# Patient Record
Sex: FEMALE | Race: WHITE | NOT HISPANIC OR LATINO | Employment: UNEMPLOYED | URBAN - METROPOLITAN AREA
[De-identification: names, ages, dates, MRNs, and addresses within clinical notes are randomized per-mention and may not be internally consistent; named-entity substitution may affect disease eponyms.]

---

## 2019-04-18 ENCOUNTER — HOSPITAL ENCOUNTER (OUTPATIENT)
Dept: OTHER | Facility: HOSPITAL | Age: 39
Setting detail: SPECIMEN
Discharge: HOME | End: 2019-04-18
Attending: ORTHOPAEDIC SURGERY | Admitting: ORTHOPAEDIC SURGERY

## 2019-04-19 ENCOUNTER — HOSPITAL ENCOUNTER (OUTPATIENT)
Dept: OTHER | Facility: HOSPITAL | Age: 39
Setting detail: SPECIMEN
Discharge: HOME | End: 2019-04-19
Attending: ORTHOPAEDIC SURGERY | Admitting: ORTHOPAEDIC SURGERY

## 2019-04-19 LAB
ANION GAP SERPL CALC-SCNC: 13.5 MMOL/L (ref 10–20)
BASOPHILS # BLD AUTO: 0 10*3/UL (ref 0–0.2)
BASOPHILS NFR BLD AUTO: 0 % (ref 0–2)
BUN SERPL-MCNC: 6 MG/DL (ref 8–20)
BUN/CREAT SERPL: 10 (ref 5.4–26.2)
CALCIUM SERPL-MCNC: 8.7 MG/DL (ref 8.9–10.3)
CHLORIDE SERPL-SCNC: 105 MMOL/L (ref 101–111)
CONV CO2: 21 MMOL/L (ref 22–32)
CREAT UR-MCNC: 0.6 MG/DL (ref 0.4–1)
DIFFERENTIAL METHOD BLD: (no result)
EOSINOPHIL # BLD AUTO: 0 % (ref 0–3)
EOSINOPHIL # BLD AUTO: 0 10*3/UL (ref 0–0.3)
ERYTHROCYTE [DISTWIDTH] IN BLOOD BY AUTOMATED COUNT: 13.9 % (ref 11.5–14.5)
GLUCOSE SERPL-MCNC: 156 MG/DL (ref 65–99)
HCT VFR BLD AUTO: 35 % (ref 35–49)
HGB BLD-MCNC: 11.3 G/DL (ref 12–15)
LYMPHOCYTES # BLD AUTO: 0.5 10*3/UL (ref 0.8–4.8)
LYMPHOCYTES NFR BLD AUTO: 4 % (ref 18–42)
MCH RBC QN AUTO: 28.9 PG (ref 26–32)
MCHC RBC AUTO-ENTMCNC: 32.2 G/DL (ref 32–36)
MCV RBC AUTO: 89.9 FL (ref 80–94)
MONOCYTES # BLD AUTO: 0.4 10*3/UL (ref 0.1–1.3)
MONOCYTES NFR BLD AUTO: 3 % (ref 2–11)
NEUTROPHILS # BLD AUTO: 12 10*3/UL (ref 2.3–8.6)
NEUTROPHILS NFR BLD AUTO: 93 % (ref 50–75)
NRBC BLD AUTO-RTO: 0 /100{WBCS}
NRBC/RBC NFR BLD MANUAL: 0 10*3/UL
PLATELET # BLD AUTO: 426 10*3/UL (ref 150–450)
PMV BLD AUTO: 9.2 FL (ref 7.4–10.4)
POTASSIUM SERPL-SCNC: 3.5 MMOL/L (ref 3.6–5.1)
RBC # BLD AUTO: 3.9 10*6/UL (ref 4–5.4)
SODIUM SERPL-SCNC: 136 MMOL/L (ref 136–144)
WBC # BLD AUTO: 12.9 10*3/UL (ref 4.5–11.5)

## 2022-04-07 ENCOUNTER — OFFICE VISIT (OUTPATIENT)
Dept: FAMILY MEDICINE CLINIC | Facility: CLINIC | Age: 42
End: 2022-04-07

## 2022-04-07 VITALS
SYSTOLIC BLOOD PRESSURE: 126 MMHG | WEIGHT: 193.8 LBS | DIASTOLIC BLOOD PRESSURE: 72 MMHG | HEART RATE: 85 BPM | TEMPERATURE: 97.7 F | BODY MASS INDEX: 34.34 KG/M2 | OXYGEN SATURATION: 99 % | HEIGHT: 63 IN

## 2022-04-07 DIAGNOSIS — R53.83 FATIGUE, UNSPECIFIED TYPE: ICD-10-CM

## 2022-04-07 DIAGNOSIS — J30.2 SEASONAL ALLERGIC RHINITIS, UNSPECIFIED TRIGGER: ICD-10-CM

## 2022-04-07 DIAGNOSIS — K92.1 BLOOD IN STOOL: ICD-10-CM

## 2022-04-07 DIAGNOSIS — L30.9 DERMATITIS: ICD-10-CM

## 2022-04-07 DIAGNOSIS — Z13.220 SCREENING FOR LIPID DISORDERS: ICD-10-CM

## 2022-04-07 DIAGNOSIS — Z76.89 ENCOUNTER TO ESTABLISH CARE: Primary | ICD-10-CM

## 2022-04-07 DIAGNOSIS — F41.1 GAD (GENERALIZED ANXIETY DISORDER): ICD-10-CM

## 2022-04-07 PROBLEM — Q76.1 CERVICAL FUSION SYNDROME: Status: ACTIVE | Noted: 2017-01-26

## 2022-04-07 PROBLEM — M48.02 SPINAL STENOSIS IN CERVICAL REGION: Status: ACTIVE | Noted: 2017-10-17

## 2022-04-07 PROBLEM — M50.30 BULGE OF CERVICAL DISC WITHOUT MYELOPATHY: Status: ACTIVE | Noted: 2022-04-07

## 2022-04-07 PROBLEM — J30.9 ALLERGIC RHINITIS: Status: ACTIVE | Noted: 2020-10-12

## 2022-04-07 LAB
25(OH)D3 SERPL-MCNC: 28.2 NG/ML (ref 30–100)
ALBUMIN SERPL-MCNC: 4.6 G/DL (ref 3.5–5.2)
ALBUMIN/GLOB SERPL: 1.6 G/DL
ALP SERPL-CCNC: 50 U/L (ref 39–117)
ALT SERPL W P-5'-P-CCNC: 14 U/L (ref 1–33)
ANION GAP SERPL CALCULATED.3IONS-SCNC: 11.6 MMOL/L (ref 5–15)
AST SERPL-CCNC: 12 U/L (ref 1–32)
BASOPHILS # BLD AUTO: 0.04 10*3/MM3 (ref 0–0.2)
BASOPHILS NFR BLD AUTO: 0.8 % (ref 0–1.5)
BILIRUB SERPL-MCNC: 0.2 MG/DL (ref 0–1.2)
BUN SERPL-MCNC: 16 MG/DL (ref 6–20)
BUN/CREAT SERPL: 20.8 (ref 7–25)
CALCIUM SPEC-SCNC: 9.5 MG/DL (ref 8.6–10.5)
CHLORIDE SERPL-SCNC: 103 MMOL/L (ref 98–107)
CHOLEST SERPL-MCNC: 189 MG/DL (ref 0–200)
CO2 SERPL-SCNC: 23.4 MMOL/L (ref 22–29)
CREAT SERPL-MCNC: 0.77 MG/DL (ref 0.57–1)
DEPRECATED RDW RBC AUTO: 40 FL (ref 37–54)
EGFRCR SERPLBLD CKD-EPI 2021: 100.8 ML/MIN/1.73
EOSINOPHIL # BLD AUTO: 0.1 10*3/MM3 (ref 0–0.4)
EOSINOPHIL NFR BLD AUTO: 1.9 % (ref 0.3–6.2)
ERYTHROCYTE [DISTWIDTH] IN BLOOD BY AUTOMATED COUNT: 12.3 % (ref 12.3–15.4)
FERRITIN SERPL-MCNC: 22.2 NG/ML (ref 13–150)
FOLATE SERPL-MCNC: 7.68 NG/ML (ref 4.78–24.2)
GLOBULIN UR ELPH-MCNC: 2.8 GM/DL
GLUCOSE SERPL-MCNC: 78 MG/DL (ref 65–99)
HCT VFR BLD AUTO: 39 % (ref 34–46.6)
HDLC SERPL-MCNC: 44 MG/DL (ref 40–60)
HGB BLD-MCNC: 12.7 G/DL (ref 12–15.9)
IMM GRANULOCYTES # BLD AUTO: 0.02 10*3/MM3 (ref 0–0.05)
IMM GRANULOCYTES NFR BLD AUTO: 0.4 % (ref 0–0.5)
IRON 24H UR-MRATE: 91 MCG/DL (ref 37–145)
IRON SATN MFR SERPL: 23 % (ref 20–50)
LDLC SERPL CALC-MCNC: 122 MG/DL (ref 0–100)
LDLC/HDLC SERPL: 2.72 {RATIO}
LYMPHOCYTES # BLD AUTO: 1.4 10*3/MM3 (ref 0.7–3.1)
LYMPHOCYTES NFR BLD AUTO: 26.4 % (ref 19.6–45.3)
MCH RBC QN AUTO: 28.7 PG (ref 26.6–33)
MCHC RBC AUTO-ENTMCNC: 32.6 G/DL (ref 31.5–35.7)
MCV RBC AUTO: 88.2 FL (ref 79–97)
MONOCYTES # BLD AUTO: 0.34 10*3/MM3 (ref 0.1–0.9)
MONOCYTES NFR BLD AUTO: 6.4 % (ref 5–12)
NEUTROPHILS NFR BLD AUTO: 3.4 10*3/MM3 (ref 1.7–7)
NEUTROPHILS NFR BLD AUTO: 64.1 % (ref 42.7–76)
NRBC BLD AUTO-RTO: 0 /100 WBC (ref 0–0.2)
PLATELET # BLD AUTO: 466 10*3/MM3 (ref 140–450)
PMV BLD AUTO: 10.1 FL (ref 6–12)
POTASSIUM SERPL-SCNC: 4.1 MMOL/L (ref 3.5–5.2)
PROT SERPL-MCNC: 7.4 G/DL (ref 6–8.5)
RBC # BLD AUTO: 4.42 10*6/MM3 (ref 3.77–5.28)
SODIUM SERPL-SCNC: 138 MMOL/L (ref 136–145)
TIBC SERPL-MCNC: 404 MCG/DL (ref 298–536)
TRANSFERRIN SERPL-MCNC: 271 MG/DL (ref 200–360)
TRIGL SERPL-MCNC: 126 MG/DL (ref 0–150)
TSH SERPL DL<=0.05 MIU/L-ACNC: 1.63 UIU/ML (ref 0.27–4.2)
VIT B12 BLD-MCNC: 459 PG/ML (ref 211–946)
VLDLC SERPL-MCNC: 23 MG/DL (ref 5–40)
WBC NRBC COR # BLD: 5.3 10*3/MM3 (ref 3.4–10.8)

## 2022-04-07 PROCEDURE — 99214 OFFICE O/P EST MOD 30 MIN: CPT | Performed by: NURSE PRACTITIONER

## 2022-04-07 PROCEDURE — 82746 ASSAY OF FOLIC ACID SERUM: CPT | Performed by: NURSE PRACTITIONER

## 2022-04-07 PROCEDURE — 85025 COMPLETE CBC W/AUTO DIFF WBC: CPT | Performed by: NURSE PRACTITIONER

## 2022-04-07 PROCEDURE — 82728 ASSAY OF FERRITIN: CPT | Performed by: NURSE PRACTITIONER

## 2022-04-07 PROCEDURE — 80061 LIPID PANEL: CPT | Performed by: NURSE PRACTITIONER

## 2022-04-07 PROCEDURE — 82306 VITAMIN D 25 HYDROXY: CPT | Performed by: NURSE PRACTITIONER

## 2022-04-07 PROCEDURE — 84443 ASSAY THYROID STIM HORMONE: CPT | Performed by: NURSE PRACTITIONER

## 2022-04-07 PROCEDURE — 80053 COMPREHEN METABOLIC PANEL: CPT | Performed by: NURSE PRACTITIONER

## 2022-04-07 PROCEDURE — 84466 ASSAY OF TRANSFERRIN: CPT | Performed by: NURSE PRACTITIONER

## 2022-04-07 PROCEDURE — 82607 VITAMIN B-12: CPT | Performed by: NURSE PRACTITIONER

## 2022-04-07 PROCEDURE — 96372 THER/PROPH/DIAG INJ SC/IM: CPT | Performed by: NURSE PRACTITIONER

## 2022-04-07 PROCEDURE — 83540 ASSAY OF IRON: CPT | Performed by: NURSE PRACTITIONER

## 2022-04-07 RX ORDER — DULOXETIN HYDROCHLORIDE 30 MG/1
30 CAPSULE, DELAYED RELEASE ORAL DAILY
Qty: 90 CAPSULE | Refills: 1 | Status: SHIPPED | OUTPATIENT
Start: 2022-04-07 | End: 2022-04-28

## 2022-04-07 RX ORDER — TRIAMCINOLONE ACETONIDE 1 MG/G
1 CREAM TOPICAL 2 TIMES DAILY
Qty: 80 G | Refills: 1 | Status: SHIPPED | OUTPATIENT
Start: 2022-04-07

## 2022-04-07 RX ORDER — TRIAMCINOLONE ACETONIDE 40 MG/ML
40 INJECTION, SUSPENSION INTRA-ARTICULAR; INTRAMUSCULAR ONCE
Status: COMPLETED | OUTPATIENT
Start: 2022-04-07 | End: 2022-04-07

## 2022-04-07 RX ORDER — TIZANIDINE 4 MG/1
8 TABLET ORAL 3 TIMES DAILY
COMMUNITY
End: 2022-05-19

## 2022-04-07 RX ADMIN — TRIAMCINOLONE ACETONIDE 40 MG: 40 INJECTION, SUSPENSION INTRA-ARTICULAR; INTRAMUSCULAR at 12:21

## 2022-04-07 NOTE — PROGRESS NOTES
Chief Complaint  Establish Care, anxiety depression    Subjective          Ebony Hamilton presents to [facility]  History of Present Illness  Presents today to establish care.  Previous PCP is Reina Kaiser at [facility] primary care in [facility].  She has anxiety.  She was previously treated with hydroxyzine and Lexapro.  She states the Lexapro caused her to sweat profusely daily.  She was also given hydroxyzine to help out with her anxiety and her insomnia.  She states the medication did not help much.    She was seen pain management specialist pain care in [place].  She has chronic neck pain.  Spinal stenosis in the cervical region, degenerative disc disease with a couple surgeries.  She is trying to switch to [facility].  She states when she spoke with the pain clinic in [place] that they did not refill her last medication.  She is in the process of setting up an appointment with [facility] pain and spine.  Previously taking Percocet 7.5-325 4 times daily.    She has endometriosis.  She has an appointment scheduled with a gynecologist on the 19th.    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  When she had her surgery and had effusion on her neck that the implants contained nickel which caused her to have a rash.  She is seeing an allergist prior.  Reports that she has several allergies to some foods and pollen.  In the last week she developed a rash on her hands that is started spread up her arms and feet.    She also reports having intermittent blood in her stool.  Last week she had blood clots.  History of hemorrhoids.    PHQ-9 Depression Screening  Little interest or pleasure in doing things? 2-->more than half the days   Feeling down, depressed, or hopeless? 3-->nearly every day   Trouble falling or staying asleep, or sleeping too much? 3-->nearly every  day   Feeling tired or having little energy? 3-->nearly every day   Poor appetite or overeating? 0-->not at all   Feeling bad about yourself - or that you are a failure or have let yourself or your family down? 0-->not at all   Trouble concentrating on things, such as reading the newspaper or watching television? 2-->more than half the days   Moving or speaking so slowly that other people could have noticed? Or the opposite - being so fidgety or restless that you have been moving around a lot more than usual? 1-->several days   Thoughts that you would be better off dead, or of hurting yourself in some way? 0-->not at all   PHQ-9 Total Score 14   If you checked off any problems, how difficult have these problems made it for you to do your work, take care of things at home, or get along with other people? very difficult           NICHOLE - 7 Anxiety    Date data was collected: 4/7/2022    Over the last 2 weeks, how often have you been bothered by any of the following problems?    1. Feeling nervous, anxious or on edge: Nearly every day = 3   2. Not being able to stop or control worrying Nearly every day = 3   3. Worrying too much about different things: Nearly every day = 3   4. Trouble relaxing: Nearly every day = 3   5. Being so restless that it is hard to sit still: Nearly every day = 3   6. Becoming easily annoyed or irritable: Nearly every day = 3   7. Feeling afraid as if something awful might happen: Nearly every day = 3    Total Score 21     If you checked off any problems, how difficult have these problems made it for you to do your work, take care of things at home or get along with other people? Somewhat difficult    ______________________________________________________________________  NICHOLE-7 SCORING CARD FOR SEVERITY DETERMINATION    Scoring -- add up all answers  Not at all =0; Serveral Days = 1; More than half the days - 2; Nearly every day =3      Interpretation of Total Score  Total Score Anxiety Severity  "  0-5 Mild   6-10 Moderate   11-15 Moderately Severe   15-21 Severe       Objective   Vital Signs:   /72   Pulse 85   Temp 97.7 °F (36.5 °C)   Ht 158.8 cm (62.5\")   Wt 87.9 kg (193 lb 12.8 oz)   SpO2 99%   BMI 34.88 kg/m²            Physical Exam  Vitals reviewed.   Constitutional:       Appearance: Normal appearance. She is well-developed.   HENT:      Head: Normocephalic and atraumatic.      Right Ear: External ear normal.      Left Ear: External ear normal.      Mouth/Throat:      Pharynx: No oropharyngeal exudate.   Eyes:      Conjunctiva/sclera: Conjunctivae normal.      Pupils: Pupils are equal, round, and reactive to light.   Cardiovascular:      Rate and Rhythm: Normal rate and regular rhythm.      Heart sounds: No murmur heard.    No friction rub. No gallop.   Pulmonary:      Effort: Pulmonary effort is normal.      Breath sounds: Normal breath sounds. No wheezing or rhonchi.   Abdominal:      General: Bowel sounds are normal. There is no distension.      Palpations: Abdomen is soft.      Tenderness: There is no abdominal tenderness.   Skin:     General: Skin is warm and dry.      Findings: Rash (Erythema rash on bilateral hand scattered, slightly going up arm, on feet as well.) present.   Neurological:      Mental Status: She is alert and oriented to person, place, and time.   Psychiatric:         Mood and Affect: Affect normal. Mood is anxious.         Behavior: Behavior normal.         Thought Content: Thought content normal.         Judgment: Judgment normal.        Result Review :                 Assessment and Plan    Diagnoses and all orders for this visit:    1. Encounter to establish care (Primary)    2. Fatigue, unspecified type  -     Ferritin  -     Iron Profile  -     Vitamin D 25 Hydroxy  -     Vitamin B12 & Folate    3. NICHOLE (generalized anxiety disorder)  -     CBC & Differential  -     Comprehensive Metabolic Panel  -     TSH Rfx On Abnormal To Free T4  -     Ambulatory Referral " to Psychiatry  -     DULoxetine (CYMBALTA) 30 MG capsule; Take 1 capsule by mouth Daily.  Dispense: 90 capsule; Refill: 1    4. Screening for lipid disorders  -     Lipid Panel    5. Seasonal allergic rhinitis, unspecified trigger    6. Dermatitis  -     triamcinolone acetonide (KENALOG-40) injection 40 mg  -     triamcinolone (KENALOG) 0.1 % cream; Apply 1 application topically to the appropriate area as directed 2 (Two) Times a Day.  Dispense: 80 g; Refill: 1    7. Blood in stool    Check a CBC ferritin iron for fatigue and blood in stool.  Check a vitamin D B12 folate for fatigue.  Start duloxetine 30 mg daily for anxiety and for chronic cervical neck pain.  Consult psychiatry for further evaluation and treatment.  For the dermatitis give Kenalog 40 IM injection today.  Apply triamcinolone 0.1% cream twice daily to the rash.      Follow Up   Return in about 1 week (around 4/14/2022), or if symptoms worsen or fail to improve, for Next scheduled follow up.  Patient was given instructions and counseling regarding her condition or for health maintenance advice. Please see specific information pulled into the AVS if appropriate.

## 2022-04-14 ENCOUNTER — OFFICE VISIT (OUTPATIENT)
Dept: FAMILY MEDICINE CLINIC | Facility: CLINIC | Age: 42
End: 2022-04-14

## 2022-04-14 VITALS
SYSTOLIC BLOOD PRESSURE: 124 MMHG | WEIGHT: 198.8 LBS | DIASTOLIC BLOOD PRESSURE: 88 MMHG | OXYGEN SATURATION: 98 % | BODY MASS INDEX: 35.22 KG/M2 | HEART RATE: 101 BPM | TEMPERATURE: 97.8 F | HEIGHT: 63 IN

## 2022-04-14 DIAGNOSIS — L30.9 DERMATITIS: ICD-10-CM

## 2022-04-14 DIAGNOSIS — K92.1 BLOOD IN STOOL: ICD-10-CM

## 2022-04-14 DIAGNOSIS — F41.1 GAD (GENERALIZED ANXIETY DISORDER): Primary | ICD-10-CM

## 2022-04-14 PROCEDURE — 99214 OFFICE O/P EST MOD 30 MIN: CPT | Performed by: NURSE PRACTITIONER

## 2022-04-14 RX ORDER — BUSPIRONE HYDROCHLORIDE 5 MG/1
5 TABLET ORAL 3 TIMES DAILY PRN
Qty: 90 TABLET | Refills: 2 | Status: SHIPPED | OUTPATIENT
Start: 2022-04-14 | End: 2022-04-28

## 2022-04-14 RX ORDER — HYDROCODONE BITARTRATE AND ACETAMINOPHEN 10; 325 MG/1; MG/1
1 TABLET ORAL EVERY 6 HOURS PRN
COMMUNITY
End: 2023-02-03

## 2022-04-14 NOTE — ASSESSMENT & PLAN NOTE
Anxiety insomnia are improving with Cymbalta 30 mg daily.  To help cover the afternoon evening hours of her anxiety we will start BuSpar 5 mg 3 times daily as needed.  She has a consultation with psychiatry in 2 weeks.

## 2022-04-14 NOTE — ASSESSMENT & PLAN NOTE
She has endometriosis.  She is going to see her OB/GYN next week.  The blood in stool only is occurring with prior her menstrual cycle.  Will consult general surgery for colonoscopy.

## 2022-04-14 NOTE — PROGRESS NOTES
"Chief Complaint  Anxiety, Depression, and Labs Only (Lab results )    Subjective          Ebony Hamilton presents to [facility]  History of Present Illness  Presents today for 1 week follow-up on depression anxiety, rash, and blood in stool.  Last week she was started on Cymbalta 30 mg daily.  She states the medication is helping a lot with her depression anxiety.  She feels more calm during the day.  She states she feels the medication wearing off in the afternoon and feels more anxious.  She is sleeping better at night.    She states over the past year 3 days prior to her menstrual cycle she will have blood and blood clots in her stool.  He states it is different than when she is on her menstrual cycle.  She has hemorrhoids.  She takes over-the-counter stool softener and fiber supplements.  She has a history of endometriosis.  She is seeing her OB/GYN next week.    Started seeing her new pain clinic doctor.  He resumed her Norco for her chronic neck pain.    The dermatitis rash that was on her hands and feet have resolved after receiving a Kenalog injection and applying Kenalog cream.    Reviewed labs from last week.  She has a mild elevation of her LDL.  Also low vitamin D.  She is taking over-the-counter vitamin D supplements.  Slightly elevated platelets.    Objective   Vital Signs:   /88   Pulse 101   Temp 97.8 °F (36.6 °C)   Ht 160 cm (63\")   Wt 90.2 kg (198 lb 12.8 oz)   SpO2 98%   BMI 35.22 kg/m²            Physical Exam  Vitals reviewed.   Constitutional:       Appearance: Normal appearance. She is well-developed.   HENT:      Head: Normocephalic and atraumatic.      Right Ear: External ear normal.      Left Ear: External ear normal.      Mouth/Throat:      Pharynx: No oropharyngeal exudate.   Eyes:      Conjunctiva/sclera: Conjunctivae normal.      Pupils: Pupils are equal, round, and reactive to light.   Cardiovascular:      Rate and Rhythm: Normal rate and " regular rhythm.      Heart sounds: No murmur heard.    No friction rub. No gallop.   Pulmonary:      Effort: Pulmonary effort is normal.      Breath sounds: Normal breath sounds. No wheezing or rhonchi.   Abdominal:      General: Bowel sounds are normal. There is no distension.      Palpations: Abdomen is soft.      Tenderness: There is no abdominal tenderness.   Skin:     General: Skin is warm and dry.   Neurological:      Mental Status: She is alert and oriented to person, place, and time.   Psychiatric:         Mood and Affect: Mood and affect normal.         Behavior: Behavior normal.         Thought Content: Thought content normal.         Judgment: Judgment normal.        Result Review :     Common labs    Common Labsle 4/7/22 4/7/22 4/7/22    1233 1233 1233   Glucose  78    BUN  16    Creatinine  0.77    Sodium  138    Potassium  4.1    Chloride  103    Calcium  9.5    Albumin  4.60    Total Bilirubin  0.2    Alkaline Phosphatase  50    AST (SGOT)  12    ALT (SGPT)  14    WBC 5.30     Hemoglobin 12.7     Hematocrit 39.0     Platelets 466 (A)     Total Cholesterol   189   Triglycerides   126   HDL Cholesterol   44   LDL Cholesterol    122 (A)   (A) Abnormal value                      Assessment and Plan {CC Problem List  Visit Diagnosis   ROS  Review (Popup)  [facility] Maintenance  Quality  BestPractice  Medications  SmartSets  SnapShot Encounters  Media :23}   Diagnoses and all orders for this visit:    1. NICHOLE (generalized anxiety disorder) (Primary)  Assessment & Plan:  Anxiety insomnia are improving with Cymbalta 30 mg daily.  To help cover the afternoon evening hours of her anxiety we will start BuSpar 5 mg 3 times daily as needed.  She has a consultation with psychiatry in 2 weeks.    Orders:  -     busPIRone (BUSPAR) 5 MG tablet; Take 1 tablet by mouth 3 (Three) Times a Day As Needed (anxiety).  Dispense: 90 tablet; Refill: 2    2. Blood in stool  Assessment & Plan:  She has endometriosis.  She  is going to see her OB/GYN next week.  The blood in stool only is occurring with prior her menstrual cycle.  Will consult general surgery for colonoscopy.     Orders:  -     Ambulatory Referral to General Surgery    3. Dermatitis  Comments:  Dermatitis has resolved      Follow Up   Return in about 4 weeks (around 5/12/2022), or if symptoms worsen or fail to improve, for Next scheduled follow up.  Patient was given instructions and counseling regarding her condition or for health maintenance advice. Please see specific information pulled into the AVS if appropriate.

## 2022-04-21 ENCOUNTER — OFFICE VISIT (OUTPATIENT)
Dept: SURGERY | Facility: CLINIC | Age: 42
End: 2022-04-21

## 2022-04-21 ENCOUNTER — PREP FOR SURGERY (OUTPATIENT)
Dept: OTHER | Facility: HOSPITAL | Age: 42
End: 2022-04-21

## 2022-04-21 VITALS — HEART RATE: 87 BPM | BODY MASS INDEX: 34.2 KG/M2 | RESPIRATION RATE: 18 BRPM | WEIGHT: 193 LBS | HEIGHT: 63 IN

## 2022-04-21 DIAGNOSIS — K59.03 DRUG-INDUCED CONSTIPATION: ICD-10-CM

## 2022-04-21 DIAGNOSIS — K62.5 BRBPR (BRIGHT RED BLOOD PER RECTUM): Primary | ICD-10-CM

## 2022-04-21 DIAGNOSIS — R19.7 DIARRHEA: ICD-10-CM

## 2022-04-21 DIAGNOSIS — K59.00 CONSTIPATION: ICD-10-CM

## 2022-04-21 DIAGNOSIS — R10.30 LOWER ABDOMINAL PAIN: ICD-10-CM

## 2022-04-21 DIAGNOSIS — R19.7 DIARRHEA, UNSPECIFIED TYPE: ICD-10-CM

## 2022-04-21 PROCEDURE — 99213 OFFICE O/P EST LOW 20 MIN: CPT | Performed by: NURSE PRACTITIONER

## 2022-04-21 RX ORDER — SODIUM CHLORIDE 0.9 % (FLUSH) 0.9 %
10 SYRINGE (ML) INJECTION AS NEEDED
Status: CANCELLED | OUTPATIENT
Start: 2022-04-21

## 2022-04-21 RX ORDER — SODIUM CHLORIDE 0.9 % (FLUSH) 0.9 %
3 SYRINGE (ML) INJECTION EVERY 12 HOURS SCHEDULED
Status: CANCELLED | OUTPATIENT
Start: 2022-04-21

## 2022-04-21 NOTE — PROGRESS NOTES
Chief Complaint: Colonoscopy (consult)    Subjective      Colonoscopy consultation       History of Present Illness  Ebony Hamilton is a 41 y.o. female presents to [facility] for a colonoscopy consultation.    Patient presents today on a referral from Son LEVY.    Patient reports with complaints of BRBPR, she feels like it hemorrhoid related. Admits that bleeding is significant enough that it fills the toilet. She reports that bleeding has been  On-going x  1 year.     Admits to lower abdominal pain. Reports that she is with diarrhea alternating constipation. She reports that she is taking pain medication daily for 2 previous neck surgery she has had.    Denies any family history of colorectal cancer.    No previous colonoscopy.    Objective     Past Medical History:   Diagnosis Date   • Anxiety    • Atrial fibrillation (HCC)    • Depression    • Endometriosis    • Injury of shoulder, right 2009       Past Surgical History:   Procedure Laterality Date   • CERVICAL ARTHRODESIS  01/14/2015    Donaldo Albarran   • EXPLORATORY LAPAROTOMY     • TUBAL ABDOMINAL LIGATION         Outpatient Medications Marked as Taking for the 4/21/22 encounter (Office Visit) with Madina Hassan APRN   Medication Sig Dispense Refill   • busPIRone (BUSPAR) 5 MG tablet Take 1 tablet by mouth 3 (Three) Times a Day As Needed (anxiety). 90 tablet 2   • cetirizine (ZyrTEC) 10 MG tablet Take 10 mg by mouth daily.     • DULoxetine (CYMBALTA) 30 MG capsule Take 1 capsule by mouth Daily. 90 capsule 1   • famotidine (PEPCID) 10 MG tablet Take 10 mg by mouth 2 (two) times a day.     • fexofenadine (ALLEGRA) 30 MG tablet Take 30 mg by mouth 2 (two) times a day.     • HYDROcodone-acetaminophen (NORCO)  MG per tablet Take 1 tablet by mouth Every 6 (Six) Hours As Needed for Moderate Pain .     • tiZANidine (ZANAFLEX) 4 MG tablet Take 8 mg by mouth 3 (Three) Times a Day.     • vitamin C (ASCORBIC ACID) 500 MG  "tablet Take 500 mg by mouth daily.         Allergies   Allergen Reactions   • Sulfa Antibiotics Anaphylaxis   • Ciprofloxacin Hallucinations   • Codeine Rash   • Gold-Containing Drug Products Rash   • Nickel Rash        Family History   Problem Relation Age of Onset   • Cancer Mother    • Heart disease Mother    • Hypertension Mother    • Hypertension Father    • Heart disease Other        Social History     Socioeconomic History   • Marital status:    Tobacco Use   • Smoking status: Former Smoker     Packs/day: 1.00     Years: 20.00     Pack years: 20.00     Quit date: 2019     Years since quitting: 3.3   • Smokeless tobacco: Never Used   Vaping Use   • Vaping Use: Never used   Substance and Sexual Activity   • Alcohol use: No   • Drug use: No   • Sexual activity: Defer       Review of Systems   Constitutional: Negative for chills and fever.   HENT: Negative for trouble swallowing.    Gastrointestinal: Positive for abdominal pain, anal bleeding, constipation and diarrhea. Negative for abdominal distention, blood in stool, nausea, rectal pain, vomiting, GERD and indigestion.        Vital Signs:   Pulse 87   Resp 18   Ht 160 cm (63\")   Wt 87.5 kg (193 lb)   BMI 34.19 kg/m²      Physical Exam  Constitutional:       Appearance: She is obese.   HENT:      Head: Normocephalic.   Cardiovascular:      Rate and Rhythm: Normal rate.   Pulmonary:      Effort: Pulmonary effort is normal.   Abdominal:      Palpations: Abdomen is soft.   Skin:     General: Skin is warm and dry.   Neurological:      General: No focal deficit present.      Mental Status: She is alert and oriented to person, place, and time.   Psychiatric:         Mood and Affect: Mood normal.         Thought Content: Thought content normal.          Result Review :          []  Laboratory  []  Radiology  []  Pathology  []  Microbiology  []  EKG/Telemetry   []  Cardiology/Vascular   [x]  Old records  Today I have reviewed Son DHALIWAL previous " office note.      Assessment and Plan    Diagnoses and all orders for this visit:    1. BRBPR (bright red blood per rectum) (Primary)    2. Diarrhea, unspecified type    3. Lower abdominal pain    4. Drug-induced constipation    white prep    Follow Up   Return for Schedule colonoscopy with Dr. Baird on 5/31/22@ [facility], [place].     Hospital arrival time: 10am    Possible risks/complications, benefits, and alternatives to surgical or invasive procedure have been explained to patient and/or legal guardian.     Patient has been evaluated and can tolerate anesthesia and/or sedation. Risks, benefits, and alternatives to anesthesia and sedation have been explained to patient and/or legal guardian.  Patient verbalizes understanding is will proceed with above plan.    Patient was given instructions and counseling regarding her condition or for health maintenance advice. Please see specific information pulled into the AVS if appropriate.

## 2022-04-26 NOTE — PROGRESS NOTES
"    Chief Complaint:  Anxiety    History of Present Illness: Ebony Hamilton is a 41 y.o. female who presents to the office today referred by PCP Son LEVY.  Patient denies feeling depressed since starting Cymbalta.  Patient states \"I feel better.\"  She denies having any difficulty with sleep.  Patient does note having nightmares that occur nightly.  Patient denies having any SI or HI.  She does have access to firearms.  Patient denies history of suicide attempt or self-harm.  Patient had BuSpar added to med regimen as she felt like Cymbalta was wearing off in the evening.  Patient does take Cymbalta every morning.  Patient notes feeling anxious and having to take BuSpar 3 times a day although this does improve her symptoms.  Patient continues to have anxiety with worrying about everything although notes improvement in anxiety since starting Cymbalta.  No panic attacks since starting Cymbalta.  Patient denies feeling restless, on edge or easily irritable since starting Cymbalta.  Patient denies PTSD symptoms.  No AVH.  Patient does note having life stressors over the past few years, including her  cheating on her nearly 3 years ago.  Patient states this still affects her and has difficulty with trust as it is.      Medical Record Review: Reviewed office visit note from 4/7/22, She has anxiety.  She was previously treated with hydroxyzine and Lexapro.  She states the Lexapro caused her to sweat profusely daily.  She was also given hydroxyzine to help out with her anxiety and her insomnia.  She states the medication did not help much.      PHQ-9 Depression Screening  Little interest or pleasure in doing things? 1-->several days   Feeling down, depressed, or hopeless? 0-->not at all   Trouble falling or staying asleep, or sleeping too much?     Feeling tired or having little energy?     Poor appetite or overeating?     Feeling bad about yourself - or that you are a failure or have let yourself or your " family down?     Trouble concentrating on things, such as reading the newspaper or watching television?     Moving or speaking so slowly that other people could have noticed? Or the opposite - being so fidgety or restless that you have been moving around a lot more than usual?     Thoughts that you would be better off dead, or of hurting yourself in some way?     PHQ-9 Total Score 1   If you checked off any problems, how difficult have these problems made it for you to do your work, take care of things at home, or get along with other people?           NICHOLE-7  Feeling nervous, anxious or on edge: Several days  Not being able to stop or control worrying: More than half the days  Worrying too much about different things: More than half the days  Trouble Relaxing: Several days  Being so restless that it is hard to sit still: Several days  Feeling afraid as if something awful might happen: Several days  Becoming easily annoyed or irritable: Several days  NICHOLE 7 Total Score: 9  If you checked any problems, how difficult have these problems made it for you to do your work, take care of things at home, or get along with other people: Somewhat difficult      ROS:  Review of Systems   Constitutional: Positive for diaphoresis and fatigue. Negative for appetite change and unexpected weight change.   HENT: Negative for drooling, tinnitus and trouble swallowing.    Eyes: Negative for visual disturbance.   Respiratory: Negative for cough, chest tightness and shortness of breath.    Cardiovascular: Negative for chest pain and palpitations.   Gastrointestinal: Positive for abdominal pain, constipation, diarrhea and nausea. Negative for vomiting.   Endocrine: Negative for cold intolerance and heat intolerance.   Genitourinary: Negative for difficulty urinating.   Musculoskeletal: Positive for myalgias. Negative for arthralgias.   Skin: Negative for rash.   Allergic/Immunologic: Negative for immunocompromised state.   Neurological:  Positive for headaches. Negative for dizziness, tremors and seizures.   Psychiatric/Behavioral: Positive for agitation and sleep disturbance. Negative for dysphoric mood, hallucinations, self-injury and suicidal ideas. The patient is nervous/anxious.        Problem List:  Patient Active Problem List   Diagnosis   • Acute postoperative pain   • Allergic rhinitis   • Bulge of cervical disc without myelopathy   • Cervical fusion syndrome   • Degeneration of intervertebral disc of cervical region   • Spinal stenosis in cervical region   • Impingement syndrome of shoulder region   • Intractable chronic migraine without aura   • Mitral valve disorder   • Tricuspid valve disorder   • Endometriosis   • Obesity   • SLAP lesion of left shoulder   • NICHOLE (generalized anxiety disorder)   • Blood in stool   • BRBPR (bright red blood per rectum)   • Constipation   • Diarrhea   • Lower abdominal pain       Current Medications:   Current Outpatient Medications   Medication Sig Dispense Refill   • cetirizine (ZyrTEC) 10 MG tablet Take 10 mg by mouth daily.     • famotidine (PEPCID) 10 MG tablet Take 10 mg by mouth 2 (two) times a day.     • fexofenadine (ALLEGRA) 30 MG tablet Take 30 mg by mouth 2 (two) times a day.     • HYDROcodone-acetaminophen (NORCO)  MG per tablet Take 1 tablet by mouth Every 6 (Six) Hours As Needed for Moderate Pain .     • tiZANidine (ZANAFLEX) 4 MG tablet Take 8 mg by mouth 3 (Three) Times a Day.     • triamcinolone (KENALOG) 0.1 % cream Apply 1 application topically to the appropriate area as directed 2 (Two) Times a Day. 80 g 1   • vitamin C (ASCORBIC ACID) 500 MG tablet Take 500 mg by mouth daily.     • busPIRone (BUSPAR) 7.5 MG tablet Take 1 tablet by mouth 3 (Three) Times a Day for 30 days. 90 tablet 2   • DULoxetine (Cymbalta) 60 MG capsule Take 1 capsule by mouth Daily. 30 capsule 2   • prazosin (MINIPRESS) 1 MG capsule Take 1 capsule by mouth Every Night for 30 days. 30 capsule 2     No current  facility-administered medications for this visit.       Discontinued Medications:  Medications Discontinued During This Encounter   Medication Reason   • busPIRone (BUSPAR) 5 MG tablet    • DULoxetine (CYMBALTA) 30 MG capsule        Allergy:   Allergies   Allergen Reactions   • Sulfa Antibiotics Anaphylaxis   • Ciprofloxacin Hallucinations   • Codeine Rash   • Gold-Containing Drug Products Rash   • Nickel Rash        Past Medical History:  Past Medical History:   Diagnosis Date   • Anxiety    • Atrial fibrillation (HCC)    • Chronic pain disorder    • Depression    • Endometriosis    • Injury of shoulder, right 2009   • Panic disorder        Past Surgical History:  Past Surgical History:   Procedure Laterality Date   • CERVICAL ARTHRODESIS  01/14/2015    Donaldo Albarran   • CERVICAL FUSION     • EXPLORATORY LAPAROTOMY     • TUBAL ABDOMINAL LIGATION         Past Psychiatric History:  Began Treatment: Initially 20 years ago and then 1 to 2 years ago.  Diagnoses: Anxiety  Psychiatrist: Denies  Therapist: Denies  Admission History: Denies  Medications/Treatment: Patient was initially put on Xanax (irritable the following day), Lexapro (sweating, GI symptoms, headache)  Self Harm: Denies  Suicide Attempts: Denies  Postpartum depression: Denies    Family Psychiatric History:   Diagnoses: Her mother has a history of depression, anxiety, and bipolar disorder (patient is not completely sure about bipolar diagnosis)  Substance use: Denies  Suicide Attempts/Completions: Denies    Family History   Problem Relation Age of Onset   • Depression Mother    • Bipolar disorder Mother    • Anxiety disorder Mother    • Cancer Mother    • Heart disease Mother    • Hypertension Mother    • Anxiety disorder Father    • Hypertension Father    • Dementia Paternal Uncle    • Dementia Paternal Grandmother    • Heart disease Other        Substance Abuse History:   Alcohol use: Denies  Nicotine: Denies  Illicit Drug Use: Denies  Longest Period  "Sober: Denies  Rehab/AA/NA: Denies    Social History:  Living Situation: Patient lives with  and their 17-year-old daughter.  Marital/Relationship History: Patient has been  to  for 15 years.  No abuse.  Children: She has a 17-year-old daughter and a 21-year-old daughter.  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  Education: Patient received her GED and attended school for EMS.   History: Denies  Legal: Denies    Social History     Socioeconomic History   • Marital status:    Tobacco Use   • Smoking status: Former Smoker     Packs/day: 1.00     Years: 20.00     Pack years: 20.00     Quit date: 2019     Years since quitting: 3.3   • Smokeless tobacco: Never Used   Vaping Use   • Vaping Use: Never used   Substance and Sexual Activity   • Alcohol use: No   • Drug use: Never   • Sexual activity: Yes       Developmental History:   Place of birth: Patient was born in [place].  Siblings: Denies  Childhood: Patient notes having trust issues since childhood.      Physical Exam:  Physical Exam    Appearance: Well-groomed with adequate hygiene, appears to be of stated age. Casually and neatly dressed, maintains good eye contact.   Behavior: Appropriate, cooperative. No acute distress.  Motor: No abnormal movements, tics or tremors are noted. No psychomotor agitation or retardation.  Speech: Coherent, spontaneous, appropriate with normal rate, volume, rhythm, and tone. Normal reaction time to questions. No hyperverbal or pressured speech.   Mood: \"I'm feeling great\"  Affect: Full range, appropriate, congruent with spontaneous emotional reactivity. Normal intensity. No emotional blunting. Pt is pleasant. Pt does not appear depressed or anxious.  Thought content: Negative suicidal ideations, negative homicidal ideations. Patient denies any obsession, compulsion, or phobia. No evidence of delusions.  Perceptions: Negative auditory hallucinations, negative visual " "hallucinations. Pt does not appear to be actively responding to internal stimuli.   Thought process: Logical, goal-directed, coherent, and linear with no evidence of flight of ideas, looseness of associations, thought blocking, circumstantiality, or tangentiality.   Insight/Judgement: Fair/fair  Cognition: Alert and oriented to person, place, and date. Memory intact for recent and remote events. Attention and concentration intact.     Vital Signs:   /70   Ht 160 cm (63\")   Wt 86.3 kg (190 lb 4.8 oz)   BMI 33.71 kg/m²      Lab Results:   Office Visit on 04/07/2022   Component Date Value Ref Range Status   • Glucose 04/07/2022 78  65 - 99 mg/dL Final   • BUN 04/07/2022 16  6 - 20 mg/dL Final   • Creatinine 04/07/2022 0.77  0.57 - 1.00 mg/dL Final   • Sodium 04/07/2022 138  136 - 145 mmol/L Final   • Potassium 04/07/2022 4.1  3.5 - 5.2 mmol/L Final   • Chloride 04/07/2022 103  98 - 107 mmol/L Final   • CO2 04/07/2022 23.4  22.0 - 29.0 mmol/L Final   • Calcium 04/07/2022 9.5  8.6 - 10.5 mg/dL Final   • Total Protein 04/07/2022 7.4  6.0 - 8.5 g/dL Final   • Albumin 04/07/2022 4.60  3.50 - 5.20 g/dL Final   • ALT (SGPT) 04/07/2022 14  1 - 33 U/L Final   • AST (SGOT) 04/07/2022 12  1 - 32 U/L Final   • Alkaline Phosphatase 04/07/2022 50  39 - 117 U/L Final   • Total Bilirubin 04/07/2022 0.2  0.0 - 1.2 mg/dL Final   • Globulin 04/07/2022 2.8  gm/dL Final   • A/G Ratio 04/07/2022 1.6  g/dL Final   • BUN/Creatinine Ratio 04/07/2022 20.8  7.0 - 25.0 Final   • Anion Gap 04/07/2022 11.6  5.0 - 15.0 mmol/L Final   • eGFR 04/07/2022 100.8  >60.0 mL/min/1.73 Final    National Kidney Foundation and American Society of Nephrology (ASN) Task Force recommended calculation based on the Chronic Kidney Disease Epidemiology Collaboration (CKD-EPI) equation refit without adjustment for race.   • Total Cholesterol 04/07/2022 189  0 - 200 mg/dL Final   • Triglycerides 04/07/2022 126  0 - 150 mg/dL Final   • HDL Cholesterol 04/07/2022 " 44  40 - 60 mg/dL Final   • LDL Cholesterol  04/07/2022 122 (A) 0 - 100 mg/dL Final   • VLDL Cholesterol 04/07/2022 23  5 - 40 mg/dL Final   • LDL/HDL Ratio 04/07/2022 2.72   Final   • TSH 04/07/2022 1.630  0.270 - 4.200 uIU/mL Final   • Ferritin 04/07/2022 22.20  13.00 - 150.00 ng/mL Final   • Iron 04/07/2022 91  37 - 145 mcg/dL Final   • Iron Saturation 04/07/2022 23  20 - 50 % Final   • Transferrin 04/07/2022 271  200 - 360 mg/dL Final   • TIBC 04/07/2022 404  298 - 536 mcg/dL Final   • 25 Hydroxy, Vitamin D 04/07/2022 28.2 (A) 30.0 - 100.0 ng/ml Final   • Folate 04/07/2022 7.68  4.78 - 24.20 ng/mL Final   • Vitamin B-12 04/07/2022 459  211 - 946 pg/mL Final   • WBC 04/07/2022 5.30  3.40 - 10.80 10*3/mm3 Final   • RBC 04/07/2022 4.42  3.77 - 5.28 10*6/mm3 Final   • Hemoglobin 04/07/2022 12.7  12.0 - 15.9 g/dL Final   • Hematocrit 04/07/2022 39.0  34.0 - 46.6 % Final   • MCV 04/07/2022 88.2  79.0 - 97.0 fL Final   • MCH 04/07/2022 28.7  26.6 - 33.0 pg Final   • MCHC 04/07/2022 32.6  31.5 - 35.7 g/dL Final   • RDW 04/07/2022 12.3  12.3 - 15.4 % Final   • RDW-SD 04/07/2022 40.0  37.0 - 54.0 fl Final   • MPV 04/07/2022 10.1  6.0 - 12.0 fL Final   • Platelets 04/07/2022 466 (A) 140 - 450 10*3/mm3 Final   • Neutrophil % 04/07/2022 64.1  42.7 - 76.0 % Final   • Lymphocyte % 04/07/2022 26.4  19.6 - 45.3 % Final   • Monocyte % 04/07/2022 6.4  5.0 - 12.0 % Final   • Eosinophil % 04/07/2022 1.9  0.3 - 6.2 % Final   • Basophil % 04/07/2022 0.8  0.0 - 1.5 % Final   • Immature Grans % 04/07/2022 0.4  0.0 - 0.5 % Final   • Neutrophils, Absolute 04/07/2022 3.40  1.70 - 7.00 10*3/mm3 Final   • Lymphocytes, Absolute 04/07/2022 1.40  0.70 - 3.10 10*3/mm3 Final   • Monocytes, Absolute 04/07/2022 0.34  0.10 - 0.90 10*3/mm3 Final   • Eosinophils, Absolute 04/07/2022 0.10  0.00 - 0.40 10*3/mm3 Final   • Basophils, Absolute 04/07/2022 0.04  0.00 - 0.20 10*3/mm3 Final   • Immature Grans, Absolute 04/07/2022 0.02  0.00 - 0.05 10*3/mm3  Final   • [facility] 04/07/2022 0.0  0.0 - 0.2 /100 WBC Final       EKG Results:  No orders to display       Imaging Results:  No Images in the past 120 days found..      Assessment/Plan   Diagnoses and all orders for this visit:    1. Generalized anxiety disorder (Primary)  -     busPIRone (BUSPAR) 7.5 MG tablet; Take 1 tablet by mouth 3 (Three) Times a Day for 30 days.  Dispense: 90 tablet; Refill: 2  -     DULoxetine (Cymbalta) 60 MG capsule; Take 1 capsule by mouth Daily.  Dispense: 30 capsule; Refill: 2    2. Episode of recurrent major depressive disorder, unspecified depression episode severity (HCC)  -     busPIRone (BUSPAR) 7.5 MG tablet; Take 1 tablet by mouth 3 (Three) Times a Day for 30 days.  Dispense: 90 tablet; Refill: 2  -     DULoxetine (Cymbalta) 60 MG capsule; Take 1 capsule by mouth Daily.  Dispense: 30 capsule; Refill: 2    3. Nightmares  -     prazosin (MINIPRESS) 1 MG capsule; Take 1 capsule by mouth Every Night for 30 days.  Dispense: 30 capsule; Refill: 2    Presentation seems most consistent with NICHOLE, MDD, and nightmares.  Patient appears to be doing very well with current med regimen.  Will increase Cymbalta to 60 mg to target depression, anxiety, and overall mood.  Patient would like to have BuSpar slightly increased as it is helpful when she takes it.  We will increase BuSpar to 7.5 mg to target depression, anxiety.  Discussed that patient may not need BuSpar once increasing Cymbalta to 60 mg and to continue monitoring for this.  We will start the patient on prazosin for management of nightmares.  Patient is interested in therapy.  Will refer to Faiza.  Follow-up in 8 weeks.  Addressed all questions and concerns.    Visit Diagnoses:    ICD-10-CM ICD-9-CM   1. Generalized anxiety disorder  F41.1 300.02   2. Episode of recurrent major depressive disorder, unspecified depression episode severity (HCC)  F33.9 296.30   3. Nightmares  F51.5 307.47       PLAN:  1. Safety: No acute safety concerns at  this time.   2. Therapy: Will refer to Faiza Small LCSW.   3. Risk Assessment: Risk of self-harm acutely is low to moderate.  Risk factors include anxiety disorder, mood disorder, access to firearms, and recent psychosocial stressors (pandemic). Protective factors include no family history, no present SI, no history of suicide attempts or self-harm in the past, minimal AODA, healthcare seeking, future orientation, willingness to engage in care.  Risk of self-harm chronically is also low to moderate, but could be further elevated in the event of treatment noncompliance and/or AODA.  4. Labs/Diagnostics Ordered:   No orders of the defined types were placed in this encounter.    5. Medications:   New Medications Ordered This Visit   Medications   • busPIRone (BUSPAR) 7.5 MG tablet     Sig: Take 1 tablet by mouth 3 (Three) Times a Day for 30 days.     Dispense:  90 tablet     Refill:  2   • DULoxetine (Cymbalta) 60 MG capsule     Sig: Take 1 capsule by mouth Daily.     Dispense:  30 capsule     Refill:  2   • prazosin (MINIPRESS) 1 MG capsule     Sig: Take 1 capsule by mouth Every Night for 30 days.     Dispense:  30 capsule     Refill:  2       Discussed all risks, benefits, alternatives, and side effects of Buspirone including but not limited to GI upset, dizziness, sedation, HA, nervousness, restlessness, and serotonin syndrome.  Pt educated on the need to practice safe sex while taking this med. Discussed the need for pt to immediately call the office for any new or worsening symptoms, and all other concerns. Pt educated on med compliance. Pt verbalized understanding and is agreeable to taking Buspirone. Addressed all questions and concerns.     Discussed all risks, benefits, alternatives, and side effects of Duloxetine including but not limited to GI upset, sexual dysfunction, bleeding risk, seizure risk, weight loss, insomnia, diaphoresis, drowsiness, headache, dizziness, fatigue, activation of vernon or  hypomania, increased fragility fracture risk, hyponatremia, increased BP, hepatotoxicity, ocular effects, withdrawal syndrome following abrupt discontinuation, serotonin syndrome, and activation of suicidal ideation and behavior.  Pt educated on the need to practice safe sex while taking this med. Discussed the need for pt to immediately call the office for any new or worsening symptoms, such as worsening depression; feeling nervous or restless; suicidal thoughts or actions; or other changes changes in mood or behavior, and all other concerns. Pt educated on med compliance and the risks of suddenly stopping this medication or missing doses. Pt verbalized understanding and is agreeable to taking Duloxetine. Addressed all questions and concerns.     Prazosin, Risks, benefits, alternatives, and side effects discussed with patient including sedation, dizziness/falls risk, hypotension, GI upset. After discussion of these risks and benefits, the patient voiced understanding and agreed to proceed.      6. Follow up:   F/u in 8 weeks.     TREATMENT PLAN/GOALS: Continue supportive psychotherapy efforts and medications as indicated. Treatment and medication options discussed during today's visit. Patient ackowledged and verbally consented to continue with current treatment plan and was educated on the importance of compliance with treatment and follow-up appointments.    MEDICATION ISSUES:  JESÚS reviewed as expected.  Discussed medication options and treatment plan of prescribed medication as well as the risks, benefits, and side effects including potential falls, possible impaired driving and metabolic adversities among others. Patient is agreeable to call the office with any worsening of symptoms or onset of side effects. Patient is agreeable to call 911 or go to the nearest ER should he/she begin having SI/HI. No medication side effects or related complaints today.            This document has been electronically signed  by Kelly Lindsay PA-C  April 28, 2022 12:04 EDT      Part of this note may be an electronic transcription/translation of spoken language to printed text using the Dragon Dictation System.

## 2022-04-28 ENCOUNTER — OFFICE VISIT (OUTPATIENT)
Dept: BEHAVIORAL HEALTH | Facility: CLINIC | Age: 42
End: 2022-04-28

## 2022-04-28 VITALS
WEIGHT: 190.3 LBS | HEIGHT: 63 IN | DIASTOLIC BLOOD PRESSURE: 70 MMHG | SYSTOLIC BLOOD PRESSURE: 120 MMHG | BODY MASS INDEX: 33.72 KG/M2

## 2022-04-28 DIAGNOSIS — F41.1 GENERALIZED ANXIETY DISORDER: Primary | ICD-10-CM

## 2022-04-28 DIAGNOSIS — F33.9 EPISODE OF RECURRENT MAJOR DEPRESSIVE DISORDER, UNSPECIFIED DEPRESSION EPISODE SEVERITY: ICD-10-CM

## 2022-04-28 DIAGNOSIS — F51.5 NIGHTMARES: ICD-10-CM

## 2022-04-28 PROCEDURE — 90792 PSYCH DIAG EVAL W/MED SRVCS: CPT | Performed by: PHYSICIAN ASSISTANT

## 2022-04-28 RX ORDER — DULOXETIN HYDROCHLORIDE 60 MG/1
60 CAPSULE, DELAYED RELEASE ORAL DAILY
Qty: 30 CAPSULE | Refills: 2 | Status: SHIPPED | OUTPATIENT
Start: 2022-04-28 | End: 2022-06-23 | Stop reason: SDUPTHER

## 2022-04-28 RX ORDER — PRAZOSIN HYDROCHLORIDE 1 MG/1
1 CAPSULE ORAL NIGHTLY
Qty: 30 CAPSULE | Refills: 2 | Status: SHIPPED | OUTPATIENT
Start: 2022-04-28 | End: 2022-05-19 | Stop reason: SDUPTHER

## 2022-04-28 RX ORDER — BUSPIRONE HYDROCHLORIDE 7.5 MG/1
7.5 TABLET ORAL 3 TIMES DAILY
Qty: 90 TABLET | Refills: 2 | Status: SHIPPED | OUTPATIENT
Start: 2022-04-28 | End: 2022-05-19 | Stop reason: SDUPTHER

## 2022-05-02 ENCOUNTER — OFFICE VISIT (OUTPATIENT)
Dept: PSYCHIATRY | Facility: CLINIC | Age: 42
End: 2022-05-02

## 2022-05-02 DIAGNOSIS — F41.1 GAD (GENERALIZED ANXIETY DISORDER): Primary | ICD-10-CM

## 2022-05-02 PROCEDURE — 90837 PSYTX W PT 60 MINUTES: CPT | Performed by: SOCIAL WORKER
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
## 2022-05-19 ENCOUNTER — OFFICE VISIT (OUTPATIENT)
Dept: FAMILY MEDICINE CLINIC | Facility: CLINIC | Age: 42
End: 2022-05-19

## 2022-05-19 VITALS
SYSTOLIC BLOOD PRESSURE: 116 MMHG | WEIGHT: 192 LBS | TEMPERATURE: 97.3 F | HEIGHT: 63 IN | DIASTOLIC BLOOD PRESSURE: 82 MMHG | BODY MASS INDEX: 34.02 KG/M2 | HEART RATE: 82 BPM | OXYGEN SATURATION: 96 %

## 2022-05-19 DIAGNOSIS — L30.9 DERMATITIS: Primary | ICD-10-CM

## 2022-05-19 DIAGNOSIS — F41.1 GENERALIZED ANXIETY DISORDER: ICD-10-CM

## 2022-05-19 DIAGNOSIS — F51.5 NIGHTMARES: ICD-10-CM

## 2022-05-19 DIAGNOSIS — F33.9 EPISODE OF RECURRENT MAJOR DEPRESSIVE DISORDER, UNSPECIFIED DEPRESSION EPISODE SEVERITY: ICD-10-CM

## 2022-05-19 PROCEDURE — 99214 OFFICE O/P EST MOD 30 MIN: CPT | Performed by: NURSE PRACTITIONER

## 2022-05-19 RX ORDER — METHOCARBAMOL 750 MG/1
TABLET, FILM COATED ORAL
COMMUNITY
Start: 2022-05-16 | End: 2022-05-31 | Stop reason: HOSPADM

## 2022-05-19 RX ORDER — METHOCARBAMOL 750 MG/1
TABLET, FILM COATED ORAL EVERY 8 HOURS SCHEDULED
COMMUNITY
End: 2022-05-19 | Stop reason: SDUPTHER

## 2022-05-19 RX ORDER — PRAZOSIN HYDROCHLORIDE 1 MG/1
2 CAPSULE ORAL NIGHTLY
Qty: 90 CAPSULE | Refills: 2 | Status: SHIPPED | OUTPATIENT
Start: 2022-05-19 | End: 2022-06-23 | Stop reason: SDUPTHER

## 2022-05-19 RX ORDER — DULOXETIN HYDROCHLORIDE 30 MG/1
CAPSULE, DELAYED RELEASE ORAL
COMMUNITY
End: 2022-05-19

## 2022-05-19 RX ORDER — BUSPIRONE HYDROCHLORIDE 5 MG/1
TABLET ORAL
COMMUNITY
End: 2022-05-19

## 2022-05-19 RX ORDER — BUSPIRONE HYDROCHLORIDE 7.5 MG/1
15 TABLET ORAL 3 TIMES DAILY
Qty: 90 TABLET | Refills: 2 | Status: SHIPPED | OUTPATIENT
Start: 2022-05-19 | End: 2022-06-23 | Stop reason: SDUPTHER
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
## 2022-05-31 ENCOUNTER — ANESTHESIA EVENT (OUTPATIENT)
Dept: GASTROENTEROLOGY | Facility: HOSPITAL | Age: 42
End: 2022-05-31

## 2022-05-31 ENCOUNTER — ANESTHESIA (OUTPATIENT)
Dept: GASTROENTEROLOGY | Facility: HOSPITAL | Age: 42
End: 2022-05-31

## 2022-05-31 ENCOUNTER — HOSPITAL ENCOUNTER (OUTPATIENT)
Facility: HOSPITAL | Age: 42
Setting detail: HOSPITAL OUTPATIENT SURGERY
Discharge: HOME | End: 2022-05-31
Attending: SURGERY | Admitting: SURGERY

## 2022-05-31 VITALS
BODY MASS INDEX: 33.74 KG/M2 | WEIGHT: 190.48 LBS | DIASTOLIC BLOOD PRESSURE: 74 MMHG | HEART RATE: 59 BPM | RESPIRATION RATE: 18 BRPM | SYSTOLIC BLOOD PRESSURE: 110 MMHG | TEMPERATURE: 97.4 F | OXYGEN SATURATION: 98 %

## 2022-05-31 DIAGNOSIS — K62.5 BRBPR (BRIGHT RED BLOOD PER RECTUM): ICD-10-CM

## 2022-05-31 DIAGNOSIS — R19.7 DIARRHEA: ICD-10-CM

## 2022-05-31 DIAGNOSIS — R10.30 LOWER ABDOMINAL PAIN: ICD-10-CM

## 2022-05-31 DIAGNOSIS — K59.00 CONSTIPATION: ICD-10-CM

## 2022-05-31 PROCEDURE — 25010000002 PROPOFOL 10 MG/ML EMULSION: Performed by: NURSE ANESTHETIST, CERTIFIED REGISTERED

## 2022-05-31 RX ORDER — SODIUM CHLORIDE, SODIUM LACTATE, POTASSIUM CHLORIDE, CALCIUM CHLORIDE 600; 310; 30; 20 MG/100ML; MG/100ML; MG/100ML; MG/100ML
30 INJECTION, SOLUTION INTRAVENOUS CONTINUOUS
Status: DISCONTINUED | OUTPATIENT
Start: 2022-05-31 | End: 2022-05-31 | Stop reason: HOSPADM

## 2022-05-31 RX ORDER — TIZANIDINE HYDROCHLORIDE 4 MG/1
4 CAPSULE, GELATIN COATED ORAL 3 TIMES DAILY
COMMUNITY
End: 2022-08-04

## 2022-05-31 RX ORDER — SODIUM CHLORIDE 0.9 % (FLUSH) 0.9 %
10 SYRINGE (ML) INJECTION AS NEEDED
Status: DISCONTINUED | OUTPATIENT
Start: 2022-05-31 | End: 2022-05-31 | Stop reason: HOSPADM

## 2022-05-31 RX ORDER — PROPOFOL 10 MG/ML
VIAL (ML) INTRAVENOUS AS NEEDED
Status: DISCONTINUED | OUTPATIENT
Start: 2022-05-31 | End: 2022-05-31 | Stop reason: SURG

## 2022-05-31 RX ORDER — SODIUM CHLORIDE 0.9 % (FLUSH) 0.9 %
3 SYRINGE (ML) INJECTION EVERY 12 HOURS SCHEDULED
Status: DISCONTINUED | OUTPATIENT
Start: 2022-05-31 | End: 2022-05-31 | Stop reason: HOSPADM

## 2022-05-31 RX ORDER — LIDOCAINE HYDROCHLORIDE 20 MG/ML
INJECTION, SOLUTION EPIDURAL; INFILTRATION; INTRACAUDAL; PERINEURAL AS NEEDED
Status: DISCONTINUED | OUTPATIENT
Start: 2022-05-31 | End: 2022-05-31 | Stop reason: SURG

## 2022-05-31 RX ADMIN — SODIUM CHLORIDE, POTASSIUM CHLORIDE, SODIUM LACTATE AND CALCIUM CHLORIDE 30 ML/HR: 600; 310; 30; 20 INJECTION, SOLUTION INTRAVENOUS at 12:03

## 2022-05-31 RX ADMIN — PROPOFOL 50 MG: 10 INJECTION, EMULSION INTRAVENOUS at 12:21

## 2022-05-31 RX ADMIN — LIDOCAINE HYDROCHLORIDE 60 MG: 20 INJECTION, SOLUTION EPIDURAL; INFILTRATION; INTRACAUDAL; PERINEURAL at 12:21

## 2022-05-31 RX ADMIN — PROPOFOL 250 MCG/KG/MIN: 10 INJECTION, EMULSION INTRAVENOUS at 12:21

## 2022-05-31 NOTE — ANESTHESIA POSTPROCEDURE EVALUATION
Patient: Ebony Hamilton    Procedure Summary     Date: 05/31/22 Room / Location: [facility] ENDOSCOPY 3 / [facility] ENDOSCOPY    Anesthesia Start: 1220 Anesthesia Stop: 1242    Procedures:       COLONOSCOPY (N/A )      HEMORRHOID BANDING (N/A Rectum) Diagnosis:       BRBPR (bright red blood per rectum)      Constipation      Diarrhea      Lower abdominal pain      (BRBPR (bright red blood per rectum) [K62.5])      (Constipation [K59.00])      (Diarrhea [R19.7])      (Lower abdominal pain [R10.30])    Surgeons: Shabbir Baird MD Provider: Bryce Nettles MD    Anesthesia Type: general ASA Status: 2          Anesthesia Type: general    Vitals  Vitals Value Taken Time   /74 05/31/22 1301   Temp 36.3 °C (97.4 °F) 05/31/22 1240   Pulse 67 05/31/22 1303   Resp 18 05/31/22 1300   SpO2 98 % 05/31/22 1303   Vitals shown include unvalidated device data.        Post Anesthesia Care and Evaluation    Patient location during evaluation: bedside  Patient participation: complete - patient participated  Level of consciousness: awake  Pain management: adequate  Airway patency: patent  Anesthetic complications: No anesthetic complications  PONV Status: none  Cardiovascular status: acceptable  Respiratory status: acceptable  Hydration status: acceptable    Comments: An Anesthesiologist personally participated in the most demanding procedures (including induction and emergence if applicable) in the anesthesia plan, monitored the course of anesthesia administration at frequent intervals and remained physically present and available for immediate diagnosis and treatment of emergencies.

## 2022-05-31 NOTE — ANESTHESIA PREPROCEDURE EVALUATION
Anesthesia Evaluation     Patient summary reviewed and Nursing notes reviewed   no history of anesthetic complications:  NPO Solid Status: > 8 hours  NPO Liquid Status: > 2 hours           Airway   Mallampati: II  TM distance: >3 FB  Neck ROM: limited  No difficulty expected  Dental      Pulmonary - negative pulmonary ROS and normal exam    breath sounds clear to auscultation  Cardiovascular - normal exam  Exercise tolerance: good (4-7 METS)    Rhythm: regular  Rate: normal    (+) dysrhythmias Atrial Fib,       Neuro/Psych  (+) psychiatric history Anxiety and Depression,    GI/Hepatic/Renal/Endo    (+)  GI bleeding ,     Musculoskeletal     (+) neck stiffness,   Abdominal    Substance History - negative use     OB/GYN negative ob/gyn ROS         Other - negative ROS                       Anesthesia Plan    ASA 2     general       Anesthetic plan, all risks, benefits, and alternatives have been provided, discussed and informed consent has been obtained with: patient and spouse/significant other.        CODE STATUS:

## 2022-06-13 PROBLEM — M96.1 CERVICAL POST-LAMINECTOMY SYNDROME: Status: ACTIVE | Noted: 2022-06-13

## 2022-06-13 PROBLEM — M79.10 MYALGIA: Status: ACTIVE | Noted: 2022-05-17

## 2022-06-13 PROBLEM — M54.12 CERVICAL RADICULOPATHY: Status: ACTIVE | Noted: 2022-06-13

## 2022-06-22 ENCOUNTER — TELEPHONE (OUTPATIENT)
Dept: SURGERY | Facility: CLINIC | Age: 42
End: 2022-06-22

## 2022-06-22 NOTE — TELEPHONE ENCOUNTER
Caller: LINDSAY TINSLEY    Relationship: SELF    Best call back number: [phone]    What form or medical record are you requesting: COLONOSCOPY OP NOTE    Who is requesting this form or medical record from you: TEENA FARRAR (OBGYSHAWNA @ [facility])    How would you like to receive the form or medical records (pick-up, mail, fax): FAX  If fax, what is the fax number: [phone]        Timeframe paperwork needed: ASAP    Additional notes: NONE

## 2022-06-23 ENCOUNTER — OFFICE VISIT (OUTPATIENT)
Dept: BEHAVIORAL HEALTH | Facility: CLINIC | Age: 42
End: 2022-06-23

## 2022-06-23 DIAGNOSIS — F51.5 NIGHTMARES: ICD-10-CM

## 2022-06-23 DIAGNOSIS — F33.1 MODERATE EPISODE OF RECURRENT MAJOR DEPRESSIVE DISORDER: ICD-10-CM

## 2022-06-23 DIAGNOSIS — F41.1 GENERALIZED ANXIETY DISORDER: ICD-10-CM

## 2022-06-23 PROCEDURE — 99213 OFFICE O/P EST LOW 20 MIN: CPT | Performed by: PHYSICIAN ASSISTANT

## 2022-06-23 RX ORDER — BUSPIRONE HYDROCHLORIDE 7.5 MG/1
15 TABLET ORAL 3 TIMES DAILY
Qty: 90 TABLET | Refills: 2 | Status: SHIPPED | OUTPATIENT
Start: 2022-06-23 | End: 2022-08-04

## 2022-06-23 RX ORDER — DULOXETIN HYDROCHLORIDE 30 MG/1
CAPSULE, DELAYED RELEASE ORAL
Qty: 30 CAPSULE | Refills: 2 | Status: SHIPPED | OUTPATIENT
Start: 2022-06-23 | End: 2022-08-04 | Stop reason: SDUPTHER

## 2022-06-23 RX ORDER — PRAZOSIN HYDROCHLORIDE 1 MG/1
2 CAPSULE ORAL NIGHTLY
Qty: 90 CAPSULE | Refills: 2 | Status: SHIPPED | OUTPATIENT
Start: 2022-06-23 | End: 2022-08-04

## 2022-06-23 RX ORDER — DULOXETIN HYDROCHLORIDE 60 MG/1
CAPSULE, DELAYED RELEASE ORAL
Qty: 30 CAPSULE | Refills: 2 | Status: SHIPPED | OUTPATIENT
Start: 2022-06-23 | End: 2022-08-04 | Stop reason: SDUPTHER

## 2022-06-24 ENCOUNTER — TELEPHONE (OUTPATIENT)
Dept: PSYCHIATRY | Facility: CLINIC | Age: 42
End: 2022-06-24

## 2022-08-04 ENCOUNTER — OFFICE VISIT (OUTPATIENT)
Dept: BEHAVIORAL HEALTH | Facility: CLINIC | Age: 42
End: 2022-08-04

## 2022-08-04 VITALS
WEIGHT: 190.8 LBS | BODY MASS INDEX: 33.81 KG/M2 | DIASTOLIC BLOOD PRESSURE: 70 MMHG | SYSTOLIC BLOOD PRESSURE: 120 MMHG | HEIGHT: 63 IN

## 2022-08-04 DIAGNOSIS — G47.00 INSOMNIA, UNSPECIFIED TYPE: ICD-10-CM

## 2022-08-04 DIAGNOSIS — F41.1 GENERALIZED ANXIETY DISORDER: Primary | ICD-10-CM

## 2022-08-04 DIAGNOSIS — F33.1 MODERATE EPISODE OF RECURRENT MAJOR DEPRESSIVE DISORDER: ICD-10-CM

## 2022-08-04 DIAGNOSIS — F51.5 NIGHTMARES: ICD-10-CM

## 2022-08-04 PROBLEM — N80.03 ADENOMYOSIS OF UTERUS: Status: ACTIVE | Noted: 2022-07-06

## 2022-08-04 PROCEDURE — 99213 OFFICE O/P EST LOW 20 MIN: CPT | Performed by: PHYSICIAN ASSISTANT

## 2022-08-04 RX ORDER — HYDROCODONE BITARTRATE AND ACETAMINOPHEN 7.5; 325 MG/1; MG/1
TABLET ORAL
COMMUNITY
End: 2022-08-19

## 2022-08-04 RX ORDER — TRAZODONE HYDROCHLORIDE 50 MG/1
TABLET ORAL
Qty: 60 TABLET | Refills: 2 | Status: SHIPPED | OUTPATIENT
Start: 2022-08-04

## 2022-08-04 RX ORDER — ORPHENADRINE CITRATE 100 MG/1
TABLET, EXTENDED RELEASE ORAL
COMMUNITY
End: 2022-08-04

## 2022-08-04 RX ORDER — IBUPROFEN 800 MG/1
TABLET ORAL
COMMUNITY

## 2022-08-04 RX ORDER — PRAZOSIN HYDROCHLORIDE 1 MG/1
CAPSULE ORAL
COMMUNITY
End: 2022-08-04

## 2022-08-04 RX ORDER — PRAZOSIN HYDROCHLORIDE 2 MG/1
2 CAPSULE ORAL NIGHTLY
Qty: 30 CAPSULE | Refills: 2 | Status: SHIPPED | OUTPATIENT
Start: 2022-08-04 | End: 2023-02-03

## 2022-08-04 RX ORDER — DULOXETIN HYDROCHLORIDE 60 MG/1
CAPSULE, DELAYED RELEASE ORAL
Qty: 30 CAPSULE | Refills: 2 | Status: SHIPPED | OUTPATIENT
Start: 2022-08-04 | End: 2023-02-03 | Stop reason: SDUPTHER

## 2022-08-04 RX ORDER — BACLOFEN 20 MG/1
TABLET ORAL EVERY 6 HOURS SCHEDULED
COMMUNITY
End: 2022-08-04

## 2022-08-04 RX ORDER — PREGABALIN 50 MG/1
CAPSULE ORAL
COMMUNITY
Start: 2022-08-02 | End: 2023-02-03

## 2022-08-04 RX ORDER — DULOXETIN HYDROCHLORIDE 30 MG/1
CAPSULE, DELAYED RELEASE ORAL
Qty: 30 CAPSULE | Refills: 2 | Status: SHIPPED | OUTPATIENT
Start: 2022-08-04

## 2022-08-04 RX ORDER — PROMETHAZINE HYDROCHLORIDE 25 MG/1
TABLET ORAL
COMMUNITY

## 2022-08-04 RX ORDER — BUSPIRONE HYDROCHLORIDE 15 MG/1
15 TABLET ORAL 3 TIMES DAILY
Qty: 90 TABLET | Refills: 2 | Status: SHIPPED | OUTPATIENT
Start: 2022-08-04 | End: 2023-02-28 | Stop reason: SDUPTHER

## 2022-08-04 NOTE — PROGRESS NOTES
Chief Complaint:  Anxiety    History of Present Illness: Ebony Hamilton is a 41 y.o. female who presents to the office today for follow-up of depression and anxiety.  Patient reports taking meds as prescribed and tolerating them well without any complications.  Patient denies feeling depressed.  No SI or HI.  She has been taking BuSpar 15 mg 3 times daily and is requesting refill.  Patient continues to have anxiety although has been more manageable.  Patient was started on Lyrica 2 days ago by her pain management.  Patient reports nightmares are well controlled with prazosin 2 mg.  Patient reports constipation resolved.  She feels like she is doing well on that regimen.      Medical Record Review: Reviewed office visit note from 4/7/22, She has anxiety.  She was previously treated with hydroxyzine and Lexapro.  She states the Lexapro caused her to sweat profusely daily.  She was also given hydroxyzine to help out with her anxiety and her insomnia.  She states the medication did not help much.      ROS:  Review of Systems   Constitutional: Positive for diaphoresis and fatigue. Negative for appetite change and unexpected weight change.   HENT: Negative for drooling, tinnitus and trouble swallowing.    Eyes: Negative for visual disturbance.   Respiratory: Negative for cough, chest tightness and shortness of breath.    Cardiovascular: Negative for chest pain and palpitations.   Gastrointestinal: Positive for abdominal pain, constipation, diarrhea and nausea. Negative for vomiting.   Endocrine: Negative for cold intolerance and heat intolerance.   Genitourinary: Negative for difficulty urinating.   Musculoskeletal: Positive for myalgias. Negative for arthralgias.   Skin: Negative for rash.   Allergic/Immunologic: Negative for immunocompromised state.   Neurological: Positive for headaches. Negative for dizziness, tremors and seizures.   Psychiatric/Behavioral: Positive for agitation and sleep disturbance.  Negative for dysphoric mood, hallucinations, self-injury and suicidal ideas. The patient is nervous/anxious.        Problem List:  Patient Active Problem List   Diagnosis   • Acute postoperative pain   • Allergic rhinitis   • Bulge of cervical disc without myelopathy   • Cervical fusion syndrome   • Degeneration of intervertebral disc of cervical region   • Spinal stenosis in cervical region   • Impingement syndrome of shoulder region   • Intractable chronic migraine without aura   • Mitral valve disorder   • Tricuspid valve disorder   • Endometriosis   • Obesity   • SLAP lesion of left shoulder   • NICHOLE (generalized anxiety disorder)   • Blood in stool   • BRBPR (bright red blood per rectum)   • Constipation   • Diarrhea   • Lower abdominal pain   • Myalgia   • Cervical post-laminectomy syndrome   • Cervical radiculopathy   • Adenomyosis of uterus       Current Medications:   Current Outpatient Medications   Medication Sig Dispense Refill   • cetirizine (ZyrTEC) 10 MG tablet Take 10 mg by mouth daily.     • DULoxetine (Cymbalta) 30 MG capsule Take 1 cap PO QD. Take with 60mg for total dose of 90mg 30 capsule 2   • DULoxetine (Cymbalta) 60 MG capsule Take 1 cap PO QD. Take with 30mg cap for total dose of 90mg. 30 capsule 2   • fexofenadine (ALLEGRA) 30 MG tablet Take 30 mg by mouth 2 (two) times a day.     • HYDROcodone-acetaminophen (NORCO)  MG per tablet Take 1 tablet by mouth Every 6 (Six) Hours As Needed for Moderate Pain .     • HYDROcodone-acetaminophen (NORCO) 7.5-325 MG per tablet hydrocodone 7.5 mg-acetaminophen 325 mg tablet   TAKE (1) TABLET EVERY 4-6 HOURS AS NEEDED FOR PAIN     • ibuprofen (ADVIL,MOTRIN) 800 MG tablet ibuprofen 800 mg tablet   take 1 tablet by mouth 3 times daily as needed     • pregabalin (LYRICA) 50 MG capsule TAKE ONE CAPSULE BY MOUTH AT BEDTIME FOR 7 DAYS THEN TAKE ONE CAPSULE BY MOUTH EVERY MORNING AND ONE AT bedtime FOR 7 DAYS THEN TAKE ONE CAPSULE BY MOUTH THREE TIMES DAILY      • promethazine (PHENERGAN) 25 MG tablet promethazine 25 mg tablet   TAKE (1) TABLET EVERY 6 HOURS AS NEEDED FOR NAUSEA     • tiZANidine (ZANAFLEX) 4 MG tablet TAKE 2 TABLETS BY MOUTH 3 TIMES DAILY AS NEEDED DISCONTINUE CYCLOBENZAPRINE     • triamcinolone (KENALOG) 0.1 % cream Apply 1 application topically to the appropriate area as directed 2 (Two) Times a Day. 80 g 1   • vitamin C (ASCORBIC ACID) 500 MG tablet Take 500 mg by mouth daily.     • busPIRone (BUSPAR) 15 MG tablet Take 1 tablet by mouth 3 (Three) Times a Day for 30 days. 90 tablet 2   • prazosin (MINIPRESS) 2 MG capsule Take 1 capsule by mouth Every Night for 30 days. 30 capsule 2   • traZODone (DESYREL) 50 MG tablet Take 0.5 to 2 tab PO QHS PRN sleep 60 tablet 2     No current facility-administered medications for this visit.       Discontinued Medications:  Medications Discontinued During This Encounter   Medication Reason   • baclofen (LIORESAL) 20 MG tablet *Therapy completed   • orphenadrine (NORFLEX) 100 MG 12 hr tablet *Therapy completed   • prazosin (MINIPRESS) 1 MG capsule *Re-Entry   • TiZANidine (ZANAFLEX) 4 MG capsule *Re-Entry   • busPIRone (BUSPAR) 7.5 MG tablet    • prazosin (MINIPRESS) 1 MG capsule    • DULoxetine (Cymbalta) 60 MG capsule Reorder   • DULoxetine (Cymbalta) 30 MG capsule Reorder       Allergy:   Allergies   Allergen Reactions   • Sulfa Antibiotics Anaphylaxis   • Ciprofloxacin Hallucinations   • Baclofen Itching   • Tegaderm Ag Mesh [Silver] Hives   • Codeine Rash   • Gold-Containing Drug Products Rash   • Latex Rash   • Nickel Rash        Past Medical History:  Past Medical History:   Diagnosis Date   • Anxiety    • Atrial fibrillation (HCC)    • Chronic pain disorder    • Depression    • Endometriosis    • Injury of shoulder, right 2009   • Panic disorder        Past Surgical History:  Past Surgical History:   Procedure Laterality Date   • CERVICAL ARTHRODESIS  01/14/2015    Donaldo Albarran   • CERVICAL FUSION     •  COLONOSCOPY N/A 05/31/2022    Procedure: COLONOSCOPY;  Surgeon: Shabbir Baird MD;  Location: [facility] ENDOSCOPY;  Service: General;  Laterality: N/A;  HEMORRHOIDS   • EXPLORATORY LAPAROTOMY     • HEMORRHOIDECTOMY N/A 05/31/2022    Procedure: HEMORRHOID BANDING;  Surgeon: Shabbir Baird MD;  Location: [facility] ENDOSCOPY;  Service: General;  Laterality: N/A;  BANDS X 2   • SHOULDER SURGERY Left    • TUBAL ABDOMINAL LIGATION         Past Psychiatric History:  Began Treatment: Initially 20 years ago and then 1 to 2 years ago.  Diagnoses: Anxiety  Psychiatrist: Denies  Therapist: Denies  Admission History: Denies  Medications/Treatment: Patient was initially put on Xanax (irritable the following day), Lexapro (sweating, GI symptoms, headache), gabapentin (dystonia)  Self Harm: Denies  Suicide Attempts: Denies  Postpartum depression: Denies    Family Psychiatric History:   Diagnoses: Her mother has a history of depression, anxiety, and bipolar disorder (patient is not completely sure about bipolar diagnosis)  Substance use: Denies  Suicide Attempts/Completions: Denies    Family History   Problem Relation Age of Onset   • Depression Mother    • Bipolar disorder Mother    • Anxiety disorder Mother    • Cancer Mother    • Heart disease Mother    • Hypertension Mother    • Anxiety disorder Father    • Hypertension Father    • Dementia Paternal Uncle    • Dementia Paternal Grandmother    • Heart disease Other    • Colon cancer Neg Hx        Substance Abuse History:   Alcohol use: Denies  Nicotine: Denies  Illicit Drug Use: Denies  Longest Period Sober: Denies  Rehab/AA/NA: Denies    Social History:  Living Situation: Patient lives with  and their 17-year-old daughter.  Marital/Relationship History: Patient has been  to  for 15 years.  No abuse.  Children: She has a 17-year-old daughter and a 21-year-old daughter.  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  Education:  "Patient received her GED and attended school for [employer].   History: Denies  Legal: Denies    Social History     Socioeconomic History   • Marital status:    Tobacco Use   • Smoking status: Former Smoker     Packs/day: 1.00     Years: 20.00     Pack years: 20.00     Quit date: 2019     Years since quitting: 3.5   • Smokeless tobacco: Never Used   Vaping Use   • Vaping Use: Never used   Substance and Sexual Activity   • Alcohol use: No   • Drug use: Never   • Sexual activity: Yes       Developmental History:   Place of birth: Patient was born in [place].  Siblings: Denies  Childhood: Patient notes having trust issues since childhood.      Physical Exam:  Physical Exam    Appearance: Well-groomed with adequate hygiene, appears to be of stated age. Casually and neatly dressed, maintains good eye contact.   Behavior: Appropriate, cooperative. No acute distress.  Motor: No abnormal movements, tics or tremors are noted. No psychomotor agitation or retardation.  Speech: Coherent, spontaneous, appropriate with normal rate, volume, rhythm, and tone. Normal reaction time to questions. No hyperverbal or pressured speech.   Mood: \"I'm good\"  Affect: Full range, appropriate, congruent with spontaneous emotional reactivity. Normal intensity. No emotional blunting. Pt is pleasant. Pt does not appear depressed or anxious.  Thought content: Negative suicidal ideations, negative homicidal ideations. Patient denies any obsession, compulsion, or phobia. No evidence of delusions.  Perceptions: Negative auditory hallucinations, negative visual hallucinations. Pt does not appear to be actively responding to internal stimuli.   Thought process: Logical, goal-directed, coherent, and linear with no evidence of flight of ideas, looseness of associations, thought blocking, circumstantiality, or tangentiality.   Insight/Judgement: Fair/fair  Cognition: Alert and oriented to person, place, and date. Memory intact for recent and remote " "events. Attention and concentration intact.     Vital Signs:   /70   Ht 158.8 cm (62.5\")   Wt 86.5 kg (190 lb 12.8 oz)   BMI 34.34 kg/m²      Lab Results:   Office Visit on 04/07/2022   Component Date Value Ref Range Status   • Glucose 04/07/2022 78  65 - 99 mg/dL Final   • BUN 04/07/2022 16  6 - 20 mg/dL Final   • Creatinine 04/07/2022 0.77  0.57 - 1.00 mg/dL Final   • Sodium 04/07/2022 138  136 - 145 mmol/L Final   • Potassium 04/07/2022 4.1  3.5 - 5.2 mmol/L Final   • Chloride 04/07/2022 103  98 - 107 mmol/L Final   • CO2 04/07/2022 23.4  22.0 - 29.0 mmol/L Final   • Calcium 04/07/2022 9.5  8.6 - 10.5 mg/dL Final   • Total Protein 04/07/2022 7.4  6.0 - 8.5 g/dL Final   • Albumin 04/07/2022 4.60  3.50 - 5.20 g/dL Final   • ALT (SGPT) 04/07/2022 14  1 - 33 U/L Final   • AST (SGOT) 04/07/2022 12  1 - 32 U/L Final   • Alkaline Phosphatase 04/07/2022 50  39 - 117 U/L Final   • Total Bilirubin 04/07/2022 0.2  0.0 - 1.2 mg/dL Final   • Globulin 04/07/2022 2.8  gm/dL Final   • A/G Ratio 04/07/2022 1.6  g/dL Final   • BUN/Creatinine Ratio 04/07/2022 20.8  7.0 - 25.0 Final   • Anion Gap 04/07/2022 11.6  5.0 - 15.0 mmol/L Final   • eGFR 04/07/2022 100.8  >60.0 mL/min/1.73 Final    National Kidney Foundation and American Society of Nephrology (ASN) Task Force recommended calculation based on the Chronic Kidney Disease Epidemiology Collaboration (CKD-EPI) equation refit without adjustment for race.   • Total Cholesterol 04/07/2022 189  0 - 200 mg/dL Final   • Triglycerides 04/07/2022 126  0 - 150 mg/dL Final   • HDL Cholesterol 04/07/2022 44  40 - 60 mg/dL Final   • LDL Cholesterol  04/07/2022 122 (A) 0 - 100 mg/dL Final   • VLDL Cholesterol 04/07/2022 23  5 - 40 mg/dL Final   • LDL/HDL Ratio 04/07/2022 2.72   Final   • TSH 04/07/2022 1.630  0.270 - 4.200 uIU/mL Final   • Ferritin 04/07/2022 22.20  13.00 - 150.00 ng/mL Final   • Iron 04/07/2022 91  37 - 145 mcg/dL Final   • Iron Saturation 04/07/2022 23  20 - 50 % " Final   • Transferrin 04/07/2022 271  200 - 360 mg/dL Final   • TIBC 04/07/2022 404  298 - 536 mcg/dL Final   • 25 Hydroxy, Vitamin D 04/07/2022 28.2 (A) 30.0 - 100.0 ng/ml Final   • Folate 04/07/2022 7.68  4.78 - 24.20 ng/mL Final   • Vitamin B-12 04/07/2022 459  211 - 946 pg/mL Final   • WBC 04/07/2022 5.30  3.40 - 10.80 10*3/mm3 Final   • RBC 04/07/2022 4.42  3.77 - 5.28 10*6/mm3 Final   • Hemoglobin 04/07/2022 12.7  12.0 - 15.9 g/dL Final   • Hematocrit 04/07/2022 39.0  34.0 - 46.6 % Final   • MCV 04/07/2022 88.2  79.0 - 97.0 fL Final   • MCH 04/07/2022 28.7  26.6 - 33.0 pg Final   • MCHC 04/07/2022 32.6  31.5 - 35.7 g/dL Final   • RDW 04/07/2022 12.3  12.3 - 15.4 % Final   • RDW-SD 04/07/2022 40.0  37.0 - 54.0 fl Final   • MPV 04/07/2022 10.1  6.0 - 12.0 fL Final   • Platelets 04/07/2022 466 (A) 140 - 450 10*3/mm3 Final   • Neutrophil % 04/07/2022 64.1  42.7 - 76.0 % Final   • Lymphocyte % 04/07/2022 26.4  19.6 - 45.3 % Final   • Monocyte % 04/07/2022 6.4  5.0 - 12.0 % Final   • Eosinophil % 04/07/2022 1.9  0.3 - 6.2 % Final   • Basophil % 04/07/2022 0.8  0.0 - 1.5 % Final   • Immature Grans % 04/07/2022 0.4  0.0 - 0.5 % Final   • Neutrophils, Absolute 04/07/2022 3.40  1.70 - 7.00 10*3/mm3 Final   • Lymphocytes, Absolute 04/07/2022 1.40  0.70 - 3.10 10*3/mm3 Final   • Monocytes, Absolute 04/07/2022 0.34  0.10 - 0.90 10*3/mm3 Final   • Eosinophils, Absolute 04/07/2022 0.10  0.00 - 0.40 10*3/mm3 Final   • Basophils, Absolute 04/07/2022 0.04  0.00 - 0.20 10*3/mm3 Final   • Immature Grans, Absolute 04/07/2022 0.02  0.00 - 0.05 10*3/mm3 Final   • nRBC 04/07/2022 0.0  0.0 - 0.2 /100 WBC Final       EKG Results:  No orders to display       Imaging Results:  No Images in the past 120 days found..      Assessment & Plan   Diagnoses and all orders for this visit:    1. Generalized anxiety disorder (Primary)  -     busPIRone (BUSPAR) 15 MG tablet; Take 1 tablet by mouth 3 (Three) Times a Day for 30 days.  Dispense: 90  tablet; Refill: 2  -     DULoxetine (Cymbalta) 30 MG capsule; Take 1 cap PO QD. Take with 60mg for total dose of 90mg  Dispense: 30 capsule; Refill: 2  -     DULoxetine (Cymbalta) 60 MG capsule; Take 1 cap PO QD. Take with 30mg cap for total dose of 90mg.  Dispense: 30 capsule; Refill: 2    2. Moderate episode of recurrent major depressive disorder (HCC)  -     busPIRone (BUSPAR) 15 MG tablet; Take 1 tablet by mouth 3 (Three) Times a Day for 30 days.  Dispense: 90 tablet; Refill: 2  -     DULoxetine (Cymbalta) 30 MG capsule; Take 1 cap PO QD. Take with 60mg for total dose of 90mg  Dispense: 30 capsule; Refill: 2  -     DULoxetine (Cymbalta) 60 MG capsule; Take 1 cap PO QD. Take with 30mg cap for total dose of 90mg.  Dispense: 30 capsule; Refill: 2    3. Insomnia, unspecified type  -     prazosin (MINIPRESS) 2 MG capsule; Take 1 capsule by mouth Every Night for 30 days.  Dispense: 30 capsule; Refill: 2  -     traZODone (DESYREL) 50 MG tablet; Take 0.5 to 2 tab PO QHS PRN sleep  Dispense: 60 tablet; Refill: 2    4. Nightmares  -     prazosin (MINIPRESS) 2 MG capsule; Take 1 capsule by mouth Every Night for 30 days.  Dispense: 30 capsule; Refill: 2    Presentation seems most consistent with NICHOLE, MDD, and nightmares.  We will continue cymbalta at 90 mg to target depression, anxiety, and overall mood.  We will continue BuSpar at current dose to target depression, anxiety.  We will continue prazosin at current dose for management of nightmares.  Discussed that patient will likely have improvement of anxiety with new prescription of Lyrica.  Pt feels well controlled at this time. F/u in 2-3 months.  Addressed all questions and concerns.    Visit Diagnoses:    ICD-10-CM ICD-9-CM   1. Generalized anxiety disorder  F41.1 300.02   2. Moderate episode of recurrent major depressive disorder (HCC)  F33.1 296.32   3. Insomnia, unspecified type  G47.00 780.52   4. Nightmares  F51.5 307.47       PLAN:  1. Safety: No acute safety  concerns at this time.   2. Therapy: Will refer to Faiza Small LCSW.   3. Risk Assessment: Risk of self-harm acutely is low to moderate.  Risk factors include anxiety disorder, mood disorder, access to firearms, and recent psychosocial stressors (pandemic). Protective factors include no family history, no present SI, no history of suicide attempts or self-harm in the past, minimal AODA, healthcare seeking, future orientation, willingness to engage in care.  Risk of self-harm chronically is also low to moderate, but could be further elevated in the event of treatment noncompliance and/or AODA.  4. Labs/Diagnostics Ordered:   No orders of the defined types were placed in this encounter.    5. Medications:   New Medications Ordered This Visit   Medications   • busPIRone (BUSPAR) 15 MG tablet     Sig: Take 1 tablet by mouth 3 (Three) Times a Day for 30 days.     Dispense:  90 tablet     Refill:  2   • prazosin (MINIPRESS) 2 MG capsule     Sig: Take 1 capsule by mouth Every Night for 30 days.     Dispense:  30 capsule     Refill:  2   • DULoxetine (Cymbalta) 30 MG capsule     Sig: Take 1 cap PO QD. Take with 60mg for total dose of 90mg     Dispense:  30 capsule     Refill:  2   • DULoxetine (Cymbalta) 60 MG capsule     Sig: Take 1 cap PO QD. Take with 30mg cap for total dose of 90mg.     Dispense:  30 capsule     Refill:  2   • traZODone (DESYREL) 50 MG tablet     Sig: Take 0.5 to 2 tab PO QHS PRN sleep     Dispense:  60 tablet     Refill:  2       Discussed all risks, benefits, alternatives, and side effects of Buspirone including but not limited to GI upset, dizziness, sedation, HA, nervousness, restlessness, and serotonin syndrome.  Pt educated on the need to practice safe sex while taking this med. Discussed the need for pt to immediately call the office for any new or worsening symptoms, and all other concerns. Pt educated on med compliance. Pt verbalized understanding and is agreeable to taking Buspirone.  Addressed all questions and concerns.     Discussed all risks, benefits, alternatives, and side effects of Duloxetine including but not limited to GI upset, sexual dysfunction, bleeding risk, seizure risk, weight loss, insomnia, diaphoresis, drowsiness, headache, dizziness, fatigue, activation of vernon or hypomania, increased fragility fracture risk, hyponatremia, increased BP, hepatotoxicity, ocular effects, withdrawal syndrome following abrupt discontinuation, serotonin syndrome, and activation of suicidal ideation and behavior.  Pt educated on the need to practice safe sex while taking this med. Discussed the need for pt to immediately call the office for any new or worsening symptoms, such as worsening depression; feeling nervous or restless; suicidal thoughts or actions; or other changes changes in mood or behavior, and all other concerns. Pt educated on med compliance and the risks of suddenly stopping this medication or missing doses. Pt verbalized understanding and is agreeable to taking Duloxetine. Addressed all questions and concerns.     Prazosin, Risks, benefits, alternatives, and side effects discussed with patient including sedation, dizziness/falls risk, hypotension, GI upset. After discussion of these risks and benefits, the patient voiced understanding and agreed to proceed.      6. Follow up:   F/u in 2-3 months.    TREATMENT PLAN/GOALS: Continue supportive psychotherapy efforts and medications as indicated. Treatment and medication options discussed during today's visit. Patient ackowledged and verbally consented to continue with current treatment plan and was educated on the importance of compliance with treatment and follow-up appointments.    MEDICATION ISSUES:  JESÚS reviewed as expected.  Discussed medication options and treatment plan of prescribed medication as well as the risks, benefits, and side effects including potential falls, possible impaired driving and metabolic adversities among  others. Patient is agreeable to call the office with any worsening of symptoms or onset of side effects. Patient is agreeable to call 911 or go to the nearest ER should he/she begin having SI/HI. No medication side effects or related complaints today.            This document has been electronically signed by Kelly Lindsay PA-C  August 5, 2022 16:49 EDT      Part of this note may be an electronic transcription/translation of spoken language to printed text using the Dragon Dictation System.

## 2022-08-19 ENCOUNTER — OFFICE VISIT (OUTPATIENT)
Dept: FAMILY MEDICINE CLINIC | Facility: CLINIC | Age: 42
End: 2022-08-19

## 2022-08-19 VITALS
HEIGHT: 63 IN | TEMPERATURE: 98.6 F | OXYGEN SATURATION: 99 % | DIASTOLIC BLOOD PRESSURE: 82 MMHG | HEART RATE: 98 BPM | WEIGHT: 195.4 LBS | BODY MASS INDEX: 34.62 KG/M2 | SYSTOLIC BLOOD PRESSURE: 124 MMHG

## 2022-08-19 DIAGNOSIS — M25.512 ACUTE PAIN OF LEFT SHOULDER: Primary | ICD-10-CM

## 2022-08-19 PROCEDURE — 99213 OFFICE O/P EST LOW 20 MIN: CPT | Performed by: NURSE PRACTITIONER

## 2022-08-19 PROCEDURE — 96372 THER/PROPH/DIAG INJ SC/IM: CPT | Performed by: NURSE PRACTITIONER

## 2022-08-19 RX ORDER — KETOROLAC TROMETHAMINE 30 MG/ML
60 INJECTION, SOLUTION INTRAMUSCULAR; INTRAVENOUS ONCE
Status: COMPLETED | OUTPATIENT
Start: 2022-08-19 | End: 2022-08-19

## 2022-08-19 RX ADMIN — KETOROLAC TROMETHAMINE 60 MG: 30 INJECTION, SOLUTION INTRAMUSCULAR; INTRAVENOUS at 14:05

## 2022-08-19 NOTE — PROGRESS NOTES
"Chief Complaint  Left shoulder pain    Subjective        Ebony Hamilton presents to [facility]  History of Present Illness  Presents today for an acute visit for left shoulder pain.  Patient had surgery in July at [facility].  Patient states she has been having significant amount of pain over the past month.  Difficulty getting pain under control.  She sees pain management has been taking Norco 10/3/2025 4 times a day.  Surgeon only written for 7.5 unless for amount.  Today she woke up with significant amount of pain and felt as her bicep was and I did not her fall.  Her  has stretched out her arm.  She is going to see PT later today.  Patient reports pain 10 out of 10.    Objective   Vital Signs:  /82 (BP Location: Right arm, Patient Position: Sitting)   Pulse 98   Temp 98.6 °F (37 °C)   Ht 158.8 cm (62.5\")   Wt 88.6 kg (195 lb 6.4 oz)   SpO2 99%   BMI 35.17 kg/m²   Estimated body mass index is 35.17 kg/m² as calculated from the following:    Height as of this encounter: 158.8 cm (62.5\").    Weight as of this encounter: 88.6 kg (195 lb 6.4 oz).    \plain      Physical Exam  Vitals reviewed.   Constitutional:       Appearance: Normal appearance. She is well-developed.   HENT:      Head: Normocephalic and atraumatic.      Right Ear: External ear normal.      Left Ear: External ear normal.      Mouth/Throat:      Pharynx: No oropharyngeal exudate.   Eyes:      Conjunctiva/sclera: Conjunctivae normal.      Pupils: Pupils are equal, round, and reactive to light.   Cardiovascular:      Rate and Rhythm: Normal rate and regular rhythm.      Heart sounds: No murmur heard.    No friction rub. No gallop.   Pulmonary:      Effort: Pulmonary effort is normal.      Breath sounds: Normal breath sounds. No wheezing or rhonchi.   Musculoskeletal:      Left shoulder: Tenderness present. Decreased range of motion. Decreased strength.   Skin:     " General: Skin is warm and dry.   Neurological:      Mental Status: She is alert and oriented to person, place, and time.   Psychiatric:         Mood and Affect: Affect normal.        Result Review :                Assessment and Plan {CC Problem List  Visit Diagnosis   ROS  Review (Popup)  Health Maintenance  Quality  BestPractice  Medications  SmartSets  SnapShot Encounters  Media :23}  Diagnoses and all orders for this visit:    1. Acute pain of left shoulder (Primary)  -     ketorolac (TORADOL) injection 60 mg    Will give Toradol 60 mg IM today in office.  We will request medical records from [facility].  Instructed patient to follow-up with orthopedist.  If pain becomes unbearable to go to the emergency room         Follow Up   Return in about 4 weeks (around 9/16/2022), or if symptoms worsen or fail to improve, for Next scheduled follow up.  Patient was given instructions and counseling regarding her condition or for health maintenance advice. Please see specific information pulled into the AVS if appropriate.

## 2022-09-20 ENCOUNTER — OFFICE VISIT (OUTPATIENT)
Dept: FAMILY MEDICINE CLINIC | Facility: CLINIC | Age: 42
End: 2022-09-20

## 2022-09-20 VITALS
DIASTOLIC BLOOD PRESSURE: 72 MMHG | BODY MASS INDEX: 35.08 KG/M2 | TEMPERATURE: 98.1 F | HEART RATE: 98 BPM | HEIGHT: 63 IN | OXYGEN SATURATION: 97 % | SYSTOLIC BLOOD PRESSURE: 128 MMHG | WEIGHT: 198 LBS

## 2022-09-20 DIAGNOSIS — M25.512 ACUTE PAIN OF LEFT SHOULDER: Primary | ICD-10-CM

## 2022-09-20 DIAGNOSIS — Z11.59 ENCOUNTER FOR HEPATITIS C SCREENING TEST FOR LOW RISK PATIENT: ICD-10-CM

## 2022-09-20 DIAGNOSIS — E55.9 VITAMIN D DEFICIENCY: ICD-10-CM

## 2022-09-20 DIAGNOSIS — F41.1 GAD (GENERALIZED ANXIETY DISORDER): ICD-10-CM

## 2022-09-20 PROCEDURE — 99214 OFFICE O/P EST MOD 30 MIN: CPT | Performed by: NURSE PRACTITIONER

## 2022-09-20 PROCEDURE — 96372 THER/PROPH/DIAG INJ SC/IM: CPT | Performed by: NURSE PRACTITIONER

## 2022-09-20 RX ORDER — KETOROLAC TROMETHAMINE 30 MG/ML
60 INJECTION, SOLUTION INTRAMUSCULAR; INTRAVENOUS ONCE
Status: COMPLETED | OUTPATIENT
Start: 2022-09-20 | End: 2022-09-20

## 2022-09-20 RX ADMIN — KETOROLAC TROMETHAMINE 60 MG: 30 INJECTION, SOLUTION INTRAMUSCULAR; INTRAVENOUS at 14:42

## 2022-09-20 NOTE — PROGRESS NOTES
"Answers for HPI/ROS submitted by the patient on 9/20/2022  Please describe your symptoms.: N/A  Have you had these symptoms before?: No  How long have you been having these symptoms?: 1-4 days  What is the primary reason for your visit?: Other    Chief Complaint  Anxiety and Shoulder Pain (Left side)    Subjective        Ebony Hamilton presents to [facility]  History of Present Illness  Presents today for 1 month follow-up on left shoulder pain.  She is still seeing pain management for her chronic back pain.  Sees orthopedist for her left shoulder.  Currently on hydrocodone  mg every 6 hours as needed for chronic back pain.  She reports she has had good and bad days with her left shoulder pain.  She saw physical therapy today.    Objective   Vital Signs:  /72 (BP Location: Right arm, Patient Position: Sitting)   Pulse 98   Temp 98.1 °F (36.7 °C)   Ht 158.8 cm (62.5\")   Wt 89.8 kg (198 lb)   SpO2 97%   BMI 35.64 kg/m²   Estimated body mass index is 35.64 kg/m² as calculated from the following:    Height as of this encounter: 158.8 cm (62.5\").    Weight as of this encounter: 89.8 kg (198 lb).          Physical Exam  Vitals reviewed.   Constitutional:       Appearance: Normal appearance. She is well-developed.   HENT:      Head: Normocephalic and atraumatic.      Right Ear: External ear normal.      Left Ear: External ear normal.      Mouth/Throat:      Pharynx: No oropharyngeal exudate.   Eyes:      Conjunctiva/sclera: Conjunctivae normal.      Pupils: Pupils are equal, round, and reactive to light.   Cardiovascular:      Rate and Rhythm: Normal rate and regular rhythm.      Heart sounds: No murmur heard.    No friction rub. No gallop.   Pulmonary:      Effort: Pulmonary effort is normal.      Breath sounds: Normal breath sounds. No wheezing or rhonchi.   Abdominal:      General: Bowel sounds are normal. There is no distension.      Palpations: Abdomen is soft. "      Tenderness: There is no abdominal tenderness.   Musculoskeletal:      Left shoulder: Tenderness present. Decreased range of motion.   Skin:     General: Skin is warm and dry.   Neurological:      Mental Status: She is alert and oriented to person, place, and time.   Psychiatric:         Mood and Affect: Mood and affect normal.         Behavior: Behavior normal.         Thought Content: Thought content normal.         Judgment: Judgment normal.        Result Review :                Assessment and Plan   Diagnoses and all orders for this visit:    1. Acute pain of left shoulder (Primary)  -     ketorolac (TORADOL) injection 60 mg    2. NICHOLE (generalized anxiety disorder)  -     CBC & Differential  -     Comprehensive Metabolic Panel  -     TSH Rfx On Abnormal To Free T4    3. Encounter for hepatitis C screening test for low risk patient  -     Hepatitis C antibody; Future  -     Hepatitis C antibody    4. Vitamin D deficiency  -     Vitamin D 25 Hydroxy    Give Toradol 60 mg IM injection today.  Continue other regimen.  Check a CBC CMP and TSH.  Also check hep C antibody.         Follow Up   Return if symptoms worsen or fail to improve.  Patient was given instructions and counseling regarding her condition or for health maintenance advice. Please see specific information pulled into the AVS if appropriate.

## 2022-12-07 ENCOUNTER — TELEPHONE (OUTPATIENT)
Dept: FAMILY MEDICINE CLINIC | Facility: CLINIC | Age: 42
End: 2022-12-07

## 2022-12-07 NOTE — TELEPHONE ENCOUNTER
Caller: Ebony Hamilton    Relationship: Self    Best call back number: [phone]    What medication are you requesting: SOMETHING FOR SYMPTOMS    What are your current symptoms: PATIENT STATES SHE HAS NAUSEA, FEVER, CHILLS AND VOMITING FOR 2 WEEKS. SHE IS REQUESTING A MEDICATION FOR THIS.     How long have you been experiencing symptoms: 2 WEEKS    If a prescription is needed, what is your preferred pharmacy and phone number: [facility] [place], [place] - [address] [place] [address] [phone] [facility] [phone] FX

## 2022-12-13 NOTE — TELEPHONE ENCOUNTER
Left voicemail for patient stating she can make an appt or be seen at urgent care. I also advised that she can do a virtual urgent care visit with the [email or web address] [facility].

## 2023-01-30 ENCOUNTER — TELEPHONE (OUTPATIENT)
Dept: PSYCHIATRY | Facility: CLINIC | Age: 43
End: 2023-01-30
Payer: COMMERCIAL

## 2023-01-30 ENCOUNTER — TELEPHONE (OUTPATIENT)
Dept: FAMILY MEDICINE CLINIC | Facility: CLINIC | Age: 43
End: 2023-01-30
Payer: COMMERCIAL

## 2023-02-01 NOTE — TELEPHONE ENCOUNTER
Spoke to patient and she stated that she was having SOB and coughing up brown stuff. Told patient that I would recommend her to go to ER or Urgent care to be seen since she was having these symptoms and patient stated that she understood and was going.

## 2023-02-02 RX ORDER — BENZONATATE 200 MG/1
200 CAPSULE ORAL 3 TIMES DAILY PRN
Qty: 30 CAPSULE | Refills: 1 | Status: SHIPPED | OUTPATIENT
Start: 2023-02-02 | End: 2023-02-03 | Stop reason: SDUPTHER

## 2023-02-02 NOTE — TELEPHONE ENCOUNTER
Called [facility] and they stated that I would need to send a fax over. Faxing as requested to [phone].

## 2023-02-02 NOTE — TELEPHONE ENCOUNTER
I spoke with patient and she stated that she went to Urgent Care in [place]. I see an [facility] and will contact them at [phone] when they open to obtain records. Number is [phone]. Patient stated that she was giving albuterol inhaler and steroids.

## 2023-02-03 ENCOUNTER — OFFICE VISIT (OUTPATIENT)
Dept: FAMILY MEDICINE CLINIC | Facility: CLINIC | Age: 43
End: 2023-02-03
Payer: COMMERCIAL

## 2023-02-03 VITALS
BODY MASS INDEX: 35.61 KG/M2 | DIASTOLIC BLOOD PRESSURE: 74 MMHG | HEART RATE: 104 BPM | OXYGEN SATURATION: 99 % | TEMPERATURE: 99.2 F | HEIGHT: 63 IN | WEIGHT: 201 LBS | SYSTOLIC BLOOD PRESSURE: 132 MMHG

## 2023-02-03 DIAGNOSIS — F41.1 GENERALIZED ANXIETY DISORDER: ICD-10-CM

## 2023-02-03 DIAGNOSIS — J20.9 ACUTE BRONCHITIS, UNSPECIFIED ORGANISM: Primary | ICD-10-CM

## 2023-02-03 DIAGNOSIS — F33.1 MODERATE EPISODE OF RECURRENT MAJOR DEPRESSIVE DISORDER: ICD-10-CM

## 2023-02-03 PROCEDURE — 99213 OFFICE O/P EST LOW 20 MIN: CPT | Performed by: NURSE PRACTITIONER

## 2023-02-03 RX ORDER — DULOXETIN HYDROCHLORIDE 60 MG/1
CAPSULE, DELAYED RELEASE ORAL
Qty: 90 CAPSULE | Refills: 1 | Status: SHIPPED | OUTPATIENT
Start: 2023-02-03

## 2023-02-03 RX ORDER — AZITHROMYCIN 250 MG/1
TABLET, FILM COATED ORAL
Qty: 6 TABLET | Refills: 0 | Status: SHIPPED | OUTPATIENT
Start: 2023-02-03 | End: 2023-02-28

## 2023-02-03 RX ORDER — BROMPHENIRAMINE MALEATE, PSEUDOEPHEDRINE HYDROCHLORIDE, AND DEXTROMETHORPHAN HYDROBROMIDE 2; 30; 10 MG/5ML; MG/5ML; MG/5ML
SYRUP ORAL
COMMUNITY
Start: 2023-02-01 | End: 2023-02-03 | Stop reason: SDUPTHER

## 2023-02-03 RX ORDER — BROMPHENIRAMINE MALEATE, PSEUDOEPHEDRINE HYDROCHLORIDE, AND DEXTROMETHORPHAN HYDROBROMIDE 2; 30; 10 MG/5ML; MG/5ML; MG/5ML
10 SYRUP ORAL 4 TIMES DAILY PRN
Qty: 473 ML | Refills: 0 | Status: SHIPPED | OUTPATIENT
Start: 2023-02-03 | End: 2023-02-28 | Stop reason: SDUPTHER

## 2023-02-03 RX ORDER — METHYLPREDNISOLONE 4 MG/1
TABLET ORAL
COMMUNITY
Start: 2023-02-01 | End: 2023-02-28

## 2023-02-03 RX ORDER — ALBUTEROL SULFATE 90 UG/1
2 AEROSOL, METERED RESPIRATORY (INHALATION) EVERY 4 HOURS PRN
COMMUNITY
Start: 2023-02-01

## 2023-02-03 RX ORDER — BENZONATATE 200 MG/1
200 CAPSULE ORAL 3 TIMES DAILY PRN
Qty: 30 CAPSULE | Refills: 1 | Status: SHIPPED | OUTPATIENT
Start: 2023-02-03 | End: 2023-02-28

## 2023-02-03 RX ORDER — OXYCODONE AND ACETAMINOPHEN 7.5; 325 MG/1; MG/1
1 TABLET ORAL 4 TIMES DAILY
COMMUNITY
Start: 2023-01-06

## 2023-02-03 NOTE — TELEPHONE ENCOUNTER
Staff have attempted to contact patient at least three times to schedule an appointment.  The patient has not returned any of these calls. If the patient returns our calls we will schedule a follow up appointment with them

## 2023-02-03 NOTE — TELEPHONE ENCOUNTER
PHARMACY SENT A FAX FOR  A REFILL FOR THE PATIENT'S BUSPAR.  PATIENT CANCELLED LAST APPT AND NEVER RESCHEDULED.  CALLED PT TO SCHEDULE A FOLLOW UP.  NO ANSWER. LEFT A MESSAGE TO CALL BACK AND SCHEDULE A FOLLOW UP SO WE CAN PROCESS THE REFILL

## 2023-02-03 NOTE — PROGRESS NOTES
"Chief Complaint  URI (Seen at an urgent care on 2023)    Subjective         Ebony Hamilton presents to [facility]  HPI   Presents today for an acute visit for upper respiratory.  And  on Friday she went to urgent care in [place].  She was prescribed doxycycline and Bromfed for upper respiratory infection.  Symptoms had worsened which shortness of breath she return back to urgent care and was prescribed a Medrol Dosepak and albuterol.  She is on day 2 of her Medrol Dosepak.  She still having shortness of breath and persistent cough.  Urgent care flu and strep were negative.    Social History     Socioeconomic History   • Marital status:    Tobacco Use   • Smoking status: Former     Packs/day: 1.00     Years: 20.00     Pack years: 20.00     Types: Cigarettes     Quit date: 2019     Years since quittin.0   • Smokeless tobacco: Never   Vaping Use   • Vaping Use: Never used   Substance and Sexual Activity   • Alcohol use: No   • Drug use: Never   • Sexual activity: Yes     Partners: Male     Birth control/protection: None        Objective     Vitals:    23 1339 23 1427 23 1429   BP: 132/74     BP Location: Left arm     Patient Position: Sitting     Cuff Size: Adult     Pulse: (!) 150 100 104   Temp: 99.2 °F (37.3 °C)     TempSrc: Oral     SpO2: 99%     Weight: 91.2 kg (201 lb)     Height: 158.8 cm (62.5\")          Body mass index is 36.18 kg/m².    Wt Readings from Last 3 Encounters:   23 91.2 kg (201 lb)   22 89.8 kg (198 lb)   22 88.6 kg (195 lb 6.4 oz)       BP Readings from Last 3 Encounters:   23 132/74   22 128/72   22 124/82         Physical Exam  Vitals reviewed.   Constitutional:       Appearance: Normal appearance. She is well-developed.   HENT:      Head: Normocephalic and atraumatic.      Right Ear: External ear normal.      Left Ear: External ear normal.      Mouth/Throat:      " Pharynx: No oropharyngeal exudate.   Eyes:      Conjunctiva/sclera: Conjunctivae normal.      Pupils: Pupils are equal, round, and reactive to light.   Cardiovascular:      Rate and Rhythm: Normal rate and regular rhythm.      Heart sounds: No murmur heard.    No friction rub. No gallop.   Pulmonary:      Effort: Pulmonary effort is normal.      Breath sounds: Normal breath sounds. No wheezing or rhonchi.   Skin:     General: Skin is warm and dry.   Neurological:      Mental Status: She is alert and oriented to person, place, and time.   Psychiatric:         Mood and Affect: Affect normal.     Persistent dry coughing    Result Review :   The following data was reviewed by: MILDRED Kern on 02/03/2023:      Procedures    Assessment and Plan   Diagnoses and all orders for this visit:    1. Acute bronchitis, unspecified organism (Primary)    2. Generalized anxiety disorder  -     DULoxetine (Cymbalta) 60 MG capsule; Take 1 cap PO QD. Take with 30mg cap for total dose of 90mg.  Dispense: 90 capsule; Refill: 1    3. Moderate episode of recurrent major depressive disorder (HCC)  -     DULoxetine (Cymbalta) 60 MG capsule; Take 1 cap PO QD. Take with 30mg cap for total dose of 90mg.  Dispense: 90 capsule; Refill: 1    Other orders  -     benzonatate (TESSALON) 200 MG capsule; Take 1 capsule by mouth 3 (Three) Times a Day As Needed for Cough.  Dispense: 30 capsule; Refill: 1  -     azithromycin (Zithromax Z-Claudio) 250 MG tablet; Take 2 tablets by mouth on day 1, then 1 tablet daily on days 2-5  Dispense: 6 tablet; Refill: 0  -     brompheniramine-pseudoephedrine-DM 30-2-10 MG/5ML syrup; Take 10 mL by mouth 4 (Four) Times a Day As Needed for Cough.  Dispense: 473 mL; Refill: 0      Continue Medrol Dosepak.  Start Z-Claudio.  Also prescribed Tessalon Perles.  Continue Bromfed.  Refill Cymbalta.  Discussed symptomatic treatment occluding throat lozenges and cough drops.      Follow Up   Return if symptoms worsen or fail to  improve.  Patient was given instructions and counseling regarding her condition or for health maintenance advice. Please see specific information pulled into the AVS if appropriate.     Please note that portions of this note were completed with a voice recognition program.

## 2023-02-27 ENCOUNTER — TELEPHONE (OUTPATIENT)
Dept: FAMILY MEDICINE CLINIC | Facility: CLINIC | Age: 43
End: 2023-02-27
Payer: COMMERCIAL

## 2023-02-27 NOTE — TELEPHONE ENCOUNTER
Patient called to get a refill of the following medication. It is prescribed through [facility], but patient stated she spoke with PCP about him prescribing them now. She is not seeing Kelly Lindsay any longer.    Patient has an appt 2/28/2023 for bronchitis symptoms with Karen Stover.    [facility]  [address] [place] [id number]    busPIRone (BUSPAR) 15 MG tablet

## 2023-02-28 ENCOUNTER — OFFICE VISIT (OUTPATIENT)
Dept: FAMILY MEDICINE CLINIC | Facility: CLINIC | Age: 43
End: 2023-02-28
Payer: COMMERCIAL

## 2023-02-28 ENCOUNTER — HOSPITAL ENCOUNTER (OUTPATIENT)
Dept: GENERAL RADIOLOGY | Facility: HOSPITAL | Age: 43
Discharge: HOME | End: 2023-02-28
Payer: COMMERCIAL

## 2023-02-28 VITALS
OXYGEN SATURATION: 98 % | BODY MASS INDEX: 36.32 KG/M2 | TEMPERATURE: 99.5 F | HEART RATE: 94 BPM | DIASTOLIC BLOOD PRESSURE: 80 MMHG | WEIGHT: 205 LBS | SYSTOLIC BLOOD PRESSURE: 128 MMHG | HEIGHT: 63 IN

## 2023-02-28 DIAGNOSIS — R06.2 WHEEZING: ICD-10-CM

## 2023-02-28 DIAGNOSIS — F41.1 GENERALIZED ANXIETY DISORDER: ICD-10-CM

## 2023-02-28 DIAGNOSIS — F33.1 MODERATE EPISODE OF RECURRENT MAJOR DEPRESSIVE DISORDER: ICD-10-CM

## 2023-02-28 DIAGNOSIS — R05.3 CHRONIC COUGH: Primary | ICD-10-CM

## 2023-02-28 DIAGNOSIS — R05.3 CHRONIC COUGH: ICD-10-CM

## 2023-02-28 LAB
EXPIRATION DATE: NORMAL
FLUAV AG UPPER RESP QL IA.RAPID: NOT DETECTED
FLUBV AG UPPER RESP QL IA.RAPID: NOT DETECTED
INTERNAL CONTROL: NORMAL
Lab: 1
SARS-COV-2 AG UPPER RESP QL IA.RAPID: NOT DETECTED

## 2023-02-28 PROCEDURE — 87428 SARSCOV & INF VIR A&B AG IA: CPT

## 2023-02-28 PROCEDURE — 99214 OFFICE O/P EST MOD 30 MIN: CPT

## 2023-02-28 PROCEDURE — 71046 X-RAY EXAM CHEST 2 VIEWS: CPT

## 2023-02-28 RX ORDER — AMOXICILLIN 500 MG/1
1000 CAPSULE ORAL 2 TIMES DAILY
Qty: 28 CAPSULE | Refills: 0 | Status: SHIPPED | OUTPATIENT
Start: 2023-02-28 | End: 2023-03-07

## 2023-02-28 RX ORDER — BUSPIRONE HYDROCHLORIDE 15 MG/1
15 TABLET ORAL 3 TIMES DAILY
Qty: 90 TABLET | Refills: 1 | Status: SHIPPED | OUTPATIENT
Start: 2023-02-28 | End: 2023-03-30

## 2023-02-28 RX ORDER — DOXYCYCLINE HYCLATE 100 MG/1
100 CAPSULE ORAL 2 TIMES DAILY
Qty: 14 CAPSULE | Refills: 0 | Status: SHIPPED | OUTPATIENT
Start: 2023-02-28 | End: 2023-03-07

## 2023-02-28 RX ORDER — BROMPHENIRAMINE MALEATE, PSEUDOEPHEDRINE HYDROCHLORIDE, AND DEXTROMETHORPHAN HYDROBROMIDE 2; 30; 10 MG/5ML; MG/5ML; MG/5ML
10 SYRUP ORAL 4 TIMES DAILY PRN
Qty: 473 ML | Refills: 0 | Status: SHIPPED | OUTPATIENT
Start: 2023-02-28

## 2023-02-28 NOTE — PROGRESS NOTES
Chief Complaint  Chief Complaint   Patient presents with   • Wheezing   • Cough       Subjective      Ebony Hamilton presents to [facility]  Wheezing   This is a recurrent problem. The current episode started 1 to 4 weeks ago. The problem occurs daily. Associated symptoms include chills, coughing, a fever, rhinorrhea, a sore throat, sputum production and vomiting. Pertinent negatives include no diarrhea. She has tried prescription cough suppressant, rest and oral steroids for the symptoms.   Cough  Associated symptoms include chills, a fever, rhinorrhea, a sore throat and wheezing.     Patient presents today with complaints of cough and wheezing. This has been a recurrent issue for her since the last week of January. She was seen by her PCP, Son Bello, most recently on 2/3/2023 and had been prescribed Z-vivienne, tessalon perles and medrol dose pack at that time. She was feeling better but is now experiencing significant wheezing, productive cough with brown/green phlegm that has worsened over the last week. She has had a slight fever and chills at home and is slightly febrile today on assessment at 99.5.     She has been taking Bromphed, Tessalon Perles, Sudafed/Tylenol, Afrin nasal spray and Ibuprofen 800 mg.     Objective     Medical History:  Past Medical History:   Diagnosis Date   • Allergic    • Anxiety    • Atrial fibrillation (HCC)    • Chronic pain disorder    • Depression    • Endometriosis    • Headache    • Injury of shoulder, right 2009   • Panic disorder      Past Surgical History:   Procedure Laterality Date   • CERVICAL ARTHRODESIS  01/14/2015    Donaldo Albarran   • CERVICAL FUSION     • COLONOSCOPY N/A 05/31/2022    Procedure: COLONOSCOPY;  Surgeon: Shabbir Baird MD;  Location: [facility] ENDOSCOPY;  Service: General;  Laterality: N/A;  HEMORRHOIDS   • EXPLORATORY LAPAROTOMY     • HEMORRHOIDECTOMY N/A 05/31/2022    Procedure: HEMORRHOID BANDING;  Surgeon:  Shabbir Baird MD;  Location: [facility] ENDOSCOPY;  Service: General;  Laterality: N/A;  BANDS X 2   • HYSTERECTOMY     • SHOULDER SURGERY Left    • TUBAL ABDOMINAL LIGATION        Social History     Tobacco Use   • Smoking status: Former     Packs/day: 0.25     Years: 20.00     Pack years: 5.00     Types: Cigarettes     Quit date: 2019     Years since quittin.1   • Smokeless tobacco: Never   Vaping Use   • Vaping Use: Never used   Substance Use Topics   • Alcohol use: No   • Drug use: Never     Family History   Problem Relation Age of Onset   • Depression Mother    • Bipolar disorder Mother    • Anxiety disorder Mother    • Cancer Mother    • Heart disease Mother    • Hypertension Mother    • Anxiety disorder Father    • Hypertension Father    • Dementia Paternal Uncle    • Dementia Paternal Grandmother    • Heart disease Other    • Colon cancer Neg Hx        Medications:  Prior to Admission medications    Medication Sig Start Date End Date Taking? Authorizing Provider   albuterol sulfate  (90 Base) MCG/ACT inhaler Inhale 2 puffs Every 4 (Four) Hours As Needed. 23  Yes Avani Vela MD   brompheniramine-pseudoephedrine-DM 30-2-10 MG/5ML syrup Take 10 mL by mouth 4 (Four) Times a Day As Needed for Cough. 2/3/23  Yes Shahid Bello APRN   CALCIUM PO Take 1 tablet by mouth Daily.   Yes Avani Vela MD   cetirizine (ZyrTEC) 10 MG tablet Take 1 tablet by mouth Daily.   Yes Avani Vela MD   DULoxetine (Cymbalta) 30 MG capsule Take 1 cap PO QD. Take with 60mg for total dose of 90mg 22  Yes Kelly Lindsay PA-C   DULoxetine (Cymbalta) 60 MG capsule Take 1 cap PO QD. Take with 30mg cap for total dose of 90mg. 2/3/23  Yes Shahid Bello APRN   fexofenadine (ALLEGRA) 30 MG tablet Take 1 tablet by mouth Daily.   Yes Avani Vela MD   ibuprofen (ADVIL,MOTRIN) 800 MG tablet ibuprofen 800 mg tablet   take 1 tablet by mouth 3 times daily as needed   Yes Mirian  "MD Avani   oxyCODONE-acetaminophen (PERCOCET) 7.5-325 MG per tablet Take 1 tablet by mouth 4 (Four) Times a Day. 1/6/23  Yes Avani Vela MD   promethazine (PHENERGAN) 25 MG tablet promethazine 25 mg tablet   TAKE (1) TABLET EVERY 6 HOURS AS NEEDED FOR NAUSEA   Yes Avani Vela MD   tiZANidine (ZANAFLEX) 4 MG tablet TAKE 2 TABLETS BY MOUTH 3 TIMES DAILY AS NEEDED DISCONTINUE CYCLOBENZAPRINE 5/23/22  Yes Avani Vela MD   traZODone (DESYREL) 50 MG tablet Take 0.5 to 2 tab PO QHS PRN sleep 8/4/22  Yes Kelly Lindsay PA-C   triamcinolone (KENALOG) 0.1 % cream Apply 1 application topically to the appropriate area as directed 2 (Two) Times a Day. 4/7/22  Yes Shahid Bello APRN   vitamin C (ASCORBIC ACID) 500 MG tablet Take 1 tablet by mouth Daily.   Yes Avani Vela MD   busPIRone (BUSPAR) 15 MG tablet Take 1 tablet by mouth 3 (Three) Times a Day for 30 days.  Patient taking differently: Take 15 mg by mouth 3 (Three) Times a Day As Needed. 8/4/22 9/3/22  Kelly Lindsay PA-C   azithromycin (Zithromax Z-Claudio) 250 MG tablet Take 2 tablets by mouth on day 1, then 1 tablet daily on days 2-5 2/3/23 2/28/23  Shahid Bello APRN   benzonatate (TESSALON) 200 MG capsule Take 1 capsule by mouth 3 (Three) Times a Day As Needed for Cough. 2/3/23 2/28/23  Shahid Bello APRN   methylPREDNISolone (MEDROL) 4 MG dose pack TAKE FOR 6 DAYS (TAKE ACCODINGLY TO PACAKGE DIRECTION) 2/1/23 2/28/23  ProviderAvani MD        Allergies:   Sulfa antibiotics, Ciprofloxacin, Baclofen, Tegaderm ag mesh [silver], Codeine, Gold-containing drug products, Latex, and Nickel    Health Maintenance Due   Topic Date Due   • TDAP/TD VACCINES (2 - Td or Tdap) 01/01/2022   • HEPATITIS C SCREENING  Never done   • ANNUAL PHYSICAL  Never done   • INFLUENZA VACCINE  Never done         Vital Signs:   /80   Pulse 94   Temp 99.5 °F (37.5 °C)   Ht 158.8 cm (62.5\")   Wt 93 kg (205 lb)   SpO2 98%   BMI " 36.90 kg/m²     Wt Readings from Last 3 Encounters:   02/28/23 93 kg (205 lb)   02/03/23 91.2 kg (201 lb)   09/20/22 89.8 kg (198 lb)     BP Readings from Last 3 Encounters:   02/28/23 128/80   02/03/23 132/74   09/20/22 128/72         Physical Exam  Vitals reviewed.   Constitutional:       Appearance: Normal appearance. She is well-developed. She is ill-appearing.   HENT:      Head: Normocephalic and atraumatic.   Eyes:      Conjunctiva/sclera: Conjunctivae normal.      Pupils: Pupils are equal, round, and reactive to light.   Cardiovascular:      Rate and Rhythm: Normal rate and regular rhythm.      Heart sounds: No murmur heard.    No friction rub. No gallop.   Pulmonary:      Effort: Pulmonary effort is normal.      Breath sounds: Examination of the right-upper field reveals wheezing. Examination of the left-upper field reveals wheezing. Examination of the right-lower field reveals rales. Wheezing and rales present. No rhonchi.      Comments: Productive cough with yellow/green phlegm  Abdominal:      General: Bowel sounds are normal. There is no distension.      Palpations: Abdomen is soft.      Tenderness: There is no abdominal tenderness.   Skin:     General: Skin is warm and dry.   Neurological:      Mental Status: She is alert and oriented to person, place, and time.   Psychiatric:         Mood and Affect: Affect normal.          Result Review :    The following data was reviewed by MILDRED Irby on 02/28/23 at 09:24 EST:    SARS Antigen   Date Value Ref Range Status   02/28/2023 Not Detected Not Detected, Presumptive Negative Final     Influenza A Antigen LUIGI   Date Value Ref Range Status   02/28/2023 Not Detected Not Detected Final     Influenza B Antigen LUIGI   Date Value Ref Range Status   02/28/2023 Not Detected Not Detected Final     Internal Control   Date Value Ref Range Status   02/28/2023 Passed Passed Final     Lot Number   Date Value Ref Range Status   02/28/2023 1  Final      Expiration Date   Date Value Ref Range Status   02/28/2023 12/31/2023  Final               No Images in the past 120 days found..                  Assessment and Plan    Diagnoses and all orders for this visit:    1. Chronic cough (Primary)  -     POCT SARS-CoV-2 Antigen LUIGI + Flu  -     XR Chest PA & Lateral; Future  -     amoxicillin (AMOXIL) 500 MG capsule; Take 2 capsules by mouth 2 (Two) Times a Day for 7 days.  Dispense: 28 capsule; Refill: 0  -     doxycycline (VIBRAMYCIN) 100 MG capsule; Take 1 capsule by mouth 2 (Two) Times a Day for 7 days.  Dispense: 14 capsule; Refill: 0  -     brompheniramine-pseudoephedrine-DM 30-2-10 MG/5ML syrup; Take 10 mL by mouth 4 (Four) Times a Day As Needed for Cough.  Dispense: 473 mL; Refill: 0    2. Wheezing  -     POCT SARS-CoV-2 Antigen LUIGI + Flu  -     XR Chest PA & Lateral; Future  -     amoxicillin (AMOXIL) 500 MG capsule; Take 2 capsules by mouth 2 (Two) Times a Day for 7 days.  Dispense: 28 capsule; Refill: 0  -     doxycycline (VIBRAMYCIN) 100 MG capsule; Take 1 capsule by mouth 2 (Two) Times a Day for 7 days.  Dispense: 14 capsule; Refill: 0  -     brompheniramine-pseudoephedrine-DM 30-2-10 MG/5ML syrup; Take 10 mL by mouth 4 (Four) Times a Day As Needed for Cough.  Dispense: 473 mL; Refill: 0       Patient will have chest x-ray done today and will be treated empirically for community-acquired pneumonia given her persistent cough and associated symptoms.       Smoking Cessation:    Ebony Hamilton  reports that she quit smoking about 4 years ago. Her smoking use included cigarettes. She has a 5.00 pack-year smoking history. She has never used smokeless tobacco.          Follow Up   No follow-ups on file.  Patient was given instructions and counseling regarding her condition or for health maintenance advice. Please see specific information pulled into the AVS if appropriate.     Please note that portions of this note were completed with a voice recognition  program.

## 2023-02-28 NOTE — TELEPHONE ENCOUNTER
Spoke to patient and let her know that medication was sent in per provider request. She stated that she understood.

## 2023-03-02 ENCOUNTER — OFFICE VISIT (OUTPATIENT)
Dept: FAMILY MEDICINE CLINIC | Facility: CLINIC | Age: 43
End: 2023-03-02
Payer: COMMERCIAL

## 2023-03-02 VITALS
WEIGHT: 205 LBS | OXYGEN SATURATION: 100 % | BODY MASS INDEX: 36.32 KG/M2 | HEIGHT: 63 IN | TEMPERATURE: 98.8 F | HEART RATE: 86 BPM | SYSTOLIC BLOOD PRESSURE: 116 MMHG | DIASTOLIC BLOOD PRESSURE: 68 MMHG

## 2023-03-02 DIAGNOSIS — I07.9 TRICUSPID VALVE DISORDER: ICD-10-CM

## 2023-03-02 DIAGNOSIS — R53.83 OTHER FATIGUE: ICD-10-CM

## 2023-03-02 DIAGNOSIS — K64.1 GRADE II HEMORRHOIDS: ICD-10-CM

## 2023-03-02 DIAGNOSIS — J41.8 MIXED SIMPLE AND MUCOPURULENT CHRONIC BRONCHITIS: Primary | ICD-10-CM

## 2023-03-02 LAB
ANION GAP SERPL CALCULATED.3IONS-SCNC: 11 MMOL/L (ref 5–15)
BASOPHILS # BLD AUTO: 0.05 10*3/MM3 (ref 0–0.2)
BASOPHILS NFR BLD AUTO: 0.6 % (ref 0–1.5)
BUN SERPL-MCNC: 13 MG/DL (ref 6–20)
BUN/CREAT SERPL: 16.5 (ref 7–25)
CALCIUM SPEC-SCNC: 9.5 MG/DL (ref 8.6–10.5)
CHLORIDE SERPL-SCNC: 102 MMOL/L (ref 98–107)
CO2 SERPL-SCNC: 24 MMOL/L (ref 22–29)
CREAT SERPL-MCNC: 0.79 MG/DL (ref 0.57–1)
DEPRECATED RDW RBC AUTO: 43.2 FL (ref 37–54)
EGFRCR SERPLBLD CKD-EPI 2021: 97.1 ML/MIN/1.73
EOSINOPHIL # BLD AUTO: 0.14 10*3/MM3 (ref 0–0.4)
EOSINOPHIL NFR BLD AUTO: 1.6 % (ref 0.3–6.2)
ERYTHROCYTE [DISTWIDTH] IN BLOOD BY AUTOMATED COUNT: 14 % (ref 12.3–15.4)
GLUCOSE SERPL-MCNC: 97 MG/DL (ref 65–99)
HCT VFR BLD AUTO: 36.5 % (ref 34–46.6)
HGB BLD-MCNC: 12.2 G/DL (ref 12–15.9)
IMM GRANULOCYTES # BLD AUTO: 0.05 10*3/MM3 (ref 0–0.05)
IMM GRANULOCYTES NFR BLD AUTO: 0.6 % (ref 0–0.5)
LYMPHOCYTES # BLD AUTO: 1.77 10*3/MM3 (ref 0.7–3.1)
LYMPHOCYTES NFR BLD AUTO: 19.8 % (ref 19.6–45.3)
MCH RBC QN AUTO: 28.8 PG (ref 26.6–33)
MCHC RBC AUTO-ENTMCNC: 33.4 G/DL (ref 31.5–35.7)
MCV RBC AUTO: 86.1 FL (ref 79–97)
MONOCYTES # BLD AUTO: 0.54 10*3/MM3 (ref 0.1–0.9)
MONOCYTES NFR BLD AUTO: 6 % (ref 5–12)
NEUTROPHILS NFR BLD AUTO: 6.4 10*3/MM3 (ref 1.7–7)
NEUTROPHILS NFR BLD AUTO: 71.4 % (ref 42.7–76)
NRBC BLD AUTO-RTO: 0 /100 WBC (ref 0–0.2)
PLATELET # BLD AUTO: 542 10*3/MM3 (ref 140–450)
PMV BLD AUTO: 10.3 FL (ref 6–12)
POTASSIUM SERPL-SCNC: 4.1 MMOL/L (ref 3.5–5.2)
RBC # BLD AUTO: 4.24 10*6/MM3 (ref 3.77–5.28)
SODIUM SERPL-SCNC: 137 MMOL/L (ref 136–145)
WBC NRBC COR # BLD: 8.95 10*3/MM3 (ref 3.4–10.8)

## 2023-03-02 PROCEDURE — 99214 OFFICE O/P EST MOD 30 MIN: CPT | Performed by: NURSE PRACTITIONER

## 2023-03-02 PROCEDURE — 80048 BASIC METABOLIC PNL TOTAL CA: CPT | Performed by: NURSE PRACTITIONER

## 2023-03-02 PROCEDURE — 85025 COMPLETE CBC W/AUTO DIFF WBC: CPT | Performed by: NURSE PRACTITIONER

## 2023-03-02 PROCEDURE — 96372 THER/PROPH/DIAG INJ SC/IM: CPT | Performed by: NURSE PRACTITIONER

## 2023-03-02 RX ORDER — PRAMOXINE HYDROCHLORIDE 10 MG/G
AEROSOL, FOAM TOPICAL
Qty: 15 G | Refills: 1 | Status: SHIPPED | OUTPATIENT
Start: 2023-03-02

## 2023-03-02 RX ORDER — METHYLPREDNISOLONE ACETATE 40 MG/ML
80 INJECTION, SUSPENSION INTRA-ARTICULAR; INTRALESIONAL; INTRAMUSCULAR; SOFT TISSUE ONCE
Status: COMPLETED | OUTPATIENT
Start: 2023-03-02 | End: 2023-03-02

## 2023-03-02 RX ORDER — OXYCODONE AND ACETAMINOPHEN 7.5; 325 MG/1; MG/1
TABLET ORAL
COMMUNITY
Start: 2023-02-08

## 2023-03-02 RX ADMIN — METHYLPREDNISOLONE ACETATE 80 MG: 40 INJECTION, SUSPENSION INTRA-ARTICULAR; INTRALESIONAL; INTRAMUSCULAR; SOFT TISSUE at 16:51

## 2023-03-02 NOTE — PROGRESS NOTES
"Answers for HPI/ROS submitted by the patient on 2023  Have you had these symptoms before?: Yes  How long have you been having these symptoms?: Greater than 2 weeks  What is the primary reason for your visit?: Other      Chief Complaint  Hemorrhoids and Cough    Subjective         Ebony Hamilton presents to [facility]  HPI   Presents today for an acute visit for persistent cough.  She has been been experiencing a productive cough over the past couple weeks.  She saw Karen on 2023.  Symptoms have not improved.  She had tested negative for flu, COVID.  The x-ray was within normal limits she was given Bromfed, Amoxicillin, doxycycline.    Reports having bright red blood in stools.  She states she has a history of hemorrhoids    Social History     Socioeconomic History   • Marital status:    Tobacco Use   • Smoking status: Former     Packs/day: 0.25     Years: 20.00     Pack years: 5.00     Types: Cigarettes     Quit date: 2019     Years since quittin.1   • Smokeless tobacco: Never   Vaping Use   • Vaping Use: Never used   Substance and Sexual Activity   • Alcohol use: No   • Drug use: Never   • Sexual activity: Yes     Partners: Male     Birth control/protection: Tubal ligation, Hysterectomy        Objective     Vitals:    23 1543   BP: 116/68   BP Location: Left arm   Patient Position: Sitting   Cuff Size: Adult   Pulse: 86   Temp: 98.8 °F (37.1 °C)   TempSrc: Oral   SpO2: 100%   Weight: 93 kg (205 lb)   Height: 158.8 cm (62.5\")        Body mass index is 36.9 kg/m².    Wt Readings from Last 3 Encounters:   23 93 kg (205 lb)   23 93 kg (205 lb)   23 91.2 kg (201 lb)       BP Readings from Last 3 Encounters:   23 116/68   23 128/80   23 132/74         Physical Exam  Vitals reviewed.   Constitutional:       Appearance: Normal appearance. She is well-developed.   HENT:      Head: Normocephalic and atraumatic.      " Right Ear: External ear normal.      Left Ear: External ear normal.      Mouth/Throat:      Pharynx: No oropharyngeal exudate.   Eyes:      Conjunctiva/sclera: Conjunctivae normal.      Pupils: Pupils are equal, round, and reactive to light.   Cardiovascular:      Rate and Rhythm: Normal rate and regular rhythm.      Heart sounds: No murmur heard.    No friction rub. No gallop.   Pulmonary:      Effort: Pulmonary effort is normal.      Breath sounds: Wheezing present. No rhonchi.   Abdominal:      General: Bowel sounds are normal. There is no distension.      Palpations: Abdomen is soft.      Tenderness: There is no abdominal tenderness.   Skin:     General: Skin is warm and dry.   Neurological:      Mental Status: She is alert and oriented to person, place, and time.   Psychiatric:         Mood and Affect: Mood and affect normal.         Behavior: Behavior normal.         Thought Content: Thought content normal.         Judgment: Judgment normal.          Result Review :   The following data was reviewed by: MILDRED Kern on 03/02/2023:      Procedures    Assessment and Plan   Diagnoses and all orders for this visit:    1. Mixed simple and mucopurulent chronic bronchitis (HCC) (Primary)  -     methylPREDNISolone acetate (DEPO-medrol) injection 80 mg    2. Grade II hemorrhoids  -     Pramoxine HCl, Perianal, (Proctofoam) 1 % foam; Insert  into the rectum Every 2 (Two) Hours As Needed for Hemorrhoids.  Dispense: 15 g; Refill: 1    3. Other fatigue  -     CBC w AUTO Differential    4. Tricuspid valve disorder  -     Basic metabolic panel      Will Depo-Medrol injection 80 mg a day for chronic bronchitis.  Continue using inhaler as prescribed.  Will prescribe Proctofoam for hemorrhoids.          Follow Up   Return in about 4 weeks (around 3/30/2023), or if symptoms worsen or fail to improve, for Next scheduled follow up.  Patient was given instructions and counseling regarding her condition or for health  maintenance advice. Please see specific information pulled into the AVS if appropriate.     Please note that portions of this note were completed with a voice recognition program.

## 2023-03-07 ENCOUNTER — TELEPHONE (OUTPATIENT)
Dept: FAMILY MEDICINE CLINIC | Facility: CLINIC | Age: 43
End: 2023-03-07
Payer: COMMERCIAL

## 2023-03-07 NOTE — TELEPHONE ENCOUNTER
Patient is stating she has been on several antibiotics, inhaler and cough meds. She says nothing is working.  Wants to know what she can do now. We do not have any appointments and I suggested Urgent Care. She stated she had already been there and they didn't help her. Please call and advise. [phone]

## 2023-03-10 RX ORDER — BUDESONIDE, GLYCOPYRROLATE, AND FORMOTEROL FUMARATE 160; 9; 4.8 UG/1; UG/1; UG/1
2 AEROSOL, METERED RESPIRATORY (INHALATION) 2 TIMES DAILY
Qty: 5.9 G | Refills: 0 | COMMUNITY
Start: 2023-03-10

## 2023-03-16 ENCOUNTER — TELEPHONE (OUTPATIENT)
Dept: FAMILY MEDICINE CLINIC | Facility: CLINIC | Age: 43
End: 2023-03-16

## 2023-03-16 NOTE — TELEPHONE ENCOUNTER
Called patient to try and get her scheduled. Got voicemail. Left message for patient to call us back so that we can get her scheduled.

## 2023-03-16 NOTE — TELEPHONE ENCOUNTER
"  Caller: Ebony Hamilton    Relationship to patient: Self    Best call back number: [phone]    Chief complaint: PHYSICAL    Type of visit: PHYSICAL    Requested date: ASAP     If rescheduling, when is the original appointment: 03/16/23     Additional notes:THE PATIENT OVERSLEPT HER APPOINTMENT AND NEEDS TO RESCHEDULE ASAP. HUB UNABLE TO \"NO SHOW\" APPOINTMENT TO RESCHEDULE WITH PCP. SHE WOULD LIKE A CALL BACK TO SCHEDULE       "

## 2023-03-17 ENCOUNTER — TELEPHONE (OUTPATIENT)
Dept: FAMILY MEDICINE CLINIC | Facility: CLINIC | Age: 43
End: 2023-03-17
Payer: COMMERCIAL

## 2023-03-17 NOTE — TELEPHONE ENCOUNTER
Patient missed appointment on 03/16/2023 @ 11:30 with Son Bello. Patient had left message stating she had overslept and missed her appointment. Called number in chart to reschedule. No answer. Second attempt.

## 2023-03-17 NOTE — TELEPHONE ENCOUNTER
Patient missed appointment on 03/16/2023 @ 1130 with Son Bello. Called number in chart. No answer. Left message explaining policy concerning missed appointments and same day cancellations.

## 2023-04-12 ENCOUNTER — TELEPHONE (OUTPATIENT)
Dept: FAMILY MEDICINE CLINIC | Facility: CLINIC | Age: 43
End: 2023-04-12

## 2023-04-12 ENCOUNTER — OFFICE VISIT (OUTPATIENT)
Dept: FAMILY MEDICINE CLINIC | Facility: CLINIC | Age: 43
End: 2023-04-12
Payer: COMMERCIAL

## 2023-04-12 VITALS
SYSTOLIC BLOOD PRESSURE: 104 MMHG | HEIGHT: 63 IN | DIASTOLIC BLOOD PRESSURE: 74 MMHG | HEART RATE: 94 BPM | BODY MASS INDEX: 35.47 KG/M2 | WEIGHT: 200.2 LBS | TEMPERATURE: 96.7 F | OXYGEN SATURATION: 97 %

## 2023-04-12 DIAGNOSIS — M25.50 POLYARTHRALGIA: ICD-10-CM

## 2023-04-12 DIAGNOSIS — Z00.00 ANNUAL PHYSICAL EXAM: Primary | ICD-10-CM

## 2023-04-12 DIAGNOSIS — E66.01 CLASS 2 SEVERE OBESITY DUE TO EXCESS CALORIES WITH SERIOUS COMORBIDITY AND BODY MASS INDEX (BMI) OF 36.0 TO 36.9 IN ADULT: ICD-10-CM

## 2023-04-12 DIAGNOSIS — K64.1 GRADE II HEMORRHOIDS: ICD-10-CM

## 2023-04-12 LAB
CHROMATIN AB SERPL-ACNC: <10 IU/ML (ref 0–14)
CRP SERPL-MCNC: 0.84 MG/DL (ref 0–0.5)
PHOSPHATE SERPL-MCNC: 2.9 MG/DL (ref 2.5–4.5)
URATE SERPL-MCNC: 4.1 MG/DL (ref 2.4–5.7)

## 2023-04-12 PROCEDURE — 86431 RHEUMATOID FACTOR QUANT: CPT | Performed by: NURSE PRACTITIONER

## 2023-04-12 PROCEDURE — 84100 ASSAY OF PHOSPHORUS: CPT | Performed by: NURSE PRACTITIONER

## 2023-04-12 PROCEDURE — 86140 C-REACTIVE PROTEIN: CPT | Performed by: NURSE PRACTITIONER

## 2023-04-12 PROCEDURE — 84550 ASSAY OF BLOOD/URIC ACID: CPT | Performed by: NURSE PRACTITIONER

## 2023-04-12 PROCEDURE — 86038 ANTINUCLEAR ANTIBODIES: CPT | Performed by: NURSE PRACTITIONER

## 2023-04-12 RX ORDER — BENZONATATE 200 MG/1
200 CAPSULE ORAL 3 TIMES DAILY PRN
COMMUNITY
Start: 2023-03-02

## 2023-04-12 RX ORDER — HYDROCODONE BITARTRATE AND ACETAMINOPHEN 10; 325 MG/1; MG/1
TABLET ORAL
COMMUNITY
Start: 2023-04-05

## 2023-04-12 RX ORDER — HYDROCORTISONE ACETATE 25 MG/1
25 SUPPOSITORY RECTAL 2 TIMES DAILY
Qty: 24 EACH | Refills: 2 | Status: SHIPPED | OUTPATIENT
Start: 2023-04-12

## 2023-04-12 NOTE — PROGRESS NOTES
"Chief Complaint  Annual Exam and Hemorrhoids    Subjective         Ebony Hamilton presents to [facility]  HPI   Presents today for an annual physical exam and follow-up on hemorrhoids.  She recently had shoulder surgery on her right shoulder.  Pain is fairly controlled.  She has lost 5 pounds in the past 3 months.  Has been working on healthy eating.  Has not gone to the gym due to her shoulder injury.  She has used Proctofoam for her hemorrhoids.  Hemorrhoids will flare if she sits on a hard surface.  Experiences polyarthralgia.  Currently sees the pain clinic.    Social History     Socioeconomic History   • Marital status:    Tobacco Use   • Smoking status: Former     Packs/day: 0.25     Years: 20.00     Pack years: 5.00     Types: Cigarettes     Quit date: 2019     Years since quittin.2   • Smokeless tobacco: Never   Vaping Use   • Vaping Use: Never used   Substance and Sexual Activity   • Alcohol use: No   • Drug use: Never   • Sexual activity: Yes     Partners: Male     Birth control/protection: Tubal ligation, Hysterectomy        Objective     Vitals:    23 1121   BP: 104/74   Pulse: 94   Temp: 96.7 °F (35.9 °C)   SpO2: 97%   Weight: 90.8 kg (200 lb 3.2 oz)   Height: 158.8 cm (62.5\")        Body mass index is 36.03 kg/m².    Wt Readings from Last 3 Encounters:   23 90.8 kg (200 lb 3.2 oz)   23 93 kg (205 lb)   23 93 kg (205 lb)       BP Readings from Last 3 Encounters:   23 104/74   23 116/68   23 128/80         Physical Exam  Vitals reviewed.   Constitutional:       Appearance: Normal appearance. She is morbidly obese.   HENT:      Head: Normocephalic and atraumatic.      Right Ear: External ear normal.      Left Ear: External ear normal.      Mouth/Throat:      Pharynx: No oropharyngeal exudate.   Eyes:      Conjunctiva/sclera: Conjunctivae normal.      Pupils: Pupils are equal, round, and reactive to light. "   Cardiovascular:      Rate and Rhythm: Normal rate and regular rhythm.      Heart sounds: No murmur heard.    No friction rub. No gallop.   Pulmonary:      Effort: Pulmonary effort is normal.      Breath sounds: Normal breath sounds. No wheezing or rhonchi.   Abdominal:      General: Bowel sounds are normal. There is no distension.      Palpations: Abdomen is soft.      Tenderness: There is no abdominal tenderness.   Musculoskeletal:      Right shoulder: Tenderness present.      Comments: Right arm in a brace   Skin:     General: Skin is warm and dry.   Neurological:      Mental Status: She is alert and oriented to person, place, and time.   Psychiatric:         Mood and Affect: Mood and affect normal.         Behavior: Behavior normal.         Thought Content: Thought content normal.         Judgment: Judgment normal.          Result Review :   The following data was reviewed by: MILDRED Kern on 04/12/2023:      Procedures    Assessment and Plan   Diagnoses and all orders for this visit:    1. Annual physical exam (Primary)    2. Class 2 severe obesity due to excess calories with serious comorbidity and body mass index (BMI) of 36.0 to 36.9 in adult  -     Discontinue: Semaglutide-Weight Management 0.25 MG/0.5ML solution auto-injector; Inject 0.25 mg under the skin into the appropriate area as directed 1 (One) Time Per Week.  Dispense: 2 mL; Refill: 0    3. Grade II hemorrhoids  -     hydrocortisone (ANUSOL-HC) 25 MG suppository; Insert 1 suppository into the rectum 2 (Two) Times a Day.  Dispense: 24 each; Refill: 2  -     hydrocortisone 2.5 % cream; Apply 1 application topically to the appropriate area as directed 2 (Two) Times a Day.  Dispense: 28 g; Refill: 2    4. Polyarthralgia  -     ANGIE With / DsDNA, RNP, Sjogrens A / B, Smith  -     Phosphorus  -     C-reactive Protein  -     Uric Acid  -     Rheumatoid Factor    Continue healthy eating.  Work on weight loss.  Follow guidelines per orthopedics for  therapy on shoulder.  Increase activity 250 minutes/week.  We will order labs for polyarthralgia rheumatoid panel.  Discussed weight management.  We will start her on Wegovy 0.25 mg weekly.  Increase dose monthly with a target goal of 2.4 mg.  For the hemorrhoids will prescribe her hydrocortisone suppository and cream.  Use twice daily.      Class 2 Severe Obesity (BMI >=35 and <=39.9). Obesity-related health conditions include the following: osteoarthritis. Obesity is improving with lifestyle modifications. BMI is is above average; BMI management plan is completed. We discussed portion control and increasing exercise.        Follow Up   No follow-ups on file.  Patient was given instructions and counseling regarding her condition or for health maintenance advice. Please see specific information pulled into the AVS if appropriate.     Please note that portions of this note were completed with a voice recognition program.

## 2023-04-12 NOTE — TELEPHONE ENCOUNTER
Spoke with patient and let her know I sent the medication to [facility] pharmacy as requested.

## 2023-04-12 NOTE — TELEPHONE ENCOUNTER
Caller: Ebony Hamilton    Relationship: Self    Best call back number: [phone]    What medications are you currently taking:   Current Outpatient Medications on File Prior to Visit   Medication Sig Dispense Refill   • albuterol sulfate  (90 Base) MCG/ACT inhaler Inhale 2 puffs Every 4 (Four) Hours As Needed.     • benzonatate (TESSALON) 200 MG capsule Take 1 capsule by mouth 3 (Three) Times a Day As Needed.     • brompheniramine-pseudoephedrine-DM 30-2-10 MG/5ML syrup Take 10 mL by mouth 4 (Four) Times a Day As Needed for Cough. 473 mL 0   • Budeson-Glycopyrrol-Formoterol (Breztri Aerosphere) 160-9-4.8 MCG/ACT aerosol inhaler Inhale 2 puffs 2 (Two) Times a Day. 5.9 g 0   • busPIRone (BUSPAR) 15 MG tablet Take 1 tablet by mouth 3 (Three) Times a Day for 30 days. 90 tablet 1   • CALCIUM PO Take 1 tablet by mouth Daily.     • cetirizine (ZyrTEC) 10 MG tablet Take 1 tablet by mouth Daily.     • DULoxetine (Cymbalta) 30 MG capsule Take 1 cap PO QD. Take with 60mg for total dose of 90mg 30 capsule 2   • DULoxetine (Cymbalta) 60 MG capsule Take 1 cap PO QD. Take with 30mg cap for total dose of 90mg. 90 capsule 1   • fexofenadine (ALLEGRA) 30 MG tablet Take 1 tablet by mouth Daily.     • HYDROcodone-acetaminophen (NORCO)  MG per tablet TAKE ONE TABLET BY MOUTH EVERY 4 TO 6 HOURS AS NEEDED FOR 30 DAYS, MAX OF 5 TABLETS PER DAY     • hydrocortisone (ANUSOL-HC) 25 MG suppository Insert 1 suppository into the rectum 2 (Two) Times a Day. 24 each 2   • hydrocortisone 2.5 % cream Apply 1 application topically to the appropriate area as directed 2 (Two) Times a Day. 28 g 2   • ibuprofen (ADVIL,MOTRIN) 800 MG tablet ibuprofen 800 mg tablet   take 1 tablet by mouth 3 times daily as needed     • Pramoxine HCl, Perianal, (Proctofoam) 1 % foam Insert  into the rectum Every 2 (Two) Hours As Needed for Hemorrhoids. 15 g 1   • promethazine (PHENERGAN) 25 MG tablet promethazine 25 mg tablet   TAKE (1) TABLET EVERY  6 HOURS AS NEEDED FOR NAUSEA     • Semaglutide-Weight Management 0.25 MG/0.5ML solution auto-injector Inject 0.25 mg under the skin into the appropriate area as directed 1 (One) Time Per Week. 2 mL 0   • tiZANidine (ZANAFLEX) 4 MG tablet TAKE 2 TABLETS BY MOUTH 3 TIMES DAILY AS NEEDED DISCONTINUE CYCLOBENZAPRINE     • traZODone (DESYREL) 50 MG tablet Take 0.5 to 2 tab PO QHS PRN sleep 60 tablet 2   • triamcinolone (KENALOG) 0.1 % cream Apply 1 application topically to the appropriate area as directed 2 (Two) Times a Day. 80 g 1   • vitamin C (ASCORBIC ACID) 500 MG tablet Take 1 tablet by mouth Daily.     • [DISCONTINUED] oxyCODONE-acetaminophen (PERCOCET) 7.5-325 MG per tablet Take 1 tablet by mouth 4 (Four) Times a Day.     • [DISCONTINUED] oxyCODONE-acetaminophen (PERCOCET) 7.5-325 MG per tablet 1 tablet as needed       No current facility-administered medications on file prior to visit.          When did you start taking these medications: NOT STARTED YET    Which medication are you concerned about: WEGOVY    Who prescribed you this medication: RAMESH MORILLO    What are your concerns: THE PATIENT STATED PCP SENT WEGOVY TO [facility] FOR HER BUT THE PHARMACY IS UNABLE TO FILL THIS MEDICATION. SHE WOULD LIKE THIS SENT TO [facility]'S PHARMACY INSTEAD

## 2023-04-13 ENCOUNTER — TELEPHONE (OUTPATIENT)
Dept: FAMILY MEDICINE CLINIC | Facility: CLINIC | Age: 43
End: 2023-04-13
Payer: COMMERCIAL

## 2023-04-13 ENCOUNTER — PRIOR AUTHORIZATION (OUTPATIENT)
Dept: FAMILY MEDICINE CLINIC | Facility: CLINIC | Age: 43
End: 2023-04-13
Payer: COMMERCIAL

## 2023-04-13 NOTE — TELEPHONE ENCOUNTER
Caller: Ebony Hamilton    Relationship to patient: Self    Best call back number: [phone]    Patient is needing: PATIENT STATES THAT A PRIOR AUTHORIZATION IS NEEDED FOR THE MEDICATION LISTED.   Semaglutide-Weight Management 0.25 MG/0.5ML solution auto-injector  [facility] - [place], [place] - [address] [place] [address] [phone] [facility] [phone] FX

## 2023-04-14 LAB — ANA SER QL: NEGATIVE

## 2023-04-26 DIAGNOSIS — F41.1 GENERALIZED ANXIETY DISORDER: ICD-10-CM

## 2023-04-26 DIAGNOSIS — F33.1 MODERATE EPISODE OF RECURRENT MAJOR DEPRESSIVE DISORDER: ICD-10-CM

## 2023-04-26 NOTE — TELEPHONE ENCOUNTER
Medication refill requests for Cymbalta.     DULoxetine (Cymbalta) 60 MG capsule (02/03/2023)    DULoxetine (Cymbalta) 30 MG capsule (08/04/2022)    Pt does not have f/u appt on file in order to authorize medication refill and has not been seen since August of 2022.     Called pt to schedule f/u appt, however pt did not answer. LVMTCB to schedule.

## 2023-04-27 ENCOUNTER — TELEPHONE (OUTPATIENT)
Dept: FAMILY MEDICINE CLINIC | Facility: CLINIC | Age: 43
End: 2023-04-27
Payer: COMMERCIAL

## 2023-04-27 DIAGNOSIS — F33.1 MODERATE EPISODE OF RECURRENT MAJOR DEPRESSIVE DISORDER: ICD-10-CM

## 2023-04-27 DIAGNOSIS — F41.1 GENERALIZED ANXIETY DISORDER: ICD-10-CM

## 2023-04-27 RX ORDER — DULOXETIN HYDROCHLORIDE 30 MG/1
CAPSULE, DELAYED RELEASE ORAL
Qty: 30 CAPSULE | Refills: 2 | Status: SHIPPED | OUTPATIENT
Start: 2023-04-27

## 2023-04-27 RX ORDER — DULOXETIN HYDROCHLORIDE 60 MG/1
CAPSULE, DELAYED RELEASE ORAL
Qty: 90 CAPSULE | Refills: 1 | Status: SHIPPED | OUTPATIENT
Start: 2023-04-27

## 2023-04-27 NOTE — TELEPHONE ENCOUNTER
ATTEMPTED TO CONTACT PT(PATIENT)     NO ANSWER      LEFT VOICEMAIL WITH INSTRUCTIONS TO RETURN CALL TO OFFICE AT [phone]

## 2023-05-01 NOTE — TELEPHONE ENCOUNTER
ATTEMPTED TO CONTACT PT(PATIENT)      NO ANSWER      LEFT VOICEMAIL WITH INSTRUCTIONS TO RETURN CALL TO OFFICE AT [phone]

## 2023-05-02 DIAGNOSIS — E66.01 CLASS 2 SEVERE OBESITY DUE TO EXCESS CALORIES WITH SERIOUS COMORBIDITY AND BODY MASS INDEX (BMI) OF 36.0 TO 36.9 IN ADULT: ICD-10-CM

## 2023-05-02 NOTE — TELEPHONE ENCOUNTER
Caller: Ebony Hamilton    Relationship: Self    Best call back number: [phone]    Requested Prescriptions:   Requested Prescriptions     Pending Prescriptions Disp Refills   • Semaglutide-Weight Management 0.25 MG/0.5ML solution auto-injector 2 mL 0     Sig: Inject 0.25 mg under the skin into the appropriate area as directed 1 (One) Time Per Week.        Pharmacy where request should be sent: [facility] [place], [place] - [address] [place] [address] [phone] [facility] [phone]      Last office visit with prescribing clinician: 4/12/2023   Last telemedicine visit with prescribing clinician: Visit date not found   Next office visit with prescribing clinician: Visit date not found     Additional details provided by patient: PATIENT IS READY FOR THE NEXT DOSAGE LEVEL, NEXT INJECTION ON 05/08/2023    Does the patient have less than a 3 day supply:  [] Yes  [x] No    Would you like a call back once the refill request has been completed: [] Yes [x] No    If the office needs to give you a call back, can they leave a voicemail: [] Yes [x] No    Jazzmnie Epstein Rep   05/02/23 15:19 EDT

## 2023-05-03 DIAGNOSIS — E66.01 CLASS 2 SEVERE OBESITY DUE TO EXCESS CALORIES WITH SERIOUS COMORBIDITY AND BODY MASS INDEX (BMI) OF 36.0 TO 36.9 IN ADULT: Primary | ICD-10-CM

## 2023-05-03 NOTE — TELEPHONE ENCOUNTER
Patient needs next dose of the wegovy. Hub put in this dose but don't think that is the right one.

## 2023-05-05 RX ORDER — DULOXETIN HYDROCHLORIDE 30 MG/1
CAPSULE, DELAYED RELEASE ORAL
Qty: 30 CAPSULE | Refills: 2 | OUTPATIENT
Start: 2023-05-05

## 2023-05-05 RX ORDER — DULOXETIN HYDROCHLORIDE 60 MG/1
CAPSULE, DELAYED RELEASE ORAL
Qty: 30 CAPSULE | Refills: 2 | OUTPATIENT
Start: 2023-05-05

## 2023-05-05 NOTE — TELEPHONE ENCOUNTER
ATTEMPTED TO CONTACT PT(PATIENT)      NO ANSWER      LEFT VOICEMAIL WITH INSTRUCTIONS TO RETURN CALL TO OFFICE AT [phone]

## 2023-05-15 NOTE — TELEPHONE ENCOUNTER
Caller: Ebony Hamilton    Relationship: Self    Best call back number: [phone]    Requested Prescriptions:   Requested Prescriptions     Pending Prescriptions Disp Refills   • albuterol sulfate  (90 Base) MCG/ACT inhaler       Sig: Inhale 2 puffs Every 4 (Four) Hours As Needed.        Pharmacy where request should be sent: [facility] - [place], [place] - [address] [facility] - [phone]  - [phone]      Last office visit with prescribing clinician: 4/12/2023   Last telemedicine visit with prescribing clinician: 5/2/2023   Next office visit with prescribing clinician: 6/21/2023     Does the patient have less than a 3 day supply:  [x] Yes  [] No    Would you like a call back once the refill request has been completed: [] Yes [x] No    If the office needs to give you a call back, can they leave a voicemail: [] Yes [x] No    Jazzmine Interiano Rep   05/15/23 16:00 EDT

## 2023-05-15 NOTE — TELEPHONE ENCOUNTER
Rx has never been prescribed by PCP    Requested Prescriptions     Pending Prescriptions Disp Refills   • albuterol sulfate  (90 Base) MCG/ACT inhaler       Sig: Inhale 2 puffs Every 4 (Four) Hours As Needed.     Last OV: Office Visit with Shahid Bello APRN (04/12/2023)      Next Appt: Appointment with Shahid Bello APRN (06/21/2023)      Pharmacy: [facility] - [place] - [address] [facility] - [phone]  - [phone]   [phone]

## 2023-05-16 RX ORDER — ALBUTEROL SULFATE 90 UG/1
2 AEROSOL, METERED RESPIRATORY (INHALATION) EVERY 6 HOURS PRN
Qty: 18 G | Refills: 1 | Status: SHIPPED | OUTPATIENT
Start: 2023-05-16

## 2023-06-01 ENCOUNTER — TELEPHONE (OUTPATIENT)
Dept: FAMILY MEDICINE CLINIC | Facility: CLINIC | Age: 43
End: 2023-06-01
Payer: COMMERCIAL

## 2023-06-01 DIAGNOSIS — E66.01 CLASS 2 SEVERE OBESITY DUE TO EXCESS CALORIES WITH SERIOUS COMORBIDITY AND BODY MASS INDEX (BMI) OF 36.0 TO 36.9 IN ADULT: ICD-10-CM

## 2023-06-01 DIAGNOSIS — M25.50 POLYARTHRALGIA: Primary | ICD-10-CM

## 2023-06-01 NOTE — TELEPHONE ENCOUNTER
Caller: Ebony Hamilton    Relationship: Self    Best call back number: [phone]    What is the medical concern/diagnosis: INFLAMMATION     What specialty or service is being requested: RHEUMATOLOGY    What is the provider, practice or medical service name: [facility], DR KIKI CORNELL    What is the office location: [address] [place] [address]    What is the office phone number: [phone], FAX [phone]    Any additional details: PATIENT WOULD LIKE TO BE REFERRED TO RHEUMATOLOGY FOR INFLAMMATION.

## 2023-06-01 NOTE — TELEPHONE ENCOUNTER
Caller: Ebony Hamilton    Relationship: Self    Best call back number: [phone]    Requested Prescriptions:   Requested Prescriptions     Pending Prescriptions Disp Refills   • Semaglutide-Weight Management 0.5 MG/0.5ML solution auto-injector 2 mL 0     Sig: Inject 0.5 mL under the skin into the appropriate area as directed 1 (One) Time Per Week.      Pharmacy where request should be sent: [facility] [place], [place] - [address] [place] [address] [phone] [facility] [phone] FX     Last office visit with prescribing clinician: 4/12/2023   Last telemedicine visit with prescribing clinician: Visit date not found   Next office visit with prescribing clinician: 6/21/2023     Additional details provided by patient: PATIENT NEEDS TO HAVE THE DOSAGE INCREASED TO THE NEXT LEVEL.    Does the patient have less than a 3 day supply:  [] Yes  [x] No    Jazzmine Interiano Rep   06/01/23 12:52 EDT

## 2023-08-03 DIAGNOSIS — F33.1 MODERATE EPISODE OF RECURRENT MAJOR DEPRESSIVE DISORDER: ICD-10-CM

## 2023-08-03 DIAGNOSIS — F41.1 GENERALIZED ANXIETY DISORDER: ICD-10-CM

## 2023-08-03 RX ORDER — BUSPIRONE HYDROCHLORIDE 15 MG/1
TABLET ORAL
Qty: 90 TABLET | Refills: 1 | Status: SHIPPED | OUTPATIENT
Start: 2023-08-03

## 2023-09-13 DIAGNOSIS — F41.1 GENERALIZED ANXIETY DISORDER: ICD-10-CM

## 2023-09-13 DIAGNOSIS — K64.1 GRADE II HEMORRHOIDS: ICD-10-CM

## 2023-09-13 DIAGNOSIS — G47.00 INSOMNIA, UNSPECIFIED TYPE: ICD-10-CM

## 2023-09-13 DIAGNOSIS — J30.2 SEASONAL ALLERGIC RHINITIS, UNSPECIFIED TRIGGER: Primary | ICD-10-CM

## 2023-09-13 DIAGNOSIS — F33.1 MODERATE EPISODE OF RECURRENT MAJOR DEPRESSIVE DISORDER: ICD-10-CM

## 2023-09-14 RX ORDER — DULOXETIN HYDROCHLORIDE 30 MG/1
CAPSULE, DELAYED RELEASE ORAL
Qty: 30 CAPSULE | Refills: 2 | OUTPATIENT
Start: 2023-09-14

## 2023-09-25 DIAGNOSIS — F33.1 MODERATE EPISODE OF RECURRENT MAJOR DEPRESSIVE DISORDER: ICD-10-CM

## 2023-09-25 DIAGNOSIS — G47.00 INSOMNIA, UNSPECIFIED TYPE: ICD-10-CM

## 2023-09-25 DIAGNOSIS — F41.1 GENERALIZED ANXIETY DISORDER: ICD-10-CM

## 2023-09-25 RX ORDER — BUSPIRONE HYDROCHLORIDE 15 MG/1
15 TABLET ORAL 3 TIMES DAILY
Qty: 90 TABLET | Refills: 1 | Status: SHIPPED | OUTPATIENT
Start: 2023-09-25

## 2023-09-25 RX ORDER — DULOXETIN HYDROCHLORIDE 60 MG/1
CAPSULE, DELAYED RELEASE ORAL
Qty: 90 CAPSULE | Refills: 1 | Status: SHIPPED | OUTPATIENT
Start: 2023-09-25

## 2023-09-25 RX ORDER — DULOXETIN HYDROCHLORIDE 30 MG/1
CAPSULE, DELAYED RELEASE ORAL
Qty: 30 CAPSULE | Refills: 2 | Status: SHIPPED | OUTPATIENT
Start: 2023-09-25

## 2023-09-25 RX ORDER — ALBUTEROL SULFATE 90 UG/1
2 AEROSOL, METERED RESPIRATORY (INHALATION) EVERY 6 HOURS PRN
Qty: 18 G | Refills: 1 | Status: SHIPPED | OUTPATIENT
Start: 2023-09-25

## 2023-09-25 RX ORDER — TRAZODONE HYDROCHLORIDE 50 MG/1
TABLET ORAL
Qty: 60 TABLET | Refills: 2 | Status: SHIPPED | OUTPATIENT
Start: 2023-09-25

## 2023-09-25 NOTE — TELEPHONE ENCOUNTER
Caller: Ebony Hamilton    Relationship: Self    Best call back number: [phone]     Requested Prescriptions:   Requested Prescriptions     Pending Prescriptions Disp Refills    DULoxetine (Cymbalta) 30 MG capsule 30 capsule 2     Sig: Take 1 cap PO QD. Take with 60mg for total dose of 90mg    DULoxetine (Cymbalta) 60 MG capsule 90 capsule 1     Sig: Take 1 cap PO QD. Take with 30mg cap for total dose of 90mg.    traZODone (DESYREL) 50 MG tablet 60 tablet 2     Sig: Take 0.5 to 2 tab PO QHS PRN sleep    albuterol sulfate  (90 Base) MCG/ACT inhaler 18 g 1     Sig: Inhale 2 puffs Every 6 (Six) Hours As Needed for Wheezing.    busPIRone (BUSPAR) 15 MG tablet 90 tablet 1        Pharmacy where request should be sent: [facility] - [place], [place] - [address] [place] [address] [phone] [facility] [phone] FX     Last office visit with prescribing clinician: 7/12/2023   Last telemedicine visit with prescribing clinician: Visit date not found   Next office visit with prescribing clinician: Visit date not found     Additional details provided by patient: PATIENT STATES THAT THESE WERE PREVIOUSLY REQUESTED AND THAT SHE IS OUT OF CYMBALTA AND TRAZODONE, AND HER INHALER IS LOW. IF THE MEDICATIONS ARE NOT GOING TO BE REFILLED, PLEASE CALL PATIENT TO ADVISE. PATIENT IS A FORMER [facility] PATIENT AND IS CURRENTLY SCHEDULED ON THE FIRST AVAILABLE APPOINTMENT WITH DR. MANZANO ON 11/8/23.    Does the patient have less than a 3 day supply:  [x] Yes  [] No    Would you like a call back once the refill request has been completed: [] Yes [] No    If the office needs to give you a call back, can they leave a voicemail: [] Yes [] No    Jazzmine Phelps Rep   09/25/23 09:33 EDT

## 2023-09-25 NOTE — TELEPHONE ENCOUNTER
ATTEMPTED TO CONTACT PT(PATIENT)      NO ANSWER      LEFT VOICEMAIL WITH INSTRUCTIONS TO RETURN CALL TO OFFICE AT [phone]

## 2023-09-25 NOTE — TELEPHONE ENCOUNTER
FAX MED REFILL REQUEST     PT(PATIENT) NOT SEEN SINCE  Office Visit with Kelly Lindsay PA-C (08/04/2022)     LAST MED REFILL  traZODone (DESYREL) 50 MG tablet (08/04/2022)     STAFF ATTEMPTED TO CONTACT PT(PATIENT)   Telephone with Kelly Lindsay PA-C (01/30/2023)

## 2023-09-26 NOTE — TELEPHONE ENCOUNTER
ATTEMPTED TO CONTACT PT(PATIENT)      NO ANSWER      LEFT VOICEMAIL WITH INSTRUCTIONS TO RETURN CALL TO OFFICE AT [phone]

## 2023-09-27 NOTE — TELEPHONE ENCOUNTER
ATTEMPTED TO CONTACT PT(PATIENT)      NO ANSWER      LEFT VOICEMAIL WITH INSTRUCTIONS TO RETURN CALL TO OFFICE AT [phone]

## 2023-09-28 RX ORDER — TRAZODONE HYDROCHLORIDE 50 MG/1
TABLET ORAL
Qty: 60 TABLET | Refills: 2 | OUTPATIENT
Start: 2023-09-28

## 2023-10-03 ENCOUNTER — OFFICE VISIT (OUTPATIENT)
Dept: FAMILY MEDICINE CLINIC | Facility: CLINIC | Age: 43
End: 2023-10-03
Payer: COMMERCIAL

## 2023-10-03 VITALS
WEIGHT: 183 LBS | TEMPERATURE: 97.3 F | HEIGHT: 62 IN | OXYGEN SATURATION: 99 % | DIASTOLIC BLOOD PRESSURE: 70 MMHG | HEART RATE: 106 BPM | SYSTOLIC BLOOD PRESSURE: 102 MMHG | BODY MASS INDEX: 33.68 KG/M2

## 2023-10-03 DIAGNOSIS — R05.9 COUGH, UNSPECIFIED TYPE: Primary | ICD-10-CM

## 2023-10-03 DIAGNOSIS — J34.89 RHINORRHEA: ICD-10-CM

## 2023-10-03 PROCEDURE — 99213 OFFICE O/P EST LOW 20 MIN: CPT | Performed by: NURSE PRACTITIONER

## 2023-10-03 RX ORDER — MONTELUKAST SODIUM 10 MG/1
10 TABLET ORAL NIGHTLY
Qty: 30 TABLET | Refills: 2 | Status: SHIPPED | OUTPATIENT
Start: 2023-10-03

## 2023-10-03 RX ORDER — BROMPHENIRAMINE MALEATE, PSEUDOEPHEDRINE HYDROCHLORIDE, AND DEXTROMETHORPHAN HYDROBROMIDE 2; 30; 10 MG/5ML; MG/5ML; MG/5ML
10 SYRUP ORAL 4 TIMES DAILY PRN
Qty: 240 ML | Refills: 0 | Status: SHIPPED | OUTPATIENT
Start: 2023-10-03

## 2023-10-03 NOTE — PROGRESS NOTES
Chief Complaint  Cough    Subjective      Ebony Hamilton is a 42-year-old female that presents to [facility] for c/o nonproductive cough and runny nose for about 3 weeks. She has been taking allegra, Zyrtec and using an albuterol inhaler.  She states that she has similar symptoms a couple of times a year due to allergies. She denies shortness of breath, wheezing or chest pain. No known sick contacts or exposures.           History of Present Illness    Current Outpatient Medications   Medication Instructions    albuterol sulfate  (90 Base) MCG/ACT inhaler 2 puffs, Inhalation, Every 6 Hours PRN    brompheniramine-pseudoephedrine-DM 30-2-10 MG/5ML syrup 10 mL, Oral, 4 Times Daily PRN    Budeson-Glycopyrrol-Formoterol (Breztri Aerosphere) 160-9-4.8 MCG/ACT aerosol inhaler 2 puffs, Inhalation, 2 Times Daily    busPIRone (BUSPAR) 15 mg, Oral, 3 Times Daily    CALCIUM PO 1 tablet, Oral, Daily    cetirizine (ZYRTEC) 10 mg, Oral, Daily    DULoxetine (Cymbalta) 30 MG capsule Take 1 cap PO QD. Take with 60mg for total dose of 90mg    DULoxetine (Cymbalta) 60 MG capsule Take 1 cap PO QD. Take with 30mg cap for total dose of 90mg.    fexofenadine (ALLEGRA) 30 mg, Oral, Daily    hydrocortisone (ANUSOL-HC) 25 mg, Rectal, 2 Times Daily    hydrocortisone 2.5 % cream 1 application , Topical, 2 Times Daily    ibuprofen (ADVIL,MOTRIN) 800 MG tablet ibuprofen 800 mg tablet   take 1 tablet by mouth 3 times daily as needed    montelukast (SINGULAIR) 10 mg, Oral, Nightly    oxyCODONE-acetaminophen (PERCOCET) 7.5-325 MG per tablet 1 tablet, Oral, Every 4 Hours PRN    promethazine (PHENERGAN) 25 MG tablet promethazine 25 mg tablet   TAKE (1) TABLET EVERY 6 HOURS AS NEEDED FOR NAUSEA    Quviviq 50 mg, Oral, Nightly    Semaglutide-Weight Management 2.4 mg, Subcutaneous, Weekly    tiZANidine (ZANAFLEX) 4 MG tablet TAKE 2 TABLETS BY MOUTH 3 TIMES DAILY AS NEEDED DISCONTINUE CYCLOBENZAPRINE     "traZODone (DESYREL) 50 MG tablet Take 0.5 to 2 tab PO QHS PRN sleep    triamcinolone (KENALOG) 0.1 % cream 1 application , Topical, 2 Times Daily    vitamin C (ASCORBIC ACID) 500 mg, Oral, Daily       The following portions of the patient's history were reviewed and updated as appropriate: allergies, current medications, past family history, past medical history, past social history, past surgical history, and problem list.    Objective   Vital Signs:   /70   Pulse 106   Temp 97.3 °F (36.3 °C)   Ht 157.5 cm (62\")   Wt 83 kg (183 lb)   SpO2 99%   BMI 33.47 kg/m²     Wt Readings from Last 3 Encounters:   10/03/23 83 kg (183 lb)   07/12/23 84.5 kg (186 lb 3.2 oz)   06/21/23 85.4 kg (188 lb 3.2 oz)     BP Readings from Last 3 Encounters:   10/03/23 102/70   07/12/23 102/58   06/21/23 114/80     Physical Exam  Vitals reviewed.   Constitutional:       Appearance: Normal appearance. She is well-developed. She is not ill-appearing.   HENT:      Head: Normocephalic and atraumatic.   Eyes:      Conjunctiva/sclera: Conjunctivae normal.      Pupils: Pupils are equal, round, and reactive to light.   Cardiovascular:      Rate and Rhythm: Normal rate and regular rhythm.      Heart sounds: No murmur heard.  Pulmonary:      Effort: Pulmonary effort is normal.      Breath sounds: Normal breath sounds. No wheezing or rhonchi.      Comments: Dry cough noted on exam  Skin:     General: Skin is warm and dry.   Neurological:      Mental Status: She is alert and oriented to person, place, and time.   Psychiatric:         Mood and Affect: Mood and affect normal.         Behavior: Behavior normal.        Result Review :  The following data was reviewed by: MILDRED Fisher on 10/03/2023:          Lab Results (last 72 hours)       ** No results found for the last 72 hours. **             No Images in the past 120 days found..    Lab Results   Component Value Date    SARSANTIGEN Not Detected 02/28/2023    FLUAAG Not " Detected 02/28/2023    FLUBAG Not Detected 02/28/2023    POCGLU 110 09/01/2022       Procedures        Assessment and Plan   Diagnoses and all orders for this visit:    1. Cough, unspecified type (Primary)  -     montelukast (Singulair) 10 MG tablet; Take 1 tablet by mouth Every Night.  Dispense: 30 tablet; Refill: 2  -     brompheniramine-pseudoephedrine-DM 30-2-10 MG/5ML syrup; Take 10 mL by mouth 4 (Four) Times a Day As Needed for Congestion or Cough.  Dispense: 240 mL; Refill: 0    2. Rhinorrhea        Advised to follow up if symptoms do not improve or new symptoms develop.     There are no discontinued medications.       Follow Up   No follow-ups on file.  Patient was given instructions and counseling regarding her condition or for health maintenance advice. Please see specific information pulled into the AVS if appropriate.       Tracy Segura, MILDRED  10/03/23  14:24 EDT

## 2023-11-08 ENCOUNTER — OFFICE VISIT (OUTPATIENT)
Dept: FAMILY MEDICINE CLINIC | Facility: CLINIC | Age: 43
End: 2023-11-08
Payer: COMMERCIAL

## 2023-11-08 VITALS
BODY MASS INDEX: 34.6 KG/M2 | HEART RATE: 88 BPM | SYSTOLIC BLOOD PRESSURE: 130 MMHG | TEMPERATURE: 97.5 F | OXYGEN SATURATION: 98 % | HEIGHT: 62 IN | DIASTOLIC BLOOD PRESSURE: 80 MMHG | WEIGHT: 188 LBS

## 2023-11-08 DIAGNOSIS — R10.13 EPIGASTRIC PAIN: ICD-10-CM

## 2023-11-08 DIAGNOSIS — Z86.19 HISTORY OF HELICOBACTER PYLORI INFECTION: ICD-10-CM

## 2023-11-08 DIAGNOSIS — K21.9 GASTROESOPHAGEAL REFLUX DISEASE, UNSPECIFIED WHETHER ESOPHAGITIS PRESENT: Primary | ICD-10-CM

## 2023-11-08 PROCEDURE — 83013 H PYLORI (C-13) BREATH: CPT | Performed by: STUDENT IN AN ORGANIZED HEALTH CARE EDUCATION/TRAINING PROGRAM

## 2023-11-08 RX ORDER — PANTOPRAZOLE SODIUM 40 MG/1
40 TABLET, DELAYED RELEASE ORAL DAILY
Qty: 90 TABLET | Refills: 3 | Status: SHIPPED | OUTPATIENT
Start: 2023-11-08

## 2023-11-08 RX ORDER — BISACODYL 10 MG
SUPPOSITORY, RECTAL RECTAL
COMMUNITY
Start: 2023-07-24

## 2023-11-08 RX ORDER — HYDROCODONE BITARTRATE AND ACETAMINOPHEN 7.5; 325 MG/1; MG/1
TABLET ORAL
COMMUNITY
Start: 2023-11-03

## 2023-11-08 RX ORDER — PANTOPRAZOLE SODIUM 40 MG/1
TABLET, DELAYED RELEASE ORAL
COMMUNITY
Start: 2023-07-20 | End: 2023-11-08 | Stop reason: SDUPTHER

## 2023-11-08 RX ORDER — POLYETHYLENE GLYCOL 3350 17 G/17G
POWDER, FOR SOLUTION ORAL
COMMUNITY
Start: 2023-07-24

## 2023-11-08 NOTE — PROGRESS NOTES
"Chief Complaint  Establish Care    Subjective      History of Present Illness  Ebony Hamilton is a 43 y.o. female who presents to [facility] with a past medical history of NICHOLE, Insomia, and chronic pain managed by pain management.  Pt presents today to establish care with me today.  Pt stopped wegovy due to having severe constipation, vomiting for non-stop, so they told her to D/C the medication.  Pt requesting Protonix she states she takes PPI-s for her whole life, patient was previously diagnosed with H. pylori she states she got IV antibiotics in the hospital but was never given a 14-day course outpatient she is unsure of her H. pylori status at this time.  Continues to have symptoms when she eats certain foods she states she also has symptoms that are worse at night and the only medication that seemed to help where Protonix.  She also states she was recently treated for pneumonia and she has had pneumonia multiple times throughout her life she did quit smoking 5 years ago.  Past Medical History:   Diagnosis Date    Allergic     Anxiety     Atrial fibrillation     Chronic pain disorder     Depression     Endometriosis     Headache     Injury of shoulder, right 2009    Panic disorder          Objective   Vital Signs:   Vitals:    11/08/23 1411   BP: 130/80   Pulse: 88   Temp: 97.5 °F (36.4 °C)   SpO2: 98%   Weight: 85.3 kg (188 lb)   Height: 157.5 cm (62\")       Wt Readings from Last 3 Encounters:   11/08/23 85.3 kg (188 lb)   10/03/23 83 kg (183 lb)   07/12/23 84.5 kg (186 lb 3.2 oz)     BP Readings from Last 3 Encounters:   11/08/23 130/80   10/03/23 102/70   07/12/23 102/58       Health Maintenance   Topic Date Due    COVID-19 Vaccine (1) Never done    TDAP/TD VACCINES (2 - Td or Tdap) 01/01/2022    HEPATITIS C SCREENING  Never done    INFLUENZA VACCINE  Never done    ANNUAL PHYSICAL  04/12/2024    BMI FOLLOWUP  07/12/2024    COLORECTAL CANCER SCREENING  05/31/2032    " Pneumococcal Vaccine 0-64  Aged Out       Physical Exam   General:  AAO x3, no acute distress.  Eyes:  EOMI PERRLA  Ears/Nose/Mouth/Throat:  Moist mucous membranes  Respiratory:  CTA bilaterally, no wheezes rales or rhonchi  Cardiovascular:  RRR no murmur rubs or gallops  Gastrointestinal:  Abdomen soft nondistended nontender bowel sounds present x4 quadrants  Musculoskeletal:  Moves all 4 extremities spontaneously, no apparent weakness  Skin:  Warm, dry, no skin rashes or lesions  Neurologic:  AAO x3, cranial nerves 2-12 grossly intact, no focal neuro deficits  Psychiatric:  Normal mood and affect      Result Review :  The following data was reviewed by: Digna Paniagua MD on 11/08/2023:      Procedures        Assessment and Plan   Diagnoses and all orders for this visit:    1. Gastroesophageal reflux disease, unspecified whether esophagitis present (Primary)  -     pantoprazole (PROTONIX) 40 MG EC tablet; Take 1 tablet by mouth Daily.  Dispense: 90 tablet; Refill: 3  -     H. Pylori Breath Test - Breath, Lung; Future  -     H. Pylori Breath Test - Breath, Lung    2. Epigastric pain    3. History of Helicobacter pylori infection        Plan to test for H. pylori, will send in Protonix for patient to use as needed and will call patient with results patient also has appointment with rheumatology I told her to keep that.         FOLLOW UP  Return in about 4 weeks (around 12/6/2023).  Patient was given instructions and counseling regarding her condition or for health maintenance advice. Please see specific information pulled into the AVS if appropriate.       Digna Paniagua MD  11/08/23  16:02 EST    CURRENT & DISCONTINUED MEDICATIONS  Current Outpatient Medications   Medication Instructions    albuterol sulfate  (90 Base) MCG/ACT inhaler 2 puffs, Inhalation, Every 6 Hours PRN    bisacodyl (DULCOLAX) 10 MG suppository Insert 1 suppository every day by rectal route as needed.    brompheniramine-pseudoephedrine-DM  30-2-10 MG/5ML syrup 10 mL, Oral, 4 Times Daily PRN    Budeson-Glycopyrrol-Formoterol (Breztri Aerosphere) 160-9-4.8 MCG/ACT aerosol inhaler 2 puffs, Inhalation, 2 Times Daily    busPIRone (BUSPAR) 15 mg, Oral, 3 Times Daily    CALCIUM PO 1 tablet, Oral, Daily    cetirizine (ZYRTEC) 10 mg, Oral, Daily    DULoxetine (Cymbalta) 30 MG capsule Take 1 cap PO QD. Take with 60mg for total dose of 90mg    DULoxetine (Cymbalta) 60 MG capsule Take 1 cap PO QD. Take with 30mg cap for total dose of 90mg.    fexofenadine (ALLEGRA) 30 mg, Oral, Daily    HYDROcodone-acetaminophen (NORCO) 7.5-325 MG per tablet TAKE 1 TO 2 TABLETS BY MOUTH THREE TIMES DAILY AS NEEDED FOR PAIN    hydrocortisone (ANUSOL-HC) 25 mg, Rectal, 2 Times Daily    hydrocortisone 2.5 % cream 1 application , Topical, 2 Times Daily    ibuprofen (ADVIL,MOTRIN) 800 MG tablet ibuprofen 800 mg tablet   take 1 tablet by mouth 3 times daily as needed    montelukast (SINGULAIR) 10 mg, Oral, Nightly    oxyCODONE-acetaminophen (PERCOCET) 7.5-325 MG per tablet 1 tablet, Oral, Every 4 Hours PRN    pantoprazole (PROTONIX) 40 mg, Oral, Daily    polyethylene glycol (MIRALAX) 17 GM/SCOOP powder mix 17 grams in 4-8oz of liquid and drink once daily    promethazine (PHENERGAN) 25 MG tablet promethazine 25 mg tablet   TAKE (1) TABLET EVERY 6 HOURS AS NEEDED FOR NAUSEA    Quviviq 50 mg, Oral, Nightly    Semaglutide-Weight Management 2.4 mg, Subcutaneous, Weekly    tiZANidine (ZANAFLEX) 4 MG tablet TAKE 2 TABLETS BY MOUTH 3 TIMES DAILY AS NEEDED DISCONTINUE CYCLOBENZAPRINE    traZODone (DESYREL) 50 MG tablet Take 0.5 to 2 tab PO QHS PRN sleep    triamcinolone (KENALOG) 0.1 % cream 1 application , Topical, 2 Times Daily    vitamin C (ASCORBIC ACID) 500 mg, Oral, Daily       Medications Discontinued During This Encounter   Medication Reason    pantoprazole (PROTONIX) 40 MG EC tablet Reorder

## 2023-11-10 LAB — UREA BREATH TEST QL: NEGATIVE

## 2023-12-05 RX ORDER — DARIDOREXANT 50 MG/1
50 TABLET, FILM COATED ORAL NIGHTLY
Qty: 30 TABLET | Refills: 2 | Status: CANCELLED | OUTPATIENT
Start: 2023-12-05

## 2023-12-07 DIAGNOSIS — G47.00 INSOMNIA, UNSPECIFIED TYPE: ICD-10-CM

## 2023-12-07 RX ORDER — DULOXETIN HYDROCHLORIDE 30 MG/1
CAPSULE, DELAYED RELEASE ORAL
Qty: 30 CAPSULE | Refills: 2 | Status: SHIPPED | OUTPATIENT
Start: 2023-12-07

## 2023-12-07 RX ORDER — HYDROCORTISONE ACETATE 25 MG/1
25 SUPPOSITORY RECTAL 2 TIMES DAILY
Qty: 24 EACH | Refills: 2 | Status: SHIPPED | OUTPATIENT
Start: 2023-12-07

## 2023-12-07 RX ORDER — ALBUTEROL SULFATE 90 UG/1
2 AEROSOL, METERED RESPIRATORY (INHALATION) EVERY 6 HOURS PRN
Qty: 18 G | Refills: 1 | Status: SHIPPED | OUTPATIENT
Start: 2023-12-07

## 2023-12-20 RX ORDER — DARIDOREXANT 50 MG/1
50 TABLET, FILM COATED ORAL NIGHTLY
Qty: 30 TABLET | Refills: 2 | OUTPATIENT
Start: 2023-12-20

## 2024-01-05 DIAGNOSIS — G47.00 INSOMNIA, UNSPECIFIED TYPE: ICD-10-CM

## 2024-01-05 RX ORDER — DARIDOREXANT 50 MG/1
50 TABLET, FILM COATED ORAL NIGHTLY
Qty: 30 TABLET | Refills: 2 | Status: CANCELLED | OUTPATIENT
Start: 2024-01-05

## 2024-01-10 ENCOUNTER — OFFICE VISIT (OUTPATIENT)
Dept: FAMILY MEDICINE CLINIC | Facility: CLINIC | Age: 44
End: 2024-01-10
Payer: COMMERCIAL

## 2024-01-10 VITALS
HEIGHT: 62 IN | WEIGHT: 191 LBS | HEART RATE: 70 BPM | SYSTOLIC BLOOD PRESSURE: 120 MMHG | BODY MASS INDEX: 35.15 KG/M2 | DIASTOLIC BLOOD PRESSURE: 72 MMHG | OXYGEN SATURATION: 98 % | TEMPERATURE: 97 F

## 2024-01-10 DIAGNOSIS — F11.90 CHRONIC, CONTINUOUS USE OF OPIOIDS: ICD-10-CM

## 2024-01-10 DIAGNOSIS — K59.03 CONSTIPATION DUE TO OPIOID THERAPY: ICD-10-CM

## 2024-01-10 DIAGNOSIS — T40.2X5A CONSTIPATION DUE TO OPIOID THERAPY: ICD-10-CM

## 2024-01-10 DIAGNOSIS — K64.9 HEMORRHOIDS, UNSPECIFIED HEMORRHOID TYPE: Primary | ICD-10-CM

## 2024-01-10 DIAGNOSIS — G47.00 INSOMNIA, UNSPECIFIED TYPE: ICD-10-CM

## 2024-01-10 RX ORDER — DARIDOREXANT 50 MG/1
50 TABLET, FILM COATED ORAL NIGHTLY
Qty: 90 TABLET | Refills: 0 | Status: SHIPPED | OUTPATIENT
Start: 2024-01-10

## 2024-01-10 NOTE — PROGRESS NOTES
"Chief Complaint  Hemorrhoids    Subjective      History of Present Illness  Ebony Hamilton is a 43 y.o. female who presents to [facility] with a past medical history of insomnia, chronic pain disorder on opioids, chronic constipation, presents today for follow-up and acute complaint of hemorrhoids.  She was seen at urgent care and they gave her hydrocortisone cream she is stating that there is no improvement.  Gerd is improving with protonix.   Past Medical History:   Diagnosis Date    Allergic     Anxiety     Atrial fibrillation     Chronic pain disorder     Depression     Endometriosis     Headache     Injury of shoulder, right 2009    Panic disorder          Objective   Vital Signs:   Vitals:    01/10/24 0938   BP: 120/72   Pulse: 70   Temp: 97 °F (36.1 °C)   SpO2: 98%   Weight: 86.6 kg (191 lb)   Height: 157.5 cm (62\")     Body mass index is 34.93 kg/m².    Wt Readings from Last 3 Encounters:   01/10/24 86.6 kg (191 lb)   11/08/23 85.3 kg (188 lb)   10/03/23 83 kg (183 lb)     BP Readings from Last 3 Encounters:   01/10/24 120/72   11/08/23 130/80   10/03/23 102/70       Health Maintenance   Topic Date Due    COVID-19 Vaccine (1) Never done    TDAP/TD VACCINES (2 - Td or Tdap) 01/01/2022    HEPATITIS C SCREENING  Never done    INFLUENZA VACCINE  Never done    ANNUAL PHYSICAL  04/12/2024    BMI FOLLOWUP  04/12/2024    COLORECTAL CANCER SCREENING  05/31/2032    Pneumococcal Vaccine 0-64  Aged Out       Physical Exam  Constitutional:       Appearance: Normal appearance.   HENT:      Head: Normocephalic.      Nose: Nose normal.      Mouth/Throat:      Mouth: Mucous membranes are moist.   Eyes:      Pupils: Pupils are equal, round, and reactive to light.   Cardiovascular:      Rate and Rhythm: Normal rate and regular rhythm.   Pulmonary:      Effort: Pulmonary effort is normal.   Abdominal:      General: Abdomen is flat.   Musculoskeletal:         General: Normal range of " motion.      Cervical back: Normal range of motion.   Skin:     General: Skin is warm and dry.   Neurological:      General: No focal deficit present.      Mental Status: She is alert.   Psychiatric:         Mood and Affect: Mood normal.         Thought Content: Thought content normal.            Result Review :  The following data was reviewed by: Digna Paniagua MD on 01/10/2024:      Procedures        Assessment and Plan   Diagnoses and all orders for this visit:    1. Hemorrhoids, unspecified hemorrhoid type (Primary)  -     Ambulatory Referral to General Surgery    2. Insomnia, unspecified type  -     Daridorexant HCl (Quviviq) 50 MG tablet; Take 1 tablet by mouth Every Night.  Dispense: 90 tablet; Refill: 0    3. Constipation due to opioid therapy    4. Chronic, continuous use of opioids        Refer to general surgery at patient's request.  Refilled Quviviq today for insomnia.  Patient states she does not want to start any medications for constipation she states she knows how to treat her constipation she just would like referral to general surgery to treat her hemorrhoids.  She also has Anusol suppositories at home and does not require refills today.         FOLLOW UP  Return in about 3 months (around 4/10/2024).  Patient was given instructions and counseling regarding her condition or for health maintenance advice. Please see specific information pulled into the AVS if appropriate.       Digna Paniagua MD  01/10/24  10:56 EST    CURRENT & DISCONTINUED MEDICATIONS  Current Outpatient Medications   Medication Instructions    albuterol sulfate  (90 Base) MCG/ACT inhaler 2 puffs, Inhalation, Every 6 Hours PRN    albuterol sulfate  (90 Base) MCG/ACT inhaler 2 puffs, Inhalation, Every 6 Hours PRN    bisacodyl (DULCOLAX) 10 MG suppository Insert 1 suppository every day by rectal route as needed.    Budeson-Glycopyrrol-Formoterol (Breztri Aerosphere) 160-9-4.8 MCG/ACT aerosol inhaler 2 puffs,  Inhalation, 2 Times Daily    busPIRone (BUSPAR) 15 mg, Oral, 3 Times Daily    CALCIUM PO 1 tablet, Oral, Daily    cetirizine (ZYRTEC) 10 mg, Oral, Daily    DULoxetine (Cymbalta) 30 MG capsule Take 1 cap PO QD. Take with 60mg for total dose of 90mg    DULoxetine (Cymbalta) 30 MG capsule Take 1 cap PO QD. Take with 60mg for total dose of 90mg    DULoxetine (Cymbalta) 60 MG capsule Take 1 cap PO QD. Take with 30mg cap for total dose of 90mg.    fexofenadine (ALLEGRA) 30 mg, Oral, Daily    HYDROcodone-acetaminophen (NORCO) 7.5-325 MG per tablet TAKE 1 TO 2 TABLETS BY MOUTH THREE TIMES DAILY AS NEEDED FOR PAIN    hydrocortisone (ANUSOL-HC) 25 mg, Rectal, 2 Times Daily    hydrocortisone 2.5 % cream 1 application , Topical, 2 Times Daily    ibuprofen (ADVIL,MOTRIN) 800 MG tablet ibuprofen 800 mg tablet   take 1 tablet by mouth 3 times daily as needed    montelukast (SINGULAIR) 10 mg, Oral, Nightly    oxyCODONE-acetaminophen (PERCOCET) 7.5-325 MG per tablet 1 tablet, Oral, Every 4 Hours PRN    pantoprazole (PROTONIX) 40 mg, Oral, Daily    polyethylene glycol (MIRALAX) 17 GM/SCOOP powder mix 17 grams in 4-8oz of liquid and drink once daily    promethazine (PHENERGAN) 25 MG tablet promethazine 25 mg tablet   TAKE (1) TABLET EVERY 6 HOURS AS NEEDED FOR NAUSEA    Quviviq 50 mg, Oral, Nightly    tiZANidine (ZANAFLEX) 4 MG tablet TAKE 2 TABLETS BY MOUTH 3 TIMES DAILY AS NEEDED DISCONTINUE CYCLOBENZAPRINE    traZODone (DESYREL) 50 MG tablet Take 0.5 to 2 tab PO QHS PRN sleep    triamcinolone (KENALOG) 0.1 % cream 1 application , Topical, 2 Times Daily    vitamin C (ASCORBIC ACID) 500 mg, Oral, Daily       Medications Discontinued During This Encounter   Medication Reason    Daridorexant HCl (Quviviq) 50 MG tablet Reorder

## 2024-01-17 ENCOUNTER — PREP FOR SURGERY (OUTPATIENT)
Dept: OTHER | Facility: HOSPITAL | Age: 44
End: 2024-01-17
Payer: COMMERCIAL

## 2024-01-17 ENCOUNTER — OFFICE VISIT (OUTPATIENT)
Dept: SURGERY | Facility: CLINIC | Age: 44
End: 2024-01-17
Payer: COMMERCIAL

## 2024-01-17 VITALS
WEIGHT: 192 LBS | HEIGHT: 62 IN | DIASTOLIC BLOOD PRESSURE: 93 MMHG | BODY MASS INDEX: 35.33 KG/M2 | SYSTOLIC BLOOD PRESSURE: 153 MMHG | RESPIRATION RATE: 16 BRPM

## 2024-01-17 DIAGNOSIS — K64.9 HEMORRHOIDS, UNSPECIFIED HEMORRHOID TYPE: Primary | ICD-10-CM

## 2024-01-17 RX ORDER — SODIUM CHLORIDE, SODIUM LACTATE, POTASSIUM CHLORIDE, CALCIUM CHLORIDE 600; 310; 30; 20 MG/100ML; MG/100ML; MG/100ML; MG/100ML
50 INJECTION, SOLUTION INTRAVENOUS CONTINUOUS
OUTPATIENT
Start: 2024-01-17

## 2024-01-17 RX ORDER — SODIUM CHLORIDE 0.9 % (FLUSH) 0.9 %
10 SYRINGE (ML) INJECTION EVERY 12 HOURS SCHEDULED
OUTPATIENT
Start: 2024-01-17

## 2024-01-17 RX ORDER — SODIUM CHLORIDE 0.9 % (FLUSH) 0.9 %
10 SYRINGE (ML) INJECTION AS NEEDED
OUTPATIENT
Start: 2024-01-17

## 2024-01-17 RX ORDER — SODIUM CHLORIDE 9 MG/ML
40 INJECTION, SOLUTION INTRAVENOUS AS NEEDED
OUTPATIENT
Start: 2024-01-17

## 2024-01-17 RX ORDER — SODIUM PHOSPHATE,MONO-DIBASIC 19G-7G/118
1 ENEMA (ML) RECTAL ONCE
OUTPATIENT
Start: 2024-01-17 | End: 2024-01-17

## 2024-01-17 RX ORDER — CEFAZOLIN SODIUM 2 G/100ML
2000 INJECTION, SOLUTION INTRAVENOUS ONCE
OUTPATIENT
Start: 2024-01-17 | End: 2024-01-17

## 2024-01-18 NOTE — PROGRESS NOTES
General Surgery/Colorectal Surgery Note    Patient Name:  Ebony Hamilton  YOB: 1980  [id number]    Referring Provider: Digna Paniagua MD      Patient Care Team:  Digna Paniagua MD as PCP - General (Internal Medicine)  Deion, Donaldo LAGUNA MD as Surgeon (Neurosurgery)  Mo April, APRN as Nurse Practitioner (Nurse Practitioner)    Chief complaint hemorrhoids    Subjective .     History of present illness:    Previously seen.  Previous colonoscopy 5/31/2022 normal with rubber band ligation.    She comes in for symptomatic hemorrhoids with prolapse, swelling, rectal bleeding.  Similar symptoms for 23 years since pregnancy.  Symptoms have recently worsened.  She is using fiber.  She is using stool softeners.  She is on and off the toilet quickly.  No chest pain.  No blood thinner use.  No tobacco use.  Also some fecal seepage over the past month.  She is currently working with a pain management doctor.      History:  Past Medical History:   Diagnosis Date    Allergic     Anxiety     Atrial fibrillation     Chronic pain disorder     Depression     Endometriosis     Headache     Injury of shoulder, right 2009    Panic disorder        Past Surgical History:   Procedure Laterality Date    CERVICAL ARTHRODESIS  01/14/2015    Donaldo Albarran    CERVICAL FUSION      COLONOSCOPY N/A 05/31/2022    Procedure: COLONOSCOPY;  Surgeon: Shabbir Baird MD;  Location: [facility];  Service: General;  Laterality: N/A;  HEMORRHOIDS    EXPLORATORY LAPAROTOMY      HEMORRHOIDECTOMY N/A 05/31/2022    Procedure: HEMORRHOID BANDING;  Surgeon: Shabbir Baird MD;  Location: [facility] ENDOSCOPY;  Service: General;  Laterality: N/A;  BANDS X 2    HYSTERECTOMY      SHOULDER SURGERY Left     TUBAL ABDOMINAL LIGATION         Family History   Problem Relation Age of Onset    Depression Mother     Bipolar disorder Mother     Anxiety disorder Mother     Cancer Mother     Heart disease Mother     Hypertension Mother      Anxiety disorder Father     Hypertension Father     Dementia Paternal Uncle     Dementia Paternal Grandmother     Heart disease Other     Colon cancer Neg Hx        Social History     Tobacco Use    Smoking status: Former     Packs/day: 0.25     Years: 20.00     Additional pack years: 0.00     Total pack years: 5.00     Types: Cigarettes     Quit date: 2019     Years since quittin.0    Smokeless tobacco: Never   Vaping Use    Vaping Use: Never used   Substance Use Topics    Alcohol use: No    Drug use: Never       Review of Systems  All systems were reviewed and negative except for:   Review of Systems   Constitutional: Negative for chills, fever and unexpected weight loss.   HENT: Negative for congestion, nosebleeds and voice change.    Eyes: Negative for blurred vision, double vision and discharge.   Respiratory: Negative for apnea, chest tightness and shortness of breath.    Cardiovascular: Negative for chest pain and leg swelling.   Gastrointestinal:        See HPI   Endocrine: Negative for cold intolerance and heat intolerance.   Genitourinary: Negative for dysuria, hematuria and urgency.   Musculoskeletal: Negative for back pain, joint swelling and neck pain.   Skin: Negative for color change and dry skin.   Neurological: Negative for dizziness and confusion.   Hematological: Negative for adenopathy.   Psychiatric/Behavioral: Negative for agitation and behavioral problems.     MEDS:  Prior to Admission medications    Medication Sig Start Date End Date Taking? Authorizing Provider   albuterol sulfate  (90 Base) MCG/ACT inhaler Inhale 2 puffs Every 6 (Six) Hours As Needed for Wheezing. 23  Yes Digna Paniagua MD   albuterol sulfate  (90 Base) MCG/ACT inhaler Inhale 2 puffs Every 6 (Six) Hours As Needed for Wheezing. 23  Yes Digna Paniagua MD   bisacodyl (DULCOLAX) 10 MG suppository Insert 1 suppository every day by rectal route as needed. 23  Yes ProviderAvani MD    Budeson-Glycopyrrol-Formoterol (Breztri Aerosphere) 160-9-4.8 MCG/ACT aerosol inhaler Inhale 2 puffs 2 (Two) Times a Day. 3/10/23  Yes Shahid Bello APRN   busPIRone (BUSPAR) 15 MG tablet Take 1 tablet by mouth 3 (Three) Times a Day. 9/25/23  Yes Digan Paniagua MD   CALCIUM PO Take 1 tablet by mouth Daily.   Yes ProviderAvani MD   cetirizine (ZyrTEC) 10 MG tablet Take 1 tablet by mouth Daily.   Yes ProviderAvani MD   Daridorexant HCl (Quviviq) 50 MG tablet Take 1 tablet by mouth Every Night. 1/10/24  Yes Digna Paniagua MD   DULoxetine (Cymbalta) 30 MG capsule Take 1 cap PO QD. Take with 60mg for total dose of 90mg 12/7/23  Yes Digna Paniagua MD   DULoxetine (Cymbalta) 30 MG capsule Take 1 cap PO QD. Take with 60mg for total dose of 90mg 9/25/23  Yes Digna Paniagua MD   DULoxetine (Cymbalta) 60 MG capsule Take 1 cap PO QD. Take with 30mg cap for total dose of 90mg. 9/25/23  Yes Digna Paniagua MD   fexofenadine (ALLEGRA) 30 MG tablet Take 1 tablet by mouth Daily.   Yes Avani Vela MD   HYDROcodone-acetaminophen (NORCO) 7.5-325 MG per tablet TAKE 1 TO 2 TABLETS BY MOUTH THREE TIMES DAILY AS NEEDED FOR PAIN 11/3/23  Yes Avani Vela MD   hydrocortisone (ANUSOL-HC) 25 MG suppository Insert 1 suppository into the rectum 2 (Two) Times a Day. 12/7/23  Yes Digna Paniagua MD   hydrocortisone 2.5 % cream Apply 1 application  topically to the appropriate area as directed 2 (Two) Times a Day. 12/7/23  Yes Digna Paniagua MD   ibuprofen (ADVIL,MOTRIN) 800 MG tablet ibuprofen 800 mg tablet   take 1 tablet by mouth 3 times daily as needed   Yes Avani eVla MD   montelukast (Singulair) 10 MG tablet Take 1 tablet by mouth Every Night. 10/3/23  Yes Tracy Segura APRN   pantoprazole (PROTONIX) 40 MG EC tablet Take 1 tablet by mouth Daily. 11/8/23  Yes Digna Paniagua MD   polyethylene glycol (MIRALAX) 17 GM/SCOOP powder mix 17 grams in 4-8oz of liquid and drink once daily 7/24/23  Yes  Avani Vela MD   promethazine (PHENERGAN) 25 MG tablet promethazine 25 mg tablet   TAKE (1) TABLET EVERY 6 HOURS AS NEEDED FOR NAUSEA   Yes Avani Vela MD   tiZANidine (ZANAFLEX) 4 MG tablet TAKE 2 TABLETS BY MOUTH 3 TIMES DAILY AS NEEDED DISCONTINUE CYCLOBENZAPRINE 5/23/22  Yes Avani Vela MD   traZODone (DESYREL) 50 MG tablet Take 0.5 to 2 tab PO QHS PRN sleep 9/25/23  Yes Digna Paniagua MD   triamcinolone (KENALOG) 0.1 % cream Apply 1 application topically to the appropriate area as directed 2 (Two) Times a Day. 4/7/22  Yes Shahid Bello APRN   vitamin C (ASCORBIC ACID) 500 MG tablet Take 1 tablet by mouth Daily.   Yes Avani Vela MD   oxyCODONE-acetaminophen (PERCOCET) 7.5-325 MG per tablet Take 1 tablet by mouth Every 4 (Four) Hours As Needed.  Patient not taking: Reported on 1/17/2024 6/27/23   Avani Vela MD        Allergies:  Sulfa antibiotics, Ciprofloxacin, Escitalopram, Baclofen, Codeine, Gold-containing drug products, Latex, Nickel, and Tegaderm ag mesh [silver]    Objective     Vital Signs   Resp:  [16] 16  BP: (153)/(93) 153/93    Physical Exam:     General Appearance:    Alert, cooperative, in no acute distress   Head:    Normocephalic, without obvious abnormality, atraumatic   Eyes:          Conjunctivae and sclerae normal, no icterus,     Ears:    Ears appear intact with no abnormalities noted   Throat:   No oral lesions, no thrush, oral mucosa moist   Neck:   No adenopathy, supple, trachea midline, no thyromegaly   Back:     No kyphosis present, no scoliosis present, no skin lesions,      erythema or scars, no tenderness to percussion or                   palpation,   range of motion normal   Lungs:     Clear to auscultation,respirations regular, even and                  unlabored    Heart:    Regular rhythm and normal rate, normal S1 and S2, no            murmur, no gallop, no rub, no click   Chest Wall:    No abnormalities observed   Abdomen:    "  Normal bowel sounds, no masses, no organomegaly, soft        non-tender, non-distended, no guarding, no rebound                tenderness   Rectal:        Extremities:   Moves all extremities well, no edema, no cyanosis, no             redness   Pulses:   Pulses palpable and equal bilaterally   Skin:   No bleeding, bruising or rash   Lymph nodes:   No palpable adenopathy   Neurologic:   A/o x 4 with no deficits       Results Review:   {Results Review:10077::\"I reviewed the patient's new clinical results.\"    LABS/IMAGING:  Results for orders placed or performed in visit on 11/08/23   H. Pylori Breath Test - Breath, Lung    Specimen: Lung; Breath   Result Value Ref Range    H. pylori Breath Test Negative Negative        Result Review :     Assessment & Plan     Symptomatic hemorrhoids    Discussion with patient.  I reviewed images of her hemorrhoids on her phone.  Significant external hemorrhoids.  With her symptoms I recommended excisional hemorrhoidectomy.  I explained the procedure and recovery.  Benefits and alternatives discussed.  Risk procedure including risk of anesthesia, bleeding, infection, recurrence, pain, heart attack, stroke, blood clot, pneumonia discussed.  All questions answered.  She agrees with the plan.  Orders placed.  Enema on the day of the procedure.  I instructed her to discuss pain management after the surgery with her pain physician.  Thank you for the consult.              This document has been electronically signed by Shabbir Baird MD  January 18, 2024 10:08 EST  "

## 2024-01-18 NOTE — H&P (VIEW-ONLY)
General Surgery/Colorectal Surgery Note    Patient Name:  Ebony Hamilton  YOB: 1980  [id number]    Referring Provider: Digna Paniagua MD      Patient Care Team:  Digna Paniagua MD as PCP - General (Internal Medicine)  Deion, Donaldo LAGUNA MD as Surgeon (Neurosurgery)  Mo April, APRN as Nurse Practitioner (Nurse Practitioner)    Chief complaint hemorrhoids    Subjective .     History of present illness:    Previously seen.  Previous colonoscopy 5/31/2022 normal with rubber band ligation.    She comes in for symptomatic hemorrhoids with prolapse, swelling, rectal bleeding.  Similar symptoms for 23 years since pregnancy.  Symptoms have recently worsened.  She is using fiber.  She is using stool softeners.  She is on and off the toilet quickly.  No chest pain.  No blood thinner use.  No tobacco use.  Also some fecal seepage over the past month.  She is currently working with a pain management doctor.      History:  Past Medical History:   Diagnosis Date    Allergic     Anxiety     Atrial fibrillation     Chronic pain disorder     Depression     Endometriosis     Headache     Injury of shoulder, right 2009    Panic disorder        Past Surgical History:   Procedure Laterality Date    CERVICAL ARTHRODESIS  01/14/2015    Donaldo Albarran    CERVICAL FUSION      COLONOSCOPY N/A 05/31/2022    Procedure: COLONOSCOPY;  Surgeon: Shabbir Baird MD;  Location: [facility] ENDOSCOPY;  Service: General;  Laterality: N/A;  HEMORRHOIDS    EXPLORATORY LAPAROTOMY      HEMORRHOIDECTOMY N/A 05/31/2022    Procedure: HEMORRHOID BANDING;  Surgeon: Shabbir Baird MD;  Location: [facility] ENDOSCOPY;  Service: General;  Laterality: N/A;  BANDS X 2    HYSTERECTOMY      SHOULDER SURGERY Left     TUBAL ABDOMINAL LIGATION         Family History   Problem Relation Age of Onset    Depression Mother     Bipolar disorder Mother     Anxiety disorder Mother     Cancer Mother     Heart disease Mother     Hypertension Mother      Anxiety disorder Father     Hypertension Father     Dementia Paternal Uncle     Dementia Paternal Grandmother     Heart disease Other     Colon cancer Neg Hx        Social History     Tobacco Use    Smoking status: Former     Packs/day: 0.25     Years: 20.00     Additional pack years: 0.00     Total pack years: 5.00     Types: Cigarettes     Quit date: 2019     Years since quittin.0    Smokeless tobacco: Never   Vaping Use    Vaping Use: Never used   Substance Use Topics    Alcohol use: No    Drug use: Never       Review of Systems  All systems were reviewed and negative except for:   Review of Systems   Constitutional: Negative for chills, fever and unexpected weight loss.   HENT: Negative for congestion, nosebleeds and voice change.    Eyes: Negative for blurred vision, double vision and discharge.   Respiratory: Negative for apnea, chest tightness and shortness of breath.    Cardiovascular: Negative for chest pain and leg swelling.   Gastrointestinal:        See HPI   Endocrine: Negative for cold intolerance and heat intolerance.   Genitourinary: Negative for dysuria, hematuria and urgency.   Musculoskeletal: Negative for back pain, joint swelling and neck pain.   Skin: Negative for color change and dry skin.   Neurological: Negative for dizziness and confusion.   Hematological: Negative for adenopathy.   Psychiatric/Behavioral: Negative for agitation and behavioral problems.     MEDS:  Prior to Admission medications    Medication Sig Start Date End Date Taking? Authorizing Provider   albuterol sulfate  (90 Base) MCG/ACT inhaler Inhale 2 puffs Every 6 (Six) Hours As Needed for Wheezing. 23  Yes Digna Paniagua MD   albuterol sulfate  (90 Base) MCG/ACT inhaler Inhale 2 puffs Every 6 (Six) Hours As Needed for Wheezing. 23  Yes Digna Paniagua MD   bisacodyl (DULCOLAX) 10 MG suppository Insert 1 suppository every day by rectal route as needed. 23  Yes ProviderAvani MD    Budeson-Glycopyrrol-Formoterol (Breztri Aerosphere) 160-9-4.8 MCG/ACT aerosol inhaler Inhale 2 puffs 2 (Two) Times a Day. 3/10/23  Yes Shahid Bello APRN   busPIRone (BUSPAR) 15 MG tablet Take 1 tablet by mouth 3 (Three) Times a Day. 9/25/23  Yes Digna Paniagua MD   CALCIUM PO Take 1 tablet by mouth Daily.   Yes ProviderAvani MD   cetirizine (ZyrTEC) 10 MG tablet Take 1 tablet by mouth Daily.   Yes ProviderAvani MD   Daridorexant HCl (Quviviq) 50 MG tablet Take 1 tablet by mouth Every Night. 1/10/24  Yes Digna Paniagua MD   DULoxetine (Cymbalta) 30 MG capsule Take 1 cap PO QD. Take with 60mg for total dose of 90mg 12/7/23  Yes Digna Paniagua MD   DULoxetine (Cymbalta) 30 MG capsule Take 1 cap PO QD. Take with 60mg for total dose of 90mg 9/25/23  Yes Digna Paniagua MD   DULoxetine (Cymbalta) 60 MG capsule Take 1 cap PO QD. Take with 30mg cap for total dose of 90mg. 9/25/23  Yes Digna Paniagua MD   fexofenadine (ALLEGRA) 30 MG tablet Take 1 tablet by mouth Daily.   Yes Avani Vela MD   HYDROcodone-acetaminophen (NORCO) 7.5-325 MG per tablet TAKE 1 TO 2 TABLETS BY MOUTH THREE TIMES DAILY AS NEEDED FOR PAIN 11/3/23  Yes Avani Vela MD   hydrocortisone (ANUSOL-HC) 25 MG suppository Insert 1 suppository into the rectum 2 (Two) Times a Day. 12/7/23  Yes Digna Paniagua MD   hydrocortisone 2.5 % cream Apply 1 application  topically to the appropriate area as directed 2 (Two) Times a Day. 12/7/23  Yes Digna Paniagua MD   ibuprofen (ADVIL,MOTRIN) 800 MG tablet ibuprofen 800 mg tablet   take 1 tablet by mouth 3 times daily as needed   Yes Avani Vela MD   montelukast (Singulair) 10 MG tablet Take 1 tablet by mouth Every Night. 10/3/23  Yes Tracy Segura APRN   pantoprazole (PROTONIX) 40 MG EC tablet Take 1 tablet by mouth Daily. 11/8/23  Yes Digna Paniagua MD   polyethylene glycol (MIRALAX) 17 GM/SCOOP powder mix 17 grams in 4-8oz of liquid and drink once daily 7/24/23  Yes  Avani Vela MD   promethazine (PHENERGAN) 25 MG tablet promethazine 25 mg tablet   TAKE (1) TABLET EVERY 6 HOURS AS NEEDED FOR NAUSEA   Yes Avani Vela MD   tiZANidine (ZANAFLEX) 4 MG tablet TAKE 2 TABLETS BY MOUTH 3 TIMES DAILY AS NEEDED DISCONTINUE CYCLOBENZAPRINE 5/23/22  Yes Avani Veal MD   traZODone (DESYREL) 50 MG tablet Take 0.5 to 2 tab PO QHS PRN sleep 9/25/23  Yes Digna Paniagua MD   triamcinolone (KENALOG) 0.1 % cream Apply 1 application topically to the appropriate area as directed 2 (Two) Times a Day. 4/7/22  Yes Shahid Bello APRN   vitamin C (ASCORBIC ACID) 500 MG tablet Take 1 tablet by mouth Daily.   Yes Avani Vela MD   oxyCODONE-acetaminophen (PERCOCET) 7.5-325 MG per tablet Take 1 tablet by mouth Every 4 (Four) Hours As Needed.  Patient not taking: Reported on 1/17/2024 6/27/23   Avani Vela MD        Allergies:  Sulfa antibiotics, Ciprofloxacin, Escitalopram, Baclofen, Codeine, Gold-containing drug products, Latex, Nickel, and Tegaderm ag mesh [silver]    Objective     Vital Signs   Resp:  [16] 16  BP: (153)/(93) 153/93    Physical Exam:     General Appearance:    Alert, cooperative, in no acute distress   Head:    Normocephalic, without obvious abnormality, atraumatic   Eyes:          Conjunctivae and sclerae normal, no icterus,     Ears:    Ears appear intact with no abnormalities noted   Throat:   No oral lesions, no thrush, oral mucosa moist   Neck:   No adenopathy, supple, trachea midline, no thyromegaly   Back:     No kyphosis present, no scoliosis present, no skin lesions,      erythema or scars, no tenderness to percussion or                   palpation,   range of motion normal   Lungs:     Clear to auscultation,respirations regular, even and                  unlabored    Heart:    Regular rhythm and normal rate, normal S1 and S2, no            murmur, no gallop, no rub, no click   Chest Wall:    No abnormalities observed   Abdomen:    "  Normal bowel sounds, no masses, no organomegaly, soft        non-tender, non-distended, no guarding, no rebound                tenderness   Rectal:        Extremities:   Moves all extremities well, no edema, no cyanosis, no             redness   Pulses:   Pulses palpable and equal bilaterally   Skin:   No bleeding, bruising or rash   Lymph nodes:   No palpable adenopathy   Neurologic:   A/o x 4 with no deficits       Results Review:   {Results Review:[id number]::\"I reviewed the patient's new clinical results.\"    LABS/IMAGING:  Results for orders placed or performed in visit on 11/08/23   H. Pylori Breath Test - Breath, Lung    Specimen: Lung; Breath   Result Value Ref Range    H. pylori Breath Test Negative Negative        Result Review :     Assessment & Plan     Symptomatic hemorrhoids    Discussion with patient.  I reviewed images of her hemorrhoids on her phone.  Significant external hemorrhoids.  With her symptoms I recommended excisional hemorrhoidectomy.  I explained the procedure and recovery.  Benefits and alternatives discussed.  Risk procedure including risk of anesthesia, bleeding, infection, recurrence, pain, heart attack, stroke, blood clot, pneumonia discussed.  All questions answered.  She agrees with the plan.  Orders placed.  Enema on the day of the procedure.  I instructed her to discuss pain management after the surgery with her pain physician.  Thank you for the consult.              This document has been electronically signed by Shabbir Baird MD  January 18, 2024 10:08 EST  "

## 2024-01-29 ENCOUNTER — TELEPHONE (OUTPATIENT)
Dept: UROLOGY | Facility: CLINIC | Age: 44
End: 2024-01-29
Payer: COMMERCIAL

## 2024-01-29 NOTE — PRE-PROCEDURE INSTRUCTIONS
PRE-OP INSTRUCTIONS REVIEWED WITH PATIENT: FASTING/BATHING/ARRIVAL PROCEDURES.  INSTRUCTED TO TAKE A.M. DAY OF SURGERY: PRN: NORCO, ALBUTEROL INHALER.  TAKE: BUSPAR, ZYRTEC, PANTOPRAZOLE  UNDERSTANDING VERBALIZED.

## 2024-01-29 NOTE — TELEPHONE ENCOUNTER
Called & spoke with pt. [facility] I told her that dr wetzel can send them to the outpatient pharmacy at the hospital & she can get them before she leaves. She was fine with that.

## 2024-01-29 NOTE — TELEPHONE ENCOUNTER
Patient called, she stated that she has not gotten an arrival time for her surgery on 1/31. I told her that the hospital typically calls the day before and will give her a time. I gave her the phone number to call just in case she does not hear from them. She also needs her medication send to the [facility] in [place] at [place] [facility]. She ask that it would be send in as soon as possible as [facility] has had issues getting the medication

## 2024-01-30 ENCOUNTER — ANESTHESIA EVENT (OUTPATIENT)
Dept: PERIOP | Facility: HOSPITAL | Age: 44
End: 2024-01-30
Payer: COMMERCIAL

## 2024-01-31 ENCOUNTER — HOSPITAL ENCOUNTER (OUTPATIENT)
Facility: HOSPITAL | Age: 44
Setting detail: HOSPITAL OUTPATIENT SURGERY
Discharge: HOME | End: 2024-01-31
Attending: SURGERY | Admitting: SURGERY
Payer: COMMERCIAL

## 2024-01-31 ENCOUNTER — ANESTHESIA (OUTPATIENT)
Dept: PERIOP | Facility: HOSPITAL | Age: 44
End: 2024-01-31
Payer: COMMERCIAL

## 2024-01-31 VITALS
HEART RATE: 115 BPM | DIASTOLIC BLOOD PRESSURE: 96 MMHG | HEIGHT: 62 IN | TEMPERATURE: 97.9 F | OXYGEN SATURATION: 97 % | SYSTOLIC BLOOD PRESSURE: 128 MMHG | BODY MASS INDEX: 34.52 KG/M2 | RESPIRATION RATE: 20 BRPM | WEIGHT: 187.61 LBS

## 2024-01-31 DIAGNOSIS — K64.9 HEMORRHOIDS, UNSPECIFIED HEMORRHOID TYPE: ICD-10-CM

## 2024-01-31 PROCEDURE — 25010000002 FENTANYL CITRATE (PF) 50 MCG/ML SOLUTION: Performed by: NURSE ANESTHETIST, CERTIFIED REGISTERED

## 2024-01-31 PROCEDURE — 25010000002 PROPOFOL 10 MG/ML EMULSION: Performed by: NURSE ANESTHETIST, CERTIFIED REGISTERED

## 2024-01-31 PROCEDURE — 25010000002 DEXAMETHASONE PER 1 MG: Performed by: NURSE ANESTHETIST, CERTIFIED REGISTERED

## 2024-01-31 PROCEDURE — 25010000002 ONDANSETRON PER 1 MG: Performed by: NURSE ANESTHETIST, CERTIFIED REGISTERED

## 2024-01-31 PROCEDURE — 25010000002 SUGAMMADEX 200 MG/2ML SOLUTION: Performed by: NURSE ANESTHETIST, CERTIFIED REGISTERED

## 2024-01-31 PROCEDURE — 46260 REMOVE IN/EX HEM GROUPS 2+: CPT

## 2024-01-31 PROCEDURE — 25010000002 BUPRENORPHINE PER 0.1 MG: Performed by: SURGERY

## 2024-01-31 PROCEDURE — 46260 REMOVE IN/EX HEM GROUPS 2+: CPT | Performed by: SURGERY

## 2024-01-31 PROCEDURE — 88304 TISSUE EXAM BY PATHOLOGIST: CPT | Performed by: SURGERY

## 2024-01-31 PROCEDURE — 25010000002 BUPIVACAINE (PF) 0.25 % SOLUTION: Performed by: SURGERY

## 2024-01-31 PROCEDURE — 25010000002 MIDAZOLAM PER 1MG: Performed by: ANESTHESIOLOGY

## 2024-01-31 PROCEDURE — 25010000002 HYDROMORPHONE 1 MG/ML SOLUTION: Performed by: NURSE ANESTHETIST, CERTIFIED REGISTERED

## 2024-01-31 PROCEDURE — 25010000002 MORPHINE PER 10 MG: Performed by: NURSE ANESTHETIST, CERTIFIED REGISTERED

## 2024-01-31 PROCEDURE — 25010000002 CEFAZOLIN IN DEXTROSE 2000 MG/ 100 ML SOLUTION: Performed by: SURGERY

## 2024-01-31 PROCEDURE — 25810000003 LACTATED RINGERS PER 1000 ML: Performed by: ANESTHESIOLOGY

## 2024-01-31 PROCEDURE — 25010000002 ONDANSETRON PER 1 MG: Performed by: ANESTHESIOLOGY

## 2024-01-31 PROCEDURE — 25010000002 DEXAMETHASONE SODIUM PHOSPHATE 100 MG/10ML SOLUTION: Performed by: SURGERY

## 2024-01-31 PROCEDURE — 0 HYDROMORPHONE 1 MG/ML SOLUTION: Performed by: NURSE ANESTHETIST, CERTIFIED REGISTERED

## 2024-01-31 RX ORDER — SODIUM CHLORIDE 9 MG/ML
40 INJECTION, SOLUTION INTRAVENOUS AS NEEDED
Status: DISCONTINUED | OUTPATIENT
Start: 2024-01-31 | End: 2024-01-31 | Stop reason: HOSPADM

## 2024-01-31 RX ORDER — DEXMEDETOMIDINE HYDROCHLORIDE 100 UG/ML
INJECTION, SOLUTION INTRAVENOUS AS NEEDED
Status: DISCONTINUED | OUTPATIENT
Start: 2024-01-31 | End: 2024-01-31 | Stop reason: SURG

## 2024-01-31 RX ORDER — PROMETHAZINE HYDROCHLORIDE 12.5 MG/1
25 TABLET ORAL ONCE AS NEEDED
Status: DISCONTINUED | OUTPATIENT
Start: 2024-01-31 | End: 2024-01-31 | Stop reason: HOSPADM

## 2024-01-31 RX ORDER — CEFAZOLIN SODIUM 2 G/100ML
2000 INJECTION, SOLUTION INTRAVENOUS ONCE
Status: COMPLETED | OUTPATIENT
Start: 2024-01-31 | End: 2024-01-31

## 2024-01-31 RX ORDER — NALOXONE HYDROCHLORIDE 4 MG/.1ML
SPRAY NASAL
Qty: 2 EACH | Refills: 0 | Status: SHIPPED | OUTPATIENT
Start: 2024-01-31

## 2024-01-31 RX ORDER — OXYCODONE HYDROCHLORIDE 5 MG/1
5 TABLET ORAL
Status: COMPLETED | OUTPATIENT
Start: 2024-01-31 | End: 2024-01-31

## 2024-01-31 RX ORDER — ONDANSETRON 2 MG/ML
4 INJECTION INTRAMUSCULAR; INTRAVENOUS ONCE
Status: COMPLETED | OUTPATIENT
Start: 2024-01-31 | End: 2024-01-31

## 2024-01-31 RX ORDER — ULTRASOUND COUPLING MEDIUM
GEL (GRAM) TOPICAL AS NEEDED
Status: DISCONTINUED | OUTPATIENT
Start: 2024-01-31 | End: 2024-01-31 | Stop reason: HOSPADM

## 2024-01-31 RX ORDER — SODIUM CHLORIDE 0.9 % (FLUSH) 0.9 %
10 SYRINGE (ML) INJECTION AS NEEDED
Status: DISCONTINUED | OUTPATIENT
Start: 2024-01-31 | End: 2024-01-31 | Stop reason: HOSPADM

## 2024-01-31 RX ORDER — FENTANYL CITRATE 50 UG/ML
INJECTION, SOLUTION INTRAMUSCULAR; INTRAVENOUS AS NEEDED
Status: DISCONTINUED | OUTPATIENT
Start: 2024-01-31 | End: 2024-01-31 | Stop reason: SURG

## 2024-01-31 RX ORDER — SODIUM CHLORIDE, SODIUM LACTATE, POTASSIUM CHLORIDE, CALCIUM CHLORIDE 600; 310; 30; 20 MG/100ML; MG/100ML; MG/100ML; MG/100ML
50 INJECTION, SOLUTION INTRAVENOUS CONTINUOUS
Status: DISCONTINUED | OUTPATIENT
Start: 2024-01-31 | End: 2024-01-31 | Stop reason: HOSPADM

## 2024-01-31 RX ORDER — OXYCODONE HYDROCHLORIDE AND ACETAMINOPHEN 5; 325 MG/1; MG/1
1 TABLET ORAL EVERY 6 HOURS PRN
Qty: 18 TABLET | Refills: 0 | Status: SHIPPED | OUTPATIENT
Start: 2024-01-31

## 2024-01-31 RX ORDER — LIDOCAINE 50 MG/G
1 OINTMENT TOPICAL
Qty: 35.44 G | Refills: 0 | Status: SHIPPED | OUTPATIENT
Start: 2024-01-31

## 2024-01-31 RX ORDER — SODIUM PHOSPHATE,MONO-DIBASIC 19G-7G/118
1 ENEMA (ML) RECTAL ONCE
Status: COMPLETED | OUTPATIENT
Start: 2024-01-31 | End: 2024-01-31

## 2024-01-31 RX ORDER — SCOLOPAMINE TRANSDERMAL SYSTEM 1 MG/1
1 PATCH, EXTENDED RELEASE TRANSDERMAL ONCE
Status: DISCONTINUED | OUTPATIENT
Start: 2024-01-31 | End: 2024-01-31 | Stop reason: HOSPADM

## 2024-01-31 RX ORDER — ONDANSETRON 2 MG/ML
4 INJECTION INTRAMUSCULAR; INTRAVENOUS ONCE AS NEEDED
Status: DISCONTINUED | OUTPATIENT
Start: 2024-01-31 | End: 2024-01-31 | Stop reason: HOSPADM

## 2024-01-31 RX ORDER — MEPERIDINE HYDROCHLORIDE 25 MG/ML
12.5 INJECTION INTRAMUSCULAR; INTRAVENOUS; SUBCUTANEOUS
Status: DISCONTINUED | OUTPATIENT
Start: 2024-01-31 | End: 2024-01-31 | Stop reason: HOSPADM

## 2024-01-31 RX ORDER — PROPOFOL 10 MG/ML
VIAL (ML) INTRAVENOUS AS NEEDED
Status: DISCONTINUED | OUTPATIENT
Start: 2024-01-31 | End: 2024-01-31 | Stop reason: SURG

## 2024-01-31 RX ORDER — POLYETHYLENE GLYCOL 3350 17 G/17G
17 POWDER, FOR SOLUTION ORAL DAILY
Qty: 14 PACKET | Refills: 0 | Status: SHIPPED | OUTPATIENT
Start: 2024-01-31

## 2024-01-31 RX ORDER — LABETALOL HYDROCHLORIDE 5 MG/ML
INJECTION, SOLUTION INTRAVENOUS AS NEEDED
Status: DISCONTINUED | OUTPATIENT
Start: 2024-01-31 | End: 2024-01-31 | Stop reason: SURG

## 2024-01-31 RX ORDER — MIDAZOLAM HYDROCHLORIDE 2 MG/2ML
2 INJECTION, SOLUTION INTRAMUSCULAR; INTRAVENOUS ONCE
Status: COMPLETED | OUTPATIENT
Start: 2024-01-31 | End: 2024-01-31

## 2024-01-31 RX ORDER — ONDANSETRON 2 MG/ML
INJECTION INTRAMUSCULAR; INTRAVENOUS AS NEEDED
Status: DISCONTINUED | OUTPATIENT
Start: 2024-01-31 | End: 2024-01-31 | Stop reason: SURG

## 2024-01-31 RX ORDER — SODIUM CHLORIDE, SODIUM LACTATE, POTASSIUM CHLORIDE, CALCIUM CHLORIDE 600; 310; 30; 20 MG/100ML; MG/100ML; MG/100ML; MG/100ML
9 INJECTION, SOLUTION INTRAVENOUS CONTINUOUS PRN
Status: DISCONTINUED | OUTPATIENT
Start: 2024-01-31 | End: 2024-01-31 | Stop reason: HOSPADM

## 2024-01-31 RX ORDER — ROCURONIUM BROMIDE 10 MG/ML
INJECTION, SOLUTION INTRAVENOUS AS NEEDED
Status: DISCONTINUED | OUTPATIENT
Start: 2024-01-31 | End: 2024-01-31 | Stop reason: SURG

## 2024-01-31 RX ORDER — ACETAMINOPHEN 500 MG
1000 TABLET ORAL ONCE
Status: COMPLETED | OUTPATIENT
Start: 2024-01-31 | End: 2024-01-31

## 2024-01-31 RX ORDER — SODIUM CHLORIDE 0.9 % (FLUSH) 0.9 %
10 SYRINGE (ML) INJECTION EVERY 12 HOURS SCHEDULED
Status: DISCONTINUED | OUTPATIENT
Start: 2024-01-31 | End: 2024-01-31 | Stop reason: HOSPADM

## 2024-01-31 RX ORDER — LIDOCAINE HYDROCHLORIDE 20 MG/ML
INJECTION, SOLUTION EPIDURAL; INFILTRATION; INTRACAUDAL; PERINEURAL AS NEEDED
Status: DISCONTINUED | OUTPATIENT
Start: 2024-01-31 | End: 2024-01-31 | Stop reason: SURG

## 2024-01-31 RX ORDER — PROMETHAZINE HYDROCHLORIDE 25 MG/1
25 SUPPOSITORY RECTAL ONCE AS NEEDED
Status: DISCONTINUED | OUTPATIENT
Start: 2024-01-31 | End: 2024-01-31 | Stop reason: HOSPADM

## 2024-01-31 RX ORDER — CYCLOBENZAPRINE HCL 5 MG
5 TABLET ORAL EVERY 8 HOURS
Qty: 15 TABLET | Refills: 0 | Status: SHIPPED | OUTPATIENT
Start: 2024-01-31 | End: 2024-02-05

## 2024-01-31 RX ORDER — DEXAMETHASONE SODIUM PHOSPHATE 4 MG/ML
INJECTION, SOLUTION INTRA-ARTICULAR; INTRALESIONAL; INTRAMUSCULAR; INTRAVENOUS; SOFT TISSUE AS NEEDED
Status: DISCONTINUED | OUTPATIENT
Start: 2024-01-31 | End: 2024-01-31 | Stop reason: SURG

## 2024-01-31 RX ORDER — MORPHINE SULFATE 2 MG/ML
2 INJECTION, SOLUTION INTRAMUSCULAR; INTRAVENOUS ONCE
Status: COMPLETED | OUTPATIENT
Start: 2024-01-31 | End: 2024-01-31

## 2024-01-31 RX ORDER — KETAMINE HCL IN NACL, ISO-OSM 100MG/10ML
SYRINGE (ML) INJECTION AS NEEDED
Status: DISCONTINUED | OUTPATIENT
Start: 2024-01-31 | End: 2024-01-31 | Stop reason: SURG

## 2024-01-31 RX ADMIN — ONDANSETRON 4 MG: 2 INJECTION INTRAMUSCULAR; INTRAVENOUS at 08:50

## 2024-01-31 RX ADMIN — HYDROMORPHONE HYDROCHLORIDE 0.5 MG: 1 INJECTION, SOLUTION INTRAMUSCULAR; INTRAVENOUS; SUBCUTANEOUS at 09:34

## 2024-01-31 RX ADMIN — OXYCODONE 5 MG: 5 TABLET ORAL at 10:52

## 2024-01-31 RX ADMIN — CEFAZOLIN SODIUM 2000 MG: 2 INJECTION, SOLUTION INTRAVENOUS at 09:28

## 2024-01-31 RX ADMIN — SCOPALAMINE 1 PATCH: 1 PATCH, EXTENDED RELEASE TRANSDERMAL at 08:50

## 2024-01-31 RX ADMIN — FENTANYL CITRATE 50 MCG: 50 INJECTION, SOLUTION INTRAMUSCULAR; INTRAVENOUS at 09:38

## 2024-01-31 RX ADMIN — SODIUM CHLORIDE, POTASSIUM CHLORIDE, SODIUM LACTATE AND CALCIUM CHLORIDE 9 ML/HR: 600; 310; 30; 20 INJECTION, SOLUTION INTRAVENOUS at 07:46

## 2024-01-31 RX ADMIN — ROCURONIUM BROMIDE 50 MG: 10 INJECTION, SOLUTION INTRAVENOUS at 09:22

## 2024-01-31 RX ADMIN — LIDOCAINE HYDROCHLORIDE 60 MG: 20 INJECTION, SOLUTION EPIDURAL; INFILTRATION; INTRACAUDAL; PERINEURAL at 09:22

## 2024-01-31 RX ADMIN — DEXMEDETOMIDINE HYDROCHLORIDE 20 MCG: 100 INJECTION, SOLUTION, CONCENTRATE INTRAVENOUS at 09:22

## 2024-01-31 RX ADMIN — LABETALOL HYDROCHLORIDE 5 MG: 5 INJECTION, SOLUTION INTRAVENOUS at 09:42

## 2024-01-31 RX ADMIN — Medication 50 MG: at 09:22

## 2024-01-31 RX ADMIN — FENTANYL CITRATE 100 MCG: 50 INJECTION, SOLUTION INTRAMUSCULAR; INTRAVENOUS at 09:22

## 2024-01-31 RX ADMIN — MORPHINE SULFATE 2 MG: 2 INJECTION, SOLUTION INTRAMUSCULAR; INTRAVENOUS at 10:58

## 2024-01-31 RX ADMIN — ACETAMINOPHEN 1000 MG: 500 TABLET ORAL at 08:12

## 2024-01-31 RX ADMIN — DEXAMETHASONE SODIUM PHOSPHATE 8 MG: 4 INJECTION, SOLUTION INTRAMUSCULAR; INTRAVENOUS at 09:22

## 2024-01-31 RX ADMIN — OXYCODONE 5 MG: 5 TABLET ORAL at 10:35

## 2024-01-31 RX ADMIN — SODIUM PHOSPHATE 1 ENEMA: 7; 19 ENEMA RECTAL at 07:46

## 2024-01-31 RX ADMIN — ONDANSETRON 4 MG: 2 INJECTION INTRAMUSCULAR; INTRAVENOUS at 09:37

## 2024-01-31 RX ADMIN — PROPOFOL 150 MG: 10 INJECTION, EMULSION INTRAVENOUS at 09:22

## 2024-01-31 RX ADMIN — FENTANYL CITRATE 50 MCG: 50 INJECTION, SOLUTION INTRAMUSCULAR; INTRAVENOUS at 09:34

## 2024-01-31 RX ADMIN — HYDROMORPHONE HYDROCHLORIDE 0.5 MG: 1 INJECTION, SOLUTION INTRAMUSCULAR; INTRAVENOUS; SUBCUTANEOUS at 10:35

## 2024-01-31 RX ADMIN — MIDAZOLAM HYDROCHLORIDE 2 MG: 1 INJECTION, SOLUTION INTRAMUSCULAR; INTRAVENOUS at 08:12

## 2024-01-31 RX ADMIN — SUGAMMADEX 200 MG: 100 INJECTION, SOLUTION INTRAVENOUS at 10:02

## 2024-01-31 NOTE — OP NOTE
OP NOTE  HEMORRHOIDECTOMY  Procedure Report    Patient Name:  Ebony Hamilton  YOB: 1980  [id number]    Date of Surgery:  1/31/2024     Indications: See last clinic note for indications, discussion of risk benefits and alternatives    Pre-op Diagnosis:   Hemorrhoids, unspecified hemorrhoid type [K64.9]       Post-Op Diagnosis Codes:     * Hemorrhoids, unspecified hemorrhoid type [K64.9]    Procedure/CPT® Codes:    Procedure(s): Excisional hemorrhoidectomy of left lateral, right anterior, right posterior external/internal hemorrhoids    Staff:  Surgeon(s):  Shabbir Baird MD    Assistant: Grisel Christopher CSA    Anesthesia: General, Local    Estimated Blood Loss: 30 mL    Implants:    Nothing was implanted during the procedure    Specimen:          Specimens       ID Source Type Tests Collected By Collected At Frozen?    A Hemorrhoid(s) Tissue TISSUE PATHOLOGY EXAM   Shabbir Baird MD 1/31/24 0934 No    Description: right anterior internal and external hemorrhoids    This specimen was not marked as sent.    B Hemorrhoid(s) Tissue TISSUE PATHOLOGY EXAM   Shabbir Baird MD 1/31/24 0934 No    Description: right posterior external/internal hemorrhoids    This specimen was not marked as sent.    C Hemorrhoid(s) Tissue TISSUE PATHOLOGY EXAM   Shabbir Baird MD 1/31/24 0926 No    Description: left lateral external/internal hemorrhoids    This specimen was not marked as sent.              Findings: Excisional hemorrhoidectomy of left lateral, right anterior, right posterior external/internal hemorrhoids over Kelsey bivalve retractor with minimal excision of anoderm.  Excised with LigaSure device oversewn with Vicryl suture.    Complications: None    Description of Procedure:   After all questions were answered, consent was verified.  Patient brought to the operating room per stretcher placed in supine position arms out all extremities padded.  Bilateral lower extremity  SCDs placed.  Anesthesia induced.  Preoperative IV antibiotics administered.  Patient placed in candycane lithotomy.  Patient's perianal area prepped with Betadine.  Draped in usual sterile fashion.  Critical timeout taken.  Began the procedure with exam under anesthesia.  Significant external hemorrhoids noted.  Digital rectal exam with no mass no blood.  Circumferential exam with Kelsey bivalve retractor with grade 2-3 internal hemorrhoids.  I then exposed the right anterior external and internal hemorrhoid bundle with a Kelsey bivalve retractor.  I excised this with minimal excision of the anoderm with the LigaSure device.  This was sent to pathology for permanent.  I oversewed this with running Vicryl suture.  This was repeated in a similar fashion to right posterior and left lateral external/internal hemorrhoids with each sent to pathology separately for permanent.  I then placed additional circumferential figure-of-eight Vicryl sutures to obtain hemostasis.  I verified hemostasis.  I infiltrated with local anesthesia.  I placed Xeroform gauze inside the anus.  Dressing applied.  At the end of the procedure all counts were correct.  I was present for the procedure.    Assistant: Grisel Christopher CSA was responsible for performing the following activities: Retraction, Suction, and Placing Dressing and their skilled assistance was necessary for the success of this case.    Shabbir Baird MD     Date: 1/31/2024  Time: 10:07 EST

## 2024-01-31 NOTE — DISCHARGE INSTRUCTIONS
DISCHARGE INSTRUCTIONS  SURGICAL / AMBULATORY  PROCEDURES      For your surgery you had:  General anesthesia (you may have a sore throat for the first 24 hours)  IV sedation.  Local anesthesia  Monitored anesthesia Care  You received a medicated patch for nausea prevention today (behind your ear). It is recommended that you remove it 24-48 hours post-operatively. It must be removed within 72 hours.   You have received an anesthesia medication today that can cause hormonal forms of birth control to be ineffective. You should use a different form of birth control (to prevent pregnancy) for 7 days.   You may experience dizziness, drowsiness, or light-headedness for several hours following surgery/procedure.  Do not stay alone today or tonight.  Limit your activity for 24 hours.  Resume your diet slowly.  Follow whatever special dietary instructions you may have been given by your doctor.  You should not drive or operate machinery, drink alcohol, or sign legally binding documents for 24 hours or while you are taking pain medication.    NOTIFY YOUR DOCTOR IF YOU EXPERIENCE ANY OF THE FOLLOWING:  Temperature greater than 101 degrees Fahrenheit  Shaking Chills  Redness or excessive drainage from incision  Nausea, vomiting and/or pain that is not controlled by prescribed medications  Increase in bleeding or bleeding that is excessive  Unable to urinate in 6 hours after surgery  If unable to reach your doctor, please go to the closest Emergency Room  It is important to avoid constipation at this time so the physician will prescribe stool softeners. Eating a high-fiber diet and drinking plenty of liquids also helps. A small to moderate amount of bleeding, usually when having a bowel movement, may occur for a week or two following the surgery. This is normal and should stop when the anus and rectum heal.  Heavy lifting should be avoided for 2-3 weeks.  An ice pack can help reduce swelling. Soaking in a sitz bat (a shallow  bath of warm water) several times a day helps ease the discomfort. Using a donut ring (cushion with a hole in the middle) can make sitting upright more comfortable.  May shower tomorrow, sitz bath as needed.    SPECIAL INSTRUCTIONS:            Last dose of pain medication was given at:   .

## 2024-01-31 NOTE — ANESTHESIA POSTPROCEDURE EVALUATION
Patient: Ebony Hamilton    Procedure Summary       Date: 01/31/24 Room / Location: [facility] OR  / [facility]    Anesthesia Start: 0918 Anesthesia Stop: 1013    Procedure: HEMORRHOIDECTOMY (Anus) Diagnosis:       Hemorrhoids, unspecified hemorrhoid type      (Hemorrhoids, unspecified hemorrhoid type [K64.9])    Surgeons: Shabbir Baird MD Provider: Javier Eli MD    Anesthesia Type: general ASA Status: 2            Anesthesia Type: general    Vitals  Vitals Value Taken Time   /89 01/31/24 1107   Temp 36.2 °C (97.1 °F) 01/31/24 1010   Pulse 115 01/31/24 1109   Resp 20 01/31/24 1105   SpO2 100 % 01/31/24 1109   Vitals shown include unfiled device data.        Post Anesthesia Care and Evaluation    Patient location during evaluation: bedside  Patient participation: complete - patient participated  Level of consciousness: awake  Pain management: adequate    Airway patency: patent  PONV Status: none  Cardiovascular status: acceptable  Respiratory status: acceptable  Hydration status: acceptable    Comments: An Anesthesiologist personally participated in the most demanding procedures (including induction and emergence if applicable) in the anesthesia plan, monitored the course of anesthesia administration at frequent intervals and remained physically present and available for immediate diagnosis and treatment of emergencies.

## 2024-01-31 NOTE — ANESTHESIA PREPROCEDURE EVALUATION
Anesthesia Evaluation     Patient summary reviewed and Nursing notes reviewed   no history of anesthetic complications:   NPO Solid Status: > 8 hours  NPO Liquid Status: > 2 hours           Airway   Mallampati: II  TM distance: >3 FB  Neck ROM: limited  No difficulty expected  Dental      Pulmonary - negative pulmonary ROS and normal exam    breath sounds clear to auscultation  Cardiovascular - normal exam  Exercise tolerance: good (4-7 METS)    Rhythm: regular  Rate: normal    (+) valvular problems/murmurs MR, dysrhythmias      Neuro/Psych  (+) headaches, numbness, psychiatric history Anxiety and Depression  GI/Hepatic/Renal/Endo    (+) obesity, GI bleeding     Musculoskeletal     (+) neck stiffness  Abdominal    Substance History - negative use     OB/GYN negative ob/gyn ROS         Other   arthritis,     ROS/Med Hx Other: History of intermittent SVT, none recently.                Anesthesia Plan    ASA 2     general       Anesthetic plan, risks, benefits, and alternatives have been provided, discussed and informed consent has been obtained with: patient.  Pre-procedure education provided      CODE STATUS:

## 2024-02-01 ENCOUNTER — TELEPHONE (OUTPATIENT)
Dept: SURGERY | Facility: CLINIC | Age: 44
End: 2024-02-01
Payer: COMMERCIAL

## 2024-02-01 NOTE — TELEPHONE ENCOUNTER
PT REPORTS THAT THE HOSPITAL WAS UNABLE TO FILL THEM. SHE HAS NOT HAD ANY PAIN MEDS SINCE SURGERY. WANTS THEM SENT TO [facility] PLEASE.

## 2024-02-01 NOTE — TELEPHONE ENCOUNTER
Caller: Ebony Hamilton    Relationship: Self    Best call back number: [phone]     Requested Prescriptions:   oxyCODONE-acetaminophen (Percocet) 5-325 MG per tablet    Pharmacy where request should be sent:    [id number] - [place], [place] - [address] AT [facility] [phone] [facility] [phone]   [address] [facility] [address]    Last office visit with prescribing clinician: 1/17/2024   Last telemedicine visit with prescribing clinician: 01/31/24  Next office visit with prescribing clinician: 2/21/2024     Additional details provided by patient: UNABLE TO GET FILLED    Does the patient have less than a 3 day supply:  [x] Yes  [] No    Would you like a call back once the refill request has been completed: [x] Yes [] No    If the office needs to give you a call back, can they leave a voicemail: [x] Yes [] No    Jazzmine Loomis Rep   02/01/24 11:17 EST

## 2024-02-02 ENCOUNTER — APPOINTMENT (OUTPATIENT)
Dept: GENERAL RADIOLOGY | Facility: HOSPITAL | Age: 44
End: 2024-02-02
Payer: COMMERCIAL

## 2024-02-02 ENCOUNTER — APPOINTMENT (OUTPATIENT)
Dept: ULTRASOUND IMAGING | Facility: HOSPITAL | Age: 44
End: 2024-02-02
Payer: COMMERCIAL

## 2024-02-02 ENCOUNTER — HOSPITAL ENCOUNTER (EMERGENCY)
Facility: HOSPITAL | Age: 44
Discharge: HOME | End: 2024-02-02
Attending: EMERGENCY MEDICINE
Payer: COMMERCIAL

## 2024-02-02 VITALS
TEMPERATURE: 98.6 F | RESPIRATION RATE: 20 BRPM | BODY MASS INDEX: 34.97 KG/M2 | HEART RATE: 102 BPM | WEIGHT: 190.04 LBS | OXYGEN SATURATION: 95 % | HEIGHT: 62 IN | SYSTOLIC BLOOD PRESSURE: 147 MMHG | DIASTOLIC BLOOD PRESSURE: 91 MMHG

## 2024-02-02 DIAGNOSIS — R10.10 PAIN OF UPPER ABDOMEN: ICD-10-CM

## 2024-02-02 DIAGNOSIS — K59.00 CONSTIPATION, UNSPECIFIED CONSTIPATION TYPE: Primary | ICD-10-CM

## 2024-02-02 DIAGNOSIS — R11.2 NAUSEA AND VOMITING, UNSPECIFIED VOMITING TYPE: ICD-10-CM

## 2024-02-02 LAB
ALBUMIN SERPL-MCNC: 4.6 G/DL (ref 3.5–5.2)
ALBUMIN/GLOB SERPL: 1.5 G/DL
ALP SERPL-CCNC: 63 U/L (ref 39–117)
ALT SERPL W P-5'-P-CCNC: 11 U/L (ref 1–33)
ANION GAP SERPL CALCULATED.3IONS-SCNC: 15.1 MMOL/L (ref 5–15)
AST SERPL-CCNC: 16 U/L (ref 1–32)
BACTERIA UR QL AUTO: ABNORMAL /HPF
BASOPHILS # BLD AUTO: 0.04 10*3/MM3 (ref 0–0.2)
BASOPHILS NFR BLD AUTO: 0.4 % (ref 0–1.5)
BILIRUB SERPL-MCNC: 0.3 MG/DL (ref 0–1.2)
BILIRUB UR QL STRIP: NEGATIVE
BUN SERPL-MCNC: 14 MG/DL (ref 6–20)
BUN/CREAT SERPL: 18.9 (ref 7–25)
CALCIUM SPEC-SCNC: 9.5 MG/DL (ref 8.6–10.5)
CHLORIDE SERPL-SCNC: 99 MMOL/L (ref 98–107)
CLARITY UR: ABNORMAL
CO2 SERPL-SCNC: 20.9 MMOL/L (ref 22–29)
COLOR UR: YELLOW
CREAT SERPL-MCNC: 0.74 MG/DL (ref 0.57–1)
CYTO UR: NORMAL
DEPRECATED RDW RBC AUTO: 41.7 FL (ref 37–54)
EGFRCR SERPLBLD CKD-EPI 2021: 104.4 ML/MIN/1.73
EOSINOPHIL # BLD AUTO: 0.11 10*3/MM3 (ref 0–0.4)
EOSINOPHIL NFR BLD AUTO: 1.1 % (ref 0.3–6.2)
ERYTHROCYTE [DISTWIDTH] IN BLOOD BY AUTOMATED COUNT: 12.9 % (ref 12.3–15.4)
GLOBULIN UR ELPH-MCNC: 3.1 GM/DL
GLUCOSE SERPL-MCNC: 105 MG/DL (ref 65–99)
GLUCOSE UR STRIP-MCNC: NEGATIVE MG/DL
HCT VFR BLD AUTO: 42.5 % (ref 34–46.6)
HGB BLD-MCNC: 13.5 G/DL (ref 12–15.9)
HGB UR QL STRIP.AUTO: NEGATIVE
HOLD SPECIMEN: NORMAL
HOLD SPECIMEN: NORMAL
HYALINE CASTS UR QL AUTO: ABNORMAL /LPF
IMM GRANULOCYTES # BLD AUTO: 0.05 10*3/MM3 (ref 0–0.05)
IMM GRANULOCYTES NFR BLD AUTO: 0.5 % (ref 0–0.5)
KETONES UR QL STRIP: NEGATIVE
LAB AP CASE REPORT: NORMAL
LAB AP CLINICAL INFORMATION: NORMAL
LEUKOCYTE ESTERASE UR QL STRIP.AUTO: ABNORMAL
LIPASE SERPL-CCNC: 29 U/L (ref 13–60)
LYMPHOCYTES # BLD AUTO: 2.4 10*3/MM3 (ref 0.7–3.1)
LYMPHOCYTES NFR BLD AUTO: 23.6 % (ref 19.6–45.3)
MCH RBC QN AUTO: 27.9 PG (ref 26.6–33)
MCHC RBC AUTO-ENTMCNC: 31.8 G/DL (ref 31.5–35.7)
MCV RBC AUTO: 87.8 FL (ref 79–97)
MONOCYTES # BLD AUTO: 0.52 10*3/MM3 (ref 0.1–0.9)
MONOCYTES NFR BLD AUTO: 5.1 % (ref 5–12)
NEUTROPHILS NFR BLD AUTO: 69.3 % (ref 42.7–76)
NEUTROPHILS NFR BLD AUTO: 7.06 10*3/MM3 (ref 1.7–7)
NITRITE UR QL STRIP: NEGATIVE
NRBC BLD AUTO-RTO: 0 /100 WBC (ref 0–0.2)
PATH REPORT.FINAL DX SPEC: NORMAL
PATH REPORT.GROSS SPEC: NORMAL
PH UR STRIP.AUTO: 5.5 [PH] (ref 5–8)
PLATELET # BLD AUTO: 473 10*3/MM3 (ref 140–450)
PMV BLD AUTO: 9.5 FL (ref 6–12)
POTASSIUM SERPL-SCNC: 3.7 MMOL/L (ref 3.5–5.2)
PROT SERPL-MCNC: 7.7 G/DL (ref 6–8.5)
PROT UR QL STRIP: NEGATIVE
RBC # BLD AUTO: 4.84 10*6/MM3 (ref 3.77–5.28)
RBC # UR STRIP: ABNORMAL /HPF
REF LAB TEST METHOD: ABNORMAL
SODIUM SERPL-SCNC: 135 MMOL/L (ref 136–145)
SP GR UR STRIP: 1.03 (ref 1–1.03)
SQUAMOUS #/AREA URNS HPF: ABNORMAL /HPF
UROBILINOGEN UR QL STRIP: ABNORMAL
WBC # UR STRIP: ABNORMAL /HPF
WBC NRBC COR # BLD AUTO: 10.18 10*3/MM3 (ref 3.4–10.8)
WHOLE BLOOD HOLD COAG: NORMAL
WHOLE BLOOD HOLD SPECIMEN: NORMAL

## 2024-02-02 PROCEDURE — 96374 THER/PROPH/DIAG INJ IV PUSH: CPT

## 2024-02-02 PROCEDURE — 80053 COMPREHEN METABOLIC PANEL: CPT | Performed by: EMERGENCY MEDICINE

## 2024-02-02 PROCEDURE — 25010000002 ONDANSETRON PER 1 MG: Performed by: EMERGENCY MEDICINE

## 2024-02-02 PROCEDURE — 83690 ASSAY OF LIPASE: CPT | Performed by: EMERGENCY MEDICINE

## 2024-02-02 PROCEDURE — 99284 EMERGENCY DEPT VISIT MOD MDM: CPT

## 2024-02-02 PROCEDURE — 25810000003 SODIUM CHLORIDE 0.9 % SOLUTION: Performed by: EMERGENCY MEDICINE

## 2024-02-02 PROCEDURE — 25010000002 MORPHINE PER 10 MG: Performed by: EMERGENCY MEDICINE

## 2024-02-02 PROCEDURE — 96361 HYDRATE IV INFUSION ADD-ON: CPT

## 2024-02-02 PROCEDURE — 76705 ECHO EXAM OF ABDOMEN: CPT

## 2024-02-02 PROCEDURE — 85025 COMPLETE CBC W/AUTO DIFF WBC: CPT | Performed by: EMERGENCY MEDICINE

## 2024-02-02 PROCEDURE — 81001 URINALYSIS AUTO W/SCOPE: CPT | Performed by: EMERGENCY MEDICINE

## 2024-02-02 PROCEDURE — 96375 TX/PRO/DX INJ NEW DRUG ADDON: CPT

## 2024-02-02 PROCEDURE — 25010000002 DROPERIDOL PER 5 MG: Performed by: NURSE PRACTITIONER

## 2024-02-02 PROCEDURE — 74019 RADEX ABDOMEN 2 VIEWS: CPT

## 2024-02-02 RX ORDER — PROCHLORPERAZINE MALEATE 10 MG
10 TABLET ORAL EVERY 6 HOURS PRN
Qty: 10 TABLET | Refills: 0 | Status: SHIPPED | OUTPATIENT
Start: 2024-02-02

## 2024-02-02 RX ORDER — AMOXICILLIN 250 MG
1 CAPSULE ORAL DAILY
Qty: 10 TABLET | Refills: 0 | Status: SHIPPED | OUTPATIENT
Start: 2024-02-02

## 2024-02-02 RX ORDER — DICYCLOMINE HCL 20 MG
20 TABLET ORAL EVERY 6 HOURS
Qty: 20 TABLET | Refills: 0 | Status: SHIPPED | OUTPATIENT
Start: 2024-02-02

## 2024-02-02 RX ORDER — SODIUM CHLORIDE 0.9 % (FLUSH) 0.9 %
10 SYRINGE (ML) INJECTION AS NEEDED
Status: DISCONTINUED | OUTPATIENT
Start: 2024-02-02 | End: 2024-02-02 | Stop reason: HOSPADM

## 2024-02-02 RX ORDER — ONDANSETRON 4 MG/1
4 TABLET, ORALLY DISINTEGRATING ORAL EVERY 4 HOURS PRN
Qty: 9 TABLET | Refills: 0 | Status: SHIPPED | OUTPATIENT
Start: 2024-02-02

## 2024-02-02 RX ORDER — DROPERIDOL 2.5 MG/ML
2.5 INJECTION, SOLUTION INTRAMUSCULAR; INTRAVENOUS ONCE
Status: COMPLETED | OUTPATIENT
Start: 2024-02-02 | End: 2024-02-02

## 2024-02-02 RX ORDER — ONDANSETRON 2 MG/ML
4 INJECTION INTRAMUSCULAR; INTRAVENOUS ONCE
Status: COMPLETED | OUTPATIENT
Start: 2024-02-02 | End: 2024-02-02

## 2024-02-02 RX ADMIN — ONDANSETRON 4 MG: 2 INJECTION INTRAMUSCULAR; INTRAVENOUS at 04:11

## 2024-02-02 RX ADMIN — MORPHINE SULFATE 4 MG: 4 INJECTION, SOLUTION INTRAMUSCULAR; INTRAVENOUS at 04:11

## 2024-02-02 RX ADMIN — DROPERIDOL 2.5 MG: 2.5 INJECTION, SOLUTION INTRAMUSCULAR; INTRAVENOUS at 06:19

## 2024-02-02 RX ADMIN — SODIUM CHLORIDE 1000 ML: 9 INJECTION, SOLUTION INTRAVENOUS at 04:10

## 2024-02-02 NOTE — DISCHARGE INSTRUCTIONS
Continue to take your home medications including stool softener and laxative, pain medicine and Phenergan.  May alternate with Zofran/ compazine and take other medications as prescribed for symptomatic treatment.    Drink plenty fluids.    OTC mag citrate if unable to have bowel movement.  Drink 1 bottle    Follow-up with your PCP and your surgeon to discuss any additional complications or issues.      Findings were unremarkable here today for emergent abnormality

## 2024-02-02 NOTE — TELEPHONE ENCOUNTER
LMOM. Called to inform patient that the pharmacy at the hospital is filling her prescription now and she can pick it up. Please let patient know this when she calls back and send back to me please. Thanks.

## 2024-02-02 NOTE — ED PROVIDER NOTES
Time: 3:49 AM EST  Date of encounter:  2/2/2024  Independent Historian/Clinical History and Information was obtained by:   Patient and Family    History is limited by: N/A    Chief Complaint: Abdominal pain and vomiting      History of Present Illness:  Patient is a 43 y.o. year old female who presents to the emergency department for evaluation of abdominal pain and vomiting since Saturday.  Patient has been seen at urgent care facilities and was given Phenergan.  Patient had hemorrhoidectomy on Wednesday.  Last bowel movement was Tuesday.  Patient states been unable to hold even her surgery meds down.  Patient states she has had this pain intermittently for the last several months but has never been seen as an urgent care.  No imaging ever done.  Patient reports pain is in the right upper quadrant and is sharp and stabbing and radiates through to her back in the same spot.  No fever.  No shortness of breath.    HPI    Patient Care Team  Primary Care Provider: Digna Paniagua MD    Past Medical History:     Allergies   Allergen Reactions    Escitalopram Nausea Only and Rash     Nausea, sweating, rash     Sulfa Antibiotics Anaphylaxis    Baclofen Hives and GI Intolerance    Ciprofloxacin Hallucinations    Codeine Hives    Gold-Containing Drug Products Rash    Latex Rash    Nickel Hives    Tegaderm Ag Mesh [Silver] Hives     Past Medical History:   Diagnosis Date    Allergic     Anxiety     Atrial fibrillation     RELEASED BY CARDIOLOGIST/ELECTROPHYSIST, NO CURRENT MEDS    Chronic pain disorder     Depression     Endometriosis     Headache     Hemorrhoids     Injury of shoulder, right 2009    CHRONIC PAIN    Panic disorder      Past Surgical History:   Procedure Laterality Date    CERVICAL ARTHRODESIS  01/14/2015    Donaldo Albarran    CERVICAL FUSION      C4-7 FUSION, FULL ROM    COLONOSCOPY N/A 05/31/2022    Procedure: COLONOSCOPY;  Surgeon: Shabbir Baird MD;  Location: [facility] ENDOSCOPY;  Service: General;   Laterality: N/A;  HEMORRHOIDS    EXPLORATORY LAPAROTOMY      HEMORRHOIDECTOMY N/A 05/31/2022    Procedure: HEMORRHOID BANDING;  Surgeon: Shabbir Baird MD;  Location: [facility] ENDOSCOPY;  Service: General;  Laterality: N/A;  BANDS X 2    HEMORRHOIDECTOMY N/A 1/31/2024    Procedure: HEMORRHOIDECTOMY;  Surgeon: Shabbir Baird MD;  Location: [facility] OR OSC;  Service: General;  Laterality: N/A;    HYSTERECTOMY      SHOULDER ARTHROSCOPY Right     SHOULDER SURGERY Left     ARTHROSCOPY LABRAL TEAR X2    TUBAL ABDOMINAL LIGATION       Family History   Problem Relation Age of Onset    Depression Mother     Bipolar disorder Mother     Anxiety disorder Mother     Cancer Mother     Heart disease Mother     Hypertension Mother     Anxiety disorder Father     Hypertension Father     Dementia Paternal Uncle     Dementia Paternal Grandmother     Heart disease Other     Colon cancer Neg Hx     Malig Hyperthermia Neg Hx        Home Medications:  Prior to Admission medications    Medication Sig Start Date End Date Taking? Authorizing Provider   albuterol sulfate  (90 Base) MCG/ACT inhaler Inhale 2 puffs Every 6 (Six) Hours As Needed for Wheezing.  Patient taking differently: Inhale 2 puffs Every 6 (Six) Hours As Needed for Wheezing (PRN ALLERGIES). 12/7/23   Digna Paniagua MD   bisacodyl (DULCOLAX) 10 MG suppository Insert 1 suppository every day by rectal route as needed. 7/24/23   Avani Vela MD   busPIRone (BUSPAR) 15 MG tablet Take 1 tablet by mouth 3 (Three) Times a Day. 9/25/23   Digna Paniagua MD   CALCIUM PO Take 1 tablet by mouth Daily. LD 1/29/24    Avani Vela MD   cetirizine (ZyrTEC) 10 MG tablet Take 1 tablet by mouth Daily.    Avani Vela MD   cyclobenzaprine (FLEXERIL) 5 MG tablet Take 1 tablet by mouth Every 8 (Eight) Hours for 5 days. 1/31/24 2/5/24  Shabbir Baird MD   Daridorexant HCl (Quviviq) 50 MG tablet Take 1 tablet by mouth Every Night. 1/10/24   Arcelia  MD Digna   DULoxetine (Cymbalta) 30 MG capsule Take 1 cap PO QD. Take with 60mg for total dose of 90mg  Patient taking differently: 1 capsule Every Night. Take 1 cap PO QD. Take with 60mg for total dose of 90mg 12/7/23   Digna Paniagua MD   DULoxetine (Cymbalta) 60 MG capsule Take 1 cap PO QD. Take with 30mg cap for total dose of 90mg.  Patient taking differently: Take 1 capsule by mouth Every Morning. Take 1 cap PO QD. Take with 30mg cap for total dose of 90mg. 9/25/23   Digna Paniagua MD   fexofenadine (ALLEGRA) 30 MG tablet Take 1 tablet by mouth Daily With Lunch.    Avani Vela MD   HYDROcodone-acetaminophen (NORCO) 7.5-325 MG per tablet TAKE 1 TO 2 TABLETS BY MOUTH THREE TIMES DAILY AS NEEDED FOR PAIN 11/3/23   Avani Vela MD   hydrocortisone (ANUSOL-HC) 25 MG suppository Insert 1 suppository into the rectum 2 (Two) Times a Day. 12/7/23   Digna Paniagua MD   hydrocortisone 2.5 % cream Apply 1 application  topically to the appropriate area as directed 2 (Two) Times a Day. 12/7/23   Digna Paniagua MD   ibuprofen (ADVIL,MOTRIN) 800 MG tablet Take 1 tablet by mouth Every 8 (Eight) Hours As Needed. LAST DOSE 1/28/24    Avani Vela MD   lidocaine (XYLOCAINE) 5 % ointment Apply 1 Application topically to the appropriate area as directed Every 2 (Two) Hours As Needed for Mild Pain. 1/31/24   Shabbir Baird MD   montelukast (Singulair) 10 MG tablet Take 1 tablet by mouth Every Night. 10/3/23   Tracy Segura APRN   naloxone (NARCAN) 4 MG/0.1ML nasal spray Call 911. Don't prime. [place] in 1 nostril for overdose. Repeat in 2-3 minutes in other nostril if no or minimal breathing/responsiveness. 1/31/24   Shabbir Baird MD   oxyCODONE-acetaminophen (Percocet) 5-325 MG per tablet Take 1 tablet by mouth Every 6 (Six) Hours As Needed for Moderate Pain. 1/31/24   Shabbir Baird MD   pantoprazole (PROTONIX) 40 MG EC tablet Take 1 tablet by mouth Daily. 11/8/23   Arcelia,  MD Digna   polyethylene glycol (MIRALAX) 17 g packet Take 17 g by mouth Daily. 24   Shabbir Baird MD   polyethylene glycol (MIRALAX) 17 GM/SCOOP powder mix 17 grams in 4-8oz of liquid and drink once daily 23   Avani Vela MD   promethazine (PHENERGAN) 25 MG tablet promethazine 25 mg tablet   TAKE (1) TABLET EVERY 6 HOURS AS NEEDED FOR NAUSEA    Avani Vela MD   tiZANidine (ZANAFLEX) 4 MG tablet TAKE 2 TABLETS BY MOUTH 3 TIMES DAILY AS NEEDED DISCONTINUE CYCLOBENZAPRINE 22   Avani Vela MD   traZODone (DESYREL) 50 MG tablet Take 0.5 to 2 tab PO QHS PRN sleep 23   Digna Paniagua MD   triamcinolone (KENALOG) 0.1 % cream Apply 1 application topically to the appropriate area as directed 2 (Two) Times a Day. 22   Shahid Bello APRN   vitamin C (ASCORBIC ACID) 500 MG tablet Take 1 tablet by mouth Daily. LAST DOSE 24    Avani Vela MD        Social History:   Social History     Tobacco Use    Smoking status: Former     Packs/day: 0.25     Years: 20.00     Additional pack years: 0.00     Total pack years: 5.00     Types: Cigarettes     Quit date: 2019     Years since quittin.0    Smokeless tobacco: Never   Vaping Use    Vaping Use: Never used   Substance Use Topics    Alcohol use: No    Drug use: Never         Review of Systems:  Review of Systems   Constitutional:  Negative for chills and fever.   HENT:  Negative for congestion, ear pain and sore throat.    Eyes:  Negative for pain.   Respiratory:  Negative for cough, chest tightness and shortness of breath.    Cardiovascular:  Negative for chest pain.   Gastrointestinal:  Positive for abdominal pain, nausea and vomiting. Negative for diarrhea.   Genitourinary:  Negative for flank pain and hematuria.   Musculoskeletal:  Positive for back pain. Negative for joint swelling.   Skin:  Negative for pallor.   Neurological:  Negative for seizures and headaches.   Hematological: Negative.   "  Psychiatric/Behavioral: Negative.     All other systems reviewed and are negative.       Physical Exam:  /91 (BP Location: Right arm, Patient Position: Lying)   Pulse 102   Temp 98.6 °F (37 °C) (Oral)   Resp 20   Ht 157.5 cm (62\")   Wt 86.2 kg (190 lb 0.6 oz)   SpO2 95%   BMI 34.76 kg/m²     Physical Exam  Vitals and nursing note reviewed.   Constitutional:       General: She is in acute distress (Patient appears uncomfortable).      Appearance: Normal appearance. She is not ill-appearing or toxic-appearing.   HENT:      Head: Normocephalic and atraumatic.      Mouth/Throat:      Mouth: Mucous membranes are moist.   Eyes:      General: No scleral icterus.  Cardiovascular:      Rate and Rhythm: Regular rhythm. Tachycardia present.      Pulses: Normal pulses.      Heart sounds: Normal heart sounds.   Pulmonary:      Effort: Pulmonary effort is normal. No respiratory distress.      Breath sounds: Normal breath sounds.   Abdominal:      General: Abdomen is protuberant. Bowel sounds are normal. There is no distension.      Palpations: Abdomen is soft.      Tenderness: There is no abdominal tenderness in the right upper quadrant and epigastric area. There is no right CVA tenderness or left CVA tenderness.      Hernia: No hernia is present.   Musculoskeletal:         General: Normal range of motion.      Cervical back: Normal range of motion and neck supple.   Skin:     General: Skin is warm and dry.   Neurological:      Mental Status: She is alert and oriented to person, place, and time. Mental status is at baseline.   Psychiatric:         Mood and Affect: Mood normal.         Behavior: Behavior normal.            Medical Decision Making:      Comorbidities that affect care:    Hemorrhoids with recent surgery 2 days ago, Atrial Fibrillation    External Notes reviewed:    Previous Operation Note: Patient had hemorrhoidectomy January 31 with Dr. Baird on Wednesday.  Note reviewed      The following orders " were placed and all results were independently analyzed by me:  Orders Placed This Encounter   Procedures    US Gallbladder    XR Abdomen Flat & Upright    [place] Draw    Comprehensive Metabolic Panel    Lipase    Urinalysis With Microscopic If Indicated (No Culture) - Urine, Clean Catch    CBC Auto Differential    Urinalysis, Microscopic Only - Urine, Clean Catch    NPO Diet NPO Type: Strict NPO    Undress & Gown    Recheck and document vitals please  Misc Nursing Order (Specify)    Insert Peripheral IV    CBC & Differential    Green Top (Gel)    Lavender Top    Gold Top - SST    Light Blue Top       Medications Given in the Emergency Department:  Medications   sodium chloride 0.9 % flush 10 mL (has no administration in time range)   morphine injection 4 mg (4 mg Intravenous Given 2/2/24 0411)   ondansetron (ZOFRAN) injection 4 mg (4 mg Intravenous Given 2/2/24 0411)   sodium chloride 0.9 % bolus 1,000 mL (0 mL Intravenous Stopped 2/2/24 0530)   droperidol (INAPSINE) injection 2.5 mg (2.5 mg Intravenous Given 2/2/24 0619)        ED Course:    ED Course as of 02/02/24 0648   Fri Feb 02, 2024   0546 XR Abdomen Flat & Upright  No obstruction or pneumoperitoneum [DS]   0646 US Gallbladder  No acute cholecystitis or gallbladder wall thickening [DS]      ED Course User Index  [DS] Selene Mora APRN       Labs:    Lab Results (last 24 hours)       Procedure Component Value Units Date/Time    Urinalysis With Microscopic If Indicated (No Culture) - Urine, Clean Catch [[id number]]  (Abnormal) Collected: 02/02/24 0352    Specimen: Urine, Clean Catch Updated: 02/02/24 0404     Color, UA Yellow     Appearance, UA Cloudy     pH, UA 5.5     Specific Gravity, UA 1.026     Glucose, UA Negative     Ketones, UA Negative     Bilirubin, UA Negative     Blood, UA Negative     Protein, UA Negative     Leuk Esterase, UA Small (1+)     Nitrite, UA Negative     Urobilinogen, UA 0.2 E.U./dL    Urinalysis, Microscopic Only - Urine, Clean  Catch [[id number]]  (Abnormal) Collected: 02/02/24 0352    Specimen: Urine, Clean Catch Updated: 02/02/24 0405     RBC, UA 0-2 /HPF      WBC, UA 6-10 /HPF      Bacteria, UA 2+ /HPF      Squamous Epithelial Cells, UA 31-50 /HPF      Hyaline Casts, UA 7-12 /LPF      Methodology Automated Microscopy    CBC & Differential [[id number]]  (Abnormal) Collected: 02/02/24 0412    Specimen: Blood Updated: 02/02/24 0419    Narrative:      The following orders were created for panel order CBC & Differential.  Procedure                               Abnormality         Status                     ---------                               -----------         ------                     CBC Auto Differential[[id number]]        Abnormal            Final result                 Please view results for these tests on the individual orders.    Comprehensive Metabolic Panel [[id number]]  (Abnormal) Collected: 02/02/24 0412    Specimen: Blood Updated: 02/02/24 0436     Glucose 105 mg/dL      BUN 14 mg/dL      Creatinine 0.74 mg/dL      Sodium 135 mmol/L      Potassium 3.7 mmol/L      Chloride 99 mmol/L      CO2 20.9 mmol/L      Calcium 9.5 mg/dL      Total Protein 7.7 g/dL      Albumin 4.6 g/dL      ALT (SGPT) 11 U/L      AST (SGOT) 16 U/L      Alkaline Phosphatase 63 U/L      Total Bilirubin 0.3 mg/dL      Globulin 3.1 gm/dL      A/G Ratio 1.5 g/dL      BUN/Creatinine Ratio 18.9     Anion Gap 15.1 mmol/L      eGFR 104.4 mL/min/1.73     Narrative:      GFR Normal >60  Chronic Kidney Disease <60  Kidney Failure <15      Lipase [[id number]]  (Normal) Collected: 02/02/24 0412    Specimen: Blood Updated: 02/02/24 0436     Lipase 29 U/L     CBC Auto Differential [[id number]]  (Abnormal) Collected: 02/02/24 0412    Specimen: Blood Updated: 02/02/24 0419     WBC 10.18 10*3/mm3      RBC 4.84 10*6/mm3      Hemoglobin 13.5 g/dL      Hematocrit 42.5 %      MCV 87.8 fL      MCH 27.9 pg      MCHC 31.8 g/dL      RDW 12.9 %      RDW-SD 41.7 fl      MPV 9.5 fL       Platelets 473 10*3/mm3      Neutrophil % 69.3 %      Lymphocyte % 23.6 %      Monocyte % 5.1 %      Eosinophil % 1.1 %      Basophil % 0.4 %      Immature Grans % 0.5 %      Neutrophils, Absolute 7.06 10*3/mm3      Lymphocytes, Absolute 2.40 10*3/mm3      Monocytes, Absolute 0.52 10*3/mm3      Eosinophils, Absolute 0.11 10*3/mm3      Basophils, Absolute 0.04 10*3/mm3      Immature Grans, Absolute 0.05 10*3/mm3      nRBC 0.0 /100 WBC              Imaging:    US Gallbladder    Result Date: 2/2/2024  PROCEDURE: US GALLBLADDER  COMPARISON: None.  INDICATIONS: ruq abdomen pain  TECHNIQUE: A limited abdominal ultrasound examination of the right upper quadrant was performed, tailored in order to evaluate the gallbladder and the biliary tree.   FINDINGS:  Two-dimensional grayscale images as well as color and spectral Doppler analysis are provided for review. The patient was fasting as per protocol for the study.  The gallbladder is distended and measures about 11 x 2.2 cm on image 30/64.  No gallstones or acute cholecystitis are seen. No gallbladder wall thickening. No pericholecystic fluid. The pancreas is obscured by bowel gas. Its visualized portions are unremarkable.  The main pancreatic duct measures about 3 mm in diameter as seen on image 3/64.  Doppler interrogation of the hepatic vasculature reveals patent vessels with normal blood flow direction.  Minimal dilatation of the common bile duct is seen.  The common bile duct measures 6.8 mm in diameter.  No choledocholithiasis.  No focal abnormalities are seen involving the right kidney. The zchb-yx-lnda length of the right kidney is 11 cm.  The right renal volume is 99.5 mL. No right hydronephrosis. The craniocaudal dimension of the right lobe of the liver is 17.6 cm.  There may be diffuse hepatic steatosis.  No definite suspicious hepatic mass.  The left kidney, spleen, inferior vena cava, and abdominal aorta were not evaluated. A negative sonographic Guzman's sign  was reported.        No acute cholecystitis. No gallstones.  The gallbladder appears distended.  There is mild biliary ductal dilatation, especially involving the common bile duct.  There may be mild hepatomegaly with diffuse hepatic steatosis.  The other findings are as detailed above.    Please note that portions of this note were completed with a voice recognition program.  CONNIE BRADLEY JR, MD       Electronically Signed and Approved By: CONNIE BRADLEY JR, MD on 2/02/2024 at 6:43              XR Abdomen Flat & Upright    Result Date: 2/2/2024  PROCEDURE: XR ABDOMEN FLAT AND UPRIGHT (THREE VIEWS)  COMPARISON: None.  INDICATIONS: Unspecified abdominal pain; no bowel movement x 3 days. recent hemorrhoidectomy.  FINDINGS:  BOWEL GAS PATTERN: No abnormal dilation or deviation.  Seen at origin amount of formed stool is suggested radiographically. CALCIFICATIONS: None significant.  There are incidental pelvic phleboliths. OTHER: No abnormal gaseous collections.  Possible hepatomegaly.       No mechanical bowel obstruction is seen.  No pneumoperitoneum.     Please note that portions of this note were completed with a voice recognition program.  CONNIE BRADLEY JR, MD      Electronically Signed and Approved By: CONNIE BRADLEY JR, MD on 2/02/2024 at 4:57             Differential Diagnosis and Discussion:    Abdominal Pain: Based on the patient's signs and symptoms, I considered abdominal aortic aneurysm, small bowel obstruction, pancreatitis, acute cholecystitis, acute appendecitis, peptic ulcer disease, gastritis, colitis, endocrine disorders, irritable bowel syndrome and other differential diagnosis an etiology of the patient's abdominal pain.    All labs were reviewed and interpreted by me.  Ultrasound impression was interpreted by me.     MDM  Number of Diagnoses or Management Options  Constipation, unspecified constipation type  Nausea and vomiting, unspecified vomiting type  Pain of upper abdomen  Diagnosis  management comments: The patient is resting comfortably and feels better, is alert and in no distress. Repeat examination is unremarkable and benign; in particular, there's no discomfort at McBurney's point and there is no pulsatile mass. The history, exam, diagnostic testing, and current condition does not suggest acute appendicitis, bowel obstruction, acute cholecystitis, bowel perforation, major gastrointestinal bleeding, severe diverticulitis, abdominal aortic aneurysm, mesenteric ischemia, volvulus, sepsis, or other significant pathology that warrants further testing, continued ED treatment, admission, for surgical evaluation at this point. The vital signs have been stable. The patient does not have uncontrollable pain, intractable vomiting, or other significant symptoms. The patient's condition is stable and appropriate for discharge from the emergency department.       Amount and/or Complexity of Data Reviewed  Clinical lab tests: reviewed and ordered  Tests in the radiology section of CPT®: reviewed and ordered  Tests in the medicine section of CPT®: ordered and reviewed    Risk of Complications, Morbidity, and/or Mortality  Presenting problems: moderate  Diagnostic procedures: moderate  Management options: low    Patient Progress  Patient progress: stable           Patient Care Considerations:    ANTIBIOTICS: I considered prescribing antibiotics as an outpatient however no bacterial focus of infection was found.      Consultants/Shared Management Plan:    None    Social Determinants of Health:    Patient has presented with family members who are responsible, reliable and will ensure follow up care.      Disposition and Care Coordination:    Discharged: I considered escalation of care by admitting this patient to the hospital, however the patient has improved and is suitable and  stable for discharge.    I have explained the patient´s condition, diagnoses and treatment plan based on the information available  to me at this time. I have answered questions and addressed any concerns. The patient has a good  understanding of the patient´s diagnosis, condition, and treatment plan as can be expected at this point. The vital signs have been stable. The patient´s condition is stable and appropriate for discharge from the emergency department.      The patient will pursue further outpatient evaluation with the primary care physician or other designated or consulting physician as outlined in the discharge instructions. They are agreeable to this plan of care and follow-up instructions have been explained in detail. The patient has received these instructions in written format and have expressed an understanding of the discharge instructions. The patient is aware that any significant change in condition or worsening of symptoms should prompt an immediate return to this or the closest emergency department or call to 911.  I have explained discharge medications and the need for follow up with the patient/caretakers. This was also printed in the discharge instructions. Patient was discharged with the following medications and follow up:      Medication List        New Prescriptions      dicyclomine 20 MG tablet  Commonly known as: BENTYL  Take 1 tablet by mouth Every 6 (Six) Hours.     ondansetron ODT 4 MG disintegrating tablet  Commonly known as: ZOFRAN-ODT  Place 1 tablet on the tongue Every 4 (Four) Hours As Needed for Nausea or Vomiting.     prochlorperazine 10 MG tablet  Commonly known as: COMPAZINE  Take 1 tablet by mouth Every 6 (Six) Hours As Needed for Nausea or Vomiting.     sennosides-docusate 8.6-50 MG per tablet  Commonly known as: PERICOLACE  Take 1 tablet by mouth Daily.            Changed      albuterol sulfate  (90 Base) MCG/ACT inhaler  Commonly known as: PROVENTIL HFA;VENTOLIN HFA;PROAIR HFA  Inhale 2 puffs Every 6 (Six) Hours As Needed for Wheezing.  What changed: reasons to take this     * DULoxetine 60 MG  capsule  Commonly known as: Cymbalta  Take 1 cap PO QD. Take with 30mg cap for total dose of 90mg.  What changed:   how much to take  how to take this  when to take this     * DULoxetine 30 MG capsule  Commonly known as: Cymbalta  Take 1 cap PO QD. Take with 60mg for total dose of 90mg  What changed:   how much to take  when to take this           * This list has 2 medication(s) that are the same as other medications prescribed for you. Read the directions carefully, and ask your doctor or other care provider to review them with you.                   Where to Get Your Medications        These medications were sent to [id number] - [place], [place] - [address] AT [facility] - [phone]  - [phone] FX  [address] [place] [address]      Phone: [phone]   dicyclomine 20 MG tablet  ondansetron ODT 4 MG disintegrating tablet  prochlorperazine 10 MG tablet  sennosides-docusate 8.6-50 MG per tablet      Digna Paniagua MD  [address]  [address]  [facility] [address] [phone]    Schedule an appointment as soon as possible for a visit          Final diagnoses:   Constipation, unspecified constipation type   Nausea and vomiting, unspecified vomiting type   Pain of upper abdomen        ED Disposition       ED Disposition   Discharge    Condition   Stable    Comment   --               This medical record created using voice recognition software.             Selene Mora, MILDRED  02/02/24 0648

## 2024-02-02 NOTE — TELEPHONE ENCOUNTER
PATIENT CALLED.  I TOLD HER, PER DINORA:    LMOM. Called to inform patient that the pharmacy at the hospital is filling her prescription now and she can pick it up. Please let patient know this when she calls back and send back to me please. Thanks.

## 2024-02-06 ENCOUNTER — TELEPHONE (OUTPATIENT)
Dept: SURGERY | Facility: CLINIC | Age: 44
End: 2024-02-06
Payer: COMMERCIAL

## 2024-02-06 DIAGNOSIS — Z98.890 S/P HEMORRHOIDECTOMY: Primary | ICD-10-CM

## 2024-02-06 DIAGNOSIS — Z87.19 S/P HEMORRHOIDECTOMY: Primary | ICD-10-CM

## 2024-02-06 RX ORDER — LIDOCAINE 50 MG/G
1 OINTMENT TOPICAL
Qty: 30 G | Refills: 0 | Status: SHIPPED | OUTPATIENT
Start: 2024-02-06

## 2024-02-06 RX ORDER — HYDROCODONE BITARTRATE AND ACETAMINOPHEN 7.5; 325 MG/1; MG/1
1 TABLET ORAL EVERY 6 HOURS PRN
Qty: 15 TABLET | Refills: 0 | Status: SHIPPED | OUTPATIENT
Start: 2024-02-06

## 2024-02-06 RX ORDER — FLUCONAZOLE 100 MG/1
100 TABLET ORAL DAILY
Qty: 3 TABLET | Refills: 0 | Status: SHIPPED | OUTPATIENT
Start: 2024-02-06 | End: 2024-02-09

## 2024-02-06 NOTE — TELEPHONE ENCOUNTER
PT HAD HEMORRHOIDECTOMY ON 01/31. SHE CALLED [facility], STATES THAT IT IS SHOOTING IN NATURE. SHE IS ASKING FOR MORE LIDOCAINE CREAM AND MORE PAIN MEDS. ALSO SHE HAS DEVELOPED A YEAST INFECTION FROM WEARING PADS FOR THE DISCHARGE AND WANTS TO KNOW IF SHE CAN GET SOMETHING FOR THAT AS WELL. SHE IS TAKING SENNA AND MIRALAX DAILY AND IS ONLY HAVING SMALL HARD BM'S, IS THERE ANYTHING ELSE SHE CAN TAKE OR ADD.

## 2024-02-06 NOTE — TELEPHONE ENCOUNTER
I have sent in additional pain meds, lidocaine and diflucan to [facility] listed in her chart. She may take miralax twice a day. Once in the morning and once in the evening. Sitz bathes are recommendation also. Witch hazel wipes after BM and then apply lidocaine.

## 2024-02-15 ENCOUNTER — TELEPHONE (OUTPATIENT)
Dept: FAMILY MEDICINE CLINIC | Facility: CLINIC | Age: 44
End: 2024-02-15
Payer: COMMERCIAL

## 2024-02-15 DIAGNOSIS — G47.00 INSOMNIA, UNSPECIFIED TYPE: Primary | ICD-10-CM

## 2024-02-15 RX ORDER — DARIDOREXANT 50 MG/1
50 TABLET, FILM COATED ORAL NIGHTLY
Qty: 60 TABLET | Refills: 0 | Status: SHIPPED | OUTPATIENT
Start: 2024-02-15 | End: 2024-05-15

## 2024-02-19 ENCOUNTER — TELEPHONE (OUTPATIENT)
Dept: SURGERY | Facility: CLINIC | Age: 44
End: 2024-02-19
Payer: COMMERCIAL

## 2024-02-19 NOTE — TELEPHONE ENCOUNTER
Hub staff attempted to follow warm transfer process and was unsuccessful     Caller: Ebony Hamilton    Relationship to patient: Self    Best call back number: [phone]     Patient is needing: STILL SEEING BLOOD AND DISCHARGE WHEN HAVING BM. WANTS TO KNOW IF NORMAL. PLS CONTACT PT. OK TO LVM IF PT DOES NOT ANSWER

## 2024-02-27 ENCOUNTER — OFFICE VISIT (OUTPATIENT)
Dept: SURGERY | Facility: CLINIC | Age: 44
End: 2024-02-27
Payer: COMMERCIAL

## 2024-02-27 ENCOUNTER — TELEPHONE (OUTPATIENT)
Dept: FAMILY MEDICINE CLINIC | Facility: CLINIC | Age: 44
End: 2024-02-27
Payer: COMMERCIAL

## 2024-02-27 VITALS — HEIGHT: 62 IN | RESPIRATION RATE: 16 BRPM | WEIGHT: 190 LBS | BODY MASS INDEX: 34.96 KG/M2

## 2024-02-27 DIAGNOSIS — Z87.19 STATUS POST HEMORRHOIDECTOMY: Primary | ICD-10-CM

## 2024-02-27 DIAGNOSIS — R11.2 NAUSEA AND VOMITING, UNSPECIFIED VOMITING TYPE: Primary | ICD-10-CM

## 2024-02-27 DIAGNOSIS — R05.9 COUGH, UNSPECIFIED TYPE: ICD-10-CM

## 2024-02-27 DIAGNOSIS — Z98.890 STATUS POST HEMORRHOIDECTOMY: Primary | ICD-10-CM

## 2024-02-27 PROCEDURE — 99024 POSTOP FOLLOW-UP VISIT: CPT | Performed by: SURGERY

## 2024-02-27 RX ORDER — ONDANSETRON 4 MG/1
4 TABLET, ORALLY DISINTEGRATING ORAL EVERY 4 HOURS PRN
Qty: 9 TABLET | Refills: 0 | Status: SHIPPED | OUTPATIENT
Start: 2024-02-27

## 2024-02-27 RX ORDER — ONDANSETRON 4 MG/1
4 TABLET, ORALLY DISINTEGRATING ORAL EVERY 4 HOURS PRN
Qty: 9 TABLET | Refills: 0 | Status: SHIPPED | OUTPATIENT
Start: 2024-02-27 | End: 2024-02-27 | Stop reason: SDUPTHER

## 2024-02-27 RX ORDER — HYDROCODONE BITARTRATE AND ACETAMINOPHEN 10; 325 MG/1; MG/1
TABLET ORAL
COMMUNITY
Start: 2024-02-14

## 2024-02-27 RX ORDER — MONTELUKAST SODIUM 10 MG/1
10 TABLET ORAL NIGHTLY
Qty: 90 TABLET | Refills: 2 | Status: SHIPPED | OUTPATIENT
Start: 2024-02-27

## 2024-02-27 NOTE — LETTER
February 28, 2024       No Recipients    Patient: Ebony Hamilton   YOB: 1980   Date of Visit: 2/27/2024     Dear Digna Paniagua MD:       Thank you for referring Ebony Hamilton to me for evaluation. Below are the relevant portions of my assessment and plan of care.    If you have questions, please do not hesitate to call me. I look forward to following Ebony along with you.         Sincerely,        Shabbir Baird MD        CC:   No Recipients    Shabbir Baird MD  02/28/24 1206  Sign when Signing Visit  General Surgery/Colorectal Surgery   Post -op Follow up Note    Patient Name:  Ebony Hamilton  YOB: 1980  [id number]    Referring Provider: No ref. provider found    Patient Care Team:  Digna Paniagua MD as PCP - General (Internal Medicine)  Donaldo Albarran MD as Surgeon (Neurosurgery)  Mo April, APRSHAWNA as Nurse Practitioner (Nurse Practitioner)    Chief complaint follow-up    Subjective.     History of present illness:    Status post excisional hemorrhoidectomy 1/31/2024.  Pathology with hemorrhoids.    She comes in for follow-up.  No fever.  Her pain is slowly improving.  No fecal incontinence.  She is using fiber.    History:  Past Medical History:   Diagnosis Date   • Allergic    • Anxiety    • Atrial fibrillation     RELEASED BY CARDIOLOGIST/ELECTROPHYSIST, NO CURRENT MEDS   • Chronic pain disorder    • Depression    • Endometriosis    • Headache    • Hemorrhoids    • Injury of shoulder, right 2009    CHRONIC PAIN   • Panic disorder        Past Surgical History:   Procedure Laterality Date   • CERVICAL ARTHRODESIS  01/14/2015    Donaldo Albarran   • CERVICAL FUSION      C4-7 FUSION, FULL ROM   • COLONOSCOPY N/A 05/31/2022    Procedure: COLONOSCOPY;  Surgeon: Shabbir Baird MD;  Location: [facility] ENDOSCOPY;  Service: General;  Laterality: N/A;  HEMORRHOIDS   • EXPLORATORY LAPAROTOMY     • HEMORRHOIDECTOMY N/A 05/31/2022    Procedure: HEMORRHOID  BANDING;  Surgeon: Shabbir Baird MD;  Location: [facility] ENDOSCOPY;  Service: General;  Laterality: N/A;  BANDS X 2   • HEMORRHOIDECTOMY N/A 2024    Procedure: HEMORRHOIDECTOMY;  Surgeon: Shabbir Baird MD;  Location: [facility] OR OSC;  Service: General;  Laterality: N/A;   • HYSTERECTOMY     • SHOULDER ARTHROSCOPY Right    • SHOULDER SURGERY Left     ARTHROSCOPY LABRAL TEAR X2   • TUBAL ABDOMINAL LIGATION         Family History   Problem Relation Age of Onset   • Depression Mother    • Bipolar disorder Mother    • Anxiety disorder Mother    • Cancer Mother    • Heart disease Mother    • Hypertension Mother    • Anxiety disorder Father    • Hypertension Father    • Dementia Paternal Uncle    • Dementia Paternal Grandmother    • Heart disease Other    • Colon cancer Neg Hx    • Malig Hyperthermia Neg Hx        Social History     Tobacco Use   • Smoking status: Former     Packs/day: 0.25     Years: 20.00     Additional pack years: 0.00     Total pack years: 5.00     Types: Cigarettes     Quit date: 2019     Years since quittin.1   • Smokeless tobacco: Never   Vaping Use   • Vaping Use: Never used   Substance Use Topics   • Alcohol use: No   • Drug use: Never       MEDS:  Prior to Admission medications    Medication Sig Start Date End Date Taking? Authorizing Provider   albuterol sulfate  (90 Base) MCG/ACT inhaler Inhale 2 puffs Every 6 (Six) Hours As Needed for Wheezing.  Patient taking differently: Inhale 2 puffs Every 6 (Six) Hours As Needed for Wheezing (PRN ALLERGIES). 23  Yes Digna Paniagua MD   bisacodyl (DULCOLAX) 10 MG suppository Insert 1 suppository every day by rectal route as needed. 23  Yes Avani Vela MD   busPIRone (BUSPAR) 15 MG tablet Take 1 tablet by mouth 3 (Three) Times a Day. 23  Yes Digna Paniagua MD   CALCIUM PO Take 1 tablet by mouth Daily. LD 24   Yes Avani Vela MD   cetirizine (ZyrTEC) 10 MG tablet Take 1 tablet by mouth  Daily.   Yes Avani Vela MD   Daridorexant HCl (Quviviq) 50 MG tablet Take 1 tablet by mouth Every Night for 90 days. 2/15/24 5/15/24 Yes Digna Paniagua MD   dicyclomine (BENTYL) 20 MG tablet Take 1 tablet by mouth Every 6 (Six) Hours. 2/2/24  Yes Selene Mora APRN   DULoxetine (Cymbalta) 30 MG capsule Take 1 cap PO QD. Take with 60mg for total dose of 90mg  Patient taking differently: 1 capsule Every Night. Take 1 cap PO QD. Take with 60mg for total dose of 90mg 12/7/23  Yes Digna Paniagua MD   DULoxetine (Cymbalta) 60 MG capsule Take 1 cap PO QD. Take with 30mg cap for total dose of 90mg.  Patient taking differently: Take 1 capsule by mouth Every Morning. Take 1 cap PO QD. Take with 30mg cap for total dose of 90mg. 9/25/23  Yes Digna Paniagua MD   fexofenadine (ALLEGRA) 30 MG tablet Take 1 tablet by mouth Daily With Lunch.   Yes Avani Vela MD   HYDROcodone-acetaminophen (NORCO)  MG per tablet TAKE 1 TABLET BY MOUTH UP TO FOUR TIMES DAILY AS NEEDED FOR PAIN 2/14/24  Yes Avani Vela MD   hydrocortisone (ANUSOL-HC) 25 MG suppository Insert 1 suppository into the rectum 2 (Two) Times a Day. 12/7/23  Yes Digna Paniagua MD   hydrocortisone 2.5 % cream Apply 1 application  topically to the appropriate area as directed 2 (Two) Times a Day. 12/7/23  Yes Digna Paniagua MD   ibuprofen (ADVIL,MOTRIN) 800 MG tablet Take 1 tablet by mouth Every 8 (Eight) Hours As Needed. LAST DOSE 1/28/24   Yes Avani Vela MD   lidocaine (XYLOCAINE) 5 % ointment Apply 1 Application topically to the appropriate area as directed Every 2 (Two) Hours As Needed for Mild Pain. 2/6/24  Yes Madina Hassan APRN   pantoprazole (PROTONIX) 40 MG EC tablet Take 1 tablet by mouth Daily. 11/8/23  Yes Digna Paniagua MD   polyethylene glycol (MIRALAX) 17 g packet Take 17 g by mouth Daily. 1/31/24  Yes Shabbir Baird MD   polyethylene glycol (MIRALAX) 17 GM/SCOOP powder mix 17 grams in 4-8oz of liquid and drink  once daily 7/24/23  Yes Avani Vela MD   prochlorperazine (COMPAZINE) 10 MG tablet Take 1 tablet by mouth Every 6 (Six) Hours As Needed for Nausea or Vomiting. 2/2/24  Yes Selene Mora APRN   promethazine (PHENERGAN) 25 MG tablet promethazine 25 mg tablet   TAKE (1) TABLET EVERY 6 HOURS AS NEEDED FOR NAUSEA   Yes Avani Vela MD   sennosides-docusate (senna-docusate sodium) 8.6-50 MG per tablet Take 1 tablet by mouth Daily. 2/2/24  Yes Selene Mora APRN   tiZANidine (ZANAFLEX) 4 MG tablet TAKE 2 TABLETS BY MOUTH 3 TIMES DAILY AS NEEDED DISCONTINUE CYCLOBENZAPRINE 5/23/22  Yes Avani Vela MD   traZODone (DESYREL) 50 MG tablet Take 0.5 to 2 tab PO QHS PRN sleep 9/25/23  Yes Digna Paniagua MD   triamcinolone (KENALOG) 0.1 % cream Apply 1 application topically to the appropriate area as directed 2 (Two) Times a Day. 4/7/22  Yes Shahid Bello APRN   vitamin C (ASCORBIC ACID) 500 MG tablet Take 1 tablet by mouth Daily. LAST DOSE 1/28/24   Yes Avani Vela MD   HYDROcodone-acetaminophen (NORCO) 7.5-325 MG per tablet TAKE 1 TO 2 TABLETS BY MOUTH THREE TIMES DAILY AS NEEDED FOR PAIN  Patient not taking: Reported on 2/27/2024 11/3/23   Avani Vela MD   HYDROcodone-acetaminophen (Norco) 7.5-325 MG per tablet Take 1 tablet by mouth Every 6 (Six) Hours As Needed for Moderate Pain.  Patient not taking: Reported on 2/27/2024 2/6/24   Madina Hassan APRN   montelukast (Singulair) 10 MG tablet Take 1 tablet by mouth Every Night. 2/27/24   Digna Paniagua MD   naloxone (NARCAN) 4 MG/0.1ML nasal spray Call 911. Don't prime. [place] in 1 nostril for overdose. Repeat in 2-3 minutes in other nostril if no or minimal breathing/responsiveness.  Patient not taking: Reported on 2/27/2024 1/31/24   Shabbir Baird MD   ondansetron ODT (ZOFRAN-ODT) 4 MG disintegrating tablet Place 1 tablet on the tongue Every 4 (Four) Hours As Needed for Nausea or Vomiting. 2/27/24   Digna Paniagua MD    oxyCODONE-acetaminophen (Percocet) 5-325 MG per tablet Take 1 tablet by mouth Every 6 (Six) Hours As Needed for Moderate Pain.  Patient not taking: Reported on 2/27/2024 1/31/24   Shabbir Baird MD             No current facility-administered medications for this visit.       Allergies:  Escitalopram, Sulfa antibiotics, Baclofen, Ciprofloxacin, Codeine, Gold-containing drug products, Latex, Nickel, and Tegaderm ag mesh [silver]    Objective    Vital Signs        Physical Exam:     No acute distress    Results Review: I have reviewed the patient's labs and imaging    LABS/IMAGING:    Imaging Results (Last 72 Hours)       ** No results found for the last 72 hours. **             Lab Results (last 72 hours)       ** No results found for the last 72 hours. **               Result Review:     Assessment & Plan    * No active hospital problems. *     Status post excisional hemorrhoidectomy 1/31/2024.  Pathology with hemorrhoids.    I reviewed the details of the surgery with the patient.  I reviewed the pathology.  Her colonoscopy is up-to-date.  Continue fiber.  I explained to her her symptoms should slowly continue to improve.  Continue sitz bath's.  Follow-up as needed.  All questions answered.  She agrees with the plan.  Thank you for the consult.          Shabbir Baird MD  02/28/24 12:04 EST

## 2024-02-27 NOTE — TELEPHONE ENCOUNTER
Caller: Ebony Hamilton    Relationship: Self    Best call back number: [phone]    What medication are you requesting: SOMETHING FOR NAUSEA     What are your current symptoms: NAUSEA, VOMITING. TROUBLE EATING     How long have you been experiencing symptoms: ONE WEEK     Have you had these symptoms before:    [x] Yes  [] No    Have you been treated for these symptoms before:   [x] Yes  [] No    If a prescription is needed, what is your preferred pharmacy and phone number: [id number] - [place], [place] - [address] AT [facility] [phone] [facility] [phone]      Additional notes: PATIENT RECENTLY HAD A DEATH IN THE FAMILY AND IS HAVING A LOT OF NAUSEA AND TROUBLE EATING DUE TO THIS RECENT LOSS.

## 2024-02-27 NOTE — TELEPHONE ENCOUNTER
Caller: Ebony Hamilton    Relationship to patient: Self    Best call back number: [phone]    Patient is needing: PATIENT CALLED REQUESTING TO SPEAK WITH CLINICAL STAFF. PATIENT STATES SHE WAS ON HOLD TO SPEAK WITH THE NURSE AND GOT DISCONNECTED REGARDING HER REFILL FOR SINGULAIR AND ALSO QUVIVIQ. PATIENT STATES SHE HAS BEEN TRYING TO HAVE THIS REFILLED FOR A MONTH AND HAS NEVER RECEIVED IT AND HER QUVIVQ WAS REQUESTED A FEW WEEKS AGO. PATIENT STATES SINGULAIR GOES TO [facility] ON FILE AND THE QUVIVIQ IS NEEDING TO GO TO [facility]. PATIENT REQUESTING A CALL BACK AS SOON AS POSSIBLE.

## 2024-02-28 NOTE — PROGRESS NOTES
General Surgery/Colorectal Surgery   Post -op Follow up Note    Patient Name:  Ebony Hamilton  YOB: 1980  [id number]    Referring Provider: No ref. provider found    Patient Care Team:  Digna Paniagua MD as PCP - General (Internal Medicine)  Donaldo Albarran MD as Surgeon (Neurosurgery)  Mo April, APRSHAWNA as Nurse Practitioner (Nurse Practitioner)    Chief complaint follow-up    Subjective .     History of present illness:    Status post excisional hemorrhoidectomy 1/31/2024.  Pathology with hemorrhoids.    She comes in for follow-up.  No fever.  Her pain is slowly improving.  No fecal incontinence.  She is using fiber.    History:  Past Medical History:   Diagnosis Date    Allergic     Anxiety     Atrial fibrillation     RELEASED BY CARDIOLOGIST/ELECTROPHYSIST, NO CURRENT MEDS    Chronic pain disorder     Depression     Endometriosis     Headache     Hemorrhoids     Injury of shoulder, right 2009    CHRONIC PAIN    Panic disorder        Past Surgical History:   Procedure Laterality Date    CERVICAL ARTHRODESIS  01/14/2015    Donaldo Albarran    CERVICAL FUSION      C4-7 FUSION, FULL ROM    COLONOSCOPY N/A 05/31/2022    Procedure: COLONOSCOPY;  Surgeon: Shabbir Baird MD;  Location: [facility] ENDOSCOPY;  Service: General;  Laterality: N/A;  HEMORRHOIDS    EXPLORATORY LAPAROTOMY      HEMORRHOIDECTOMY N/A 05/31/2022    Procedure: HEMORRHOID BANDING;  Surgeon: Shabbir Baird MD;  Location: [facility] ENDOSCOPY;  Service: General;  Laterality: N/A;  BANDS X 2    HEMORRHOIDECTOMY N/A 1/31/2024    Procedure: HEMORRHOIDECTOMY;  Surgeon: Shabbir Baird MD;  Location: [facility] OR OSC;  Service: General;  Laterality: N/A;    HYSTERECTOMY      SHOULDER ARTHROSCOPY Right     SHOULDER SURGERY Left     ARTHROSCOPY LABRAL TEAR X2    TUBAL ABDOMINAL LIGATION         Family History   Problem Relation Age of Onset    Depression Mother     Bipolar disorder Mother     Anxiety disorder Mother      Cancer Mother     Heart disease Mother     Hypertension Mother     Anxiety disorder Father     Hypertension Father     Dementia Paternal Uncle     Dementia Paternal Grandmother     Heart disease Other     Colon cancer Neg Hx     Malig Hyperthermia Neg Hx        Social History     Tobacco Use    Smoking status: Former     Packs/day: 0.25     Years: 20.00     Additional pack years: 0.00     Total pack years: 5.00     Types: Cigarettes     Quit date: 2019     Years since quittin.1    Smokeless tobacco: Never   Vaping Use    Vaping Use: Never used   Substance Use Topics    Alcohol use: No    Drug use: Never       MEDS:  Prior to Admission medications    Medication Sig Start Date End Date Taking? Authorizing Provider   albuterol sulfate  (90 Base) MCG/ACT inhaler Inhale 2 puffs Every 6 (Six) Hours As Needed for Wheezing.  Patient taking differently: Inhale 2 puffs Every 6 (Six) Hours As Needed for Wheezing (PRN ALLERGIES). 23  Yes Digna Paniagua MD   bisacodyl (DULCOLAX) 10 MG suppository Insert 1 suppository every day by rectal route as needed. 23  Yes Avani Vela MD   busPIRone (BUSPAR) 15 MG tablet Take 1 tablet by mouth 3 (Three) Times a Day. 23  Yes Digna Paniagua MD   CALCIUM PO Take 1 tablet by mouth Daily. LD 24   Yes Avani Vela MD   cetirizine (ZyrTEC) 10 MG tablet Take 1 tablet by mouth Daily.   Yes Avani Vela MD   Daridorexant HCl (Quviviq) 50 MG tablet Take 1 tablet by mouth Every Night for 90 days. 2/15/24 5/15/24 Yes Digna Paniagua MD   dicyclomine (BENTYL) 20 MG tablet Take 1 tablet by mouth Every 6 (Six) Hours. 24  Yes Selene Mora APRN   DULoxetine (Cymbalta) 30 MG capsule Take 1 cap PO QD. Take with 60mg for total dose of 90mg  Patient taking differently: 1 capsule Every Night. Take 1 cap PO QD. Take with 60mg for total dose of 90mg 23  Yes Digna Paniagua MD   DULoxetine (Cymbalta) 60 MG capsule Take 1 cap PO QD. Take with 30mg cap  for total dose of 90mg.  Patient taking differently: Take 1 capsule by mouth Every Morning. Take 1 cap PO QD. Take with 30mg cap for total dose of 90mg. 9/25/23  Yes Digna Paniagua MD   fexofenadine (ALLEGRA) 30 MG tablet Take 1 tablet by mouth Daily With Lunch.   Yes Avani Vela MD   HYDROcodone-acetaminophen (NORCO)  MG per tablet TAKE 1 TABLET BY MOUTH UP TO FOUR TIMES DAILY AS NEEDED FOR PAIN 2/14/24  Yes Avani Vela MD   hydrocortisone (ANUSOL-HC) 25 MG suppository Insert 1 suppository into the rectum 2 (Two) Times a Day. 12/7/23  Yes Digna Paniagua MD   hydrocortisone 2.5 % cream Apply 1 application  topically to the appropriate area as directed 2 (Two) Times a Day. 12/7/23  Yes Digna Paniagua MD   ibuprofen (ADVIL,MOTRIN) 800 MG tablet Take 1 tablet by mouth Every 8 (Eight) Hours As Needed. LAST DOSE 1/28/24   Yes Avani Vela MD   lidocaine (XYLOCAINE) 5 % ointment Apply 1 Application topically to the appropriate area as directed Every 2 (Two) Hours As Needed for Mild Pain. 2/6/24  Yes Madina Hassan APRN   pantoprazole (PROTONIX) 40 MG EC tablet Take 1 tablet by mouth Daily. 11/8/23  Yes Digna Paniagua MD   polyethylene glycol (MIRALAX) 17 g packet Take 17 g by mouth Daily. 1/31/24  Yes Shabbir Baird MD   polyethylene glycol (MIRALAX) 17 GM/SCOOP powder mix 17 grams in 4-8oz of liquid and drink once daily 7/24/23  Yes Avani Vela MD   prochlorperazine (COMPAZINE) 10 MG tablet Take 1 tablet by mouth Every 6 (Six) Hours As Needed for Nausea or Vomiting. 2/2/24  Yes Selene Mora APRN   promethazine (PHENERGAN) 25 MG tablet promethazine 25 mg tablet   TAKE (1) TABLET EVERY 6 HOURS AS NEEDED FOR NAUSEA   Yes Avani Vela MD   sennosides-docusate (senna-docusate sodium) 8.6-50 MG per tablet Take 1 tablet by mouth Daily. 2/2/24  Yes Selene Mora APRN   tiZANidine (ZANAFLEX) 4 MG tablet TAKE 2 TABLETS BY MOUTH 3 TIMES DAILY AS NEEDED DISCONTINUE  CYCLOBENZAPRINE 5/23/22  Yes Avani Vela MD   traZODone (DESYREL) 50 MG tablet Take 0.5 to 2 tab PO QHS PRN sleep 9/25/23  Yes Digna Paniagua MD   triamcinolone (KENALOG) 0.1 % cream Apply 1 application topically to the appropriate area as directed 2 (Two) Times a Day. 4/7/22  Yes Shahid Bello APRN   vitamin C (ASCORBIC ACID) 500 MG tablet Take 1 tablet by mouth Daily. LAST DOSE 1/28/24   Yes Avani Vela MD   HYDROcodone-acetaminophen (NORCO) 7.5-325 MG per tablet TAKE 1 TO 2 TABLETS BY MOUTH THREE TIMES DAILY AS NEEDED FOR PAIN  Patient not taking: Reported on 2/27/2024 11/3/23   Avani Vela MD   HYDROcodone-acetaminophen (Norco) 7.5-325 MG per tablet Take 1 tablet by mouth Every 6 (Six) Hours As Needed for Moderate Pain.  Patient not taking: Reported on 2/27/2024 2/6/24   Madina Hassan APRN   montelukast (Singulair) 10 MG tablet Take 1 tablet by mouth Every Night. 2/27/24   Digna Paniagua MD   naloxone (NARCAN) 4 MG/0.1ML nasal spray Call 911. Don't prime. [place] in 1 nostril for overdose. Repeat in 2-3 minutes in other nostril if no or minimal breathing/responsiveness.  Patient not taking: Reported on 2/27/2024 1/31/24   Shabbir Baird MD   ondansetron ODT (ZOFRAN-ODT) 4 MG disintegrating tablet Place 1 tablet on the tongue Every 4 (Four) Hours As Needed for Nausea or Vomiting. 2/27/24   Digna Paniagua MD   oxyCODONE-acetaminophen (Percocet) 5-325 MG per tablet Take 1 tablet by mouth Every 6 (Six) Hours As Needed for Moderate Pain.  Patient not taking: Reported on 2/27/2024 1/31/24   Shabbir Baird MD             No current facility-administered medications for this visit.       Allergies:  Escitalopram, Sulfa antibiotics, Baclofen, Ciprofloxacin, Codeine, Gold-containing drug products, Latex, Nickel, and Tegaderm ag mesh [silver]    Objective     Vital Signs        Physical Exam:     No acute distress    Results Review: I have reviewed the patient's labs and  Strong peripheral pulses/Capillary refill less/equal to 2 seconds imaging    LABS/IMAGING:    Imaging Results (Last 72 Hours)       ** No results found for the last 72 hours. **             Lab Results (last 72 hours)       ** No results found for the last 72 hours. **               Result Review :     Assessment & Plan     * No active hospital problems. *     Status post excisional hemorrhoidectomy 1/31/2024.  Pathology with hemorrhoids.    I reviewed the details of the surgery with the patient.  I reviewed the pathology.  Her colonoscopy is up-to-date.  Continue fiber.  I explained to her her symptoms should slowly continue to improve.  Continue sitz bath's.  Follow-up as needed.  All questions answered.  She agrees with the plan.  Thank you for the consult.          Shabbir Baird MD  02/28/24 12:04 EST

## 2024-04-02 DIAGNOSIS — G47.00 INSOMNIA, UNSPECIFIED TYPE: ICD-10-CM

## 2024-04-02 RX ORDER — TRAZODONE HYDROCHLORIDE 50 MG/1
TABLET ORAL
Qty: 90 TABLET | Refills: 2 | Status: SHIPPED | OUTPATIENT
Start: 2024-04-02

## 2024-04-02 RX ORDER — TIZANIDINE 4 MG/1
4 TABLET ORAL EVERY 8 HOURS PRN
Qty: 30 TABLET | Refills: 0 | Status: SHIPPED | OUTPATIENT
Start: 2024-04-02 | End: 2024-04-12

## 2024-04-02 NOTE — TELEPHONE ENCOUNTER
Caller: Ebony Hamilton    Relationship: Self    Best call back number: [phone]    Requested Prescriptions:   Requested Prescriptions     Pending Prescriptions Disp Refills    traZODone (DESYREL) 50 MG tablet 60 tablet 2     Sig: Take 0.5 to 2 tab PO QHS PRN sleep        Pharmacy where request should be sent: [facility] [address] [place], [place] - [address] [place] [address] [phone] [facility] [phone] FX     Last office visit with prescribing clinician: 1/10/2024   Last telemedicine visit with prescribing clinician: Visit date not found   Next office visit with prescribing clinician: 4/16/2024     Additional details provided by patient: PATIENT IS CURRENTLY OUT OF THIS.     Does the patient have less than a 3 day supply:  [x] Yes  [] No    Would you like a call back once the refill request has been completed: [] Yes [x] No    If the office needs to give you a call back, can they leave a voicemail: [] Yes [x] No    Jazzmine Adan Rep   04/02/24 09:44 EDT

## 2024-04-02 NOTE — TELEPHONE ENCOUNTER
This is a new medication , you will need to prescribe this one the first time before MA can send it

## 2024-04-16 DIAGNOSIS — F33.1 MODERATE EPISODE OF RECURRENT MAJOR DEPRESSIVE DISORDER: ICD-10-CM

## 2024-04-16 DIAGNOSIS — F41.1 GENERALIZED ANXIETY DISORDER: ICD-10-CM

## 2024-04-16 RX ORDER — DULOXETIN HYDROCHLORIDE 30 MG/1
30 CAPSULE, DELAYED RELEASE ORAL NIGHTLY
Qty: 90 CAPSULE | Refills: 1 | Status: SHIPPED | OUTPATIENT
Start: 2024-04-16

## 2024-04-26 DIAGNOSIS — F33.1 MODERATE EPISODE OF RECURRENT MAJOR DEPRESSIVE DISORDER: ICD-10-CM

## 2024-04-26 DIAGNOSIS — F41.1 GENERALIZED ANXIETY DISORDER: ICD-10-CM

## 2024-04-26 RX ORDER — DULOXETIN HYDROCHLORIDE 30 MG/1
30 CAPSULE, DELAYED RELEASE ORAL NIGHTLY
Qty: 90 CAPSULE | Refills: 1 | OUTPATIENT
Start: 2024-04-26

## 2024-04-26 NOTE — TELEPHONE ENCOUNTER
Caller: Ebony Hamilton    Relationship: Self    Best call back number: [phone]    Who is your current provider: MARIANNA MANZANO    Is your current provider offboarding? NO    Who would you like your new provider to be: SOMMER GOMEZ     What are your reasons for transferring care: PERSONALITIES DO NOT MESH WELL

## 2024-04-26 NOTE — TELEPHONE ENCOUNTER
Caller: Ebony Hamilton    Relationship: Self    Best call back number: [phone]    Requested Prescriptions:   Requested Prescriptions     Pending Prescriptions Disp Refills    DULoxetine (Cymbalta) 30 MG capsule 90 capsule 1     Sig: Take 1 capsule by mouth Every Night. Take 1 cap PO QD. Take with 60mg for total dose of 90mg        Pharmacy where request should be sent: [facility] [address] [place], [place] - [address] [place] [address] [phone] [facility] [phone] FX     Last office visit with prescribing clinician: 1/10/2024   Last telemedicine visit with prescribing clinician: Visit date not found   Next office visit with prescribing clinician: Visit date not found     Additional details provided by patient: PRESCRIPTION WAS SENT TO [facility] AND NEEDS TO BE RESENT  SHE HAS BEEN OUT FOR 2 WEEKS     Does the patient have less than a 3 day supply:  [x] Yes  [] No    Would you like a call back once the refill request has been completed: [] Yes [x] No    If the office needs to give you a call back, can they leave a voicemail: [] Yes [x] No    Jazzmine Epstein Rep   04/26/24 12:54 EDT

## 2024-04-30 ENCOUNTER — TELEPHONE (OUTPATIENT)
Dept: FAMILY MEDICINE CLINIC | Facility: CLINIC | Age: 44
End: 2024-04-30
Payer: COMMERCIAL

## 2024-04-30 DIAGNOSIS — F33.1 MODERATE EPISODE OF RECURRENT MAJOR DEPRESSIVE DISORDER: ICD-10-CM

## 2024-04-30 DIAGNOSIS — F41.1 GENERALIZED ANXIETY DISORDER: ICD-10-CM

## 2024-04-30 RX ORDER — DULOXETIN HYDROCHLORIDE 30 MG/1
30 CAPSULE, DELAYED RELEASE ORAL NIGHTLY
Qty: 90 CAPSULE | Refills: 1 | Status: SHIPPED | OUTPATIENT
Start: 2024-04-30

## 2024-04-30 NOTE — TELEPHONE ENCOUNTER
Spoke to pt regarding her transfer from providers. I also explained that this change in permanent. I also, to opportunity to inform the pt of the No show policy and late cancellation and the potential that if she have 3 or more within 12 month rolling calendar year it can be subject to termination. Pt verbalize understanding

## 2024-04-30 NOTE — TELEPHONE ENCOUNTER
Called pt lm on vm to confirm which medications she needs refill on and if all meds go to [facility] in [place]              Pt returned call and rx was sent in for pt and pt notified

## 2024-05-21 ENCOUNTER — TELEPHONE (OUTPATIENT)
Dept: GASTROENTEROLOGY | Facility: CLINIC | Age: 44
End: 2024-05-21
Payer: COMMERCIAL

## 2024-05-21 NOTE — TELEPHONE ENCOUNTER
Called to reschedule appointment on 05.28.24 due to Dr. Pak being out of office. [facility]

## 2024-05-22 ENCOUNTER — TELEPHONE (OUTPATIENT)
Dept: GASTROENTEROLOGY | Facility: CLINIC | Age: 44
End: 2024-05-22
Payer: COMMERCIAL

## 2024-05-22 NOTE — TELEPHONE ENCOUNTER
LVM for pt return our call to reschedule her 5.28.24 appt. Due to Dr Pak not going to be in the office that day.

## 2024-05-30 DIAGNOSIS — F33.1 MODERATE EPISODE OF RECURRENT MAJOR DEPRESSIVE DISORDER: ICD-10-CM

## 2024-05-30 DIAGNOSIS — F41.1 GENERALIZED ANXIETY DISORDER: ICD-10-CM

## 2024-05-30 RX ORDER — BUSPIRONE HYDROCHLORIDE 15 MG/1
15 TABLET ORAL 3 TIMES DAILY
Qty: 90 TABLET | Refills: 1 | Status: SHIPPED | OUTPATIENT
Start: 2024-05-30

## 2024-05-31 ENCOUNTER — OFFICE VISIT (OUTPATIENT)
Dept: FAMILY MEDICINE CLINIC | Facility: CLINIC | Age: 44
End: 2024-05-31
Payer: COMMERCIAL

## 2024-05-31 VITALS
OXYGEN SATURATION: 98 % | TEMPERATURE: 98.6 F | WEIGHT: 196.4 LBS | DIASTOLIC BLOOD PRESSURE: 98 MMHG | BODY MASS INDEX: 36.14 KG/M2 | HEART RATE: 105 BPM | HEIGHT: 62 IN | SYSTOLIC BLOOD PRESSURE: 134 MMHG

## 2024-05-31 DIAGNOSIS — D75.839 THROMBOCYTOSIS: ICD-10-CM

## 2024-05-31 DIAGNOSIS — K21.9 GASTROESOPHAGEAL REFLUX DISEASE, UNSPECIFIED WHETHER ESOPHAGITIS PRESENT: ICD-10-CM

## 2024-05-31 DIAGNOSIS — F41.1 GENERALIZED ANXIETY DISORDER: ICD-10-CM

## 2024-05-31 DIAGNOSIS — M25.511 CHRONIC RIGHT SHOULDER PAIN: ICD-10-CM

## 2024-05-31 DIAGNOSIS — R10.11 RUQ PAIN: ICD-10-CM

## 2024-05-31 DIAGNOSIS — G89.29 CHRONIC RIGHT SHOULDER PAIN: ICD-10-CM

## 2024-05-31 DIAGNOSIS — F33.1 MODERATE EPISODE OF RECURRENT MAJOR DEPRESSIVE DISORDER: ICD-10-CM

## 2024-05-31 DIAGNOSIS — Z76.89 ENCOUNTER FOR SUPPORT AND COORDINATION OF TRANSITION OF CARE: Primary | ICD-10-CM

## 2024-05-31 DIAGNOSIS — M50.30 DDD (DEGENERATIVE DISC DISEASE), CERVICAL: ICD-10-CM

## 2024-05-31 DIAGNOSIS — F11.90 CHRONIC, CONTINUOUS USE OF OPIOIDS: ICD-10-CM

## 2024-05-31 DIAGNOSIS — T40.2X5A CONSTIPATION DUE TO OPIOID THERAPY: ICD-10-CM

## 2024-05-31 DIAGNOSIS — R10.13 EPIGASTRIC PAIN: ICD-10-CM

## 2024-05-31 DIAGNOSIS — Z00.00 ANNUAL PHYSICAL EXAM: ICD-10-CM

## 2024-05-31 DIAGNOSIS — K58.0 IRRITABLE BOWEL SYNDROME WITH DIARRHEA: ICD-10-CM

## 2024-05-31 DIAGNOSIS — K59.03 CONSTIPATION DUE TO OPIOID THERAPY: ICD-10-CM

## 2024-05-31 PROCEDURE — 99396 PREV VISIT EST AGE 40-64: CPT

## 2024-05-31 PROCEDURE — 99214 OFFICE O/P EST MOD 30 MIN: CPT

## 2024-05-31 RX ORDER — TIZANIDINE 4 MG/1
4 TABLET ORAL EVERY 8 HOURS PRN
COMMUNITY
Start: 2024-05-23

## 2024-05-31 RX ORDER — ONDANSETRON 4 MG/1
4 TABLET, FILM COATED ORAL EVERY 8 HOURS PRN
Qty: 30 TABLET | Refills: 1 | Status: SHIPPED | OUTPATIENT
Start: 2024-05-31

## 2024-05-31 RX ORDER — CHLORCYCLIZINE HYDROCHLORIDE AND PSEUDOEPHEDRINE HYDROCHLORIDE 25; 60 MG/1; MG/1
1 TABLET ORAL 3 TIMES DAILY
COMMUNITY
Start: 2024-04-11

## 2024-05-31 RX ORDER — AMOXICILLIN 250 MG
1 CAPSULE ORAL DAILY
Qty: 30 TABLET | Refills: 2 | Status: SHIPPED | OUTPATIENT
Start: 2024-05-31

## 2024-05-31 NOTE — PROGRESS NOTES
Chief Complaint  Chief Complaint   Patient presents with    Establish Care     Transfer of care from Son LEVY    Annual Exam    High platelet count       Subjective      Ebony Hamilton presents to [facility]  History of Present Illness  The patient presents for transition of care and annual physical.    The patient was previously under the care of MILDRED Kern. She has been experiencing gastrointestinal issues, including IBS with diarrhea and constipation, which commenced approximately a year ago. Despite maintaining a diet rich in fiber, MiraLAX, and stool softeners, her symptoms persist. She has a history of severe hemorrhoids and underwent a hemorrhoidectomy a few months ago. She has a long-standing history of Irritable Bowel Syndrome (IBS), for which she was referred to rheumatology by previous PCP. Laboratory tests revealed inflammation in her bowels, intestines, and stomach, prompting a referral to a gastroenterologist. However, her appointment was cancelled after 3 months, and her next appointment is scheduled for 10/2024. She has sought immediate care due to episodes of vomiting and diarrhea, which persisted for 3 days. An abdominal x-ray revealed a large stool burden. Her diarrhea is not constant, but occurs when she is constipated. She has previously tried Wegovy, but it caused constipation and vomiting. She was unable to consume more than 3 bites of a meal without needing to vomit. Several people have suggested that she should be evaluated for gastroparesis. She has discontinued Wegovy. Her bowel movements are regular, occurring at least once daily. She experiences cramping, bloating, and diarrhea during flare-ups. She underwent a colonoscopy in 05/2022, prior to her hysterectomy, performed by Dr. Baird. Her symptoms began with a mild case of IBS, which she believes was more related to acid reflux. Protonix is no longer effective in managing her  "symptoms. She has to force herself to drink water, which upsets her stomach.     She is prescribed Norco 10 and tizanidine for pain management. She has a history of 3 neck and 3 shoulder surgeries performed by Dr. Baird. She has a history of \"a blown disc\" in her neck and tore both labrums in her shoulders. She had a tear in her rotator cuff, which was treated with a collagen patch. She was diagnosed with a nickel allergy, which resulted in blisters over her entire body. The disc was left in place for so long that it damaged the vertebrae above and below, and the screws infiltrated the vertebrae and went into the discs, necessitating another fusion from C4-C7.      She had labs done in 02/2024 for an ER visit for constipation, nausea, vomiting, and abdominal pain. She was prescribed Compazine and a stimulant, which worked well for her. She has been on Zofran several times, which provided temporary relief. Colace provides relief. She experiences reflux and has been burping up what she ate last night. She vomits chunks of dinner from the night before due to digestion issues. She continues to take Protonix.  It had previously been suggested that she be referred to a hematologist due to high platelets and fatigue, exhaustion, and dizziness. She denies a history of high blood pressure or high cholesterol. She does not take any medication for this.     She is currently taking Cymbalta 90 mg daily for depression and anxiety, which is effective. She takes BuSpar as needed. She has been to therapy and has been diagnosed with PTSD, OCD, anxiety, and depression. An ultrasound of her gallbladder showed distension, but no gallstones or cholecystitis. She has not had a CT scan.      She is allergic to NICKEL.      Objective     Medical History:  Past Medical History:   Diagnosis Date    Allergic     Anxiety     Atrial fibrillation     RELEASED BY CARDIOLOGIST/ELECTROPHYSIST, NO CURRENT MEDS    Chronic pain disorder     " Depression     Endometriosis     Headache     Hemorrhoids     Injury of shoulder, right 2009    CHRONIC PAIN    Panic disorder      Past Surgical History:   Procedure Laterality Date    CERVICAL ARTHRODESIS  2015    Donaldo Albarran    CERVICAL FUSION      C4-7 FUSION, FULL ROM    COLONOSCOPY N/A 2022    Procedure: COLONOSCOPY;  Surgeon: Shabbir Baird MD;  Location: [facility] ENDOSCOPY;  Service: General;  Laterality: N/A;  HEMORRHOIDS    EXPLORATORY LAPAROTOMY      HEMORRHOIDECTOMY N/A 2022    Procedure: HEMORRHOID BANDING;  Surgeon: Shabbir Baird MD;  Location: [facility] ENDOSCOPY;  Service: General;  Laterality: N/A;  BANDS X 2    HEMORRHOIDECTOMY N/A 2024    Procedure: HEMORRHOIDECTOMY;  Surgeon: Shabbir Baird MD;  Location: [facility] OR [facility];  Service: General;  Laterality: N/A;    HYSTERECTOMY      SHOULDER ARTHROSCOPY Right     SHOULDER SURGERY Left     ARTHROSCOPY LABRAL TEAR X2    TUBAL ABDOMINAL LIGATION        Social History     Tobacco Use    Smoking status: Former     Current packs/day: 0.00     Average packs/day: 0.3 packs/day for 20.0 years (5.0 ttl pk-yrs)     Types: Cigarettes     Start date: 1999     Quit date: 2019     Years since quittin.4    Smokeless tobacco: Never   Vaping Use    Vaping status: Never Used   Substance Use Topics    Alcohol use: No    Drug use: Never     Family History   Problem Relation Age of Onset    Depression Mother     Bipolar disorder Mother     Anxiety disorder Mother     Cancer Mother     Heart disease Mother     Hypertension Mother     Anxiety disorder Father     Hypertension Father     Dementia Paternal Uncle     Dementia Paternal Grandmother     Heart disease Other     Colon cancer Neg Hx     Malig Hyperthermia Neg Hx        Medications:  Prior to Admission medications    Medication Sig Start Date End Date Taking? Authorizing Provider   albuterol sulfate  (90 Base) MCG/ACT inhaler Inhale 2 puffs Every 6 (Six)  Hours As Needed for Wheezing.  Patient taking differently: Inhale 2 puffs Every 6 (Six) Hours As Needed for Wheezing (PRN ALLERGIES). 12/7/23  Yes Digna Paniagua MD   bisacodyl (DULCOLAX) 10 MG suppository Insert 1 suppository every day by rectal route as needed. 7/24/23  Yes Avani Vela MD   busPIRone (BUSPAR) 15 MG tablet Take 1 tablet by mouth 3 (Three) Times a Day. 5/30/24  Yes Karen Stover APRN   CALCIUM PO Take 1 tablet by mouth Daily. LD 1/29/24   Yes Avani Vela MD   cetirizine (ZyrTEC) 10 MG tablet Take 1 tablet by mouth Daily.   Yes Avani Vela MD   dicyclomine (BENTYL) 20 MG tablet Take 1 tablet by mouth Every 6 (Six) Hours. 2/2/24  Yes Selene Mora APRN   DULoxetine (Cymbalta) 30 MG capsule Take 1 capsule by mouth Every Night. Take 1 cap PO QD. Take with 60mg for total dose of 90mg 4/30/24  Yes Digna Paniagua MD   DULoxetine (Cymbalta) 60 MG capsule Take 1 cap PO QD. Take with 30mg cap for total dose of 90mg.  Patient taking differently: Take 1 capsule by mouth Every Morning. Take 1 cap PO QD. Take with 30mg cap for total dose of 90mg. 9/25/23  Yes Digna Paniagua MD   fexofenadine (ALLEGRA) 30 MG tablet Take 1 tablet by mouth Daily With Lunch.   Yes ProviderAvani MD   HYDROcodone-acetaminophen (NORCO)  MG per tablet TAKE 1 TABLET BY MOUTH UP TO FOUR TIMES DAILY AS NEEDED FOR PAIN 2/14/24  Yes Avani Vela MD   HYDROcodone-acetaminophen (Norco) 7.5-325 MG per tablet Take 1 tablet by mouth Every 6 (Six) Hours As Needed for Moderate Pain. 2/6/24  Yes Madina Hassan APRN   hydrocortisone (ANUSOL-HC) 25 MG suppository Insert 1 suppository into the rectum 2 (Two) Times a Day. 12/7/23  Yes Digna Paniagua MD   hydrocortisone 2.5 % cream Apply 1 application  topically to the appropriate area as directed 2 (Two) Times a Day. 12/7/23  Yes Digna Paniagua MD   ibuprofen (ADVIL,MOTRIN) 800 MG tablet Take 1 tablet by mouth Every 8 (Eight) Hours As Needed. LAST  DOSE 1/28/24   Yes Avani Vela MD   lidocaine (XYLOCAINE) 5 % ointment Apply 1 Application topically to the appropriate area as directed Every 2 (Two) Hours As Needed for Mild Pain. 2/6/24  Yes Madina Hassan APRN   montelukast (Singulair) 10 MG tablet Take 1 tablet by mouth Every Night. 2/27/24  Yes Digna Paniagua MD   ondansetron ODT (ZOFRAN-ODT) 4 MG disintegrating tablet Place 1 tablet on the tongue Every 4 (Four) Hours As Needed for Nausea or Vomiting. 2/27/24  Yes Digna Paniagua MD   pantoprazole (PROTONIX) 40 MG EC tablet Take 1 tablet by mouth Daily. 11/8/23  Yes Digna Paniagua MD   polyethylene glycol (MIRALAX) 17 g packet Take 17 g by mouth Daily. 1/31/24  Yes Shabbir Baird MD   sennosides-docusate (senna-docusate sodium) 8.6-50 MG per tablet Take 1 tablet by mouth Daily. 2/2/24  Yes Selene Mora APRN   Stahist AD 25-60 MG tablet Take 1 tablet by mouth 3 times a day. 4/11/24  Yes Avani Vela MD   tiZANidine (ZANAFLEX) 4 MG tablet Take 1 tablet by mouth Every 8 (Eight) Hours As Needed. 5/23/24  Yes Avani Vela MD   traZODone (DESYREL) 50 MG tablet Take 0.5 to 2 tab PO QHS PRN sleep 4/2/24  Yes Digna Paniagua MD   triamcinolone (KENALOG) 0.1 % cream Apply 1 application topically to the appropriate area as directed 2 (Two) Times a Day. 4/7/22  Yes Shahid Bello APRN   vitamin C (ASCORBIC ACID) 500 MG tablet Take 1 tablet by mouth Daily. LAST DOSE 1/28/24   Yes Avani Vela MD   HYDROcodone-acetaminophen (NORCO) 7.5-325 MG per tablet  11/3/23   Avani Vela MD   naloxone (NARCAN) 4 MG/0.1ML nasal spray Call 911. Don't prime. [place] in 1 nostril for overdose. Repeat in 2-3 minutes in other nostril if no or minimal breathing/responsiveness. 1/31/24   Shabbir Baird MD   oxyCODONE-acetaminophen (Percocet) 5-325 MG per tablet Take 1 tablet by mouth Every 6 (Six) Hours As Needed for Moderate Pain. 1/31/24   Shabbir Baird MD   polyethylene  "glycol (MIRALAX) 17 GM/SCOOP powder mix 17 grams in 4-8oz of liquid and drink once daily 7/24/23   ProviderAvani MD   prochlorperazine (COMPAZINE) 10 MG tablet Take 1 tablet by mouth Every 6 (Six) Hours As Needed for Nausea or Vomiting. 2/2/24   Selene Mora APRN   promethazine (PHENERGAN) 25 MG tablet promethazine 25 mg tablet   TAKE (1) TABLET EVERY 6 HOURS AS NEEDED FOR NAUSEA    Provider, MD Avani        Allergies:   Escitalopram, Sulfa antibiotics, Baclofen, Ciprofloxacin, Codeine, Gold-containing drug products, Latex, Nickel, and Tegaderm ag mesh [silver]    Health Maintenance Due   Topic Date Due    MAMMOGRAM  Never done    TDAP/TD VACCINES (2 - Td or Tdap) 01/01/2022    HEPATITIS C SCREENING  Never done    COVID-19 Vaccine (1 - 2023-24 season) Never done    ANNUAL PHYSICAL  04/12/2024    BMI FOLLOWUP  04/12/2024         Vital Signs:   /98 (BP Location: Right arm, Patient Position: Sitting, Cuff Size: Adult)   Pulse 105   Temp 98.6 °F (37 °C) (Oral)   Ht 157.5 cm (62\")   Wt 89.1 kg (196 lb 6.4 oz)   SpO2 98%   BMI 35.92 kg/m²     Wt Readings from Last 3 Encounters:   05/31/24 89.1 kg (196 lb 6.4 oz)   02/27/24 86.2 kg (190 lb)   02/02/24 86.2 kg (190 lb 0.6 oz)     BP Readings from Last 3 Encounters:   05/31/24 134/98   02/02/24 147/91   01/31/24 128/96       Class 2 Severe Obesity (BMI >=35 and <=39.9). Obesity-related health conditions include the following: dyslipidemias and GERD. Obesity is newly identified. BMI is is above average; BMI management plan is completed. We discussed portion control and increasing exercise.       Physical Exam  Physical Exam        Result Review :    The following data was reviewed by MILDRED Irby on 05/31/24 at 08:26 EDT:    Common labs          8/18/2023    16:43 2/2/2024    04:12   Common Labs   Glucose  105    BUN  14    Creatinine  0.74    Sodium  135    Potassium  3.7    Chloride  99    Calcium  9.5    Albumin  4.6    Total " Bilirubin  0.3    Alkaline Phosphatase  63    AST (SGOT)  16    ALT (SGPT)  11    WBC 7.44     10.18    Hemoglobin 12.8     13.5    Hematocrit 39.9     42.5    Platelets 465     473       Details          This result is from an external source.               US Gallbladder    Result Date: 2/2/2024    No acute cholecystitis. No gallstones.  The gallbladder appears distended.  There is mild biliary ductal dilatation, especially involving the common bile duct.  There may be mild hepatomegaly with diffuse hepatic steatosis.  The other findings are as detailed above.    Please note that portions of this note were completed with a voice recognition program.  CONNIE BRADLEY JR, MD       Electronically Signed and Approved By: CONNIE BRADLEY JR, MD on 2/02/2024 at 6:43              XR Abdomen Flat & Upright    Result Date: 2/2/2024   No mechanical bowel obstruction is seen.  No pneumoperitoneum.     Please note that portions of this note were completed with a voice recognition program.  CONNIE BRADLEY JR, MD      Electronically Signed and Approved By: CONNIE BRADLEY JR, MD on 2/02/2024 at 4:57            Results  Imaging  Colonoscopy in May 2022 was normal. Ultrasound of gallbladder showed no gallstones, no cholecystitis. Liver and biliary duct were slightly enlarged and fatty.               Assessment and Plan    Diagnoses and all orders for this visit:    1. Encounter for support and coordination of transition of care (Primary)    2. Annual physical exam  -     CBC & Differential  -     Comprehensive Metabolic Panel  -     Lipid Panel  -     TSH Rfx On Abnormal To Free T4    3. History of high platelet count  -     CBC & Differential    4. Irritable bowel syndrome with diarrhea  -     CT Abdomen Pelvis With Contrast; Future    5. Generalized anxiety disorder    6. Moderate episode of recurrent major depressive disorder    7. Chronic, continuous use of opioids    8. Constipation due to opioid therapy  -     CT Abdomen  Pelvis With Contrast; Future    9. DDD (degenerative disc disease), cervical    10. Chronic right shoulder pain    11. Gastroesophageal reflux disease, unspecified whether esophagitis present  -     NM Gastric Emptying; Future  -     CT Abdomen Pelvis With Contrast; Future    12. Epigastric pain  -     CT Abdomen Pelvis With Contrast; Future    13. RUQ pain  -     CT Abdomen Pelvis With Contrast; Future    Other orders  -     sennosides-docusate (senna-docusate sodium) 8.6-50 MG per tablet; Take 1 tablet by mouth Daily.  Dispense: 30 tablet; Refill: 2  -     ondansetron (Zofran) 4 MG tablet; Take 1 tablet by mouth Every 8 (Eight) Hours As Needed for Nausea or Vomiting.  Dispense: 30 tablet; Refill: 1       Assessment & Plan  1. Irritable bowel syndrome with diarrhea.  Given her existing gastrointestinal issues, a GLP-1 is not recommended due to their slowing effects and potentially exacerbating her risk for gastroparesis. A gastric emptying study will be ordered to test for gastroparesis. She is advised to continue her Protonix regimen. Zofran will be prescribed, and a prescription for Colace will be reordered. She is advised to increase her water intake and to try carbonated olivarez such as seltzer water instead of soda. A CT scan of her abdomen will be obtained given her presenting symptoms and ultrasound finding of biliary ductal dilatation involving the common bile duct as well as hepatomegaly and hepatic steatosis. Should the gastric emptying suggest gastroparesis, Reglan or alternative medication for motility will be considered.  Patient has a scheduled appointment with gastroenterology but it is not until October.  If any tests yield significant findings, I will attempt to get her in sooner or schedule her with general surgery for upper and lower scope.    2.  Thrombocytosis.  Her platelet count has been slightly elevated. A full set of labs will be ordered.  If indicated, patient will be referred over to  hematology.          Smoking Cessation:    Ebony Hamilton  reports that she quit smoking about 5 years ago. Her smoking use included cigarettes. She started smoking about 25 years ago. She has a 5 pack-year smoking history. She has never used smokeless tobacco.             Follow Up   Return in about 3 months (around 8/31/2024) for Next scheduled follow up.  Patient was given instructions and counseling regarding her condition or for health maintenance advice. Please see specific information pulled into the AVS if appropriate.     Please note that portions of this note were completed with a voice recognition program.    Patient or patient representative verbalized consent for the use of Ambient Listening during the visit with  MILDRED Irby for chart documentation. 5/31/2024  11:20 EDT

## 2024-06-13 ENCOUNTER — HOSPITAL ENCOUNTER (OUTPATIENT)
Dept: CT IMAGING | Facility: HOSPITAL | Age: 44
Discharge: HOME | End: 2024-06-13
Payer: COMMERCIAL

## 2024-06-13 DIAGNOSIS — K59.03 CONSTIPATION DUE TO OPIOID THERAPY: ICD-10-CM

## 2024-06-13 DIAGNOSIS — K21.9 GASTROESOPHAGEAL REFLUX DISEASE, UNSPECIFIED WHETHER ESOPHAGITIS PRESENT: ICD-10-CM

## 2024-06-13 DIAGNOSIS — K58.0 IRRITABLE BOWEL SYNDROME WITH DIARRHEA: ICD-10-CM

## 2024-06-13 DIAGNOSIS — R10.13 EPIGASTRIC PAIN: ICD-10-CM

## 2024-06-13 DIAGNOSIS — T40.2X5A CONSTIPATION DUE TO OPIOID THERAPY: ICD-10-CM

## 2024-06-13 DIAGNOSIS — R10.11 RUQ PAIN: ICD-10-CM

## 2024-06-13 PROCEDURE — 74177 CT ABD & PELVIS W/CONTRAST: CPT

## 2024-06-13 PROCEDURE — 25510000001 IOPAMIDOL PER 1 ML

## 2024-06-13 RX ADMIN — IOPAMIDOL 100 ML: 755 INJECTION, SOLUTION INTRAVENOUS at 13:47

## 2024-06-14 ENCOUNTER — HOSPITAL ENCOUNTER (OUTPATIENT)
Dept: NUCLEAR MEDICINE | Facility: HOSPITAL | Age: 44
Discharge: HOME | End: 2024-06-14
Payer: COMMERCIAL

## 2024-06-14 DIAGNOSIS — K21.9 GASTROESOPHAGEAL REFLUX DISEASE, UNSPECIFIED WHETHER ESOPHAGITIS PRESENT: ICD-10-CM

## 2024-06-14 PROCEDURE — A9541 TC99M SULFUR COLLOID: HCPCS

## 2024-06-14 PROCEDURE — 78264 GASTRIC EMPTYING IMG STUDY: CPT

## 2024-06-14 PROCEDURE — 0 TECHNETIUM SULFUR COLLOID

## 2024-06-14 RX ADMIN — TECHNETIUM TC 99M SULFUR COLLOID 1 DOSE: KIT at 08:08

## 2024-06-17 ENCOUNTER — TELEPHONE (OUTPATIENT)
Dept: FAMILY MEDICINE CLINIC | Facility: CLINIC | Age: 44
End: 2024-06-17
Payer: COMMERCIAL

## 2024-06-17 DIAGNOSIS — F33.1 MODERATE EPISODE OF RECURRENT MAJOR DEPRESSIVE DISORDER: ICD-10-CM

## 2024-06-17 DIAGNOSIS — F41.1 GENERALIZED ANXIETY DISORDER: ICD-10-CM

## 2024-06-17 RX ORDER — DULOXETIN HYDROCHLORIDE 60 MG/1
CAPSULE, DELAYED RELEASE ORAL
Qty: 90 CAPSULE | Refills: 1 | Status: CANCELLED | OUTPATIENT
Start: 2024-06-17

## 2024-06-17 NOTE — TELEPHONE ENCOUNTER
Caller: Ebony Hamilton    Relationship: Self    Best call back number:[phone]    What medication are you requesting: COMPAZINE    What are your current symptoms: STOMACH ISSUE    How long have you been experiencing symptoms:     Have you had these symptoms before:    [x] Yes  [] No    Have you been treated for these symptoms before:   [x] Yes  [] No    If a prescription is needed, what is your preferred pharmacy and phone number:    [facility] - [place], [place] - [address] [place] [address] [phone] [facility] [phone]  [phone]       Additional notes:PATIENT SAID ZOFRAN IS NOT WORKING AND SHE WOULD LIKE A PRESCRIPTION FOR COMPAZINE. PATIENT IS ALSO REQUESTING A REFILL ON CYMBALTA

## 2024-06-18 DIAGNOSIS — K31.84 GASTROPARESIS: Primary | ICD-10-CM

## 2024-06-18 DIAGNOSIS — R11.2 NAUSEA AND VOMITING, UNSPECIFIED VOMITING TYPE: ICD-10-CM

## 2024-06-18 DIAGNOSIS — K21.9 GASTROESOPHAGEAL REFLUX DISEASE, UNSPECIFIED WHETHER ESOPHAGITIS PRESENT: ICD-10-CM

## 2024-06-18 DIAGNOSIS — F33.1 MODERATE EPISODE OF RECURRENT MAJOR DEPRESSIVE DISORDER: ICD-10-CM

## 2024-06-18 DIAGNOSIS — R10.13 EPIGASTRIC PAIN: ICD-10-CM

## 2024-06-18 DIAGNOSIS — F41.1 GENERALIZED ANXIETY DISORDER: ICD-10-CM

## 2024-06-18 RX ORDER — DULOXETIN HYDROCHLORIDE 30 MG/1
30 CAPSULE, DELAYED RELEASE ORAL NIGHTLY
Qty: 90 CAPSULE | Refills: 1 | Status: SHIPPED | OUTPATIENT
Start: 2024-06-18

## 2024-06-18 RX ORDER — METOCLOPRAMIDE 5 MG/1
5 TABLET ORAL
Qty: 90 TABLET | Refills: 1 | Status: SHIPPED | OUTPATIENT
Start: 2024-06-18

## 2024-06-18 RX ORDER — DULOXETIN HYDROCHLORIDE 60 MG/1
60 CAPSULE, DELAYED RELEASE ORAL EVERY MORNING
Qty: 90 CAPSULE | Refills: 1 | Status: SHIPPED | OUTPATIENT
Start: 2024-06-18

## 2024-06-18 RX ORDER — PROCHLORPERAZINE MALEATE 10 MG
10 TABLET ORAL EVERY 6 HOURS PRN
Qty: 30 TABLET | Refills: 0 | Status: SHIPPED | OUTPATIENT
Start: 2024-06-18

## 2024-06-27 ENCOUNTER — TELEMEDICINE (OUTPATIENT)
Dept: FAMILY MEDICINE CLINIC | Facility: CLINIC | Age: 44
End: 2024-06-27
Payer: COMMERCIAL

## 2024-06-27 DIAGNOSIS — T40.2X5A CONSTIPATION DUE TO OPIOID THERAPY: ICD-10-CM

## 2024-06-27 DIAGNOSIS — K31.84 GASTROPARESIS: Primary | ICD-10-CM

## 2024-06-27 DIAGNOSIS — N28.1 KIDNEY CYSTS: ICD-10-CM

## 2024-06-27 DIAGNOSIS — K59.03 CONSTIPATION DUE TO OPIOID THERAPY: ICD-10-CM

## 2024-06-27 DIAGNOSIS — K76.89 LIVER CYST: ICD-10-CM

## 2024-06-27 PROCEDURE — 99214 OFFICE O/P EST MOD 30 MIN: CPT

## 2024-06-27 RX ORDER — AMOXICILLIN 250 MG
1 CAPSULE ORAL DAILY
Qty: 30 TABLET | Refills: 2 | Status: SHIPPED | OUTPATIENT
Start: 2024-06-27

## 2024-06-27 NOTE — PROGRESS NOTES
"Chief Complaint  Gastroparesis (Pt had a CT scan and Gastric emptying study completed)    Subjective           Ebony Hamilton presents to [facility]  History of Present Illness  Patient is seen via video visit today to discuss recent CT scan of abdomen/pelvis and gastric emptying study.  CT of abdomen pelvis showed no acute abnormality but there were findings of multiple lesions within the liver compatible with small cysts, a 1.5 cm cyst along the fissure within the left hepatic lobe, cysts on the kidneys.  Patient denies ever being told this in the past.  She reports a significant family history of cancer including uterine, colon, liver cancer in her uncle.  Liver enzymes in February were normal.  There is no mention of hepatic steatosis or any other abnormality.  Oestreich emptying study revealed mild gastroparesis.  She does experience frequent nausea and constipation.  Upon this finding she was prescribed Reglan to be taken before meals and advised to make dietary modifications, decrease intake of raw fruits and raw vegetables which she has been doing.  She was also referred over to gastroenterology through [facility] for these findings.  Patient suffers from chronic constipation and recently had a hemorrhoidectomy.  She continues to experience constipation and is requesting a refill of Colace and also a refill of hydrocortisone cream which she uses for allergic reactions on her skin.    Objective   Vital Signs:   There were no vitals taken for this visit.    Estimated body mass index is 35.92 kg/m² as calculated from the following:    Height as of 5/31/24: 157.5 cm (62\").    Weight as of 5/31/24: 89.1 kg (196 lb 6.4 oz).     Virtual Visit Physical Exam  Result Review :       Common labs          8/18/2023    16:43 2/2/2024    04:12   Common Labs   Glucose  105    BUN  14    Creatinine  0.74    Sodium  135    Potassium  3.7    Chloride  99    Calcium  9.5  "   Albumin  4.6    Total Bilirubin  0.3    Alkaline Phosphatase  63    AST (SGOT)  16    ALT (SGPT)  11    WBC 7.44     10.18    Hemoglobin 12.8     13.5    Hematocrit 39.9     42.5    Platelets 465     473       Details          This result is from an external source.             Data reviewed : Radiologic studies including CT scan of abdomen/pelvis, gastric emptying study           Assessment and Plan      Diagnoses and all orders for this visit:    1. Gastroparesis (Primary)    2. Liver cyst    3. Kidney cysts    4. Constipation due to opioid therapy  -     hydrocortisone 2.5 % cream; Apply 1 Application topically to the appropriate area as directed 2 (Two) Times a Day.  Dispense: 28 g; Refill: 2  -     sennosides-docusate (senna-docusate sodium) 8.6-50 MG per tablet; Take 1 tablet by mouth Daily.  Dispense: 30 tablet; Refill: 2    Patient has been referred over to gastroenterology for further evaluation of gastroparesis with associated symptoms of nausea and vomiting, findings of liver and kidney cysts.  She will continue Reglan before meals for gastroparesis and will continue use of Compazine as needed for significant nausea.  She also was prescribed stool softeners for prevention of constipation.  If gastroenterology does not see the need for getting biopsy of lesions on liver and kidneys, will repeat CT of abdomen pelvis in 6 months to ensure that there are no significant changes.      Follow Up     No follow-ups on file.  Patient was given instructions and counseling regarding her condition or for health maintenance advice. Please see specific information pulled into the AVS if appropriate.     Mode of Visit: Video  Location of patient: home  Location of provider: [facility]  You have chosen to receive care through a telehealth visit.  Does the patient consent to use a video/audio connection for your medical care today? Yes  The visit included audio and video interaction. No technical issues occurred during  this visit.

## 2024-06-27 NOTE — PROGRESS NOTES
"Chief Complaint  Gastroparesis (Pt had a CT scan and Gastric emptying study completed)    Subjective    {Problem List  Visit Diagnosis   Encounters  Notes  Medications  Labs  Result Review Imaging  Media :23}       Ebony Hamilton presents to [facility]  History of Present Illness  Objective   Vital Signs:   There were no vitals taken for this visit.    Estimated body mass index is 35.92 kg/m² as calculated from the following:    Height as of 5/31/24: 157.5 cm (62\").    Weight as of 5/31/24: 89.1 kg (196 lb 6.4 oz).     Virtual Visit Physical Exam  Result Review :{Labs  Result Review  Imaging  Med Tab  Media  Procedures :23}     {The following data was reviewed by (Optional):63388}  {Ambulatory Labs (Optional):36740}  {Data reviewed (Optional):89888:::1}          Assessment and Plan {CC Problem List  Visit Diagnosis   ROS  Review (Popup)  Health Maintenance  Quality  BestPractice  Medications  SmartSets  SnapShot Encounters  Media :23}     There are no diagnoses linked to this encounter.  {Time Spent (Optional):76784}  Follow Up {Instructions Charge Capture  Follow-up Communications :23}    No follow-ups on file.  Patient was given instructions and counseling regarding her condition or for health maintenance advice. Please see specific information pulled into the AVS if appropriate.     Mode of Visit: Video  Location of patient: {Patient Location:12007::\"home\"}  Location of provider: {Provider Location:[address]}  You have chosen to receive care through a telehealth visit.  Does the patient consent to use a video/audio connection for your medical care today? {YES NO:81913::\"Yes\"}  The visit included audio and video interaction. No technical issues occurred during this visit.   "

## 2024-07-05 RX ORDER — PROCHLORPERAZINE MALEATE 10 MG
10 TABLET ORAL EVERY 6 HOURS PRN
Qty: 30 TABLET | Refills: 0 | OUTPATIENT
Start: 2024-07-05

## 2024-08-06 DIAGNOSIS — R10.13 EPIGASTRIC PAIN: Primary | ICD-10-CM

## 2024-08-06 DIAGNOSIS — R11.0 NAUSEA: ICD-10-CM

## 2024-08-06 DIAGNOSIS — G47.00 INSOMNIA, UNSPECIFIED TYPE: ICD-10-CM

## 2024-08-06 RX ORDER — PROMETHAZINE HYDROCHLORIDE 12.5 MG/1
12.5 TABLET ORAL EVERY 6 HOURS PRN
Qty: 12 TABLET | Refills: 0 | Status: SHIPPED | OUTPATIENT
Start: 2024-08-06

## 2024-08-06 RX ORDER — TRAZODONE HYDROCHLORIDE 50 MG/1
TABLET ORAL
Qty: 90 TABLET | Refills: 2 | Status: SHIPPED | OUTPATIENT
Start: 2024-08-06

## 2024-08-20 ENCOUNTER — OFFICE VISIT (OUTPATIENT)
Dept: FAMILY MEDICINE CLINIC | Facility: CLINIC | Age: 44
End: 2024-08-20
Payer: COMMERCIAL

## 2024-08-20 VITALS
SYSTOLIC BLOOD PRESSURE: 126 MMHG | HEART RATE: 112 BPM | DIASTOLIC BLOOD PRESSURE: 82 MMHG | OXYGEN SATURATION: 95 % | HEIGHT: 62 IN | WEIGHT: 188.1 LBS | TEMPERATURE: 99.1 F | BODY MASS INDEX: 34.61 KG/M2

## 2024-08-20 DIAGNOSIS — F41.1 GENERALIZED ANXIETY DISORDER: ICD-10-CM

## 2024-08-20 DIAGNOSIS — F43.21 GRIEF: ICD-10-CM

## 2024-08-20 DIAGNOSIS — F33.2 SEVERE EPISODE OF RECURRENT MAJOR DEPRESSIVE DISORDER, WITHOUT PSYCHOTIC FEATURES: Primary | ICD-10-CM

## 2024-08-20 PROCEDURE — 99214 OFFICE O/P EST MOD 30 MIN: CPT

## 2024-08-20 RX ORDER — CLONAZEPAM 0.5 MG/1
0.5 TABLET ORAL 2 TIMES DAILY PRN
Qty: 20 TABLET | Refills: 0 | Status: SHIPPED | OUTPATIENT
Start: 2024-08-20

## 2024-08-20 RX ORDER — ONDANSETRON 4 MG/1
4 TABLET, FILM COATED ORAL EVERY 8 HOURS PRN
COMMUNITY
Start: 2024-07-05

## 2024-08-20 NOTE — PROGRESS NOTES
Chief Complaint  Chief Complaint   Patient presents with    Depression     Pt states she has been on cymbalta for years and it has not been helping. Dad passed in February and has had lots of things going on. Would like med change if needed and therapy.       Subjective      Ebony Hamilton presents to [facility]  History of Present Illness  The patient is here today for a follow-up on gastroparesis, depression and anxiety..    She has been diagnosed with gastroparesis and is currently prescribed Reglan to be taken before meals, and Zofran as needed. She continues to take Reglan three times a day, which she finds helpful, and has adjusted her meal schedule accordingly. She has also switched to sugar-free drinks as recommended. She uses Zofran and Phenergan only on days when she struggles to eat or keep food down. She is seeking a refill of these medications until her next appointment in October 2024.    She also would like to discuss her depression and anxiety as she has recently felt more anxious and irritable. She reports feeling overwhelmed and unable to cope with daily tasks. She does not endorse any suicidal ideation but expresses feelings of exhaustion and lack of motivation. She has sought therapy in the past but found it unhelpful. She is currently taking Cymbalta 60 mg in the morning and 30 mg in the evening, along with buspirone for breakthrough anxiety. She describes feeling overwhelmed by simple tasks such as grocery shopping or house cleaning. She recalls an incident two weeks ago where she spent four days on her couch, unable to get up, shower, eat, or engage in any activities. She has not tried benzodiazepines in the past.      Objective     Medical History:  Past Medical History:   Diagnosis Date    Allergic     Anxiety     Atrial fibrillation     RELEASED BY CARDIOLOGIST/ELECTROPHYSIST, NO CURRENT MEDS    Chronic pain disorder     Depression     Endometriosis      Headache     Hemorrhoids     Injury of shoulder, right     CHRONIC PAIN    Panic disorder      Past Surgical History:   Procedure Laterality Date    CERVICAL ARTHRODESIS  2015    Donaldo Albarran    CERVICAL FUSION      C4-7 FUSION, FULL ROM    COLONOSCOPY N/A 2022    Procedure: COLONOSCOPY;  Surgeon: Shabbir Baird MD;  Location: [facility] ENDOSCOPY;  Service: General;  Laterality: N/A;  HEMORRHOIDS    EXPLORATORY LAPAROTOMY      HEMORRHOIDECTOMY N/A 2022    Procedure: HEMORRHOID BANDING;  Surgeon: Shabbir Baird MD;  Location: [facility] ENDOSCOPY;  Service: General;  Laterality: N/A;  BANDS X 2    HEMORRHOIDECTOMY N/A 2024    Procedure: HEMORRHOIDECTOMY;  Surgeon: Shabbir Baird MD;  Location: [facility] OR OSC;  Service: General;  Laterality: N/A;    HYSTERECTOMY      SHOULDER ARTHROSCOPY Right     SHOULDER SURGERY Left     ARTHROSCOPY LABRAL TEAR X2    TUBAL ABDOMINAL LIGATION        Social History     Tobacco Use    Smoking status: Former     Current packs/day: 0.00     Average packs/day: 0.3 packs/day for 20.0 years (5.0 ttl pk-yrs)     Types: Cigarettes     Start date: 1999     Quit date: 2019     Years since quittin.6    Smokeless tobacco: Never   Vaping Use    Vaping status: Never Used   Substance Use Topics    Alcohol use: No    Drug use: Never     Family History   Problem Relation Age of Onset    Depression Mother     Bipolar disorder Mother     Anxiety disorder Mother     Cancer Mother     Heart disease Mother     Hypertension Mother     Anxiety disorder Father     Hypertension Father     Dementia Paternal Uncle     Dementia Paternal Grandmother     Heart disease Other     Colon cancer Neg Hx     Malig Hyperthermia Neg Hx        Medications:  Prior to Admission medications    Medication Sig Start Date End Date Taking? Authorizing Provider   albuterol sulfate  (90 Base) MCG/ACT inhaler Inhale 2 puffs Every 6 (Six) Hours As Needed for  Wheezing.  Patient taking differently: Inhale 2 puffs Every 6 (Six) Hours As Needed for Wheezing (PRN ALLERGIES). 12/7/23  Yes Digna Paniagua MD   bisacodyl (DULCOLAX) 10 MG suppository Insert 1 suppository every day by rectal route as needed. 7/24/23  Yes Avani Vela MD   busPIRone (BUSPAR) 15 MG tablet Take 1 tablet by mouth 3 (Three) Times a Day. 5/30/24  Yes Karen Stover APRN   CALCIUM PO Take 1 tablet by mouth Daily. LD 1/29/24   Yes Avani Vela MD   cetirizine (ZyrTEC) 10 MG tablet Take 1 tablet by mouth Daily.   Yes Avani Vela MD   DULoxetine (Cymbalta) 30 MG capsule Take 1 capsule by mouth Every Night. Take 1 cap PO QD. Take with 60mg for total dose of 90mg 6/18/24  Yes Karen Stover APRN   DULoxetine (Cymbalta) 60 MG capsule Take 1 capsule by mouth Every Morning. Take 1 cap PO QD. Take with 30mg cap for total dose of 90mg. 6/18/24  Yes Karen Stover APRN   fexofenadine (ALLEGRA) 30 MG tablet Take 1 tablet by mouth Daily With Lunch.   Yes Avani Vela MD   HYDROcodone-acetaminophen (NORCO)  MG per tablet TAKE 1 TABLET BY MOUTH UP TO FOUR TIMES DAILY AS NEEDED FOR PAIN 2/14/24  Yes Avani Vela MD   hydrocortisone 2.5 % cream Apply 1 Application topically to the appropriate area as directed 2 (Two) Times a Day. 6/27/24  Yes Karen Stover APRN   ibuprofen (ADVIL,MOTRIN) 800 MG tablet Take 1 tablet by mouth Every 8 (Eight) Hours As Needed. LAST DOSE 1/28/24   Yes Avani Vela MD   metoclopramide (Reglan) 5 MG tablet Take 1 tablet by mouth 3 (Three) Times a Day Before Meals. 6/18/24  Yes Karen Stover APRN   montelukast (Singulair) 10 MG tablet Take 1 tablet by mouth Every Night. 2/27/24  Yes Digna Paniagua MD   ondansetron (ZOFRAN) 4 MG tablet Take 1 tablet by mouth Every 8 (Eight) Hours As Needed for Nausea. 7/5/24  Yes Provider, MD Avani   pantoprazole (PROTONIX) 40 MG EC tablet Take 1 tablet  "by mouth Daily. 11/8/23  Yes Digna Paniagua MD   polyethylene glycol (MIRALAX) 17 g packet Take 17 g by mouth Daily. 1/31/24  Yes Shabbir Baird MD   promethazine (PHENERGAN) 12.5 MG tablet Take 1 tablet by mouth Every 6 (Six) Hours As Needed for Nausea or Vomiting. 8/6/24  Yes Karen Stover APRN   sennosides-docusate (senna-docusate sodium) 8.6-50 MG per tablet Take 1 tablet by mouth Daily. 6/27/24  Yes Karen Stover APRN   Stahist AD 25-60 MG tablet Take 1 tablet by mouth 3 times a day. 4/11/24  Yes Avani Vela MD   tiZANidine (ZANAFLEX) 4 MG tablet Take 1 tablet by mouth Every 8 (Eight) Hours As Needed. 5/23/24  Yes Avani Vela MD   traZODone (DESYREL) 50 MG tablet Take 0.5 to 2 tab PO QHS PRN sleep 8/6/24  Yes Karen Stover APRN   vitamin C (ASCORBIC ACID) 500 MG tablet Take 1 tablet by mouth Daily. LAST DOSE 1/28/24   Yes ProviderAvani MD   Cariprazine HCl (Vraylar) 1.5 MG capsule capsule Take 1 capsule by mouth Daily. 8/20/24   Karen Stover APRN   clonazePAM (KlonoPIN) 0.5 MG tablet Take 1 tablet by mouth 2 (Two) Times a Day As Needed for Anxiety. 8/20/24   Karen Stover APRN   prochlorperazine (COMPAZINE) 10 MG tablet Take 1 tablet by mouth Every 6 (Six) Hours As Needed for Nausea or Vomiting. 6/18/24   Karen Stover APRN        Allergies:   Escitalopram, Sulfa antibiotics, Baclofen, Ciprofloxacin, Codeine, Gold-containing drug products, Latex, Nickel, and Tegaderm ag mesh [silver]    Health Maintenance Due   Topic Date Due    MAMMOGRAM  Never done    TDAP/TD VACCINES (2 - Td or Tdap) 01/01/2022    HEPATITIS C SCREENING  Never done    COVID-19 Vaccine (1 - 2023-24 season) Never done    INFLUENZA VACCINE  08/01/2024         Vital Signs:   /82 (BP Location: Left arm, Patient Position: Sitting, Cuff Size: Adult)   Pulse 112   Temp 99.1 °F (37.3 °C) (Oral)   Ht 157.5 cm (62\")   Wt 85.3 kg (188 lb 1.6 oz)   " SpO2 95%   BMI 34.40 kg/m²     Wt Readings from Last 3 Encounters:   08/20/24 85.3 kg (188 lb 1.6 oz)   05/31/24 89.1 kg (196 lb 6.4 oz)   02/27/24 86.2 kg (190 lb)     BP Readings from Last 3 Encounters:   08/20/24 126/82   05/31/24 134/98   02/02/24 147/91     Physical Exam  Vitals reviewed.   Constitutional:       Appearance: Normal appearance. She is well-developed.   HENT:      Head: Normocephalic and atraumatic.   Eyes:      Conjunctiva/sclera: Conjunctivae normal.      Pupils: Pupils are equal, round, and reactive to light.   Cardiovascular:      Rate and Rhythm: Normal rate and regular rhythm. Tachycardia present.      Heart sounds: No murmur heard.     No friction rub. No gallop.   Pulmonary:      Effort: Pulmonary effort is normal.      Breath sounds: Normal breath sounds. No wheezing or rhonchi.   Abdominal:      General: Bowel sounds are normal. There is no distension.      Palpations: Abdomen is soft.      Tenderness: There is no abdominal tenderness.   Skin:     General: Skin is warm and dry.   Neurological:      Mental Status: She is alert and oriented to person, place, and time.      Cranial Nerves: No cranial nerve deficit.   Psychiatric:         Mood and Affect: Mood and affect normal. Mood is anxious and depressed. Affect is tearful.         Speech: Speech normal.         Behavior: Behavior normal.         Thought Content: Thought content normal.         Judgment: Judgment normal.       Physical Exam        Result Review :    The following data was reviewed by MILDRED Irby on 08/20/24 at 14:51 EDT:        NM Gastric Emptying    Result Date: 6/14/2024  Mild delayed gastric emptying suggesting mild gastroparesis Electronically Signed: Jl Hendrix MD  6/14/2024 5:59 PM EDT  Workstation ID: [id number]    CT Abdomen Pelvis With Contrast    Result Date: 6/14/2024  1. No acute intra-abdominal or intrapelvic abnormality noted. 2. Incidental note of low-attenuation foci within the liver and  kidneys the majority of which are too small to characterize and most compatible with cyst Electronically Signed: Donaldo Jarvis MD  6/14/2024 6:54 AM EDT  Workstation ID: [id number]      Results                 Assessment and Plan    Diagnoses and all orders for this visit:    1. Severe episode of recurrent major depressive disorder, without psychotic features (Primary)    2. Generalized anxiety disorder  -     clonazePAM (KlonoPIN) 0.5 MG tablet; Take 1 tablet by mouth 2 (Two) Times a Day As Needed for Anxiety.  Dispense: 20 tablet; Refill: 0    3. Grief    Other orders  -     Cariprazine HCl (Vraylar) 1.5 MG capsule capsule; Take 1 capsule by mouth Daily.  Dispense: 30 capsule; Refill: 2       Assessment & Plan  1. Severe depression.  Symptoms and emotional state indicate severe depression, exacerbated by recent life stressors and grief. Vraylar 1.5 mg once daily has been prescribed in addition to her current Cymbalta regimen (60 mg in the morning and 30 mg in the evening). Clonazepam 0.5 mg tablets have been prescribed for acute anxiety, with instructions to start with half a tablet and increase to a full tablet if necessary. Potential interactions between Reglan and Vraylar were discussed. She was advised to take Klonopin upon returning home and to avoid driving due to its sedative effects. A list of therapists, including [facility], [facility], and [facility], was provided for her to consider. She was encouraged to schedule an appointment with a therapist.    2. Anxiety.  Buspirone is currently being used for breakthrough anxiety. Clonazepam 0.5 mg tablets have been prescribed to manage severe anxiety episodes. She was advised to take Klonopin only when at home and to avoid driving afterwards due to its sedative effects.    3. Gastroparesis.  She continues to take Reglan three times a day before meals and Zofran as needed.  A refill for Reglan will be provided until her next GI  appointment in October. Phenergan was also mentioned for occasional use when she cannot eat or hold down food.    Follow-up  A follow-up appointment is scheduled for 1 month from now.          Smoking Cessation:    Ebony Hamilton  reports that she quit smoking about 5 years ago. Her smoking use included cigarettes. She started smoking about 25 years ago. She has a 5 pack-year smoking history. She has never used smokeless tobacco.          Follow Up   Return in about 1 month (around 9/20/2024) for Next scheduled follow up, Video visit.  Patient was given instructions and counseling regarding her condition or for health maintenance advice. Please see specific information pulled into the AVS if appropriate.     Please note that portions of this note were completed with a voice recognition program.    Patient or patient representative verbalized consent for the use of Ambient Listening during the visit with  MILDRED Irby for chart documentation. 8/20/2024  14:56 EDT

## 2024-08-28 DIAGNOSIS — K21.9 GASTROESOPHAGEAL REFLUX DISEASE, UNSPECIFIED WHETHER ESOPHAGITIS PRESENT: ICD-10-CM

## 2024-08-28 DIAGNOSIS — F41.1 GENERALIZED ANXIETY DISORDER: ICD-10-CM

## 2024-08-28 DIAGNOSIS — G47.00 INSOMNIA, UNSPECIFIED TYPE: ICD-10-CM

## 2024-08-28 DIAGNOSIS — R10.13 EPIGASTRIC PAIN: ICD-10-CM

## 2024-08-28 DIAGNOSIS — K31.84 GASTROPARESIS: ICD-10-CM

## 2024-08-28 RX ORDER — TRAZODONE HYDROCHLORIDE 50 MG/1
TABLET, FILM COATED ORAL
Qty: 90 TABLET | Refills: 2 | Status: CANCELLED | OUTPATIENT
Start: 2024-08-28

## 2024-08-29 DIAGNOSIS — F41.1 GENERALIZED ANXIETY DISORDER: ICD-10-CM

## 2024-08-29 DIAGNOSIS — G47.00 INSOMNIA, UNSPECIFIED TYPE: ICD-10-CM

## 2024-08-29 RX ORDER — CLONAZEPAM 0.5 MG/1
0.5 TABLET ORAL 2 TIMES DAILY PRN
Qty: 20 TABLET | Refills: 0 | OUTPATIENT
Start: 2024-08-29

## 2024-08-29 RX ORDER — METOCLOPRAMIDE 5 MG/1
5 TABLET ORAL
Qty: 90 TABLET | Refills: 1 | OUTPATIENT
Start: 2024-08-29

## 2024-08-29 RX ORDER — TRAZODONE HYDROCHLORIDE 50 MG/1
TABLET, FILM COATED ORAL
Qty: 90 TABLET | Refills: 2 | OUTPATIENT
Start: 2024-08-29

## 2024-08-30 DIAGNOSIS — K31.84 GASTROPARESIS: ICD-10-CM

## 2024-08-30 DIAGNOSIS — K21.9 GASTROESOPHAGEAL REFLUX DISEASE, UNSPECIFIED WHETHER ESOPHAGITIS PRESENT: ICD-10-CM

## 2024-08-30 DIAGNOSIS — R10.13 EPIGASTRIC PAIN: ICD-10-CM

## 2024-08-30 RX ORDER — CLONAZEPAM 0.5 MG/1
0.5 TABLET ORAL 2 TIMES DAILY PRN
Qty: 20 TABLET | Refills: 0 | Status: SHIPPED | OUTPATIENT
Start: 2024-08-30

## 2024-08-30 RX ORDER — TRAZODONE HYDROCHLORIDE 50 MG/1
TABLET, FILM COATED ORAL
Qty: 90 TABLET | Refills: 2 | Status: SHIPPED | OUTPATIENT
Start: 2024-08-30

## 2024-08-30 RX ORDER — METOCLOPRAMIDE 5 MG/1
5 TABLET ORAL
Qty: 90 TABLET | Refills: 1 | Status: SHIPPED | OUTPATIENT
Start: 2024-08-30

## 2024-09-03 ENCOUNTER — TELEPHONE (OUTPATIENT)
Dept: FAMILY MEDICINE CLINIC | Facility: CLINIC | Age: 44
End: 2024-09-03

## 2024-09-03 NOTE — TELEPHONE ENCOUNTER
Caller: Ebony Hamilton    Relationship: Self    Best call back number: [phone]     What was the call regarding: PATIENT STATES HER PHARMACY REFUSES TO DISPENSE THE PRESCRIPTION THAT SOMMER GOMEZ PRESCRIBED HER DUE TO IRREVERSIBLE SIDE EFFECTS FROM INTERACTIONS WITH CURRENT MEDICATIONS. PATIENT STATES SHE WOULD LIKE TO SPEAK WITH YOLIS ABOUT THIS ISSUE BECAUSE SHE HAS QUESTIONS AND CONCERNS.

## 2024-09-10 NOTE — TELEPHONE ENCOUNTER
"I spoke with patient. She stated the pharmacy refuses to fill her reglan due to her being on vraylar. They told her that these two medications together can cause \"irreversible Parkinsons problems.\" Patient started to taper herself off the vraylar and is having mood issues. She doesn't know what to do and would like some advice. They also told her any med related to vraylar such as rexulti, abilify, etc would have the same effect.  "

## 2024-09-26 ENCOUNTER — OFFICE VISIT (OUTPATIENT)
Dept: FAMILY MEDICINE CLINIC | Facility: CLINIC | Age: 44
End: 2024-09-26
Payer: COMMERCIAL

## 2024-09-26 VITALS
SYSTOLIC BLOOD PRESSURE: 128 MMHG | TEMPERATURE: 98.7 F | OXYGEN SATURATION: 100 % | WEIGHT: 192.9 LBS | DIASTOLIC BLOOD PRESSURE: 84 MMHG | HEIGHT: 62 IN | BODY MASS INDEX: 35.5 KG/M2 | HEART RATE: 67 BPM

## 2024-09-26 DIAGNOSIS — K31.84 GASTROPARESIS: Primary | ICD-10-CM

## 2024-09-26 DIAGNOSIS — F41.1 GENERALIZED ANXIETY DISORDER: ICD-10-CM

## 2024-09-26 DIAGNOSIS — F43.21 GRIEF: ICD-10-CM

## 2024-09-26 DIAGNOSIS — F33.2 SEVERE EPISODE OF RECURRENT MAJOR DEPRESSIVE DISORDER, WITHOUT PSYCHOTIC FEATURES: ICD-10-CM

## 2024-09-26 PROCEDURE — 99214 OFFICE O/P EST MOD 30 MIN: CPT

## 2024-09-26 RX ORDER — CLONAZEPAM 0.5 MG/1
0.5 TABLET ORAL 2 TIMES DAILY PRN
Qty: 20 TABLET | Refills: 0 | Status: SHIPPED | OUTPATIENT
Start: 2024-09-26

## 2024-10-10 NOTE — TELEPHONE ENCOUNTER
ATTEMPTED TO CONTACT PT(PATIENT)      NO ANSWER      LEFT VOICEMAIL WITH INSTRUCTIONS TO RETURN CALL TO OFFICE AT [phone]   Wound Care Plan:    1-Cleanse wound to right lower leg with normal saline & pat dry. Apply barrier cream to periwound for protection then apply Dakin's soaked gauze x 5 minutes, rinse with normal saline & pat dry. Apply thin layer of Normlgel to wound, Adaptic cut to size of wound, ABD, rolled gauze and tape. Change every other day & as needed for soilage/dislodgement.  2-Cleanse wound to right knee with normal saline & pat dry. Apply thin layer of Normlgel to wound, cover with Adaptic cut to size of wound and small bordered foam dressing. Change every other day & as needed for soilage/dislodgement.  3-Apply Prevent barrier cream or equivalent to bilateral sacrum, buttock and heels 2x/day and as needed.  4-Float heels on 2 pillows in bed to offload pressure so heels are not in contact with mattress or pillows.  5-Use pressure redistribution cushion in chair when out of bed and during dialysis treatments. Avoid prolonged sitting.  6-Moisturize skin daily with skin nourishing cream.  7-Turn/reposition every 2 hrs in bed and every hour when out of bed to chair for pressure re-distribution on skin.  8-You may follow up at [facility] with Dr Nixon or Dr Parmar. Call Central Scheduling for appointment:   [phone].

## 2024-10-14 DIAGNOSIS — F41.1 GENERALIZED ANXIETY DISORDER: ICD-10-CM

## 2024-10-14 DIAGNOSIS — F33.1 MODERATE EPISODE OF RECURRENT MAJOR DEPRESSIVE DISORDER: ICD-10-CM

## 2024-10-14 RX ORDER — DULOXETIN HYDROCHLORIDE 60 MG/1
CAPSULE, DELAYED RELEASE ORAL
Qty: 90 CAPSULE | Refills: 1 | Status: SHIPPED | OUTPATIENT
Start: 2024-10-14

## 2024-10-14 RX ORDER — ONDANSETRON 4 MG/1
TABLET, FILM COATED ORAL
Qty: 30 TABLET | Refills: 1 | Status: SHIPPED | OUTPATIENT
Start: 2024-10-14

## 2024-10-14 RX ORDER — DULOXETIN HYDROCHLORIDE 30 MG/1
CAPSULE, DELAYED RELEASE ORAL
Qty: 90 CAPSULE | Refills: 1 | Status: SHIPPED | OUTPATIENT
Start: 2024-10-14

## 2024-10-28 DIAGNOSIS — F41.1 GENERALIZED ANXIETY DISORDER: ICD-10-CM

## 2024-10-29 RX ORDER — CLONAZEPAM 0.5 MG/1
0.5 TABLET ORAL 2 TIMES DAILY PRN
Qty: 20 TABLET | Refills: 0 | Status: SHIPPED | OUTPATIENT
Start: 2024-10-29

## 2024-11-11 DIAGNOSIS — F33.1 MODERATE EPISODE OF RECURRENT MAJOR DEPRESSIVE DISORDER: ICD-10-CM

## 2024-11-11 DIAGNOSIS — K21.9 GASTROESOPHAGEAL REFLUX DISEASE, UNSPECIFIED WHETHER ESOPHAGITIS PRESENT: ICD-10-CM

## 2024-11-11 DIAGNOSIS — F41.1 GENERALIZED ANXIETY DISORDER: ICD-10-CM

## 2024-11-11 RX ORDER — PANTOPRAZOLE SODIUM 40 MG/1
40 TABLET, DELAYED RELEASE ORAL DAILY
Qty: 90 TABLET | Refills: 3 | Status: SHIPPED | OUTPATIENT
Start: 2024-11-11

## 2024-11-11 RX ORDER — BUSPIRONE HYDROCHLORIDE 15 MG/1
15 TABLET ORAL 3 TIMES DAILY
Qty: 90 TABLET | Refills: 1 | Status: SHIPPED | OUTPATIENT
Start: 2024-11-11

## 2024-11-11 NOTE — TELEPHONE ENCOUNTER
Rx Refill Note  Requested Prescriptions     Pending Prescriptions Disp Refills    pantoprazole (PROTONIX) 40 MG EC tablet 90 tablet 3     Sig: Take 1 tablet by mouth Daily.      Last office visit with prescribing clinician: 9/26/2024   Last telemedicine visit with prescribing clinician: 6/27/2024   Next office visit with prescribing clinician: Visit date not found                         Would you like a call back once the refill request has been completed: [] Yes [] No    If the office needs to give you a call back, can they leave a voicemail: [] Yes [] No    Jazzmine Arrieta Rep  11/11/24, 13:50 EST

## 2024-12-19 RX ORDER — CARIPRAZINE 1.5 MG/1
1.5 CAPSULE, GELATIN COATED ORAL DAILY
Qty: 30 CAPSULE | Refills: 2 | Status: SHIPPED | OUTPATIENT
Start: 2024-12-19

## 2024-12-20 DIAGNOSIS — F41.1 GENERALIZED ANXIETY DISORDER: ICD-10-CM

## 2024-12-20 RX ORDER — CLONAZEPAM 0.5 MG/1
0.5 TABLET ORAL 2 TIMES DAILY PRN
Qty: 20 TABLET | Refills: 0 | Status: SHIPPED | OUTPATIENT
Start: 2024-12-20

## 2024-12-20 NOTE — TELEPHONE ENCOUNTER
Controlled Medication Refill Request:    1.  Last Office Visit:  09/26/2024     2.  Next Office Visit:  Visit date not found     3.  Last UDS Date:  07/12/2023    4.  Last Consent Signed:  01/18/2024    5.  Medication Requested: Klonopin (Clonazepam)    Pharmacy:   [id number] - [place], [place] - [address] [place] [address] [phone] [facility] [phone]   [address] [place] [id number]  Phone: [phone] Fax: [phone]

## 2025-01-08 DIAGNOSIS — J30.2 SEASONAL ALLERGIC RHINITIS, UNSPECIFIED TRIGGER: ICD-10-CM

## 2025-01-08 RX ORDER — ALBUTEROL SULFATE 90 UG/1
2 INHALANT RESPIRATORY (INHALATION) EVERY 6 HOURS PRN
Qty: 18 G | Refills: 1 | Status: SHIPPED | OUTPATIENT
Start: 2025-01-08

## 2025-01-11 ENCOUNTER — HOSPITAL ENCOUNTER (EMERGENCY)
Facility: HOSPITAL | Age: 45
Discharge: HOME | End: 2025-01-11
Attending: EMERGENCY MEDICINE
Payer: COMMERCIAL

## 2025-01-11 ENCOUNTER — APPOINTMENT (OUTPATIENT)
Dept: GENERAL RADIOLOGY | Facility: HOSPITAL | Age: 45
End: 2025-01-11
Payer: COMMERCIAL

## 2025-01-11 VITALS
HEIGHT: 62 IN | SYSTOLIC BLOOD PRESSURE: 134 MMHG | BODY MASS INDEX: 34.69 KG/M2 | TEMPERATURE: 98 F | RESPIRATION RATE: 18 BRPM | OXYGEN SATURATION: 90 % | WEIGHT: 188.49 LBS | DIASTOLIC BLOOD PRESSURE: 92 MMHG | HEART RATE: 106 BPM

## 2025-01-11 DIAGNOSIS — L02.415 ABSCESS OF RIGHT LEG: Primary | ICD-10-CM

## 2025-01-11 DIAGNOSIS — L02.414 ABSCESS OF LEFT ARM: ICD-10-CM

## 2025-01-11 LAB
ALBUMIN SERPL-MCNC: 4.4 G/DL (ref 3.5–5.2)
ALBUMIN/GLOB SERPL: 1.2 G/DL
ALP SERPL-CCNC: 94 U/L (ref 39–117)
ALT SERPL W P-5'-P-CCNC: 27 U/L (ref 1–33)
ANION GAP SERPL CALCULATED.3IONS-SCNC: 14.5 MMOL/L (ref 5–15)
AST SERPL-CCNC: 21 U/L (ref 1–32)
BASOPHILS # BLD AUTO: 0.06 10*3/MM3 (ref 0–0.2)
BASOPHILS NFR BLD AUTO: 0.4 % (ref 0–1.5)
BILIRUB SERPL-MCNC: 0.5 MG/DL (ref 0–1.2)
BUN SERPL-MCNC: 13 MG/DL (ref 6–20)
BUN/CREAT SERPL: 16.5 (ref 7–25)
CALCIUM SPEC-SCNC: 9.4 MG/DL (ref 8.6–10.5)
CHLORIDE SERPL-SCNC: 99 MMOL/L (ref 98–107)
CO2 SERPL-SCNC: 25.5 MMOL/L (ref 22–29)
CREAT SERPL-MCNC: 0.79 MG/DL (ref 0.57–1)
CRP SERPL-MCNC: 16.77 MG/DL (ref 0–0.5)
DEPRECATED RDW RBC AUTO: 47.4 FL (ref 37–54)
EGFRCR SERPLBLD CKD-EPI 2021: 95.9 ML/MIN/1.73
EOSINOPHIL # BLD AUTO: 0.15 10*3/MM3 (ref 0–0.4)
EOSINOPHIL NFR BLD AUTO: 0.9 % (ref 0.3–6.2)
ERYTHROCYTE [DISTWIDTH] IN BLOOD BY AUTOMATED COUNT: 14.3 % (ref 12.3–15.4)
ERYTHROCYTE [SEDIMENTATION RATE] IN BLOOD: 17 MM/HR (ref 0–20)
GLOBULIN UR ELPH-MCNC: 3.7 GM/DL
GLUCOSE SERPL-MCNC: 101 MG/DL (ref 65–99)
HCT VFR BLD AUTO: 40.8 % (ref 34–46.6)
HGB BLD-MCNC: 12.8 G/DL (ref 12–15.9)
HOLD SPECIMEN: NORMAL
HOLD SPECIMEN: NORMAL
IMM GRANULOCYTES # BLD AUTO: 0.07 10*3/MM3 (ref 0–0.05)
IMM GRANULOCYTES NFR BLD AUTO: 0.4 % (ref 0–0.5)
LYMPHOCYTES # BLD AUTO: 1.46 10*3/MM3 (ref 0.7–3.1)
LYMPHOCYTES NFR BLD AUTO: 9.2 % (ref 19.6–45.3)
MCH RBC QN AUTO: 28.4 PG (ref 26.6–33)
MCHC RBC AUTO-ENTMCNC: 31.4 G/DL (ref 31.5–35.7)
MCV RBC AUTO: 90.7 FL (ref 79–97)
MONOCYTES # BLD AUTO: 1.02 10*3/MM3 (ref 0.1–0.9)
MONOCYTES NFR BLD AUTO: 6.4 % (ref 5–12)
NEUTROPHILS NFR BLD AUTO: 13.12 10*3/MM3 (ref 1.7–7)
NEUTROPHILS NFR BLD AUTO: 82.7 % (ref 42.7–76)
NRBC BLD AUTO-RTO: 0 /100 WBC (ref 0–0.2)
PLATELET # BLD AUTO: 483 10*3/MM3 (ref 140–450)
PMV BLD AUTO: 9.5 FL (ref 6–12)
POTASSIUM SERPL-SCNC: 3.6 MMOL/L (ref 3.5–5.2)
PROT SERPL-MCNC: 8.1 G/DL (ref 6–8.5)
RBC # BLD AUTO: 4.5 10*6/MM3 (ref 3.77–5.28)
SODIUM SERPL-SCNC: 139 MMOL/L (ref 136–145)
WBC NRBC COR # BLD AUTO: 15.88 10*3/MM3 (ref 3.4–10.8)
WHOLE BLOOD HOLD COAG: NORMAL
WHOLE BLOOD HOLD SPECIMEN: NORMAL

## 2025-01-11 PROCEDURE — 85652 RBC SED RATE AUTOMATED: CPT

## 2025-01-11 PROCEDURE — 85025 COMPLETE CBC W/AUTO DIFF WBC: CPT

## 2025-01-11 PROCEDURE — 36415 COLL VENOUS BLD VENIPUNCTURE: CPT

## 2025-01-11 PROCEDURE — 86140 C-REACTIVE PROTEIN: CPT

## 2025-01-11 PROCEDURE — 80053 COMPREHEN METABOLIC PANEL: CPT

## 2025-01-11 PROCEDURE — 99283 EMERGENCY DEPT VISIT LOW MDM: CPT

## 2025-01-11 PROCEDURE — 73552 X-RAY EXAM OF FEMUR 2/>: CPT

## 2025-01-11 PROCEDURE — 87186 SC STD MICRODIL/AGAR DIL: CPT

## 2025-01-11 PROCEDURE — 25010000002 CEFTRIAXONE PER 250 MG

## 2025-01-11 PROCEDURE — 25010000002 LIDOCAINE 1% - EPINEPHRINE 1:100000 1 %-1:100000 SOLUTION

## 2025-01-11 PROCEDURE — 87205 SMEAR GRAM STAIN: CPT

## 2025-01-11 PROCEDURE — 87147 CULTURE TYPE IMMUNOLOGIC: CPT

## 2025-01-11 PROCEDURE — 25010000002 LIDOCAINE 1 % SOLUTION 10 ML VIAL

## 2025-01-11 PROCEDURE — 96372 THER/PROPH/DIAG INJ SC/IM: CPT

## 2025-01-11 PROCEDURE — 87070 CULTURE OTHR SPECIMN AEROBIC: CPT

## 2025-01-11 RX ORDER — GINSENG 100 MG
1 CAPSULE ORAL 2 TIMES DAILY
Qty: 14 G | Refills: 0 | Status: SHIPPED | OUTPATIENT
Start: 2025-01-11

## 2025-01-11 RX ORDER — DOXYCYCLINE 100 MG/1
100 CAPSULE ORAL 2 TIMES DAILY
Qty: 14 CAPSULE | Refills: 0 | Status: SHIPPED | OUTPATIENT
Start: 2025-01-11 | End: 2025-01-18

## 2025-01-11 RX ORDER — CEPHALEXIN 500 MG/1
500 CAPSULE ORAL 3 TIMES DAILY
Qty: 21 CAPSULE | Refills: 0 | Status: SHIPPED | OUTPATIENT
Start: 2025-01-11 | End: 2025-01-18

## 2025-01-11 RX ORDER — LIDOCAINE HYDROCHLORIDE AND EPINEPHRINE 10; 10 MG/ML; UG/ML
10 INJECTION, SOLUTION INFILTRATION; PERINEURAL ONCE
Status: COMPLETED | OUTPATIENT
Start: 2025-01-11 | End: 2025-01-11

## 2025-01-11 RX ADMIN — LIDOCAINE HYDROCHLORIDE,EPINEPHRINE BITARTRATE 10 ML: 10; .01 INJECTION, SOLUTION INFILTRATION; PERINEURAL at 16:55

## 2025-01-11 RX ADMIN — LIDOCAINE HYDROCHLORIDE 1 G: 10 INJECTION, SOLUTION INFILTRATION; PERINEURAL at 18:39

## 2025-01-11 NOTE — ED PROVIDER NOTES
Time: 3:27 PM EST  Date of encounter:  1/11/2025  Independent Historian/Clinical History and Information was obtained by:   Patient    History is limited by: N/A    Chief Complaint: Wound abscess      History of Present Illness:  Patient is a 44 y.o. year old female who presents to the emergency department for evaluation of possible insect bite.  Patient reports wound on her right thigh and left bicep. She noticed this about 5 days ago after waking up from sleep.  Denies visualized insect or spider.  Reports wound on right thigh has increasingly worsened with swelling and pain radiating down into her right thigh.  Denies any known fevers but reports ongoing diaphoresis.  Denies pruritus at injury sites.  Denies history of MRSA or DM.  Reports history of right shoulder arthroscopy 6 weeks ago.  Denies recent antibiotics in last 30 days. States that she is UTD on her tetanus. She went to [email or web address] today who recommended for her to come to the ED.       Patient Care Team  Primary Care Provider: Karen Stover APRN    Past Medical History:     Allergies   Allergen Reactions    Escitalopram Nausea Only and Rash     Nausea, sweating, rash     Sulfa Antibiotics Anaphylaxis    Baclofen GI Intolerance, Hives and Nausea And Vomiting    Ciprofloxacin Hallucinations    Codeine Hives    Gold-Containing Drug Products Rash    Latex Rash    Nickel Hives    Tegaderm Ag Mesh [Silver] Hives     Past Medical History:   Diagnosis Date    Allergic     Anxiety     Atrial fibrillation     RELEASED BY CARDIOLOGIST/ELECTROPHYSIST, NO CURRENT MEDS    Chronic pain disorder     Depression     Endometriosis     Headache     Hemorrhoids     Injury of shoulder, right 2009    CHRONIC PAIN    Panic disorder      Past Surgical History:   Procedure Laterality Date    CERVICAL ARTHRODESIS  01/14/2015    Donaldo Albarran    CERVICAL FUSION      C4-7 FUSION, FULL ROM    COLONOSCOPY N/A 05/31/2022    Procedure: COLONOSCOPY;  Surgeon: Shabbir Baird  MD [place];  Location: [facility] ENDOSCOPY;  Service: General;  Laterality: N/A;  HEMORRHOIDS    EXPLORATORY LAPAROTOMY      HEMORRHOIDECTOMY N/A 05/31/2022    Procedure: HEMORRHOID BANDING;  Surgeon: Shabbir Baird MD;  Location: [facility] ENDOSCOPY;  Service: General;  Laterality: N/A;  BANDS X 2    HEMORRHOIDECTOMY N/A 1/31/2024    Procedure: HEMORRHOIDECTOMY;  Surgeon: Shabbir Baird MD;  Location: [facility] OR [facility];  Service: General;  Laterality: N/A;    HYSTERECTOMY      SHOULDER ARTHROSCOPY Right     SHOULDER SURGERY Left     ARTHROSCOPY LABRAL TEAR X2    TUBAL ABDOMINAL LIGATION       Family History   Problem Relation Age of Onset    Depression Mother     Bipolar disorder Mother     Anxiety disorder Mother     Cancer Mother     Heart disease Mother     Hypertension Mother     Anxiety disorder Father     Hypertension Father     Dementia Paternal Uncle     Dementia Paternal Grandmother     Heart disease Other     Colon cancer Neg Hx     Malig Hyperthermia Neg Hx        Home Medications:  Prior to Admission medications    Medication Sig Start Date End Date Taking? Authorizing Provider   albuterol sulfate  (90 Base) MCG/ACT inhaler Inhale 2 puffs Every 6 (Six) Hours As Needed for Wheezing. 1/8/25   Karen Stover APRN   bisacodyl (DULCOLAX) 10 MG suppository Insert 1 suppository every day by rectal route as needed. 7/24/23   Avani Vela MD   busPIRone (BUSPAR) 15 MG tablet Take 1 tablet by mouth 3 (Three) Times a Day. 11/11/24   Karen Stover APRN   CALCIUM PO Take 1 tablet by mouth Daily. LD 1/29/24    Avani Vela MD   cetirizine (ZyrTEC) 10 MG tablet Take 1 tablet by mouth Daily.    Avani Vela MD   clonazePAM (KlonoPIN) 0.5 MG tablet Take 1 tablet by mouth 2 (Two) Times a Day As Needed for Anxiety. 12/20/24   Karen Stover APRN   DULoxetine (CYMBALTA) 30 MG capsule Take 1 capsule by mouth Every Night. Take with 60mg for total dose of 90mg  10/14/24   Karen Stover APRN   DULoxetine (CYMBALTA) 60 MG capsule Take 1 capsule by mouth Every Morning. Take with 30mg cap for total dose of 90mg. 10/14/24   Karen Stover APRN   fexofenadine (ALLEGRA) 30 MG tablet Take 1 tablet by mouth Daily With Lunch.    Avani Vela MD   HYDROcodone-acetaminophen (NORCO)  MG per tablet TAKE 1 TABLET BY MOUTH UP TO FOUR TIMES DAILY AS NEEDED FOR PAIN 2/14/24   Avani Vela MD   hydrocortisone 2.5 % cream Apply 1 Application topically to the appropriate area as directed 2 (Two) Times a Day. 6/27/24   Karen Stover APRN   ibuprofen (ADVIL,MOTRIN) 800 MG tablet Take 1 tablet by mouth Every 8 (Eight) Hours As Needed. LAST DOSE 1/28/24    Avani Vela MD   Linzess 145 MCG capsule capsule  1/8/25   Avani Vela MD   montelukast (Singulair) 10 MG tablet Take 1 tablet by mouth Every Night. 2/27/24   Digna Paniagua MD   ondansetron (ZOFRAN) 4 MG tablet TAKE (1) TABLET EVERY 8 HOURS AS NEEDED FOR NAUSEA AND vomiting 10/14/24   Karen Stover APRN   oxyCODONE-acetaminophen (PERCOCET) 7.5-325 MG per tablet TAKE (1) TABLET EVERY 4 TO 6 HOURS AS NEEDED 12/2/24   Avani Vela MD   pantoprazole (PROTONIX) 40 MG EC tablet Take 1 tablet by mouth Daily. 11/11/24   Karen Stover APRN   polyethylene glycol (MIRALAX) 17 g packet Take 17 g by mouth Daily. 1/31/24   Shabbir Baird MD   prochlorperazine (COMPAZINE) 10 MG tablet TAKE (1) TABLET BY MOUTH EVERY 6 HOURS AS NEEDED FOR NAUSEA AND VOMITING    Avani Vela MD   promethazine (PHENERGAN) 25 MG tablet Take 1 tablet by mouth Every 6 (Six) Hours As Needed. for nausea 11/20/24   Avani Vela MD   sennosides-docusate (senna-docusate sodium) 8.6-50 MG per tablet Take 1 tablet by mouth Daily. 6/27/24   Karen Stover, APRN   Stahist AD 25-60 MG tablet Take 1 tablet by mouth 3 times a day. 4/11/24   Provider, Historical,  "MD   tiZANidine (ZANAFLEX) 4 MG tablet Take 1 tablet by mouth Every 8 (Eight) Hours As Needed. 24   Provider, Avani, MD   traZODone (DESYREL) 50 MG tablet Take 0.5 to 2 tab PO QHS PRN sleep 24   Karen Stover APRN   vitamin C (ASCORBIC ACID) 500 MG tablet Take 1 tablet by mouth Daily. LAST DOSE 24    Provider, Avani, MD   Vraylar 1.5 MG capsule capsule Take 1 capsule by mouth Daily. 24   Karen Stover APRN        Social History:   Social History     Tobacco Use    Smoking status: Former     Current packs/day: 0.00     Average packs/day: 0.3 packs/day for 20.0 years (5.0 ttl pk-yrs)     Types: Cigarettes     Start date: 1999     Quit date: 2019     Years since quittin.0    Smokeless tobacco: Never   Vaping Use    Vaping status: Never Used   Substance Use Topics    Alcohol use: Yes     Comment: rarely    Drug use: Never         Review of Systems:  Review of Systems   Constitutional:  Positive for diaphoresis. Negative for fever.   HENT:  Negative for sore throat.    Respiratory:  Negative for shortness of breath.    Cardiovascular:  Negative for chest pain.   Gastrointestinal:  Negative for abdominal pain.   Endocrine: Negative for polyuria.   Genitourinary:  Negative for dysuria.   Musculoskeletal:  Positive for myalgias. Negative for neck pain.   Skin:  Positive for color change, rash and wound.   Neurological:  Negative for headaches.   All other systems reviewed and are negative.       Physical Exam:  /84   Pulse 94   Temp 98 °F (36.7 °C)   Resp 18   Ht 157.5 cm (62\")   Wt 85.5 kg (188 lb 7.9 oz)   SpO2 99%   BMI 34.48 kg/m²     Physical Exam  Constitutional:       Appearance: She is diaphoretic.   HENT:      Head: Normocephalic.      Mouth/Throat:      Mouth: Mucous membranes are moist.   Eyes:      Pupils: Pupils are equal, round, and reactive to light.   Pulmonary:      Effort: Pulmonary effort is normal.   Abdominal:      General: " There is no distension.   Musculoskeletal:      Cervical back: Neck supple.   Skin:     General: Skin is warm and dry.      Findings: Erythema and lesion present.      Comments: Evidence of right lateral thigh wound present.  Evidence of central scaling with possible necrosis with surrounding erythema and significant induration.  No appreciable fluctuation.  Purulence appreciated    Left anterior bicep wound appreciated similar to findings on right lateral thigh.  Evidence of induration with central scaling surrounding erythema.  No purulence appreciated   Neurological:      General: No focal deficit present.      Mental Status: She is alert and oriented to person, place, and time.   Psychiatric:         Mood and Affect: Mood normal.         Behavior: Behavior normal.                    Medical Decision Making:      Comorbidities that affect care:    A-fib, panic disorder    External Notes reviewed:    Previous Clinic Note: Reviewed urgent care note on 1/11/2025      The following orders were placed and all results were independently analyzed by me:  Orders Placed This Encounter   Procedures    Incision & Drainage    Incision & Drainage    Wound Culture - Swab, Thigh, Right    XR Femur 2 View Right    Britt Draw    Comprehensive Metabolic Panel    C-reactive Protein    Sedimentation Rate    CBC Auto Differential    CBC & Differential    Green Top (Gel)    Lavender Top    Gold Top - SST    Light Blue Top       Medications Given in the Emergency Department:  Medications   lidocaine 1% - EPINEPHrine 1:963957 (XYLOCAINE W/EPI) 1 %-1:137413 injection 10 mL (10 mL Injection Given 1/11/25 1655)   cefTRIAXone (ROCEPHIN) 1 g in lidocaine (XYLOCAINE) 1 % 2.1 mL IM only syringe (1 g Intramuscular Given 1/11/25 1839)        ED Course:    ED Course as of 01/11/25 1852   Sat Jan 11, 2025   1546 --- PROVIDER IN TRIAGE NOTE ---    The patient was evaluated by me, Jerson Sena in triage. Orders were placed and the patient is  currently awaiting disposition.  [CB]      ED Course User Index  [CB] Jerson Sena, SCOTT       Labs:    Lab Results (last 24 hours)       Procedure Component Value Units Date/Time    CBC & Differential [[id number]]  (Abnormal) Collected: 01/11/25 1538    Specimen: Blood from Arm, Right Updated: 01/11/25 1551    Narrative:      The following orders were created for panel order CBC & Differential.  Procedure                               Abnormality         Status                     ---------                               -----------         ------                     CBC Auto Differential[[id number]]        Abnormal            Final result                 Please view results for these tests on the individual orders.    Comprehensive Metabolic Panel [[id number]]  (Abnormal) Collected: 01/11/25 1538    Specimen: Blood from Arm, Right Updated: 01/11/25 1608     Glucose 101 mg/dL      BUN 13 mg/dL      Creatinine 0.79 mg/dL      Sodium 139 mmol/L      Potassium 3.6 mmol/L      Chloride 99 mmol/L      CO2 25.5 mmol/L      Calcium 9.4 mg/dL      Total Protein 8.1 g/dL      Albumin 4.4 g/dL      ALT (SGPT) 27 U/L      AST (SGOT) 21 U/L      Alkaline Phosphatase 94 U/L      Total Bilirubin 0.5 mg/dL      Globulin 3.7 gm/dL      A/G Ratio 1.2 g/dL      BUN/Creatinine Ratio 16.5     Anion Gap 14.5 mmol/L      eGFR 95.9 mL/min/1.73     Narrative:      GFR Categories in Chronic Kidney Disease (CKD)      GFR Category          GFR (mL/min/1.73)    Interpretation  G1                     90 or greater         Normal or high (1)  G2                      60-89                Mild decrease (1)  G3a                   45-59                Mild to moderate decrease  G3b                   30-44                Moderate to severe decrease  G4                    15-29                Severe decrease  G5                    14 or less           Kidney failure          (1)In the absence of evidence of kidney disease, neither GFR category G1  or G2 fulfill the criteria for CKD.    eGFR calculation 2021 CKD-EPI creatinine equation, which does not include race as a factor    C-reactive Protein [[id number]]  (Abnormal) Collected: 01/11/25 1538    Specimen: Blood from Arm, Right Updated: 01/11/25 1608     C-Reactive Protein 16.77 mg/dL     Sedimentation Rate [[id number]]  (Normal) Collected: 01/11/25 1538    Specimen: Blood from Arm, Right Updated: 01/11/25 1557     Sed Rate 17 mm/hr     CBC Auto Differential [[id number]]  (Abnormal) Collected: 01/11/25 1538    Specimen: Blood from Arm, Right Updated: 01/11/25 1551     WBC 15.88 10*3/mm3      RBC 4.50 10*6/mm3      Hemoglobin 12.8 g/dL      Hematocrit 40.8 %      MCV 90.7 fL      MCH 28.4 pg      MCHC 31.4 g/dL      RDW 14.3 %      RDW-SD 47.4 fl      MPV 9.5 fL      Platelets 483 10*3/mm3      Neutrophil % 82.7 %      Lymphocyte % 9.2 %      Monocyte % 6.4 %      Eosinophil % 0.9 %      Basophil % 0.4 %      Immature Grans % 0.4 %      Neutrophils, Absolute 13.12 10*3/mm3      Lymphocytes, Absolute 1.46 10*3/mm3      Monocytes, Absolute 1.02 10*3/mm3      Eosinophils, Absolute 0.15 10*3/mm3      Basophils, Absolute 0.06 10*3/mm3      Immature Grans, Absolute 0.07 10*3/mm3      nRBC 0.0 /100 WBC              Imaging:    XR Femur 2 View Right    Result Date: 1/11/2025  XR FEMUR 2 VW RIGHT Date of Exam: 1/11/2025 4:50 PM EST Indication: wound abscess right thigh pain Comparison: None available. Findings: No acute fracture. Normal joint alignment. No significant degenerative changes. There is subcutaneous edema along the lateral thigh/hip. No radiopaque foreign body.     Impression: No acute osseous abnormality. Electronically Signed: Reji Martinez MD  1/11/2025 5:10 PM EST  Workstation ID: [id number]       Differential Diagnosis and Discussion:    Abscess: Differential diagnosis for an abscess includes but is not limited to bacterial or fungal infections, foreign body reactions, malignancies, and autoimmune or  inflammatory conditions.  Wound Evaluation: Differential diagnosis includes but is not limited to laceration, abrasion, puncture, burn, ulcer, cellulitis, abscess, vasculitis, malignancy, and rash.    PROCEDURES:    Labs were collected in the emergency department and all labs were reviewed and interpreted by me.  X-ray were performed in the emergency department and all X-ray impressions were independently interpreted by me.    No orders to display       Incision & Drainage    Date/Time: 1/11/2025 6:25 PM    Performed by: Keo Rao PA  Authorized by: Nigel Goldstein MD    Consent:     Consent obtained:  Verbal    Consent given by:  Patient    Risks, benefits, and alternatives were discussed: yes      Risks discussed:  Bleeding, incomplete drainage and pain    Alternatives discussed:  No treatment  Universal protocol:     Procedure explained and questions answered to patient or proxy's satisfaction: yes      Patient identity confirmed:  Verbally with patient  Location:     Type:  Abscess    Size:  3    Location:  Lower extremity    Lower extremity location:  Leg    Leg location:  R upper leg  Pre-procedure details:     Skin preparation:  Povidone-iodine  Sedation:     Sedation type:  None  Anesthesia:     Anesthesia method:  Local infiltration    Local anesthetic:  Lidocaine 1% WITH epi  Procedure type:     Complexity:  Simple  Procedure details:     Incision types:  Single straight    Wound management:  Probed and deloculated and irrigated with saline    Drainage:  Purulent    Drainage amount:  Copious    Wound treatment:  Drain placed    Packing materials:  1/4 in iodoform gauze  Post-procedure details:     Procedure completion:  Tolerated well, no immediate complications  Incision & Drainage    Date/Time: 1/11/2025 6:26 PM    Performed by: Keo Rao PA  Authorized by: Nigel Goldstein MD    Consent:     Consent obtained:  Verbal    Consent given by:  Patient    Risks, benefits, and alternatives were  discussed: yes      Risks discussed:  Bleeding, incomplete drainage and pain    Alternatives discussed:  No treatment  Universal protocol:     Procedure explained and questions answered to patient or proxy's satisfaction: yes      Patient identity confirmed:  Verbally with patient  Location:     Type:  Abscess    Size:  1    Location:  Upper extremity    Upper extremity location:  Arm    Arm location:  L upper arm  Pre-procedure details:     Skin preparation:  Povidone-iodine  Sedation:     Sedation type:  None  Anesthesia:     Anesthesia method:  Local infiltration    Local anesthetic:  Lidocaine 1% WITH epi  Procedure type:     Complexity:  Simple  Procedure details:     Incision types:  Stab incision    Wound management:  Probed and deloculated and irrigated with saline    Drainage:  Purulent    Drainage amount:  Scant    Wound treatment:  Wound left open  Post-procedure details:     Procedure completion:  Tolerated well, no immediate complications      MDM     Amount and/or Complexity of Data Reviewed  Clinical lab tests: reviewed  Tests in the radiology section of CPT®: reviewed  Decide to obtain previous medical records or to obtain history from someone other than the patient: yes    Risk of Complications, Morbidity, and/or Mortality  Presenting problems: moderate  Diagnostic procedures: moderate  Management options: low    Patient presents to the emergency department for evaluation of possible insect bite.  Patient reports wound on her right thigh and left bicep. She noticed this about 5 days ago after waking up from sleep.  Denies visualized insect or spider.  Reports wound on right thigh has increasingly worsened with swelling and pain radiating down into her right thigh.  Denies any known fevers but reports ongoing diaphoresis.  Denies pruritus at injury sites.  Denies history of MRSA or DM.  Reports history of right shoulder arthroscopy 6 weeks ago.  Denies recent antibiotics in last 30 days. States that  she is UTD on her tetanus. She went to [facility] today who recommended for her to come to the ED.     On exam,  L arm: 1cm abscess with a head, draining purulent drainage.  R lateral thigh: 3cm abscess to the right thigh, draining purulent drainage. Does have swelling and erythema around the wound.    CBC shows an elevated white count of 15.88.  Rest of the CBC is unremarkable.  The patient´s CMP that was reviewed and interpretted by me shows no abnormalities of critical concern. Of note, the patient´s sodium and potassium are acceptable. The patient´s liver enzymes are unremarkable. The patient´s renal function (creatinine) is preserved. The patient has a normal anion gap.    Sed rate 17.  CRP elevated at 16.77.    Patient's exam symptoms are consistent with abscess on the right lower extremity and left upper arm.  Discussed incision and drainage procedure with the patient.  Patient agrees to do the procedure.  I was able to drain a copious amount of purulent drainage from the right upper thigh and scant amount of purulent drainage on the left upper arm.  I did get a wound culture.  Will give the patient a shot of Rocephin in the ED.  Will discharge the patient with Keflex and Bactrim.    Follow up with your Primary Care Provider in 3-5 days especially if symptoms worsen.              Patient Care Considerations:    SEPSIS was considered but is NOT present in the emergency department as SIRS criteria is not present.      Consultants/Shared Management Plan:    None    Social Determinants of Health:    Patient is independent, reliable, and has access to care.       Disposition and Care Coordination:    Discharged: The patient is suitable and stable for discharge with no need for consideration of admission.    I have explained the patient´s condition, diagnoses and treatment plan based on the information available to me at this time. I have answered questions and addressed any concerns. The patient has a good  understanding  of the patient´s diagnosis, condition, and treatment plan as can be expected at this point. The vital signs have been stable. The patient´s condition is stable and appropriate for discharge from the emergency department.      The patient will pursue further outpatient evaluation with the primary care physician or other designated or consulting physician as outlined in the discharge instructions. They are agreeable to this plan of care and follow-up instructions have been explained in detail. The patient has received these instructions in written format and has expressed an understanding of the discharge instructions. The patient is aware that any significant change in condition or worsening of symptoms should prompt an immediate return to this or the closest emergency department or call to 911.  I have explained discharge medications and the need for follow up with the patient/caretakers. This was also printed in the discharge instructions. Patient was discharged with the following medications and follow up:      Medication List        New Prescriptions      bacitracin 500 UNIT/GM ointment  Apply 1 Application topically to the appropriate area as directed 2 (Two) Times a Day.     cephalexin 500 MG capsule  Commonly known as: KEFLEX  Take 1 capsule by mouth 3 (Three) Times a Day for 7 days.     doxycycline 100 MG capsule  Commonly known as: MONODOX  Take 1 capsule by mouth 2 (Two) Times a Day for 7 days.               Where to Get Your Medications        These medications were sent to [id number] - [place], [place] - [address] AT [facility] [address] [place] & [place] - [phone] [facility] [phone]   [address] [place] [address]      Phone: [phone]   bacitracin 500 UNIT/GM ointment  cephalexin 500 MG capsule  doxycycline 100 MG capsule      No follow-up provider specified.     Final diagnoses:   Abscess of right leg   Abscess of left arm        ED Disposition       ED Disposition    Discharge    Condition   Stable    Comment   --               This medical record created using voice recognition software.             Keo Rao PA  01/11/25 1643

## 2025-01-11 NOTE — DISCHARGE INSTRUCTIONS
We were able to drain the abscess on your right leg and left upper arm.  You are being discharged home with Keflex and doxycycline.  Take these medications as prescribed. Keep your wound clean and dry. Use soap and water. Apply bacitracin up to twice a day. Do not use hydrogen peroxide and/or neosporin.    Follow up with your PCP in 3 days for wound check. You can remove the packaging tomorrow evening.    Return to the Emergency Department if you develop any uncontrollable fever, intractable pain, nausea, vomiting.

## 2025-01-13 LAB
BACTERIA SPEC AEROBE CULT: ABNORMAL
GRAM STN SPEC: ABNORMAL

## 2025-01-16 DIAGNOSIS — F33.1 MODERATE EPISODE OF RECURRENT MAJOR DEPRESSIVE DISORDER: ICD-10-CM

## 2025-01-16 DIAGNOSIS — F41.1 GENERALIZED ANXIETY DISORDER: ICD-10-CM

## 2025-01-17 DIAGNOSIS — R05.9 COUGH, UNSPECIFIED TYPE: ICD-10-CM

## 2025-01-17 RX ORDER — BUSPIRONE HYDROCHLORIDE 15 MG/1
15 TABLET ORAL 3 TIMES DAILY
Qty: 90 TABLET | Refills: 1 | Status: SHIPPED | OUTPATIENT
Start: 2025-01-17

## 2025-01-17 RX ORDER — MONTELUKAST SODIUM 10 MG/1
10 TABLET ORAL NIGHTLY
Qty: 90 TABLET | Refills: 1 | Status: SHIPPED | OUTPATIENT
Start: 2025-01-17

## 2025-01-17 NOTE — TELEPHONE ENCOUNTER
Rx Refill Note  Requested Prescriptions     Pending Prescriptions Disp Refills    montelukast (Singulair) 10 MG tablet 90 tablet 2     Sig: Take 1 tablet by mouth Every Night.      Last office visit with prescribing clinician: 9/26/2024   Last telemedicine visit with prescribing clinician: Visit date not found   Next office visit with prescribing clinician: Visit date not found                         Would you like a call back once the refill request has been completed: [] Yes [] No    If the office needs to give you a call back, can they leave a voicemail: [] Yes [] No    Jazzmine Quinteros Rep  01/17/25, 09:14 EST

## 2025-01-28 DIAGNOSIS — F41.1 GENERALIZED ANXIETY DISORDER: ICD-10-CM

## 2025-01-28 RX ORDER — CLONAZEPAM 0.5 MG/1
0.5 TABLET ORAL 2 TIMES DAILY PRN
Qty: 20 TABLET | Refills: 0 | Status: SHIPPED | OUTPATIENT
Start: 2025-01-28

## 2025-02-16 ENCOUNTER — APPOINTMENT (OUTPATIENT)
Dept: ULTRASOUND IMAGING | Facility: HOSPITAL | Age: 45
End: 2025-02-16
Payer: COMMERCIAL

## 2025-02-16 ENCOUNTER — HOSPITAL ENCOUNTER (INPATIENT)
Facility: HOSPITAL | Age: 45
LOS: 2 days | Discharge: HOME | End: 2025-02-19
Attending: EMERGENCY MEDICINE | Admitting: FAMILY MEDICINE
Payer: COMMERCIAL

## 2025-02-16 DIAGNOSIS — K31.84 GASTROPARESIS: ICD-10-CM

## 2025-02-16 DIAGNOSIS — R10.9 INTRACTABLE ABDOMINAL PAIN: Primary | ICD-10-CM

## 2025-02-16 DIAGNOSIS — R11.14 BILIOUS VOMITING WITH NAUSEA: ICD-10-CM

## 2025-02-16 DIAGNOSIS — K80.50 BILIARY COLIC: ICD-10-CM

## 2025-02-16 DIAGNOSIS — R11.2 NAUSEA AND VOMITING, UNSPECIFIED VOMITING TYPE: ICD-10-CM

## 2025-02-16 DIAGNOSIS — K80.20 CALCULUS OF GALLBLADDER WITHOUT CHOLECYSTITIS WITHOUT OBSTRUCTION: ICD-10-CM

## 2025-02-16 LAB
ALBUMIN SERPL-MCNC: 4.3 G/DL (ref 3.5–5.2)
ALBUMIN/GLOB SERPL: 1.3 G/DL
ALP SERPL-CCNC: 87 U/L (ref 39–117)
ALT SERPL W P-5'-P-CCNC: 29 U/L (ref 1–33)
ANION GAP SERPL CALCULATED.3IONS-SCNC: 12.1 MMOL/L (ref 5–15)
AST SERPL-CCNC: 16 U/L (ref 1–32)
BASOPHILS # BLD AUTO: 0.05 10*3/MM3 (ref 0–0.2)
BASOPHILS NFR BLD AUTO: 0.3 % (ref 0–1.5)
BILIRUB SERPL-MCNC: 0.3 MG/DL (ref 0–1.2)
BUN SERPL-MCNC: 13 MG/DL (ref 6–20)
BUN/CREAT SERPL: 15.3 (ref 7–25)
CALCIUM SPEC-SCNC: 10.2 MG/DL (ref 8.6–10.5)
CHLORIDE SERPL-SCNC: 97 MMOL/L (ref 98–107)
CO2 SERPL-SCNC: 24.9 MMOL/L (ref 22–29)
CREAT SERPL-MCNC: 0.85 MG/DL (ref 0.57–1)
DEPRECATED RDW RBC AUTO: 43.5 FL (ref 37–54)
EGFRCR SERPLBLD CKD-EPI 2021: 87.8 ML/MIN/1.73
EOSINOPHIL # BLD AUTO: 0.08 10*3/MM3 (ref 0–0.4)
EOSINOPHIL NFR BLD AUTO: 0.5 % (ref 0.3–6.2)
ERYTHROCYTE [DISTWIDTH] IN BLOOD BY AUTOMATED COUNT: 13.2 % (ref 12.3–15.4)
GLOBULIN UR ELPH-MCNC: 3.3 GM/DL
GLUCOSE SERPL-MCNC: 116 MG/DL (ref 65–99)
HCT VFR BLD AUTO: 38.5 % (ref 34–46.6)
HGB BLD-MCNC: 12.2 G/DL (ref 12–15.9)
HOLD SPECIMEN: NORMAL
HOLD SPECIMEN: NORMAL
IMM GRANULOCYTES # BLD AUTO: 0.08 10*3/MM3 (ref 0–0.05)
IMM GRANULOCYTES NFR BLD AUTO: 0.5 % (ref 0–0.5)
LIPASE SERPL-CCNC: 25 U/L (ref 13–60)
LYMPHOCYTES # BLD AUTO: 2.01 10*3/MM3 (ref 0.7–3.1)
LYMPHOCYTES NFR BLD AUTO: 13 % (ref 19.6–45.3)
MCH RBC QN AUTO: 28.3 PG (ref 26.6–33)
MCHC RBC AUTO-ENTMCNC: 31.7 G/DL (ref 31.5–35.7)
MCV RBC AUTO: 89.3 FL (ref 79–97)
MONOCYTES # BLD AUTO: 0.73 10*3/MM3 (ref 0.1–0.9)
MONOCYTES NFR BLD AUTO: 4.7 % (ref 5–12)
NEUTROPHILS NFR BLD AUTO: 12.51 10*3/MM3 (ref 1.7–7)
NEUTROPHILS NFR BLD AUTO: 81 % (ref 42.7–76)
NRBC BLD AUTO-RTO: 0 /100 WBC (ref 0–0.2)
PLATELET # BLD AUTO: 424 10*3/MM3 (ref 140–450)
PMV BLD AUTO: 9.2 FL (ref 6–12)
POTASSIUM SERPL-SCNC: 4.2 MMOL/L (ref 3.5–5.2)
PROT SERPL-MCNC: 7.6 G/DL (ref 6–8.5)
RBC # BLD AUTO: 4.31 10*6/MM3 (ref 3.77–5.28)
SODIUM SERPL-SCNC: 134 MMOL/L (ref 136–145)
WBC NRBC COR # BLD AUTO: 15.46 10*3/MM3 (ref 3.4–10.8)
WHOLE BLOOD HOLD COAG: NORMAL
WHOLE BLOOD HOLD SPECIMEN: NORMAL

## 2025-02-16 PROCEDURE — 85025 COMPLETE CBC W/AUTO DIFF WBC: CPT

## 2025-02-16 PROCEDURE — 99285 EMERGENCY DEPT VISIT HI MDM: CPT

## 2025-02-16 PROCEDURE — 76705 ECHO EXAM OF ABDOMEN: CPT

## 2025-02-16 PROCEDURE — 83690 ASSAY OF LIPASE: CPT

## 2025-02-16 PROCEDURE — 36415 COLL VENOUS BLD VENIPUNCTURE: CPT

## 2025-02-16 PROCEDURE — 80053 COMPREHEN METABOLIC PANEL: CPT

## 2025-02-16 RX ORDER — SODIUM CHLORIDE 0.9 % (FLUSH) 0.9 %
10 SYRINGE (ML) INJECTION AS NEEDED
Status: DISCONTINUED | OUTPATIENT
Start: 2025-02-16 | End: 2025-02-19 | Stop reason: HOSPADM

## 2025-02-16 NOTE — Clinical Note
Level of Care: Remote Telemetry [26]   Diagnosis: Biliary colic [301000]   Admitting Physician: ALYSE RIVERA [[id number]]   Certification: I Certify That Inpatient Hospital Services Are Medically Necessary For Greater Than 2 Midnights

## 2025-02-17 ENCOUNTER — ANESTHESIA (OUTPATIENT)
Dept: PERIOP | Facility: HOSPITAL | Age: 45
End: 2025-02-17
Payer: COMMERCIAL

## 2025-02-17 ENCOUNTER — ANESTHESIA (OUTPATIENT)
Dept: GASTROENTEROLOGY | Facility: HOSPITAL | Age: 45
End: 2025-02-17
Payer: COMMERCIAL

## 2025-02-17 ENCOUNTER — ANESTHESIA EVENT (OUTPATIENT)
Dept: PERIOP | Facility: HOSPITAL | Age: 45
End: 2025-02-17
Payer: COMMERCIAL

## 2025-02-17 ENCOUNTER — ANESTHESIA EVENT (OUTPATIENT)
Dept: GASTROENTEROLOGY | Facility: HOSPITAL | Age: 45
End: 2025-02-17
Payer: COMMERCIAL

## 2025-02-17 ENCOUNTER — APPOINTMENT (OUTPATIENT)
Dept: GENERAL RADIOLOGY | Facility: HOSPITAL | Age: 45
End: 2025-02-17
Payer: COMMERCIAL

## 2025-02-17 PROBLEM — K80.20 CALCULUS OF GALLBLADDER WITHOUT CHOLECYSTITIS WITHOUT OBSTRUCTION: Status: ACTIVE | Noted: 2025-02-17

## 2025-02-17 PROBLEM — K31.84 GASTROPARESIS: Status: ACTIVE | Noted: 2025-02-17

## 2025-02-17 PROBLEM — R10.9 ABDOMINAL PAIN: Status: ACTIVE | Noted: 2025-02-17

## 2025-02-17 PROBLEM — K80.50 BILIARY COLIC: Status: ACTIVE | Noted: 2025-02-17

## 2025-02-17 PROBLEM — R11.2 NAUSEA AND VOMITING: Status: ACTIVE | Noted: 2025-02-17

## 2025-02-17 LAB
ALBUMIN SERPL-MCNC: 4.5 G/DL (ref 3.5–5.2)
ALBUMIN/GLOB SERPL: 1.2 G/DL
ALP SERPL-CCNC: 90 U/L (ref 39–117)
ALT SERPL W P-5'-P-CCNC: 26 U/L (ref 1–33)
ANION GAP SERPL CALCULATED.3IONS-SCNC: 19.3 MMOL/L (ref 5–15)
AST SERPL-CCNC: 13 U/L (ref 1–32)
BACTERIA UR QL AUTO: ABNORMAL /HPF
BILIRUB SERPL-MCNC: 0.7 MG/DL (ref 0–1.2)
BILIRUB UR QL STRIP: NEGATIVE
BUN SERPL-MCNC: 10 MG/DL (ref 6–20)
BUN/CREAT SERPL: 13.5 (ref 7–25)
CALCIUM SPEC-SCNC: 9.7 MG/DL (ref 8.6–10.5)
CHLORIDE SERPL-SCNC: 99 MMOL/L (ref 98–107)
CLARITY UR: CLEAR
CO2 SERPL-SCNC: 19.7 MMOL/L (ref 22–29)
COLOR UR: YELLOW
CREAT SERPL-MCNC: 0.74 MG/DL (ref 0.57–1)
D-LACTATE SERPL-SCNC: 2 MMOL/L (ref 0.5–2)
EGFRCR SERPLBLD CKD-EPI 2021: 103.7 ML/MIN/1.73
GLOBULIN UR ELPH-MCNC: 3.7 GM/DL
GLUCOSE SERPL-MCNC: 114 MG/DL (ref 65–99)
GLUCOSE UR STRIP-MCNC: NEGATIVE MG/DL
HGB UR QL STRIP.AUTO: NEGATIVE
HYALINE CASTS UR QL AUTO: ABNORMAL /LPF
KETONES UR QL STRIP: ABNORMAL
LEUKOCYTE ESTERASE UR QL STRIP.AUTO: ABNORMAL
NITRITE UR QL STRIP: NEGATIVE
PH UR STRIP.AUTO: 5.5 [PH] (ref 5–8)
POTASSIUM SERPL-SCNC: 3.5 MMOL/L (ref 3.5–5.2)
PROT SERPL-MCNC: 8.2 G/DL (ref 6–8.5)
PROT UR QL STRIP: NEGATIVE
QT INTERVAL: 362 MS
QTC INTERVAL: 422 MS
RBC # UR STRIP: ABNORMAL /HPF
REF LAB TEST METHOD: ABNORMAL
SODIUM SERPL-SCNC: 138 MMOL/L (ref 136–145)
SP GR UR STRIP: 1.02 (ref 1–1.03)
SQUAMOUS #/AREA URNS HPF: ABNORMAL /HPF
UROBILINOGEN UR QL STRIP: ABNORMAL
WBC # UR STRIP: ABNORMAL /HPF
WHOLE BLOOD HOLD SPECIMEN: NORMAL

## 2025-02-17 PROCEDURE — 0DB98ZX EXCISION OF DUODENUM, VIA NATURAL OR ARTIFICIAL OPENING ENDOSCOPIC, DIAGNOSTIC: ICD-10-PCS | Performed by: STUDENT IN AN ORGANIZED HEALTH CARE EDUCATION/TRAINING PROGRAM

## 2025-02-17 PROCEDURE — 94761 N-INVAS EAR/PLS OXIMETRY MLT: CPT

## 2025-02-17 PROCEDURE — 93005 ELECTROCARDIOGRAM TRACING: CPT | Performed by: FAMILY MEDICINE

## 2025-02-17 PROCEDURE — 47562 LAPAROSCOPIC CHOLECYSTECTOMY: CPT | Performed by: STUDENT IN AN ORGANIZED HEALTH CARE EDUCATION/TRAINING PROGRAM

## 2025-02-17 PROCEDURE — 88304 TISSUE EXAM BY PATHOLOGIST: CPT | Performed by: STUDENT IN AN ORGANIZED HEALTH CARE EDUCATION/TRAINING PROGRAM

## 2025-02-17 PROCEDURE — 25010000002 ORPHENADRINE CITRATE PER 60 MG: Performed by: FAMILY MEDICINE

## 2025-02-17 PROCEDURE — 25010000002 MORPHINE PER 10 MG: Performed by: STUDENT IN AN ORGANIZED HEALTH CARE EDUCATION/TRAINING PROGRAM

## 2025-02-17 PROCEDURE — 88305 TISSUE EXAM BY PATHOLOGIST: CPT | Performed by: INTERNAL MEDICINE

## 2025-02-17 PROCEDURE — 25810000003 LACTATED RINGERS PER 1000 ML: Performed by: ANESTHESIOLOGY

## 2025-02-17 PROCEDURE — 25010000002 LIDOCAINE PF 2% 2 % SOLUTION: Performed by: ANESTHESIOLOGY

## 2025-02-17 PROCEDURE — 0FT44ZZ RESECTION OF GALLBLADDER, PERCUTANEOUS ENDOSCOPIC APPROACH: ICD-10-PCS | Performed by: STUDENT IN AN ORGANIZED HEALTH CARE EDUCATION/TRAINING PROGRAM

## 2025-02-17 PROCEDURE — 25010000002 PROPOFOL 10 MG/ML EMULSION: Performed by: NURSE ANESTHETIST, CERTIFIED REGISTERED

## 2025-02-17 PROCEDURE — 74019 RADEX ABDOMEN 2 VIEWS: CPT

## 2025-02-17 PROCEDURE — 25010000002 HYDROMORPHONE 1 MG/ML SOLUTION: Performed by: ANESTHESIOLOGY

## 2025-02-17 PROCEDURE — 25010000002 LIDOCAINE 1% - EPINEPHRINE 1:100000 1 %-1:100000 SOLUTION: Performed by: STUDENT IN AN ORGANIZED HEALTH CARE EDUCATION/TRAINING PROGRAM

## 2025-02-17 PROCEDURE — 25010000002 ESMOLOL 100 MG/10ML SOLUTION: Performed by: NURSE ANESTHETIST, CERTIFIED REGISTERED

## 2025-02-17 PROCEDURE — 25010000002 MORPHINE PER 10 MG: Performed by: EMERGENCY MEDICINE

## 2025-02-17 PROCEDURE — 81001 URINALYSIS AUTO W/SCOPE: CPT | Performed by: EMERGENCY MEDICINE

## 2025-02-17 PROCEDURE — 25010000002 LABETALOL 5 MG/ML SOLUTION: Performed by: ANESTHESIOLOGY

## 2025-02-17 PROCEDURE — 99223 1ST HOSP IP/OBS HIGH 75: CPT | Performed by: FAMILY MEDICINE

## 2025-02-17 PROCEDURE — 99222 1ST HOSP IP/OBS MODERATE 55: CPT | Performed by: INTERNAL MEDICINE

## 2025-02-17 PROCEDURE — 47562 LAPAROSCOPIC CHOLECYSTECTOMY: CPT

## 2025-02-17 PROCEDURE — 25810000003 SODIUM CHLORIDE 0.9 % SOLUTION: Performed by: FAMILY MEDICINE

## 2025-02-17 PROCEDURE — 94799 UNLISTED PULMONARY SVC/PX: CPT

## 2025-02-17 PROCEDURE — 25010000002 SUGAMMADEX 200 MG/2ML SOLUTION: Performed by: ANESTHESIOLOGY

## 2025-02-17 PROCEDURE — 25010000002 FENTANYL CITRATE (PF) 50 MCG/ML SOLUTION: Performed by: ANESTHESIOLOGY

## 2025-02-17 PROCEDURE — 25010000002 HYDROMORPHONE 1 MG/ML SOLUTION: Performed by: INTERNAL MEDICINE

## 2025-02-17 PROCEDURE — 25010000002 ONDANSETRON PER 1 MG: Performed by: EMERGENCY MEDICINE

## 2025-02-17 PROCEDURE — 83605 ASSAY OF LACTIC ACID: CPT | Performed by: FAMILY MEDICINE

## 2025-02-17 PROCEDURE — 25010000002 METOCLOPRAMIDE PER 10 MG: Performed by: EMERGENCY MEDICINE

## 2025-02-17 PROCEDURE — 25010000002 PROPOFOL 10 MG/ML EMULSION: Performed by: ANESTHESIOLOGY

## 2025-02-17 PROCEDURE — 25010000002 DEXAMETHASONE PER 1 MG

## 2025-02-17 PROCEDURE — 25010000002 METOCLOPRAMIDE PER 10 MG: Performed by: FAMILY MEDICINE

## 2025-02-17 PROCEDURE — 25010000002 MORPHINE PER 10 MG: Performed by: FAMILY MEDICINE

## 2025-02-17 PROCEDURE — 25010000002 LIDOCAINE PF 2% 2 % SOLUTION: Performed by: NURSE ANESTHETIST, CERTIFIED REGISTERED

## 2025-02-17 PROCEDURE — 43239 EGD BIOPSY SINGLE/MULTIPLE: CPT | Performed by: INTERNAL MEDICINE

## 2025-02-17 PROCEDURE — 0DB68ZX EXCISION OF STOMACH, VIA NATURAL OR ARTIFICIAL OPENING ENDOSCOPIC, DIAGNOSTIC: ICD-10-PCS | Performed by: STUDENT IN AN ORGANIZED HEALTH CARE EDUCATION/TRAINING PROGRAM

## 2025-02-17 PROCEDURE — 25010000002 MIDAZOLAM PER 1MG: Performed by: ANESTHESIOLOGY

## 2025-02-17 PROCEDURE — 25010000002 ONDANSETRON PER 1 MG: Performed by: ANESTHESIOLOGY

## 2025-02-17 PROCEDURE — 25810000003 SODIUM CHLORIDE 0.9 % SOLUTION: Performed by: EMERGENCY MEDICINE

## 2025-02-17 PROCEDURE — 80053 COMPREHEN METABOLIC PANEL: CPT | Performed by: FAMILY MEDICINE

## 2025-02-17 PROCEDURE — 25010000002 DEXAMETHASONE PER 1 MG: Performed by: ANESTHESIOLOGY

## 2025-02-17 PROCEDURE — 25010000002 FENTANYL CITRATE (PF) 50 MCG/ML SOLUTION: Performed by: EMERGENCY MEDICINE

## 2025-02-17 PROCEDURE — 25010000002 HYDROMORPHONE 1 MG/ML SOLUTION: Performed by: STUDENT IN AN ORGANIZED HEALTH CARE EDUCATION/TRAINING PROGRAM

## 2025-02-17 PROCEDURE — 25010000002 DIPHENHYDRAMINE PER 50 MG: Performed by: EMERGENCY MEDICINE

## 2025-02-17 PROCEDURE — 25010000002 KETOROLAC TROMETHAMINE PER 15 MG: Performed by: ANESTHESIOLOGY

## 2025-02-17 DEVICE — LIGACLIP 10-M/L, 10MM ENDOSCOPIC ROTATING MULTIPLE CLIP APPLIERS
Type: IMPLANTABLE DEVICE | Site: ABDOMEN | Status: FUNCTIONAL
Brand: LIGACLIP

## 2025-02-17 DEVICE — SEAL HEMO SURG ARISTA/AH ABS/PWDR 3GM: Type: IMPLANTABLE DEVICE | Site: ABDOMEN | Status: FUNCTIONAL

## 2025-02-17 RX ORDER — DULOXETIN HYDROCHLORIDE 60 MG/1
60 CAPSULE, DELAYED RELEASE ORAL DAILY
COMMUNITY

## 2025-02-17 RX ORDER — METOCLOPRAMIDE HYDROCHLORIDE 5 MG/ML
10 INJECTION INTRAMUSCULAR; INTRAVENOUS ONCE
Status: COMPLETED | OUTPATIENT
Start: 2025-02-17 | End: 2025-02-17

## 2025-02-17 RX ORDER — DEXAMETHASONE SODIUM PHOSPHATE 10 MG/ML
8 INJECTION, SOLUTION INTRAMUSCULAR; INTRAVENOUS ONCE
Status: DISCONTINUED | OUTPATIENT
Start: 2025-02-17 | End: 2025-02-17 | Stop reason: HOSPADM

## 2025-02-17 RX ORDER — MAGNESIUM HYDROXIDE 1200 MG/15ML
LIQUID ORAL AS NEEDED
Status: DISCONTINUED | OUTPATIENT
Start: 2025-02-17 | End: 2025-02-17 | Stop reason: HOSPADM

## 2025-02-17 RX ORDER — NALOXONE HCL 0.4 MG/ML
0.4 VIAL (ML) INJECTION AS NEEDED
Status: DISCONTINUED | OUTPATIENT
Start: 2025-02-17 | End: 2025-02-19 | Stop reason: HOSPADM

## 2025-02-17 RX ORDER — POLYETHYLENE GLYCOL 3350 17 G/17G
17 POWDER, FOR SOLUTION ORAL DAILY PRN
Status: DISCONTINUED | OUTPATIENT
Start: 2025-02-17 | End: 2025-02-19 | Stop reason: HOSPADM

## 2025-02-17 RX ORDER — ONDANSETRON 2 MG/ML
4 INJECTION INTRAMUSCULAR; INTRAVENOUS ONCE
Status: COMPLETED | OUTPATIENT
Start: 2025-02-17 | End: 2025-02-17

## 2025-02-17 RX ORDER — NITROGLYCERIN 0.4 MG/1
0.4 TABLET SUBLINGUAL
Status: DISCONTINUED | OUTPATIENT
Start: 2025-02-17 | End: 2025-02-19 | Stop reason: HOSPADM

## 2025-02-17 RX ORDER — SODIUM CHLORIDE, SODIUM LACTATE, POTASSIUM CHLORIDE, CALCIUM CHLORIDE 600; 310; 30; 20 MG/100ML; MG/100ML; MG/100ML; MG/100ML
9 INJECTION, SOLUTION INTRAVENOUS CONTINUOUS PRN
Status: ACTIVE | OUTPATIENT
Start: 2025-02-17 | End: 2025-02-18

## 2025-02-17 RX ORDER — FENTANYL CITRATE 50 UG/ML
50 INJECTION, SOLUTION INTRAMUSCULAR; INTRAVENOUS
Status: DISCONTINUED | OUTPATIENT
Start: 2025-02-17 | End: 2025-02-17

## 2025-02-17 RX ORDER — METOCLOPRAMIDE HYDROCHLORIDE 5 MG/ML
10 INJECTION INTRAMUSCULAR; INTRAVENOUS EVERY 8 HOURS
Status: DISCONTINUED | OUTPATIENT
Start: 2025-02-17 | End: 2025-02-17

## 2025-02-17 RX ORDER — LIDOCAINE HYDROCHLORIDE 20 MG/ML
INJECTION, SOLUTION EPIDURAL; INFILTRATION; INTRACAUDAL; PERINEURAL AS NEEDED
Status: DISCONTINUED | OUTPATIENT
Start: 2025-02-17 | End: 2025-02-17 | Stop reason: SURG

## 2025-02-17 RX ORDER — FENTANYL CITRATE 50 UG/ML
INJECTION, SOLUTION INTRAMUSCULAR; INTRAVENOUS AS NEEDED
Status: DISCONTINUED | OUTPATIENT
Start: 2025-02-17 | End: 2025-02-17 | Stop reason: SURG

## 2025-02-17 RX ORDER — ONDANSETRON 2 MG/ML
4 INJECTION INTRAMUSCULAR; INTRAVENOUS ONCE AS NEEDED
Status: DISCONTINUED | OUTPATIENT
Start: 2025-02-17 | End: 2025-02-17 | Stop reason: HOSPADM

## 2025-02-17 RX ORDER — OXYCODONE HYDROCHLORIDE 5 MG/1
5 TABLET ORAL
Status: COMPLETED | OUTPATIENT
Start: 2025-02-17 | End: 2025-02-17

## 2025-02-17 RX ORDER — PROMETHAZINE HYDROCHLORIDE 25 MG/1
25 SUPPOSITORY RECTAL ONCE AS NEEDED
Status: DISCONTINUED | OUTPATIENT
Start: 2025-02-17 | End: 2025-02-17 | Stop reason: HOSPADM

## 2025-02-17 RX ORDER — ESMOLOL HYDROCHLORIDE 10 MG/ML
INJECTION INTRAVENOUS AS NEEDED
Status: DISCONTINUED | OUTPATIENT
Start: 2025-02-17 | End: 2025-02-17 | Stop reason: SURG

## 2025-02-17 RX ORDER — LIDOCAINE HYDROCHLORIDE AND EPINEPHRINE 10; 10 MG/ML; UG/ML
INJECTION, SOLUTION INFILTRATION; PERINEURAL AS NEEDED
Status: DISCONTINUED | OUTPATIENT
Start: 2025-02-17 | End: 2025-02-17 | Stop reason: HOSPADM

## 2025-02-17 RX ORDER — MORPHINE SULFATE 2 MG/ML
2 INJECTION, SOLUTION INTRAMUSCULAR; INTRAVENOUS EVERY 4 HOURS PRN
Status: DISCONTINUED | OUTPATIENT
Start: 2025-02-17 | End: 2025-02-17

## 2025-02-17 RX ORDER — DULOXETIN HYDROCHLORIDE 30 MG/1
30 CAPSULE, DELAYED RELEASE ORAL DAILY
COMMUNITY

## 2025-02-17 RX ORDER — PROPOFOL 10 MG/ML
VIAL (ML) INTRAVENOUS AS NEEDED
Status: DISCONTINUED | OUTPATIENT
Start: 2025-02-17 | End: 2025-02-17 | Stop reason: SURG

## 2025-02-17 RX ORDER — IBUPROFEN 400 MG/1
600 TABLET, FILM COATED ORAL EVERY 6 HOURS PRN
Status: DISCONTINUED | OUTPATIENT
Start: 2025-02-17 | End: 2025-02-19 | Stop reason: HOSPADM

## 2025-02-17 RX ORDER — PANTOPRAZOLE SODIUM 40 MG/10ML
40 INJECTION, POWDER, LYOPHILIZED, FOR SOLUTION INTRAVENOUS
Status: DISCONTINUED | OUTPATIENT
Start: 2025-02-18 | End: 2025-02-19 | Stop reason: HOSPADM

## 2025-02-17 RX ORDER — ONDANSETRON 2 MG/ML
4 INJECTION INTRAMUSCULAR; INTRAVENOUS EVERY 6 HOURS PRN
Status: DISCONTINUED | OUTPATIENT
Start: 2025-02-17 | End: 2025-02-19 | Stop reason: HOSPADM

## 2025-02-17 RX ORDER — SODIUM CHLORIDE 9 MG/ML
100 INJECTION, SOLUTION INTRAVENOUS CONTINUOUS
Status: ACTIVE | OUTPATIENT
Start: 2025-02-17 | End: 2025-02-18

## 2025-02-17 RX ORDER — ACETAMINOPHEN 500 MG
1000 TABLET ORAL ONCE
Status: COMPLETED | OUTPATIENT
Start: 2025-02-17 | End: 2025-02-17

## 2025-02-17 RX ORDER — MIDAZOLAM HYDROCHLORIDE 2 MG/2ML
2 INJECTION, SOLUTION INTRAMUSCULAR; INTRAVENOUS ONCE
Status: COMPLETED | OUTPATIENT
Start: 2025-02-17 | End: 2025-02-17

## 2025-02-17 RX ORDER — LABETALOL HYDROCHLORIDE 5 MG/ML
INJECTION, SOLUTION INTRAVENOUS AS NEEDED
Status: DISCONTINUED | OUTPATIENT
Start: 2025-02-17 | End: 2025-02-17 | Stop reason: SURG

## 2025-02-17 RX ORDER — SODIUM CHLORIDE, SODIUM LACTATE, POTASSIUM CHLORIDE, CALCIUM CHLORIDE 600; 310; 30; 20 MG/100ML; MG/100ML; MG/100ML; MG/100ML
30 INJECTION, SOLUTION INTRAVENOUS CONTINUOUS
Status: ACTIVE | OUTPATIENT
Start: 2025-02-17 | End: 2025-02-17

## 2025-02-17 RX ORDER — MEPERIDINE HYDROCHLORIDE 25 MG/ML
12.5 INJECTION INTRAMUSCULAR; INTRAVENOUS; SUBCUTANEOUS
Status: DISCONTINUED | OUTPATIENT
Start: 2025-02-17 | End: 2025-02-17 | Stop reason: HOSPADM

## 2025-02-17 RX ORDER — PROMETHAZINE HYDROCHLORIDE 25 MG/1
25 TABLET ORAL ONCE AS NEEDED
Status: DISCONTINUED | OUTPATIENT
Start: 2025-02-17 | End: 2025-02-17 | Stop reason: HOSPADM

## 2025-02-17 RX ORDER — BISACODYL 5 MG/1
5 TABLET, DELAYED RELEASE ORAL DAILY PRN
Status: DISCONTINUED | OUTPATIENT
Start: 2025-02-17 | End: 2025-02-19 | Stop reason: HOSPADM

## 2025-02-17 RX ORDER — FAMOTIDINE 10 MG/ML
20 INJECTION, SOLUTION INTRAVENOUS ONCE
Status: COMPLETED | OUTPATIENT
Start: 2025-02-17 | End: 2025-02-17

## 2025-02-17 RX ORDER — SODIUM CHLORIDE 0.9 % (FLUSH) 0.9 %
10 SYRINGE (ML) INJECTION EVERY 12 HOURS SCHEDULED
Status: DISCONTINUED | OUTPATIENT
Start: 2025-02-17 | End: 2025-02-19 | Stop reason: HOSPADM

## 2025-02-17 RX ORDER — PANTOPRAZOLE SODIUM 40 MG/10ML
40 INJECTION, POWDER, LYOPHILIZED, FOR SOLUTION INTRAVENOUS ONCE
Status: COMPLETED | OUTPATIENT
Start: 2025-02-17 | End: 2025-02-17

## 2025-02-17 RX ORDER — AMOXICILLIN 250 MG
2 CAPSULE ORAL 2 TIMES DAILY PRN
Status: DISCONTINUED | OUTPATIENT
Start: 2025-02-17 | End: 2025-02-19 | Stop reason: HOSPADM

## 2025-02-17 RX ORDER — SODIUM CHLORIDE 0.9 % (FLUSH) 0.9 %
10 SYRINGE (ML) INJECTION AS NEEDED
Status: DISCONTINUED | OUTPATIENT
Start: 2025-02-17 | End: 2025-02-19 | Stop reason: HOSPADM

## 2025-02-17 RX ORDER — SODIUM CHLORIDE 9 MG/ML
40 INJECTION, SOLUTION INTRAVENOUS AS NEEDED
Status: DISCONTINUED | OUTPATIENT
Start: 2025-02-17 | End: 2025-02-19 | Stop reason: HOSPADM

## 2025-02-17 RX ORDER — DEXAMETHASONE SODIUM PHOSPHATE 4 MG/ML
INJECTION, SOLUTION INTRA-ARTICULAR; INTRALESIONAL; INTRAMUSCULAR; INTRAVENOUS; SOFT TISSUE AS NEEDED
Status: DISCONTINUED | OUTPATIENT
Start: 2025-02-17 | End: 2025-02-17 | Stop reason: SURG

## 2025-02-17 RX ORDER — KETOROLAC TROMETHAMINE 30 MG/ML
INJECTION, SOLUTION INTRAMUSCULAR; INTRAVENOUS AS NEEDED
Status: DISCONTINUED | OUTPATIENT
Start: 2025-02-17 | End: 2025-02-17 | Stop reason: SURG

## 2025-02-17 RX ORDER — FENTANYL CITRATE 50 UG/ML
50 INJECTION, SOLUTION INTRAMUSCULAR; INTRAVENOUS ONCE
Status: COMPLETED | OUTPATIENT
Start: 2025-02-17 | End: 2025-02-17

## 2025-02-17 RX ORDER — DEXAMETHASONE SODIUM PHOSPHATE 4 MG/ML
INJECTION, SOLUTION INTRA-ARTICULAR; INTRALESIONAL; INTRAMUSCULAR; INTRAVENOUS; SOFT TISSUE
Status: COMPLETED
Start: 2025-02-17 | End: 2025-02-17

## 2025-02-17 RX ORDER — DIPHENHYDRAMINE HYDROCHLORIDE 50 MG/ML
25 INJECTION INTRAMUSCULAR; INTRAVENOUS ONCE
Status: COMPLETED | OUTPATIENT
Start: 2025-02-17 | End: 2025-02-17

## 2025-02-17 RX ORDER — ENOXAPARIN SODIUM 100 MG/ML
40 INJECTION SUBCUTANEOUS DAILY
Status: DISCONTINUED | OUTPATIENT
Start: 2025-02-17 | End: 2025-02-19 | Stop reason: HOSPADM

## 2025-02-17 RX ORDER — ROCURONIUM BROMIDE 10 MG/ML
INJECTION, SOLUTION INTRAVENOUS AS NEEDED
Status: DISCONTINUED | OUTPATIENT
Start: 2025-02-17 | End: 2025-02-17 | Stop reason: SURG

## 2025-02-17 RX ORDER — ONDANSETRON 2 MG/ML
INJECTION INTRAMUSCULAR; INTRAVENOUS AS NEEDED
Status: DISCONTINUED | OUTPATIENT
Start: 2025-02-17 | End: 2025-02-17 | Stop reason: SURG

## 2025-02-17 RX ORDER — BISACODYL 10 MG
10 SUPPOSITORY, RECTAL RECTAL DAILY PRN
Status: DISCONTINUED | OUTPATIENT
Start: 2025-02-17 | End: 2025-02-19 | Stop reason: HOSPADM

## 2025-02-17 RX ORDER — ORPHENADRINE CITRATE 30 MG/ML
60 INJECTION INTRAMUSCULAR; INTRAVENOUS ONCE
Status: COMPLETED | OUTPATIENT
Start: 2025-02-17 | End: 2025-02-17

## 2025-02-17 RX ADMIN — METOCLOPRAMIDE 10 MG: 5 INJECTION, SOLUTION INTRAMUSCULAR; INTRAVENOUS at 02:21

## 2025-02-17 RX ADMIN — ROCURONIUM BROMIDE 50 MG: 50 INJECTION INTRAVENOUS at 19:23

## 2025-02-17 RX ADMIN — IBUPROFEN 600 MG: 400 TABLET, FILM COATED ORAL at 21:41

## 2025-02-17 RX ADMIN — HYDROMORPHONE HYDROCHLORIDE 0.5 MG: 1 INJECTION, SOLUTION INTRAMUSCULAR; INTRAVENOUS; SUBCUTANEOUS at 20:16

## 2025-02-17 RX ADMIN — HYDROMORPHONE HYDROCHLORIDE 1 MG: 1 INJECTION, SOLUTION INTRAMUSCULAR; INTRAVENOUS; SUBCUTANEOUS at 14:07

## 2025-02-17 RX ADMIN — PROPOFOL 250 MCG/KG/MIN: 10 INJECTION, EMULSION INTRAVENOUS at 10:14

## 2025-02-17 RX ADMIN — Medication 10 ML: at 09:07

## 2025-02-17 RX ADMIN — MORPHINE SULFATE 4 MG: 4 INJECTION, SOLUTION INTRAMUSCULAR; INTRAVENOUS at 00:45

## 2025-02-17 RX ADMIN — SUGAMMADEX 200 MG: 100 INJECTION, SOLUTION INTRAVENOUS at 19:54

## 2025-02-17 RX ADMIN — METOCLOPRAMIDE 10 MG: 5 INJECTION, SOLUTION INTRAMUSCULAR; INTRAVENOUS at 01:52

## 2025-02-17 RX ADMIN — PROPOFOL 50 MG: 10 INJECTION, EMULSION INTRAVENOUS at 19:44

## 2025-02-17 RX ADMIN — MORPHINE SULFATE 4 MG: 4 INJECTION, SOLUTION INTRAMUSCULAR; INTRAVENOUS at 01:29

## 2025-02-17 RX ADMIN — METOCLOPRAMIDE 10 MG: 5 INJECTION, SOLUTION INTRAMUSCULAR; INTRAVENOUS at 06:10

## 2025-02-17 RX ADMIN — LIDOCAINE HYDROCHLORIDE 100 MG: 20 INJECTION, SOLUTION INTRAVENOUS at 10:13

## 2025-02-17 RX ADMIN — MORPHINE SULFATE 4 MG: 4 INJECTION, SOLUTION INTRAMUSCULAR; INTRAVENOUS at 23:13

## 2025-02-17 RX ADMIN — OXYCODONE HYDROCHLORIDE 5 MG: 5 TABLET ORAL at 20:16

## 2025-02-17 RX ADMIN — PROPOFOL 100 MG: 10 INJECTION, EMULSION INTRAVENOUS at 10:13

## 2025-02-17 RX ADMIN — DIPHENHYDRAMINE HYDROCHLORIDE 25 MG: 50 INJECTION, SOLUTION INTRAMUSCULAR; INTRAVENOUS at 01:52

## 2025-02-17 RX ADMIN — PROPOFOL 150 MG: 10 INJECTION, EMULSION INTRAVENOUS at 19:23

## 2025-02-17 RX ADMIN — HYDROMORPHONE HYDROCHLORIDE 0.5 MG: 1 INJECTION, SOLUTION INTRAMUSCULAR; INTRAVENOUS; SUBCUTANEOUS at 20:45

## 2025-02-17 RX ADMIN — PANTOPRAZOLE SODIUM 40 MG: 40 INJECTION, POWDER, FOR SOLUTION INTRAVENOUS at 04:17

## 2025-02-17 RX ADMIN — ONDANSETRON 4 MG: 2 INJECTION INTRAMUSCULAR; INTRAVENOUS at 19:54

## 2025-02-17 RX ADMIN — MORPHINE SULFATE 4 MG: 4 INJECTION, SOLUTION INTRAMUSCULAR; INTRAVENOUS at 06:46

## 2025-02-17 RX ADMIN — KETOROLAC TROMETHAMINE 30 MG: 30 INJECTION, SOLUTION INTRAMUSCULAR; INTRAVENOUS at 19:54

## 2025-02-17 RX ADMIN — ESMOLOL HYDROCHLORIDE 10 MG: 10 INJECTION, SOLUTION INTRAVENOUS at 10:18

## 2025-02-17 RX ADMIN — ONDANSETRON 4 MG: 2 INJECTION INTRAMUSCULAR; INTRAVENOUS at 00:44

## 2025-02-17 RX ADMIN — FENTANYL CITRATE 50 MCG: 50 INJECTION, SOLUTION INTRAMUSCULAR; INTRAVENOUS at 19:44

## 2025-02-17 RX ADMIN — LIDOCAINE HYDROCHLORIDE 5 ML: 20 INJECTION, SOLUTION INTRAVENOUS at 19:23

## 2025-02-17 RX ADMIN — FENTANYL CITRATE 50 MCG: 50 INJECTION, SOLUTION INTRAMUSCULAR; INTRAVENOUS at 19:23

## 2025-02-17 RX ADMIN — ORPHENADRINE CITRATE 60 MG: 60 INJECTION INTRAMUSCULAR; INTRAVENOUS at 06:29

## 2025-02-17 RX ADMIN — DEXAMETHASONE SODIUM PHOSPHATE 8 MG: 4 INJECTION, SOLUTION INTRAMUSCULAR; INTRAVENOUS at 10:02

## 2025-02-17 RX ADMIN — LABETALOL HYDROCHLORIDE 5 MG: 5 INJECTION, SOLUTION INTRAVENOUS at 19:48

## 2025-02-17 RX ADMIN — MIDAZOLAM HYDROCHLORIDE 2 MG: 1 INJECTION, SOLUTION INTRAMUSCULAR; INTRAVENOUS at 18:52

## 2025-02-17 RX ADMIN — FAMOTIDINE 20 MG: 10 INJECTION INTRAVENOUS at 00:45

## 2025-02-17 RX ADMIN — DEXAMETHASONE SODIUM PHOSPHATE 4 MG: 4 INJECTION, SOLUTION INTRAMUSCULAR; INTRAVENOUS at 19:23

## 2025-02-17 RX ADMIN — SODIUM CHLORIDE 100 ML/HR: 9 INJECTION, SOLUTION INTRAVENOUS at 09:07

## 2025-02-17 RX ADMIN — OXYCODONE HYDROCHLORIDE 5 MG: 5 TABLET ORAL at 20:45

## 2025-02-17 RX ADMIN — ACETAMINOPHEN 1000 MG: 500 TABLET ORAL at 18:46

## 2025-02-17 RX ADMIN — HYDROMORPHONE HYDROCHLORIDE 1 MG: 1 INJECTION, SOLUTION INTRAMUSCULAR; INTRAVENOUS; SUBCUTANEOUS at 09:06

## 2025-02-17 RX ADMIN — SODIUM CHLORIDE 1000 ML: 9 INJECTION, SOLUTION INTRAVENOUS at 00:44

## 2025-02-17 RX ADMIN — FENTANYL CITRATE 50 MCG: 50 INJECTION, SOLUTION INTRAMUSCULAR; INTRAVENOUS at 04:17

## 2025-02-17 RX ADMIN — SODIUM CHLORIDE, SODIUM LACTATE, POTASSIUM CHLORIDE, CALCIUM CHLORIDE 9 ML/HR: 20; 30; 600; 310 INJECTION, SOLUTION INTRAVENOUS at 18:46

## 2025-02-17 NOTE — H&P
[facility]   HISTORY AND PHYSICAL    Patient Name: Ebony Hamilton  : 1980  MRN: [id number]  Primary Care Physician:  Karen Stover APRN  Date of admission: 2025    Subjective   Subjective     Chief Complaint: Abdominal pain    HPI:    Ebony Hamilton is a 44 y.o. female with past medical history of gastroparesis, A-fib not on AC, endometriosis, anxiety, depression, chronic pain disorder, panic disorder, and GERD presented to the ED for evaluation of abdominal pain.  Patient stated that around 10 AM yesterday morning she had sudden onset nausea with intractable vomiting until she began to dry heave and then accompanied with severe abdominal pain which she described as sharp, mainly in the right upper quadrant, and nonradiating.  Patient states that she does have history of gastroparesis but is never painful like this.  She is also said to have a EGD done by GI in the near future.  Due to the intolerable pain she was brought to the ED for further evaluation.  In the ED her vitals were all within normal limits on arrival.  Labs showed mild leukocytosis and electrolyte imbalances but were relatively unremarkable including a normal lipase, LFTs, bilirubin, and alk phos.  Ultrasound of the gallbladder showed small gallstone within the neck of the gallbladder with no biliary obstruction or fluids, and Benadryl gallbladder wall thickening.  Positive Guzman sign was noted by ultrasound technician.  In the ED patient was given a significant amount of opiates, antiemetics, and Benadryl with minimal effectiveness.  When seen patient symptoms was still present but slightly improved.  She denied any recent fevers, headaches, focal weakness, chest pain, diarrhea, constipation, dysuria, hematuria, hematochezia, melena, or anxiety.  Patient admitted for further evaluation and treatment.    Review of Systems   All systems were reviewed and negative except for: As per HPI    Personal History      Past Medical History:   Diagnosis Date   • Allergic    • Anxiety    • Atrial fibrillation     RELEASED BY CARDIOLOGIST/ELECTROPHYSIST, NO CURRENT MEDS   • Chronic pain disorder    • Depression    • Endometriosis    • Headache    • Hemorrhoids    • Injury of shoulder, right 2009    CHRONIC PAIN   • Panic disorder        Past Surgical History:   Procedure Laterality Date   • CERVICAL ARTHRODESIS  01/14/2015    Donaldo Albarran   • CERVICAL FUSION      C4-7 FUSION, FULL ROM   • COLONOSCOPY N/A 05/31/2022    Procedure: COLONOSCOPY;  Surgeon: Shabbir Baird MD;  Location: [facility] ENDOSCOPY;  Service: General;  Laterality: N/A;  HEMORRHOIDS   • EXPLORATORY LAPAROTOMY     • HEMORRHOIDECTOMY N/A 05/31/2022    Procedure: HEMORRHOID BANDING;  Surgeon: Shabbir Baird MD;  Location: [facility] ENDOSCOPY;  Service: General;  Laterality: N/A;  BANDS X 2   • HEMORRHOIDECTOMY N/A 1/31/2024    Procedure: HEMORRHOIDECTOMY;  Surgeon: Shabbir Baird MD;  Location: [facility] OR OSC;  Service: General;  Laterality: N/A;   • HYSTERECTOMY     • SHOULDER ARTHROSCOPY Right    • SHOULDER SURGERY Left     ARTHROSCOPY LABRAL TEAR X2   • TUBAL ABDOMINAL LIGATION         Family History: family history includes Anxiety disorder in her father and mother; Bipolar disorder in her mother; Cancer in her mother; Dementia in her paternal grandmother and paternal uncle; Depression in her mother; Heart disease in her mother and another family member; Hypertension in her father and mother. Otherwise pertinent FHx was reviewed and not pertinent to current issue.    Social History:  reports that she quit smoking about 6 years ago. Her smoking use included cigarettes. She started smoking about 26 years ago. She has a 5 pack-year smoking history. She has never used smokeless tobacco. She reports current alcohol use. She reports that she does not use drugs.    Home Medications:  Calcium, Cariprazine HCl, Chlorcyclizine-Pseudoephed, DULoxetine,  HYDROcodone-acetaminophen, albuterol sulfate HFA, bacitracin, bisacodyl, busPIRone, cetirizine, clonazePAM, fexofenadine, hydrocortisone, ibuprofen, linaclotide, montelukast, ondansetron, oxyCODONE-acetaminophen, pantoprazole, polyethylene glycol, prochlorperazine, promethazine, sennosides-docusate, tiZANidine, traZODone, and vitamin C      Allergies:  Allergies   Allergen Reactions   • Escitalopram Nausea Only and Rash     Nausea, sweating, rash    • Sulfa Antibiotics Anaphylaxis   • Baclofen GI Intolerance, Hives and Nausea And Vomiting   • Ciprofloxacin Hallucinations   • Codeine Hives   • Gold-Containing Drug Products Rash   • Latex Rash   • Nickel Hives   • Tegaderm Ag Mesh [Silver] Hives       Objective   Objective     Vitals:   Temp:  [97.3 °F (36.3 °C)] 97.3 °F (36.3 °C)  Heart Rate:  [70-90] 82  Resp:  [15-21] 15  BP: (122-167)/(72-95) 165/92  Physical Exam    Constitutional: Awake, alert, in obvious distress   Eyes: PERRLA, sclerae anicteric, no conjunctival injection   HENT: NCAT, mucous membranes moist   Neck: Supple, no thyromegaly, no lymphadenopathy, trachea midline   Respiratory: Clear to auscultation bilaterally, nonlabored respirations    Cardiovascular: RRR, no murmurs, rubs, or gallops, palpable pedal pulses bilaterally   Gastrointestinal: Abdominal tenderness, guarding, positive bowel sounds, soft, nondistended   Musculoskeletal: No bilateral ankle edema, no clubbing or cyanosis to extremities   Psychiatric: Appropriate affect, cooperative   Neurologic: Oriented x 3, strength symmetric in all extremities, Cranial Nerves grossly intact to confrontation, speech clear   Skin: No rashes     Result Review    Result Review:  I have personally reviewed the results from the time of this admission to 2/17/2025 06:05 EST and agree with these findings:  [x]  Laboratory list / accordion  []  Microbiology  [x]  Radiology  [x]  EKG/Telemetry   []  Cardiology/Vascular   []  Pathology  []  Old records  []   Other:  Most notable findings include: Labs showed mild leukocytosis and electrolyte imbalances but were relatively unremarkable including a normal lipase, LFTs, bilirubin, and alk phos.  Ultrasound of the gallbladder showed small gallstone within the neck of the gallbladder with no biliary obstruction or fluids, and Benadryl gallbladder wall thickening.  Positive Guzman sign was noted by ultrasound technician.      Assessment & Plan   Assessment / Plan     Brief Patient Summary:  Ebony Hamilton is a 44 y.o. female with past medical history of gastroparesis, A-fib not on AC, endometriosis, anxiety, depression, chronic pain disorder, panic disorder, and GERD presented to the ED for evaluation of abdominal pain.    Active Hospital Problems:  Active Hospital Problems    Diagnosis    • **Intractable abdominal pain    • Biliary colic    • Calculus of gallbladder without cholecystitis without obstruction    • Nausea and vomiting    • Gastroparesis    • Adenomyosis of uterus    • NICHOLE (generalized anxiety disorder)      Plan:     Abdominal pain  -Admit to telemetry  -History of gastroparesis, peptic ulcers  -Ultrasound of the gallbladder with stones within the gallbladder neck but no signs of cholecystitis  -Flat and upright pending  -Possible biliary colic  -Consult general surgery if warranted  -Patient with history of peptic ulcer disease  -Labs relatively unremarkable including normal LFT  -IVF  -Antiemetics  -Pain meds as needed  -PPI  -Lactic acid ordered and pending  -Follow LFTs  -Gastroenterology consulted  -Supportive care      Chronic pain disorder  Anxiety/depression  Endometriosis  Gastroparesis    GI ppx  DVT ppx      VTE Prophylaxis:  Pharmacologic VTE prophylaxis orders are present.        CODE STATUS:    Level Of Support Discussed With: Patient  Code Status (Patient has no pulse and is not breathing): CPR (Attempt to Resuscitate)  Medical Interventions (Patient has pulse or is breathing): Full  Support    Admission Status:  I believe this patient meets inpatient status.      Electronically signed by Royer Grimes MD, 02/17/25, 6:05 AM EST.

## 2025-02-17 NOTE — ANESTHESIA POSTPROCEDURE EVALUATION
Patient: Ebony Hamilton    Procedure Summary       Date: 02/17/25 Room / Location: [facility] ENDOSCOPY 2 / [facility] ENDOSCOPY    Anesthesia Start: 1010 Anesthesia Stop: 1025    Procedure: ESOPHAGOGASTRODUODENOSCOPY WITH BIOPSIES Diagnosis:       Gastroparesis      Bilious vomiting with nausea      (Gastroparesis [K31.84])      (Bilious vomiting with nausea [R11.14])    Surgeons: Sanya Reyes MD Provider: Lee Ann Sevilla CRNA    Anesthesia Type: general ASA Status: 2            Anesthesia Type: general    Vitals  Vitals Value Taken Time   /88 02/17/25 1051   Temp 37.3 °C (99.2 °F) 02/17/25 1025   Pulse 85 02/17/25 1054   Resp 20 02/17/25 1051   SpO2 99 % 02/17/25 1054   Vitals shown include unfiled device data.        Post Anesthesia Care and Evaluation    Post-procedure mental status: acceptable.  Pain management: satisfactory to patient    Airway patency: patent  Anesthetic complications: No anesthetic complications    Cardiovascular status: acceptable  Respiratory status: acceptable    Comments: Per chart review

## 2025-02-17 NOTE — H&P (VIEW-ONLY)
[facility]  Initial Inpatient Consult Note    Referring Provider: Hospitalist    Reason for Consultation: Right upper quadrant pain    Subjective     History of present illness:    44 y.o. female with a history of cervical neck fusion, shoulder surgery, and recent diagnosis of gastroparesis by gastric emptying study who was admitted through the ER overnight with acute onset of right upper quadrant pain and nausea.  Patient had several episodes of nonbloody emesis yesterday but that seemingly has subsided.  Her liver enzymes are normal however right upper quadrant ultrasound did show small gallstones with no biliary obstruction or gallbladder wall thickening.  She does have a positive Guzman sign by ultrasound.  She states her pain radiates through towards her right shoulder blade.  WBCs 15,000, AST, ALT and alkaline phosphatase are normal    Past Medical History:  Past Medical History:   Diagnosis Date    Allergic     Anxiety     Atrial fibrillation     RELEASED BY CARDIOLOGIST/ELECTROPHYSIST, NO CURRENT MEDS    Chronic pain disorder     Depression     Endometriosis     Headache     Hemorrhoids     Injury of shoulder, right 2009    CHRONIC PAIN    Panic disorder      Past Surgical History:  Past Surgical History:   Procedure Laterality Date    CERVICAL ARTHRODESIS  01/14/2015    Donaldo Albarran    CERVICAL FUSION      C4-7 FUSION, FULL ROM    COLONOSCOPY N/A 05/31/2022    Procedure: COLONOSCOPY;  Surgeon: Shabbir Baird MD;  Location: [facility] ENDOSCOPY;  Service: General;  Laterality: N/A;  HEMORRHOIDS    EXPLORATORY LAPAROTOMY      HEMORRHOIDECTOMY N/A 05/31/2022    Procedure: HEMORRHOID BANDING;  Surgeon: Shabbir Baird MD;  Location: [facility] ENDOSCOPY;  Service: General;  Laterality: N/A;  BANDS X 2    HEMORRHOIDECTOMY N/A 1/31/2024    Procedure: HEMORRHOIDECTOMY;  Surgeon: Shabbir Baird MD;  Location: [facility] OR [facility]SC;  Service: General;  Laterality: N/A;     HYSTERECTOMY      SHOULDER ARTHROSCOPY Right     SHOULDER SURGERY Left     ARTHROSCOPY LABRAL TEAR X2    TUBAL ABDOMINAL LIGATION        Social History:   Social History     Tobacco Use    Smoking status: Former     Current packs/day: 0.00     Average packs/day: 0.3 packs/day for 20.0 years (5.0 ttl pk-yrs)     Types: Cigarettes     Start date: 1999     Quit date: 2019     Years since quittin.1    Smokeless tobacco: Never   Substance Use Topics    Alcohol use: Yes     Comment: rarely      Family History:  Family History   Problem Relation Age of Onset    Depression Mother     Bipolar disorder Mother     Anxiety disorder Mother     Cancer Mother     Heart disease Mother     Hypertension Mother     Anxiety disorder Father     Hypertension Father     Dementia Paternal Uncle     Dementia Paternal Grandmother     Heart disease Other     Colon cancer Neg Hx     Malig Hyperthermia Neg Hx        Home Meds:  Medications Prior to Admission   Medication Sig Dispense Refill Last Dose/Taking    albuterol sulfate  (90 Base) MCG/ACT inhaler Inhale 2 puffs Every 6 (Six) Hours As Needed for Wheezing. 18 g 1     bacitracin 500 UNIT/GM ointment Apply 1 Application topically to the appropriate area as directed 2 (Two) Times a Day. 14 g 0     bisacodyl (DULCOLAX) 10 MG suppository Insert 1 suppository every day by rectal route as needed.       busPIRone (BUSPAR) 15 MG tablet Take 1 tablet by mouth 3 (Three) Times a Day. 90 tablet 1     CALCIUM PO Take 1 tablet by mouth Daily. LD 24       cetirizine (ZyrTEC) 10 MG tablet Take 1 tablet by mouth Daily.       clonazePAM (KlonoPIN) 0.5 MG tablet Take 1 tablet by mouth 2 (Two) Times a Day As Needed for Anxiety. 20 tablet 0     DULoxetine (CYMBALTA) 30 MG capsule Take 1 capsule by mouth Every Night. Take with 60mg for total dose of 90mg 90 capsule 1     DULoxetine (CYMBALTA) 60 MG capsule Take 1 capsule by mouth Every Morning. Take with 30mg cap for total dose of 90mg.  90 capsule 1     fexofenadine (ALLEGRA) 30 MG tablet Take 1 tablet by mouth Daily With Lunch.       HYDROcodone-acetaminophen (NORCO)  MG per tablet TAKE 1 TABLET BY MOUTH UP TO FOUR TIMES DAILY AS NEEDED FOR PAIN       hydrocortisone 2.5 % cream Apply 1 Application topically to the appropriate area as directed 2 (Two) Times a Day. 28 g 2     ibuprofen (ADVIL,MOTRIN) 800 MG tablet Take 1 tablet by mouth Every 8 (Eight) Hours As Needed. LAST DOSE 1/28/24       Linzess 145 MCG capsule capsule        montelukast (Singulair) 10 MG tablet Take 1 tablet by mouth Every Night. 90 tablet 1     ondansetron (ZOFRAN) 4 MG tablet TAKE (1) TABLET EVERY 8 HOURS AS NEEDED FOR NAUSEA AND vomiting 30 tablet 1     oxyCODONE-acetaminophen (PERCOCET) 7.5-325 MG per tablet TAKE (1) TABLET EVERY 4 TO 6 HOURS AS NEEDED       pantoprazole (PROTONIX) 40 MG EC tablet Take 1 tablet by mouth Daily. 90 tablet 3     polyethylene glycol (MIRALAX) 17 g packet Take 17 g by mouth Daily. 14 packet 0     prochlorperazine (COMPAZINE) 10 MG tablet TAKE (1) TABLET BY MOUTH EVERY 6 HOURS AS NEEDED FOR NAUSEA AND VOMITING       promethazine (PHENERGAN) 25 MG tablet Take 1 tablet by mouth Every 6 (Six) Hours As Needed. for nausea       sennosides-docusate (senna-docusate sodium) 8.6-50 MG per tablet Take 1 tablet by mouth Daily. 30 tablet 2     Stahist AD 25-60 MG tablet Take 1 tablet by mouth 3 times a day.       tiZANidine (ZANAFLEX) 4 MG tablet Take 1 tablet by mouth Every 8 (Eight) Hours As Needed.       traZODone (DESYREL) 50 MG tablet Take 0.5 to 2 tab PO QHS PRN sleep 90 tablet 2     vitamin C (ASCORBIC ACID) 500 MG tablet Take 1 tablet by mouth Daily. LAST DOSE 1/28/24       Vraylar 1.5 MG capsule capsule Take 1 capsule by mouth Daily. 30 capsule 2      Current Meds:   dexAMETHasone, 8 mg, Intravenous, Once  enoxaparin, 40 mg, Subcutaneous, Daily  metoclopramide, 10 mg, Intravenous, Q8H  [START ON 2/18/2025] pantoprazole, 40 mg, Intravenous,  QAM AC  sodium chloride, 10 mL, Intravenous, Q12H      Allergies:  Allergies   Allergen Reactions    Escitalopram Nausea Only and Rash     Nausea, sweating, rash     Sulfa Antibiotics Anaphylaxis    Baclofen GI Intolerance, Hives and Nausea And Vomiting    Ciprofloxacin Hallucinations    Codeine Hives    Gold-Containing Drug Products Rash    Latex Rash    Nickel Hives    Tegaderm Ag Mesh [Silver] Hives     Review of Systems  Pertinent items are noted in HPI, all other systems reviewed and negative         Vital Signs  Temp:  [97.3 °F (36.3 °C)-100.2 °F (37.9 °C)] 100.2 °F (37.9 °C)  Heart Rate:  [] 80  Resp:  [13-21] 13  BP: (122-167)/(72-98) 145/98  Physical Exam:  General Appearance:    Alert, cooperative, in no acute distress   Head:    Normocephalic, without obvious abnormality, atraumatic   Eyes:          conjunctivae and sclerae normal, no   icterus   Throat:   no thrush, oral mucosa moist   Neck:   Supple, no adenopathy   Lungs:     Clear to auscultation bilaterally    Heart:    Regular rhythm and normal rate    Chest Wall:    No abnormalities observed   Abdomen:     Soft, tender to palpation in the right upper quadrant with guarding, no rebound tenderness noted   Extremities:   no edema, no redness   Skin:   No bruising or rash   Psychiatric:  normal mood and insight     Results Review:  [x]  Laboratory   [x]  Radiology  []  Pathology      I reviewed the patient's new clinical results.    Results from last 7 days   Lab Units 02/16/25  2217   WBC 10*3/mm3 15.46*   HEMOGLOBIN g/dL 12.2   HEMATOCRIT % 38.5   PLATELETS 10*3/mm3 424     Results from last 7 days   Lab Units 02/17/25  0817 02/16/25  2217   SODIUM mmol/L 138 134*   POTASSIUM mmol/L 3.5 4.2   CHLORIDE mmol/L 99 97*   CO2 mmol/L 19.7* 24.9   BUN mg/dL 10 13   CREATININE mg/dL 0.74 0.85   CALCIUM mg/dL 9.7 10.2   BILIRUBIN mg/dL 0.7 0.3   ALK PHOS U/L 90 87   ALT (SGPT) U/L 26 29   AST (SGOT) U/L 13 16   GLUCOSE mg/dL 114* 116*         Lab  Results   Lab Value Date/Time    LIPASE 25 02/16/2025 2217    LIPASE 29 02/02/2024 0412       Radiology:  XR Abdomen Flat & Upright    Result Date: 2/17/2025  Nonobstructive bowel gas pattern. No free air. Electronically Signed: Yayo Ford MD  2/17/2025 6:51 AM EST  Workstation ID: [id number]    US Gallbladder    Result Date: 2/17/2025  1. Small gallstone in the gallbladder neck with no biliary obstruction or gallbladder wall thickening. There is a positive sonographic Guzman's sign of questionable significance. If there is continued concern for acute cholecystitis, HIDA scan may be beneficial. 2. Otherwise negative. Electronically Signed: Yayo Ford MD  2/17/2025 12:08 AM EST  Workstation ID: [id number]     Assessment & Plan       Intractable abdominal pain    NICHOLE (generalized anxiety disorder)    Adenomyosis of uterus    Biliary colic    Calculus of gallbladder without cholecystitis without obstruction    Nausea and vomiting    Gastroparesis       Plan:  I will schedule the patient for EGD this morning given her history of recent gastroparesis and nausea and right upper quadrant pain.  If unremarkable then I will ask surgery to see the patient for consideration cholecystectomy.  I discussed risk and benefits of EGD and she is willing to proceed.      I discussed the patients findings and my recommendations with patient, family, and nursing staff.    Sanya Reyes MD

## 2025-02-17 NOTE — ANESTHESIA PREPROCEDURE EVALUATION
Anesthesia Evaluation     Patient summary reviewed and Nursing notes reviewed   no history of anesthetic complications:   NPO Solid Status: > 8 hours  NPO Liquid Status: > 2 hours           Airway   Mallampati: II  TM distance: >3 FB  Neck ROM: full  No difficulty expected  Dental - normal exam     Pulmonary - negative pulmonary ROS and normal exam    breath sounds clear to auscultation  Cardiovascular - negative cardio ROS and normal exam  Exercise tolerance: good (4-7 METS)    Rhythm: regular  Rate: normal        Neuro/Psych  (+) headaches, numbness, psychiatric history Anxiety and Depression  GI/Hepatic/Renal/Endo    (+) obesity, GI bleeding     Musculoskeletal     Abdominal   (+) obese   Substance History - negative use     OB/GYN negative ob/gyn ROS         Other   arthritis,     ROS/Med Hx Other: PAT Nursing Notes unavailable.               Anesthesia Plan    ASA 2     general     (Patient understands anesthesia not responsible for dental damage.)  intravenous induction     Anesthetic plan, risks, benefits, and alternatives have been provided, discussed and informed consent has been obtained with: patient, spouse/significant other and child.    Use of blood products discussed with patient .    Plan discussed with CRNA.    CODE STATUS:    Level Of Support Discussed With: Patient  Code Status (Patient has no pulse and is not breathing): CPR (Attempt to Resuscitate)  Medical Interventions (Patient has pulse or is breathing): Full Support

## 2025-02-17 NOTE — ED PROVIDER NOTES
Time: 12:43 AM EST  Date of encounter:  2/16/2025  Independent Historian/Clinical History and Information was obtained by:   Patient  Chief Complaint: Abdominal pain    History is limited by: N/A    History of Present Illness:  Patient is a 44 y.o. year old female who presents to the emergency department for evaluation of abdominal pain.  The patient notes that she began having right upper quadrant abdominal pain early this morning.  She has had nausea and dry heaves.  She has had anorexia.  She has no fever rigors or myalgias.  She has had several loose stools.  She denies any hematochezia or melena.  She has no urinary frequency urgency or dysuria.  She denies pregnancy as she has had a hysterectomy.  Patient does note that she has had chronic GI issues and has been recently diagnosed with gastroparesis.  The patient also notes that she has a history of duodenal ulcers from H. pylori in the past    HPI    Patient Care Team  Primary Care Provider: Karen Stover APRN    Past Medical History:     Allergies   Allergen Reactions    Escitalopram Nausea Only and Rash     Nausea, sweating, rash     Sulfa Antibiotics Anaphylaxis    Baclofen GI Intolerance, Hives and Nausea And Vomiting    Ciprofloxacin Hallucinations    Codeine Hives    Gold-Containing Drug Products Rash    Latex Rash    Nickel Hives    Tegaderm Ag Mesh [Silver] Hives     Past Medical History:   Diagnosis Date    Allergic     Anxiety     Atrial fibrillation     RELEASED BY CARDIOLOGIST/ELECTROPHYSIST, NO CURRENT MEDS    Chronic pain disorder     Depression     Endometriosis     Headache     Hemorrhoids     Injury of shoulder, right 2009    CHRONIC PAIN    Panic disorder     S/P laparoscopic cholecystectomy 02/17/2025     Past Surgical History:   Procedure Laterality Date    CERVICAL ARTHRODESIS  01/14/2015    Donaldo Albarran    CERVICAL FUSION      C4-7 FUSION, FULL ROM    CHOLECYSTECTOMY N/A 2/17/2025    Procedure: CHOLECYSTECTOMY LAPAROSCOPIC  plain language: removal of gallbladder thru small incisions using long instruments and a camera;  Surgeon: Josué Castano MD;  Location: [facility] MAIN OR;  Service: General;  Laterality: N/A;    COLONOSCOPY N/A 05/31/2022    Procedure: COLONOSCOPY;  Surgeon: Shabbir Baird MD;  Location: [facility] ENDOSCOPY;  Service: General;  Laterality: N/A;  HEMORRHOIDS    ENDOSCOPY N/A 2/17/2025    Procedure: ESOPHAGOGASTRODUODENOSCOPY WITH BIOPSIES;  Surgeon: Sanya Reyes MD;  Location: [facility] ENDOSCOPY;  Service: Gastroenterology;  Laterality: N/A;  GASTRITIS, SMALL HIATAL HERNIA    EXPLORATORY LAPAROTOMY      HEMORRHOIDECTOMY N/A 05/31/2022    Procedure: HEMORRHOID BANDING;  Surgeon: Shabbir Baird MD;  Location: [facility] ENDOSCOPY;  Service: General;  Laterality: N/A;  BANDS X 2    HEMORRHOIDECTOMY N/A 1/31/2024    Procedure: HEMORRHOIDECTOMY;  Surgeon: Shabbir Baird MD;  Location: [facility] OR [facility];  Service: General;  Laterality: N/A;    HYSTERECTOMY      SHOULDER ARTHROSCOPY Right     SHOULDER SURGERY Left     ARTHROSCOPY LABRAL TEAR X2    TUBAL ABDOMINAL LIGATION       Family History   Problem Relation Age of Onset    Depression Mother     Bipolar disorder Mother     Anxiety disorder Mother     Cancer Mother     Heart disease Mother     Hypertension Mother     Anxiety disorder Father     Hypertension Father     Dementia Paternal Uncle     Dementia Paternal Grandmother     Heart disease Other     Colon cancer Neg Hx     Malig Hyperthermia Neg Hx        Home Medications:  Prior to Admission medications    Medication Sig Start Date End Date Taking? Authorizing Provider   albuterol sulfate  (90 Base) MCG/ACT inhaler Inhale 2 puffs Every 6 (Six) Hours As Needed for Wheezing. 1/8/25   Karen Stover APRN   bacitracin 500 UNIT/GM ointment Apply 1 Application topically to the appropriate area as directed 2 (Two) Times a Day. 1/11/25   Keo Rao PA   bisacodyl (DULCOLAX) 10 MG  suppository Insert 1 suppository every day by rectal route as needed. 7/24/23   Avani Vela MD   busPIRone (BUSPAR) 15 MG tablet Take 1 tablet by mouth 3 (Three) Times a Day. 1/17/25   Karen Stover APRN   CALCIUM PO Take 1 tablet by mouth Daily. LD 1/29/24    Avani Vela MD   cetirizine (ZyrTEC) 10 MG tablet Take 1 tablet by mouth Daily.    Avani Vela MD   clonazePAM (KlonoPIN) 0.5 MG tablet Take 1 tablet by mouth 2 (Two) Times a Day As Needed for Anxiety. 1/28/25   Karen Stover APRN   DULoxetine (CYMBALTA) 30 MG capsule Take 1 capsule by mouth Every Night. Take with 60mg for total dose of 90mg 10/14/24   Karen Stover APRN   DULoxetine (CYMBALTA) 60 MG capsule Take 1 capsule by mouth Every Morning. Take with 30mg cap for total dose of 90mg. 10/14/24   Karen Stover APRN   fexofenadine (ALLEGRA) 30 MG tablet Take 1 tablet by mouth Daily With Lunch.    Avani Vela MD   HYDROcodone-acetaminophen (NORCO)  MG per tablet TAKE 1 TABLET BY MOUTH UP TO FOUR TIMES DAILY AS NEEDED FOR PAIN 2/14/24   Avani Vela MD   hydrocortisone 2.5 % cream Apply 1 Application topically to the appropriate area as directed 2 (Two) Times a Day. 6/27/24   Karen Stover APRN   ibuprofen (ADVIL,MOTRIN) 800 MG tablet Take 1 tablet by mouth Every 8 (Eight) Hours As Needed. LAST DOSE 1/28/24    Avani Vela MD   Linzess 145 MCG capsule capsule  1/8/25   Avani Vela MD   montelukast (Singulair) 10 MG tablet Take 1 tablet by mouth Every Night. 1/17/25   Karen Stover APRN   ondansetron (ZOFRAN) 4 MG tablet TAKE (1) TABLET EVERY 8 HOURS AS NEEDED FOR NAUSEA AND vomiting 10/14/24   Karen Stover APRN   oxyCODONE-acetaminophen (PERCOCET) 7.5-325 MG per tablet TAKE (1) TABLET EVERY 4 TO 6 HOURS AS NEEDED 12/2/24   Provider, MD Avani   pantoprazole (PROTONIX) 40 MG EC tablet Take 1 tablet by mouth Daily.  24   Karen Stover APRN   polyethylene glycol (MIRALAX) 17 g packet Take 17 g by mouth Daily. 24   Shabbir Baird MD   prochlorperazine (COMPAZINE) 10 MG tablet TAKE (1) TABLET BY MOUTH EVERY 6 HOURS AS NEEDED FOR NAUSEA AND VOMITING    Avani Vela MD   promethazine (PHENERGAN) 25 MG tablet Take 1 tablet by mouth Every 6 (Six) Hours As Needed. for nausea 24   Avani Vela MD   sennosides-docusate (senna-docusate sodium) 8.6-50 MG per tablet Take 1 tablet by mouth Daily. 24   Karen Stover APRN   Stahist AD 25-60 MG tablet Take 1 tablet by mouth 3 times a day. 24   Avani Vela MD   tiZANidine (ZANAFLEX) 4 MG tablet Take 1 tablet by mouth Every 8 (Eight) Hours As Needed. 24   Avani Vela MD   traZODone (DESYREL) 50 MG tablet Take 0.5 to 2 tab PO QHS PRN sleep 24   Karen Stover APRN   vitamin C (ASCORBIC ACID) 500 MG tablet Take 1 tablet by mouth Daily. LAST DOSE 24    Avani Vela MD   Vraylar 1.5 MG capsule capsule Take 1 capsule by mouth Daily. 24   Karen Stover APRN        Social History:   Social History     Tobacco Use    Smoking status: Former     Current packs/day: 0.00     Average packs/day: 0.3 packs/day for 20.0 years (5.0 ttl pk-yrs)     Types: Cigarettes     Start date: 1999     Quit date: 2019     Years since quittin.1    Smokeless tobacco: Never   Vaping Use    Vaping status: Never Used   Substance Use Topics    Alcohol use: Yes     Comment: rarely    Drug use: Never         Review of Systems:  Review of Systems   Constitutional:  Negative for chills, diaphoresis and fever.   HENT:  Negative for congestion, postnasal drip, rhinorrhea and sore throat.    Eyes:  Negative for photophobia.   Respiratory:  Negative for cough, chest tightness and shortness of breath.    Cardiovascular:  Negative for chest pain, palpitations and leg swelling.  "  Gastrointestinal:  Positive for abdominal pain, diarrhea, nausea and vomiting.   Genitourinary:  Negative for difficulty urinating, dysuria, flank pain, frequency, hematuria and urgency.   Musculoskeletal:  Negative for neck pain and neck stiffness.   Skin:  Negative for pallor and rash.   Neurological:  Negative for dizziness, syncope, weakness, numbness and headaches.   Hematological:  Negative for adenopathy. Does not bruise/bleed easily.   Psychiatric/Behavioral: Negative.          Physical Exam:  BP 95/64 (BP Location: Left arm, Patient Position: Lying)   Pulse 79   Temp 98.4 °F (36.9 °C) (Oral)   Resp 18   Ht 157.5 cm (62\")   Wt 81.6 kg (180 lb)   SpO2 97%   BMI 32.92 kg/m²     Physical Exam  Vitals and nursing note reviewed.   Constitutional:       General: She is not in acute distress.     Appearance: Normal appearance. She is not ill-appearing, toxic-appearing or diaphoretic.   HENT:      Head: Normocephalic and atraumatic.      Mouth/Throat:      Mouth: Mucous membranes are moist.   Eyes:      Pupils: Pupils are equal, round, and reactive to light.   Cardiovascular:      Rate and Rhythm: Normal rate and regular rhythm.      Pulses: Normal pulses.           Carotid pulses are 2+ on the right side and 2+ on the left side.       Radial pulses are 2+ on the right side and 2+ on the left side.        Femoral pulses are 2+ on the right side and 2+ on the left side.       Popliteal pulses are 2+ on the right side and 2+ on the left side.        Dorsalis pedis pulses are 2+ on the right side and 2+ on the left side.        Posterior tibial pulses are 2+ on the right side and 2+ on the left side.      Heart sounds: Normal heart sounds. No murmur heard.  Pulmonary:      Effort: Pulmonary effort is normal. No accessory muscle usage, respiratory distress or retractions.      Breath sounds: Normal breath sounds. No wheezing, rhonchi or rales.   Abdominal:      General: Abdomen is flat. There is no distension. "      Palpations: Abdomen is soft. There is no mass or pulsatile mass.      Tenderness: There is abdominal tenderness in the right upper quadrant and epigastric area. There is no right CVA tenderness, left CVA tenderness, guarding or rebound. Negative signs include Guzman's sign and McBurney's sign.      Comments: No rigidity   Musculoskeletal:         General: No swelling, tenderness or deformity.      Cervical back: Neck supple. No tenderness.      Right lower leg: No edema.      Left lower leg: No edema.   Skin:     General: Skin is warm and dry.      Capillary Refill: Capillary refill takes less than 2 seconds.      Coloration: Skin is not jaundiced or pale.      Findings: No erythema.   Neurological:      General: No focal deficit present.      Mental Status: She is alert and oriented to person, place, and time. Mental status is at baseline.      Cranial Nerves: Cranial nerves 2-12 are intact. No cranial nerve deficit.      Sensory: Sensation is intact. No sensory deficit.      Motor: Motor function is intact. No weakness or pronator drift.      Coordination: Coordination is intact. Coordination normal.   Psychiatric:         Mood and Affect: Mood normal.         Behavior: Behavior normal.                  Procedures:  Procedures      Medical Decision Making:      Comorbidities that affect care:    Atrial fibrillation, depression, gastroparesis, chronic pain syndrome    External Notes reviewed:    None      The following orders were placed and all results were independently analyzed by me:  Orders Placed This Encounter   Procedures    US Gallbladder    XR Abdomen Flat & Upright    [place] Draw    Comprehensive Metabolic Panel    Lipase    Urinalysis With Microscopic If Indicated (No Culture) - Urine, Clean Catch    CBC Auto Differential    Urinalysis, Microscopic Only - Urine, Clean Catch    CBC (No Diff)    Lactic Acid, Plasma    Undress & Gown    Follow Anesthesia Guidelines / Protocol    Verify NPO    Verify  / Perform Chlorhexidine Skin Prep    Place Sequential Compression Device    Obtain Informed Consent    Apply Warming [place]    Discharge Follow-up with PCP    Discharge Follow-up with Specialty: General Surgeon; 2 Weeks    Inpatient Gastroenterology Consult    Inpatient General Surgery Consult    POC Glucose STAT    ECG 12 Lead QT Measurement    Telemetry Scan    Inpatient Admission    Discharge patient    Upper GI Endoscopy    CBC & Differential    Green Top (Gel)    Lavender Top    Gold Top - SST    Light Blue Top    Extra Tubes    Lavender Top    Extra Tubes    Lavender Top       Medications Given in the Emergency Department:  Medications   sodium chloride 0.9 % infusion ( Intravenous Anesthesia Volume Adjustment 2/17/25 1019)   lactated ringers infusion (30 mL/hr Intravenous Not Given 2/17/25 1309)   lactated ringers infusion (0 mL/hr Intravenous Stopped 2/19/25 1400)   sodium chloride 0.9 % bolus 1,000 mL (0 mL Intravenous Stopped 2/17/25 0157)   morphine injection 4 mg (4 mg Intravenous Given 2/17/25 0045)   ondansetron (ZOFRAN) injection 4 mg (4 mg Intravenous Given 2/17/25 0044)   famotidine (PEPCID) injection 20 mg (20 mg Intravenous Given 2/17/25 0045)   morphine injection 4 mg (4 mg Intravenous Given 2/17/25 0129)   metoclopramide (REGLAN) injection 10 mg (10 mg Intravenous Given 2/17/25 0152)   diphenhydrAMINE (BENADRYL) injection 25 mg (25 mg Intravenous Given 2/17/25 0152)   metoclopramide (REGLAN) injection 10 mg (10 mg Intravenous Given 2/17/25 0221)   fentaNYL citrate (PF) (SUBLIMAZE) injection 50 mcg (50 mcg Intravenous Given 2/17/25 0417)   pantoprazole (PROTONIX) injection 40 mg (40 mg Intravenous Given 2/17/25 0417)   orphenadrine (NORFLEX) injection 60 mg (60 mg Intramuscular Given 2/17/25 0629)   dexAMETHasone (DECADRON) 4 MG/ML injection  - ADS Override Pull (8 mg  Given 2/17/25 1002)   acetaminophen (TYLENOL) tablet 1,000 mg (1,000 mg Oral Given 2/17/25 1846)   Midazolam HCl (PF) (VERSED)  injection 2 mg (2 mg Intravenous Given 2/17/25 1852)   oxyCODONE (ROXICODONE) immediate release tablet 5 mg (5 mg Oral Given 2/17/25 2045)   HYDROmorphone (DILAUDID) injection 0.5 mg (0.5 mg Intravenous Given 2/17/25 2045)   lactated ringers bolus 1,000 mL (0 mL Intravenous Stopped 2/19/25 1400)   potassium chloride (MICRO-K/KLOR-CON) CR capsule (40 mEq Oral Given 2/19/25 0843)        ED Course:         Labs:    Lab Results (last 24 hours)       Procedure Component Value Units Date/Time    Comprehensive Metabolic Panel [[id number]]  (Abnormal) Collected: 02/19/25 0607    Specimen: Blood from Hand, Left Updated: 02/19/25 0710     Glucose 134 mg/dL      BUN 15 mg/dL      Creatinine 0.65 mg/dL      Sodium 135 mmol/L      Potassium 3.1 mmol/L      Chloride 100 mmol/L      CO2 24.5 mmol/L      Calcium 8.7 mg/dL      Total Protein 6.1 g/dL      Albumin 3.3 g/dL      ALT (SGPT) 23 U/L      AST (SGOT) 17 U/L      Alkaline Phosphatase 59 U/L      Total Bilirubin 0.4 mg/dL      Globulin 2.8 gm/dL      A/G Ratio 1.2 g/dL      BUN/Creatinine Ratio 23.1     Anion Gap 10.5 mmol/L      eGFR 112.9 mL/min/1.73     Narrative:      GFR Categories in Chronic Kidney Disease (CKD)      GFR Category          GFR (mL/min/1.73)    Interpretation  G1                     90 or greater         Normal or high (1)  G2                      60-89                Mild decrease (1)  G3a                   45-59                Mild to moderate decrease  G3b                   30-44                Moderate to severe decrease  G4                    15-29                Severe decrease  G5                    14 or less           Kidney failure          (1)In the absence of evidence of kidney disease, neither GFR category G1 or G2 fulfill the criteria for CKD.    eGFR calculation 2021 CKD-EPI creatinine equation, which does not include race as a factor             Imaging:    No Radiology Exams Resulted Within Past 24 Hours      Differential Diagnosis and  Discussion:    Abdominal Pain: Based on the patient's signs and symptoms, I considered abdominal aortic aneurysm, small bowel obstruction, pancreatitis, acute cholecystitis, acute appendecitis, peptic ulcer disease, gastritis, colitis, endocrine disorders, irritable bowel syndrome and other differential diagnosis an etiology of the patient's abdominal pain.    Labs were collected in the emergency department and all labs were reviewed and interpreted by me.    MDM  Number of Diagnoses or Management Options  Calculus of gallbladder without cholecystitis without obstruction  Intractable abdominal pain  Nausea and vomiting, unspecified vomiting type  Diagnosis management comments: Patient's white blood cell count was elevated at 15,000.  No bandemia present.  The patient's CBC was reviewed and shows no abnormalities of critical concern.  Of note, there is no anemia requiring a blood transfusion and the platelet count is acceptable    The patient's CMP was reviewed and shows no abnormalities of critical concern.  Of note, the patient's sodium and potassium are acceptable.  The patient's liver enzymes are unremarkable.  The patient's renal function including creatinine is preserved.  The patient has a normal anion gap.    Patient's lipase is normal.  Typically this indicates the patient is low risk for acute pancreatitis    Ultrasound of the gallbladder demonstrates small stones in the gallbladder neck.  There is no biliary obstruction or gallbladder wall thickening.  There is no pericholecystic fluid.  The ultrasound tech recorded possible Guzman sign.  On my examination, the patient did not have a positive Guzman sign    The patient's pain was treated with multiple doses of morphine.  The patient was also given Benadryl Reglan Zofran for nausea.  The patient was treated with liter of normal saline.  The patient was bolused Protonix.  Patient was given fentanyl.  The patient continued to have abdominal pain and nausea  with dry heaves.  The patient was subsequently admitted to the hospital for intractable pain and vomiting.           Amount and/or Complexity of Data Reviewed  Clinical lab tests: reviewed and ordered  Tests in the radiology section of CPT®: reviewed and ordered  Tests in the medicine section of CPT®: ordered and reviewed  Decide to obtain previous medical records or to obtain history from someone other than the patient: yes  Discuss the patient with other providers: yes (04:22 EST  I discussed the case with the hospitalist, Dr. Rivera.  We have discussed the patient's presenting symptoms, laboratory values, imaging and condition at the time of admission.  They will evaluate the patient in the emergency room and admit the patient to the hospital)           Social Determinants of Health:    Patient is independent, reliable, and has access to care.       Disposition and Care Coordination:    Admit:   Through independent evaluation of the patient's history, physical, and imperical data, the patient meets criteria for inpatient admission to the hospital.        Final diagnoses:   Intractable abdominal pain   Nausea and vomiting, unspecified vomiting type   Calculus of gallbladder without cholecystitis without obstruction        ED Disposition       ED Disposition   Decision to Admit    Condition   --    Comment   Level of Care: Remote Telemetry [26]   Diagnosis: Abdominal pain [[id number]]   Admitting Physician: ALYSE RIVERA [[id number]]   Certification: I Certify That Inpatient Hospital Services Are Medically Necessary For Greater Than 2 Midnights                 This medical record created using voice recognition software.             Santi Fernandez DO  02/20/25 0147

## 2025-02-17 NOTE — CONSULTS
History and Physical    Patient Name:  Ebony Hamilton  YOB: 1980  [id number]    Referring Provider:  Dr. Reyes      Patient Care Team:  Karen Stover APRN as PCP - General (Emergency Medicine)  Donaldo Albarran MD as Surgeon (Neurosurgery)  Mo, April, APRSHAWNA as Nurse Practitioner (Nurse Practitioner)    Reason for consult/Chief complaint:  RUQ pain     Subjective .     History of present illness:  Ebony Haimlton is a 44 y.o. female who presented to the ED overnight with RUQ .  The pain started around 10 AM yesterday morning.  She has had nausea and vomiting.  Her pain is constant and made worse with palpation or movement.  She had an US of the gallbladder that indicates she has gallstones. She had an EGD this morning that was negative for any findings.   Her WBC count is 15.          History:  Past Medical History:   Diagnosis Date    Allergic     Anxiety     Atrial fibrillation     RELEASED BY CARDIOLOGIST/ELECTROPHYSIST, NO CURRENT MEDS    Chronic pain disorder     Depression     Endometriosis     Headache     Hemorrhoids     Injury of shoulder, right 2009    CHRONIC PAIN    Panic disorder        Past Surgical History:   Procedure Laterality Date    CERVICAL ARTHRODESIS  01/14/2015    Donaldo Albarran    CERVICAL FUSION      C4-7 FUSION, FULL ROM    COLONOSCOPY N/A 05/31/2022    Procedure: COLONOSCOPY;  Surgeon: Shabbir Baird MD;  Location: [facility] ENDOSCOPY;  Service: General;  Laterality: N/A;  HEMORRHOIDS    EXPLORATORY LAPAROTOMY      HEMORRHOIDECTOMY N/A 05/31/2022    Procedure: HEMORRHOID BANDING;  Surgeon: Shabbir Baird MD;  Location: [facility] ENDOSCOPY;  Service: General;  Laterality: N/A;  BANDS X 2    HEMORRHOIDECTOMY N/A 1/31/2024    Procedure: HEMORRHOIDECTOMY;  Surgeon: Shabbir Baird MD;  Location: [facility] OR OSC;  Service: General;  Laterality: N/A;    HYSTERECTOMY      SHOULDER ARTHROSCOPY Right     SHOULDER SURGERY Left     ARTHROSCOPY  LABRAL TEAR X2    TUBAL ABDOMINAL LIGATION         Family History   Problem Relation Age of Onset    Depression Mother     Bipolar disorder Mother     Anxiety disorder Mother     Cancer Mother     Heart disease Mother     Hypertension Mother     Anxiety disorder Father     Hypertension Father     Dementia Paternal Uncle     Dementia Paternal Grandmother     Heart disease Other     Colon cancer Neg Hx     Malig Hyperthermia Neg Hx        Social History     Tobacco Use    Smoking status: Former     Current packs/day: 0.00     Average packs/day: 0.3 packs/day for 20.0 years (5.0 ttl pk-yrs)     Types: Cigarettes     Start date: 1999     Quit date: 2019     Years since quittin.1    Smokeless tobacco: Never   Vaping Use    Vaping status: Never Used   Substance Use Topics    Alcohol use: Yes     Comment: rarely    Drug use: Never       Review of Systems:  A complete ROS was performed and all are negative except for what is documented in the HPI.    MEDS:  Prior to Admission medications    Medication Sig Start Date End Date Taking? Authorizing Provider   albuterol sulfate  (90 Base) MCG/ACT inhaler Inhale 2 puffs Every 6 (Six) Hours As Needed for Wheezing. 25   Karen Stover APRN   bacitracin 500 UNIT/GM ointment Apply 1 Application topically to the appropriate area as directed 2 (Two) Times a Day. 25   Keo Rao PA   bisacodyl (DULCOLAX) 10 MG suppository Insert 1 suppository every day by rectal route as needed. 23   Avani Vela MD   busPIRone (BUSPAR) 15 MG tablet Take 1 tablet by mouth 3 (Three) Times a Day. 25   Karen Stover APRN   CALCIUM PO Take 1 tablet by mouth Daily. LD 24    Avani Veal MD   cetirizine (ZyrTEC) 10 MG tablet Take 1 tablet by mouth Daily.    Avani Vela MD   clonazePAM (KlonoPIN) 0.5 MG tablet Take 1 tablet by mouth 2 (Two) Times a Day As Needed for Anxiety. 25   Karen Stover APRN    DULoxetine (CYMBALTA) 30 MG capsule Take 1 capsule by mouth Every Night. Take with 60mg for total dose of 90mg 10/14/24   Karen Stover APRN   DULoxetine (CYMBALTA) 60 MG capsule Take 1 capsule by mouth Every Morning. Take with 30mg cap for total dose of 90mg. 10/14/24   Karen Stover APRN   fexofenadine (ALLEGRA) 30 MG tablet Take 1 tablet by mouth Daily With Lunch.    Avani Vela MD   HYDROcodone-acetaminophen (NORCO)  MG per tablet TAKE 1 TABLET BY MOUTH UP TO FOUR TIMES DAILY AS NEEDED FOR PAIN 2/14/24   Avani Vela MD   hydrocortisone 2.5 % cream Apply 1 Application topically to the appropriate area as directed 2 (Two) Times a Day. 6/27/24   Karen Stover APRN   ibuprofen (ADVIL,MOTRIN) 800 MG tablet Take 1 tablet by mouth Every 8 (Eight) Hours As Needed. LAST DOSE 1/28/24    Avani Vela MD   Linzess 145 MCG capsule capsule  1/8/25   Avani Vela MD   montelukast (Singulair) 10 MG tablet Take 1 tablet by mouth Every Night. 1/17/25   Karen Stover APRN   ondansetron (ZOFRAN) 4 MG tablet TAKE (1) TABLET EVERY 8 HOURS AS NEEDED FOR NAUSEA AND vomiting 10/14/24   Karen Stover APRN   oxyCODONE-acetaminophen (PERCOCET) 7.5-325 MG per tablet TAKE (1) TABLET EVERY 4 TO 6 HOURS AS NEEDED 12/2/24   Avani Vela MD   pantoprazole (PROTONIX) 40 MG EC tablet Take 1 tablet by mouth Daily. 11/11/24   Karen Stover APRN   polyethylene glycol (MIRALAX) 17 g packet Take 17 g by mouth Daily. 1/31/24   Shabbir Baird MD   prochlorperazine (COMPAZINE) 10 MG tablet TAKE (1) TABLET BY MOUTH EVERY 6 HOURS AS NEEDED FOR NAUSEA AND VOMITING    Avani Vela MD   promethazine (PHENERGAN) 25 MG tablet Take 1 tablet by mouth Every 6 (Six) Hours As Needed. for nausea 11/20/24   Avani Vela MD   sennosides-docusate (senna-docusate sodium) 8.6-50 MG per tablet Take 1 tablet by mouth Daily. 6/27/24    "Karen Stover APRN   Stahist AD 25-60 MG tablet Take 1 tablet by mouth 3 times a day. 4/11/24   Avani Vela MD   tiZANidine (ZANAFLEX) 4 MG tablet Take 1 tablet by mouth Every 8 (Eight) Hours As Needed. 5/23/24   Avani Vela MD   traZODone (DESYREL) 50 MG tablet Take 0.5 to 2 tab PO QHS PRN sleep 8/30/24   Karen Stover APRN   vitamin C (ASCORBIC ACID) 500 MG tablet Take 1 tablet by mouth Daily. LAST DOSE 1/28/24    Avani Vela MD   Vraylar 1.5 MG capsule capsule Take 1 capsule by mouth Daily. 12/19/24   Karen Stover APRN        enoxaparin, 40 mg, Subcutaneous, Daily  [START ON 2/18/2025] pantoprazole, 40 mg, Intravenous, QAM AC  sodium chloride, 10 mL, Intravenous, Q12H         lactated ringers, 30 mL/hr  sodium chloride, 100 mL/hr, Last Rate: 100 mL/hr (02/17/25 0907)         Allergies:  Escitalopram, Sulfa antibiotics, Baclofen, Ciprofloxacin, Codeine, Gold-containing drug products, Latex, Nickel, and Tegaderm ag mesh [silver]    Objective         Physical Exam:      Constitutional:  well nourished, no acute distress, appears stated age /89   Pulse 78   Temp 99.2 °F (37.3 °C) (Temporal)   Resp 18   Ht 157.5 cm (62\")   Wt 81.6 kg (180 lb)   SpO2 99%   BMI 32.92 kg/m²    Eyes:  anicteric sclerae, moist conjunctivae, no lid lag, PERRLA  ENMT:  oropharynx clear, moist mucous membranes, good dentition  Neck:   full ROM, trachea midline, no thyromegaly  Cardiovascular: RRR, S1 and S2 present, no MRG, heart rate 78, no pedal edema  Respiratory: lungs CTA, respirations even and unlabored  GI:  Abdomen soft, RUQ tender, nondistended, no HSM     Skin:  warm and dry, normal turgor, no rashes  Psychiatric:  alert and oriented x3, intact judgment and insight, cooperative         Results Review: I have reviewed the patient's labs and imaging including CBC, CMP, US of gallbladder     LABS/IMAGING:    WBC   Date Value Ref Range Status   02/16/2025 15.46 " (H) 3.40 - 10.80 10*3/mm3 Final     RBC   Date Value Ref Range Status   02/16/2025 4.31 3.77 - 5.28 10*6/mm3 Final     Hemoglobin   Date Value Ref Range Status   02/16/2025 12.2 12.0 - 15.9 g/dL Final     Hematocrit   Date Value Ref Range Status   02/16/2025 38.5 34.0 - 46.6 % Final     MCV   Date Value Ref Range Status   02/16/2025 89.3 79.0 - 97.0 fL Final     MCH   Date Value Ref Range Status   02/16/2025 28.3 26.6 - 33.0 pg Final     MCHC   Date Value Ref Range Status   02/16/2025 31.7 31.5 - 35.7 g/dL Final     RDW   Date Value Ref Range Status   02/16/2025 13.2 12.3 - 15.4 % Final     RDW-SD   Date Value Ref Range Status   02/16/2025 43.5 37.0 - 54.0 fl Final     MPV   Date Value Ref Range Status   02/16/2025 9.2 6.0 - 12.0 fL Final     Platelets   Date Value Ref Range Status   02/16/2025 424 140 - 450 10*3/mm3 Final     Neutrophil %   Date Value Ref Range Status   02/16/2025 81.0 (H) 42.7 - 76.0 % Final     Lymphocyte %   Date Value Ref Range Status   02/16/2025 13.0 (L) 19.6 - 45.3 % Final     Monocyte %   Date Value Ref Range Status   02/16/2025 4.7 (L) 5.0 - 12.0 % Final     Eosinophil %   Date Value Ref Range Status   02/16/2025 0.5 0.3 - 6.2 % Final     Basophil %   Date Value Ref Range Status   02/16/2025 0.3 0.0 - 1.5 % Final     Immature Grans %   Date Value Ref Range Status   02/16/2025 0.5 0.0 - 0.5 % Final     Neutrophils, Absolute   Date Value Ref Range Status   02/16/2025 12.51 (H) 1.70 - 7.00 10*3/mm3 Final     Lymphocytes, Absolute   Date Value Ref Range Status   02/16/2025 2.01 0.70 - 3.10 10*3/mm3 Final     Monocytes, Absolute   Date Value Ref Range Status   02/16/2025 0.73 0.10 - 0.90 10*3/mm3 Final     Eosinophils, Absolute   Date Value Ref Range Status   02/16/2025 0.08 0.00 - 0.40 10*3/mm3 Final     Basophils, Absolute   Date Value Ref Range Status   02/16/2025 0.05 0.00 - 0.20 10*3/mm3 Final     Immature Grans, Absolute   Date Value Ref Range Status   02/16/2025 0.08 (H) 0.00 - 0.05  "10*3/mm3 Final     nRBC   Date Value Ref Range Status   02/16/2025 0.0 0.0 - 0.2 /100 WBC Final        Basic Metabolic Panel    Sodium Sodium   Date Value Ref Range Status   02/17/2025 138 136 - 145 mmol/L Final   02/16/2025 134 (L) 136 - 145 mmol/L Final      Potassium Potassium   Date Value Ref Range Status   02/17/2025 3.5 3.5 - 5.2 mmol/L Final   02/16/2025 4.2 3.5 - 5.2 mmol/L Final      Chloride Chloride   Date Value Ref Range Status   02/17/2025 99 98 - 107 mmol/L Final   02/16/2025 97 (L) 98 - 107 mmol/L Final      Bicarbonate No results found for: \"PLASMABICARB\"   BUN BUN   Date Value Ref Range Status   02/17/2025 10 6 - 20 mg/dL Final   02/16/2025 13 6 - 20 mg/dL Final      Creatinine Creatinine   Date Value Ref Range Status   02/17/2025 0.74 0.57 - 1.00 mg/dL Final   02/16/2025 0.85 0.57 - 1.00 mg/dL Final      Calcium Calcium   Date Value Ref Range Status   02/17/2025 9.7 8.6 - 10.5 mg/dL Final   02/16/2025 10.2 8.6 - 10.5 mg/dL Final      Glucose      No components found for: \"GLUCOSE.*\"        Lab Results   Component Value Date    GLUCOSE 114 (H) 02/17/2025    BUN 10 02/17/2025    CREATININE 0.74 02/17/2025    EGFRIFAFRI >60 07/07/2022    BCR 13.5 02/17/2025    K 3.5 02/17/2025    CO2 19.7 (L) 02/17/2025    CALCIUM 9.7 02/17/2025    ALBUMIN 4.5 02/17/2025    AST 13 02/17/2025    ALT 26 02/17/2025                    Assessment & Plan         Intractable abdominal pain    NICHOLE (generalized anxiety disorder)    Adenomyosis of uterus    Biliary colic    Calculus of gallbladder without cholecystitis without obstruction    Nausea and vomiting    Gastroparesis      Npo   Case request and consent for laparoscopic cholecystectomy possible open procedure by Dr. Castano risks, benefits, alternatives discussed            Electronically signed by MILDRED Hanks, 02/17/25, 12:53 PM EST.   "

## 2025-02-17 NOTE — CONSULTS
[facility]  Initial Inpatient Consult Note    Referring Provider: Hospitalist    Reason for Consultation: Right upper quadrant pain    Subjective     History of present illness:    44 y.o. female with a history of cervical neck fusion, shoulder surgery, and recent diagnosis of gastroparesis by gastric emptying study who was admitted through the ER overnight with acute onset of right upper quadrant pain and nausea.  Patient had several episodes of nonbloody emesis yesterday but that seemingly has subsided.  Her liver enzymes are normal however right upper quadrant ultrasound did show small gallstones with no biliary obstruction or gallbladder wall thickening.  She does have a positive Guzman sign by ultrasound.  She states her pain radiates through towards her right shoulder blade.  WBCs 15,000, AST, ALT and alkaline phosphatase are normal    Past Medical History:  Past Medical History:   Diagnosis Date    Allergic     Anxiety     Atrial fibrillation     RELEASED BY CARDIOLOGIST/ELECTROPHYSIST, NO CURRENT MEDS    Chronic pain disorder     Depression     Endometriosis     Headache     Hemorrhoids     Injury of shoulder, right 2009    CHRONIC PAIN    Panic disorder      Past Surgical History:  Past Surgical History:   Procedure Laterality Date    CERVICAL ARTHRODESIS  01/14/2015    Donaldo Albarran    CERVICAL FUSION      C4-7 FUSION, FULL ROM    COLONOSCOPY N/A 05/31/2022    Procedure: COLONOSCOPY;  Surgeon: Shabbir Baird MD;  Location: [facility] ENDOSCOPY;  Service: General;  Laterality: N/A;  HEMORRHOIDS    EXPLORATORY LAPAROTOMY      HEMORRHOIDECTOMY N/A 05/31/2022    Procedure: HEMORRHOID BANDING;  Surgeon: Shabbir Baird MD;  Location: [facility] ENDOSCOPY;  Service: General;  Laterality: N/A;  BANDS X 2    HEMORRHOIDECTOMY N/A 1/31/2024    Procedure: HEMORRHOIDECTOMY;  Surgeon: Shabbir Baird MD;  Location: [facility] OR OSC;  Service: General;  Laterality: N/A;     HYSTERECTOMY      SHOULDER ARTHROSCOPY Right     SHOULDER SURGERY Left     ARTHROSCOPY LABRAL TEAR X2    TUBAL ABDOMINAL LIGATION        Social History:   Social History     Tobacco Use    Smoking status: Former     Current packs/day: 0.00     Average packs/day: 0.3 packs/day for 20.0 years (5.0 ttl pk-yrs)     Types: Cigarettes     Start date: 1999     Quit date: 2019     Years since quittin.1    Smokeless tobacco: Never   Substance Use Topics    Alcohol use: Yes     Comment: rarely      Family History:  Family History   Problem Relation Age of Onset    Depression Mother     Bipolar disorder Mother     Anxiety disorder Mother     Cancer Mother     Heart disease Mother     Hypertension Mother     Anxiety disorder Father     Hypertension Father     Dementia Paternal Uncle     Dementia Paternal Grandmother     Heart disease Other     Colon cancer Neg Hx     Malig Hyperthermia Neg Hx        Home Meds:  Medications Prior to Admission   Medication Sig Dispense Refill Last Dose/Taking    albuterol sulfate  (90 Base) MCG/ACT inhaler Inhale 2 puffs Every 6 (Six) Hours As Needed for Wheezing. 18 g 1     bacitracin 500 UNIT/GM ointment Apply 1 Application topically to the appropriate area as directed 2 (Two) Times a Day. 14 g 0     bisacodyl (DULCOLAX) 10 MG suppository Insert 1 suppository every day by rectal route as needed.       busPIRone (BUSPAR) 15 MG tablet Take 1 tablet by mouth 3 (Three) Times a Day. 90 tablet 1     CALCIUM PO Take 1 tablet by mouth Daily. LD 24       cetirizine (ZyrTEC) 10 MG tablet Take 1 tablet by mouth Daily.       clonazePAM (KlonoPIN) 0.5 MG tablet Take 1 tablet by mouth 2 (Two) Times a Day As Needed for Anxiety. 20 tablet 0     DULoxetine (CYMBALTA) 30 MG capsule Take 1 capsule by mouth Every Night. Take with 60mg for total dose of 90mg 90 capsule 1     DULoxetine (CYMBALTA) 60 MG capsule Take 1 capsule by mouth Every Morning. Take with 30mg cap for total dose of 90mg.  90 capsule 1     fexofenadine (ALLEGRA) 30 MG tablet Take 1 tablet by mouth Daily With Lunch.       HYDROcodone-acetaminophen (NORCO)  MG per tablet TAKE 1 TABLET BY MOUTH UP TO FOUR TIMES DAILY AS NEEDED FOR PAIN       hydrocortisone 2.5 % cream Apply 1 Application topically to the appropriate area as directed 2 (Two) Times a Day. 28 g 2     ibuprofen (ADVIL,MOTRIN) 800 MG tablet Take 1 tablet by mouth Every 8 (Eight) Hours As Needed. LAST DOSE 1/28/24       Linzess 145 MCG capsule capsule        montelukast (Singulair) 10 MG tablet Take 1 tablet by mouth Every Night. 90 tablet 1     ondansetron (ZOFRAN) 4 MG tablet TAKE (1) TABLET EVERY 8 HOURS AS NEEDED FOR NAUSEA AND vomiting 30 tablet 1     oxyCODONE-acetaminophen (PERCOCET) 7.5-325 MG per tablet TAKE (1) TABLET EVERY 4 TO 6 HOURS AS NEEDED       pantoprazole (PROTONIX) 40 MG EC tablet Take 1 tablet by mouth Daily. 90 tablet 3     polyethylene glycol (MIRALAX) 17 g packet Take 17 g by mouth Daily. 14 packet 0     prochlorperazine (COMPAZINE) 10 MG tablet TAKE (1) TABLET BY MOUTH EVERY 6 HOURS AS NEEDED FOR NAUSEA AND VOMITING       promethazine (PHENERGAN) 25 MG tablet Take 1 tablet by mouth Every 6 (Six) Hours As Needed. for nausea       sennosides-docusate (senna-docusate sodium) 8.6-50 MG per tablet Take 1 tablet by mouth Daily. 30 tablet 2     Stahist AD 25-60 MG tablet Take 1 tablet by mouth 3 times a day.       tiZANidine (ZANAFLEX) 4 MG tablet Take 1 tablet by mouth Every 8 (Eight) Hours As Needed.       traZODone (DESYREL) 50 MG tablet Take 0.5 to 2 tab PO QHS PRN sleep 90 tablet 2     vitamin C (ASCORBIC ACID) 500 MG tablet Take 1 tablet by mouth Daily. LAST DOSE 1/28/24       Vraylar 1.5 MG capsule capsule Take 1 capsule by mouth Daily. 30 capsule 2      Current Meds:   dexAMETHasone, 8 mg, Intravenous, Once  enoxaparin, 40 mg, Subcutaneous, Daily  metoclopramide, 10 mg, Intravenous, Q8H  [START ON 2/18/2025] pantoprazole, 40 mg, Intravenous,  QAM AC  sodium chloride, 10 mL, Intravenous, Q12H      Allergies:  Allergies   Allergen Reactions    Escitalopram Nausea Only and Rash     Nausea, sweating, rash     Sulfa Antibiotics Anaphylaxis    Baclofen GI Intolerance, Hives and Nausea And Vomiting    Ciprofloxacin Hallucinations    Codeine Hives    Gold-Containing Drug Products Rash    Latex Rash    Nickel Hives    Tegaderm Ag Mesh [Silver] Hives     Review of Systems  Pertinent items are noted in HPI, all other systems reviewed and negative         Vital Signs  Temp:  [97.3 °F (36.3 °C)-100.2 °F (37.9 °C)] 100.2 °F (37.9 °C)  Heart Rate:  [] 80  Resp:  [13-21] 13  BP: (122-167)/(72-98) 145/98  Physical Exam:  General Appearance:    Alert, cooperative, in no acute distress   Head:    Normocephalic, without obvious abnormality, atraumatic   Eyes:          conjunctivae and sclerae normal, no   icterus   Throat:   no thrush, oral mucosa moist   Neck:   Supple, no adenopathy   Lungs:     Clear to auscultation bilaterally    Heart:    Regular rhythm and normal rate    Chest Wall:    No abnormalities observed   Abdomen:     Soft, tender to palpation in the right upper quadrant with guarding, no rebound tenderness noted   Extremities:   no edema, no redness   Skin:   No bruising or rash   Psychiatric:  normal mood and insight     Results Review:  [x]  Laboratory   [x]  Radiology  []  Pathology      I reviewed the patient's new clinical results.    Results from last 7 days   Lab Units 02/16/25  2217   WBC 10*3/mm3 15.46*   HEMOGLOBIN g/dL 12.2   HEMATOCRIT % 38.5   PLATELETS 10*3/mm3 424     Results from last 7 days   Lab Units 02/17/25  0817 02/16/25  2217   SODIUM mmol/L 138 134*   POTASSIUM mmol/L 3.5 4.2   CHLORIDE mmol/L 99 97*   CO2 mmol/L 19.7* 24.9   BUN mg/dL 10 13   CREATININE mg/dL 0.74 0.85   CALCIUM mg/dL 9.7 10.2   BILIRUBIN mg/dL 0.7 0.3   ALK PHOS U/L 90 87   ALT (SGPT) U/L 26 29   AST (SGOT) U/L 13 16   GLUCOSE mg/dL 114* 116*         Lab  Results   Lab Value Date/Time    LIPASE 25 02/16/2025 2217    LIPASE 29 02/02/2024 0412       Radiology:  XR Abdomen Flat & Upright    Result Date: 2/17/2025  Nonobstructive bowel gas pattern. No free air. Electronically Signed: Yayo Ford MD  2/17/2025 6:51 AM EST  Workstation ID: [id number]    US Gallbladder    Result Date: 2/17/2025  1. Small gallstone in the gallbladder neck with no biliary obstruction or gallbladder wall thickening. There is a positive sonographic Guzman's sign of questionable significance. If there is continued concern for acute cholecystitis, HIDA scan may be beneficial. 2. Otherwise negative. Electronically Signed: Yayo Ford MD  2/17/2025 12:08 AM EST  Workstation ID: [id number]     Assessment & Plan       Intractable abdominal pain    NICHOLE (generalized anxiety disorder)    Adenomyosis of uterus    Biliary colic    Calculus of gallbladder without cholecystitis without obstruction    Nausea and vomiting    Gastroparesis       Plan:  I will schedule the patient for EGD this morning given her history of recent gastroparesis and nausea and right upper quadrant pain.  If unremarkable then I will ask surgery to see the patient for consideration cholecystectomy.  I discussed risk and benefits of EGD and she is willing to proceed.      I discussed the patients findings and my recommendations with patient, family, and nursing staff.    Sanya Reyes MD

## 2025-02-17 NOTE — ANESTHESIA PREPROCEDURE EVALUATION
Anesthesia Evaluation     Patient summary reviewed and Nursing notes reviewed   NPO Solid Status: > 8 hours  NPO Liquid Status: > 8 hours           Airway   Mallampati: II  TM distance: >3 FB  Neck ROM: full  No difficulty expected  Comment: S/p cervical fusion  Dental - normal exam     Pulmonary - normal exam    breath sounds clear to auscultation  Cardiovascular - normal exam  Exercise tolerance: good (4-7 METS)    ECG reviewed  Rhythm: regular  Rate: normal    (+) dysrhythmias (pt states one time incident) Atrial Fib  (-) valvular problems/murmurs (mitral/tricuspid valve d/o - pt states this resolved yrs ago)      Neuro/Psych  (+) headaches, numbness, psychiatric history Anxiety and Depression  GI/Hepatic/Renal/Endo    (+) obesity, GI bleeding     Musculoskeletal     Abdominal    Substance History      OB/GYN          Other   arthritis,     ROS/Med Hx Other: Hx gastroparesis- abd pain     EKG 02/17/25: HR 82, SR    S/p hysterectomy          Phys Exam Other: Decadron 8 mg IV in preop for nausea, has already been given pepcid, reglan and zofran at 0630 on floor               Anesthesia Plan    ASA 2     general   total IV anesthesia  (Total IV Anesthesia    Patient understands anesthesia not responsible for dental damage.      Discussed risks with pt including aspiration, allergic reactions, apnea, advanced airway placement. Pt verbalized understanding. All questions answered.     )  intravenous induction     Anesthetic plan, risks, benefits, and alternatives have been provided, discussed and informed consent has been obtained with: patient and spouse/significant other.  Pre-procedure education provided  Plan discussed with CRNA.      CODE STATUS:    Level Of Support Discussed With: Patient  Code Status (Patient has no pulse and is not breathing): CPR (Attempt to Resuscitate)  Medical Interventions (Patient has pulse or is breathing): Full Support

## 2025-02-17 NOTE — PLAN OF CARE
Goal Outcome Evaluation:           Progress: no change  Outcome Evaluation: Vitals WNL. Pt has been resting well. Pt did complain of ABD pain. Dilauded given. Pt did have a EGD done today with biopses. Pt has complained of nausea but stated it was due to the pain. Did get better once pain medication was given. Pt ambulates to the bathroom.  at bedside. No other complaints this shift. Pt is scheduled for a cholecystectomy later on for tonight. Consent signed. Call light within reach. Will continue plan of care.

## 2025-02-18 PROBLEM — Z90.49 S/P LAPAROSCOPIC CHOLECYSTECTOMY: Status: ACTIVE | Noted: 2025-02-17

## 2025-02-18 LAB
ALBUMIN SERPL-MCNC: 4 G/DL (ref 3.5–5.2)
ALBUMIN/GLOB SERPL: 1.2 G/DL
ALP SERPL-CCNC: 76 U/L (ref 39–117)
ALT SERPL W P-5'-P-CCNC: 32 U/L (ref 1–33)
ANION GAP SERPL CALCULATED.3IONS-SCNC: 15 MMOL/L (ref 5–15)
AST SERPL-CCNC: 29 U/L (ref 1–32)
BILIRUB SERPL-MCNC: 0.4 MG/DL (ref 0–1.2)
BUN SERPL-MCNC: 14 MG/DL (ref 6–20)
BUN/CREAT SERPL: 20.9 (ref 7–25)
CALCIUM SPEC-SCNC: 9.4 MG/DL (ref 8.6–10.5)
CHLORIDE SERPL-SCNC: 100 MMOL/L (ref 98–107)
CO2 SERPL-SCNC: 22 MMOL/L (ref 22–29)
CREAT SERPL-MCNC: 0.67 MG/DL (ref 0.57–1)
CYTO UR: NORMAL
DEPRECATED RDW RBC AUTO: 45 FL (ref 37–54)
EGFRCR SERPLBLD CKD-EPI 2021: 112.1 ML/MIN/1.73
ERYTHROCYTE [DISTWIDTH] IN BLOOD BY AUTOMATED COUNT: 13.7 % (ref 12.3–15.4)
GLOBULIN UR ELPH-MCNC: 3.4 GM/DL
GLUCOSE SERPL-MCNC: 128 MG/DL (ref 65–99)
HCT VFR BLD AUTO: 38.3 % (ref 34–46.6)
HGB BLD-MCNC: 12.2 G/DL (ref 12–15.9)
LAB AP CASE REPORT: NORMAL
LAB AP CLINICAL INFORMATION: NORMAL
MCH RBC QN AUTO: 28.4 PG (ref 26.6–33)
MCHC RBC AUTO-ENTMCNC: 31.9 G/DL (ref 31.5–35.7)
MCV RBC AUTO: 89.1 FL (ref 79–97)
PATH REPORT.FINAL DX SPEC: NORMAL
PATH REPORT.GROSS SPEC: NORMAL
PLATELET # BLD AUTO: 493 10*3/MM3 (ref 140–450)
PMV BLD AUTO: 9.9 FL (ref 6–12)
POTASSIUM SERPL-SCNC: 3.7 MMOL/L (ref 3.5–5.2)
PROT SERPL-MCNC: 7.4 G/DL (ref 6–8.5)
RBC # BLD AUTO: 4.3 10*6/MM3 (ref 3.77–5.28)
SODIUM SERPL-SCNC: 137 MMOL/L (ref 136–145)
WBC NRBC COR # BLD AUTO: 16.39 10*3/MM3 (ref 3.4–10.8)

## 2025-02-18 PROCEDURE — 25010000002 MORPHINE PER 10 MG: Performed by: STUDENT IN AN ORGANIZED HEALTH CARE EDUCATION/TRAINING PROGRAM

## 2025-02-18 PROCEDURE — 85027 COMPLETE CBC AUTOMATED: CPT | Performed by: STUDENT IN AN ORGANIZED HEALTH CARE EDUCATION/TRAINING PROGRAM

## 2025-02-18 PROCEDURE — 99232 SBSQ HOSP IP/OBS MODERATE 35: CPT | Performed by: STUDENT IN AN ORGANIZED HEALTH CARE EDUCATION/TRAINING PROGRAM

## 2025-02-18 PROCEDURE — 80053 COMPREHEN METABOLIC PANEL: CPT | Performed by: STUDENT IN AN ORGANIZED HEALTH CARE EDUCATION/TRAINING PROGRAM

## 2025-02-18 PROCEDURE — 25010000002 ENOXAPARIN PER 10 MG: Performed by: STUDENT IN AN ORGANIZED HEALTH CARE EDUCATION/TRAINING PROGRAM

## 2025-02-18 PROCEDURE — 25810000003 LACTATED RINGERS SOLUTION: Performed by: STUDENT IN AN ORGANIZED HEALTH CARE EDUCATION/TRAINING PROGRAM

## 2025-02-18 RX ORDER — OXYCODONE AND ACETAMINOPHEN 5; 325 MG/1; MG/1
1 TABLET ORAL EVERY 4 HOURS PRN
Status: DISCONTINUED | OUTPATIENT
Start: 2025-02-18 | End: 2025-02-19 | Stop reason: HOSPADM

## 2025-02-18 RX ORDER — CYCLOBENZAPRINE HCL 5 MG
5 TABLET ORAL 3 TIMES DAILY PRN
Status: DISCONTINUED | OUTPATIENT
Start: 2025-02-18 | End: 2025-02-19 | Stop reason: HOSPADM

## 2025-02-18 RX ADMIN — MORPHINE SULFATE 4 MG: 4 INJECTION, SOLUTION INTRAMUSCULAR; INTRAVENOUS at 13:43

## 2025-02-18 RX ADMIN — SODIUM CHLORIDE, SODIUM LACTATE, POTASSIUM CHLORIDE, CALCIUM CHLORIDE 1000 ML: 20; 30; 600; 310 INJECTION, SOLUTION INTRAVENOUS at 21:05

## 2025-02-18 RX ADMIN — MORPHINE SULFATE 4 MG: 4 INJECTION, SOLUTION INTRAMUSCULAR; INTRAVENOUS at 03:18

## 2025-02-18 RX ADMIN — MORPHINE SULFATE 4 MG: 4 INJECTION, SOLUTION INTRAMUSCULAR; INTRAVENOUS at 08:06

## 2025-02-18 RX ADMIN — Medication 10 ML: at 08:07

## 2025-02-18 RX ADMIN — IBUPROFEN 600 MG: 400 TABLET, FILM COATED ORAL at 21:05

## 2025-02-18 RX ADMIN — OXYCODONE HYDROCHLORIDE AND ACETAMINOPHEN 1 TABLET: 5; 325 TABLET ORAL at 18:08

## 2025-02-18 RX ADMIN — OXYCODONE HYDROCHLORIDE AND ACETAMINOPHEN 1 TABLET: 5; 325 TABLET ORAL at 22:11

## 2025-02-18 RX ADMIN — ENOXAPARIN SODIUM 40 MG: 100 INJECTION SUBCUTANEOUS at 08:06

## 2025-02-18 RX ADMIN — CYCLOBENZAPRINE HYDROCHLORIDE 5 MG: 5 TABLET, FILM COATED ORAL at 14:47

## 2025-02-18 RX ADMIN — Medication 10 ML: at 21:05

## 2025-02-18 RX ADMIN — PANTOPRAZOLE SODIUM 40 MG: 40 INJECTION, POWDER, FOR SOLUTION INTRAVENOUS at 08:07

## 2025-02-18 NOTE — PLAN OF CARE
Problem: Adult Inpatient Plan of Care  Goal: Plan of Care Review  Outcome: Progressing  Flowsheets (Taken 2/18/2025 0346)  Progress: no change  Outcome Evaluation: Patient is alert and oriented x4. Patient has had complaints of pain, see EMAR. Patient has no other complaints at this time.  Plan of Care Reviewed With: patient  Goal: Patient-Specific Goal (Individualized)  Outcome: Progressing  Goal: Absence of Hospital-Acquired Illness or Injury  Outcome: Progressing  Intervention: Identify and Manage Fall Risk  Recent Flowsheet Documentation  Taken 2/18/2025 0318 by Corry Schumacher, RN  Safety Promotion/Fall Prevention:   assistive device/personal items within reach   clutter free environment maintained   fall prevention program maintained   lighting adjusted   nonskid shoes/slippers when out of bed   room organization consistent   safety round/check completed  Taken 2/18/2025 0115 by Corry Schumacher, RN  Safety Promotion/Fall Prevention:   assistive device/personal items within reach   clutter free environment maintained   fall prevention program maintained   lighting adjusted   nonskid shoes/slippers when out of bed   room organization consistent   safety round/check completed  Taken 2/17/2025 2313 by Corry Schumacher, RN  Safety Promotion/Fall Prevention:   assistive device/personal items within reach   clutter free environment maintained   fall prevention program maintained   lighting adjusted   nonskid shoes/slippers when out of bed   room organization consistent   safety round/check completed  Taken 2/17/2025 2254 by Corry Schumacher, RN  Safety Promotion/Fall Prevention:   assistive device/personal items within reach   clutter free environment maintained   fall prevention program maintained   lighting adjusted   nonskid shoes/slippers when out of bed   room organization consistent   safety round/check completed  Taken 2/17/2025 2141 by Corry Schumacher, RN  Safety Promotion/Fall Prevention:   assistive  device/personal items within reach   clutter free environment maintained   fall prevention program maintained   lighting adjusted   nonskid shoes/slippers when out of bed   room organization consistent   safety round/check completed  Taken 2/17/2025 2118 by Corry Schumacher RN  Safety Promotion/Fall Prevention:   assistive device/personal items within reach   clutter free environment maintained   fall prevention program maintained   lighting adjusted   nonskid shoes/slippers when out of bed   room organization consistent   safety round/check completed  Intervention: Prevent Infection  Recent Flowsheet Documentation  Taken 2/18/2025 0318 by Corry Schumacher RN  Infection Prevention:   cohorting utilized   environmental surveillance performed   equipment surfaces disinfected   hand hygiene promoted   personal protective equipment utilized   rest/sleep promoted   single patient room provided  Taken 2/18/2025 0115 by Corry Schumacher RN  Infection Prevention:   cohorting utilized   environmental surveillance performed   equipment surfaces disinfected   hand hygiene promoted   personal protective equipment utilized   rest/sleep promoted   single patient room provided  Taken 2/17/2025 2313 by Corry Schumacher RN  Infection Prevention:   cohorting utilized   environmental surveillance performed   equipment surfaces disinfected   hand hygiene promoted   personal protective equipment utilized   rest/sleep promoted   single patient room provided  Taken 2/17/2025 2254 by Corry Schumacher RN  Infection Prevention:   cohorting utilized   environmental surveillance performed   equipment surfaces disinfected   hand hygiene promoted   personal protective equipment utilized   rest/sleep promoted   single patient room provided  Taken 2/17/2025 2141 by Corry Schumacher RN  Infection Prevention:   cohorting utilized   environmental surveillance performed   equipment surfaces disinfected   hand hygiene promoted   personal protective  equipment utilized   rest/sleep promoted   single patient room provided  Taken 2/17/2025 2118 by Corry Schumacehr RN  Infection Prevention:   cohorting utilized   environmental surveillance performed   equipment surfaces disinfected   hand hygiene promoted   personal protective equipment utilized   rest/sleep promoted   single patient room provided  Goal: Optimal Comfort and Wellbeing  Outcome: Progressing  Intervention: Monitor Pain and Promote Comfort  Recent Flowsheet Documentation  Taken 2/18/2025 0318 by Corry Schumacher RN  Pain Management Interventions:   care clustered   pain management plan reviewed with patient/caregiver   pain medication given   quiet environment facilitated  Taken 2/17/2025 2313 by Corry Schumacher RN  Pain Management Interventions:   care clustered   pain management plan reviewed with patient/caregiver   pain medication given   quiet environment facilitated  Taken 2/17/2025 2141 by Corry Schumacher RN  Pain Management Interventions:   care clustered   pain medication given   pain management plan reviewed with patient/caregiver   quiet environment facilitated  Intervention: Provide Person-Centered Care  Recent Flowsheet Documentation  Taken 2/17/2025 2118 by Corry Schumacher RN  Trust Relationship/Rapport:   care explained   choices provided   emotional support provided   empathic listening provided   questions answered   questions encouraged   reassurance provided   thoughts/feelings acknowledged  Goal: Readiness for Transition of Care  Outcome: Progressing     Problem: Pain Acute  Goal: Optimal Pain Control and Function  Outcome: Progressing  Intervention: Develop Pain Management Plan  Recent Flowsheet Documentation  Taken 2/18/2025 0318 by Corry Schumacher RN  Pain Management Interventions:   care clustered   pain management plan reviewed with patient/caregiver   pain medication given   quiet environment facilitated  Taken 2/17/2025 2313 by Corry Schumacher RN  Pain Management  Interventions:   care clustered   pain management plan reviewed with patient/caregiver   pain medication given   quiet environment facilitated  Taken 2/17/2025 2141 by Corry Schumacher, RN  Pain Management Interventions:   care clustered   pain medication given   pain management plan reviewed with patient/caregiver   quiet environment facilitated  Intervention: Prevent or Manage Pain  Recent Flowsheet Documentation  Taken 2/17/2025 2118 by Corry Schumacher, RN  Medication Review/Management:   medications reviewed   high-risk medications identified     Problem: Nausea and Vomiting  Goal: Nausea and Vomiting Relief  Outcome: Progressing   Goal Outcome Evaluation:  Plan of Care Reviewed With: patient        Progress: no change  Outcome Evaluation: Patient is alert and oriented x4. Patient has had complaints of pain, see EMAR. Patient has no other complaints at this time.

## 2025-02-18 NOTE — ANESTHESIA POSTPROCEDURE EVALUATION
Patient: Ebony Hamilton    Procedure Summary       Date: 02/17/25 Room / Location: [facility] OR 07 / [facility] MAIN OR    Anesthesia Start: 1915 Anesthesia Stop: 2010    Procedure: CHOLECYSTECTOMY LAPAROSCOPIC plain language: removal of gallbladder thru small incisions using long instruments and a camera (Abdomen) Diagnosis:       Biliary colic      (Biliary colic [K80.50])    Surgeons: Josué Castano MD Provider: Karen Amaral MD    Anesthesia Type: general ASA Status: 2            Anesthesia Type: general    Vitals  Vitals Value Taken Time   /86 02/17/25 2053   Temp 36.6 °C (97.9 °F) 02/17/25 2050   Pulse 80 02/17/25 2055   Resp 23 02/17/25 2050   SpO2 96 % 02/17/25 2054   Vitals shown include unfiled device data.        Post Anesthesia Care and Evaluation    Patient location during evaluation: bedside  Patient participation: complete - patient participated  Level of consciousness: awake  Pain management: adequate    Airway patency: patent  PONV Status: none  Cardiovascular status: acceptable and stable  Respiratory status: acceptable  Hydration status: acceptable

## 2025-02-18 NOTE — PLAN OF CARE
Goal Outcome Evaluation:              Outcome Evaluation: Pt is alert and orient x4.  VS WNl for pt norm.  PRN pain medication adm for pain with mod effect.

## 2025-02-18 NOTE — OP NOTE
CHOLECYSTECTOMY LAPAROSCOPIC POSSIBLE OPEN CHOLECYSTECTOMY  Procedure Report    Patient Name:  Ebony Hamilton  YOB: 1980    Date of Surgery:  2/17/2025     Indications:  Acute cholecystitis    Pre-op Diagnosis:   Biliary colic [K80.50]       Post-Op Diagnosis Codes:     * Biliary colic [K80.50]    Procedure/CPT® Codes:  No CPT Code Applied in Case Entry    Procedure(s):  Laparoscopic cholecystectomy        Staff:  Surgeon(s):  Josué Castano MD    Assistant: Madina Polanco RN CSA    Anesthesia: General    Estimated Blood Loss: minimal    Implants:    Implant Name Type Inv. Item Serial No.  Lot No. LRB No. Used Action   CLIPAPPLR M/ ENDO LIGACLIP ROT 10MM MD/LG - [id number] Implant CLIPAPPLR M/ ENDO LIGACLIP ROT 10MM MD/LG  [employer]  DIV OF J AND J [id number] N/A 1 Implanted   SEAL HEMO SURG ERICK/AH ABS/PWDR 3GM - [id number] Implant SEAL HEMO SURG ERICK/AH ABS/PWDR 3GM  MED[employer] HEMOSTATIS POLYMER xzoops [id number]/A 1 Implanted       Specimen:          Specimens       ID Source Type Tests Collected By Collected At Frozen?    A Gallbladder Tissue TISSUE PATHOLOGY EXAM   Josué Castano MD 2/17/25 1950     Description: Gallbladder and contents                Findings: Acute cholecystitis    Complications: None apparent at the time of surgery    Description of Procedure: Informed consent was obtained before the patient was brought to the operating suite and placed in the supine position.  General endotracheal anesthesia was induced and maintained throughout the procedure.  The patient's abdomen was prepped and draped in standard sterile fashion before a timeout procedure was performed, verifying the correct patient, procedure, and other pertinent information.    Using a #15 blade scalpel an incision was made in the umbilicus and the abdomen was entered using Karen technique.  12 mm balloon trocar was inserted and the gas applied to achieve adequate pneumoperitoneum of  15 mmHg.  Laparoscope was inserted into the abdomen confirmed a safe entrance.  Under direct visualization three additional trocars were placed: two 5 mm trocars in the right costal margin and one 11 mm trocar in the epigastric region.  Fundus of the gallbladder was grasped and retracted over the liver to expose the infundibulum.  Omental and peritoneal attachments were serially taken down using blunt dissection.  Blunt dissection was also used to expose cystic artery and cystic duct to achieve a critical view of safety.  Cystic duct and artery were both clipped x3 before being transected with laparoscopic scissors.  Exam confirmed clips were placed across the entire lumen of the artery and the duct; there was no leakage of bile from the divided duct.  Hook electrocautery was used taking the gallbladder off the bed of the liver without rupture.  Specimen was collected in an Endo Catch bag and removed from the abdomen at the end of the procedure.  Hemostasis along the liver bed was verified.  Irrigation and suction of right upper quadrant was performed confirming the effluent ran clear.  Trocars were then removed under direct visualization.  Specimen was passed off the table for analysis by pathology.  Umbilical fascia was closed with a 0 PDS in figure-of-eight fashion.  All skin incisions were closed with staples before application of sterile dressings over top to conclude the procedure.    Patient tolerated the procedure well and there were no immediate complications.  All counts were correct x2 at the end of procedure.    Disposition: Stable in PACU        The surgical first assist listed above assisted with all needed aspects of the procedure.    Electronically signed by Josué Castano MD, 02/17/25, 8:02 PM EST.

## 2025-02-18 NOTE — PROGRESS NOTES
[facility]   Hospitalist Progress Note  Date: 2025  Patient Name: Ebony Hamilton  : 1980  MRN: [id number]  Date of admission: 2025  Room/Bed: [facility]      Subjective   Subjective     Chief Complaint:   Chief Complaint   Patient presents with   • Abdominal Pain       Summary: Ebony Hamilton is a 44-year-old female with a history of atrial fibrillation off anticoagulation, gastroparesis, anxiety, depression, chronic pain, panic, GERD and endometriosis.  She presented to the emergency department with abdominal pain of sudden onset and associated with nausea and vomiting.  In the emergency department she was found to have vital signs within normal limits, laboratory evaluation with leukocytosis and some electrolyte abnormalities but with normal liver function testing and lipase, ultrasound of the gallbladder was notable for small gallstone within the neck of the gallbladder without obstruction and positive sonographic Guzman sign.  She was given significant amounts of opiates and antibiotics in the emergency department, was seen by gastroenterology after hospitalist service admitted and underwent EGD.  The patient's esophagoduodenoscopy was notable for a hiatal hernia, some mild congestive edema in the gastric antrum but no other focal findings.  General surgeon was consulted.  The patient underwent laparoscopic cholecystectomy same day of arrival without complication.    Interval Followup:     Patient has been cleared for discharge from surgical standpoint.  She is having some muscle spasms and has requested to stay until tomorrow.        Objective   Objective     Vitals:   Temp:  [97.5 °F (36.4 °C)-98.8 °F (37.1 °C)] 98.2 °F (36.8 °C)  Heart Rate:  [] 107  Resp:  [14-26] 18  BP: (113-177)/() 143/90    Physical Exam   General: Awake, alert, in no acute distress  HENT: Atraumatic, normocephalic.   Eyes: pupils equal, round, without scleral icterus  Cardiovascular: Regular  rate and rhythm, no murmurs   Pulmonary: CTA bilaterally; no wheezes; no conversational dyspnea  Gastrointestinal: nondistended      Result Review    Result Review:  I have personally reviewed these results:  [x]  Laboratory      Lab 02/18/25 0448 02/17/25 0817 02/16/25 2217   WBC 16.39*  --  15.46*   HEMOGLOBIN 12.2  --  12.2   HEMATOCRIT 38.3  --  38.5   PLATELETS 493*  --  424   NEUTROS ABS  --   --  12.51*   IMMATURE GRANS (ABS)  --   --  0.08*   LYMPHS ABS  --   --  2.01   MONOS ABS  --   --  0.73   EOS ABS  --   --  0.08   MCV 89.1  --  89.3   LACTATE  --  2.0  --          Lab 02/18/25 0448 02/17/25  0817 02/16/25 2217   SODIUM 137 138 134*   POTASSIUM 3.7 3.5 4.2   CHLORIDE 100 99 97*   CO2 22.0 19.7* 24.9   ANION GAP 15.0 19.3* 12.1   BUN 14 10 13   CREATININE 0.67 0.74 0.85   EGFR 112.1 103.7 87.8   GLUCOSE 128* 114* 116*   CALCIUM 9.4 9.7 10.2         Lab 02/18/25 0448 02/17/25  0817 02/16/25 2217   TOTAL PROTEIN 7.4 8.2 7.6   ALBUMIN 4.0 4.5 4.3   GLOBULIN 3.4 3.7 3.3   ALT (SGPT) 32 26 29   AST (SGOT) 29 13 16   BILIRUBIN 0.4 0.7 0.3   ALK PHOS 76 90 87   LIPASE  --   --  25                     Brief Urine Lab Results  (Last result in the past 365 days)        Color   Clarity   Blood   Leuk Est   Nitrite   Protein   CREAT   Urine HCG        02/17/25 0037 Yellow   Clear   Negative   Trace   Negative   Negative                 [x]  Microbiology   Microbiology Results (last 10 days)       ** No results found for the last 240 hours. **          [x]  Radiology  XR Abdomen Flat & Upright    Result Date: 2/17/2025  Nonobstructive bowel gas pattern. No free air. Electronically Signed: Yayo Ford MD  2/17/2025 6:51 AM EST  Workstation ID: [id number]    US Gallbladder    Result Date: 2/17/2025  1. Small gallstone in the gallbladder neck with no biliary obstruction or gallbladder wall thickening. There is a positive sonographic Guzman's sign of questionable significance. If there is continued concern for  acute cholecystitis, HIDA scan may be beneficial. 2. Otherwise negative. Electronically Signed: Yayo Ford MD  2/17/2025 12:08 AM EST  Workstation ID: [id number]   []  EKG/Telemetry   []  Cardiology/Vascular   []  Pathology  []  Old records  []  Other:    Assessment & Plan   Assessment / Plan     Assessment:  • **Intractable abdominal pain     • Biliary colic     • Calculus of gallbladder without cholecystitis without obstruction     • Nausea and vomiting     • Gastroparesis     • Adenomyosis of uterus     • NICHOLE (generalized anxiety disorder)        Plan:      Abdominal pain  -Status post EGD without focal findings, laparoscopic cholecystectomy  -Symptoms improved  -Flexeril.    -Plan for discharge to home in a.m. for outpatient follow-up 2 weeks in surgery office  -Supportive care        Chronic pain disorder  Anxiety/depression  Endometriosis  Gastroparesis     GI ppx  DVT ppx       Discussed with RN.    VTE Prophylaxis:  Pharmacologic & mechanical VTE prophylaxis orders are present.        CODE STATUS:   Level Of Support Discussed With: Patient  Code Status (Patient has no pulse and is not breathing): CPR (Attempt to Resuscitate)  Medical Interventions (Patient has pulse or is breathing): Full Support      Electronically signed by Bruno Simon MD, 2/18/2025, 12:38 EST.

## 2025-02-18 NOTE — PROGRESS NOTES
[facility]     Surgery Progress Note    Patient Name: Ebony Hamilton  :    1980  MRN:    [id number]  Date of admission:  2025  Length of Stay: 1 days    Subjective   44-year-old female with acute cholecystitis status post laparoscopic cholecystectomy ()    No acute events overnight.  Having some gas pains this morning but overall improved.  Tolerated diet.  Denies any current nausea, vomiting, fevers, chills  Objective     Current Facility-Administered Medications:   •  sennosides-docusate (PERICOLACE) 8.6-50 MG per tablet 2 tablet, 2 tablet, Oral, BID PRN **AND** polyethylene glycol (MIRALAX) packet 17 g, 17 g, Oral, Daily PRN **AND** bisacodyl (DULCOLAX) EC tablet 5 mg, 5 mg, Oral, Daily PRN **AND** bisacodyl (DULCOLAX) suppository 10 mg, 10 mg, Rectal, Daily PRN, Josué Castano MD  •  Enoxaparin Sodium (LOVENOX) syringe 40 mg, 40 mg, Subcutaneous, Daily, Josué Castano MD, 40 mg at 25 0806  •  ibuprofen (ADVIL,MOTRIN) tablet 600 mg, 600 mg, Oral, Q6H PRN, Josué Castano MD, 600 mg at 25 2141  •  lactated ringers infusion, 9 mL/hr, Intravenous, Continuous PRN, Josué Castano MD, Last Rate: 9 mL/hr at 25 1915, Restarted at 25 1920  •  morphine injection 4 mg, 4 mg, Intravenous, Q4H PRN, Mingo Ibrahim MD, 4 mg at 25 0806  •  naloxone (NARCAN) injection 0.4 mg, 0.4 mg, Intravenous, PRN, Josué Castano MD  •  nitroglycerin (NITROSTAT) SL tablet 0.4 mg, 0.4 mg, Sublingual, Q5 Min PRN, Josué Castano MD  •  ondansetron (ZOFRAN) injection 4 mg, 4 mg, Intravenous, Q6H PRN, Josué Castano MD  •  pantoprazole (PROTONIX) injection 40 mg, 40 mg, Intravenous, QAM AC, Josué Castano MD, 40 mg at 25 0807  •  promethazine (PHENERGAN) IVPB 12.5 mg, 12.5 mg, Intravenous, Q6H PRN, Josué Castano MD  •  sodium chloride 0.9 % flush 10 mL, 10 mL, Intravenous, PRN, Josué Castano MD  •  sodium chloride 0.9 % flush 10 mL, 10 mL, Intravenous, Q12H,  Josué Castano MD, 10 mL at 02/18/25 0807  •  sodium chloride 0.9 % flush 10 mL, 10 mL, Intravenous, PRNEyad Jeffrey, MD  •  sodium chloride 0.9 % infusion 40 mL, 40 mL, Intravenous, PRN, Josué Castano MD    Current Diet:    Dietary Orders (From admission, onward)       Start     Ordered    02/17/25 2119  Diet: Regular/House; Fluid Consistency: Thin (IDDSI 0)  Diet Effective Now        References:    Diet Order Definitions   Question Answer Comment   Diets: Regular/House    Fluid Consistency: Thin (IDDSI 0)        02/17/25 2119                    Vitals:   Temp:  [97.5 °F (36.4 °C)-100.2 °F (37.9 °C)] 98.2 °F (36.8 °C)  Heart Rate:  [] 107  Resp:  [13-26] 18  BP: (113-177)/() 143/90  Physical Exam   General: no acute distress, resting in bed  Respiratory:  non-labored breathing  Cardiovascular:  RRR, non-tachycardic  Abdomen:  soft, non-distended, appropriate incisional tenderness, dressings in place over minimally invasive incisions are clean/dry/intact    LABS:  CBC          1/11/2025    15:38 2/16/2025    22:17 2/18/2025    04:48   CBC   WBC 15.88  15.46  16.39    RBC 4.50  4.31  4.30    Hemoglobin 12.8  12.2  12.2    Hematocrit 40.8  38.5  38.3    MCV 90.7  89.3  89.1    MCH 28.4  28.3  28.4    MCHC 31.4  31.7  31.9    RDW 14.3  13.2  13.7    Platelets 483  424  493      BMP          2/16/2025    22:17 2/17/2025    08:17 2/18/2025    04:48   BMP   BUN 13  10  14    Creatinine 0.85  0.74  0.67    Sodium 134  138  137    Potassium 4.2  3.5  3.7    Chloride 97  99  100    CO2 24.9  19.7  22.0    Calcium 10.2  9.7  9.4      CMP          2/16/2025    22:17 2/17/2025    08:17 2/18/2025    04:48   CMP   Glucose 116  114  128    BUN 13  10  14    Creatinine 0.85  0.74  0.67    EGFR 87.8  103.7  112.1    Sodium 134  138  137    Potassium 4.2  3.5  3.7    Chloride 97  99  100    Calcium 10.2  9.7  9.4    Total Protein 7.6  8.2  7.4    Albumin 4.3  4.5  4.0    Globulin 3.3  3.7  3.4    Total Bilirubin  "0.3  0.7  0.4    Alkaline Phosphatase 87  90  76    AST (SGOT) 16  13  29    ALT (SGPT) 29  26  32    Albumin/Globulin Ratio 1.3  1.2  1.2    BUN/Creatinine Ratio 15.3  13.5  20.9    Anion Gap 12.1  19.3  15.0        Lab Results (last 24 hours)       Procedure Component Value Units Date/Time    Tissue Pathology Exam [[id number]] Collected: 02/17/25 1015    Specimen: Tissue from Small Intestine, Duodenum; Tissue from Gastric, Antrum Updated: 02/18/25 0844     Case Report --     Surgical Pathology Report                         Case: [id number]                                  Authorizing Provider:  Sanya Reyes MD  Collected:           02/17/2025 10:15 AM          Ordering Location:     [facility] Received:            02/17/2025 10:57 AM                                 SUITES                                                                       Pathologist:           Lee Ann Umana MD                                                     Specimens:   1) - Small Intestine, Duodenum, DUODENUM BIOPSY                                                     2) - Gastric, Antrum, GASTRIC ANTRUM BIOPSY                                                 Clinical Information --     Gastroparesis  Bilious vomiting with nausea         Final Diagnosis --     1. Duodenum, biopsy:   - No significant pathologic change   - Preserved villous architecture and no increase in intraepithelial lymphocytes      2. Stomach, antrum, biopsy:   - Gastric antrum mucosa with no significant pathologic change   - Negative for Helicobacter pylori on routine H&E stain   - Negative for intestinal metaplasia, dysplasia and malignancy             Gross Description --     1. Small Intestine, Duodenum.  Received in formalin and labeled \" duodenum\" are three fragments of tan soft tissue measuring 0.2-0.3 cm in greatest dimension. The specimen is entirely submitted in one cassette.    2. Gastric, Antrum.  Received in formalin and " "labeled \" gastric antrum\" are two fragments of tan soft tissue measuring 0.2-0.3 cm in greatest dimension. The specimen is entirely submitted in one cassette.   CAILIN       Microscopic Description --     Microscopic examination performed.        Tissue Pathology Exam [[id number]] Collected: 02/17/25 1950    Specimen: Tissue from Gallbladder Updated: 02/18/25 0639    Comprehensive Metabolic Panel [[id number]]  (Abnormal) Collected: 02/18/25 0448    Specimen: Blood from Arm, Left Updated: 02/18/25 0544     Glucose 128 mg/dL      BUN 14 mg/dL      Creatinine 0.67 mg/dL      Sodium 137 mmol/L      Potassium 3.7 mmol/L      Chloride 100 mmol/L      CO2 22.0 mmol/L      Calcium 9.4 mg/dL      Total Protein 7.4 g/dL      Albumin 4.0 g/dL      ALT (SGPT) 32 U/L      AST (SGOT) 29 U/L      Alkaline Phosphatase 76 U/L      Total Bilirubin 0.4 mg/dL      Globulin 3.4 gm/dL      A/G Ratio 1.2 g/dL      BUN/Creatinine Ratio 20.9     Anion Gap 15.0 mmol/L      eGFR 112.1 mL/min/1.73     Narrative:      GFR Categories in Chronic Kidney Disease (CKD)      GFR Category          GFR (mL/min/1.73)    Interpretation  G1                     90 or greater         Normal or high (1)  G2                      60-89                Mild decrease (1)  G3a                   45-59                Mild to moderate decrease  G3b                   30-44                Moderate to severe decrease  G4                    15-29                Severe decrease  G5                    14 or less           Kidney failure          (1)In the absence of evidence of kidney disease, neither GFR category G1 or G2 fulfill the criteria for CKD.    eGFR calculation 2021 CKD-EPI creatinine equation, which does not include race as a factor    CBC (No Diff) [[id number]]  (Abnormal) Collected: 02/18/25 0448    Specimen: Blood from Arm, Left Updated: 02/18/25 0523     WBC 16.39 10*3/mm3      RBC 4.30 10*6/mm3      Hemoglobin 12.2 g/dL      Hematocrit 38.3 %      MCV 89.1 fL  "     MCH 28.4 pg      MCHC 31.9 g/dL      RDW 13.7 %      RDW-SD 45.0 fl      MPV 9.9 fL      Platelets 493 10*3/mm3             IMAGING:  Imaging Results (Last 24 Hours)       ** No results found for the last 24 hours. **            [x]  Laboratory  []  Microbiology  []  Radiology  []  EKG/Telemetry   []  Cardiology/Vascular   []  Pathology  []  Old records    Assessment / Plan   Assessment:   Active Hospital Problems    Diagnosis    • **Intractable abdominal pain    • Biliary colic    • Calculus of gallbladder without cholecystitis without obstruction    • Nausea and vomiting    • Gastroparesis    • S/P laparoscopic cholecystectomy    • Adenomyosis of uterus    • NICHOLE (generalized anxiety disorder)          Plan:    Acute cholecystitis  -Afebrile, vitals stable, postoperative leukocytosis 16,000  -Status post laparoscopic cholecystectomy (2/17)  -Okay for discharge from surgical standpoint  -Plan for follow-up with me in the office in 2 weeks     Electronically signed by Josué Castano MD, 02/18/25, 9:41 AM EST.

## 2025-02-19 ENCOUNTER — READMISSION MANAGEMENT (OUTPATIENT)
Dept: CALL CENTER | Facility: HOSPITAL | Age: 45
End: 2025-02-19
Payer: COMMERCIAL

## 2025-02-19 ENCOUNTER — TELEPHONE (OUTPATIENT)
Dept: SURGERY | Facility: CLINIC | Age: 45
End: 2025-02-19
Payer: COMMERCIAL

## 2025-02-19 VITALS
TEMPERATURE: 98.4 F | DIASTOLIC BLOOD PRESSURE: 64 MMHG | HEART RATE: 79 BPM | SYSTOLIC BLOOD PRESSURE: 95 MMHG | BODY MASS INDEX: 33.13 KG/M2 | RESPIRATION RATE: 18 BRPM | WEIGHT: 180 LBS | OXYGEN SATURATION: 97 % | HEIGHT: 62 IN

## 2025-02-19 LAB
ALBUMIN SERPL-MCNC: 3.3 G/DL (ref 3.5–5.2)
ALBUMIN/GLOB SERPL: 1.2 G/DL
ALP SERPL-CCNC: 59 U/L (ref 39–117)
ALT SERPL W P-5'-P-CCNC: 23 U/L (ref 1–33)
ANION GAP SERPL CALCULATED.3IONS-SCNC: 10.5 MMOL/L (ref 5–15)
AST SERPL-CCNC: 17 U/L (ref 1–32)
BILIRUB SERPL-MCNC: 0.4 MG/DL (ref 0–1.2)
BUN SERPL-MCNC: 15 MG/DL (ref 6–20)
BUN/CREAT SERPL: 23.1 (ref 7–25)
CALCIUM SPEC-SCNC: 8.7 MG/DL (ref 8.6–10.5)
CHLORIDE SERPL-SCNC: 100 MMOL/L (ref 98–107)
CO2 SERPL-SCNC: 24.5 MMOL/L (ref 22–29)
CREAT SERPL-MCNC: 0.65 MG/DL (ref 0.57–1)
CYTO UR: NORMAL
EGFRCR SERPLBLD CKD-EPI 2021: 112.9 ML/MIN/1.73
GLOBULIN UR ELPH-MCNC: 2.8 GM/DL
GLUCOSE SERPL-MCNC: 134 MG/DL (ref 65–99)
LAB AP CASE REPORT: NORMAL
LAB AP CLINICAL INFORMATION: NORMAL
PATH REPORT.FINAL DX SPEC: NORMAL
PATH REPORT.GROSS SPEC: NORMAL
POTASSIUM SERPL-SCNC: 3.1 MMOL/L (ref 3.5–5.2)
PROT SERPL-MCNC: 6.1 G/DL (ref 6–8.5)
SODIUM SERPL-SCNC: 135 MMOL/L (ref 136–145)
WHOLE BLOOD HOLD SPECIMEN: NORMAL

## 2025-02-19 PROCEDURE — 25010000002 ENOXAPARIN PER 10 MG: Performed by: STUDENT IN AN ORGANIZED HEALTH CARE EDUCATION/TRAINING PROGRAM

## 2025-02-19 PROCEDURE — 80053 COMPREHEN METABOLIC PANEL: CPT | Performed by: STUDENT IN AN ORGANIZED HEALTH CARE EDUCATION/TRAINING PROGRAM

## 2025-02-19 PROCEDURE — 99238 HOSP IP/OBS DSCHRG MGMT 30/<: CPT | Performed by: STUDENT IN AN ORGANIZED HEALTH CARE EDUCATION/TRAINING PROGRAM

## 2025-02-19 RX ORDER — POTASSIUM CHLORIDE 750 MG/1
40 CAPSULE, EXTENDED RELEASE ORAL ONCE
Status: COMPLETED | OUTPATIENT
Start: 2025-02-19 | End: 2025-02-19

## 2025-02-19 RX ORDER — CYCLOBENZAPRINE HCL 5 MG
5 TABLET ORAL 3 TIMES DAILY PRN
Qty: 10 TABLET | Refills: 0 | Status: SHIPPED | OUTPATIENT
Start: 2025-02-19

## 2025-02-19 RX ADMIN — Medication 10 ML: at 08:44

## 2025-02-19 RX ADMIN — OXYCODONE HYDROCHLORIDE AND ACETAMINOPHEN 1 TABLET: 5; 325 TABLET ORAL at 08:44

## 2025-02-19 RX ADMIN — POTASSIUM CHLORIDE 40 MEQ: 750 CAPSULE, EXTENDED RELEASE ORAL at 08:43

## 2025-02-19 RX ADMIN — ENOXAPARIN SODIUM 40 MG: 100 INJECTION SUBCUTANEOUS at 08:44

## 2025-02-19 RX ADMIN — OXYCODONE HYDROCHLORIDE AND ACETAMINOPHEN 1 TABLET: 5; 325 TABLET ORAL at 02:30

## 2025-02-19 RX ADMIN — PANTOPRAZOLE SODIUM 40 MG: 40 INJECTION, POWDER, FOR SOLUTION INTRAVENOUS at 08:44

## 2025-02-19 RX ADMIN — CYCLOBENZAPRINE HYDROCHLORIDE 5 MG: 5 TABLET, FILM COATED ORAL at 11:47

## 2025-02-19 RX ADMIN — OXYCODONE HYDROCHLORIDE AND ACETAMINOPHEN 1 TABLET: 5; 325 TABLET ORAL at 12:46

## 2025-02-19 NOTE — PLAN OF CARE
Goal Outcome Evaluation:              Outcome Evaluation: Pt discharged, education completed and care plan met.

## 2025-02-19 NOTE — DISCHARGE SUMMARY
[facility]         HOSPITALIST  DISCHARGE SUMMARY    Patient Name: Ebony Hamilton  : 1980  MRN: [id number]    Date of Admission: 2025  Date of Discharge: 2025  Primary Care Physician: Karen Stover APRN    Consults       Date and Time Order Name Status Description    2025 10:24 AM Inpatient General Surgery Consult Completed     2025  6:03 AM Inpatient Gastroenterology Consult Completed     2025  4:02 AM Inpatient Hospitalist Consult              Active and Resolved Hospital Problems:  Active Hospital Problems    Diagnosis POA   • **Intractable abdominal pain [R10.9] Yes   • Biliary colic [K80.50] Yes   • Calculus of gallbladder without cholecystitis without obstruction [K80.20] Unknown   • Nausea and vomiting [R11.2] Unknown   • Gastroparesis [K31.84] Unknown   • S/P laparoscopic cholecystectomy [Z90.49] Not Applicable   • Adenomyosis of uterus [N80.03] Yes   • NICHOLE (generalized anxiety disorder) [F41.1] Yes      Resolved Hospital Problems   No resolved problems to display.       Hospital Course     Hospital Course:  Ebony Hamilton is a 44 y.o. female who was treated in our facility for biliary colic and possibly acute cholecystitis.  She had a medical history of atrial fibrillation off anticoagulation, gastroparesis, anxiety, depression, chronic pain, panic, GERD and endometriosis. She presented to the emergency department with abdominal pain of sudden onset and associated with nausea and vomiting. In the emergency department she was found to have vital signs within normal limits, laboratory evaluation with leukocytosis and some electrolyte abnormalities but with normal liver function testing and lipase, ultrasound of the gallbladder was notable for small gallstone within the neck of the gallbladder without obstruction and positive sonographic Guzman sign. She was given significant amounts of opiates and antibiotics in the emergency department,  was seen by gastroenterology after hospitalist service admitted and underwent EGD. The patient's esophagoduodenoscopy was notable for a hiatal hernia, some mild congestive edema in the gastric antrum but no other focal findings. General surgeon was consulted. The patient underwent laparoscopic cholecystectomy same day of arrival without complication.  She has passed flatus in the postoperative period.  Overall she states her pain is improved from admission.  She is having some significant abdominal spasms postop but has requested discharge at this time.  She will follow-up with general surgeon and her primary care provider.        DISCHARGE Follow Up Recommendations for labs and diagnostics: None    Day of Discharge     Vital Signs:  Temp:  [98.1 °F (36.7 °C)-98.8 °F (37.1 °C)] 98.4 °F (36.9 °C)  Heart Rate:  [69-94] 79  Resp:  [18] 18  BP: ()/(50-72) 95/64  Physical Exam:     General: Awake, alert, in no acute distress  HENT: Atraumatic, normocephalic. Nasal cannula in place  Eyes: pupils equal, round, without scleral icterus  Cardiovascular: Regular rate and rhythm, no murmurs   Pulmonary: CTA bilaterally; no wheezes; no conversational dyspnea  Gastrointestinal: Soft nondistended, laparoscopic sites clean dry intact with dressings in place.          Discharge Details        Discharge Medications        New Medications        Instructions Start Date   cyclobenzaprine 5 MG tablet  Commonly known as: FLEXERIL   5 mg, Oral, 3 Times Daily PRN             Changes to Medications        Instructions Start Date   ondansetron 4 MG tablet  Commonly known as: ZOFRAN  What changed: See the new instructions.   TAKE (1) TABLET EVERY 8 HOURS AS NEEDED FOR NAUSEA AND vomiting      traZODone 50 MG tablet  Commonly known as: DESYREL  What changed:   how much to take  how to take this  when to take this  reasons to take this   Take 0.5 to 2 tab PO QHS PRN sleep             Continue These Medications        Instructions Start  Date   albuterol sulfate  (90 Base) MCG/ACT inhaler  Commonly known as: PROVENTIL HFA;VENTOLIN HFA;PROAIR HFA   2 puffs, Inhalation, Every 6 Hours PRN      bacitracin 500 UNIT/GM ointment   0.9 g, Topical, 2 Times Daily      bisacodyl 10 MG suppository  Commonly known as: DULCOLAX   Insert 1 suppository into the rectum Daily As Needed.      busPIRone 15 MG tablet  Commonly known as: BUSPAR   15 mg, Oral, 3 Times Daily      CALCIUM PO   1 tablet, Daily      clonazePAM 0.5 MG tablet  Commonly known as: KlonoPIN   0.5 mg, Oral, 2 Times Daily PRN      DULoxetine 30 MG capsule  Commonly known as: CYMBALTA   30 mg, Daily      DULoxetine 60 MG capsule  Commonly known as: CYMBALTA   60 mg, Daily      HYDROcodone-acetaminophen  MG per tablet  Commonly known as: NORCO   Take 1 tablet by mouth 5 (Five) Times a Day As Needed. take 1 tablet by mouth 4-5 times daily as needed for pain      hydrocortisone 2.5 % cream   1 Application, Topical, 2 Times Daily      ibuprofen 800 MG tablet  Commonly known as: ADVIL,MOTRIN   Take 1 tablet by mouth Every 8 (Eight) Hours As Needed.      Linzess 145 MCG capsule capsule  Generic drug: linaclotide   Take 1 capsule by mouth Every Morning Before Breakfast.      montelukast 10 MG tablet  Commonly known as: Singulair   10 mg, Oral, Nightly      pantoprazole 40 MG EC tablet  Commonly known as: PROTONIX   40 mg, Oral, Daily      polyethylene glycol 17 g packet  Commonly known as: MIRALAX   17 g, Oral, Daily      prochlorperazine 10 MG tablet  Commonly known as: COMPAZINE   Take 1 tablet by mouth Every 6 (Six) Hours As Needed.      sennosides-docusate 8.6-50 MG per tablet  Commonly known as: PERICOLACE   1 tablet, Oral, Daily      tiZANidine 4 MG tablet  Commonly known as: ZANAFLEX   4-8 mg, Every 6 Hours PRN      vitamin C 500 MG tablet  Commonly known as: ASCORBIC ACID   500 mg, Daily      Vraylar 1.5 MG capsule capsule  Generic drug: Cariprazine HCl   1.5 mg, Oral, Daily                Allergies   Allergen Reactions   • Escitalopram Nausea Only and Rash     Nausea, sweating, rash    • Sulfa Antibiotics Anaphylaxis   • Baclofen GI Intolerance, Hives and Nausea And Vomiting   • Ciprofloxacin Hallucinations   • Codeine Hives   • Gold-Containing Drug Products Rash   • Latex Rash   • Nickel Hives   • Tegaderm Ag Mesh [Silver] Hives       Discharge Disposition:  Home or Self Care    Diet:  Hospital:  Diet Order   Procedures   • Diet: Regular/House; Fluid Consistency: Thin (IDDSI 0)       Discharge Activity:       CODE STATUS:  Code Status and Medical Interventions: CPR (Attempt to Resuscitate); Full Support   Ordered at: 02/17/25 0548     Level Of Support Discussed With:    Patient     Code Status (Patient has no pulse and is not breathing):    CPR (Attempt to Resuscitate)     Medical Interventions (Patient has pulse or is breathing):    Full Support         No future appointments.    Additional Instructions for the Follow-ups that You Need to Schedule       Discharge Follow-up with PCP   As directed       Currently Documented PCP:    Karen Stover APRN    PCP Phone Number:    [phone]     Follow Up Details: Within 3 to 5 days        Discharge Follow-up with Specialty: General Surgeon; 2 Weeks   As directed      Specialty: General Surgeon   Follow Up: 2 Weeks                Pertinent  and/or Most Recent Results     PROCEDURES:   Surgical Procedures Since Admission:  Procedure(s):  ESOPHAGOGASTRODUODENOSCOPY WITH BIOPSIES  Surgeon:  Sanya Reyes MD  Status:  2 Days Post-Op  -------------------    Procedure(s):  CHOLECYSTECTOMY LAPAROSCOPIC plain language: removal of gallbladder thru small incisions using long instruments and a camera  Surgeon:  Josué Castano MD  Status:  2 Days Post-Op  -------------------      LAB RESULTS:      Lab 02/18/25  0448 02/17/25  0817 02/16/25  2217   WBC 16.39*  --  15.46*   HEMOGLOBIN 12.2  --  12.2   HEMATOCRIT 38.3  --  38.5   PLATELETS  493*  --  424   NEUTROS ABS  --   --  12.51*   IMMATURE GRANS (ABS)  --   --  0.08*   LYMPHS ABS  --   --  2.01   MONOS ABS  --   --  0.73   EOS ABS  --   --  0.08   MCV 89.1  --  89.3   LACTATE  --  2.0  --          Lab 02/19/25  0607 02/18/25  0448 02/17/25  0817 02/16/25  2217   SODIUM 135* 137 138 134*   POTASSIUM 3.1* 3.7 3.5 4.2   CHLORIDE 100 100 99 97*   CO2 24.5 22.0 19.7* 24.9   ANION GAP 10.5 15.0 19.3* 12.1   BUN 15 14 10 13   CREATININE 0.65 0.67 0.74 0.85   EGFR 112.9 112.1 103.7 87.8   GLUCOSE 134* 128* 114* 116*   CALCIUM 8.7 9.4 9.7 10.2         Lab 02/19/25  0607 02/18/25  0448 02/17/25  0817 02/16/25  2217   TOTAL PROTEIN 6.1 7.4 8.2 7.6   ALBUMIN 3.3* 4.0 4.5 4.3   GLOBULIN 2.8 3.4 3.7 3.3   ALT (SGPT) 23 32 26 29   AST (SGOT) 17 29 13 16   BILIRUBIN 0.4 0.4 0.7 0.3   ALK PHOS 59 76 90 87   LIPASE  --   --   --  25                     Brief Urine Lab Results  (Last result in the past 365 days)        Color   Clarity   Blood   Leuk Est   Nitrite   Protein   CREAT   Urine HCG        02/17/25 0037 Yellow   Clear   Negative   Trace   Negative   Negative                 Microbiology Results (last 10 days)       ** No results found for the last 240 hours. **            XR Abdomen Flat & Upright    Result Date: 2/17/2025  Nonobstructive bowel gas pattern. No free air. Electronically Signed: Yayo Ford MD  2/17/2025 6:51 AM EST  Workstation ID: [id number]    US Gallbladder    Result Date: 2/17/2025  1. Small gallstone in the gallbladder neck with no biliary obstruction or gallbladder wall thickening. There is a positive sonographic Guzman's sign of questionable significance. If there is continued concern for acute cholecystitis, HIDA scan may be beneficial. 2. Otherwise negative. Electronically Signed: Yayo Ford MD  2/17/2025 12:08 AM EST  Workstation ID: [id number]              Results for orders placed in visit on 02/12/15    SCANNED - ECHOCARDIOGRAM      Labs Pending at Discharge:        Time  spent on Discharge including face to face service: Less than 30 minutes    Electronically signed by Bruno Simon MD, 02/19/25, 12:55 PM EST.

## 2025-02-19 NOTE — TELEPHONE ENCOUNTER
Hub staff attempted to follow warm transfer process and was unsuccessful     Caller: Ebony Hamilton    Relationship to patient: Self    Best call back number: [phone]    Patient is needing: PATIENT NEEDS 2 WEEK FOLLOW UP APPT AFTER CHOLECYSTECTOMY LAPAROSCOPIC plain language: removal of gallbladder thru small incisions using long instruments and a camera ON 02/17.

## 2025-02-19 NOTE — OUTREACH NOTE
Prep Survey      Flowsheet Row Responses   [facility] patient discharged [facility]? [facility]   Is LACE score < 7 ? No   Eligibility [facility]   Date of Admission 02/16/25   Date of Discharge 02/19/25   Discharge Disposition Home or Self Care   Discharge diagnosis Lap cholecystectomy   Does the patient have one of the following disease processes/diagnoses(primary or secondary)? General Surgery   Does the patient have Home health ordered? No   Is there a DME ordered? No   Prep survey completed? Yes            BETTINA NEWMAN - Registered Nurse

## 2025-02-19 NOTE — PLAN OF CARE
Problem: Adult Inpatient Plan of Care  Goal: Plan of Care Review  Outcome: Progressing  Flowsheets (Taken 2/19/2025 0342)  Plan of Care Reviewed With: patient  Goal: Patient-Specific Goal (Individualized)  Outcome: Progressing  Goal: Absence of Hospital-Acquired Illness or Injury  Outcome: Progressing  Intervention: Identify and Manage Fall Risk  Recent Flowsheet Documentation  Taken 2/19/2025 0300 by Rhonda Sahni LPN  Safety Promotion/Fall Prevention: safety round/check completed  Taken 2/19/2025 0125 by Rhonda Sahni LPN  Safety Promotion/Fall Prevention: safety round/check completed  Taken 2/18/2025 2311 by Rhonda Sahni LPN  Safety Promotion/Fall Prevention: safety round/check completed  Taken 2/18/2025 2105 by Rhonda Sahni LPN  Safety Promotion/Fall Prevention: safety round/check completed  Taken 2/18/2025 1936 by Rhonda Sahni LPN  Safety Promotion/Fall Prevention: safety round/check completed  Intervention: Prevent Skin Injury  Recent Flowsheet Documentation  Taken 2/18/2025 2105 by Rhonda Sahni LPN  Body Position: position changed independently  Intervention: Prevent and Manage VTE (Venous Thromboembolism) Risk  Recent Flowsheet Documentation  Taken 2/18/2025 2105 by Rhonda Sahni LPN  VTE Prevention/Management: patient refused intervention  Intervention: Prevent Infection  Recent Flowsheet Documentation  Taken 2/18/2025 2105 by Rhonda Sahni LPN  Infection Prevention:   rest/sleep promoted   single patient room provided  Goal: Optimal Comfort and Wellbeing  Outcome: Progressing  Intervention: Monitor Pain and Promote Comfort  Recent Flowsheet Documentation  Taken 2/18/2025 2105 by Rhonda Sahni LPN  Pain Management Interventions:   care clustered   pain medication given  Intervention: Provide Person-Centered Care  Recent Flowsheet Documentation  Taken 2/18/2025 2105 by Rhonda Sahni LPN  Trust Relationship/Rapport:   care explained   emotional support provided  Goal:  Readiness for Transition of Care  Outcome: Progressing     Problem: Pain Acute  Goal: Optimal Pain Control and Function  Outcome: Progressing  Intervention: Optimize Psychosocial Wellbeing  Recent Flowsheet Documentation  Taken 2/18/2025 2105 by Rhonda Sahni LPN  Supportive Measures: active listening utilized  Diversional Activities: television  Intervention: Develop Pain Management Plan  Recent Flowsheet Documentation  Taken 2/18/2025 2105 by Rhonda Sahni LPN  Pain Management Interventions:   care clustered   pain medication given  Intervention: Prevent or Manage Pain  Recent Flowsheet Documentation  Taken 2/18/2025 2105 by Rhonda Sahni LPN  Sleep/Rest Enhancement:   awakenings minimized   regular sleep/rest pattern promoted   relaxation techniques promoted     Problem: Nausea and Vomiting  Goal: Nausea and Vomiting Relief  Outcome: Progressing  Intervention: Prevent and Manage Nausea and Vomiting  Recent Flowsheet Documentation  Taken 2/18/2025 2105 by Rhonda Sahni LPN  Environmental Support: calm environment promoted   Goal Outcome Evaluation:  Plan of Care Reviewed With: patient         Pt A&O, pain controlled, MD made aware of pt low bp see mar for new order, incision site dry and intact, pt reported passing flatus, spouse at bedside attentive to the pt, will continue current plan of care, call light in reach                                 Spiral Flap Text: The defect edges were debeveled with a #15 scalpel blade. Given the location of the defect, shape of the defect and the proximity to free margins a spiral flap was deemed most appropriate. Using a sterile surgical marker, an appropriate rotation flap was drawn incorporating the defect and placing the expected incisions within the relaxed skin tension lines where possible. The area thus outlined was incised deep to adipose tissue with a #15 scalpel blade. The skin margins were undermined to an appropriate distance in all directions utilizing iris scissors. Following this, the designed flap was carried over into the primary defect and sutured into place.

## 2025-02-20 ENCOUNTER — TRANSITIONAL CARE MANAGEMENT TELEPHONE ENCOUNTER (OUTPATIENT)
Dept: CALL CENTER | Facility: HOSPITAL | Age: 45
End: 2025-02-20
Payer: COMMERCIAL

## 2025-02-20 NOTE — OUTREACH NOTE
Call Center TCM Note      Flowsheet Row Responses   [facility] patient discharged from? [facility]   Does the patient have one of the following disease processes/diagnoses(primary or secondary)? General Surgery   TCM attempt successful? Yes  [vr for Donaldo Askew]   Call start time 1421   Call end time 1424   Discharge diagnosis Lap cholecystectomy   Meds reviewed with patient/caregiver? Yes   Is the patient having any side effects they believe may be caused by any medication additions or changes? No   Does the patient have all medications related to this admission filled (includes all antibiotics, pain medications, etc.) Yes   Is the patient taking all medications as directed (includes completed medication regime)? Yes   Comments Hospital f/u 2/26/25@145pm (appt in place at time of call), Surgeon 2/28/25   Does the patient have an appointment with their PCP within 7-14 days of discharge? Yes   Nursing Interventions Confirmed date/time of appointment   Has home health visited the patient within 72 hours of discharge? N/A   Psychosocial issues? No   Did the patient receive a copy of their discharge instructions? Yes   Nursing interventions Reviewed instructions with patient   What is the patient's perception of their health status since discharge? Same  [Pt doing ok, taking meds as ordered, had a BM since home, tolerating po.  Pt has no questions today]   Nursing interventions Nurse provided patient education  [post op instructions reviewed]   Is the patient /caregiver able to teach back basic post-op care? Lifting as instructed by MD in discharge instructions, Keep incision areas clean,dry and protected, No tub bath, swimming, or hot tub until instructed by MD, Practice 'cough and deep breath'   Is the patient/caregiver able to teach back signs and symptoms of incisional infection? Increased redness, swelling or pain at the incisonal site, Increased drainage or bleeding, Incisional warmth, Pus or odor from  incision, Fever  [reviewed]   Is the patient/caregiver able to teach back steps to recovery at home? Rest and rebuild strength, gradually increase activity   Is the patient/caregiver able to teach back the hierarchy of who to call/visit for symptoms/problems? PCP, Specialist, Home health nurse, Urgent Care, ED, 911 Yes   TCM call completed? Yes   Call end time 0999            Isabel Granados RN    2/20/2025, 14:25 EST

## 2025-02-22 ENCOUNTER — APPOINTMENT (OUTPATIENT)
Dept: CT IMAGING | Facility: HOSPITAL | Age: 45
End: 2025-02-22
Payer: COMMERCIAL

## 2025-02-22 ENCOUNTER — READMISSION MANAGEMENT (OUTPATIENT)
Dept: CALL CENTER | Facility: HOSPITAL | Age: 45
End: 2025-02-22
Payer: COMMERCIAL

## 2025-02-22 ENCOUNTER — HOSPITAL ENCOUNTER (INPATIENT)
Facility: HOSPITAL | Age: 45
LOS: 6 days | Discharge: HOME | End: 2025-02-28
Attending: EMERGENCY MEDICINE | Admitting: STUDENT IN AN ORGANIZED HEALTH CARE EDUCATION/TRAINING PROGRAM
Payer: COMMERCIAL

## 2025-02-22 DIAGNOSIS — K83.8 BILE LEAK FROM GALLBLADDER BED: ICD-10-CM

## 2025-02-22 DIAGNOSIS — Z90.49 S/P LAPAROSCOPIC CHOLECYSTECTOMY: ICD-10-CM

## 2025-02-22 DIAGNOSIS — R26.2 DIFFICULTY WALKING: ICD-10-CM

## 2025-02-22 DIAGNOSIS — Z46.89 ENCOUNTER FOR REMOVAL OF BILIARY STENT: ICD-10-CM

## 2025-02-22 DIAGNOSIS — K83.8 BILE DUCT LEAK: Primary | ICD-10-CM

## 2025-02-22 LAB
ALBUMIN SERPL-MCNC: 3.9 G/DL (ref 3.5–5.2)
ALBUMIN/GLOB SERPL: 0.9 G/DL
ALP SERPL-CCNC: 139 U/L (ref 39–117)
ALT SERPL W P-5'-P-CCNC: 77 U/L (ref 1–33)
ANION GAP SERPL CALCULATED.3IONS-SCNC: 21.7 MMOL/L (ref 5–15)
AST SERPL-CCNC: 34 U/L (ref 1–32)
BASOPHILS # BLD AUTO: 0.05 10*3/MM3 (ref 0–0.2)
BASOPHILS NFR BLD AUTO: 0.3 % (ref 0–1.5)
BILIRUB SERPL-MCNC: 0.8 MG/DL (ref 0–1.2)
BUN SERPL-MCNC: 11 MG/DL (ref 6–20)
BUN/CREAT SERPL: 18 (ref 7–25)
CALCIUM SPEC-SCNC: 9.7 MG/DL (ref 8.6–10.5)
CHLORIDE SERPL-SCNC: 97 MMOL/L (ref 98–107)
CO2 SERPL-SCNC: 19.3 MMOL/L (ref 22–29)
CREAT SERPL-MCNC: 0.61 MG/DL (ref 0.57–1)
D-LACTATE SERPL-SCNC: 1.3 MMOL/L (ref 0.5–2)
D-LACTATE SERPL-SCNC: 2.9 MMOL/L (ref 0.5–2)
DEPRECATED RDW RBC AUTO: 39.9 FL (ref 37–54)
EGFRCR SERPLBLD CKD-EPI 2021: 114.6 ML/MIN/1.73
EOSINOPHIL # BLD AUTO: 0.01 10*3/MM3 (ref 0–0.4)
EOSINOPHIL NFR BLD AUTO: 0.1 % (ref 0.3–6.2)
ERYTHROCYTE [DISTWIDTH] IN BLOOD BY AUTOMATED COUNT: 13 % (ref 12.3–15.4)
GLOBULIN UR ELPH-MCNC: 4.5 GM/DL
GLUCOSE SERPL-MCNC: 118 MG/DL (ref 65–99)
HCT VFR BLD AUTO: 40.9 % (ref 34–46.6)
HGB BLD-MCNC: 13.5 G/DL (ref 12–15.9)
HOLD SPECIMEN: NORMAL
HOLD SPECIMEN: NORMAL
IMM GRANULOCYTES # BLD AUTO: 0.17 10*3/MM3 (ref 0–0.05)
IMM GRANULOCYTES NFR BLD AUTO: 0.9 % (ref 0–0.5)
LIPASE SERPL-CCNC: 20 U/L (ref 13–60)
LYMPHOCYTES # BLD AUTO: 1.17 10*3/MM3 (ref 0.7–3.1)
LYMPHOCYTES NFR BLD AUTO: 6.1 % (ref 19.6–45.3)
MCH RBC QN AUTO: 27.7 PG (ref 26.6–33)
MCHC RBC AUTO-ENTMCNC: 33 G/DL (ref 31.5–35.7)
MCV RBC AUTO: 84 FL (ref 79–97)
MONOCYTES # BLD AUTO: 0.63 10*3/MM3 (ref 0.1–0.9)
MONOCYTES NFR BLD AUTO: 3.3 % (ref 5–12)
NEUTROPHILS NFR BLD AUTO: 17.25 10*3/MM3 (ref 1.7–7)
NEUTROPHILS NFR BLD AUTO: 89.3 % (ref 42.7–76)
NRBC BLD AUTO-RTO: 0 /100 WBC (ref 0–0.2)
PLATELET # BLD AUTO: 793 10*3/MM3 (ref 140–450)
PMV BLD AUTO: 10.1 FL (ref 6–12)
POTASSIUM SERPL-SCNC: 3.4 MMOL/L (ref 3.5–5.2)
PROT SERPL-MCNC: 8.4 G/DL (ref 6–8.5)
RBC # BLD AUTO: 4.87 10*6/MM3 (ref 3.77–5.28)
SODIUM SERPL-SCNC: 138 MMOL/L (ref 136–145)
WBC NRBC COR # BLD AUTO: 19.28 10*3/MM3 (ref 3.4–10.8)
WHOLE BLOOD HOLD COAG: NORMAL
WHOLE BLOOD HOLD SPECIMEN: NORMAL

## 2025-02-22 PROCEDURE — 25010000002 PIPERACILLIN SOD-TAZOBACTAM PER 1 G: Performed by: EMERGENCY MEDICINE

## 2025-02-22 PROCEDURE — 99291 CRITICAL CARE FIRST HOUR: CPT

## 2025-02-22 PROCEDURE — 36415 COLL VENOUS BLD VENIPUNCTURE: CPT

## 2025-02-22 PROCEDURE — 83605 ASSAY OF LACTIC ACID: CPT

## 2025-02-22 PROCEDURE — 25510000001 IOPAMIDOL PER 1 ML: Performed by: EMERGENCY MEDICINE

## 2025-02-22 PROCEDURE — 25010000002 PIPERACILLIN SOD-TAZOBACTAM PER 1 G: Performed by: STUDENT IN AN ORGANIZED HEALTH CARE EDUCATION/TRAINING PROGRAM

## 2025-02-22 PROCEDURE — 83605 ASSAY OF LACTIC ACID: CPT | Performed by: EMERGENCY MEDICINE

## 2025-02-22 PROCEDURE — 25010000002 HYDROMORPHONE 1 MG/ML SOLUTION: Performed by: EMERGENCY MEDICINE

## 2025-02-22 PROCEDURE — 25010000002 HYDROMORPHONE 1 MG/ML SOLUTION: Performed by: STUDENT IN AN ORGANIZED HEALTH CARE EDUCATION/TRAINING PROGRAM

## 2025-02-22 PROCEDURE — 25010000002 MORPHINE PER 10 MG: Performed by: EMERGENCY MEDICINE

## 2025-02-22 PROCEDURE — 25810000003 SEPSIS FLUID NS 0.9 % SOLUTION: Performed by: EMERGENCY MEDICINE

## 2025-02-22 PROCEDURE — 25010000002 ENOXAPARIN PER 10 MG: Performed by: STUDENT IN AN ORGANIZED HEALTH CARE EDUCATION/TRAINING PROGRAM

## 2025-02-22 PROCEDURE — 83690 ASSAY OF LIPASE: CPT

## 2025-02-22 PROCEDURE — 74177 CT ABD & PELVIS W/CONTRAST: CPT

## 2025-02-22 PROCEDURE — 87040 BLOOD CULTURE FOR BACTERIA: CPT

## 2025-02-22 PROCEDURE — 85025 COMPLETE CBC W/AUTO DIFF WBC: CPT

## 2025-02-22 PROCEDURE — 99222 1ST HOSP IP/OBS MODERATE 55: CPT | Performed by: STUDENT IN AN ORGANIZED HEALTH CARE EDUCATION/TRAINING PROGRAM

## 2025-02-22 PROCEDURE — 80053 COMPREHEN METABOLIC PANEL: CPT

## 2025-02-22 PROCEDURE — 25010000002 ONDANSETRON PER 1 MG: Performed by: EMERGENCY MEDICINE

## 2025-02-22 RX ORDER — ACETAMINOPHEN 325 MG/1
650 TABLET ORAL EVERY 4 HOURS PRN
Status: DISCONTINUED | OUTPATIENT
Start: 2025-02-22 | End: 2025-02-22

## 2025-02-22 RX ORDER — DULOXETIN HYDROCHLORIDE 30 MG/1
30 CAPSULE, DELAYED RELEASE ORAL DAILY
Status: DISCONTINUED | OUTPATIENT
Start: 2025-02-23 | End: 2025-02-28 | Stop reason: HOSPADM

## 2025-02-22 RX ORDER — NALOXONE HCL 0.4 MG/ML
0.4 VIAL (ML) INJECTION
Status: DISCONTINUED | OUTPATIENT
Start: 2025-02-22 | End: 2025-02-26

## 2025-02-22 RX ORDER — MONTELUKAST SODIUM 10 MG/1
10 TABLET ORAL NIGHTLY
Status: DISCONTINUED | OUTPATIENT
Start: 2025-02-22 | End: 2025-02-28 | Stop reason: HOSPADM

## 2025-02-22 RX ORDER — BISACODYL 10 MG
10 SUPPOSITORY, RECTAL RECTAL DAILY PRN
Status: DISCONTINUED | OUTPATIENT
Start: 2025-02-22 | End: 2025-02-28 | Stop reason: HOSPADM

## 2025-02-22 RX ORDER — SODIUM CHLORIDE 9 MG/ML
40 INJECTION, SOLUTION INTRAVENOUS AS NEEDED
Status: DISCONTINUED | OUTPATIENT
Start: 2025-02-22 | End: 2025-02-28 | Stop reason: HOSPADM

## 2025-02-22 RX ORDER — BISACODYL 5 MG/1
5 TABLET, DELAYED RELEASE ORAL DAILY PRN
Status: DISCONTINUED | OUTPATIENT
Start: 2025-02-22 | End: 2025-02-28 | Stop reason: HOSPADM

## 2025-02-22 RX ORDER — ALBUTEROL SULFATE 90 UG/1
2 INHALANT RESPIRATORY (INHALATION) EVERY 6 HOURS PRN
Status: DISCONTINUED | OUTPATIENT
Start: 2025-02-22 | End: 2025-02-28 | Stop reason: HOSPADM

## 2025-02-22 RX ORDER — AMOXICILLIN 250 MG
2 CAPSULE ORAL 2 TIMES DAILY PRN
Status: DISCONTINUED | OUTPATIENT
Start: 2025-02-22 | End: 2025-02-28 | Stop reason: HOSPADM

## 2025-02-22 RX ORDER — SODIUM CHLORIDE 0.9 % (FLUSH) 0.9 %
10 SYRINGE (ML) INJECTION AS NEEDED
Status: DISCONTINUED | OUTPATIENT
Start: 2025-02-22 | End: 2025-02-28 | Stop reason: HOSPADM

## 2025-02-22 RX ORDER — IOPAMIDOL 755 MG/ML
100 INJECTION, SOLUTION INTRAVASCULAR
Status: COMPLETED | OUTPATIENT
Start: 2025-02-22 | End: 2025-02-22

## 2025-02-22 RX ORDER — FAMOTIDINE 20 MG/1
40 TABLET, FILM COATED ORAL DAILY
Status: DISCONTINUED | OUTPATIENT
Start: 2025-02-22 | End: 2025-02-28 | Stop reason: HOSPADM

## 2025-02-22 RX ORDER — ONDANSETRON 4 MG/1
4 TABLET, ORALLY DISINTEGRATING ORAL EVERY 6 HOURS PRN
Status: DISCONTINUED | OUTPATIENT
Start: 2025-02-22 | End: 2025-02-28 | Stop reason: HOSPADM

## 2025-02-22 RX ORDER — NALOXONE HCL 0.4 MG/ML
0.4 VIAL (ML) INJECTION
Status: DISCONTINUED | OUTPATIENT
Start: 2025-02-22 | End: 2025-02-22

## 2025-02-22 RX ORDER — BUSPIRONE HYDROCHLORIDE 15 MG/1
15 TABLET ORAL 3 TIMES DAILY
Status: DISCONTINUED | OUTPATIENT
Start: 2025-02-22 | End: 2025-02-28 | Stop reason: HOSPADM

## 2025-02-22 RX ORDER — PANTOPRAZOLE SODIUM 40 MG/1
40 TABLET, DELAYED RELEASE ORAL DAILY
Status: DISCONTINUED | OUTPATIENT
Start: 2025-02-23 | End: 2025-02-28 | Stop reason: HOSPADM

## 2025-02-22 RX ORDER — ACETAMINOPHEN 160 MG/5ML
650 SOLUTION ORAL EVERY 4 HOURS PRN
Status: DISCONTINUED | OUTPATIENT
Start: 2025-02-22 | End: 2025-02-22

## 2025-02-22 RX ORDER — CYCLOBENZAPRINE HCL 5 MG
10 TABLET ORAL 3 TIMES DAILY
Status: DISCONTINUED | OUTPATIENT
Start: 2025-02-22 | End: 2025-02-25

## 2025-02-22 RX ORDER — CLONAZEPAM 0.5 MG/1
0.5 TABLET ORAL 2 TIMES DAILY PRN
Status: DISCONTINUED | OUTPATIENT
Start: 2025-02-22 | End: 2025-02-26

## 2025-02-22 RX ORDER — ENOXAPARIN SODIUM 100 MG/ML
40 INJECTION SUBCUTANEOUS NIGHTLY
Status: DISCONTINUED | OUTPATIENT
Start: 2025-02-22 | End: 2025-02-22

## 2025-02-22 RX ORDER — LORAZEPAM 0.5 MG/1
2 TABLET ORAL NIGHTLY
Status: COMPLETED | OUTPATIENT
Start: 2025-02-22 | End: 2025-02-26

## 2025-02-22 RX ORDER — ACETAMINOPHEN 650 MG/1
650 SUPPOSITORY RECTAL EVERY 4 HOURS PRN
Status: DISCONTINUED | OUTPATIENT
Start: 2025-02-22 | End: 2025-02-22

## 2025-02-22 RX ORDER — POLYETHYLENE GLYCOL 3350 17 G/17G
17 POWDER, FOR SOLUTION ORAL DAILY PRN
Status: DISCONTINUED | OUTPATIENT
Start: 2025-02-22 | End: 2025-02-28 | Stop reason: HOSPADM

## 2025-02-22 RX ORDER — POLYETHYLENE GLYCOL 3350 17 G/17G
17 POWDER, FOR SOLUTION ORAL DAILY
Status: DISCONTINUED | OUTPATIENT
Start: 2025-02-22 | End: 2025-02-28 | Stop reason: HOSPADM

## 2025-02-22 RX ORDER — OXYCODONE AND ACETAMINOPHEN 10; 325 MG/1; MG/1
1 TABLET ORAL EVERY 4 HOURS PRN
Status: DISCONTINUED | OUTPATIENT
Start: 2025-02-22 | End: 2025-02-26

## 2025-02-22 RX ORDER — ONDANSETRON 2 MG/ML
4 INJECTION INTRAMUSCULAR; INTRAVENOUS ONCE
Status: COMPLETED | OUTPATIENT
Start: 2025-02-22 | End: 2025-02-22

## 2025-02-22 RX ORDER — SODIUM CHLORIDE 0.9 % (FLUSH) 0.9 %
10 SYRINGE (ML) INJECTION EVERY 12 HOURS SCHEDULED
Status: DISCONTINUED | OUTPATIENT
Start: 2025-02-22 | End: 2025-02-28 | Stop reason: HOSPADM

## 2025-02-22 RX ORDER — ONDANSETRON 4 MG/1
4 TABLET, ORALLY DISINTEGRATING ORAL EVERY 8 HOURS PRN
Status: DISCONTINUED | OUTPATIENT
Start: 2025-02-22 | End: 2025-02-28 | Stop reason: HOSPADM

## 2025-02-22 RX ORDER — OXYCODONE HYDROCHLORIDE 5 MG/1
5 TABLET ORAL EVERY 6 HOURS PRN
Status: DISCONTINUED | OUTPATIENT
Start: 2025-02-22 | End: 2025-02-22

## 2025-02-22 RX ADMIN — HYDROMORPHONE HYDROCHLORIDE 1 MG: 1 INJECTION, SOLUTION INTRAMUSCULAR; INTRAVENOUS; SUBCUTANEOUS at 16:32

## 2025-02-22 RX ADMIN — BUSPIRONE HYDROCHLORIDE 15 MG: 15 TABLET ORAL at 20:07

## 2025-02-22 RX ADMIN — ENOXAPARIN SODIUM 40 MG: 100 INJECTION SUBCUTANEOUS at 20:08

## 2025-02-22 RX ADMIN — HYDROMORPHONE HYDROCHLORIDE 1 MG: 1 INJECTION, SOLUTION INTRAMUSCULAR; INTRAVENOUS; SUBCUTANEOUS at 23:28

## 2025-02-22 RX ADMIN — FAMOTIDINE 40 MG: 20 TABLET, FILM COATED ORAL at 20:07

## 2025-02-22 RX ADMIN — ONDANSETRON 4 MG: 2 INJECTION INTRAMUSCULAR; INTRAVENOUS at 14:28

## 2025-02-22 RX ADMIN — HYDROMORPHONE HYDROCHLORIDE 0.5 MG: 1 INJECTION, SOLUTION INTRAMUSCULAR; INTRAVENOUS; SUBCUTANEOUS at 21:24

## 2025-02-22 RX ADMIN — OXYCODONE HYDROCHLORIDE 5 MG: 5 TABLET ORAL at 20:08

## 2025-02-22 RX ADMIN — Medication 10 ML: at 20:08

## 2025-02-22 RX ADMIN — PIPERACILLIN AND TAZOBACTAM 4.5 G: 4; .5 INJECTION, POWDER, FOR SOLUTION INTRAVENOUS; PARENTERAL at 16:24

## 2025-02-22 RX ADMIN — HYDROMORPHONE HYDROCHLORIDE 0.5 MG: 1 INJECTION, SOLUTION INTRAMUSCULAR; INTRAVENOUS; SUBCUTANEOUS at 14:29

## 2025-02-22 RX ADMIN — MONTELUKAST 10 MG: 10 TABLET, FILM COATED ORAL at 20:08

## 2025-02-22 RX ADMIN — SODIUM CHLORIDE 1881 ML: 9 INJECTION, SOLUTION INTRAVENOUS at 14:26

## 2025-02-22 RX ADMIN — CYCLOBENZAPRINE HYDROCHLORIDE 10 MG: 5 TABLET, FILM COATED ORAL at 22:14

## 2025-02-22 RX ADMIN — SODIUM CHLORIDE 3.38 G: 9 INJECTION, SOLUTION INTRAVENOUS at 23:30

## 2025-02-22 RX ADMIN — MORPHINE SULFATE 4 MG: 4 INJECTION, SOLUTION INTRAMUSCULAR; INTRAVENOUS at 15:20

## 2025-02-22 RX ADMIN — HYDROMORPHONE HYDROCHLORIDE 0.5 MG: 1 INJECTION, SOLUTION INTRAMUSCULAR; INTRAVENOUS; SUBCUTANEOUS at 19:19

## 2025-02-22 RX ADMIN — LORAZEPAM 2 MG: 0.5 TABLET ORAL at 22:14

## 2025-02-22 RX ADMIN — IOPAMIDOL 100 ML: 755 INJECTION, SOLUTION INTRAVENOUS at 15:39

## 2025-02-22 NOTE — CASE MANAGEMENT/SOCIAL WORK
Discharge Planning Assessment   [facility]     Patient Name: Ebony Hamilton  MRN: [id number]  Today's Date: 2/22/2025    Admit Date: 2/22/2025        Discharge Needs Assessment       Row Name 02/22/25 1648       Living Environment    People in Home spouse    Current Living Arrangements home    Potentially Unsafe Housing Conditions none    In the past 12 months has the electric, gas, oil, or water company threatened to shut off services in your home? No    Primary Care Provided by self    Provides Primary Care For no one    Family Caregiver if Needed none    Quality of Family Relationships supportive;helpful;involved    Able to Return to Prior Arrangements yes       Resource/Environmental Concerns    Resource/Environmental Concerns none    Transportation Concerns none       Transportation Needs    In the past 12 months, has lack of transportation kept you from medical appointments or from getting medications? no    In the past 12 months, has lack of transportation kept you from meetings, work, or from getting things needed for daily living? No       Food Insecurity    Within the past 12 months, you worried that your food would run out before you got the money to buy more. Never true    Within the past 12 months, the food you bought just didn't last and you didn't have money to get more. Never true       Transition Planning    Patient/Family Anticipates Transition to home    Patient/Family Anticipated Services at Transition none    Transportation Anticipated family or friend will provide       Discharge Needs Assessment    Equipment Currently Used at Home none    Concerns to be Addressed no discharge needs identified    Do you want help finding or keeping work or a job? I do not need or want help    Do you want help with school or training? For example, starting or completing job training or getting a high school diploma, GED or equivalent No    Anticipated Changes Related to Illness none    Equipment Needed After  Discharge none                   Discharge Plan    No needs identified at this time               VIKASH Sánchez

## 2025-02-22 NOTE — H&P
[facility]   HISTORY AND PHYSICAL    Patient Name: Ebony Hamilton  : 1980  MRN: [id number]  Primary Care Physician: Karen Stover APRN  Date of admission: 2025    Subjective   Subjective     Chief Complaint: Abdominal pain    History of the present illness    Patient is a 44-year-old female recently treated at this facility for abdominal pain, underwent cholecystectomy and is now presenting back with abdominal pain.  See prior discharge summary for details of prior hospital stay.    She has a past medical history of atrial fibrillation off of anticoagulation, gastroparesis, anxiety, depression, chronic pain, gastroesophageal reflux, panic disorder.  On her discharge a few days ago, her pain was improving.  She and her  state that the pain continued to improve after discharge, she was able to start eating regular food.  Elevating some doughnut today, her pain presented back severely, prompting her to return to the ER.    In the emergency department: Blood pressure 175/110, respiratory rate 32, heart rate 130.  Laboratory evaluation notable for white blood cell count 19.28, platelets 793.  Chemistry notable for potassium 3.4, ALT 77, AST 34, alk phos 139, lactic acid 2.9.  CT abdomen was performed notable for ill-defined fluid collection in the gallbladder bed 7 cm x 4 cm with small amounts tracking caudally along the medial margin alert lower lobe, additional peritoneal small fluid collections in the abdomen, peritoneal fluid collection in the pelvis 8 x 5 cm.  Findings concerning for bile leak.  Vital signs and lactic acid both normalized with IV fluid resuscitation and pain control.  ER team discussed with surgeon covering for GI, who recommended admission for IV antibiotics, with plan for possible ERCP Monday.  Hospitalist service requested to admit the patient for further evaluation and management.    Review of Systems   Gastrointestinal:  Positive for abdominal pain and  vomiting.        Personal History     Past Medical History:   Diagnosis Date    Allergic     Anxiety     Atrial fibrillation     RELEASED BY CARDIOLOGIST/ELECTROPHYSIST, NO CURRENT MEDS    Chronic pain disorder     Depression     Endometriosis     Headache     Hemorrhoids     Injury of shoulder, right 2009    CHRONIC PAIN    Panic disorder     S/P laparoscopic cholecystectomy 02/17/2025       Past Surgical History:   Procedure Laterality Date    CERVICAL ARTHRODESIS  01/14/2015    Donaldo Albarran    CERVICAL FUSION      C4-7 FUSION, FULL ROM    CHOLECYSTECTOMY N/A 2/17/2025    Procedure: CHOLECYSTECTOMY LAPAROSCOPIC plain language: removal of gallbladder thru small incisions using long instruments and a camera;  Surgeon: Josué Castano MD;  Location: [facility] MAIN OR;  Service: General;  Laterality: N/A;    COLONOSCOPY N/A 05/31/2022    Procedure: COLONOSCOPY;  Surgeon: Shabbir Baird MD;  Location: [facility] ENDOSCOPY;  Service: General;  Laterality: N/A;  HEMORRHOIDS    ENDOSCOPY N/A 2/17/2025    Procedure: ESOPHAGOGASTRODUODENOSCOPY WITH BIOPSIES;  Surgeon: Sanya Reyes MD;  Location: [facility] ENDOSCOPY;  Service: Gastroenterology;  Laterality: N/A;  GASTRITIS, SMALL HIATAL HERNIA    EXPLORATORY LAPAROTOMY      HEMORRHOIDECTOMY N/A 05/31/2022    Procedure: HEMORRHOID BANDING;  Surgeon: Shabbir Baird MD;  Location: [facility] ENDOSCOPY;  Service: General;  Laterality: N/A;  BANDS X 2    HEMORRHOIDECTOMY N/A 1/31/2024    Procedure: HEMORRHOIDECTOMY;  Surgeon: Shabbir Baird MD;  Location: [facility] OR [facility];  Service: General;  Laterality: N/A;    HYSTERECTOMY      SHOULDER ARTHROSCOPY Right     SHOULDER SURGERY Left     ARTHROSCOPY LABRAL TEAR X2    TUBAL ABDOMINAL LIGATION         Family History: family history includes Anxiety disorder in her father and mother; Bipolar disorder in her mother; Cancer in her mother; Dementia in her paternal grandmother and paternal uncle; Depression in her  mother; Heart disease in her mother and another family member; Hypertension in her father and mother. Otherwise pertinent FHx was reviewed and not pertinent to current issue.    Social History:  reports that she quit smoking about 6 years ago. Her smoking use included cigarettes. She started smoking about 26 years ago. She has a 5 pack-year smoking history. She has never used smokeless tobacco. She reports current alcohol use. She reports that she does not use drugs.    Home Medications:  Calcium, Cariprazine HCl, DULoxetine, HYDROcodone-acetaminophen, albuterol sulfate HFA, bacitracin, bisacodyl, busPIRone, clonazePAM, cyclobenzaprine, hydrocortisone, ibuprofen, linaclotide, montelukast, ondansetron, pantoprazole, polyethylene glycol, prochlorperazine, sennosides-docusate, tiZANidine, traZODone, and vitamin C      Allergies:  Allergies   Allergen Reactions    Escitalopram Nausea Only and Rash     Nausea, sweating, rash     Sulfa Antibiotics Anaphylaxis    Baclofen GI Intolerance, Hives and Nausea And Vomiting    Ciprofloxacin Hallucinations    Codeine Hives    Gold-Containing Drug Products Rash    Latex Rash    Nickel Hives    Tegaderm Ag Mesh [Silver] Hives       Objective    Objective     Vitals:  Temp:  [97.7 °F (36.5 °C)] 97.7 °F (36.5 °C)  Heart Rate:  [] 86  Resp:  [18-32] 24  BP: (129-175)/() 163/97    Physical Exam    General: Awake, alert, mild distress  HENT: Atraumatic, normocephalic. Nasal cannula in place  Eyes: pupils equal, round, without scleral icterus  Cardiovascular: Regular rate and rhythm, no murmurs   Pulmonary: CTA bilaterally; no wheezes; no conversational dyspnea  Gastrointestinal: Soft tender in all 4 quadrants, greatest upper right quadrant, nondistended  Musculoskeletal: No gross deformities                Assessment & Plan   Assessment / Plan     Abdominal pain  Gallbladder fluid collection  Abdominal fluid collection  Pelvic fluid collection  Possible bile leak:  -Surgical  consult  -Zosyn  -Oxycodone, Dilaudid for pain control  -Clear liquid diet  -Daily labs     Chronic pain disorder  Anxiety/depression  Endometriosis  Gastroparesis  -Resume appropriate home medications when reconciled     DVT prophylaxis: Lovenox    CODE STATUS:    Code Status and Medical Interventions: CPR (Attempt to Resuscitate); Full Support   Ordered at: 02/22/25 5850     Level Of Support Discussed With:    Patient     Code Status (Patient has no pulse and is not breathing):    CPR (Attempt to Resuscitate)     Medical Interventions (Patient has pulse or is breathing):    Full Support       Admission Status:  I believe this patient meets inpatient status.    Electronically signed by Bruno Simon MD, 02/22/25, 5:25 PM EST.

## 2025-02-22 NOTE — ED PROVIDER NOTES
Time: 3:02 PM EST  Date of encounter:  2/22/2025  Independent Historian/Clinical History and Information was obtained by:   Patient    History is limited by: N/A    Chief Complaint: Abdominal pain      History of Present Illness:  Patient is a 44 y.o. year old female who presents to the emergency department for evaluation of abdominal pain.  Patient was just discharged from our facility for intractable abdominal pain.  Patient had EGD and cholecystectomy that was documented as being essentially unremarkable.  Did have gallstones but no evidence of cholecystitis.  Patient presents today for intractable pain.      Patient Care Team  Primary Care Provider: Karen Stover APRN    Past Medical History:     Allergies   Allergen Reactions    Escitalopram Nausea Only and Rash     Nausea, sweating, rash     Sulfa Antibiotics Anaphylaxis    Baclofen GI Intolerance, Hives and Nausea And Vomiting    Ciprofloxacin Hallucinations    Codeine Hives    Gold-Containing Drug Products Rash    Latex Rash    Nickel Hives    Tegaderm Ag Mesh [Silver] Hives     Past Medical History:   Diagnosis Date    Allergic     Anxiety     Atrial fibrillation     RELEASED BY CARDIOLOGIST/ELECTROPHYSIST, NO CURRENT MEDS    Chronic pain disorder     Depression     Endometriosis     Headache     Hemorrhoids     Injury of shoulder, right 2009    CHRONIC PAIN    Panic disorder     S/P laparoscopic cholecystectomy 02/17/2025     Past Surgical History:   Procedure Laterality Date    CERVICAL ARTHRODESIS  01/14/2015    Donaldo Albarran    CERVICAL FUSION      C4-7 FUSION, FULL ROM    CHOLECYSTECTOMY N/A 2/17/2025    Procedure: CHOLECYSTECTOMY LAPAROSCOPIC plain language: removal of gallbladder thru small incisions using long instruments and a camera;  Surgeon: Josué Castano MD;  Location: [facility] MAIN OR;  Service: General;  Laterality: N/A;    COLONOSCOPY N/A 05/31/2022    Procedure: COLONOSCOPY;  Surgeon: Shabbir Baird MD;  Location: [facility]  ENDOSCOPY;  Service: General;  Laterality: N/A;  HEMORRHOIDS    ENDOSCOPY N/A 2/17/2025    Procedure: ESOPHAGOGASTRODUODENOSCOPY WITH BIOPSIES;  Surgeon: Sanya Reyes MD;  Location: [facility] ENDOSCOPY;  Service: Gastroenterology;  Laterality: N/A;  GASTRITIS, SMALL HIATAL HERNIA    EXPLORATORY LAPAROTOMY      HEMORRHOIDECTOMY N/A 05/31/2022    Procedure: HEMORRHOID BANDING;  Surgeon: Shabbir Baird MD;  Location: [facility] ENDOSCOPY;  Service: General;  Laterality: N/A;  BANDS X 2    HEMORRHOIDECTOMY N/A 1/31/2024    Procedure: HEMORRHOIDECTOMY;  Surgeon: Shabbir Baird MD;  Location: [facility] OR OSC;  Service: General;  Laterality: N/A;    HYSTERECTOMY      SHOULDER ARTHROSCOPY Right     SHOULDER SURGERY Left     ARTHROSCOPY LABRAL TEAR X2    TUBAL ABDOMINAL LIGATION       Family History   Problem Relation Age of Onset    Depression Mother     Bipolar disorder Mother     Anxiety disorder Mother     Cancer Mother     Heart disease Mother     Hypertension Mother     Anxiety disorder Father     Hypertension Father     Dementia Paternal Uncle     Dementia Paternal Grandmother     Heart disease Other     Colon cancer Neg Hx     Malig Hyperthermia Neg Hx        Home Medications:  Prior to Admission medications    Medication Sig Start Date End Date Taking? Authorizing Provider   albuterol sulfate  (90 Base) MCG/ACT inhaler Inhale 2 puffs Every 6 (Six) Hours As Needed for Wheezing. 1/8/25   Karen Stover APRN   bacitracin 500 UNIT/GM ointment Apply 1 Application topically to the appropriate area as directed 2 (Two) Times a Day. 1/11/25   Keo Rao PA   bisacodyl (DULCOLAX) 10 MG suppository Insert 1 suppository into the rectum Daily As Needed. 7/24/23   Avani Vela MD   busPIRone (BUSPAR) 15 MG tablet Take 1 tablet by mouth 3 (Three) Times a Day. 1/17/25   Karen Stover APRN   CALCIUM PO Take 1 tablet by mouth Daily. LD 1/29/24    Avani Vela MD    clonazePAM (KlonoPIN) 0.5 MG tablet Take 1 tablet by mouth 2 (Two) Times a Day As Needed for Anxiety. 1/28/25   Karen Stover APRN   cyclobenzaprine (FLEXERIL) 5 MG tablet Take 1 tablet by mouth 3 (Three) Times a Day As Needed for Muscle Spasms. 2/19/25   Bruno Simon MD   DULoxetine (CYMBALTA) 30 MG capsule Take 1 capsule by mouth Daily. TAKES 30mg AND 60mg TOGETHER FOR A TOTAL OF 90mg    Avani Vela MD   DULoxetine (CYMBALTA) 60 MG capsule Take 1 capsule by mouth Daily. TAKES 30mg AND 60mg TOGETHER FOR A TOTAL OF 90mg    Avani Vela MD   HYDROcodone-acetaminophen (NORCO)  MG per tablet Take 1 tablet by mouth 5 (Five) Times a Day As Needed. take 1 tablet by mouth 4-5 times daily as needed for pain 2/14/24   Avani Vela MD   hydrocortisone 2.5 % cream Apply 1 Application topically to the appropriate area as directed 2 (Two) Times a Day. 6/27/24   Karen Stover APRN   ibuprofen (ADVIL,MOTRIN) 800 MG tablet Take 1 tablet by mouth Every 8 (Eight) Hours As Needed.    Avani Vela MD   Linzess 145 MCG capsule capsule Take 1 capsule by mouth Every Morning Before Breakfast. 1/8/25   Avani Vela MD   montelukast (Singulair) 10 MG tablet Take 1 tablet by mouth Every Night. 1/17/25   Karen Stover APRN   ondansetron (ZOFRAN) 4 MG tablet TAKE (1) TABLET EVERY 8 HOURS AS NEEDED FOR NAUSEA AND vomiting  Patient taking differently: Take 1 tablet by mouth Every 8 (Eight) Hours As Needed. 10/14/24   Karen Stover APRN   pantoprazole (PROTONIX) 40 MG EC tablet Take 1 tablet by mouth Daily. 11/11/24   Karen Stover APRN   polyethylene glycol (MIRALAX) 17 g packet Take 17 g by mouth Daily. 1/31/24   Shabbir Baird MD   prochlorperazine (COMPAZINE) 10 MG tablet Take 1 tablet by mouth Every 6 (Six) Hours As Needed.    Avani Vela MD   sennosides-docusate (senna-docusate sodium) 8.6-50 MG per tablet Take 1  tablet by mouth Daily. 24   Karen Stover APRN   tiZANidine (ZANAFLEX) 4 MG tablet Take 1-2 tablets by mouth Every 6 (Six) Hours As Needed. 24   ProviderAvani MD   traZODone (DESYREL) 50 MG tablet Take 0.5 to 2 tab PO QHS PRN sleep  Patient taking differently: Take 0.5-2 tablets by mouth At Night As Needed. Take 0.5 to 2 tab PO QHS PRN sleep 24   Karen Stover APRN   vitamin C (ASCORBIC ACID) 500 MG tablet Take 1 tablet by mouth Daily. LAST DOSE 24    ProviderAvani MD   Vraylar 1.5 MG capsule capsule Take 1 capsule by mouth Daily. 24   Karen Stover APRN        Social History:   Social History     Tobacco Use    Smoking status: Former     Current packs/day: 0.00     Average packs/day: 0.3 packs/day for 20.0 years (5.0 ttl pk-yrs)     Types: Cigarettes     Start date: 1999     Quit date: 2019     Years since quittin.1    Smokeless tobacco: Never   Vaping Use    Vaping status: Never Used   Substance Use Topics    Alcohol use: Yes     Comment: rarely    Drug use: Never         Review of Systems:  Review of Systems   Constitutional:  Negative for chills and fever.   HENT:  Negative for congestion, rhinorrhea and sore throat.    Eyes:  Negative for photophobia.   Respiratory:  Negative for apnea, cough, chest tightness and shortness of breath.    Cardiovascular:  Negative for chest pain and palpitations.   Gastrointestinal:  Positive for abdominal pain. Negative for diarrhea, nausea and vomiting.   Endocrine: Negative.    Genitourinary:  Negative for difficulty urinating and dysuria.   Musculoskeletal:  Negative for back pain, joint swelling and myalgias.   Skin:  Negative for color change and wound.   Allergic/Immunologic: Negative.    Neurological:  Negative for seizures and headaches.   Psychiatric/Behavioral: Negative.     All other systems reviewed and are negative.       Physical Exam:  /97   Pulse 86   Temp 97.7 °F (36.5  "°C) (Oral)   Resp 24   Ht 157.5 cm (62\")   Wt 81.5 kg (179 lb 10.8 oz)   SpO2 95%   BMI 32.86 kg/m²     Physical Exam  Vitals and nursing note reviewed.   Constitutional:       General: She is awake.      Appearance: Normal appearance. She is not toxic-appearing.   HENT:      Head: Normocephalic and atraumatic.      Nose: Nose normal.      Mouth/Throat:      Mouth: Mucous membranes are moist.   Eyes:      Extraocular Movements: Extraocular movements intact.      Pupils: Pupils are equal, round, and reactive to light.   Cardiovascular:      Rate and Rhythm: Normal rate and regular rhythm.      Heart sounds: Normal heart sounds.   Pulmonary:      Effort: Pulmonary effort is normal. No respiratory distress.      Breath sounds: Normal breath sounds. No wheezing, rhonchi or rales.   Abdominal:      General: Bowel sounds are normal.      Palpations: Abdomen is soft.      Tenderness: There is generalized abdominal tenderness. There is no guarding or rebound.      Comments: No rigidity.  All postoperative dressings clean and dry with no evidence of drainage.  No expanding cellulitis.   Musculoskeletal:         General: No tenderness. Normal range of motion.      Cervical back: Normal range of motion and neck supple.   Skin:     General: Skin is warm and dry.      Coloration: Skin is not jaundiced.   Neurological:      General: No focal deficit present.      Mental Status: She is alert. Mental status is at baseline.   Psychiatric:      Comments: Patient screaming out in uncontrolled pain.                    Medical Decision Making:      Comorbidities that affect care:    Chronic pain disorder, anxiety, depression, atrial fibrillation    External Notes reviewed:    [facility]     Patient Name: Ebony Hamilton  : 1980  MRN: [id number]     Date of Admission: " 2/16/2025  Date of Discharge: 2/19/2025  Primary Care Physician: Karen Stover APRN     Consults         Date and Time Order Name Status Description     2/17/2025 10:24 AM Inpatient General Surgery Consult Completed       2/17/2025  6:03 AM Inpatient Gastroenterology Consult Completed       2/17/2025  4:02 AM Inpatient Hospitalist Consult                    Active and Resolved Hospital Problems:       Active Hospital Problems     Diagnosis POA    **Intractable abdominal pain [R10.9] Yes    Biliary colic [K80.50] Yes    Calculus of gallbladder without cholecystitis without obstruction [K80.20] Unknown    Nausea and vomiting [R11.2] Unknown    Gastroparesis [K31.84] Unknown    S/P laparoscopic cholecystectomy [Z90.49] Not Applicable    Adenomyosis of uterus [N80.03] Yes    NICHOLE (generalized anxiety disorder) [F41.1] Yes       Resolved Hospital Problems   No resolved problems to display.         Hospital Course      Hospital Course:  Ebony Hamilton is a 44 y.o. female who was treated in our facility for biliary colic and possibly acute cholecystitis.  She had a medical history of atrial fibrillation off anticoagulation, gastroparesis, anxiety, depression, chronic pain, panic, GERD and endometriosis. She presented to the emergency department with abdominal pain of sudden onset and associated with nausea and vomiting. In the emergency department she was found to have vital signs within normal limits, laboratory evaluation with leukocytosis and some electrolyte abnormalities but with normal liver function testing and lipase, ultrasound of the gallbladder was notable for small gallstone within the neck of the gallbladder without obstruction and positive sonographic Guzman sign. She was given significant amounts of opiates and antibiotics in the emergency department, was seen by gastroenterology after hospitalist service admitted and underwent EGD. The patient's esophagoduodenoscopy was notable for a hiatal  hernia, some mild congestive edema in the gastric antrum but no other focal findings. General surgeon was consulted. The patient underwent laparoscopic cholecystectomy same day of arrival without complication.  She has passed flatus in the postoperative period.  Overall she states her pain is improved from admission.  She is having some significant abdominal spasms postop but has requested discharge at this time.  She will follow-up with general surgeon and her primary care provider.    The following orders were placed and all results were independently analyzed by me:  Orders Placed This Encounter   Procedures    Blood Culture - Blood,    Blood Culture - Blood,    CT Abdomen Pelvis With Contrast    Venus Draw    Comprehensive Metabolic Panel    Lipase    Urinalysis With Microscopic If Indicated (No Culture) - Urine, Clean Catch    CBC Auto Differential    Lactic Acid, Plasma    STAT Lactic Acid, Reflex    NPO Diet NPO Type: Strict NPO    Undress & Gown    Hospitalist (on-call MD unless specified)    Insert Peripheral IV    CBC & Differential    Green Top (Gel)    Lavender Top    Gold Top - SST    Light Blue Top       Medications Given in the Emergency Department:  Medications   sodium chloride 0.9 % flush 10 mL (has no administration in time range)   piperacillin-tazobactam (ZOSYN) IVPB 4.5 g IVPB in 100 mL NS (VTB) (4.5 g Intravenous New Bag 2/22/25 1624)   HYDROmorphone (DILAUDID) injection 1 mg (has no administration in time range)   sepsis fluid NS 0.9 % bolus 1,881 mL (0 mL Intravenous Stopped 2/22/25 1520)   HYDROmorphone (DILAUDID) injection 0.5 mg (0.5 mg Intravenous Given 2/22/25 1429)   ondansetron (ZOFRAN) injection 4 mg (4 mg Intravenous Given 2/22/25 1428)   morphine injection 4 mg (4 mg Intravenous Given 2/22/25 1520)   iopamidol (ISOVUE-370) 76 % injection 100 mL (100 mL Intravenous Given 2/22/25 1539)        ED Course:    ED Course as of 02/22/25 1628   Sat Feb 22, 2025   1611 Case discussed with  general surgery, Dr. Mcelroy.  He notes the patient has a bile leak.  Recommend Zosyn and admission to hospitalist. [RP]      ED Course User Index  [RP] Nigel Goldstein MD       Labs:    Lab Results (last 24 hours)       Procedure Component Value Units Date/Time    CBC & Differential [[id number]]  (Abnormal) Collected: 02/22/25 1301    Specimen: Blood from Arm, Right Updated: 02/22/25 1339    Narrative:      The following orders were created for panel order CBC & Differential.  Procedure                               Abnormality         Status                     ---------                               -----------         ------                     CBC Auto Differential[[id number]]        Abnormal            Final result                 Please view results for these tests on the individual orders.    Comprehensive Metabolic Panel [[id number]]  (Abnormal) Collected: 02/22/25 1301    Specimen: Blood from Arm, Right Updated: 02/22/25 1335     Glucose 118 mg/dL      BUN 11 mg/dL      Creatinine 0.61 mg/dL      Sodium 138 mmol/L      Potassium 3.4 mmol/L      Comment: Slight hemolysis detected by analyzer. Result may be falsely elevated.        Chloride 97 mmol/L      CO2 19.3 mmol/L      Calcium 9.7 mg/dL      Total Protein 8.4 g/dL      Albumin 3.9 g/dL      ALT (SGPT) 77 U/L      AST (SGOT) 34 U/L      Alkaline Phosphatase 139 U/L      Total Bilirubin 0.8 mg/dL      Globulin 4.5 gm/dL      A/G Ratio 0.9 g/dL      BUN/Creatinine Ratio 18.0     Anion Gap 21.7 mmol/L      eGFR 114.6 mL/min/1.73     Narrative:      GFR Categories in Chronic Kidney Disease (CKD)      GFR Category          GFR (mL/min/1.73)    Interpretation  G1                     90 or greater         Normal or high (1)  G2                      60-89                Mild decrease (1)  G3a                   45-59                Mild to moderate decrease  G3b                   30-44                Moderate to severe decrease  G4                    15-29                 Severe decrease  G5                    14 or less           Kidney failure          (1)In the absence of evidence of kidney disease, neither GFR category G1 or G2 fulfill the criteria for CKD.    eGFR calculation 2021 CKD-EPI creatinine equation, which does not include race as a factor    Lipase [[id number]]  (Normal) Collected: 02/22/25 1301    Specimen: Blood from Arm, Right Updated: 02/22/25 1332     Lipase 20 U/L     CBC Auto Differential [[id number]]  (Abnormal) Collected: 02/22/25 1301    Specimen: Blood from Arm, Right Updated: 02/22/25 1339     WBC 19.28 10*3/mm3      RBC 4.87 10*6/mm3      Hemoglobin 13.5 g/dL      Hematocrit 40.9 %      MCV 84.0 fL      MCH 27.7 pg      MCHC 33.0 g/dL      RDW 13.0 %      RDW-SD 39.9 fl      MPV 10.1 fL      Platelets 793 10*3/mm3      Neutrophil % 89.3 %      Lymphocyte % 6.1 %      Monocyte % 3.3 %      Eosinophil % 0.1 %      Basophil % 0.3 %      Immature Grans % 0.9 %      Neutrophils, Absolute 17.25 10*3/mm3      Lymphocytes, Absolute 1.17 10*3/mm3      Monocytes, Absolute 0.63 10*3/mm3      Eosinophils, Absolute 0.01 10*3/mm3      Basophils, Absolute 0.05 10*3/mm3      Immature Grans, Absolute 0.17 10*3/mm3      nRBC 0.0 /100 WBC     Blood Culture - Blood, Arm, Right [[id number]] Collected: 02/22/25 1301    Specimen: Blood from Arm, Right Updated: 02/22/25 1307    Lactic Acid, Plasma [[id number]]  (Abnormal) Collected: 02/22/25 1301    Specimen: Blood from Arm, Right Updated: 02/22/25 1344     Lactate 2.9 mmol/L     Blood Culture - Blood, Arm, Right [[id number]] Collected: 02/22/25 1306    Specimen: Blood from Arm, Right Updated: 02/22/25 1322             Imaging:    CT Abdomen Pelvis With Contrast    Result Date: 2/22/2025  CT ABDOMEN PELVIS W CONTRAST Date of Exam: 2/22/2025 3:37 PM EST Indication: Recent cholecystectomy, worsening pain abdominal pain. Comparison: Supine and upright abdomen 2/17/2025, CT AP 6/13/2024 Technique: Axial CT images were obtained of  the abdomen and pelvis after the uneventful intravenous administration of iodinated contrast. Reconstructed coronal and sagittal images were also obtained. Automated exposure control and iterative construction methods were used. Findings: Subsegmental atelectasis is seen at the lung bases. The liver is of normal size and uniform density. The gallbladder is absent, surgical clips are seen in the gallbladder bed. Ill-defined fluid collection in the gallbladder bed measures 7 cm x 4 cm, with smaller amounts of fluid tracking caudally along the medial margin of the right liver lobe. Small peritoneal fluid collections are seen in the abdomen. Peritoneal fluid collection in the pelvis measures 8 cm x 5 cm. Ill-defined increased density in fat is seen in the right upper quadrant and right mid abdomen. Findings are concerning for bile leak. No pancreatic or adrenal mass is evident. The spleen is of normal size. The kidneys enhance bilaterally. No renal or ureteral stones are seen. There is no evidence of hydronephrosis. The urinary bladder is not abnormally distended. The stomach is not abnormally distended. Bowel loops are of normal caliber. The anterior abdominal wall is intact, no hernia is evident. Mild degenerative spurring is seen in the lower thoracic and lumbar spine.     Impression: CT scan of the abdomen and pelvis with IV contrast demonstrating findings consistent with recent cholecystectomy. Findings concerning for bile leak, as above. Electronically Signed: Lisandro Moreira MD  2/22/2025 4:06 PM EST  Workstation ID: [id number]       Differential Diagnosis and Discussion:    Abdominal Pain: Based on the patient's signs and symptoms, I considered abdominal aortic aneurysm, small bowel obstruction, pancreatitis, acute cholecystitis, acute appendecitis, peptic ulcer disease, gastritis, colitis, endocrine disorders, irritable bowel syndrome and other differential diagnosis an etiology of the patient's abdominal  pain.    PROCEDURES:    Labs were collected in the emergency department and all labs were reviewed and interpreted by me.  CT scan was performed in the emergency department and the CT scan radiology impression was interpreted by me.    No orders to display       Procedures    MDM     Amount and/or Complexity of Data Reviewed  Decide to obtain previous medical records or to obtain history from someone other than the patient: yes                   Sepsis criteria was met in the emergency department and the Sepsis protocol (including antibiotic administration) was initiated.      SIRS criteria considered:   1.  Temperature > 100.4 or <96.8    2.  Heart Rate > 90    3.  Respiratory Rate > 22    4.  WBC > 12K or <4K.             Severe Sepsis:     Respiratory: Mechanical Ventilation or Bipap  Hypotension: SBP > 90 or MAP < 65  Renal: Creatinine > 2  Metabolic: Lactic Acid > 2  Hematologic: Platelets < 100K or INR > 1.5  Hepatic: BILI  >  2  CNS: Sudden AMS     Septic Shock:     Severe Sepsis + Persistent hypotension or Lactic Acid > 4     Normal saline bolus, Antibiotics, and final disposition was based on these definitions.        Sepsis was recognized at 16:13 EST      Antibiotics were ordered.     30 mL/kg bolus was  indicated.         The patient presents with 2 out of the 4 SIRS criteria and a suspected source for sepsis.  Patient was evaluated and placed on a cardiac monitor for fear of worsening tachycardia and life-threatening hypotension.  Patient was monitored for shock and signs of end-organ damage.  Mental status was repeatedly checked throughout the ED stay.  Medications were ordered by me which includes IV fluid bolus, IV narcotics, IV antibiotics (Zosyn).  The case was discussed at length with admitting physician.     Total Critical Care time of 35 minutes. Total critical care time documented does not include time spent on separately billed procedures for services of nurses or physician assistants. I  personally saw and examined the patient. I have reviewed all diagnostic interpretations and treatment plans as written. I was present for the key portions of any procedures performed and the inclusive time noted in any critical care statement. Critical care time includes patient management by me, time spent at the patients bedside,  time to review lab and imaging results, discussing patient care, documentation in the medical record, and time spent with family or caregiver.    Patient Care Considerations:          Consultants/Shared Management Plan:    Hospitalist: I have discussed the case with Dr. Simon who agrees to accept the patient for admission.  Consultant: I have discussed the case with general surgery, Dr. Castano who states patient should be admitted the hospitalist with him consulting.  Recommends Zosyn.    Social Determinants of Health:    Patient is independent, reliable, and has access to care.       Disposition and Care Coordination:    Admit:   Through independent evaluation of the patient's history, physical, and imperical data, the patient meets criteria for inpatient admission to the hospital.        Final diagnoses:   Bile duct leak        ED Disposition       ED Disposition   Decision to Admit    Condition   --    Comment   --               This medical record created using voice recognition software.             Nigel Goldstein MD  02/22/25 [id number]

## 2025-02-22 NOTE — PAYOR COMM NOTE
"Ebony Hamilton (44 y.o. Female)     PATIENT INFORMATION  Name:  Ebony Hamilton  MRN#:     [id number]  :  1980     ADMISSION INFORMATION  CLASS: Inpatient   DOS:  2025    CURRENT ATTENDING PROVIDER INFORMATION  Name/NPI: Bruno Simon MD [[id number]]  Phone:  Phone: [phone]  Fax:  [phone]    REQUESTING PROVIDER and RENDERING FACILITY  Name:  [facility]   NPI:  [id number]  TID:  [id number]  Address:      [facility] [address] [place] [address]  Phone:               [phone]  Fax:  [phone]    UTILIZATION REVIEW CONTACT INFORMATION  Phone:      [phone]  Fax:           [phone]    ADMISSION DIAGNOSIS  Bile leak from gallbladder bed [K83.8]    ++++++++++++++++++++++++++++++++++++++++++++++++++++++++++++++++++++++++++++++++      Date of Birth   1980    Social Security Number       Address   [address] [place] [address]    Home Phone   [phone]    MRN   [id number]       Restorationism   None    Marital Status                               Admission Date   25    Admission Type   Emergency    Admitting Provider   Bruno Simon MD    Attending Provider   Bruno Simon MD    Department, Room/Bed   [facility] EMERGENCY ROOM,        Discharge Date       Discharge Disposition       Discharge Destination                                 Attending Provider: Bruno Simon MD    Allergies: Escitalopram, Sulfa Antibiotics, Baclofen, Ciprofloxacin, Codeine, Gold-containing Drug Products, Latex, Nickel, Tegaderm Ag Mesh [Silver]    Isolation: None   Infection: None   Code Status: CPR    Ht: 157.5 cm (62\")   Wt: 81.5 kg (179 lb 10.8 oz)    Admission Cmt: None   Principal Problem: Bile leak from gallbladder bed [K83.8]                   Active Insurance as of 2025       Primary Coverage       Payor Plan Insurance Group Employer/Plan Group    [employer]HEM BLUE CROSS ANTHEM BLUE CROSS BLUE SHIELD PPO " [id number]       Payor Plan Address Payor Plan Phone Number Payor Plan Fax Number Effective Dates    [address] [phone]  1/1/2022 - None Entered    [facility] [id number]         Subscriber Name Subscriber Birth Date Member ID       LINDSAY TINSLEY 1980 [id number]                        Gastroenterology GRG Clinical Indications for Admission to Inpatient Care       Indications Met   Last updated by Romina Del Valle RN on 2/22/2025 1851     Review Status Created By   Primary Completed Romina Del Valle RN      Criteria Review   Gastroenterology GRG Clinical Indications for Admission to Inpatient Care     Overall Determination: Indications Met     Criteria:  [×] Hospital admission is needed for appropriate care of the patient because of  1 or more  of the following :      [×] Suspected acute intra-abdominal process, as indicated by  1 or more  of the following  (1) (2) (3) (4):          [×] Hemodynamic instability              2/22/2025  6:51 PM                  -- 2/22/2025  6:51 PM by Romina Del Valle RN --                                            (X) Hemodynamic instability, as indicated by  1 or more  of the following  (1) (2) (3) (4) (5) (6):                      (X) Vital sign abnormality not readily corrected by appropriate treatment, as indicated by  1 or more  of the following  [A]:                      (X) Tachycardia that persists despite appropriate treatment (eg, volume repletion, treatment of pain, treatment of underlying cause)              2/22/2025  6:51 PM                  -- 2/22/2025  6:51 PM by Romina Del Valle RN --                      -130, despite IV NS 1800ml bolus. Dilaudid IV, & Morphine IV          [×] Other severe abdominal signs or symptoms necessitating inpatient care (eg, severe pain, free air)              2/22/2025  6:51 PM                  -- 2/22/2025  6:51 PM by Romina Del Valle RN --                      Return of abd pain today. Pt is 3-4 days post-op cholecystectomy.                        No relief with home pain meds. + Intractable pain.     Notes:  -- 2025  6:51 PM by Romina Del Valle RN --      Subject: Admission      To ED c/o return of abd pain s/p cholecystectomy for gallstones approx 4-5 days ago.       -130/ Resp 24-32. AAOx3.       + worsening abd pain & tender to palpation.                   PMhx: A fib, Gastroparesis, GERD, Anxiety/depression.       Cholecystectomy (2025).                   ED results:       K+ 3.4,       ALT 77, AST 34,       Anion gap 21. Bicarb 19      Lactate 2.9      WBC 19.28                  CT abd: The gallbladder is absent, surgical clips are seen in the gallbladder bed. Ill-defined fluid collection in the gallbladder bed measures 7 cm x 4 cm, with smaller amounts of fluid tracking caudally along the medial margin of the right liver lobe. Small       peritoneal fluid collections are seen in the abdomen. Peritoneal fluid collection in the pelvis measures 8 cm x 5 cm. Ill-defined increased density in fat is seen in the right upper quadrant and right mid abdomen. Findings are concerning for bile leak.            In ED:       IV NS 1800ml bolus      Dilaudid IV x2      Zofran IV      Morphine IV x1      Zosyn 4.5gm IV                  Admit for:      Abdominal pain      Gallbladder fluid collection      Abdominal fluid collection      Pelvic fluid collection      Possible bile leak.                   Consult surgery- (they recommend IV antibiotics & possible ERCP on Monday, ).       Clear liquid diet.       Zosyn IV q8h      Dilaudid IV prn      Oxycodone PO prn      Lovenox SQ       Labs in AM.                   History & Physical        Bruno Simon MD at 25 [phone]            [facility]   HISTORY AND PHYSICAL    Patient Name: Ebony Hamilton  : 1980  MRN: [id number]  Primary Care Physician: Karen Stover, MILDRED  Date of admission: 2025    Subjective  Subjective     Chief Complaint:  Abdominal pain    History of the present illness    Patient is a 44-year-old female recently treated at this facility for abdominal pain, underwent cholecystectomy and is now presenting back with abdominal pain.  See prior discharge summary for details of prior hospital stay.    She has a past medical history of atrial fibrillation off of anticoagulation, gastroparesis, anxiety, depression, chronic pain, gastroesophageal reflux, panic disorder.  On her discharge a few days ago, her pain was improving.  She and her  state that the pain continued to improve after discharge, she was able to start eating regular food.  Elevating some doughnut today, her pain presented back severely, prompting her to return to the ER.    In the emergency department: Blood pressure 175/110, respiratory rate 32, heart rate 130.  Laboratory evaluation notable for white blood cell count 19.28, platelets 793.  Chemistry notable for potassium 3.4, ALT 77, AST 34, alk phos 139, lactic acid 2.9.  CT abdomen was performed notable for ill-defined fluid collection in the gallbladder bed 7 cm x 4 cm with small amounts tracking caudally along the medial margin alert lower lobe, additional peritoneal small fluid collections in the abdomen, peritoneal fluid collection in the pelvis 8 x 5 cm.  Findings concerning for bile leak.  Vital signs and lactic acid both normalized with IV fluid resuscitation and pain control.  ER team discussed with surgeon covering for GI, who recommended admission for IV antibiotics, with plan for possible ERCP Monday.  Hospitalist service requested to admit the patient for further evaluation and management.    Review of Systems   Gastrointestinal:  Positive for abdominal pain and vomiting.        Personal History     Past Medical History:   Diagnosis Date    Allergic     Anxiety     Atrial fibrillation     RELEASED BY CARDIOLOGIST/ELECTROPHYSIST, NO CURRENT MEDS    Chronic pain disorder     Depression     Endometriosis      Headache     Hemorrhoids     Injury of shoulder, right 2009    CHRONIC PAIN    Panic disorder     S/P laparoscopic cholecystectomy 02/17/2025       Past Surgical History:   Procedure Laterality Date    CERVICAL ARTHRODESIS  01/14/2015    Donaldo Albarran    CERVICAL FUSION      C4-7 FUSION, FULL ROM    CHOLECYSTECTOMY N/A 2/17/2025    Procedure: CHOLECYSTECTOMY LAPAROSCOPIC plain language: removal of gallbladder thru small incisions using long instruments and a camera;  Surgeon: Josué Castano MD;  Location: [facility] MAIN OR;  Service: General;  Laterality: N/A;    COLONOSCOPY N/A 05/31/2022    Procedure: COLONOSCOPY;  Surgeon: Shabbir Baird MD;  Location: [facility] ENDOSCOPY;  Service: General;  Laterality: N/A;  HEMORRHOIDS    ENDOSCOPY N/A 2/17/2025    Procedure: ESOPHAGOGASTRODUODENOSCOPY WITH BIOPSIES;  Surgeon: Sanya Reyes MD;  Location: [facility] ENDOSCOPY;  Service: Gastroenterology;  Laterality: N/A;  GASTRITIS, SMALL HIATAL HERNIA    EXPLORATORY LAPAROTOMY      HEMORRHOIDECTOMY N/A 05/31/2022    Procedure: HEMORRHOID BANDING;  Surgeon: Shabbir Baird MD;  Location: [facility] ENDOSCOPY;  Service: General;  Laterality: N/A;  BANDS X 2    HEMORRHOIDECTOMY N/A 1/31/2024    Procedure: HEMORRHOIDECTOMY;  Surgeon: Shabbir Baird MD;  Location: [facility] OR [facility];  Service: General;  Laterality: N/A;    HYSTERECTOMY      SHOULDER ARTHROSCOPY Right     SHOULDER SURGERY Left     ARTHROSCOPY LABRAL TEAR X2    TUBAL ABDOMINAL LIGATION         Family History: family history includes Anxiety disorder in her father and mother; Bipolar disorder in her mother; Cancer in her mother; Dementia in her paternal grandmother and paternal uncle; Depression in her mother; Heart disease in her mother and another family member; Hypertension in her father and mother. Otherwise pertinent FHx was reviewed and not pertinent to current issue.    Social History:  reports that she quit smoking about 6 years ago. Her  smoking use included cigarettes. She started smoking about 26 years ago. She has a 5 pack-year smoking history. She has never used smokeless tobacco. She reports current alcohol use. She reports that she does not use drugs.    Home Medications:  Calcium, Cariprazine HCl, DULoxetine, HYDROcodone-acetaminophen, albuterol sulfate HFA, bacitracin, bisacodyl, busPIRone, clonazePAM, cyclobenzaprine, hydrocortisone, ibuprofen, linaclotide, montelukast, ondansetron, pantoprazole, polyethylene glycol, prochlorperazine, sennosides-docusate, tiZANidine, traZODone, and vitamin C      Allergies:  Allergies   Allergen Reactions    Escitalopram Nausea Only and Rash     Nausea, sweating, rash     Sulfa Antibiotics Anaphylaxis    Baclofen GI Intolerance, Hives and Nausea And Vomiting    Ciprofloxacin Hallucinations    Codeine Hives    Gold-Containing Drug Products Rash    Latex Rash    Nickel Hives    Tegaderm Ag Mesh [Silver] Hives       Objective   Objective     Vitals:  Temp:  [97.7 °F (36.5 °C)] 97.7 °F (36.5 °C)  Heart Rate:  [] 86  Resp:  [18-32] 24  BP: (129-175)/() 163/97    Physical Exam    General: Awake, alert, mild distress  HENT: Atraumatic, normocephalic. Nasal cannula in place  Eyes: pupils equal, round, without scleral icterus  Cardiovascular: Regular rate and rhythm, no murmurs   Pulmonary: CTA bilaterally; no wheezes; no conversational dyspnea  Gastrointestinal: Soft tender in all 4 quadrants, greatest upper right quadrant, nondistended  Musculoskeletal: No gross deformities                Assessment & Plan  Assessment / Plan     Abdominal pain  Gallbladder fluid collection  Abdominal fluid collection  Pelvic fluid collection  Possible bile leak:  -Surgical consult  -Zosyn  -Oxycodone, Dilaudid for pain control  -Clear liquid diet  -Daily labs     Chronic pain disorder  Anxiety/depression  Endometriosis  Gastroparesis  -Resume appropriate home medications when reconciled     DVT prophylaxis:  Lovenox    CODE STATUS:    Code Status and Medical Interventions: CPR (Attempt to Resuscitate); Full Support   Ordered at: 02/22/25 1647     Level Of Support Discussed With:    Patient     Code Status (Patient has no pulse and is not breathing):    CPR (Attempt to Resuscitate)     Medical Interventions (Patient has pulse or is breathing):    Full Support     Admission Status:  I believe this patient meets inpatient status.    Electronically signed by Bruno Simon MD, 02/22/25, 5:25 PM EST.    Electronically signed by Bruno Simon MD at 02/22/25 1725       Vital Signs (last day)       Date/Time Temp Temp src Pulse Resp BP Patient Position SpO2    02/22/25 1830 -- -- 107 -- 155/81 -- 96    02/22/25 1815 -- -- 115 -- 170/94 -- 95    02/22/25 1800 -- -- 103 -- 162/93 -- 97    02/22/25 1730 -- -- 102 -- 170/94 -- 95    02/22/25 16:24:18 -- -- 86 24 163/97 -- 95    02/22/25 14:07:43 -- -- 122 32 129/95 -- --    02/22/25 1249 97.7 (36.5) Oral 130 18 175/110 Sitting 100          Current Facility-Administered Medications   Medication Dose Route Frequency Provider Last Rate Last Admin    sodium chloride 0.9 % flush 10 mL  10 mL Intravenous PRN Nigel Goldstein MD         Current Outpatient Medications   Medication Sig Dispense Refill    albuterol sulfate  (90 Base) MCG/ACT inhaler Inhale 2 puffs Every 6 (Six) Hours As Needed for Wheezing. 18 g 1    bacitracin 500 UNIT/GM ointment Apply 1 Application topically to the appropriate area as directed 2 (Two) Times a Day. 14 g 0    bisacodyl (DULCOLAX) 10 MG suppository Insert 1 suppository into the rectum Daily As Needed.      busPIRone (BUSPAR) 15 MG tablet Take 1 tablet by mouth 3 (Three) Times a Day. 90 tablet 1    CALCIUM PO Take 1 tablet by mouth Daily.      clonazePAM (KlonoPIN) 0.5 MG tablet Take 1 tablet by mouth 2 (Two) Times a Day As Needed for Anxiety. 20 tablet 0    cyclobenzaprine (FLEXERIL) 5 MG tablet Take 1 tablet by mouth 3 (Three) Times a Day As  Needed for Muscle Spasms. 10 tablet 0    DULoxetine (CYMBALTA) 30 MG capsule Take 1 capsule by mouth Daily. TAKES 30mg AND 60mg TOGETHER FOR A TOTAL OF 90mg      DULoxetine (CYMBALTA) 60 MG capsule Take 1 capsule by mouth Daily. TAKES 30mg AND 60mg TOGETHER FOR A TOTAL OF 90mg      HYDROcodone-acetaminophen (NORCO)  MG per tablet Take 1 tablet by mouth 5 (Five) Times a Day As Needed. take 1 tablet by mouth 4-5 times daily as needed for pain      hydrocortisone 2.5 % cream Apply 1 Application topically to the appropriate area as directed 2 (Two) Times a Day. 28 g 2    ibuprofen (ADVIL,MOTRIN) 800 MG tablet Take 1 tablet by mouth Every 8 (Eight) Hours As Needed.      Linzess 145 MCG capsule capsule Take 1 capsule by mouth Every Morning Before Breakfast.      montelukast (Singulair) 10 MG tablet Take 1 tablet by mouth Every Night. 90 tablet 1    ondansetron (ZOFRAN) 4 MG tablet TAKE (1) TABLET EVERY 8 HOURS AS NEEDED FOR NAUSEA AND vomiting (Patient taking differently: Take 1 tablet by mouth Every 8 (Eight) Hours As Needed.) 30 tablet 1    pantoprazole (PROTONIX) 40 MG EC tablet Take 1 tablet by mouth Daily. 90 tablet 3    polyethylene glycol (MIRALAX) 17 g packet Take 17 g by mouth Daily. 14 packet 0    prochlorperazine (COMPAZINE) 10 MG tablet Take 1 tablet by mouth Every 6 (Six) Hours As Needed.      sennosides-docusate (senna-docusate sodium) 8.6-50 MG per tablet Take 1 tablet by mouth Daily. 30 tablet 2    tiZANidine (ZANAFLEX) 4 MG tablet Take 1-2 tablets by mouth Every 6 (Six) Hours As Needed.      traZODone (DESYREL) 50 MG tablet Take 0.5 to 2 tab PO QHS PRN sleep (Patient taking differently: Take 0.5-2 tablets by mouth At Night As Needed. Take 0.5 to 2 tab PO QHS PRN sleep) 90 tablet 2    vitamin C (ASCORBIC ACID) 500 MG tablet Take 1 tablet by mouth Daily. LAST DOSE 1/28/24      Vraylar 1.5 MG capsule capsule Take 1 capsule by mouth Daily. 30 capsule 2     Lab Results (last 24 hours)       Procedure  Component Value Units Date/Time    STAT Lactic Acid, Reflex [[id number]]  (Normal) Collected: 02/22/25 1629    Specimen: Blood Updated: 02/22/25 1647     Lactate 1.3 mmol/L     Lactic Acid, Plasma [[id number]]  (Abnormal) Collected: 02/22/25 1301    Specimen: Blood from Arm, Right Updated: 02/22/25 1344     Lactate 2.9 mmol/L     CBC & Differential [[id number]]  (Abnormal) Collected: 02/22/25 1301    Specimen: Blood from Arm, Right Updated: 02/22/25 1339    Narrative:      The following orders were created for panel order CBC & Differential.  Procedure                               Abnormality         Status                     ---------                               -----------         ------                     CBC Auto Differential[[id number]]        Abnormal            Final result                 Please view results for these tests on the individual orders.    CBC Auto Differential [[id number]]  (Abnormal) Collected: 02/22/25 1301    Specimen: Blood from Arm, Right Updated: 02/22/25 1339     WBC 19.28 10*3/mm3      RBC 4.87 10*6/mm3      Hemoglobin 13.5 g/dL      Hematocrit 40.9 %      MCV 84.0 fL      MCH 27.7 pg      MCHC 33.0 g/dL      RDW 13.0 %      RDW-SD 39.9 fl      MPV 10.1 fL      Platelets 793 10*3/mm3      Neutrophil % 89.3 %      Lymphocyte % 6.1 %      Monocyte % 3.3 %      Eosinophil % 0.1 %      Basophil % 0.3 %      Immature Grans % 0.9 %      Neutrophils, Absolute 17.25 10*3/mm3      Lymphocytes, Absolute 1.17 10*3/mm3      Monocytes, Absolute 0.63 10*3/mm3      Eosinophils, Absolute 0.01 10*3/mm3      Basophils, Absolute 0.05 10*3/mm3      Immature Grans, Absolute 0.17 10*3/mm3      nRBC 0.0 /100 WBC     Comprehensive Metabolic Panel [[id number]]  (Abnormal) Collected: 02/22/25 1301    Specimen: Blood from Arm, Right Updated: 02/22/25 1335     Glucose 118 mg/dL      BUN 11 mg/dL      Creatinine 0.61 mg/dL      Sodium 138 mmol/L      Potassium 3.4 mmol/L      Comment: Slight hemolysis detected by  analyzer. Result may be falsely elevated.        Chloride 97 mmol/L      CO2 19.3 mmol/L      Calcium 9.7 mg/dL      Total Protein 8.4 g/dL      Albumin 3.9 g/dL      ALT (SGPT) 77 U/L      AST (SGOT) 34 U/L      Alkaline Phosphatase 139 U/L      Total Bilirubin 0.8 mg/dL      Globulin 4.5 gm/dL      A/G Ratio 0.9 g/dL      BUN/Creatinine Ratio 18.0     Anion Gap 21.7 mmol/L      eGFR 114.6 mL/min/1.73     Narrative:      GFR Categories in Chronic Kidney Disease (CKD)      GFR Category          GFR (mL/min/1.73)    Interpretation  G1                     90 or greater         Normal or high (1)  G2                      60-89                Mild decrease (1)  G3a                   45-59                Mild to moderate decrease  G3b                   30-44                Moderate to severe decrease  G4                    15-29                Severe decrease  G5                    14 or less           Kidney failure          (1)In the absence of evidence of kidney disease, neither GFR category G1 or G2 fulfill the criteria for CKD.    eGFR calculation 2021 CKD-EPI creatinine equation, which does not include race as a factor    Lipase [[id number]]  (Normal) Collected: 02/22/25 1301    Specimen: Blood from Arm, Right Updated: 02/22/25 1332     Lipase 20 U/L     Blood Culture - Blood, Arm, Right [[id number]] Collected: 02/22/25 1306    Specimen: Blood from Arm, Right Updated: 02/22/25 1322    [place] Draw [[id number]] Collected: 02/22/25 1301    Specimen: Blood from Arm, Right Updated: 02/22/25 1316    Narrative:      The following orders were created for panel order [place] Draw.  Procedure                               Abnormality         Status                     ---------                               -----------         ------                     Green Top (Gel)[[id number]]                                  Final result               Lavender Top[[id number]]                                     Final result                Gold Top - SST[[id number]]                                   Final result               Light Blue Top[[id number]]                                   Final result                 Please view results for these tests on the individual orders.    Green Top (Gel) [[id number]] Collected: 02/22/25 1301    Specimen: Blood from Arm, Right Updated: 02/22/25 1316     Extra Tube Hold for add-ons.     Comment: Auto resulted.       Lavender Top [[id number]] Collected: 02/22/25 1301    Specimen: Blood from Arm, Right Updated: 02/22/25 1316     Extra Tube hold for add-on     Comment: Auto resulted       Gold Top - SST [[id number]] Collected: 02/22/25 1301    Specimen: Blood from Arm, Right Updated: 02/22/25 1316     Extra Tube Hold for add-ons.     Comment: Auto resulted.       Light Blue Top [[id number]] Collected: 02/22/25 1301    Specimen: Blood from Arm, Right Updated: 02/22/25 1316     Extra Tube Hold for add-ons.     Comment: Auto resulted       Blood Culture - Blood, Arm, Right [[id number]] Collected: 02/22/25 1301    Specimen: Blood from Arm, Right Updated: 02/22/25 1307          Imaging Results (Last 24 Hours)       Procedure Component Value Units Date/Time    CT Abdomen Pelvis With Contrast [[id number]] Collected: 02/22/25 1549     Updated: 02/22/25 1608    Narrative:      CT ABDOMEN PELVIS W CONTRAST    Date of Exam: 2/22/2025 3:37 PM EST    Indication: Recent cholecystectomy, worsening pain  abdominal pain.    Comparison: Supine and upright abdomen 2/17/2025, CT AP 6/13/2024    Technique: Axial CT images were obtained of the abdomen and pelvis after the uneventful intravenous administration of iodinated contrast. Reconstructed coronal and sagittal images were also obtained. Automated exposure control and iterative construction   methods were used.      Findings:  Subsegmental atelectasis is seen at the lung bases.    The liver is of normal size and uniform density.    The gallbladder is absent, surgical clips are seen in the  gallbladder bed. Ill-defined fluid collection in the gallbladder bed measures 7 cm x 4 cm, with smaller amounts of fluid tracking caudally along the medial margin of the right liver lobe. Small   peritoneal fluid collections are seen in the abdomen. Peritoneal fluid collection in the pelvis measures 8 cm x 5 cm. Ill-defined increased density in fat is seen in the right upper quadrant and right mid abdomen. Findings are concerning for bile leak.    No pancreatic or adrenal mass is evident. The spleen is of normal size. The kidneys enhance bilaterally. No renal or ureteral stones are seen. There is no evidence of hydronephrosis. The urinary bladder is not abnormally distended.    The stomach is not abnormally distended. Bowel loops are of normal caliber.    The anterior abdominal wall is intact, no hernia is evident.    Mild degenerative spurring is seen in the lower thoracic and lumbar spine.      Impression:      Impression:  CT scan of the abdomen and pelvis with IV contrast demonstrating findings consistent with recent cholecystectomy.    Findings concerning for bile leak, as above.      Electronically Signed: Lisandro Moreira MD    2/22/2025 4:06 PM EST    Workstation ID: [id number]          Orders (last 24 hrs)        Start     Ordered    02/22/25 1847  Urology (on-call MD unless specified)  Once        Specialty:  Urology  Provider:  Kareem Roberts MD    02/22/25 1846    02/22/25 1645  HYDROmorphone (DILAUDID) injection 1 mg  Once         02/22/25 1619    02/22/25 1643  Code Status and Medical Interventions: CPR (Attempt to Resuscitate); Full Support  Continuous         02/22/25 1647    02/22/25 1641  Inpatient Admission  Once         02/22/25 1640    02/22/25 1630  piperacillin-tazobactam (ZOSYN) IVPB 4.5 g IVPB in 100 mL NS (VTB)  Once         02/22/25 1612    02/22/25 1613  Hospitalist (on-call MD unless specified)  Once        Specialty:  Hospitalist  Provider:  Bruno Simon MD    02/22/25 1612     02/22/25 1601  STAT Lactic Acid, Reflex  PROCEDURE ONCE         02/22/25 1344    02/22/25 1600  iopamidol (ISOVUE-370) 76 % injection 100 mL  Once in Imaging         02/22/25 1537    02/22/25 1530  morphine injection 4 mg  Once         02/22/25 1509    02/22/25 1445  sepsis fluid NS 0.9 % bolus 1,881 mL  Once         02/22/25 1417    02/22/25 1445  HYDROmorphone (DILAUDID) injection 0.5 mg  Once         02/22/25 1417    02/22/25 1445  ondansetron (ZOFRAN) injection 4 mg  Once         02/22/25 1417    02/22/25 1417  CT Abdomen Pelvis With Contrast  1 Time Imaging         02/22/25 1416    02/22/25 1254  Blood Culture - Blood, Arm, Right  Once         02/22/25 1253    02/22/25 1254  Blood Culture - Blood, Arm, Right  Once         02/22/25 1253    02/22/25 1254  Lactic Acid, Plasma  Once         02/22/25 1253    02/22/25 1251  NPO Diet NPO Type: Strict NPO  Diet Effective Now         02/22/25 1250    02/22/25 1251  Undress & Gown  Once         02/22/25 1250    02/22/25 1251  Insert Peripheral IV  Once         02/22/25 1250    02/22/25 1251  [place] Draw  Once         02/22/25 1250    02/22/25 1251  CBC & Differential  Once         02/22/25 1250    02/22/25 1251  Comprehensive Metabolic Panel  Once         02/22/25 1250    02/22/25 1251  Lipase  Once         02/22/25 1250    02/22/25 1251  Urinalysis With Microscopic If Indicated (No Culture) - Urine, Clean Catch  Once         02/22/25 1250    02/22/25 1251  CBC Auto Differential  PROCEDURE ONCE         02/22/25 1250    02/22/25 1250  sodium chloride 0.9 % flush 10 mL  As Needed         02/22/25 1250    Signed and Held  clonazePAM (KlonoPIN) tablet 0.5 mg  2 Times Daily PRN         Signed and Held    Signed and Held  DULoxetine (CYMBALTA) DR capsule 30 mg  Daily         Signed and Held    Signed and Held  Vital Signs  Every 4 Hours       Signed and Held    Signed and Held  Intake & Output  Every Shift       Signed and Held    Signed and Held  Weigh Patient  Once          "Signed and Held    Signed and Held  Enoxaparin Sodium (LOVENOX) syringe 40 mg  Daily         Signed and Held    Signed and Held  sennosides-docusate (PERICOLACE) 8.6-50 MG per tablet 2 tablet  2 Times Daily PRN        Placed in \"And\" Linked Group    Signed and Held    Signed and Held  polyethylene glycol (MIRALAX) packet 17 g  Daily PRN        Placed in \"And\" Linked Group    Signed and Held    Signed and Held  bisacodyl (DULCOLAX) EC tablet 5 mg  Daily PRN        Placed in \"And\" Linked Group    Signed and Held    Signed and Held  bisacodyl (DULCOLAX) suppository 10 mg  Daily PRN        Placed in \"And\" Linked Group    Signed and Held    Signed and Held  oxyCODONE (ROXICODONE) immediate release tablet 5 mg  Every 6 Hours PRN         Signed and Held    Signed and Held  HYDROmorphone (DILAUDID) injection 0.5 mg  Every 2 Hours PRN        Placed in \"And\" Linked Group    Signed and Held    Signed and Held  naloxone (NARCAN) injection 0.4 mg  Every 5 Minutes PRN        Placed in \"And\" Linked Group    Signed and Held    Signed and Held  Basic Metabolic Panel  Daily       Signed and Held    Signed and Held  CBC & Differential  Daily       Signed and Held    Signed and Held  Diet: Liquid; Clear Liquid; Fluid Consistency: Thin (IDDSI 0)  Diet Effective Now         Signed and Held    Signed and Held  Activity - Ad Daly  Until Discontinued         Signed and Held    Signed and Held  acetaminophen (TYLENOL) tablet 650 mg  Every 4 Hours PRN        Placed in \"Or\" Linked Group    Signed and Held    Signed and Held  acetaminophen (TYLENOL) 160 MG/5ML oral solution 650 mg  Every 4 Hours PRN        Placed in \"Or\" Linked Group    Signed and Held    Signed and Held  acetaminophen (TYLENOL) suppository 650 mg  Every 4 Hours PRN        Placed in \"Or\" Linked Group    Signed and Held    Signed and Held  ondansetron ODT (ZOFRAN-ODT) disintegrating tablet 4 mg  Every 6 Hours PRN         Signed and Held    Signed and Held  famotidine (PEPCID) " tablet 40 mg  Daily         Signed and Held    Signed and Held  Inpatient General Surgery Consult  Once        Comments: Infected port removal   Specialty:  General Surgery  Provider:  Josué Castano MD    Signed and Held    Signed and Held  piperacillin-tazobactam (ZOSYN) IVPB 3.375 g IVPB in 100 mL NS (VTB)  Every 8 Hours         Signed and Held    Signed and Held  piperacillin-tazobactam (ZOSYN) IVPB 3.375 g IVPB in 100 mL NS (VTB)  Once         Signed and Held    Signed and Held  albuterol sulfate HFA (PROVENTIL HFA;VENTOLIN HFA;PROAIR HFA) inhaler 2 puff  Every 6 Hours PRN         Signed and Held    Signed and Held  bisacodyl (DULCOLAX) suppository 10 mg  Daily PRN         Signed and Held    Signed and Held  busPIRone (BUSPAR) tablet 15 mg  3 Times Daily         Signed and Held    Signed and Held  montelukast (SINGULAIR) tablet 10 mg  Nightly         Signed and Held    Signed and Held  ondansetron ODT (ZOFRAN-ODT) disintegrating tablet 4 mg  Every 8 Hours PRN         Signed and Held    Signed and Held  pantoprazole (PROTONIX) EC tablet 40 mg  Daily         Signed and Held    Signed and Held  polyethylene glycol (MIRALAX) packet 17 g  Daily         Signed and Held

## 2025-02-23 LAB
ANION GAP SERPL CALCULATED.3IONS-SCNC: 14.1 MMOL/L (ref 5–15)
APTT PPP: 28.2 SECONDS (ref 24.2–34.2)
BASOPHILS # BLD AUTO: 0.08 10*3/MM3 (ref 0–0.2)
BASOPHILS NFR BLD AUTO: 0.4 % (ref 0–1.5)
BUN SERPL-MCNC: 11 MG/DL (ref 6–20)
BUN/CREAT SERPL: 19 (ref 7–25)
CALCIUM SPEC-SCNC: 8.9 MG/DL (ref 8.6–10.5)
CHLORIDE SERPL-SCNC: 98 MMOL/L (ref 98–107)
CO2 SERPL-SCNC: 23.9 MMOL/L (ref 22–29)
CREAT SERPL-MCNC: 0.58 MG/DL (ref 0.57–1)
DEPRECATED RDW RBC AUTO: 44.4 FL (ref 37–54)
EGFRCR SERPLBLD CKD-EPI 2021: 116 ML/MIN/1.73
EOSINOPHIL # BLD AUTO: 0.01 10*3/MM3 (ref 0–0.4)
EOSINOPHIL NFR BLD AUTO: 0.1 % (ref 0.3–6.2)
ERYTHROCYTE [DISTWIDTH] IN BLOOD BY AUTOMATED COUNT: 13.4 % (ref 12.3–15.4)
GLUCOSE SERPL-MCNC: 131 MG/DL (ref 65–99)
HCT VFR BLD AUTO: 40.5 % (ref 34–46.6)
HGB BLD-MCNC: 12.5 G/DL (ref 12–15.9)
IMM GRANULOCYTES # BLD AUTO: 0.15 10*3/MM3 (ref 0–0.05)
IMM GRANULOCYTES NFR BLD AUTO: 0.8 % (ref 0–0.5)
INR PPP: 1.14 (ref 0.86–1.15)
LYMPHOCYTES # BLD AUTO: 0.98 10*3/MM3 (ref 0.7–3.1)
LYMPHOCYTES NFR BLD AUTO: 5.1 % (ref 19.6–45.3)
MCH RBC QN AUTO: 27.7 PG (ref 26.6–33)
MCHC RBC AUTO-ENTMCNC: 30.9 G/DL (ref 31.5–35.7)
MCV RBC AUTO: 89.8 FL (ref 79–97)
MONOCYTES # BLD AUTO: 1.06 10*3/MM3 (ref 0.1–0.9)
MONOCYTES NFR BLD AUTO: 5.5 % (ref 5–12)
NEUTROPHILS NFR BLD AUTO: 17.06 10*3/MM3 (ref 1.7–7)
NEUTROPHILS NFR BLD AUTO: 88.1 % (ref 42.7–76)
NRBC BLD AUTO-RTO: 0 /100 WBC (ref 0–0.2)
PLATELET # BLD AUTO: 628 10*3/MM3 (ref 140–450)
PMV BLD AUTO: 9.8 FL (ref 6–12)
POTASSIUM SERPL-SCNC: 3.9 MMOL/L (ref 3.5–5.2)
PROTHROMBIN TIME: 15.1 SECONDS (ref 11.8–14.9)
RBC # BLD AUTO: 4.51 10*6/MM3 (ref 3.77–5.28)
SODIUM SERPL-SCNC: 136 MMOL/L (ref 136–145)
WBC NRBC COR # BLD AUTO: 19.34 10*3/MM3 (ref 3.4–10.8)

## 2025-02-23 PROCEDURE — 25010000002 PIPERACILLIN SOD-TAZOBACTAM PER 1 G: Performed by: STUDENT IN AN ORGANIZED HEALTH CARE EDUCATION/TRAINING PROGRAM

## 2025-02-23 PROCEDURE — 80048 BASIC METABOLIC PNL TOTAL CA: CPT | Performed by: STUDENT IN AN ORGANIZED HEALTH CARE EDUCATION/TRAINING PROGRAM

## 2025-02-23 PROCEDURE — 85610 PROTHROMBIN TIME: CPT | Performed by: STUDENT IN AN ORGANIZED HEALTH CARE EDUCATION/TRAINING PROGRAM

## 2025-02-23 PROCEDURE — 85730 THROMBOPLASTIN TIME PARTIAL: CPT | Performed by: STUDENT IN AN ORGANIZED HEALTH CARE EDUCATION/TRAINING PROGRAM

## 2025-02-23 PROCEDURE — 25010000002 HYDROMORPHONE 1 MG/ML SOLUTION: Performed by: STUDENT IN AN ORGANIZED HEALTH CARE EDUCATION/TRAINING PROGRAM

## 2025-02-23 PROCEDURE — 85025 COMPLETE CBC W/AUTO DIFF WBC: CPT | Performed by: STUDENT IN AN ORGANIZED HEALTH CARE EDUCATION/TRAINING PROGRAM

## 2025-02-23 PROCEDURE — 99232 SBSQ HOSP IP/OBS MODERATE 35: CPT | Performed by: STUDENT IN AN ORGANIZED HEALTH CARE EDUCATION/TRAINING PROGRAM

## 2025-02-23 RX ADMIN — BUSPIRONE HYDROCHLORIDE 15 MG: 15 TABLET ORAL at 09:06

## 2025-02-23 RX ADMIN — CYCLOBENZAPRINE HYDROCHLORIDE 10 MG: 5 TABLET, FILM COATED ORAL at 09:06

## 2025-02-23 RX ADMIN — HYDROMORPHONE HYDROCHLORIDE 1 MG: 1 INJECTION, SOLUTION INTRAMUSCULAR; INTRAVENOUS; SUBCUTANEOUS at 14:48

## 2025-02-23 RX ADMIN — OXYCODONE AND ACETAMINOPHEN 1 TABLET: 10; 325 TABLET ORAL at 08:13

## 2025-02-23 RX ADMIN — HYDROMORPHONE HYDROCHLORIDE 1 MG: 1 INJECTION, SOLUTION INTRAMUSCULAR; INTRAVENOUS; SUBCUTANEOUS at 18:03

## 2025-02-23 RX ADMIN — Medication 10 ML: at 21:30

## 2025-02-23 RX ADMIN — HYDROMORPHONE HYDROCHLORIDE 1 MG: 1 INJECTION, SOLUTION INTRAMUSCULAR; INTRAVENOUS; SUBCUTANEOUS at 01:34

## 2025-02-23 RX ADMIN — SODIUM CHLORIDE 3.38 G: 9 INJECTION, SOLUTION INTRAVENOUS at 09:06

## 2025-02-23 RX ADMIN — SODIUM CHLORIDE 3.38 G: 9 INJECTION, SOLUTION INTRAVENOUS at 15:32

## 2025-02-23 RX ADMIN — Medication 10 ML: at 09:07

## 2025-02-23 RX ADMIN — HYDROMORPHONE HYDROCHLORIDE 1 MG: 1 INJECTION, SOLUTION INTRAMUSCULAR; INTRAVENOUS; SUBCUTANEOUS at 08:38

## 2025-02-23 RX ADMIN — OXYCODONE AND ACETAMINOPHEN 1 TABLET: 10; 325 TABLET ORAL at 13:22

## 2025-02-23 RX ADMIN — BUSPIRONE HYDROCHLORIDE 15 MG: 15 TABLET ORAL at 15:32

## 2025-02-23 RX ADMIN — LORAZEPAM 2 MG: 0.5 TABLET ORAL at 21:29

## 2025-02-23 RX ADMIN — HYDROMORPHONE HYDROCHLORIDE 1 MG: 1 INJECTION, SOLUTION INTRAMUSCULAR; INTRAVENOUS; SUBCUTANEOUS at 23:44

## 2025-02-23 RX ADMIN — CYCLOBENZAPRINE HYDROCHLORIDE 10 MG: 5 TABLET, FILM COATED ORAL at 21:29

## 2025-02-23 RX ADMIN — HYDROMORPHONE HYDROCHLORIDE 1 MG: 1 INJECTION, SOLUTION INTRAMUSCULAR; INTRAVENOUS; SUBCUTANEOUS at 21:29

## 2025-02-23 RX ADMIN — OXYCODONE AND ACETAMINOPHEN 1 TABLET: 10; 325 TABLET ORAL at 00:35

## 2025-02-23 RX ADMIN — HYDROMORPHONE HYDROCHLORIDE 1 MG: 1 INJECTION, SOLUTION INTRAMUSCULAR; INTRAVENOUS; SUBCUTANEOUS at 04:44

## 2025-02-23 RX ADMIN — BUSPIRONE HYDROCHLORIDE 15 MG: 15 TABLET ORAL at 21:29

## 2025-02-23 RX ADMIN — SODIUM CHLORIDE 3.38 G: 9 INJECTION, SOLUTION INTRAVENOUS at 23:44

## 2025-02-23 RX ADMIN — HYDROMORPHONE HYDROCHLORIDE 1 MG: 1 INJECTION, SOLUTION INTRAMUSCULAR; INTRAVENOUS; SUBCUTANEOUS at 06:41

## 2025-02-23 RX ADMIN — OXYCODONE AND ACETAMINOPHEN 1 TABLET: 10; 325 TABLET ORAL at 17:16

## 2025-02-23 RX ADMIN — HYDROMORPHONE HYDROCHLORIDE 1 MG: 1 INJECTION, SOLUTION INTRAMUSCULAR; INTRAVENOUS; SUBCUTANEOUS at 12:00

## 2025-02-23 RX ADMIN — MONTELUKAST 10 MG: 10 TABLET, FILM COATED ORAL at 21:29

## 2025-02-23 RX ADMIN — PANTOPRAZOLE SODIUM 40 MG: 40 TABLET, DELAYED RELEASE ORAL at 09:06

## 2025-02-23 RX ADMIN — FAMOTIDINE 40 MG: 20 TABLET, FILM COATED ORAL at 09:06

## 2025-02-23 RX ADMIN — CYCLOBENZAPRINE HYDROCHLORIDE 10 MG: 5 TABLET, FILM COATED ORAL at 15:31

## 2025-02-23 RX ADMIN — DULOXETINE HYDROCHLORIDE 30 MG: 30 CAPSULE, DELAYED RELEASE ORAL at 09:06

## 2025-02-23 NOTE — PLAN OF CARE
Goal Outcome Evaluation:  Plan of Care Reviewed With: patient        Progress: no change  Outcome Evaluation: VSS.  requiring frequent pain medication, rating pain at a 9/10 when she is awake.  states her pain is to the right side of her abdomen.  tolerating a clear liquid diet with no N/V.  spouse at bedside.

## 2025-02-23 NOTE — PROGRESS NOTES
Psychiatric   Hospitalist Progress Note  Date: 2025  Patient Name: Ebony Hamilton  : 1980  MRN: [id number]  Date of admission: 2025  Room/Bed: [facility]      Subjective   Subjective     Chief Complaint:   Chief Complaint   Patient presents with    Post-op Problem    Abdominal Pain    Vomiting       Summary:   Patient is a 44-year-old female recently treated at this facility for abdominal pain, underwent cholecystectomy and is now presenting back with abdominal pain.  See prior discharge summary for details of prior hospital stay.     She has a past medical history of atrial fibrillation off of anticoagulation, gastroparesis, anxiety, depression, chronic pain, gastroesophageal reflux, panic disorder.  On her discharge a few days ago, her pain was improving.  She and her  state that the pain continued to improve after discharge, she was able to start eating regular food.  After eating a doughnut, her pain returned and she presented back to the emergency department.      In the emergency department: Blood pressure 175/110, respiratory rate 32, heart rate 130.  Laboratory evaluation notable for white blood cell count 19.28, platelets 793.  Chemistry notable for potassium 3.4, ALT 77, AST 34, alk phos 139, lactic acid 2.9.  CT abdomen was performed notable for ill-defined fluid collection in the gallbladder bed 7 cm x 4 cm with small amounts tracking caudally along the medial margin alert lower lobe, additional peritoneal small fluid collections in the abdomen, peritoneal fluid collection in the pelvis 8 x 5 cm.  Findings concerning for bile leak.  Vital signs and lactic acid both normalized with IV fluid resuscitation and pain control.  ER team discussed with surgeon who recommended admission for IV antibiotics, with plan for possible ERCP in a few days.  Hospitalist service requested to admit the patient for further evaluation and management.    Interval events:  Patient continues to have  some abdominal pain in between doses of pain medication.  She is tolerating her IV antibiotics.  Going for IR drain tomorrow.    15:42 addendum: Discussed with Dr Thompson, who has agreed to see the patient in consultation to evaluate for ERCP.    Objective   Objective     Vitals:   Temp:  [97.3 °F (36.3 °C)-97.7 °F (36.5 °C)] 97.7 °F (36.5 °C)  Heart Rate:  [] 129  Resp:  [16-32] 16  BP: (108-170)/(75-98) 116/84    Physical Exam   General: Awake, alert, in no acute distress  HENT: Atraumatic, normocephalic. Nasal cannula in place  Eyes: pupils equal, round, without scleral icterus  Cardiovascular: Regular rate and rhythm, no murmurs   Pulmonary: CTA bilaterally; no wheezes; no conversational dyspnea  Gastrointestinal: Tender to palpation all quadrants.      Result Review    Result Review:  I have personally reviewed these results:  [x]  Laboratory      Lab 02/23/25  0505 02/22/25  1629 02/22/25  1301 02/18/25  0448 02/17/25  0817 02/16/25  2217   WBC 19.34*  --  19.28* 16.39*  --  15.46*   HEMOGLOBIN 12.5  --  13.5 12.2  --  12.2   HEMATOCRIT 40.5  --  40.9 38.3  --  38.5   PLATELETS 628*  --  793* 493*  --  424   NEUTROS ABS 17.06*  --  17.25*  --   --  12.51*   IMMATURE GRANS (ABS) 0.15*  --  0.17*  --   --  0.08*   LYMPHS ABS 0.98  --  1.17  --   --  2.01   MONOS ABS 1.06*  --  0.63  --   --  0.73   EOS ABS 0.01  --  0.01  --   --  0.08   MCV 89.8  --  84.0 89.1  --  89.3   LACTATE  --  1.3 2.9*  --  2.0  --    PROTIME 15.1*  --   --   --   --   --    APTT 28.2  --   --   --   --   --          Lab 02/23/25  0505 02/22/25  1301 02/19/25  0607   SODIUM 136 138 135*   POTASSIUM 3.9 3.4* 3.1*   CHLORIDE 98 97* 100   CO2 23.9 19.3* 24.5   ANION GAP 14.1 21.7* 10.5   BUN 11 11 15   CREATININE 0.58 0.61 0.65   EGFR 116.0 114.6 112.9   GLUCOSE 131* 118* 134*   CALCIUM 8.9 9.7 8.7         Lab 02/22/25  1301 02/19/25  0607 02/18/25  0448 02/17/25  0817 02/16/25  2217   TOTAL PROTEIN 8.4 6.1 7.4   < > 7.6   ALBUMIN 3.9  3.3* 4.0   < > 4.3   GLOBULIN 4.5 2.8 3.4   < > 3.3   ALT (SGPT) 77* 23 32   < > 29   AST (SGOT) 34* 17 29   < > 16   BILIRUBIN 0.8 0.4 0.4   < > 0.3   ALK PHOS 139* 59 76   < > 87   LIPASE 20  --   --   --  25    < > = values in this interval not displayed.         Lab 02/23/25  0505   PROTIME 15.1*   INR 1.14                 Brief Urine Lab Results  (Last result in the past 365 days)        Color   Clarity   Blood   Leuk Est   Nitrite   Protein   CREAT   Urine HCG        02/17/25 0037 Yellow   Clear   Negative   Trace   Negative   Negative                 [x]  Microbiology   Microbiology Results (last 10 days)       Procedure Component Value - Date/Time    Blood Culture - Blood, Arm, Right [[id number]]  (Normal) Collected: 02/22/25 1306    Lab Status: Preliminary result Specimen: Blood from Arm, Right Updated: 02/23/25 1330     Blood Culture No growth at 24 hours    Blood Culture - Blood, Arm, Right [[id number]]  (Normal) Collected: 02/22/25 1301    Lab Status: Preliminary result Specimen: Blood from Arm, Right Updated: 02/23/25 1315     Blood Culture No growth at 24 hours    Narrative:      Less than seven (7) mL's of blood was collected.  Insufficient quantity may yield false negative results.          [x]  Radiology  CT Abdomen Pelvis With Contrast    Result Date: 2/22/2025  Impression: CT scan of the abdomen and pelvis with IV contrast demonstrating findings consistent with recent cholecystectomy. Findings concerning for bile leak, as above. Electronically Signed: Lisandro Moreira MD  2/22/2025 4:06 PM EST  Workstation ID: [id number]    XR Abdomen Flat & Upright    Result Date: 2/17/2025  Nonobstructive bowel gas pattern. No free air. Electronically Signed: Yayo Ford MD  2/17/2025 6:51 AM EST  Workstation ID: [id number]    US Gallbladder    Result Date: 2/17/2025  1. Small gallstone in the gallbladder neck with no biliary obstruction or gallbladder wall thickening. There is a positive sonographic Guzman's sign  of questionable significance. If there is continued concern for acute cholecystitis, HIDA scan may be beneficial. 2. Otherwise negative. Electronically Signed: Yayo Ford MD  2/17/2025 12:08 AM EST  Workstation ID: [id number]   []  EKG/Telemetry   []  Cardiology/Vascular   []  Pathology  []  Old records  []  Other:    Assessment & Plan   Assessment / Plan       Bile leak:  -Surgery following  -Continue Zosyn  -Continue oxycodone, Dilaudid for pain control  -Continue clear liquid diet n.p.o. for midnight  -Daily labs  -IR drain tomorrow  -GI consult for ERCP tomorrow     Chronic pain disorder  Anxiety/depression  Endometriosis  Gastroparesis  -Resume appropriate home medications when reconciled      DVT prophylaxis: Lovenox       Discussed with RN.    VTE Prophylaxis:  No VTE prophylaxis order currently exists.        CODE STATUS:   Level Of Support Discussed With: Patient  Code Status (Patient has no pulse and is not breathing): CPR (Attempt to Resuscitate)  Medical Interventions (Patient has pulse or is breathing): Full Support      Electronically signed by Bruno Simon MD, 2/23/2025, 13:36 EST.

## 2025-02-23 NOTE — OUTREACH NOTE
General Surgery Week 2 Survey      Flowsheet Row Responses   [facility], [place], operated by [facility] facility patient discharged from? [facility]   Does the patient have one of the following disease processes/diagnoses(primary or secondary)? General Surgery   Week 2 attempt successful? No   Unsuccessful attempts Attempt 1   Revoke Readmitted            BETTINA NEWMAN - Registered Nurse

## 2025-02-23 NOTE — PROGRESS NOTES
[facility]     Surgery Progress Note    Patient Name: Ebony Hamilton  :    1980  MRN:    [id number]  Date of admission:  2025  Length of Stay: 1 days    Subjective   44-year-old female with bile leak status post laparoscopic cholecystectomy    No acute events overnight.  Overall pain control has improved since initially evaluated.  Tolerating some liquid diet without nausea/vomiting.  Objective     Current Facility-Administered Medications:     albuterol sulfate HFA (PROVENTIL HFA;VENTOLIN HFA;PROAIR HFA) inhaler 2 puff, 2 puff, Inhalation, Q6H PRN, Bruno Simon MD    sennosides-docusate (PERICOLACE) 8.6-50 MG per tablet 2 tablet, 2 tablet, Oral, BID PRN **AND** polyethylene glycol (MIRALAX) packet 17 g, 17 g, Oral, Daily PRN **AND** bisacodyl (DULCOLAX) EC tablet 5 mg, 5 mg, Oral, Daily PRN **AND** bisacodyl (DULCOLAX) suppository 10 mg, 10 mg, Rectal, Daily PRN, Bruno Simon MD    bisacodyl (DULCOLAX) suppository 10 mg, 10 mg, Rectal, Daily PRN, Bruno Simon MD    busPIRone (BUSPAR) tablet 15 mg, 15 mg, Oral, TID, Bruno Simon MD, 15 mg at 25 0906    clonazePAM (KlonoPIN) tablet 0.5 mg, 0.5 mg, Oral, BID PRN, Bruno Simon MD    cyclobenzaprine (FLEXERIL) tablet 10 mg, 10 mg, Oral, TID, Josué Castano MD, 10 mg at 25 0906    DULoxetine (CYMBALTA) DR capsule 30 mg, 30 mg, Oral, Daily, Bruno Simon MD, 30 mg at 25 0906    famotidine (PEPCID) tablet 40 mg, 40 mg, Oral, Daily, Bruno Simon MD, 40 mg at 25 0906    HYDROmorphone (DILAUDID) injection 1 mg, 1 mg, Intravenous, Q2H PRN, 1 mg at 25 1200 **AND** naloxone (NARCAN) injection 0.4 mg, 0.4 mg, Intravenous, Q5 Min PRN, Josué Castano MD    LORazepam (ATIVAN) tablet 2 mg, 2 mg, Oral, Nightly, Josué Castano MD, 2 mg at 25    montelukast (SINGULAIR) tablet 10 mg, 10 mg, Oral, Nightly, Bruno Simon MD, 10 mg at 25    ondansetron ODT (ZOFRAN-ODT)  disintegrating tablet 4 mg, 4 mg, Oral, Q6H PRN, Bruno Simon MD    ondansetron ODT (ZOFRAN-ODT) disintegrating tablet 4 mg, 4 mg, Translingual, Q8H PRN, Bruno Simon MD    oxyCODONE-acetaminophen (PERCOCET)  MG per tablet 1 tablet, 1 tablet, Oral, Q4H PRN, Josué Castano MD, 1 tablet at 02/23/25 0813    pantoprazole (PROTONIX) EC tablet 40 mg, 40 mg, Oral, Daily, Bruno Simon MD, 40 mg at 02/23/25 0906    piperacillin-tazobactam (ZOSYN) IVPB 3.375 g IVPB in 100 mL NS (VTB), 3.375 g, Intravenous, Q8H, Bruno Simon MD, 3.375 g at 02/23/25 0906    polyethylene glycol (MIRALAX) packet 17 g, 17 g, Oral, Daily, Bruno Simon MD    sodium chloride 0.9 % flush 10 mL, 10 mL, Intravenous, PRN, Bruno Simon MD    sodium chloride 0.9 % flush 10 mL, 10 mL, Intravenous, Q12H, Bruno Simon MD, 10 mL at 02/23/25 0907    sodium chloride 0.9 % flush 10 mL, 10 mL, Intravenous, PRN, Bruno Simon MD    sodium chloride 0.9 % infusion 40 mL, 40 mL, Intravenous, PRN, Bruno Simon MD    Current Diet:    Dietary Orders (From admission, onward)       Start     Ordered    02/23/25 0919  Diet: Liquid; Clear Liquid; Fluid Consistency: Thin (IDDSI 0)  Diet Effective Now        References:    Diet Order Definitions   Question Answer Comment   Diets: Liquid    Liquid Diet: Clear Liquid    Fluid Consistency: Thin (IDDSI 0)        02/23/25 0919                    Vitals:   Temp:  [97.3 °F (36.3 °C)-97.7 °F (36.5 °C)] 97.7 °F (36.5 °C)  Heart Rate:  [] 129  Resp:  [16-32] 16  BP: (108-175)/() 116/84  Physical Exam   General: no acute distress, resting in bed  Respiratory:  non-labored breathing  Cardiovascular:  RRR, non-tachycardic  Abdomen:  soft, non-distended, most of her tenderness is within the right upper quadrant without peritoneal signs    LABS:  CBC          2/18/2025    04:48 2/22/2025    13:01 2/23/2025    05:05   CBC   WBC 16.39  19.28  19.34    RBC 4.30  4.87  4.51     Hemoglobin 12.2  13.5  12.5    Hematocrit 38.3  40.9  40.5    MCV 89.1  84.0  89.8    MCH 28.4  27.7  27.7    MCHC 31.9  33.0  30.9    RDW 13.7  13.0  13.4    Platelets 493  793  628      BMP          2/19/2025    06:07 2/22/2025    13:01 2/23/2025    05:05   BMP   BUN 15  11  11    Creatinine 0.65  0.61  0.58    Sodium 135  138  136    Potassium 3.1  3.4  3.9    Chloride 100  97  98    CO2 24.5  19.3  23.9    Calcium 8.7  9.7  8.9      CMP          2/19/2025    06:07 2/22/2025    13:01 2/23/2025    05:05   CMP   Glucose 134  118  131    BUN 15  11  11    Creatinine 0.65  0.61  0.58    EGFR 112.9  114.6  116.0    Sodium 135  138  136    Potassium 3.1  3.4  3.9    Chloride 100  97  98    Calcium 8.7  9.7  8.9    Total Protein 6.1  8.4     Albumin 3.3  3.9     Globulin 2.8  4.5     Total Bilirubin 0.4  0.8     Alkaline Phosphatase 59  139     AST (SGOT) 17  34     ALT (SGPT) 23  77     Albumin/Globulin Ratio 1.2  0.9     BUN/Creatinine Ratio 23.1  18.0  19.0    Anion Gap 10.5  21.7  14.1        Lab Results (last 24 hours)       Procedure Component Value Units Date/Time    Basic Metabolic Panel [[id number]]  (Abnormal) Collected: 02/23/25 0505    Specimen: Blood Updated: 02/23/25 0605     Glucose 131 mg/dL      BUN 11 mg/dL      Creatinine 0.58 mg/dL      Sodium 136 mmol/L      Potassium 3.9 mmol/L      Chloride 98 mmol/L      CO2 23.9 mmol/L      Calcium 8.9 mg/dL      BUN/Creatinine Ratio 19.0     Anion Gap 14.1 mmol/L      eGFR 116.0 mL/min/1.73     Narrative:      GFR Categories in Chronic Kidney Disease (CKD)      GFR Category          GFR (mL/min/1.73)    Interpretation  G1                     90 or greater         Normal or high (1)  G2                      60-89                Mild decrease (1)  G3a                   45-59                Mild to moderate decrease  G3b                   30-44                Moderate to severe decrease  G4                    15-29                Severe decrease  G5                     14 or less           Kidney failure          (1)In the absence of evidence of kidney disease, neither GFR category G1 or G2 fulfill the criteria for CKD.    eGFR calculation 2021 CKD-EPI creatinine equation, which does not include race as a factor    Protime-INR [[id number]]  (Abnormal) Collected: 02/23/25 0505    Specimen: Blood Updated: 02/23/25 0603     Protime 15.1 Seconds      INR 1.14    Narrative:      Suggested Therapeutic Ranges For Oral Anticoagulant Therapy:  Level of Therapy                      INR Target Range  Standard Dose                            2.0-3.0  High Dose                                2.5-3.5  Patients not receiving anticoagulant  Therapy Normal Range                     0.86-1.15    aPTT [[id number]]  (Normal) Collected: 02/23/25 0505    Specimen: Blood Updated: 02/23/25 0603     PTT 28.2 seconds     CBC & Differential [[id number]]  (Abnormal) Collected: 02/23/25 0505    Specimen: Blood Updated: 02/23/25 0554    Narrative:      The following orders were created for panel order CBC & Differential.  Procedure                               Abnormality         Status                     ---------                               -----------         ------                     CBC Auto Differential[[id number]]        Abnormal            Final result                 Please view results for these tests on the individual orders.    CBC Auto Differential [[id number]]  (Abnormal) Collected: 02/23/25 0505    Specimen: Blood Updated: 02/23/25 0554     WBC 19.34 10*3/mm3      RBC 4.51 10*6/mm3      Hemoglobin 12.5 g/dL      Hematocrit 40.5 %      MCV 89.8 fL      MCH 27.7 pg      MCHC 30.9 g/dL      RDW 13.4 %      RDW-SD 44.4 fl      MPV 9.8 fL      Platelets 628 10*3/mm3      Neutrophil % 88.1 %      Lymphocyte % 5.1 %      Monocyte % 5.5 %      Eosinophil % 0.1 %      Basophil % 0.4 %      Immature Grans % 0.8 %      Neutrophils, Absolute 17.06 10*3/mm3      Lymphocytes, Absolute 0.98 10*3/mm3       Monocytes, Absolute 1.06 10*3/mm3      Eosinophils, Absolute 0.01 10*3/mm3      Basophils, Absolute 0.08 10*3/mm3      Immature Grans, Absolute 0.15 10*3/mm3      nRBC 0.0 /100 WBC     STAT Lactic Acid, Reflex [[id number]]  (Normal) Collected: 02/22/25 1629    Specimen: Blood Updated: 02/22/25 1647     Lactate 1.3 mmol/L     Lactic Acid, Plasma [[id number]]  (Abnormal) Collected: 02/22/25 1301    Specimen: Blood from Arm, Right Updated: 02/22/25 1344     Lactate 2.9 mmol/L     CBC & Differential [[id number]]  (Abnormal) Collected: 02/22/25 1301    Specimen: Blood from Arm, Right Updated: 02/22/25 1339    Narrative:      The following orders were created for panel order CBC & Differential.  Procedure                               Abnormality         Status                     ---------                               -----------         ------                     CBC Auto Differential[[id number]]        Abnormal            Final result                 Please view results for these tests on the individual orders.    CBC Auto Differential [[id number]]  (Abnormal) Collected: 02/22/25 1301    Specimen: Blood from Arm, Right Updated: 02/22/25 1339     WBC 19.28 10*3/mm3      RBC 4.87 10*6/mm3      Hemoglobin 13.5 g/dL      Hematocrit 40.9 %      MCV 84.0 fL      MCH 27.7 pg      MCHC 33.0 g/dL      RDW 13.0 %      RDW-SD 39.9 fl      MPV 10.1 fL      Platelets 793 10*3/mm3      Neutrophil % 89.3 %      Lymphocyte % 6.1 %      Monocyte % 3.3 %      Eosinophil % 0.1 %      Basophil % 0.3 %      Immature Grans % 0.9 %      Neutrophils, Absolute 17.25 10*3/mm3      Lymphocytes, Absolute 1.17 10*3/mm3      Monocytes, Absolute 0.63 10*3/mm3      Eosinophils, Absolute 0.01 10*3/mm3      Basophils, Absolute 0.05 10*3/mm3      Immature Grans, Absolute 0.17 10*3/mm3      nRBC 0.0 /100 WBC     Comprehensive Metabolic Panel [[id number]]  (Abnormal) Collected: 02/22/25 1301    Specimen: Blood from Arm, Right Updated: 02/22/25 1335     Glucose  118 mg/dL      BUN 11 mg/dL      Creatinine 0.61 mg/dL      Sodium 138 mmol/L      Potassium 3.4 mmol/L      Comment: Slight hemolysis detected by analyzer. Result may be falsely elevated.        Chloride 97 mmol/L      CO2 19.3 mmol/L      Calcium 9.7 mg/dL      Total Protein 8.4 g/dL      Albumin 3.9 g/dL      ALT (SGPT) 77 U/L      AST (SGOT) 34 U/L      Alkaline Phosphatase 139 U/L      Total Bilirubin 0.8 mg/dL      Globulin 4.5 gm/dL      A/G Ratio 0.9 g/dL      BUN/Creatinine Ratio 18.0     Anion Gap 21.7 mmol/L      eGFR 114.6 mL/min/1.73     Narrative:      GFR Categories in Chronic Kidney Disease (CKD)      GFR Category          GFR (mL/min/1.73)    Interpretation  G1                     90 or greater         Normal or high (1)  G2                      60-89                Mild decrease (1)  G3a                   45-59                Mild to moderate decrease  G3b                   30-44                Moderate to severe decrease  G4                    15-29                Severe decrease  G5                    14 or less           Kidney failure          (1)In the absence of evidence of kidney disease, neither GFR category G1 or G2 fulfill the criteria for CKD.    eGFR calculation 2021 CKD-EPI creatinine equation, which does not include race as a factor    Lipase [[id number]]  (Normal) Collected: 02/22/25 1301    Specimen: Blood from Arm, Right Updated: 02/22/25 1332     Lipase 20 U/L     Blood Culture - Blood, Arm, Right [[id number]] Collected: 02/22/25 1306    Specimen: Blood from Arm, Right Updated: 02/22/25 1322    [place] Draw [[id number]] Collected: 02/22/25 1301    Specimen: Blood from Arm, Right Updated: 02/22/25 1316    Narrative:      The following orders were created for panel order [place] Draw.  Procedure                               Abnormality         Status                     ---------                               -----------         ------                     Green Top (Gel)[[id number]]                                   Final result               Lavender Top[[id number]]                                     Final result               Gold Top - SST[[id number]]                                   Final result               Light Blue Top[[id number]]                                   Final result                 Please view results for these tests on the individual orders.    Green Top (Gel) [[id number]] Collected: 02/22/25 1301    Specimen: Blood from Arm, Right Updated: 02/22/25 1316     Extra Tube Hold for add-ons.     Comment: Auto resulted.       Lavender Top [[id number]] Collected: 02/22/25 1301    Specimen: Blood from Arm, Right Updated: 02/22/25 1316     Extra Tube hold for add-on     Comment: Auto resulted       Gold Top - SST [[id number]] Collected: 02/22/25 1301    Specimen: Blood from Arm, Right Updated: 02/22/25 1316     Extra Tube Hold for add-ons.     Comment: Auto resulted.       Light Blue Top [[id number]] Collected: 02/22/25 1301    Specimen: Blood from Arm, Right Updated: 02/22/25 1316     Extra Tube Hold for add-ons.     Comment: Auto resulted       Blood Culture - Blood, Arm, Right [[id number]] Collected: 02/22/25 1301    Specimen: Blood from Arm, Right Updated: 02/22/25 1307            IMAGING:  Imaging Results (Last 24 Hours)       Procedure Component Value Units Date/Time    CT Abdomen Pelvis With Contrast [[id number]] Collected: 02/22/25 1549     Updated: 02/22/25 1608    Narrative:      CT ABDOMEN PELVIS W CONTRAST    Date of Exam: 2/22/2025 3:37 PM EST    Indication: Recent cholecystectomy, worsening pain  abdominal pain.    Comparison: Supine and upright abdomen 2/17/2025, CT AP 6/13/2024    Technique: Axial CT images were obtained of the abdomen and pelvis after the uneventful intravenous administration of iodinated contrast. Reconstructed coronal and sagittal images were also obtained. Automated exposure control and iterative construction   methods were  used.      Findings:  Subsegmental atelectasis is seen at the lung bases.    The liver is of normal size and uniform density.    The gallbladder is absent, surgical clips are seen in the gallbladder bed. Ill-defined fluid collection in the gallbladder bed measures 7 cm x 4 cm, with smaller amounts of fluid tracking caudally along the medial margin of the right liver lobe. Small   peritoneal fluid collections are seen in the abdomen. Peritoneal fluid collection in the pelvis measures 8 cm x 5 cm. Ill-defined increased density in fat is seen in the right upper quadrant and right mid abdomen. Findings are concerning for bile leak.    No pancreatic or adrenal mass is evident. The spleen is of normal size. The kidneys enhance bilaterally. No renal or ureteral stones are seen. There is no evidence of hydronephrosis. The urinary bladder is not abnormally distended.    The stomach is not abnormally distended. Bowel loops are of normal caliber.    The anterior abdominal wall is intact, no hernia is evident.    Mild degenerative spurring is seen in the lower thoracic and lumbar spine.      Impression:      Impression:  CT scan of the abdomen and pelvis with IV contrast demonstrating findings consistent with recent cholecystectomy.    Findings concerning for bile leak, as above.      Electronically Signed: Lisandro Moreira MD    2/22/2025 4:06 PM EST    Workstation ID: [id number]            [x]  Laboratory  []  Microbiology  []  Radiology  []  EKG/Telemetry   []  Cardiology/Vascular   []  Pathology  []  Old records    Assessment / Plan   Assessment:   Active Hospital Problems    Diagnosis     **Bile leak from gallbladder bed          Plan:    Bile leak  -Afebrile, leukocytosis stable at 19,000  -Continue broad-spectrum IV antibiotics and multimodal pain management  -N.p.o. at midnight for CT-guided drain placement tomorrow  -Will need GI consult for ERCP based on results of replacement  -No plans for any acute surgical  intervention, will continue to follow     Electronically signed by Josué Castano MD, 02/23/25, 12:44 PM EST.

## 2025-02-23 NOTE — PLAN OF CARE
Goal Outcome Evaluation:  Plan of Care Reviewed With: patient        Progress: no change  Outcome Evaluation: Pt report continuous sharp spasmic abd pain, medicated per MAR, alternating between PO and IV pain meds. No nausea or vomiting reported. HR in 120's - 130's.

## 2025-02-23 NOTE — CONSULTS
[facility]   Consult    Patient Name: Ebony Hamilton  : 1980  MRN: [id number]  Primary Care Physician:  Karen Stover APRN  Date of admission: 2025    Subjective   Subjective     Chief Complaint: Abdominal pain, nausea, vomiting    Consult Reason: Bile leak status post cholecystectomy    HPI: Ebony Hamilton is a 44 y.o. female who presented to the ED earlier today complaining of acute onset abdominal pain starting this morning.  She underwent a laparoscopic cholecystectomy with me on  and was initially doing well postoperatively until this morning.  Since that time she has been unable to tolerate oral intake.  She denies any subjective fevers, but has had some chills.  She underwent evaluation in the ED with a CT abdomen pelvis which was suggestive of a bile leak.  She was subsequently admitted to the medicine service and started on broad-spectrum IV antibiotics.    Review of Systems   Constitutional:  Positive for chills. Negative for fever.   HENT:  Negative for sore throat.    Respiratory:  Negative for shortness of breath.    Cardiovascular:  Negative for chest pain.   Gastrointestinal:  Positive for abdominal pain, nausea and vomiting. Negative for constipation and diarrhea.   Genitourinary:  Negative for difficulty urinating.   Neurological:  Negative for dizziness.   Psychiatric/Behavioral:  Negative for confusion.        Personal History     Past Medical History:   Diagnosis Date    Allergic     Anxiety     Atrial fibrillation     RELEASED BY CARDIOLOGIST/ELECTROPHYSIST, NO CURRENT MEDS    Chronic pain disorder     Depression     Endometriosis     Headache     Hemorrhoids     Injury of shoulder, right 2009    CHRONIC PAIN    Panic disorder     S/P laparoscopic cholecystectomy 2025       Past Surgical History:   Procedure Laterality Date    CERVICAL ARTHRODESIS  2015    Donaldo Albarran    CERVICAL FUSION      C4-7 FUSION, FULL ROM    CHOLECYSTECTOMY N/A  2/17/2025    Procedure: CHOLECYSTECTOMY LAPAROSCOPIC plain language: removal of gallbladder thru small incisions using long instruments and a camera;  Surgeon: Josué Castano MD;  Location: [facility] MAIN OR;  Service: General;  Laterality: N/A;    COLONOSCOPY N/A 05/31/2022    Procedure: COLONOSCOPY;  Surgeon: Shabbir Baird MD;  Location: [facility] ENDOSCOPY;  Service: General;  Laterality: N/A;  HEMORRHOIDS    ENDOSCOPY N/A 2/17/2025    Procedure: ESOPHAGOGASTRODUODENOSCOPY WITH BIOPSIES;  Surgeon: Sanya Reyes MD;  Location: [facility] ENDOSCOPY;  Service: Gastroenterology;  Laterality: N/A;  GASTRITIS, SMALL HIATAL HERNIA    EXPLORATORY LAPAROTOMY      HEMORRHOIDECTOMY N/A 05/31/2022    Procedure: HEMORRHOID BANDING;  Surgeon: Shabbir Baird MD;  Location: [facility] ENDOSCOPY;  Service: General;  Laterality: N/A;  BANDS X 2    HEMORRHOIDECTOMY N/A 1/31/2024    Procedure: HEMORRHOIDECTOMY;  Surgeon: Shabbir Baird MD;  Location: [facility] OR OSC;  Service: General;  Laterality: N/A;    HYSTERECTOMY      SHOULDER ARTHROSCOPY Right     SHOULDER SURGERY Left     ARTHROSCOPY LABRAL TEAR X2    TUBAL ABDOMINAL LIGATION         Family History: family history includes Anxiety disorder in her father and mother; Bipolar disorder in her mother; Cancer in her mother; Dementia in her paternal grandmother and paternal uncle; Depression in her mother; Heart disease in her mother and another family member; Hypertension in her father and mother. Otherwise pertinent FHx was reviewed and not pertinent to current issue.    Social History:  reports that she quit smoking about 6 years ago. Her smoking use included cigarettes. She started smoking about 26 years ago. She has a 5 pack-year smoking history. She has never used smokeless tobacco. She reports current alcohol use. She reports that she does not use drugs.    Home Medications:  Calcium, Cariprazine HCl, DULoxetine, HYDROcodone-acetaminophen, albuterol  sulfate HFA, bacitracin, bisacodyl, busPIRone, clonazePAM, cyclobenzaprine, hydrocortisone, ibuprofen, linaclotide, montelukast, ondansetron, pantoprazole, polyethylene glycol, prochlorperazine, sennosides-docusate, tiZANidine, traZODone, and vitamin C    Allergies:  Allergies   Allergen Reactions    Escitalopram Nausea Only and Rash     Nausea, sweating, rash     Sulfa Antibiotics Anaphylaxis    Baclofen GI Intolerance, Hives and Nausea And Vomiting    Ciprofloxacin Hallucinations    Codeine Hives    Gold-Containing Drug Products Rash    Latex Rash    Nickel Hives    Tegaderm Ag Mesh [Silver] Hives       Objective    Objective     Vitals:   Temp:  [97.5 °F (36.4 °C)-97.7 °F (36.5 °C)] 97.5 °F (36.4 °C)  Heart Rate:  [] 95  Resp:  [18-32] 18  BP: (129-175)/() 148/94    Physical Exam  Constitutional:       Appearance: Normal appearance.   HENT:      Head: Normocephalic and atraumatic.      Mouth/Throat:      Mouth: Mucous membranes are moist.      Pharynx: Oropharynx is clear.   Cardiovascular:      Rate and Rhythm: Normal rate and regular rhythm.   Pulmonary:      Effort: Pulmonary effort is normal. No respiratory distress.   Abdominal:      Comments: Soft, nondistended, mild tenderness diffusely without guarding or peritoneal signs, invasive incisions are covered with dressings without strikethrough   Musculoskeletal:         General: No swelling. Normal range of motion.      Cervical back: Normal range of motion and neck supple.   Skin:     General: Skin is warm and dry.   Neurological:      General: No focal deficit present.      Mental Status: She is alert and oriented to person, place, and time.   Psychiatric:         Mood and Affect: Mood normal.         Behavior: Behavior normal.         Result Review    Result Review:  I have personally reviewed the results from the time of this admission to 2/22/2025 21:43 EST and agree with these findings:  [x]  Laboratory  []  Microbiology  [x]  Radiology  []   EKG/Telemetry   []  Cardiology/Vascular   []  Pathology  []  Old records  []  Other:  Most notable findings include: Fluid collection within the gallbladder fossa and inferiorly consistent with bile leak    Assessment & Plan   Assessment / Plan     Brief Patient Summary:  Ebony Hamilton is a 44 y.o. female who presented to the hospital with concern for bile leak after undergoing laparoscopic cholecystectomy earlier this week    Active Hospital Problems:  Active Hospital Problems    Diagnosis     **Bile leak from gallbladder bed        Plan:   Bile leak  -Afebrile, tachycardia improved with pain control, leukocytosis 19,000  -Minimal postoperative transaminitis, no hyperbilirubinemia  -CT abdomen pelvis reviewed  -Recommend CT-guided drain placement tomorrow or Monday  -N.p.o. at midnight, obtain coags  -Will need gastroenterology consult early next week for consideration of ERCP  -IV fluids, IV antibiotics, pain control  -No plans for any acute surgical intervention, will continue to follow    Electronically signed by Josué Castano MD, 02/22/25, 9:43 PM EST.

## 2025-02-24 ENCOUNTER — ANESTHESIA (OUTPATIENT)
Dept: GASTROENTEROLOGY | Facility: HOSPITAL | Age: 45
End: 2025-02-24
Payer: COMMERCIAL

## 2025-02-24 ENCOUNTER — APPOINTMENT (OUTPATIENT)
Dept: GENERAL RADIOLOGY | Facility: HOSPITAL | Age: 45
End: 2025-02-24
Payer: COMMERCIAL

## 2025-02-24 ENCOUNTER — APPOINTMENT (OUTPATIENT)
Dept: CT IMAGING | Facility: HOSPITAL | Age: 45
End: 2025-02-24
Payer: COMMERCIAL

## 2025-02-24 ENCOUNTER — ANESTHESIA EVENT (OUTPATIENT)
Dept: GASTROENTEROLOGY | Facility: HOSPITAL | Age: 45
End: 2025-02-24
Payer: COMMERCIAL

## 2025-02-24 LAB
ALBUMIN SERPL-MCNC: 3.3 G/DL (ref 3.5–5.2)
ALP SERPL-CCNC: 146 U/L (ref 39–117)
ALT SERPL W P-5'-P-CCNC: 45 U/L (ref 1–33)
ANION GAP SERPL CALCULATED.3IONS-SCNC: 13.4 MMOL/L (ref 5–15)
AST SERPL-CCNC: 11 U/L (ref 1–32)
BASOPHILS # BLD AUTO: 0.06 10*3/MM3 (ref 0–0.2)
BASOPHILS NFR BLD AUTO: 0.4 % (ref 0–1.5)
BILIRUB CONJ SERPL-MCNC: 0.8 MG/DL (ref 0–0.3)
BILIRUB INDIRECT SERPL-MCNC: 0.5 MG/DL
BILIRUB SERPL-MCNC: 1.3 MG/DL (ref 0–1.2)
BUN SERPL-MCNC: 17 MG/DL (ref 6–20)
BUN/CREAT SERPL: 19.1 (ref 7–25)
CALCIUM SPEC-SCNC: 9.4 MG/DL (ref 8.6–10.5)
CHLORIDE SERPL-SCNC: 92 MMOL/L (ref 98–107)
CO2 SERPL-SCNC: 26.6 MMOL/L (ref 22–29)
CREAT SERPL-MCNC: 0.89 MG/DL (ref 0.57–1)
DEPRECATED RDW RBC AUTO: 44.4 FL (ref 37–54)
EGFRCR SERPLBLD CKD-EPI 2021: 83.1 ML/MIN/1.73
EOSINOPHIL # BLD AUTO: 0.08 10*3/MM3 (ref 0–0.4)
EOSINOPHIL NFR BLD AUTO: 0.5 % (ref 0.3–6.2)
ERYTHROCYTE [DISTWIDTH] IN BLOOD BY AUTOMATED COUNT: 13.5 % (ref 12.3–15.4)
GLUCOSE SERPL-MCNC: 101 MG/DL (ref 65–99)
HCT VFR BLD AUTO: 35.6 % (ref 34–46.6)
HGB BLD-MCNC: 11.4 G/DL (ref 12–15.9)
IMM GRANULOCYTES # BLD AUTO: 0.19 10*3/MM3 (ref 0–0.05)
IMM GRANULOCYTES NFR BLD AUTO: 1.2 % (ref 0–0.5)
LYMPHOCYTES # BLD AUTO: 1.06 10*3/MM3 (ref 0.7–3.1)
LYMPHOCYTES NFR BLD AUTO: 6.7 % (ref 19.6–45.3)
MCH RBC QN AUTO: 28.6 PG (ref 26.6–33)
MCHC RBC AUTO-ENTMCNC: 32 G/DL (ref 31.5–35.7)
MCV RBC AUTO: 89.4 FL (ref 79–97)
MONOCYTES # BLD AUTO: 0.92 10*3/MM3 (ref 0.1–0.9)
MONOCYTES NFR BLD AUTO: 5.8 % (ref 5–12)
NEUTROPHILS NFR BLD AUTO: 13.45 10*3/MM3 (ref 1.7–7)
NEUTROPHILS NFR BLD AUTO: 85.4 % (ref 42.7–76)
NRBC BLD AUTO-RTO: 0 /100 WBC (ref 0–0.2)
PLATELET # BLD AUTO: 642 10*3/MM3 (ref 140–450)
PMV BLD AUTO: 9.5 FL (ref 6–12)
POTASSIUM SERPL-SCNC: 3.2 MMOL/L (ref 3.5–5.2)
PROT SERPL-MCNC: 7 G/DL (ref 6–8.5)
RBC # BLD AUTO: 3.98 10*6/MM3 (ref 3.77–5.28)
SODIUM SERPL-SCNC: 132 MMOL/L (ref 136–145)
WBC NRBC COR # BLD AUTO: 15.76 10*3/MM3 (ref 3.4–10.8)

## 2025-02-24 PROCEDURE — 25010000002 PIPERACILLIN SOD-TAZOBACTAM PER 1 G: Performed by: STUDENT IN AN ORGANIZED HEALTH CARE EDUCATION/TRAINING PROGRAM

## 2025-02-24 PROCEDURE — 25010000002 LIDOCAINE PF 2% 2 % SOLUTION: Performed by: NURSE ANESTHETIST, CERTIFIED REGISTERED

## 2025-02-24 PROCEDURE — 25010000002 HYDROMORPHONE 1 MG/ML SOLUTION: Performed by: STUDENT IN AN ORGANIZED HEALTH CARE EDUCATION/TRAINING PROGRAM

## 2025-02-24 PROCEDURE — 88305 TISSUE EXAM BY PATHOLOGIST: CPT | Performed by: STUDENT IN AN ORGANIZED HEALTH CARE EDUCATION/TRAINING PROGRAM

## 2025-02-24 PROCEDURE — 43274 ERCP DUCT STENT PLACEMENT: CPT | Performed by: INTERNAL MEDICINE

## 2025-02-24 PROCEDURE — 0F798DZ DILATION OF COMMON BILE DUCT WITH INTRALUMINAL DEVICE, VIA NATURAL OR ARTIFICIAL OPENING ENDOSCOPIC: ICD-10-PCS | Performed by: INTERNAL MEDICINE

## 2025-02-24 PROCEDURE — 25010000002 FENTANYL CITRATE (PF) 50 MCG/ML SOLUTION: Performed by: RADIOLOGY

## 2025-02-24 PROCEDURE — C1729 CATH, DRAINAGE: HCPCS

## 2025-02-24 PROCEDURE — 87070 CULTURE OTHR SPECIMN AEROBIC: CPT | Performed by: STUDENT IN AN ORGANIZED HEALTH CARE EDUCATION/TRAINING PROGRAM

## 2025-02-24 PROCEDURE — 25810000003 LACTATED RINGERS PER 1000 ML: Performed by: NURSE ANESTHETIST, CERTIFIED REGISTERED

## 2025-02-24 PROCEDURE — C1769 GUIDE WIRE: HCPCS | Performed by: INTERNAL MEDICINE

## 2025-02-24 PROCEDURE — C1889 IMPLANT/INSERT DEVICE, NOC: HCPCS | Performed by: INTERNAL MEDICINE

## 2025-02-24 PROCEDURE — 88108 CYTOPATH CONCENTRATE TECH: CPT | Performed by: STUDENT IN AN ORGANIZED HEALTH CARE EDUCATION/TRAINING PROGRAM

## 2025-02-24 PROCEDURE — 25510000001 IOPAMIDOL 61 % SOLUTION: Performed by: INTERNAL MEDICINE

## 2025-02-24 PROCEDURE — 99232 SBSQ HOSP IP/OBS MODERATE 35: CPT | Performed by: STUDENT IN AN ORGANIZED HEALTH CARE EDUCATION/TRAINING PROGRAM

## 2025-02-24 PROCEDURE — 0F7D8DZ DILATION OF PANCREATIC DUCT WITH INTRALUMINAL DEVICE, VIA NATURAL OR ARTIFICIAL OPENING ENDOSCOPIC: ICD-10-PCS | Performed by: INTERNAL MEDICINE

## 2025-02-24 PROCEDURE — BF141ZZ FLUOROSCOPY OF GALLBLADDER, BILE DUCTS AND PANCREATIC DUCTS USING LOW OSMOLAR CONTRAST: ICD-10-PCS | Performed by: INTERNAL MEDICINE

## 2025-02-24 PROCEDURE — 74328 X-RAY BILE DUCT ENDOSCOPY: CPT | Performed by: INTERNAL MEDICINE

## 2025-02-24 PROCEDURE — 25010000002 DEXAMETHASONE PER 1 MG: Performed by: NURSE ANESTHETIST, CERTIFIED REGISTERED

## 2025-02-24 PROCEDURE — 36415 COLL VENOUS BLD VENIPUNCTURE: CPT | Performed by: STUDENT IN AN ORGANIZED HEALTH CARE EDUCATION/TRAINING PROGRAM

## 2025-02-24 PROCEDURE — 99222 1ST HOSP IP/OBS MODERATE 55: CPT | Performed by: INTERNAL MEDICINE

## 2025-02-24 PROCEDURE — 25010000002 LIDOCAINE 2% SOLUTION: Performed by: STUDENT IN AN ORGANIZED HEALTH CARE EDUCATION/TRAINING PROGRAM

## 2025-02-24 PROCEDURE — 25010000002 ONDANSETRON PER 1 MG: Performed by: NURSE ANESTHETIST, CERTIFIED REGISTERED

## 2025-02-24 PROCEDURE — 49406 IMAGE CATH FLUID PERI/RETRO: CPT

## 2025-02-24 PROCEDURE — 25010000002 PROPOFOL 10 MG/ML EMULSION: Performed by: NURSE ANESTHETIST, CERTIFIED REGISTERED

## 2025-02-24 PROCEDURE — 76000 FLUOROSCOPY <1 HR PHYS/QHP: CPT

## 2025-02-24 PROCEDURE — C2617 STENT, NON-COR, TEM W/O DEL: HCPCS | Performed by: INTERNAL MEDICINE

## 2025-02-24 PROCEDURE — 80076 HEPATIC FUNCTION PANEL: CPT | Performed by: STUDENT IN AN ORGANIZED HEALTH CARE EDUCATION/TRAINING PROGRAM

## 2025-02-24 PROCEDURE — 25010000002 FENTANYL CITRATE (PF) 50 MCG/ML SOLUTION: Performed by: NURSE ANESTHETIST, CERTIFIED REGISTERED

## 2025-02-24 PROCEDURE — 85025 COMPLETE CBC W/AUTO DIFF WBC: CPT | Performed by: STUDENT IN AN ORGANIZED HEALTH CARE EDUCATION/TRAINING PROGRAM

## 2025-02-24 PROCEDURE — 80048 BASIC METABOLIC PNL TOTAL CA: CPT | Performed by: STUDENT IN AN ORGANIZED HEALTH CARE EDUCATION/TRAINING PROGRAM

## 2025-02-24 PROCEDURE — 25010000002 MIDAZOLAM PER 1MG: Performed by: RADIOLOGY

## 2025-02-24 PROCEDURE — 87205 SMEAR GRAM STAIN: CPT | Performed by: STUDENT IN AN ORGANIZED HEALTH CARE EDUCATION/TRAINING PROGRAM

## 2025-02-24 PROCEDURE — 25010000002 SUGAMMADEX 200 MG/2ML SOLUTION: Performed by: NURSE ANESTHETIST, CERTIFIED REGISTERED

## 2025-02-24 PROCEDURE — 25010000002 HYDROMORPHONE 1 MG/ML SOLUTION: Performed by: INTERNAL MEDICINE

## 2025-02-24 PROCEDURE — C1876 STENT, NON-COA/NON-COV W/DEL: HCPCS | Performed by: INTERNAL MEDICINE

## 2025-02-24 PROCEDURE — 43273 ENDOSCOPIC PANCREATOSCOPY: CPT | Performed by: INTERNAL MEDICINE

## 2025-02-24 PROCEDURE — 0W9G30Z DRAINAGE OF PERITONEAL CAVITY WITH DRAINAGE DEVICE, PERCUTANEOUS APPROACH: ICD-10-PCS | Performed by: RADIOLOGY

## 2025-02-24 DEVICE — BILIARY STENT
Type: IMPLANTABLE DEVICE | Site: BILE DUCT | Status: FUNCTIONAL
Brand: ADVANIX™ BILIARY

## 2025-02-24 DEVICE — STNT PANC GEENEN ST W/GW 5F 5CM: Type: IMPLANTABLE DEVICE | Site: BILE DUCT | Status: FUNCTIONAL

## 2025-02-24 RX ORDER — LIDOCAINE HYDROCHLORIDE 20 MG/ML
INJECTION, SOLUTION EPIDURAL; INFILTRATION; INTRACAUDAL; PERINEURAL AS NEEDED
Status: DISCONTINUED | OUTPATIENT
Start: 2025-02-24 | End: 2025-02-24 | Stop reason: SURG

## 2025-02-24 RX ORDER — IOPAMIDOL 612 MG/ML
INJECTION, SOLUTION INTRATHECAL AS NEEDED
Status: DISCONTINUED | OUTPATIENT
Start: 2025-02-24 | End: 2025-02-24 | Stop reason: HOSPADM

## 2025-02-24 RX ORDER — INDOMETHACIN 100 MG
100 SUPPOSITORY, RECTAL RECTAL ONCE
Status: COMPLETED | OUTPATIENT
Start: 2025-02-24 | End: 2025-02-24

## 2025-02-24 RX ORDER — FENTANYL CITRATE 50 UG/ML
INJECTION, SOLUTION INTRAMUSCULAR; INTRAVENOUS AS NEEDED
Status: COMPLETED | OUTPATIENT
Start: 2025-02-24 | End: 2025-02-24

## 2025-02-24 RX ORDER — SODIUM CHLORIDE, SODIUM LACTATE, POTASSIUM CHLORIDE, CALCIUM CHLORIDE 600; 310; 30; 20 MG/100ML; MG/100ML; MG/100ML; MG/100ML
30 INJECTION, SOLUTION INTRAVENOUS CONTINUOUS
Status: ACTIVE | OUTPATIENT
Start: 2025-02-24 | End: 2025-02-25

## 2025-02-24 RX ORDER — DEXMEDETOMIDINE HYDROCHLORIDE 100 UG/ML
INJECTION, SOLUTION INTRAVENOUS AS NEEDED
Status: DISCONTINUED | OUTPATIENT
Start: 2025-02-24 | End: 2025-02-24 | Stop reason: SURG

## 2025-02-24 RX ORDER — ROCURONIUM BROMIDE 10 MG/ML
INJECTION, SOLUTION INTRAVENOUS AS NEEDED
Status: DISCONTINUED | OUTPATIENT
Start: 2025-02-24 | End: 2025-02-24 | Stop reason: SURG

## 2025-02-24 RX ORDER — DEXAMETHASONE SODIUM PHOSPHATE 4 MG/ML
INJECTION, SOLUTION INTRA-ARTICULAR; INTRALESIONAL; INTRAMUSCULAR; INTRAVENOUS; SOFT TISSUE AS NEEDED
Status: DISCONTINUED | OUTPATIENT
Start: 2025-02-24 | End: 2025-02-24 | Stop reason: SURG

## 2025-02-24 RX ORDER — PROPOFOL 10 MG/ML
VIAL (ML) INTRAVENOUS AS NEEDED
Status: DISCONTINUED | OUTPATIENT
Start: 2025-02-24 | End: 2025-02-24 | Stop reason: SURG

## 2025-02-24 RX ORDER — ONDANSETRON 2 MG/ML
INJECTION INTRAMUSCULAR; INTRAVENOUS AS NEEDED
Status: DISCONTINUED | OUTPATIENT
Start: 2025-02-24 | End: 2025-02-24 | Stop reason: SURG

## 2025-02-24 RX ORDER — LIDOCAINE HYDROCHLORIDE 20 MG/ML
20 INJECTION, SOLUTION INFILTRATION; PERINEURAL ONCE
Status: COMPLETED | OUTPATIENT
Start: 2025-02-24 | End: 2025-02-24

## 2025-02-24 RX ORDER — MIDAZOLAM HYDROCHLORIDE 2 MG/2ML
INJECTION, SOLUTION INTRAMUSCULAR; INTRAVENOUS AS NEEDED
Status: COMPLETED | OUTPATIENT
Start: 2025-02-24 | End: 2025-02-24

## 2025-02-24 RX ORDER — SUCCINYLCHOLINE/SOD CL,ISO/PF 100 MG/5ML
SYRINGE (ML) INTRAVENOUS AS NEEDED
Status: DISCONTINUED | OUTPATIENT
Start: 2025-02-24 | End: 2025-02-24 | Stop reason: SURG

## 2025-02-24 RX ORDER — FENTANYL CITRATE 50 UG/ML
INJECTION, SOLUTION INTRAMUSCULAR; INTRAVENOUS AS NEEDED
Status: DISCONTINUED | OUTPATIENT
Start: 2025-02-24 | End: 2025-02-24 | Stop reason: SURG

## 2025-02-24 RX ADMIN — DEXMEDETOMIDINE 10 MCG: 100 INJECTION, SOLUTION, CONCENTRATE INTRAVENOUS at 16:17

## 2025-02-24 RX ADMIN — Medication 10 ML: at 20:10

## 2025-02-24 RX ADMIN — DULOXETINE HYDROCHLORIDE 30 MG: 30 CAPSULE, DELAYED RELEASE ORAL at 08:23

## 2025-02-24 RX ADMIN — CYCLOBENZAPRINE HYDROCHLORIDE 10 MG: 5 TABLET, FILM COATED ORAL at 08:23

## 2025-02-24 RX ADMIN — MIDAZOLAM HYDROCHLORIDE 1 MG: 1 INJECTION, SOLUTION INTRAMUSCULAR; INTRAVENOUS at 10:34

## 2025-02-24 RX ADMIN — MONTELUKAST 10 MG: 10 TABLET, FILM COATED ORAL at 20:11

## 2025-02-24 RX ADMIN — BUSPIRONE HYDROCHLORIDE 15 MG: 15 TABLET ORAL at 08:23

## 2025-02-24 RX ADMIN — LIDOCAINE HYDROCHLORIDE 20 ML: 20 INJECTION, SOLUTION INFILTRATION; PERINEURAL at 10:45

## 2025-02-24 RX ADMIN — HYDROMORPHONE HYDROCHLORIDE 1 MG: 1 INJECTION, SOLUTION INTRAMUSCULAR; INTRAVENOUS; SUBCUTANEOUS at 02:59

## 2025-02-24 RX ADMIN — SODIUM CHLORIDE, SODIUM LACTATE, POTASSIUM CHLORIDE, CALCIUM CHLORIDE 30 ML/HR: 20; 30; 600; 310 INJECTION, SOLUTION INTRAVENOUS at 21:26

## 2025-02-24 RX ADMIN — HYDROMORPHONE HYDROCHLORIDE 1 MG: 1 INJECTION, SOLUTION INTRAMUSCULAR; INTRAVENOUS; SUBCUTANEOUS at 05:05

## 2025-02-24 RX ADMIN — SODIUM CHLORIDE, SODIUM LACTATE, POTASSIUM CHLORIDE, CALCIUM CHLORIDE 30 ML/HR: 20; 30; 600; 310 INJECTION, SOLUTION INTRAVENOUS at 14:16

## 2025-02-24 RX ADMIN — DEXMEDETOMIDINE 10 MCG: 100 INJECTION, SOLUTION, CONCENTRATE INTRAVENOUS at 16:24

## 2025-02-24 RX ADMIN — DEXAMETHASONE SODIUM PHOSPHATE 4 MG: 4 INJECTION, SOLUTION INTRAMUSCULAR; INTRAVENOUS at 16:00

## 2025-02-24 RX ADMIN — HYDROMORPHONE HYDROCHLORIDE 1 MG: 1 INJECTION, SOLUTION INTRAMUSCULAR; INTRAVENOUS; SUBCUTANEOUS at 11:55

## 2025-02-24 RX ADMIN — FAMOTIDINE 40 MG: 20 TABLET, FILM COATED ORAL at 08:23

## 2025-02-24 RX ADMIN — ROCURONIUM BROMIDE 40 MG: 50 INJECTION INTRAVENOUS at 15:46

## 2025-02-24 RX ADMIN — HYDROMORPHONE HYDROCHLORIDE 1 MG: 1 INJECTION, SOLUTION INTRAMUSCULAR; INTRAVENOUS; SUBCUTANEOUS at 20:10

## 2025-02-24 RX ADMIN — MIDAZOLAM HYDROCHLORIDE 1 MG: 1 INJECTION, SOLUTION INTRAMUSCULAR; INTRAVENOUS at 10:52

## 2025-02-24 RX ADMIN — PANTOPRAZOLE SODIUM 40 MG: 40 TABLET, DELAYED RELEASE ORAL at 08:23

## 2025-02-24 RX ADMIN — LORAZEPAM 2 MG: 0.5 TABLET ORAL at 20:11

## 2025-02-24 RX ADMIN — SODIUM CHLORIDE 3.38 G: 9 INJECTION, SOLUTION INTRAVENOUS at 17:49

## 2025-02-24 RX ADMIN — LIDOCAINE HYDROCHLORIDE 60 MG: 20 INJECTION, SOLUTION INTRAVENOUS at 15:43

## 2025-02-24 RX ADMIN — FENTANYL CITRATE 100 MCG: 50 INJECTION, SOLUTION INTRAMUSCULAR; INTRAVENOUS at 15:43

## 2025-02-24 RX ADMIN — SODIUM CHLORIDE, SODIUM LACTATE, POTASSIUM CHLORIDE, CALCIUM CHLORIDE: 20; 30; 600; 310 INJECTION, SOLUTION INTRAVENOUS at 16:23

## 2025-02-24 RX ADMIN — FENTANYL CITRATE 25 MCG: 50 INJECTION, SOLUTION INTRAMUSCULAR; INTRAVENOUS at 10:50

## 2025-02-24 RX ADMIN — BUSPIRONE HYDROCHLORIDE 15 MG: 15 TABLET ORAL at 20:11

## 2025-02-24 RX ADMIN — PROPOFOL 200 MCG/KG/MIN: 10 INJECTION, EMULSION INTRAVENOUS at 15:44

## 2025-02-24 RX ADMIN — SUGAMMADEX 200 MG: 100 INJECTION, SOLUTION INTRAVENOUS at 16:48

## 2025-02-24 RX ADMIN — Medication 10 ML: at 08:24

## 2025-02-24 RX ADMIN — Medication 100 MG: at 15:43

## 2025-02-24 RX ADMIN — Medication 100 MG: at 14:14

## 2025-02-24 RX ADMIN — ROCURONIUM BROMIDE 10 MG: 50 INJECTION INTRAVENOUS at 15:43

## 2025-02-24 RX ADMIN — ONDANSETRON 4 MG: 2 INJECTION INTRAMUSCULAR; INTRAVENOUS at 16:00

## 2025-02-24 RX ADMIN — CYCLOBENZAPRINE HYDROCHLORIDE 10 MG: 5 TABLET, FILM COATED ORAL at 20:11

## 2025-02-24 RX ADMIN — HYDROMORPHONE HYDROCHLORIDE 1 MG: 1 INJECTION, SOLUTION INTRAMUSCULAR; INTRAVENOUS; SUBCUTANEOUS at 08:04

## 2025-02-24 RX ADMIN — PROPOFOL 200 MG: 10 INJECTION, EMULSION INTRAVENOUS at 15:43

## 2025-02-24 RX ADMIN — FENTANYL CITRATE 50 MCG: 50 INJECTION, SOLUTION INTRAMUSCULAR; INTRAVENOUS at 10:32

## 2025-02-24 RX ADMIN — SODIUM CHLORIDE 3.38 G: 9 INJECTION, SOLUTION INTRAVENOUS at 08:23

## 2025-02-24 NOTE — H&P
[facility]   Interventional Radiology H&P    Patient Name: Ebony Hamilton  : 1980  MRN: [id number]  Primary Care Physician:  Karen Stover APRN  Referring Physician: No ref. provider found  Date of admission: 2025    Subjective   Subjective     HPI:  Ebony Hamilton is a 44 y.o. female with suspected bile leak s/p cholecystectomy    Review of Systems:   Constitutional no fever,  no weight loss       Otolaryngeal no difficulty swallowing   Cardiovascular no chest pain   Pulmonary no cough, no sputum production   Gastrointestinal no constipation, no diarrhea                         Personal History       Past Medical/Surgical History:   Past Medical History:   Diagnosis Date    Allergic     Anxiety     Atrial fibrillation     RELEASED BY CARDIOLOGIST/ELECTROPHYSIST, NO CURRENT MEDS    Chronic pain disorder     Depression     Endometriosis     Headache     Hemorrhoids     Injury of shoulder, right 2009    CHRONIC PAIN    Panic disorder     S/P laparoscopic cholecystectomy 2025     Past Surgical History:   Procedure Laterality Date    CERVICAL ARTHRODESIS  2015    Donaldo Albarran    CERVICAL FUSION      C4-7 FUSION, FULL ROM    CHOLECYSTECTOMY N/A 2025    Procedure: CHOLECYSTECTOMY LAPAROSCOPIC plain language: removal of gallbladder thru small incisions using long instruments and a camera;  Surgeon: Josué Castano MD;  Location: [facility] MAIN OR;  Service: General;  Laterality: N/A;    COLONOSCOPY N/A 2022    Procedure: COLONOSCOPY;  Surgeon: Shabbir Baird MD;  Location: [facility] ENDOSCOPY;  Service: General;  Laterality: N/A;  HEMORRHOIDS    ENDOSCOPY N/A 2025    Procedure: ESOPHAGOGASTRODUODENOSCOPY WITH BIOPSIES;  Surgeon: Sanya Reyes MD;  Location: [facility] ENDOSCOPY;  Service: Gastroenterology;  Laterality: N/A;  GASTRITIS, SMALL HIATAL HERNIA    EXPLORATORY LAPAROTOMY      HEMORRHOIDECTOMY N/A 2022    Procedure: HEMORRHOID  BANDING;  Surgeon: Shabbir Baird MD;  Location: [facility] ENDOSCOPY;  Service: General;  Laterality: N/A;  BANDS X 2    HEMORRHOIDECTOMY N/A 1/31/2024    Procedure: HEMORRHOIDECTOMY;  Surgeon: Shabbir Baird MD;  Location: [facility] OR OSC;  Service: General;  Laterality: N/A;    HYSTERECTOMY      SHOULDER ARTHROSCOPY Right     SHOULDER SURGERY Left     ARTHROSCOPY LABRAL TEAR X2    TUBAL ABDOMINAL LIGATION         Social History:  reports that she quit smoking about 6 years ago. Her smoking use included cigarettes. She started smoking about 26 years ago. She has a 5 pack-year smoking history. She has never used smokeless tobacco. She reports current alcohol use. She reports that she does not use drugs.    Medications:  Medications Prior to Admission   Medication Sig Dispense Refill Last Dose/Taking    albuterol sulfate  (90 Base) MCG/ACT inhaler Inhale 2 puffs Every 6 (Six) Hours As Needed for Wheezing. 18 g 1 Taking As Needed    bacitracin 500 UNIT/GM ointment Apply 1 Application topically to the appropriate area as directed 2 (Two) Times a Day. 14 g 0 Taking    bisacodyl (DULCOLAX) 10 MG suppository Insert 1 suppository into the rectum Daily As Needed.   Taking As Needed    busPIRone (BUSPAR) 15 MG tablet Take 1 tablet by mouth 3 (Three) Times a Day. 90 tablet 1 2/22/2025    CALCIUM PO Take 1 tablet by mouth Daily.   2/22/2025    clonazePAM (KlonoPIN) 0.5 MG tablet Take 1 tablet by mouth 2 (Two) Times a Day As Needed for Anxiety. 20 tablet 0 Taking As Needed    cyclobenzaprine (FLEXERIL) 5 MG tablet Take 1 tablet by mouth 3 (Three) Times a Day As Needed for Muscle Spasms. 10 tablet 0 2/22/2025    DULoxetine (CYMBALTA) 30 MG capsule Take 1 capsule by mouth Daily. TAKES 30mg AND 60mg TOGETHER FOR A TOTAL OF 90mg   2/22/2025    DULoxetine (CYMBALTA) 60 MG capsule Take 1 capsule by mouth Daily. TAKES 30mg AND 60mg TOGETHER FOR A TOTAL OF 90mg   2/22/2025    HYDROcodone-acetaminophen (NORCO)   MG per tablet Take 1 tablet by mouth 5 (Five) Times a Day As Needed. take 1 tablet by mouth 4-5 times daily as needed for pain   2/22/2025    hydrocortisone 2.5 % cream Apply 1 Application topically to the appropriate area as directed 2 (Two) Times a Day. 28 g 2 Taking    ibuprofen (ADVIL,MOTRIN) 800 MG tablet Take 1 tablet by mouth Every 8 (Eight) Hours As Needed.   Taking As Needed    Linzess 145 MCG capsule capsule Take 1 capsule by mouth Every Morning Before Breakfast.   Taking    montelukast (Singulair) 10 MG tablet Take 1 tablet by mouth Every Night. 90 tablet 1 2/21/2025    ondansetron (ZOFRAN) 4 MG tablet TAKE (1) TABLET EVERY 8 HOURS AS NEEDED FOR NAUSEA AND vomiting (Patient taking differently: Take 1 tablet by mouth Every 8 (Eight) Hours As Needed.) 30 tablet 1 Taking Differently    pantoprazole (PROTONIX) 40 MG EC tablet Take 1 tablet by mouth Daily. 90 tablet 3 2/22/2025    polyethylene glycol (MIRALAX) 17 g packet Take 17 g by mouth Daily. 14 packet 0 Taking    prochlorperazine (COMPAZINE) 10 MG tablet Take 1 tablet by mouth Every 6 (Six) Hours As Needed.   Taking As Needed    sennosides-docusate (senna-docusate sodium) 8.6-50 MG per tablet Take 1 tablet by mouth Daily. 30 tablet 2 Taking    tiZANidine (ZANAFLEX) 4 MG tablet Take 1-2 tablets by mouth Every 6 (Six) Hours As Needed.   Taking As Needed    traZODone (DESYREL) 50 MG tablet Take 0.5 to 2 tab PO QHS PRN sleep (Patient taking differently: Take 0.5-2 tablets by mouth At Night As Needed. Take 0.5 to 2 tab PO QHS PRN sleep) 90 tablet 2 Taking Differently    vitamin C (ASCORBIC ACID) 500 MG tablet Take 1 tablet by mouth Daily. LAST DOSE 1/28/24 2/22/2025    Vraylar 1.5 MG capsule capsule Take 1 capsule by mouth Daily. 30 capsule 2 Taking     Current medications:  busPIRone, 15 mg, Oral, TID  cyclobenzaprine, 10 mg, Oral, TID  DULoxetine, 30 mg, Oral, Daily  famotidine, 40 mg, Oral, Daily  lidocaine, 20 mL, Injection, Once  LORazepam, 2 mg, Oral,  "Nightly  montelukast, 10 mg, Oral, Nightly  pantoprazole, 40 mg, Oral, Daily  piperacillin-tazobactam, 3.375 g, Intravenous, Q8H  polyethylene glycol, 17 g, Oral, Daily  sodium chloride, 10 mL, Intravenous, Q12H      Current IV drips:       Allergies:  Allergies   Allergen Reactions    Escitalopram Nausea Only and Rash     Nausea, sweating, rash     Sulfa Antibiotics Anaphylaxis    Baclofen GI Intolerance, Hives and Nausea And Vomiting    Ciprofloxacin Hallucinations    Codeine Hives    Gold-Containing Drug Products Rash    Latex Rash    Nickel Hives    Tegaderm Ag Mesh [Silver] Hives       Objective    Objective     Vitals:   Temp:  [97 °F (36.1 °C)-99 °F (37.2 °C)] 99 °F (37.2 °C)  Heart Rate:  [102-129] 129  Resp:  [16-36] 30  BP: ()/(60-91) 142/89      Physical Exam:   Constitutional: Awake, alert, in pain with some distress   Respiratory: Clear to auscultation bilaterally, nonlabored respirations    Cardiovascular: Tachycardia, no murmurs, rubs, or gallops     ASA SCALE ASSESSMENT:  4-Severe incapacitating disease process that is a constant threat to life    MALLAMPATI CLASSIFICATION:  1-Able to visualize the soft palate, fauces, uvula, anterior & posterior tonsilar pillars.       Result Review        Result Review:     Sodium   Date Value Ref Range Status   02/24/2025 132 (L) 136 - 145 mmol/L Final   02/23/2025 136 136 - 145 mmol/L Final   02/22/2025 138 136 - 145 mmol/L Final       Potassium   Date Value Ref Range Status   02/24/2025 3.2 (L) 3.5 - 5.2 mmol/L Final   02/23/2025 3.9 3.5 - 5.2 mmol/L Final   02/22/2025 3.4 (L) 3.5 - 5.2 mmol/L Final     Comment:     Slight hemolysis detected by analyzer. Result may be falsely elevated.       Chloride   Date Value Ref Range Status   02/24/2025 92 (L) 98 - 107 mmol/L Final   02/23/2025 98 98 - 107 mmol/L Final   02/22/2025 97 (L) 98 - 107 mmol/L Final       No results found for: \"PLASMABICARB\"    BUN   Date Value Ref Range Status   02/24/2025 17 6 - 20 mg/dL " "Final   02/23/2025 11 6 - 20 mg/dL Final   02/22/2025 11 6 - 20 mg/dL Final       Creatinine   Date Value Ref Range Status   02/24/2025 0.89 0.57 - 1.00 mg/dL Final   02/23/2025 0.58 0.57 - 1.00 mg/dL Final   02/22/2025 0.61 0.57 - 1.00 mg/dL Final       Calcium   Date Value Ref Range Status   02/24/2025 9.4 8.6 - 10.5 mg/dL Final   02/23/2025 8.9 8.6 - 10.5 mg/dL Final   02/22/2025 9.7 8.6 - 10.5 mg/dL Final           No components found for: \"GLUCOSE.*\"  Results from last 7 days   Lab Units 02/24/25  0440   WBC 10*3/mm3 15.76*   HEMOGLOBIN g/dL 11.4*   HEMATOCRIT % 35.6   PLATELETS 10*3/mm3 642*      Results from last 7 days   Lab Units 02/23/25  0505   INR  1.14           Assessment / Plan     Assesment:   Suspected bile leak s/p cholecystectomy      Plan:   CT guided drain placement in abdomen    The risks and benefits of the procedure were discussed with the patient.    Electronically signed by Jl Hendrix MD, 02/24/25, 10:11 AM EST.   "

## 2025-02-24 NOTE — NURSING NOTE
Arrived to rad holding via stretcher per transport for CT guided peritoneal drain placement.  Daughter at bedside.

## 2025-02-24 NOTE — ANESTHESIA PREPROCEDURE EVALUATION
Anesthesia Evaluation     Patient summary reviewed and Nursing notes reviewed   NPO Solid Status: > 8 hours  NPO Liquid Status: > 2 hours           Airway   Mallampati: II  TM distance: >3 FB  Neck ROM: full  No difficulty expected  Dental - normal exam     Pulmonary - normal exam    breath sounds clear to auscultation  Cardiovascular - normal exam  Exercise tolerance: good (4-7 METS)    Rhythm: regular  Rate: normal    (+) dysrhythmias (one time occurance released from cards no issues for years now)    ROS comment: Patient been running tachy 100-120s    Neuro/Psych  (+) headaches, numbness, psychiatric history Anxiety and Depression  GI/Hepatic/Renal/Endo    (+) obesity (32.8 BMI), GI bleeding lower resolved, liver disease    Musculoskeletal         ROS comment: Cervical fusion  Abdominal   (+) obese    Abdomen: soft.   Substance History      OB/GYN      Comment: S/P hysterectomy      Other   arthritis,     ROS/Med Hx Other: Current bile leak this am 80cc removed per CT guided cath              Anesthesia Plan    ASA 3     general   Rapid sequence and total IV anesthesia  intravenous induction     Anesthetic plan, risks, benefits, and alternatives have been provided, discussed and informed consent has been obtained with: patient.  Pre-procedure education provided  Plan discussed with CRNA.    CODE STATUS:    Level Of Support Discussed With: Patient  Code Status (Patient has no pulse and is not breathing): CPR (Attempt to Resuscitate)  Medical Interventions (Patient has pulse or is breathing): Full Support

## 2025-02-24 NOTE — PLAN OF CARE
Goal Outcome Evaluation:  Plan of Care Reviewed With: patient        Progress: no change  Outcome Evaluation: pt just arrived from ERCP, pt asleep but easy to arouse, no c/o pain at this time.  450 ml of clear brown output from IR drain this shift in addition to what was emptied down at radiology.

## 2025-02-24 NOTE — PROGRESS NOTES
[facility]   Hospitalist Progress Note  Date: 2025  Patient Name: Ebony Hamilton  : 1980  MRN: [id number]  Date of admission: 2025  Room/Bed: [facility]      Subjective   Subjective     Chief Complaint:   Chief Complaint   Patient presents with    Post-op Problem    Abdominal Pain    Vomiting       Summary:   Patient is a 44-year-old female recently treated at this facility for abdominal pain, underwent cholecystectomy and is now presenting back with abdominal pain.  See prior discharge summary for details of prior hospital stay.     She has a past medical history of atrial fibrillation off of anticoagulation, gastroparesis, anxiety, depression, chronic pain, gastroesophageal reflux, panic disorder.  On her discharge a few days ago, her pain was improving.  She and her  state that the pain continued to improve after discharge, she was able to start eating regular food.  After eating a doughnut, her pain returned and she presented back to the emergency department.      In the emergency department: Blood pressure 175/110, respiratory rate 32, heart rate 130.  Laboratory evaluation notable for white blood cell count 19.28, platelets 793.  Chemistry notable for potassium 3.4, ALT 77, AST 34, alk phos 139, lactic acid 2.9.  CT abdomen was performed notable for ill-defined fluid collection in the gallbladder bed 7 cm x 4 cm with small amounts tracking caudally along the medial margin alert lower lobe, additional peritoneal small fluid collections in the abdomen, peritoneal fluid collection in the pelvis 8 x 5 cm.  Findings concerning for bile leak.  Vital signs and lactic acid both normalized with IV fluid resuscitation and pain control.  ER team discussed with surgeon who recommended admission for IV antibiotics, with plan for possible ERCP in a few days.  Hospitalist service requested to admit the patient for further evaluation and management.    Interval events:  Patient continues to have  some abdominal pain in between doses of pain medication. Discussed with Dr Thompson, who has agreed to see the patient in consultation to evaluate for ERCP. patient going for IR drainage of fluid collections today.    Objective   Objective     Vitals:   Temp:  [97 °F (36.1 °C)-99.3 °F (37.4 °C)] 99.3 °F (37.4 °C)  Heart Rate:  [102-131] 115  Resp:  [16-36] 26  BP: ()/(60-91) 145/75    Physical Exam   General: Awake, alert, uncomfortable appearing   HENT: Atraumatic, normocephalic.   Eyes: pupils equal, round, without scleral icterus  Cardiovascular: Regular rate and rhythm, no murmurs   Pulmonary: CTA bilaterally; no wheezes; no conversational dyspnea  Gastrointestinal: Continued pain with palpation all quadrants.      Result Review    Result Review:  I have personally reviewed these results:  [x]  Laboratory      Lab 02/24/25  0440 02/23/25  0505 02/22/25  1629 02/22/25  1301   WBC 15.76* 19.34*  --  19.28*   HEMOGLOBIN 11.4* 12.5  --  13.5   HEMATOCRIT 35.6 40.5  --  40.9   PLATELETS 642* 628*  --  793*   NEUTROS ABS 13.45* 17.06*  --  17.25*   IMMATURE GRANS (ABS) 0.19* 0.15*  --  0.17*   LYMPHS ABS 1.06 0.98  --  1.17   MONOS ABS 0.92* 1.06*  --  0.63   EOS ABS 0.08 0.01  --  0.01   MCV 89.4 89.8  --  84.0   LACTATE  --   --  1.3 2.9*   PROTIME  --  15.1*  --   --    APTT  --  28.2  --   --          Lab 02/24/25  0440 02/23/25  0505 02/22/25  1301   SODIUM 132* 136 138   POTASSIUM 3.2* 3.9 3.4*   CHLORIDE 92* 98 97*   CO2 26.6 23.9 19.3*   ANION GAP 13.4 14.1 21.7*   BUN 17 11 11   CREATININE 0.89 0.58 0.61   EGFR 83.1 116.0 114.6   GLUCOSE 101* 131* 118*   CALCIUM 9.4 8.9 9.7         Lab 02/24/25  0440 02/22/25  1301 02/19/25  0607 02/18/25  0448   TOTAL PROTEIN 7.0 8.4 6.1 7.4   ALBUMIN 3.3* 3.9 3.3* 4.0   GLOBULIN  --  4.5 2.8 3.4   ALT (SGPT) 45* 77* 23 32   AST (SGOT) 11 34* 17 29   BILIRUBIN 1.3* 0.8 0.4 0.4   INDIRECT BILIRUBIN 0.5  --   --   --    BILIRUBIN DIRECT 0.8*  --   --   --    ALK PHOS 146*  139* 59 76   LIPASE  --  20  --   --          Lab 02/23/25  0505   PROTIME 15.1*   INR 1.14                 Brief Urine Lab Results  (Last result in the past 365 days)        Color   Clarity   Blood   Leuk Est   Nitrite   Protein   CREAT   Urine HCG        02/17/25 0037 Yellow   Clear   Negative   Trace   Negative   Negative                 [x]  Microbiology   Microbiology Results (last 10 days)       Procedure Component Value - Date/Time    Blood Culture - Blood, Arm, Right [[id number]]  (Normal) Collected: 02/22/25 1306    Lab Status: Preliminary result Specimen: Blood from Arm, Right Updated: 02/23/25 1330     Blood Culture No growth at 24 hours    Blood Culture - Blood, Arm, Right [[id number]]  (Normal) Collected: 02/22/25 1301    Lab Status: Preliminary result Specimen: Blood from Arm, Right Updated: 02/23/25 1315     Blood Culture No growth at 24 hours    Narrative:      Less than seven (7) mL's of blood was collected.  Insufficient quantity may yield false negative results.          [x]  Radiology  CT Abdomen Pelvis With Contrast    Result Date: 2/22/2025  Impression: CT scan of the abdomen and pelvis with IV contrast demonstrating findings consistent with recent cholecystectomy. Findings concerning for bile leak, as above. Electronically Signed: Lisandro Moreira MD  2/22/2025 4:06 PM EST  Workstation ID: [id number]    XR Abdomen Flat & Upright    Result Date: 2/17/2025  Nonobstructive bowel gas pattern. No free air. Electronically Signed: Yayo Ford MD  2/17/2025 6:51 AM EST  Workstation ID: [id number]    US Gallbladder    Result Date: 2/17/2025  1. Small gallstone in the gallbladder neck with no biliary obstruction or gallbladder wall thickening. There is a positive sonographic Guzman's sign of questionable significance. If there is continued concern for acute cholecystitis, HIDA scan may be beneficial. 2. Otherwise negative. Electronically Signed: Yayo Ford MD  2/17/2025 12:08 AM EST  Workstation ID:  [id number]   []  EKG/Telemetry   []  Cardiology/Vascular   []  Pathology  []  Old records  []  Other:    Assessment & Plan   Assessment / Plan       Bile leak:  Intra-abdominal fluid collections  -Surgery following  -GI consulted  -Continue Zosyn  -Continue oxycodone, Dilaudid for pain control  -N.p.o. today  -Daily labs  -IR drain today     Chronic pain disorder  Anxiety/depression  Endometriosis  Gastroparesis  -Resume appropriate home medications when reconciled      DVT prophylaxis: Lovenox       Discussed with RN.    VTE Prophylaxis:  No VTE prophylaxis order currently exists.        CODE STATUS:   Level Of Support Discussed With: Patient  Code Status (Patient has no pulse and is not breathing): CPR (Attempt to Resuscitate)  Medical Interventions (Patient has pulse or is breathing): Full Support      Electronically signed by Bruno Simon MD, 2/24/2025, 11:42 EST.

## 2025-02-24 NOTE — CONSULTS
Chief Complaint  Post-op Problem, Abdominal Pain, and Vomiting    History of Present Illness  Ebony Hamilton is a 44 y.o. female with history of anxiety, atrial fibrillation, depression, endometriosis, headache, chronic back and shoulder pain, recent cholecystectomy who presents to [facility] END[facility] on referral from No ref. provider found for a gastroenterology evaluation of abdominal pain.  Patient had a laparoscopic cholecystectomy done last week.  Is been admitted with elevated white cell count of 19,000 abdominal pain CT scan showed fluid around the river.  Today CT-guided drain been put in and bile is draining.  Patient has diffuse abdominal pain 5-6/10 intensity constant nonradiating without impressively relieving factors.        Labs Result Review Imaging    Past Medical History:   Diagnosis Date    Allergic     Anxiety     Atrial fibrillation     RELEASED BY CARDIOLOGIST/ELECTROPHYSIST, NO CURRENT MEDS    Chronic pain disorder     Depression     Endometriosis     Headache     Hemorrhoids     Injury of shoulder, right 2009    CHRONIC PAIN    Panic disorder     S/P laparoscopic cholecystectomy 02/17/2025       Past Surgical History:   Procedure Laterality Date    CERVICAL ARTHRODESIS  01/14/2015    Donaldo Albarran    CERVICAL FUSION      C4-7 FUSION, FULL ROM    CHOLECYSTECTOMY N/A 2/17/2025    Procedure: CHOLECYSTECTOMY LAPAROSCOPIC plain language: removal of gallbladder thru small incisions using long instruments and a camera;  Surgeon: Josué Castano MD;  Location: [facility] MAIN OR;  Service: General;  Laterality: N/A;    COLONOSCOPY N/A 05/31/2022    Procedure: COLONOSCOPY;  Surgeon: Shabbir Baird MD;  Location: [facility] ENDOSCOPY;  Service: General;  Laterality: N/A;  HEMORRHOIDS    ENDOSCOPY N/A 2/17/2025    Procedure: ESOPHAGOGASTRODUODENOSCOPY WITH BIOPSIES;  Surgeon: Sanya Reyes MD;  Location: [facility] ENDOSCOPY;  Service: Gastroenterology;  Laterality: N/A;   GASTRITIS, SMALL HIATAL HERNIA    EXPLORATORY LAPAROTOMY      HEMORRHOIDECTOMY N/A 05/31/2022    Procedure: HEMORRHOID BANDING;  Surgeon: Shabbir Baird MD;  Location: [facility] ENDOSCOPY;  Service: General;  Laterality: N/A;  BANDS X 2    HEMORRHOIDECTOMY N/A 1/31/2024    Procedure: HEMORRHOIDECTOMY;  Surgeon: Shabbir Baird MD;  Location: [facility] OR OSC;  Service: General;  Laterality: N/A;    HYSTERECTOMY      SHOULDER ARTHROSCOPY Right     SHOULDER SURGERY Left     ARTHROSCOPY LABRAL TEAR X2    TUBAL ABDOMINAL LIGATION           Current Facility-Administered Medications:     albuterol sulfate HFA (PROVENTIL HFA;VENTOLIN HFA;PROAIR HFA) inhaler 2 puff, 2 puff, Inhalation, Q6H PRN, Bruno Simon MD    sennosides-docusate (PERICOLACE) 8.6-50 MG per tablet 2 tablet, 2 tablet, Oral, BID PRN **AND** polyethylene glycol (MIRALAX) packet 17 g, 17 g, Oral, Daily PRN **AND** bisacodyl (DULCOLAX) EC tablet 5 mg, 5 mg, Oral, Daily PRN **AND** bisacodyl (DULCOLAX) suppository 10 mg, 10 mg, Rectal, Daily PRN, Bruno Simon MD    bisacodyl (DULCOLAX) suppository 10 mg, 10 mg, Rectal, Daily PRN, Bruno Simon MD    busPIRone (BUSPAR) tablet 15 mg, 15 mg, Oral, TID, Bruno Simon MD, 15 mg at 02/24/25 0823    clonazePAM (KlonoPIN) tablet 0.5 mg, 0.5 mg, Oral, BID PRN, Bruno Simon MD    cyclobenzaprine (FLEXERIL) tablet 10 mg, 10 mg, Oral, TID, Josué Castano MD, 10 mg at 02/24/25 0823    DULoxetine (CYMBALTA) DR capsule 30 mg, 30 mg, Oral, Daily, Bruno Simon MD, 30 mg at 02/24/25 0823    famotidine (PEPCID) tablet 40 mg, 40 mg, Oral, Daily, Bruno Simon MD, 40 mg at 02/24/25 0823    HYDROmorphone (DILAUDID) injection 1 mg, 1 mg, Intravenous, Q2H PRN, 1 mg at 02/24/25 1155 **AND** naloxone (NARCAN) injection 0.4 mg, 0.4 mg, Intravenous, Q5 Min PRN, Josué Castano MD    Indomethacin 100 MG  suppository suppository 100 mg, 100 mg, Rectal, Once, Ha Thompson MD    LORazepam  (ATIVAN) tablet 2 mg, 2 mg, Oral, Nightly, Josué Castano MD, 2 mg at 02/23/25 2129    montelukast (SINGULAIR) tablet 10 mg, 10 mg, Oral, Nightly, Bruno Simon MD, 10 mg at 02/23/25 2129    ondansetron ODT (ZOFRAN-ODT) disintegrating tablet 4 mg, 4 mg, Oral, Q6H PRN, Bruno Simon MD    ondansetron ODT (ZOFRAN-ODT) disintegrating tablet 4 mg, 4 mg, Translingual, Q8H PRN, Bruno Simon MD    oxyCODONE-acetaminophen (PERCOCET)  MG per tablet 1 tablet, 1 tablet, Oral, Q4H PRN, Josué Castano MD, 1 tablet at 02/23/25 1716    pantoprazole (PROTONIX) EC tablet 40 mg, 40 mg, Oral, Daily, Bruno Simon MD, 40 mg at 02/24/25 0823    piperacillin-tazobactam (ZOSYN) IVPB 3.375 g IVPB in 100 mL NS (VTB), 3.375 g, Intravenous, Q8H, Bruno Simon MD, 3.375 g at 02/24/25 0823    polyethylene glycol (MIRALAX) packet 17 g, 17 g, Oral, Daily, Bruno Simon MD    sodium chloride 0.9 % flush 10 mL, 10 mL, Intravenous, PRN, Bruno Simon MD    sodium chloride 0.9 % flush 10 mL, 10 mL, Intravenous, Q12H, Bruno Simon MD, 10 mL at 02/24/25 0824    sodium chloride 0.9 % flush 10 mL, 10 mL, Intravenous, PRN, Bruno Simon MD    sodium chloride 0.9 % infusion 40 mL, 40 mL, Intravenous, PRN, Bruno Simon MD     Allergies   Allergen Reactions    Escitalopram Nausea Only and Rash     Nausea, sweating, rash     Sulfa Antibiotics Anaphylaxis    Baclofen GI Intolerance, Hives and Nausea And Vomiting    Ciprofloxacin Hallucinations    Codeine Hives    Gold-Containing Drug Products Rash    Latex Rash    Nickel Hives    Tegaderm Ag Mesh [Silver] Hives       Family History   Problem Relation Age of Onset    Depression Mother     Bipolar disorder Mother     Anxiety disorder Mother     Cancer Mother     Heart disease Mother     Hypertension Mother     Anxiety disorder Father     Hypertension Father     Dementia Paternal Uncle     Dementia Paternal Grandmother     Heart disease Other     Colon cancer  "Neg Hx     Malig Hyperthermia Neg Hx         Social History     Social History Narrative    Not on file   Patient is negative for smoking, alcohol use, drug    Immunization:  Immunization History   Administered Date(s) Administered    Tdap 01/01/2012        Objective     Review of Systems 10 system review is negative except what is mentioned in HPI    Vital Signs:   /60 (BP Location: Left arm, Patient Position: Lying)   Pulse 105   Temp 98.8 °F (37.1 °C) (Temporal)   Resp 23   Ht 157.5 cm (62\")   Wt 81.5 kg (179 lb 10.8 oz)   SpO2 98%   BMI 32.86 kg/m²       Physical Exam  Constitutional:       General: She is awake. She is not in acute distress.     Appearance: Normal appearance. She is well-developed and well-groomed.   HENT:      Head: Normocephalic and atraumatic.      Mouth/Throat:      Mouth: Mucous membranes are moist.      Comments: Alejandro dental hygiene is good  Eyes:      General: Lids are normal.      Conjunctiva/sclera: Conjunctivae normal.      Pupils: Pupils are equal, round, and reactive to light.   Neck:      Thyroid: No thyroid mass.      Trachea: Trachea normal.   Cardiovascular:      Rate and Rhythm: Normal rate and regular rhythm.      Heart sounds: Normal heart sounds.   Pulmonary:      Effort: Pulmonary effort is normal.      Breath sounds: Normal breath sounds and air entry.   Abdominal:      General: Abdomen is flat. Bowel sounds are normal. There is no distension.      Palpations: Abdomen is soft. There is no mass.      Tenderness: There is no abdominal tenderness. There is no guarding.   Musculoskeletal:      Cervical back: Neck supple.      Right lower leg: No edema.      Left lower leg: No edema.   Skin:     General: Skin is warm and moist.      Coloration: Skin is not cyanotic, jaundiced or pale.      Findings: No rash.      Nails: There is no clubbing.   Neurological:      Mental Status: She is alert and oriented to person, place, and time.   Psychiatric:         Attention and " Perception: Attention normal.         Mood and Affect: Mood and affect normal.         Speech: Speech normal.         Labs:  Results from last 7 days   Lab Units 02/24/25  0440 02/23/25  0505 02/22/25  1301   WBC 10*3/mm3 15.76* 19.34* 19.28*   HEMOGLOBIN g/dL 11.4* 12.5 13.5   HEMATOCRIT % 35.6 40.5 40.9   PLATELETS 10*3/mm3 642* 628* 793*      Results from last 7 days   Lab Units 02/24/25  0440 02/23/25  0505 02/22/25  1301 02/19/25  0607   SODIUM mmol/L 132* 136 138 135*   POTASSIUM mmol/L 3.2* 3.9 3.4* 3.1*   CHLORIDE mmol/L 92* 98 97* 100   CO2 mmol/L 26.6 23.9 19.3* 24.5   BUN mg/dL 17 11 11 15   CREATININE mg/dL 0.89 0.58 0.61 0.65   CALCIUM mg/dL 9.4 8.9 9.7 8.7   BILIRUBIN mg/dL 1.3*  --  0.8 0.4   ALK PHOS U/L 146*  --  139* 59   ALT (SGPT) U/L 45*  --  77* 23   AST (SGOT) U/L 11  --  34* 17   GLUCOSE mg/dL 101* 131* 118* 134*      Results from last 7 days   Lab Units 02/23/25  0505   INR  1.14        Assessment & Plan:  Principal Problem:    Bile leak from gallbladder bed  Plan do ERCP.  Risk and benefits including risk of 5 7% pancreatitis, bleeding, infection, perforation, need for surgery, long hospital stay, organ failure, death, failure to complete the procedure, need for more procedures in future are all discussed with the patient.    Patient was given instructions and counseling regarding her condition or for health maintenance advice. Please see specific information pulled into the AVS if appropriate.        Signed:  Anish Thompson MD  02/24/25  14:09 EST

## 2025-02-24 NOTE — PROGRESS NOTES
[facility]     Surgery Progress Note    Patient Name: Ebony Hamilton  :    1980  MRN:    [id number]  Date of admission:  2025  Length of Stay: 2 days    Subjective   44-year-old female with bile leak status post laparoscopic cholecystectomy     No acute events overnight.  Abdominal pain overall stable.  Plans for CT-guided drain placement this morning and possible ERCP later today.  Objective     Current Facility-Administered Medications:     albuterol sulfate HFA (PROVENTIL HFA;VENTOLIN HFA;PROAIR HFA) inhaler 2 puff, 2 puff, Inhalation, Q6H PRN, Bruno Simon MD    sennosides-docusate (PERICOLACE) 8.6-50 MG per tablet 2 tablet, 2 tablet, Oral, BID PRN **AND** polyethylene glycol (MIRALAX) packet 17 g, 17 g, Oral, Daily PRN **AND** bisacodyl (DULCOLAX) EC tablet 5 mg, 5 mg, Oral, Daily PRN **AND** bisacodyl (DULCOLAX) suppository 10 mg, 10 mg, Rectal, Daily PRN, Bruno Simon MD    bisacodyl (DULCOLAX) suppository 10 mg, 10 mg, Rectal, Daily PRN, Bruno Simon MD    busPIRone (BUSPAR) tablet 15 mg, 15 mg, Oral, TID, Bruno Simon MD, 15 mg at 25 0823    clonazePAM (KlonoPIN) tablet 0.5 mg, 0.5 mg, Oral, BID PRN, Bruno Simon MD    cyclobenzaprine (FLEXERIL) tablet 10 mg, 10 mg, Oral, TID, Josué Castano MD, 10 mg at 25 0823    DULoxetine (CYMBALTA) DR capsule 30 mg, 30 mg, Oral, Daily, Bruno Simon MD, 30 mg at 25 0823    famotidine (PEPCID) tablet 40 mg, 40 mg, Oral, Daily, Bruno Simon MD, 40 mg at 25 0823    HYDROmorphone (DILAUDID) injection 1 mg, 1 mg, Intravenous, Q2H PRN, 1 mg at 25 1155 **AND** naloxone (NARCAN) injection 0.4 mg, 0.4 mg, Intravenous, Q5 Min PRN, Josué Castano MD    LORazepam (ATIVAN) tablet 2 mg, 2 mg, Oral, Nightly, Josué Castano MD, 2 mg at 25    montelukast (SINGULAIR) tablet 10 mg, 10 mg, Oral, Nightly, Bruno Simon MD, 10 mg at 25    ondansetron ODT (ZOFRAN-ODT)  disintegrating tablet 4 mg, 4 mg, Oral, Q6H PRN, Bruno Simon MD    ondansetron ODT (ZOFRAN-ODT) disintegrating tablet 4 mg, 4 mg, Translingual, Q8H PRN, Bruno Simon MD    oxyCODONE-acetaminophen (PERCOCET)  MG per tablet 1 tablet, 1 tablet, Oral, Q4H PRN, Josué Castano MD, 1 tablet at 02/23/25 1716    pantoprazole (PROTONIX) EC tablet 40 mg, 40 mg, Oral, Daily, Bruno Simon MD, 40 mg at 02/24/25 0823    piperacillin-tazobactam (ZOSYN) IVPB 3.375 g IVPB in 100 mL NS (VTB), 3.375 g, Intravenous, Q8H, Bruno Simon MD, 3.375 g at 02/24/25 0823    polyethylene glycol (MIRALAX) packet 17 g, 17 g, Oral, Daily, Bruno Simon MD    sodium chloride 0.9 % flush 10 mL, 10 mL, Intravenous, PRN, Bruno Simon MD    sodium chloride 0.9 % flush 10 mL, 10 mL, Intravenous, Q12H, Bruno Simon MD, 10 mL at 02/24/25 0824    sodium chloride 0.9 % flush 10 mL, 10 mL, Intravenous, PRN, Bruno Simon MD    sodium chloride 0.9 % infusion 40 mL, 40 mL, Intravenous, PRN, Bruno Simon MD    Current Diet:    Dietary Orders (From admission, onward)       Start     Ordered    02/24/25 0001  NPO Diet NPO Type: Sips with Meds  Diet Effective Midnight        Question:  NPO Type  Answer:  Sips with Meds    02/23/25 7028                    Vitals:   Temp:  [97 °F (36.1 °C)-99.3 °F (37.4 °C)] 98.6 °F (37 °C)  Heart Rate:  [102-131] 119  Resp:  [16-36] 28  BP: ()/(60-91) 152/81  Physical Exam   General: no acute distress, resting in bed  Respiratory:  non-labored breathing  Cardiovascular:  RRR, non-tachycardic  Abdomen:  soft, non-distended, most of her tenderness is within the right upper quadrant without peritoneal signs    LABS:  CBC          2/22/2025    13:01 2/23/2025    05:05 2/24/2025    04:40   CBC   WBC 19.28  19.34  15.76    RBC 4.87  4.51  3.98    Hemoglobin 13.5  12.5  11.4    Hematocrit 40.9  40.5  35.6    MCV 84.0  89.8  89.4    MCH 27.7  27.7  28.6    MCHC 33.0  30.9  32.0    RDW  13.0  13.4  13.5    Platelets 793  628  642      BMP          2/22/2025    13:01 2/23/2025    05:05 2/24/2025    04:40   BMP   BUN 11  11  17    Creatinine 0.61  0.58  0.89    Sodium 138  136  132    Potassium 3.4  3.9  3.2    Chloride 97  98  92    CO2 19.3  23.9  26.6    Calcium 9.7  8.9  9.4      CMP          2/22/2025    13:01 2/23/2025    05:05 2/24/2025    04:40   CMP   Glucose 118  131  101    BUN 11  11  17    Creatinine 0.61  0.58  0.89    EGFR 114.6  116.0  83.1    Sodium 138  136  132    Potassium 3.4  3.9  3.2    Chloride 97  98  92    Calcium 9.7  8.9  9.4    Total Protein 8.4   7.0    Albumin 3.9   3.3    Globulin 4.5      Total Bilirubin 0.8   1.3    Alkaline Phosphatase 139   146    AST (SGOT) 34   11    ALT (SGPT) 77   45    Albumin/Globulin Ratio 0.9      BUN/Creatinine Ratio 18.0  19.0  19.1    Anion Gap 21.7  14.1  13.4        Lab Results (last 24 hours)       Procedure Component Value Units Date/Time    Body Fluid Culture - Drainage, Peritoneum [[id number]] Collected: 02/24/25 1051    Specimen: Drainage from Peritoneum Updated: 02/24/25 1124    Basic Metabolic Panel [[id number]]  (Abnormal) Collected: 02/24/25 0440    Specimen: Blood Updated: 02/24/25 0533     Glucose 101 mg/dL      BUN 17 mg/dL      Creatinine 0.89 mg/dL      Sodium 132 mmol/L      Potassium 3.2 mmol/L      Chloride 92 mmol/L      CO2 26.6 mmol/L      Calcium 9.4 mg/dL      BUN/Creatinine Ratio 19.1     Anion Gap 13.4 mmol/L      eGFR 83.1 mL/min/1.73     Narrative:      GFR Categories in Chronic Kidney Disease (CKD)      GFR Category          GFR (mL/min/1.73)    Interpretation  G1                     90 or greater         Normal or high (1)  G2                      60-89                Mild decrease (1)  G3a                   45-59                Mild to moderate decrease  G3b                   30-44                Moderate to severe decrease  G4                    15-29                Severe decrease  G5                    14  or less           Kidney failure          (1)In the absence of evidence of kidney disease, neither GFR category G1 or G2 fulfill the criteria for CKD.    eGFR calculation 2021 CKD-EPI creatinine equation, which does not include race as a factor    Hepatic Function Panel [[id number]]  (Abnormal) Collected: 02/24/25 0440    Specimen: Blood Updated: 02/24/25 0532     Total Protein 7.0 g/dL      Albumin 3.3 g/dL      ALT (SGPT) 45 U/L      AST (SGOT) 11 U/L      Alkaline Phosphatase 146 U/L      Total Bilirubin 1.3 mg/dL      Bilirubin, Direct 0.8 mg/dL      Bilirubin, Indirect 0.5 mg/dL     CBC & Differential [[id number]]  (Abnormal) Collected: 02/24/25 0440    Specimen: Blood Updated: 02/24/25 0528    Narrative:      The following orders were created for panel order CBC & Differential.  Procedure                               Abnormality         Status                     ---------                               -----------         ------                     CBC Auto Differential[[id number]]        Abnormal            Final result                 Please view results for these tests on the individual orders.    CBC Auto Differential [[id number]]  (Abnormal) Collected: 02/24/25 0440    Specimen: Blood Updated: 02/24/25 0528     WBC 15.76 10*3/mm3      RBC 3.98 10*6/mm3      Hemoglobin 11.4 g/dL      Hematocrit 35.6 %      MCV 89.4 fL      MCH 28.6 pg      MCHC 32.0 g/dL      RDW 13.5 %      RDW-SD 44.4 fl      MPV 9.5 fL      Platelets 642 10*3/mm3      Neutrophil % 85.4 %      Lymphocyte % 6.7 %      Monocyte % 5.8 %      Eosinophil % 0.5 %      Basophil % 0.4 %      Immature Grans % 1.2 %      Neutrophils, Absolute 13.45 10*3/mm3      Lymphocytes, Absolute 1.06 10*3/mm3      Monocytes, Absolute 0.92 10*3/mm3      Eosinophils, Absolute 0.08 10*3/mm3      Basophils, Absolute 0.06 10*3/mm3      Immature Grans, Absolute 0.19 10*3/mm3      nRBC 0.0 /100 WBC     Blood Culture - Blood, Arm, Right [[id number]]  (Normal) Collected:  02/22/25 1306    Specimen: Blood from Arm, Right Updated: 02/23/25 1330     Blood Culture No growth at 24 hours    Blood Culture - Blood, Arm, Right [[id number]]  (Normal) Collected: 02/22/25 1301    Specimen: Blood from Arm, Right Updated: 02/23/25 1315     Blood Culture No growth at 24 hours    Narrative:      Less than seven (7) mL's of blood was collected.  Insufficient quantity may yield false negative results.            IMAGING:  Imaging Results (Last 24 Hours)       Procedure Component Value Units Date/Time    CT Guided Percutaneous Drain Peritoneal [[id number]] Collected: 02/24/25 1204     Updated: 02/24/25 1217    Narrative:      CT GUIDED PERCUTANEOUS DRAIN PERITONEAL    Date of Exam: 2/24/2025 10:00 AM EST    Indication: bile leak s/p cholecystectomy.    Findings:  The risks, benefits and alternatives of the procedure were explained to the patient. The Chief risks discussed were bleeding, infection and injury to the liver or other adjacent organs. The chief option discussed was doing nothing. The patient indicated   she understood what was discussed and elected to proceed. She provided written consent.    Please refer to the nursing notes for medications administered during the procedure.    Patient was placed on the CT scanner in a supine position. Pulmonary CT was performed which demonstrates a subcapsular fluid collection superficial to the right hepatic lobe. The overlying skin was prepped and draped in normal sterile fashion. 2%   lidocaine was injected along the anticipated tract of the needle. 5 Cambodian 10 cm Bswifteh catheter was advanced into the subcapsular fluid collection under CT fluoroscopic guidance. 0.035 inch Bentson guidewire was advanced through the catheter which was   then removed. An 8 Cambodian fascial dilator was passed over and then removed from the guidewire. An 8 Cambodian pigtail drain was advanced over the guidewire. The guidewire was removed. The pigtail loop was formed and locked.  Approximately 80 cc of bilious   appearing fluid was aspirated.    Patient tolerated the procedure well. She was observed for short time the radiology holding area and then transferred back to the floor for further observation and care.      Impression:      Impression:  Technically successful placement of an 8 Marshallese drain into the hepatic subcapsular fluid collection which yielded 80 cc bilious appearing fluid. A sample was sent for culture.      Electronically Signed: Jl Hendrix MD    2/24/2025 12:15 PM EST    Workstation ID: [id number]            [x]  Laboratory  []  Microbiology  []  Radiology  []  EKG/Telemetry   []  Cardiology/Vascular   []  Pathology  []  Old records    Assessment / Plan   Assessment:   Active Hospital Problems    Diagnosis     **Bile leak from gallbladder bed          Plan:    Bile leak  -Afebrile, leukocytosis stable at 15,000  -Continue broad-spectrum IV antibiotics and multimodal pain management  -CT-guided drain placement this morning; if positive for bile will likely benefit from ERCP, discussed with gastroenterology yesterday     Electronically signed by Josué Castano MD, 02/24/25, 12:25 PM EST.

## 2025-02-24 NOTE — ANESTHESIA POSTPROCEDURE EVALUATION
Patient: Ebony Hamilton    Procedure Summary       Date: 02/24/25 Room / Location: [facility] ENDOSCOPY 4 / [facility] ENDOSCOPY    Anesthesia Start: 1542 Anesthesia Stop: 1656    Procedure: ENDOSCOPIC RETROGRADE CHOLANGIOPANCREATOGRAPHY with bile duct stent placement and pancreatic duct stent placement Diagnosis:       Bile leak from gallbladder bed      (Bile leak from gallbladder bed [K83.8])    Surgeons: Ha Thompson MD Provider: Swathi Vines CRNA    Anesthesia Type: general ASA Status: 3            Anesthesia Type: general    Vitals  Vitals Value Taken Time   /76 02/24/25 1729   Temp 36.2 °C (97.1 °F) 02/24/25 1723   Pulse 87 02/24/25 1731   Resp 19 02/24/25 1728   SpO2 95 % 02/24/25 1731   Vitals shown include unfiled device data.        Post Anesthesia Care and Evaluation    Patient location during evaluation: bedside  Patient participation: complete - patient participated  Level of consciousness: awake  Pain management: satisfactory to patient    Airway patency: patent  Anesthetic complications: No anesthetic complications  PONV Status: controlled  Cardiovascular status: acceptable and stable  Respiratory status: spontaneous ventilation, room air and nonlabored ventilation         Repair Performed By Another Provider Text (Leave Blank If You Do Not Want): After obtaining clear surgical margins the defect was repaired by another provider.

## 2025-02-24 NOTE — PLAN OF CARE
Goal Outcome Evaluation:  Plan of Care Reviewed With: patient        Progress: no change  Outcome Evaluation: Pain meds given per patient request, see MAR.  No complaints of nausea or vomiting this shift.  IV antibiotics given, see MAR.  Patient has been running slightly tachy.  NPO since midnight. Bed alert was activated as patient was unsteady.  Daughter is at bedside.  Call light is in reach and patient is able to make needs known.

## 2025-02-25 ENCOUNTER — TELEPHONE (OUTPATIENT)
Dept: GASTROENTEROLOGY | Facility: CLINIC | Age: 45
End: 2025-02-25
Payer: COMMERCIAL

## 2025-02-25 ENCOUNTER — PREP FOR SURGERY (OUTPATIENT)
Dept: OTHER | Facility: HOSPITAL | Age: 45
End: 2025-02-25
Payer: COMMERCIAL

## 2025-02-25 DIAGNOSIS — Z46.89 ENCOUNTER FOR REMOVAL OF BILIARY STENT: Primary | ICD-10-CM

## 2025-02-25 LAB
ANION GAP SERPL CALCULATED.3IONS-SCNC: 11 MMOL/L (ref 5–15)
BUN SERPL-MCNC: 12 MG/DL (ref 6–20)
BUN/CREAT SERPL: 20 (ref 7–25)
CALCIUM SPEC-SCNC: 8.2 MG/DL (ref 8.6–10.5)
CHLORIDE SERPL-SCNC: 100 MMOL/L (ref 98–107)
CO2 SERPL-SCNC: 25 MMOL/L (ref 22–29)
CREAT SERPL-MCNC: 0.6 MG/DL (ref 0.57–1)
DEPRECATED RDW RBC AUTO: 43.4 FL (ref 37–54)
EGFRCR SERPLBLD CKD-EPI 2021: 115.1 ML/MIN/1.73
ERYTHROCYTE [DISTWIDTH] IN BLOOD BY AUTOMATED COUNT: 13.4 % (ref 12.3–15.4)
GLUCOSE SERPL-MCNC: 147 MG/DL (ref 65–99)
HCT VFR BLD AUTO: 27.9 % (ref 34–46.6)
HGB BLD-MCNC: 9 G/DL (ref 12–15.9)
MCH RBC QN AUTO: 28.6 PG (ref 26.6–33)
MCHC RBC AUTO-ENTMCNC: 32.3 G/DL (ref 31.5–35.7)
MCV RBC AUTO: 88.6 FL (ref 79–97)
PLATELET # BLD AUTO: 608 10*3/MM3 (ref 140–450)
PMV BLD AUTO: 9.4 FL (ref 6–12)
POTASSIUM SERPL-SCNC: 3.3 MMOL/L (ref 3.5–5.2)
RBC # BLD AUTO: 3.15 10*6/MM3 (ref 3.77–5.28)
SODIUM SERPL-SCNC: 136 MMOL/L (ref 136–145)
WBC NRBC COR # BLD AUTO: 12.94 10*3/MM3 (ref 3.4–10.8)

## 2025-02-25 PROCEDURE — 85027 COMPLETE CBC AUTOMATED: CPT | Performed by: INTERNAL MEDICINE

## 2025-02-25 PROCEDURE — 25010000002 POTASSIUM CHLORIDE 10 MEQ/100ML SOLUTION: Performed by: INTERNAL MEDICINE

## 2025-02-25 PROCEDURE — 80048 BASIC METABOLIC PNL TOTAL CA: CPT | Performed by: INTERNAL MEDICINE

## 2025-02-25 PROCEDURE — 25010000002 PIPERACILLIN SOD-TAZOBACTAM PER 1 G: Performed by: STUDENT IN AN ORGANIZED HEALTH CARE EDUCATION/TRAINING PROGRAM

## 2025-02-25 PROCEDURE — 99233 SBSQ HOSP IP/OBS HIGH 50: CPT | Performed by: INTERNAL MEDICINE

## 2025-02-25 RX ORDER — POTASSIUM CHLORIDE 7.45 MG/ML
10 INJECTION INTRAVENOUS
Status: COMPLETED | OUTPATIENT
Start: 2025-02-25 | End: 2025-02-25

## 2025-02-25 RX ORDER — SODIUM CHLORIDE, SODIUM LACTATE, POTASSIUM CHLORIDE, CALCIUM CHLORIDE 600; 310; 30; 20 MG/100ML; MG/100ML; MG/100ML; MG/100ML
30 INJECTION, SOLUTION INTRAVENOUS CONTINUOUS PRN
OUTPATIENT
Start: 2025-02-25 | End: 2025-02-26

## 2025-02-25 RX ADMIN — SODIUM CHLORIDE 3.38 G: 9 INJECTION, SOLUTION INTRAVENOUS at 18:18

## 2025-02-25 RX ADMIN — DULOXETINE HYDROCHLORIDE 30 MG: 30 CAPSULE, DELAYED RELEASE ORAL at 08:45

## 2025-02-25 RX ADMIN — POTASSIUM CHLORIDE 10 MEQ: 7.46 INJECTION, SOLUTION INTRAVENOUS at 13:55

## 2025-02-25 RX ADMIN — PANTOPRAZOLE SODIUM 40 MG: 40 TABLET, DELAYED RELEASE ORAL at 08:45

## 2025-02-25 RX ADMIN — POTASSIUM CHLORIDE 10 MEQ: 7.46 INJECTION, SOLUTION INTRAVENOUS at 14:59

## 2025-02-25 RX ADMIN — OXYCODONE AND ACETAMINOPHEN 1 TABLET: 10; 325 TABLET ORAL at 20:05

## 2025-02-25 RX ADMIN — POTASSIUM CHLORIDE 10 MEQ: 7.46 INJECTION, SOLUTION INTRAVENOUS at 11:18

## 2025-02-25 RX ADMIN — OXYCODONE AND ACETAMINOPHEN 1 TABLET: 10; 325 TABLET ORAL at 01:01

## 2025-02-25 RX ADMIN — OXYCODONE AND ACETAMINOPHEN 1 TABLET: 10; 325 TABLET ORAL at 15:52

## 2025-02-25 RX ADMIN — SODIUM CHLORIDE 3.38 G: 9 INJECTION, SOLUTION INTRAVENOUS at 01:47

## 2025-02-25 RX ADMIN — SODIUM CHLORIDE 3.38 G: 9 INJECTION, SOLUTION INTRAVENOUS at 11:19

## 2025-02-25 RX ADMIN — LORAZEPAM 2 MG: 0.5 TABLET ORAL at 20:05

## 2025-02-25 RX ADMIN — BUSPIRONE HYDROCHLORIDE 15 MG: 15 TABLET ORAL at 20:05

## 2025-02-25 RX ADMIN — POTASSIUM CHLORIDE 10 MEQ: 7.46 INJECTION, SOLUTION INTRAVENOUS at 12:51

## 2025-02-25 RX ADMIN — BUSPIRONE HYDROCHLORIDE 15 MG: 15 TABLET ORAL at 08:45

## 2025-02-25 RX ADMIN — MONTELUKAST 10 MG: 10 TABLET, FILM COATED ORAL at 20:05

## 2025-02-25 RX ADMIN — BUSPIRONE HYDROCHLORIDE 15 MG: 15 TABLET ORAL at 15:53

## 2025-02-25 RX ADMIN — OXYCODONE AND ACETAMINOPHEN 1 TABLET: 10; 325 TABLET ORAL at 09:32

## 2025-02-25 RX ADMIN — CYCLOBENZAPRINE HYDROCHLORIDE 10 MG: 5 TABLET, FILM COATED ORAL at 08:45

## 2025-02-25 RX ADMIN — FAMOTIDINE 40 MG: 20 TABLET, FILM COATED ORAL at 08:45

## 2025-02-25 NOTE — PROGRESS NOTES
[facility]     Surgery Progress Note    Patient Name: Ebony Hamilton  :    1980  MRN:    [id number]  Date of admission:  2025  Length of Stay: 3 days    Subjective   44-year-old female with bile leak status post laparoscopic cholecystectomy     Patient underwent ERCP with sphincterotomy/stent placement yesterday as well as CT-guided drain placement.  Overall reports her pain is significantly improved.  Denies any nausea, vomiting, fevers, chills.  Tolerating liquids.  Passing flatus, no bowel movement.  Objective     Current Facility-Administered Medications:     albuterol sulfate HFA (PROVENTIL HFA;VENTOLIN HFA;PROAIR HFA) inhaler 2 puff, 2 puff, Inhalation, Q6H PRN, Ha Thompson MD    sennosides-docusate (PERICOLACE) 8.6-50 MG per tablet 2 tablet, 2 tablet, Oral, BID PRN **AND** polyethylene glycol (MIRALAX) packet 17 g, 17 g, Oral, Daily PRN **AND** bisacodyl (DULCOLAX) EC tablet 5 mg, 5 mg, Oral, Daily PRN **AND** bisacodyl (DULCOLAX) suppository 10 mg, 10 mg, Rectal, Daily PRN, Ha Thompson MD    bisacodyl (DULCOLAX) suppository 10 mg, 10 mg, Rectal, Daily PRN, Ha Thompson MD    busPIRone (BUSPAR) tablet 15 mg, 15 mg, Oral, TID, Ha Thompson MD, 15 mg at 25 0845    clonazePAM (KlonoPIN) tablet 0.5 mg, 0.5 mg, Oral, BID PRN, Ha Thompson MD    DULoxetine (CYMBALTA) DR capsule 30 mg, 30 mg, Oral, Daily, Ha Thompson MD, 30 mg at 25 0845    famotidine (PEPCID) tablet 40 mg, 40 mg, Oral, Daily, Ha Thompson MD, 40 mg at 25 0845    HYDROmorphone (DILAUDID) injection 1 mg, 1 mg, Intravenous, Q2H PRN, 1 mg at 25 **AND** naloxone (NARCAN) injection 0.4 mg, 0.4 mg, Intravenous, Q5 Min PRN, Ha Thompson MD    lactated ringers infusion, 30 mL/hr, Intravenous, Continuous, Yayo mAado CRNA, Last Rate: 30 mL/hr at 25, 30 mL/hr at 25    LORazepam (ATIVAN) tablet 2 mg, 2  mg, Oral, Nightly, Ha Thompson MD, 2 mg at 02/24/25 2011    montelukast (SINGULAIR) tablet 10 mg, 10 mg, Oral, Nightly, Ha Thompson MD, 10 mg at 02/24/25 2011    ondansetron ODT (ZOFRAN-ODT) disintegrating tablet 4 mg, 4 mg, Oral, Q6H PRN, Ha Thompson MD    ondansetron ODT (ZOFRAN-ODT) disintegrating tablet 4 mg, 4 mg, Translingual, Q8H PRN, Ha Thompson MD    oxyCODONE-acetaminophen (PERCOCET)  MG per tablet 1 tablet, 1 tablet, Oral, Q4H PRN, Ha Thompson MD, 1 tablet at 02/25/25 0932    pantoprazole (PROTONIX) EC tablet 40 mg, 40 mg, Oral, Daily, Ha Thompson MD, 40 mg at 02/25/25 0845    piperacillin-tazobactam (ZOSYN) IVPB 3.375 g IVPB in 100 mL NS (VTB), 3.375 g, Intravenous, Q8H, Bruno Simon MD, 3.375 g at 02/25/25 1119    polyethylene glycol (MIRALAX) packet 17 g, 17 g, Oral, Daily, Ha Thompson MD    potassium chloride 10 mEq in 100 mL IVPB, 10 mEq, Intravenous, Q1H, Darion Zamora MD, Last Rate: 100 mL/hr at 02/25/25 1118, 10 mEq at 02/25/25 1118    sodium chloride 0.9 % flush 10 mL, 10 mL, Intravenous, PRN, Ha Thompson MD    sodium chloride 0.9 % flush 10 mL, 10 mL, Intravenous, Q12H, Ha Thompson MD, 10 mL at 02/24/25 2010    sodium chloride 0.9 % flush 10 mL, 10 mL, Intravenous, PRN, Ha Thompson MD    sodium chloride 0.9 % infusion 40 mL, 40 mL, Intravenous, PRN, Ha Thompson, MD    Current Diet:    Dietary Orders (From admission, onward)       Start     Ordered    02/24/25 1818  Diet: Liquid; Clear Liquid; Fluid Consistency: Thin (IDDSI 0)  Diet Effective Now        References:    Diet Order Definitions   Question Answer Comment   Diets: Liquid    Liquid Diet: Clear Liquid    Fluid Consistency: Thin (IDDSI 0)        02/24/25 1817                    Vitals:   Temp:  [97.1 °F (36.2 °C)-99.9 °F (37.7 °C)] 98.8 °F (37.1 °C)  Heart Rate:  [] 105  Resp:  [16-34] 18  BP: ()/(52-99)  158/92  Flow (L/min) (Oxygen Therapy):  [8] 8  Physical Exam   General: no acute distress, resting in bed  Respiratory:  non-labored breathing  Cardiovascular:  RRR, non-tachycardic  Abdomen:  soft, non-distended, tenderness markedly improved; right sided CARON drain with small amount of bilious output in bulb    LABS:  CBC          2/23/2025    05:05 2/24/2025    04:40 2/25/2025    04:18   CBC   WBC 19.34  15.76  12.94    RBC 4.51  3.98  3.15    Hemoglobin 12.5  11.4  9.0    Hematocrit 40.5  35.6  27.9    MCV 89.8  89.4  88.6    MCH 27.7  28.6  28.6    MCHC 30.9  32.0  32.3    RDW 13.4  13.5  13.4    Platelets 628  642  608      BMP          2/23/2025    05:05 2/24/2025    04:40 2/25/2025    04:18   BMP   BUN 11  17  12    Creatinine 0.58  0.89  0.60    Sodium 136  132  136    Potassium 3.9  3.2  3.3    Chloride 98  92  100    CO2 23.9  26.6  25.0    Calcium 8.9  9.4  8.2      CMP          2/23/2025    05:05 2/24/2025    04:40 2/25/2025    04:18   CMP   Glucose 131  101  147    BUN 11  17  12    Creatinine 0.58  0.89  0.60    EGFR 116.0  83.1  115.1    Sodium 136  132  136    Potassium 3.9  3.2  3.3    Chloride 98  92  100    Calcium 8.9  9.4  8.2    Total Protein  7.0     Albumin  3.3     Total Bilirubin  1.3     Alkaline Phosphatase  146     AST (SGOT)  11     ALT (SGPT)  45     BUN/Creatinine Ratio 19.0  19.1  20.0    Anion Gap 14.1  13.4  11.0        Lab Results (last 24 hours)       Procedure Component Value Units Date/Time    Non-gynecologic Cytology [[id number]] Collected: 02/24/25 1051    Specimen: Aspirate from Peritoneum Updated: 02/25/25 0958    Body Fluid Culture - Drainage, Peritoneum [[id number]] Collected: 02/24/25 1051    Specimen: Drainage from Peritoneum Updated: 02/25/25 0808     Body Fluid Culture No growth     Gram Stain Rare (1+) WBCs seen      No organisms seen    Basic Metabolic Panel [[id number]]  (Abnormal) Collected: 02/25/25 0418    Specimen: Blood Updated: 02/25/25 0529     Glucose 147 mg/dL       BUN 12 mg/dL      Creatinine 0.60 mg/dL      Sodium 136 mmol/L      Potassium 3.3 mmol/L      Chloride 100 mmol/L      CO2 25.0 mmol/L      Calcium 8.2 mg/dL      BUN/Creatinine Ratio 20.0     Anion Gap 11.0 mmol/L      eGFR 115.1 mL/min/1.73     Narrative:      GFR Categories in Chronic Kidney Disease (CKD)      GFR Category          GFR (mL/min/1.73)    Interpretation  G1                     90 or greater         Normal or high (1)  G2                      60-89                Mild decrease (1)  G3a                   45-59                Mild to moderate decrease  G3b                   30-44                Moderate to severe decrease  G4                    15-29                Severe decrease  G5                    14 or less           Kidney failure          (1)In the absence of evidence of kidney disease, neither GFR category G1 or G2 fulfill the criteria for CKD.    eGFR calculation 2021 CKD-EPI creatinine equation, which does not include race as a factor    CBC (No Diff) [[id number]]  (Abnormal) Collected: 02/25/25 0418    Specimen: Blood Updated: 02/25/25 0516     WBC 12.94 10*3/mm3      RBC 3.15 10*6/mm3      Hemoglobin 9.0 g/dL      Hematocrit 27.9 %      MCV 88.6 fL      MCH 28.6 pg      MCHC 32.3 g/dL      RDW 13.4 %      RDW-SD 43.4 fl      MPV 9.4 fL      Platelets 608 10*3/mm3     Blood Culture - Blood, Arm, Right [[id number]]  (Normal) Collected: 02/22/25 1306    Specimen: Blood from Arm, Right Updated: 02/24/25 1330     Blood Culture No growth at 2 days    Blood Culture - Blood, Arm, Right [[id number]]  (Normal) Collected: 02/22/25 1301    Specimen: Blood from Arm, Right Updated: 02/24/25 1315     Blood Culture No growth at 2 days    Narrative:      Less than seven (7) mL's of blood was collected.  Insufficient quantity may yield false negative results.            IMAGING:  Imaging Results (Last 24 Hours)       Procedure Component Value Units Date/Time    FL Surgery Fluoro [[id number]] Resulted:  02/24/25 1653     Updated: 02/24/25 1653    Narrative:      This procedure was auto-finalized with no dictation required.    CT Guided Percutaneous Drain Peritoneal [[id number]] Collected: 02/24/25 1204     Updated: 02/24/25 1217    Narrative:      CT GUIDED PERCUTANEOUS DRAIN PERITONEAL    Date of Exam: 2/24/2025 10:00 AM EST    Indication: bile leak s/p cholecystectomy.    Findings:  The risks, benefits and alternatives of the procedure were explained to the patient. The Chief risks discussed were bleeding, infection and injury to the liver or other adjacent organs. The chief option discussed was doing nothing. The patient indicated   she understood what was discussed and elected to proceed. She provided written consent.    Please refer to the nursing notes for medications administered during the procedure.    Patient was placed on the CT scanner in a supine position. Pulmonary CT was performed which demonstrates a subcapsular fluid collection superficial to the right hepatic lobe. The overlying skin was prepped and draped in normal sterile fashion. 2%   lidocaine was injected along the anticipated tract of the needle. 5 Nepali 10 cm LetMeGo catheter was advanced into the subcapsular fluid collection under CT fluoroscopic guidance. 0.035 inch Bentson guidewire was advanced through the catheter which was   then removed. An 8 Nepali fascial dilator was passed over and then removed from the guidewire. An 8 Nepali pigtail drain was advanced over the guidewire. The guidewire was removed. The pigtail loop was formed and locked. Approximately 80 cc of bilious   appearing fluid was aspirated.    Patient tolerated the procedure well. She was observed for short time the radiology holding area and then transferred back to the floor for further observation and care.      Impression:      Impression:  Technically successful placement of an 8 Nepali drain into the hepatic subcapsular fluid collection which yielded 80 cc bilious  appearing fluid. A sample was sent for culture.      Electronically Signed: Jl Hendrix MD    2/24/2025 12:15 PM EST    Workstation ID: [id number]            [x]  Laboratory  []  Microbiology  []  Radiology  []  EKG/Telemetry   []  Cardiology/Vascular   []  Pathology  []  Old records    Assessment / Plan   Assessment:   Active Hospital Problems    Diagnosis     **Bile leak from gallbladder bed          Plan:    Bile leak  -Afebrile, vitals stable, leukocytosis decreased to 12,000  -Status post CT-guided drain placement and ERCP with sphincterotomy/stent (2/24)  -Okay to slowly advance diet as tolerated  -Appreciate GI assistance  -With adequate pain control, patient can be discharged later this week  -No plans for any acute surgical intervention, will continue to follow     Electronically signed by Josué Castano MD, 02/25/25, 11:28 AM EST.

## 2025-02-25 NOTE — TELEPHONE ENCOUNTER
Currently admitted to [facility].    ERCP performed while inpatient by Dr. Thompson on 02.24.25.    Per Dr. Thompson:  Repeat EGD with pancreatic stent removal needed in 1 week.  Repeat ERCP with common bile duct stent removal needed in 6 weeks.    Reviewed with Dr. Thompson - EGD for pancreatic sent removal can be done in 10 days.    EGD with pancreatic stent removal scheduled with Dr. Thompson on Thursday, 03.06.25 with arrival time of 0630.    ERCP with CBD stent removal scheduled with Dr. Thompson on Tuesday, 04.08.25 with arrival time of 0630.

## 2025-02-25 NOTE — TELEPHONE ENCOUNTER
----- Message from Margot Pérez sent at 2/24/2025  4:48 PM EST -----  Inpatient     I have reviewed upper endoscopy. Negative results for H. Pylori, metaplasia, dysplasia and malignancy.      [History reviewed] : History reviewed. [Medications and Allergies reviewed] : Medications and allergies reviewed.

## 2025-02-25 NOTE — PLAN OF CARE
Goal Outcome Evaluation:  Plan of Care Reviewed With: patient        Progress: improving  Outcome Evaluation: Pain has been more controlled this shift.  Pain meds given per patient request, see MAR.  IV antibiotics given, see MAR.  CARON drain has had minimal output.  Cardiac monitoring continued.  Family has remained at bedside.  Call light is in reach and patient is able to make needs known.

## 2025-02-25 NOTE — TELEPHONE ENCOUNTER
EGD performed by Dr. Reyes on 02.17.25.    ERCP performed by Dr. Thompson on 02.24.25.  (See additional TE dated 02.25.25)    Currently admitted to [facility], will defer and follow.

## 2025-02-25 NOTE — PROGRESS NOTES
[facility]   Hospitalist Progress Note  Date: 2025  Patient Name: Ebony Hamilton  : 1980  MRN: [id number]  Date of admission: 2025  Room/Bed: [facility]      Subjective   Subjective     Chief Complaint: Nausea, vomiting    Summary:   Patient is a 44-year-old female recently treated at this facility for abdominal pain, underwent cholecystectomy and is now presenting back with abdominal pain.  See prior discharge summary for details of prior hospital stay.     She has a past medical history of atrial fibrillation off of anticoagulation, gastroparesis, anxiety, depression, chronic pain, gastroesophageal reflux, panic disorder.  On her discharge a few days ago, her pain was improving.  She and her  state that the pain continued to improve after discharge, she was able to start eating regular food.  After eating a doughnut, her pain returned and she presented back to the emergency department.      In the emergency department: Blood pressure 175/110, respiratory rate 32, heart rate 130.  Laboratory evaluation notable for white blood cell count 19.28, platelets 793.  Chemistry notable for potassium 3.4, ALT 77, AST 34, alk phos 139, lactic acid 2.9.  CT abdomen was performed notable for ill-defined fluid collection in the gallbladder bed 7 cm x 4 cm with small amounts tracking caudally along the medial margin alert lower lobe, additional peritoneal small fluid collections in the abdomen, peritoneal fluid collection in the pelvis 8 x 5 cm.  Findings concerning for bile leak.  Vital signs and lactic acid both normalized with IV fluid resuscitation and pain control.  ER team discussed with surgeon who recommended admission for IV antibiotics, with plan for possible ERCP in a few days.  Hospitalist service requested to admit the patient for further evaluation and management.    Interval events:  No acute events overnight.  Patient underwent ERCP yesterday with sphincterotomy and biliary stent  placement.  She also underwent CT-guided drain placement.  She states she feels significantly better today than she did yesterday.  Pain is better, tolerating liquids.  Is passing gas but has not had a bowel movement yet.    Objective   Objective     Vitals:   Temp:  [97.1 °F (36.2 °C)-99.9 °F (37.7 °C)] 98.8 °F (37.1 °C)  Heart Rate:  [] 105  Resp:  [16-20] 18  BP: ()/(52-92) 158/92  Flow (L/min) (Oxygen Therapy):  [8] 8    Physical Exam   General: Awake, alert, appears more comfortable  HENT: Atraumatic, normocephalic  Cardiovascular: RRR, no MRG  Pulmonary: CTAB; no w/r/r  Gastrointestinal: CARON drain in place with serosanguineous drainage noted, abdomen tender to palpation in all quadrants but overall improved    Result Review    I have personally reviewed these results:  [x]  Laboratory personally reviewed BMP, CBC      Lab 02/25/25 0418 02/24/25 0440 02/23/25  0505 02/22/25  1629 02/22/25  1301   WBC 12.94* 15.76* 19.34*  --  19.28*   HEMOGLOBIN 9.0* 11.4* 12.5  --  13.5   HEMATOCRIT 27.9* 35.6 40.5  --  40.9   PLATELETS 608* 642* 628*  --  793*   NEUTROS ABS  --  13.45* 17.06*  --  17.25*   IMMATURE GRANS (ABS)  --  0.19* 0.15*  --  0.17*   LYMPHS ABS  --  1.06 0.98  --  1.17   MONOS ABS  --  0.92* 1.06*  --  0.63   EOS ABS  --  0.08 0.01  --  0.01   MCV 88.6 89.4 89.8  --  84.0   LACTATE  --   --   --  1.3 2.9*   PROTIME  --   --  15.1*  --   --    APTT  --   --  28.2  --   --          Lab 02/25/25 0418 02/24/25  0440 02/23/25  0505   SODIUM 136 132* 136   POTASSIUM 3.3* 3.2* 3.9   CHLORIDE 100 92* 98   CO2 25.0 26.6 23.9   ANION GAP 11.0 13.4 14.1   BUN 12 17 11   CREATININE 0.60 0.89 0.58   EGFR 115.1 83.1 116.0   GLUCOSE 147* 101* 131*   CALCIUM 8.2* 9.4 8.9         Lab 02/24/25  0440 02/22/25  1301 02/19/25  0607   TOTAL PROTEIN 7.0 8.4 6.1   ALBUMIN 3.3* 3.9 3.3*   GLOBULIN  --  4.5 2.8   ALT (SGPT) 45* 77* 23   AST (SGOT) 11 34* 17   BILIRUBIN 1.3* 0.8 0.4   INDIRECT BILIRUBIN 0.5  --   --     BILIRUBIN DIRECT 0.8*  --   --    ALK PHOS 146* 139* 59   LIPASE  --  20  --          Lab 02/23/25  0505   PROTIME 15.1*   INR 1.14                 Brief Urine Lab Results  (Last result in the past 365 days)        Color   Clarity   Blood   Leuk Est   Nitrite   Protein   CREAT   Urine HCG        02/17/25 0037 Yellow   Clear   Negative   Trace   Negative   Negative                 [x]  Microbiology   [x]  Radiology  []  EKG/Telemetry   []  Cardiology/Vascular   []  Pathology  []  Old records  []  Other:    Assessment & Plan   Assessment / Plan   Bile leak status post ERCP, sphincterotomy, biliary stent placement  Intra-abdominal abscess due to unknown bacterial source  Chronic pain disorder  Anxiety  Depression  Endometriosis  Gastroparesis    Continue to monitor in the hospital for workup and management of the above  Discussed with gastroenterology patient underwent ERCP on 2/24 with sphincterotomy and biliary stent placement  Will need GI follow-up outpatient basis for stent removal  Discussed with general surgery, patient is status post IR guided drain placement, cultures currently no growth to date but pending  Continue with IV Zosyn for now  Continue with oral Percocet, IV Dilaudid as needed for pain.  Monitor vital signs closely while on IV narcotics  IV antiemetics as needed  Advance to clear liquid diet  Restart appropriate home medications  Lovenox for DVT prophylaxis  Trend renal function and electrolytes with a.m. BMP, magnesium   Trend Hgb and WBC with a.m. CBC    Discussed case with: Bedside RN, general surgery, gastroenterology    VTE Prophylaxis:  No VTE prophylaxis order currently exists.        CODE STATUS:   Level Of Support Discussed With: Patient  Code Status (Patient has no pulse and is not breathing): CPR (Attempt to Resuscitate)  Medical Interventions (Patient has pulse or is breathing): Full Support

## 2025-02-26 LAB
ANION GAP SERPL CALCULATED.3IONS-SCNC: 10.6 MMOL/L (ref 5–15)
BASOPHILS # BLD AUTO: 0.03 10*3/MM3 (ref 0–0.2)
BASOPHILS NFR BLD AUTO: 0.3 % (ref 0–1.5)
BUN SERPL-MCNC: 10 MG/DL (ref 6–20)
BUN/CREAT SERPL: 19.2 (ref 7–25)
CALCIUM SPEC-SCNC: 8 MG/DL (ref 8.6–10.5)
CHLORIDE SERPL-SCNC: 103 MMOL/L (ref 98–107)
CO2 SERPL-SCNC: 25.4 MMOL/L (ref 22–29)
CREAT SERPL-MCNC: 0.52 MG/DL (ref 0.57–1)
CYTO UR: NORMAL
DEPRECATED RDW RBC AUTO: 43.8 FL (ref 37–54)
EGFRCR SERPLBLD CKD-EPI 2021: 119.1 ML/MIN/1.73
EOSINOPHIL # BLD AUTO: 0.08 10*3/MM3 (ref 0–0.4)
EOSINOPHIL NFR BLD AUTO: 0.9 % (ref 0.3–6.2)
ERYTHROCYTE [DISTWIDTH] IN BLOOD BY AUTOMATED COUNT: 13.7 % (ref 12.3–15.4)
GLUCOSE SERPL-MCNC: 101 MG/DL (ref 65–99)
HCT VFR BLD AUTO: 28.4 % (ref 34–46.6)
HGB BLD-MCNC: 8.9 G/DL (ref 12–15.9)
IMM GRANULOCYTES # BLD AUTO: 0.23 10*3/MM3 (ref 0–0.05)
IMM GRANULOCYTES NFR BLD AUTO: 2.5 % (ref 0–0.5)
LAB AP CASE REPORT: NORMAL
LAB AP CLINICAL INFORMATION: NORMAL
LYMPHOCYTES # BLD AUTO: 1.64 10*3/MM3 (ref 0.7–3.1)
LYMPHOCYTES NFR BLD AUTO: 17.7 % (ref 19.6–45.3)
MAGNESIUM SERPL-MCNC: 1.8 MG/DL (ref 1.6–2.6)
MCH RBC QN AUTO: 27.7 PG (ref 26.6–33)
MCHC RBC AUTO-ENTMCNC: 31.3 G/DL (ref 31.5–35.7)
MCV RBC AUTO: 88.5 FL (ref 79–97)
MONOCYTES # BLD AUTO: 0.44 10*3/MM3 (ref 0.1–0.9)
MONOCYTES NFR BLD AUTO: 4.8 % (ref 5–12)
NEUTROPHILS NFR BLD AUTO: 6.83 10*3/MM3 (ref 1.7–7)
NEUTROPHILS NFR BLD AUTO: 73.8 % (ref 42.7–76)
NRBC BLD AUTO-RTO: 0 /100 WBC (ref 0–0.2)
PATH REPORT.FINAL DX SPEC: NORMAL
PATH REPORT.GROSS SPEC: NORMAL
PHOSPHATE SERPL-MCNC: 2.3 MG/DL (ref 2.5–4.5)
PLATELET # BLD AUTO: 620 10*3/MM3 (ref 140–450)
PMV BLD AUTO: 9.2 FL (ref 6–12)
POTASSIUM SERPL-SCNC: 2.8 MMOL/L (ref 3.5–5.2)
RBC # BLD AUTO: 3.21 10*6/MM3 (ref 3.77–5.28)
SODIUM SERPL-SCNC: 139 MMOL/L (ref 136–145)
WBC NRBC COR # BLD AUTO: 9.25 10*3/MM3 (ref 3.4–10.8)

## 2025-02-26 PROCEDURE — 85025 COMPLETE CBC W/AUTO DIFF WBC: CPT | Performed by: INTERNAL MEDICINE

## 2025-02-26 PROCEDURE — 97161 PT EVAL LOW COMPLEX 20 MIN: CPT

## 2025-02-26 PROCEDURE — 99232 SBSQ HOSP IP/OBS MODERATE 35: CPT | Performed by: INTERNAL MEDICINE

## 2025-02-26 PROCEDURE — 84100 ASSAY OF PHOSPHORUS: CPT | Performed by: INTERNAL MEDICINE

## 2025-02-26 PROCEDURE — 25010000002 PIPERACILLIN SOD-TAZOBACTAM PER 1 G: Performed by: STUDENT IN AN ORGANIZED HEALTH CARE EDUCATION/TRAINING PROGRAM

## 2025-02-26 PROCEDURE — 83735 ASSAY OF MAGNESIUM: CPT | Performed by: INTERNAL MEDICINE

## 2025-02-26 PROCEDURE — 25010000002 POTASSIUM CHLORIDE 10 MEQ/100ML SOLUTION: Performed by: INTERNAL MEDICINE

## 2025-02-26 PROCEDURE — 80048 BASIC METABOLIC PNL TOTAL CA: CPT | Performed by: INTERNAL MEDICINE

## 2025-02-26 PROCEDURE — 25010000002 HYDROMORPHONE 1 MG/ML SOLUTION: Performed by: INTERNAL MEDICINE

## 2025-02-26 PROCEDURE — 63710000001 ONDANSETRON ODT 4 MG TABLET DISPERSIBLE: Performed by: INTERNAL MEDICINE

## 2025-02-26 PROCEDURE — 25810000003 SODIUM CHLORIDE 0.9 % SOLUTION: Performed by: INTERNAL MEDICINE

## 2025-02-26 RX ORDER — SIMETHICONE 80 MG
80 TABLET,CHEWABLE ORAL ONCE
Status: COMPLETED | OUTPATIENT
Start: 2025-02-26 | End: 2025-02-26

## 2025-02-26 RX ORDER — OXYCODONE AND ACETAMINOPHEN 10; 325 MG/1; MG/1
1 TABLET ORAL EVERY 4 HOURS PRN
Status: DISCONTINUED | OUTPATIENT
Start: 2025-02-26 | End: 2025-02-28 | Stop reason: HOSPADM

## 2025-02-26 RX ORDER — FENTANYL/ROPIVACAINE/NS/PF 2-625MCG/1
15 PLASTIC BAG, INJECTION (ML) EPIDURAL ONCE
Status: COMPLETED | OUTPATIENT
Start: 2025-02-26 | End: 2025-02-26

## 2025-02-26 RX ORDER — MIDAZOLAM HYDROCHLORIDE 2 MG/2ML
INJECTION, SOLUTION INTRAMUSCULAR; INTRAVENOUS AS NEEDED
Status: COMPLETED | OUTPATIENT
Start: 2025-02-24 | End: 2025-02-24

## 2025-02-26 RX ORDER — POTASSIUM CHLORIDE 7.45 MG/ML
10 INJECTION INTRAVENOUS
Status: COMPLETED | OUTPATIENT
Start: 2025-02-26 | End: 2025-02-26

## 2025-02-26 RX ORDER — CLONAZEPAM 0.5 MG/1
0.5 TABLET ORAL 2 TIMES DAILY PRN
Status: DISCONTINUED | OUTPATIENT
Start: 2025-02-26 | End: 2025-02-28 | Stop reason: HOSPADM

## 2025-02-26 RX ORDER — NALOXONE HCL 0.4 MG/ML
0.4 VIAL (ML) INJECTION
Status: DISCONTINUED | OUTPATIENT
Start: 2025-02-26 | End: 2025-02-28 | Stop reason: HOSPADM

## 2025-02-26 RX ADMIN — ONDANSETRON 4 MG: 4 TABLET, ORALLY DISINTEGRATING ORAL at 10:57

## 2025-02-26 RX ADMIN — OXYCODONE AND ACETAMINOPHEN 1 TABLET: 10; 325 TABLET ORAL at 22:06

## 2025-02-26 RX ADMIN — OXYCODONE AND ACETAMINOPHEN 1 TABLET: 10; 325 TABLET ORAL at 17:36

## 2025-02-26 RX ADMIN — POTASSIUM CHLORIDE 10 MEQ: 7.46 INJECTION, SOLUTION INTRAVENOUS at 12:38

## 2025-02-26 RX ADMIN — OXYCODONE AND ACETAMINOPHEN 1 TABLET: 10; 325 TABLET ORAL at 07:32

## 2025-02-26 RX ADMIN — HYDROMORPHONE HYDROCHLORIDE 1 MG: 1 INJECTION, SOLUTION INTRAMUSCULAR; INTRAVENOUS; SUBCUTANEOUS at 22:55

## 2025-02-26 RX ADMIN — BUSPIRONE HYDROCHLORIDE 15 MG: 15 TABLET ORAL at 08:28

## 2025-02-26 RX ADMIN — Medication 10 ML: at 20:03

## 2025-02-26 RX ADMIN — SIMETHICONE 80 MG: 80 TABLET, CHEWABLE ORAL at 01:41

## 2025-02-26 RX ADMIN — Medication 10 ML: at 08:29

## 2025-02-26 RX ADMIN — OXYCODONE AND ACETAMINOPHEN 1 TABLET: 10; 325 TABLET ORAL at 01:41

## 2025-02-26 RX ADMIN — POTASSIUM CHLORIDE 10 MEQ: 7.46 INJECTION, SOLUTION INTRAVENOUS at 15:29

## 2025-02-26 RX ADMIN — MONTELUKAST 10 MG: 10 TABLET, FILM COATED ORAL at 20:02

## 2025-02-26 RX ADMIN — POTASSIUM CHLORIDE 10 MEQ: 7.46 INJECTION, SOLUTION INTRAVENOUS at 14:31

## 2025-02-26 RX ADMIN — PANTOPRAZOLE SODIUM 40 MG: 40 TABLET, DELAYED RELEASE ORAL at 08:28

## 2025-02-26 RX ADMIN — POTASSIUM CHLORIDE 10 MEQ: 7.46 INJECTION, SOLUTION INTRAVENOUS at 13:33

## 2025-02-26 RX ADMIN — LORAZEPAM 2 MG: 0.5 TABLET ORAL at 20:02

## 2025-02-26 RX ADMIN — SODIUM CHLORIDE 3.38 G: 9 INJECTION, SOLUTION INTRAVENOUS at 10:46

## 2025-02-26 RX ADMIN — SODIUM CHLORIDE 3.38 G: 9 INJECTION, SOLUTION INTRAVENOUS at 01:41

## 2025-02-26 RX ADMIN — POLYETHYLENE GLYCOL 3350 17 G: 17 POWDER, FOR SOLUTION ORAL at 08:29

## 2025-02-26 RX ADMIN — HYDROMORPHONE HYDROCHLORIDE 1 MG: 1 INJECTION, SOLUTION INTRAMUSCULAR; INTRAVENOUS; SUBCUTANEOUS at 15:34

## 2025-02-26 RX ADMIN — DULOXETINE HYDROCHLORIDE 30 MG: 30 CAPSULE, DELAYED RELEASE ORAL at 08:28

## 2025-02-26 RX ADMIN — OXYCODONE AND ACETAMINOPHEN 1 TABLET: 10; 325 TABLET ORAL at 12:40

## 2025-02-26 RX ADMIN — SODIUM CHLORIDE 15 MMOL: 9 INJECTION, SOLUTION INTRAVENOUS at 10:19

## 2025-02-26 RX ADMIN — BUSPIRONE HYDROCHLORIDE 15 MG: 15 TABLET ORAL at 20:02

## 2025-02-26 RX ADMIN — SODIUM CHLORIDE 3.38 G: 9 INJECTION, SOLUTION INTRAVENOUS at 17:37

## 2025-02-26 RX ADMIN — BUSPIRONE HYDROCHLORIDE 15 MG: 15 TABLET ORAL at 15:30

## 2025-02-26 RX ADMIN — FAMOTIDINE 40 MG: 20 TABLET, FILM COATED ORAL at 08:29

## 2025-02-26 NOTE — PROGRESS NOTES
[facility]   Hospitalist Progress Note  Date: 2025  Patient Name: Ebony Hamilton  : 1980  MRN: [id number]  Date of admission: 2025  Room/Bed: [facility]      Subjective   Subjective     Chief Complaint: Nausea, vomiting    Summary:   Patient is a 44-year-old female recently treated at this facility for abdominal pain, underwent cholecystectomy and is now presenting back with abdominal pain.  See prior discharge summary for details of prior hospital stay.     She has a past medical history of atrial fibrillation off of anticoagulation, gastroparesis, anxiety, depression, chronic pain, gastroesophageal reflux, panic disorder.  On her discharge a few days ago, her pain was improving.  She and her  state that the pain continued to improve after discharge, she was able to start eating regular food.  After eating a doughnut, her pain returned and she presented back to the emergency department.      In the emergency department: Blood pressure 175/110, respiratory rate 32, heart rate 130.  Laboratory evaluation notable for white blood cell count 19.28, platelets 793. CT abdomen was performed notable for ill-defined fluid collection in the gallbladder bed 7 cm x 4 cm with small amounts tracking caudally along the medial margin alert lower lobe, additional peritoneal small fluid collections in the abdomen, peritoneal fluid collection in the pelvis 8 x 5 cm.  Findings concerning for bile leak.  Vital signs and lactic acid both normalized with IV fluid resuscitation and pain control.  She was admitted for further care, general surgery and gastroenterology were consulted.  She underwent ERCP with sphincterotomy/stent placement.  IR was consulted for CT-guided drain placement and fluid collection.  She was initiated on broad-spectrum IV antibiotics, body fluid cultures returned negative.    Interval events:  No acute events overnight.  States his stomach has been a little more upset since yesterday.   Has not vomited but has had some nausea.  Passing flatus, has not yet had a bowel movement.    Objective   Objective     Vitals:   Temp:  [97.9 °F (36.6 °C)-98.2 °F (36.8 °C)] 98.1 °F (36.7 °C)  Heart Rate:  [] 77  Resp:  [18] 18  BP: (102-139)/(67-88) 128/82    Physical Exam   General: Awake, alert, appears slightly uncomfortable  HENT: NCAT  Cardiovascular: RRR, no MRG  Pulmonary: CTAB; no w/r/r  Gastrointestinal: CARON drain in place with serosanguineous drainage noted, abdomen tender to palpation     Result Review    I have personally reviewed these results:  [x]  Laboratory personally reviewed BMP, CBC, magnesium, phosphorus      Lab 02/26/25  0542 02/25/25 0418 02/24/25  0440 02/23/25  0505 02/22/25  1629 02/22/25  1301   WBC 9.25 12.94* 15.76* 19.34*  --  19.28*   HEMOGLOBIN 8.9* 9.0* 11.4* 12.5  --  13.5   HEMATOCRIT 28.4* 27.9* 35.6 40.5  --  40.9   PLATELETS 620* 608* 642* 628*  --  793*   NEUTROS ABS 6.83  --  13.45* 17.06*  --  17.25*   IMMATURE GRANS (ABS) 0.23*  --  0.19* 0.15*  --  0.17*   LYMPHS ABS 1.64  --  1.06 0.98  --  1.17   MONOS ABS 0.44  --  0.92* 1.06*  --  0.63   EOS ABS 0.08  --  0.08 0.01  --  0.01   MCV 88.5 88.6 89.4 89.8  --  84.0   LACTATE  --   --   --   --  1.3 2.9*   PROTIME  --   --   --  15.1*  --   --    APTT  --   --   --  28.2  --   --          Lab 02/26/25  0542 02/25/25  0418 02/24/25  0440   SODIUM 139 136 132*   POTASSIUM 2.8* 3.3* 3.2*   CHLORIDE 103 100 92*   CO2 25.4 25.0 26.6   ANION GAP 10.6 11.0 13.4   BUN 10 12 17   CREATININE 0.52* 0.60 0.89   EGFR 119.1 115.1 83.1   GLUCOSE 101* 147* 101*   CALCIUM 8.0* 8.2* 9.4   MAGNESIUM 1.8  --   --    PHOSPHORUS 2.3*  --   --          Lab 02/24/25  0440 02/22/25  1301   TOTAL PROTEIN 7.0 8.4   ALBUMIN 3.3* 3.9   GLOBULIN  --  4.5   ALT (SGPT) 45* 77*   AST (SGOT) 11 34*   BILIRUBIN 1.3* 0.8   INDIRECT BILIRUBIN 0.5  --    BILIRUBIN DIRECT 0.8*  --    ALK PHOS 146* 139*   LIPASE  --  20         Lab 02/23/25  0514    PROTIME 15.1*   INR 1.14                 Brief Urine Lab Results  (Last result in the past 365 days)        Color   Clarity   Blood   Leuk Est   Nitrite   Protein   CREAT   Urine HCG        02/17/25 0037 Yellow   Clear   Negative   Trace   Negative   Negative                 [x]  Microbiology   [x]  Radiology  []  EKG/Telemetry   []  Cardiology/Vascular   []  Pathology  []  Old records  []  Other:    Assessment & Plan   Assessment / Plan   Bile leak status post ERCP, sphincterotomy, biliary stent placement  Intra-abdominal abscess due to unknown bacterial source  Chronic pain disorder  Anxiety  Hypokalemia  Hypophosphatemia  Depression  Endometriosis  Gastroparesis    Continue to monitor in the hospital for workup and management of the above  Discussed with general surgery, patient is status post IR guided drain placement, cultures currently no growth to date   Continue with IV Zosyn for now  Continue clear liquid diet for now, advance as tolerated  Replace potassium orally  Continue with oral Percocet, IV Dilaudid as needed for pain  IV antiemetics as needed  Continue appropriate home medications  Lovenox for DVT prophylaxis  Trend renal function and electrolytes with a.m. BMP, magnesium   Trend Hgb and WBC with a.m. CBC    Discussed case with: Bedside RN, general surgery    VTE Prophylaxis:  Mechanical VTE prophylaxis orders are present.        CODE STATUS:   Level Of Support Discussed With: Patient  Code Status (Patient has no pulse and is not breathing): CPR (Attempt to Resuscitate)  Medical Interventions (Patient has pulse or is breathing): Full Support

## 2025-02-26 NOTE — THERAPY EVALUATION
Acute Care - Physical Therapy Initial Evaluation  RENETTA Carcamo     Patient Name: Ebony Hamilton  : 1980  MRN: [id number]  Today's Date: 2025      Visit Dx:     ICD-10-CM ICD-9-CM   1. Bile duct leak  K83.8 576.8   2. Bile leak from gallbladder bed  K83.8 576.8   3. Difficulty walking  R26.2 719.7     Patient Active Problem List   Diagnosis    Acute postoperative pain    Allergic rhinitis    Bulge of cervical disc without myelopathy    Cervical fusion syndrome    Degeneration of intervertebral disc of cervical region    Spinal stenosis in cervical region    Impingement syndrome of shoulder region    Intractable chronic migraine without aura    Mitral valve disorder    Tricuspid valve disorder    Endometriosis    Obesity    SLAP lesion of left shoulder    NICHOLE (generalized anxiety disorder)    Blood in stool    BRBPR (bright red blood per rectum)    Constipation    Diarrhea    Lower abdominal pain    Myalgia    Cervical post-laminectomy syndrome    Cervical radiculopathy    Adenomyosis of uterus    Hemorrhoids    Biliary colic    Intractable abdominal pain    Calculus of gallbladder without cholecystitis without obstruction    Nausea and vomiting    Gastroparesis    S/P laparoscopic cholecystectomy    Bile leak from gallbladder bed    Encounter for removal of biliary stent     Past Medical History:   Diagnosis Date    Allergic     Anxiety     Atrial fibrillation     RELEASED BY CARDIOLOGIST/ELECTROPHYSIST, NO CURRENT MEDS    Chronic pain disorder     Depression     Endometriosis     Headache     Hemorrhoids     Injury of shoulder, right 2009    CHRONIC PAIN    Panic disorder     S/P laparoscopic cholecystectomy 2025     Past Surgical History:   Procedure Laterality Date    CERVICAL ARTHRODESIS  2015    Donaldo Albarran    CERVICAL FUSION      C4-7 FUSION, FULL ROM    CHOLECYSTECTOMY N/A 2025    Procedure: CHOLECYSTECTOMY LAPAROSCOPIC plain language: removal of gallbladder thru small  incisions using long instruments and a camera;  Surgeon: Josué Castano MD;  Location: [facility] MAIN OR;  Service: General;  Laterality: N/A;    COLONOSCOPY N/A 05/31/2022    Procedure: COLONOSCOPY;  Surgeon: Shabbir Baird MD;  Location: [facility] ENDOSCOPY;  Service: General;  Laterality: N/A;  HEMORRHOIDS    ENDOSCOPY N/A 2/17/2025    Procedure: ESOPHAGOGASTRODUODENOSCOPY WITH BIOPSIES;  Surgeon: Sanya Reyes MD;  Location: [facility] ENDOSCOPY;  Service: Gastroenterology;  Laterality: N/A;  GASTRITIS, SMALL HIATAL HERNIA    ERCP N/A 2/24/2025    Procedure: ENDOSCOPIC RETROGRADE CHOLANGIOPANCREATOGRAPHY with bile duct stent placement and pancreatic duct stent placement;  Surgeon: Ha Thompson MD;  Location: [facility] ENDOSCOPY;  Service: Gastroenterology;  Laterality: N/A;  bile duct leeak    EXPLORATORY LAPAROTOMY      HEMORRHOIDECTOMY N/A 05/31/2022    Procedure: HEMORRHOID BANDING;  Surgeon: Shabbir Baird MD;  Location: [facility] ENDOSCOPY;  Service: General;  Laterality: N/A;  BANDS X 2    HEMORRHOIDECTOMY N/A 1/31/2024    Procedure: HEMORRHOIDECTOMY;  Surgeon: Shabbir Baird MD;  Location: [facility] OR OSC;  Service: General;  Laterality: N/A;    HYSTERECTOMY      SHOULDER ARTHROSCOPY Right     SHOULDER SURGERY Left     ARTHROSCOPY LABRAL TEAR X2    TUBAL ABDOMINAL LIGATION       PT Assessment (Last 12 Hours)       PT Evaluation and Treatment       Row Name 02/26/25 1400          Physical Therapy Time and Intention    Document Type evaluation  -AV     Mode of Treatment individual therapy;physical therapy  -AV       Row Name 02/26/25 1400          General Information    Patient Profile Reviewed yes  -AV     Patient Observations alert;cooperative;agree to therapy  -AV     Prior Level of Function independent:;all household mobility;gait;transfer;ADL's  Ambulated without an assistive device. No home O2.  -AV     Equipment Currently Used at Home none  -AV     Existing  Precautions/Restrictions other (see comments)  CARON drain x1  -AV       Row Name 02/26/25 1400          Living Environment    Current Living Arrangements home  -AV     Home Accessibility stairs to enter home  -AV     People in Home spouse;child(arlene), adult  -AV       Row Name 02/26/25 1400          Home Main Entrance    Number of Stairs, Main Entrance three  -AV     Stair Railings, Main Entrance railings on both sides of stairs  -AV       Row Name 02/26/25 1400          Cognition    Orientation Status (Cognition) oriented x 3  -AV       Row Name 02/26/25 1400          Range of Motion (ROM)    Range of Motion bilateral lower extremities;ROM is WFL  -AV       Row Name 02/26/25 1400          Strength (Manual Muscle Testing)    Strength (Manual Muscle Testing) bilateral lower extremities;strength is WFL  -AV       Row Name 02/26/25 1400          Bed Mobility    Bed Mobility bed mobility (all) activities  -AV     All Activities, Deuel (Bed Mobility) modified independence  -AV       Row Name 02/26/25 1400          Transfers    Transfers sit-stand transfer;stand-sit transfer  -AV       Row Name 02/26/25 1400          Sit-Stand Transfer    Sit-Stand Deuel (Transfers) independent  -AV     Assistive Device (Sit-Stand Transfers) --  No AD  -AV       Row Name 02/26/25 1400          Stand-Sit Transfer    Stand-Sit Deuel (Transfers) independent  -AV     Assistive Device (Stand-Sit Transfers) --  No AD  -AV       Row Name 02/26/25 1400          Gait/Stairs (Locomotion)    Gait/Stairs Locomotion gait/ambulation independence;gait/ambulation assistive device;distance ambulated  -AV     Deuel Level (Gait) independent  -AV     Assistive Device (Gait) --  No AD  -AV     Distance in Feet (Gait) 300  -AV     Pattern (Gait) step-through  -AV       Row Name 02/26/25 1400          Balance    Balance Assessment standing dynamic balance  -AV     Dynamic Standing Balance independent  -AV     Position/Device Used,  Standing Balance unsupported  -AV       Row Name 02/26/25 1400          Plan of Care Review    Plan of Care Reviewed With patient  -AV     Progress no change  -AV     Outcome Evaluation Patient does not present with any significant decline in functional mobility. She is currently completing all transfers and ambulating independently and is safe to continue doing so until her discharge from the hospital. She is safe to return home once medically able. She is in agreement and will be discharged from PT caseload.  -AV       Row Name 02/26/25 1400          Therapy Assessment/Plan (PT)    Criteria for Skilled Interventions Met (PT) no problems identified which require skilled intervention  -AV     Therapy Frequency (PT) evaluation only  -AV       Row Name 02/26/25 1400          PT Evaluation Complexity    History, PT Evaluation Complexity no personal factors and/or comorbidities  -AV     Examination of Body Systems (PT Eval Complexity) total of 4 or more elements  -AV     Clinical Presentation (PT Evaluation Complexity) stable  -AV     Clinical Decision Making (PT Evaluation Complexity) low complexity  -AV     Overall Complexity (PT Evaluation Complexity) low complexity  -AV       Row Name 02/26/25 1400          Therapy Plan Review/Discharge Plan (PT)    Therapy Plan Review (PT) evaluation/treatment results reviewed;patient  -AV       Row Name 02/26/25 1400          Physical Therapy Goals    Problem Specific Goal Selection (PT) problem specific goal 1, PT  -AV       Row Name 02/26/25 1400          Problem Specific Goal 1 (PT)    Problem Specific Goal 1 (PT) Complete PT evaluation  -AV     Time Frame (Problem Specific Goal 1, PT) 1 day  -AV     Progress/Outcome (Problem Specific Goal 1, PT) goal met  -AV               User Key  (r) = Recorded By, (t) = Taken By, (c) = Cosigned By      Initials Name Provider Type    Thierry Mercado, PT Physical Therapist                    Physical Therapy Education       Title: PT OT  SLP Therapies (In Progress)       Topic: Physical Therapy (In Progress)       Point: Mobility training (Done)       Learning Progress Summary            Patient Acceptance, E,TB, VU by AV at 2/26/2025 1414                      Point: Home exercise program (Not Started)       Learner Progress:  Not documented in this visit.              Point: Body mechanics (Done)       Learning Progress Summary            Patient Acceptance, E,TB, VU by AV at 2/26/2025 1414                      Point: Precautions (Done)       Learning Progress Summary            Patient Acceptance, E,TB, VU by AV at 2/26/2025 1414                                      User Key       Initials Effective Dates Name Provider Type Discipline    AV 06/11/21 -  Thierry Lam, PT Physical Therapist PT                  PT Recommendation and Plan  Anticipated Discharge Disposition (PT): home  Therapy Frequency (PT): evaluation only  Plan of Care Reviewed With: patient  Progress: no change  Outcome Evaluation: Patient does not present with any significant decline in functional mobility. She is currently completing all transfers and ambulating independently and is safe to continue doing so until her discharge from the hospital. She is safe to return home once medically able. She is in agreement and will be discharged from PT caseload.   Outcome Measures       Row Name 02/26/25 1400             How much help from another person do you currently need...    Turning from your back to your side while in flat bed without using bedrails? 4  -AV      Moving from lying on back to sitting on the side of a flat bed without bedrails? 4  -AV      Moving to and from a bed to a chair (including a wheelchair)? 4  -AV      Standing up from a chair using your arms (e.g., wheelchair, bedside chair)? 4  -AV      Climbing 3-5 steps with a railing? 4  -AV      To walk in hospital room? 4  -AV      AM-PAC 6 Clicks Score (PT) 24  -AV         Functional Assessment    Outcome  Measure Options AM-PAC 6 Clicks Basic Mobility (PT)  -AV                User Key  (r) = Recorded By, (t) = Taken By, (c) = Cosigned By      Initials Name Provider Type    Thierry Mercado, PT Physical Therapist                     Time Calculation:    PT Charges       Row Name 02/26/25 1413             Time Calculation    PT Received On 02/26/25  -AV         Untimed Charges    PT Eval/Re-eval Minutes 30  -AV         Total Minutes    Untimed Charges Total Minutes 30  -AV       Total Minutes 30  -AV                User Key  (r) = Recorded By, (t) = Taken By, (c) = Cosigned By      Initials Name Provider Type    Thierry Mercado, PT Physical Therapist                  Therapy Charges for Today       Code Description Service Date Service Provider Modifiers Qty    15804385595 HC PT EVAL LOW COMPLEXITY 2 2/26/2025 Thierry Lam, PT GP 1            PT G-Codes  Outcome Measure Options: AM-PAC 6 Clicks Basic Mobility (PT)  AM-PAC 6 Clicks Score (PT): 24    Thierry Lam PT  2/26/2025

## 2025-02-26 NOTE — PLAN OF CARE
Goal Outcome Evaluation:  Plan of Care Reviewed With: patient        Progress: improving  Outcome Evaluation: Pain meds given per patient request, see MAR.  Pain is controlled on oral pain medication.  IV antibiotics given, see MAR.  CARON drain with minimal output.  Cardiac monitoring continued.  Family remains at bedside.  Patient did complain of gas pains, 1X dose of Mylicon was ordered, see MAR.  Patient reports the Mylicon was effective.  Bed alert is activated. Call light is in reach and patient is able to make needs known.

## 2025-02-26 NOTE — PLAN OF CARE
Goal Outcome Evaluation:            VSS. Complaints of pain treated with PRN medicine. See MAR. Ambulated in room. CARON drain and incision monitored. Family at bedside. Call light and table within reach. No concerns per patient at this time.

## 2025-02-26 NOTE — PLAN OF CARE
Goal Outcome Evaluation:  Plan of Care Reviewed With: patient, child        Progress: no change  Outcome Evaluation: Pt complains of upset stomach this shift with no appetite. PRN Zofran given as well as PRN pain medication. See MAR. Potassium replaced this shift. BM this shift. A&Ox4. VSS. Clear liquid diet. Advance as tolerated. Call light within reach and daughter at bedside.

## 2025-02-26 NOTE — PROGRESS NOTES
"PROGRESS NOTE     Patient Name:  Ebony Hamilton  YOB: 1980  [id number]   LOS: 4 days   2 Days Post-Op            Subjective     Interval History: VSS, afebrile, still with some abdominal pain, passing flatus no BM, tolerating clear liquid diet      Review of Systems:      A complete review of systems was performed and all are negative except what is documented in the HPI.       Objective         Physical Exam:     Constitutional:  well nourished, no acute distress, appears stated age /84 (BP Location: Left arm, Patient Position: Lying)   Pulse 77   Temp 98.1 °F (36.7 °C) (Oral)   Resp 18   Ht 157.5 cm (62\")   Wt 81.5 kg (179 lb 10.8 oz)   SpO2 96%   BMI 32.86 kg/m²    Eyes:  anicteric sclerae, moist conjunctivae, no lid lag, PERRLA  ENMT:  oropharynx clear, moist mucous membranes,   Neck:   full ROM, trachea midline  Cardiovascular: RRR, S1 and S2 present, no MRG, heart rate 77, no pedal edema  Respiratory: lungs CTA, respirations even and unlabored  GI:  Abdomen soft, appropriately tender, nondistended, CARON with bilious output     Skin:  warm and dry, normal turgor, no rashes  Psychiatric:  alert and oriented x 4, intact judgment and insight, cooperative    Results Review:      I reviewed the patient's new clinical results including CBC, CMP.  LABS:  WBC   Date Value Ref Range Status   02/26/2025 9.25 3.40 - 10.80 10*3/mm3 Final     RBC   Date Value Ref Range Status   02/26/2025 3.21 (L) 3.77 - 5.28 10*6/mm3 Final     Hemoglobin   Date Value Ref Range Status   02/26/2025 8.9 (L) 12.0 - 15.9 g/dL Final     Hematocrit   Date Value Ref Range Status   02/26/2025 28.4 (L) 34.0 - 46.6 % Final     MCV   Date Value Ref Range Status   02/26/2025 88.5 79.0 - 97.0 fL Final     MCH   Date Value Ref Range Status   02/26/2025 27.7 26.6 - 33.0 pg Final     MCHC   Date Value Ref Range Status   02/26/2025 31.3 (L) 31.5 - 35.7 g/dL Final     RDW   Date Value Ref Range Status   02/26/2025 13.7 12.3 - " 15.4 % Final     RDW-SD   Date Value Ref Range Status   02/26/2025 43.8 37.0 - 54.0 fl Final     MPV   Date Value Ref Range Status   02/26/2025 9.2 6.0 - 12.0 fL Final     Platelets   Date Value Ref Range Status   02/26/2025 620 (H) 140 - 450 10*3/mm3 Final     Neutrophil %   Date Value Ref Range Status   02/26/2025 73.8 42.7 - 76.0 % Final     Lymphocyte %   Date Value Ref Range Status   02/26/2025 17.7 (L) 19.6 - 45.3 % Final     Monocyte %   Date Value Ref Range Status   02/26/2025 4.8 (L) 5.0 - 12.0 % Final     Eosinophil %   Date Value Ref Range Status   02/26/2025 0.9 0.3 - 6.2 % Final     Basophil %   Date Value Ref Range Status   02/26/2025 0.3 0.0 - 1.5 % Final     Immature Grans %   Date Value Ref Range Status   02/26/2025 2.5 (H) 0.0 - 0.5 % Final     Neutrophils, Absolute   Date Value Ref Range Status   02/26/2025 6.83 1.70 - 7.00 10*3/mm3 Final     Lymphocytes, Absolute   Date Value Ref Range Status   02/26/2025 1.64 0.70 - 3.10 10*3/mm3 Final     Monocytes, Absolute   Date Value Ref Range Status   02/26/2025 0.44 0.10 - 0.90 10*3/mm3 Final     Eosinophils, Absolute   Date Value Ref Range Status   02/26/2025 0.08 0.00 - 0.40 10*3/mm3 Final     Basophils, Absolute   Date Value Ref Range Status   02/26/2025 0.03 0.00 - 0.20 10*3/mm3 Final     Immature Grans, Absolute   Date Value Ref Range Status   02/26/2025 0.23 (H) 0.00 - 0.05 10*3/mm3 Final     nRBC   Date Value Ref Range Status   02/26/2025 0.0 0.0 - 0.2 /100 WBC Final         Basic Metabolic Panel    Sodium Sodium   Date Value Ref Range Status   02/26/2025 139 136 - 145 mmol/L Final   02/25/2025 136 136 - 145 mmol/L Final   02/24/2025 132 (L) 136 - 145 mmol/L Final      Potassium Potassium   Date Value Ref Range Status   02/26/2025 2.8 (L) 3.5 - 5.2 mmol/L Final   02/25/2025 3.3 (L) 3.5 - 5.2 mmol/L Final   02/24/2025 3.2 (L) 3.5 - 5.2 mmol/L Final      Chloride Chloride   Date Value Ref Range Status   02/26/2025 103 98 - 107 mmol/L Final  "  02/25/2025 100 98 - 107 mmol/L Final   02/24/2025 92 (L) 98 - 107 mmol/L Final      Bicarbonate No results found for: \"PLASMABICARB\"   BUN BUN   Date Value Ref Range Status   02/26/2025 10 6 - 20 mg/dL Final   02/25/2025 12 6 - 20 mg/dL Final   02/24/2025 17 6 - 20 mg/dL Final      Creatinine Creatinine   Date Value Ref Range Status   02/26/2025 0.52 (L) 0.57 - 1.00 mg/dL Final   02/25/2025 0.60 0.57 - 1.00 mg/dL Final   02/24/2025 0.89 0.57 - 1.00 mg/dL Final      Calcium Calcium   Date Value Ref Range Status   02/26/2025 8.0 (L) 8.6 - 10.5 mg/dL Final   02/25/2025 8.2 (L) 8.6 - 10.5 mg/dL Final   02/24/2025 9.4 8.6 - 10.5 mg/dL Final      Glucose      No components found for: \"GLUCOSE.*\"         IMAGING:  Imaging Results (Last 72 Hours)       Procedure Component Value Units Date/Time    FL Surgery Fluoro [[id number]] Resulted: 02/24/25 1653     Updated: 02/24/25 1653    Narrative:      This procedure was auto-finalized with no dictation required.    CT Guided Percutaneous Drain Peritoneal [[id number]] Collected: 02/24/25 1204     Updated: 02/24/25 1217    Narrative:      CT GUIDED PERCUTANEOUS DRAIN PERITONEAL    Date of Exam: 2/24/2025 10:00 AM EST    Indication: bile leak s/p cholecystectomy.    Findings:  The risks, benefits and alternatives of the procedure were explained to the patient. The Chief risks discussed were bleeding, infection and injury to the liver or other adjacent organs. The chief option discussed was doing nothing. The patient indicated   she understood what was discussed and elected to proceed. She provided written consent.    Please refer to the nursing notes for medications administered during the procedure.    Patient was placed on the CT scanner in a supine position. Pulmonary CT was performed which demonstrates a subcapsular fluid collection superficial to the right hepatic lobe. The overlying skin was prepped and draped in normal sterile fashion. 2%   lidocaine was injected along the " anticipated tract of the needle. 5 Fijian 10 cm [employer] catheter was advanced into the subcapsular fluid collection under CT fluoroscopic guidance. 0.035 inch Bergson guidewire was advanced through the catheter which was   then removed. An 8 Fijian fascial dilator was passed over and then removed from the guidewire. An 8 Fijian pigtail drain was advanced over the guidewire. The guidewire was removed. The pigtail loop was formed and locked. Approximately 80 cc of bilious   appearing fluid was aspirated.    Patient tolerated the procedure well. She was observed for short time the radiology holding area and then transferred back to the floor for further observation and care.      Impression:      Impression:  Technically successful placement of an 8 Fijian drain into the hepatic subcapsular fluid collection which yielded 80 cc bilious appearing fluid. A sample was sent for culture.      Electronically Signed: Jl Hendrix MD    2/24/2025 12:15 PM EST    Workstation ID: [id number]            Medications:    Current Facility-Administered Medications:     albuterol sulfate HFA (PROVENTIL HFA;VENTOLIN HFA;PROAIR HFA) inhaler 2 puff, 2 puff, Inhalation, Q6H PRN, Ha Thompson MD    sennosides-docusate (PERICOLACE) 8.6-50 MG per tablet 2 tablet, 2 tablet, Oral, BID PRN **AND** polyethylene glycol (MIRALAX) packet 17 g, 17 g, Oral, Daily PRN **AND** bisacodyl (DULCOLAX) EC tablet 5 mg, 5 mg, Oral, Daily PRN **AND** bisacodyl (DULCOLAX) suppository 10 mg, 10 mg, Rectal, Daily PRN, Ha Thompson MD    bisacodyl (DULCOLAX) suppository 10 mg, 10 mg, Rectal, Daily PRN, Ha Thompson MD    busPIRone (BUSPAR) tablet 15 mg, 15 mg, Oral, TID, Ha Thompson MD, 15 mg at 02/26/25 0828    clonazePAM (KlonoPIN) tablet 0.5 mg, 0.5 mg, Oral, BID PRN, Darion Zamora MD    DULoxetine (CYMBALTA) DR capsule 30 mg, 30 mg, Oral, Daily, Ha Thompson MD, 30 mg at 02/26/25 0828    famotidine (PEPCID) tablet 40  mg, 40 mg, Oral, Daily, Ha Thompson MD, 40 mg at 02/26/25 0829    HYDROmorphone (DILAUDID) injection 1 mg, 1 mg, Intravenous, Q2H PRN **AND** naloxone (NARCAN) injection 0.4 mg, 0.4 mg, Intravenous, Q5 Min PRN, Darion Zamora MD    LORazepam (ATIVAN) tablet 2 mg, 2 mg, Oral, Nightly, Ha Thompson MD, 2 mg at 02/25/25 2005    magnesium hydroxide (MILK OF MAGNESIA) suspension 10 mL, 10 mL, Oral, Daily, India Chris APRN    montelukast (SINGULAIR) tablet 10 mg, 10 mg, Oral, Nightly, Ha Thompson MD, 10 mg at 02/25/25 2005    ondansetron ODT (ZOFRAN-ODT) disintegrating tablet 4 mg, 4 mg, Oral, Q6H PRN, Ha Thompson MD, 4 mg at 02/26/25 1057    ondansetron ODT (ZOFRAN-ODT) disintegrating tablet 4 mg, 4 mg, Translingual, Q8H PRN, Ha Thompson MD    oxyCODONE-acetaminophen (PERCOCET)  MG per tablet 1 tablet, 1 tablet, Oral, Q4H PRN, Darion Zamora MD, 1 tablet at 02/26/25 1240    pantoprazole (PROTONIX) EC tablet 40 mg, 40 mg, Oral, Daily, Ha Thompson MD, 40 mg at 02/26/25 0828    piperacillin-tazobactam (ZOSYN) IVPB 3.375 g IVPB in 100 mL NS (VTB), 3.375 g, Intravenous, Q8H, Bruno Simon MD, 3.375 g at 02/26/25 1046    polyethylene glycol (MIRALAX) packet 17 g, 17 g, Oral, Daily, Ha Thompson MD, 17 g at 02/26/25 0829    potassium chloride 10 mEq in 100 mL IVPB, 10 mEq, Intravenous, Q1H, Darion Zamora MD, Last Rate: 100 mL/hr at 02/26/25 1333, 10 mEq at 02/26/25 1333    sodium chloride 0.9 % flush 10 mL, 10 mL, Intravenous, PRN, Ha Thompson MD    sodium chloride 0.9 % flush 10 mL, 10 mL, Intravenous, Q12H, Ha Thompson MD, 10 mL at 02/26/25 0829    sodium chloride 0.9 % flush 10 mL, 10 mL, Intravenous, PRNJay Syed Kashif, MD    sodium chloride 0.9 % infusion 40 mL, 40 mL, Intravenous, GIUSEPPE, Ha Thompson MD    Assessment & Plan       Bile leak from gallbladder bed  S/P ERC  S/P CT guided drain  placement   Ok to advance diet   Cont drain   Likely can go home tomorrow    MILDRED Hanks  02/26/25  13:43 EST    Electronically signed by MILDRED Hanks, 02/26/25, 1:43 PM EST.

## 2025-02-27 LAB
ANION GAP SERPL CALCULATED.3IONS-SCNC: 9.1 MMOL/L (ref 5–15)
BACTERIA FLD CULT: NORMAL
BACTERIA SPEC AEROBE CULT: NORMAL
BACTERIA SPEC AEROBE CULT: NORMAL
BASOPHILS # BLD AUTO: 0.05 10*3/MM3 (ref 0–0.2)
BASOPHILS NFR BLD AUTO: 0.5 % (ref 0–1.5)
BUN SERPL-MCNC: 7 MG/DL (ref 6–20)
BUN/CREAT SERPL: 11.9 (ref 7–25)
CALCIUM SPEC-SCNC: 8.2 MG/DL (ref 8.6–10.5)
CHLORIDE SERPL-SCNC: 100 MMOL/L (ref 98–107)
CO2 SERPL-SCNC: 25.9 MMOL/L (ref 22–29)
CREAT SERPL-MCNC: 0.59 MG/DL (ref 0.57–1)
DEPRECATED RDW RBC AUTO: 45 FL (ref 37–54)
EGFRCR SERPLBLD CKD-EPI 2021: 115.6 ML/MIN/1.73
EOSINOPHIL # BLD AUTO: 0.15 10*3/MM3 (ref 0–0.4)
EOSINOPHIL NFR BLD AUTO: 1.4 % (ref 0.3–6.2)
ERYTHROCYTE [DISTWIDTH] IN BLOOD BY AUTOMATED COUNT: 13.6 % (ref 12.3–15.4)
GLUCOSE SERPL-MCNC: 90 MG/DL (ref 65–99)
GRAM STN SPEC: NORMAL
GRAM STN SPEC: NORMAL
HCT VFR BLD AUTO: 32.7 % (ref 34–46.6)
HGB BLD-MCNC: 10.3 G/DL (ref 12–15.9)
IMM GRANULOCYTES # BLD AUTO: 0.24 10*3/MM3 (ref 0–0.05)
IMM GRANULOCYTES NFR BLD AUTO: 2.3 % (ref 0–0.5)
LYMPHOCYTES # BLD AUTO: 1.58 10*3/MM3 (ref 0.7–3.1)
LYMPHOCYTES NFR BLD AUTO: 14.9 % (ref 19.6–45.3)
MAGNESIUM SERPL-MCNC: 1.7 MG/DL (ref 1.6–2.6)
MCH RBC QN AUTO: 28.4 PG (ref 26.6–33)
MCHC RBC AUTO-ENTMCNC: 31.5 G/DL (ref 31.5–35.7)
MCV RBC AUTO: 90.1 FL (ref 79–97)
MONOCYTES # BLD AUTO: 0.69 10*3/MM3 (ref 0.1–0.9)
MONOCYTES NFR BLD AUTO: 6.5 % (ref 5–12)
NEUTROPHILS NFR BLD AUTO: 7.9 10*3/MM3 (ref 1.7–7)
NEUTROPHILS NFR BLD AUTO: 74.4 % (ref 42.7–76)
NRBC BLD AUTO-RTO: 0 /100 WBC (ref 0–0.2)
PHOSPHATE SERPL-MCNC: 3.5 MG/DL (ref 2.5–4.5)
PLATELET # BLD AUTO: 692 10*3/MM3 (ref 140–450)
PMV BLD AUTO: 8.8 FL (ref 6–12)
POTASSIUM SERPL-SCNC: 3.5 MMOL/L (ref 3.5–5.2)
RBC # BLD AUTO: 3.63 10*6/MM3 (ref 3.77–5.28)
SODIUM SERPL-SCNC: 135 MMOL/L (ref 136–145)
WBC NRBC COR # BLD AUTO: 10.61 10*3/MM3 (ref 3.4–10.8)

## 2025-02-27 PROCEDURE — 25010000002 PIPERACILLIN SOD-TAZOBACTAM PER 1 G: Performed by: STUDENT IN AN ORGANIZED HEALTH CARE EDUCATION/TRAINING PROGRAM

## 2025-02-27 PROCEDURE — 25010000002 HYDROMORPHONE 1 MG/ML SOLUTION: Performed by: INTERNAL MEDICINE

## 2025-02-27 PROCEDURE — 84100 ASSAY OF PHOSPHORUS: CPT | Performed by: INTERNAL MEDICINE

## 2025-02-27 PROCEDURE — 25010000002 MAGNESIUM SULFATE 2 GM/50ML SOLUTION: Performed by: INTERNAL MEDICINE

## 2025-02-27 PROCEDURE — 80048 BASIC METABOLIC PNL TOTAL CA: CPT | Performed by: INTERNAL MEDICINE

## 2025-02-27 PROCEDURE — 85025 COMPLETE CBC W/AUTO DIFF WBC: CPT | Performed by: INTERNAL MEDICINE

## 2025-02-27 PROCEDURE — 99232 SBSQ HOSP IP/OBS MODERATE 35: CPT | Performed by: INTERNAL MEDICINE

## 2025-02-27 PROCEDURE — 83735 ASSAY OF MAGNESIUM: CPT | Performed by: INTERNAL MEDICINE

## 2025-02-27 PROCEDURE — 63710000001 ONDANSETRON ODT 4 MG TABLET DISPERSIBLE: Performed by: INTERNAL MEDICINE

## 2025-02-27 PROCEDURE — 25010000002 POTASSIUM CHLORIDE 10 MEQ/100ML SOLUTION: Performed by: INTERNAL MEDICINE

## 2025-02-27 RX ORDER — MAGNESIUM SULFATE HEPTAHYDRATE 40 MG/ML
2 INJECTION, SOLUTION INTRAVENOUS ONCE
Status: COMPLETED | OUTPATIENT
Start: 2025-02-27 | End: 2025-02-27

## 2025-02-27 RX ORDER — POTASSIUM CHLORIDE 7.45 MG/ML
10 INJECTION INTRAVENOUS
Status: COMPLETED | OUTPATIENT
Start: 2025-02-27 | End: 2025-02-27

## 2025-02-27 RX ORDER — CALCIUM CARBONATE 500 MG/1
2 TABLET, CHEWABLE ORAL 3 TIMES DAILY PRN
Status: DISCONTINUED | OUTPATIENT
Start: 2025-02-27 | End: 2025-02-28 | Stop reason: HOSPADM

## 2025-02-27 RX ADMIN — SODIUM CHLORIDE 3.38 G: 9 INJECTION, SOLUTION INTRAVENOUS at 01:36

## 2025-02-27 RX ADMIN — HYDROMORPHONE HYDROCHLORIDE 1 MG: 1 INJECTION, SOLUTION INTRAMUSCULAR; INTRAVENOUS; SUBCUTANEOUS at 23:58

## 2025-02-27 RX ADMIN — MAGNESIUM HYDROXIDE 10 ML: 2400 SUSPENSION ORAL at 09:31

## 2025-02-27 RX ADMIN — BUSPIRONE HYDROCHLORIDE 15 MG: 15 TABLET ORAL at 16:36

## 2025-02-27 RX ADMIN — OXYCODONE AND ACETAMINOPHEN 1 TABLET: 10; 325 TABLET ORAL at 20:33

## 2025-02-27 RX ADMIN — ONDANSETRON 4 MG: 4 TABLET, ORALLY DISINTEGRATING ORAL at 10:08

## 2025-02-27 RX ADMIN — HYDROMORPHONE HYDROCHLORIDE 1 MG: 1 INJECTION, SOLUTION INTRAMUSCULAR; INTRAVENOUS; SUBCUTANEOUS at 17:46

## 2025-02-27 RX ADMIN — Medication 10 ML: at 09:31

## 2025-02-27 RX ADMIN — OXYCODONE AND ACETAMINOPHEN 1 TABLET: 10; 325 TABLET ORAL at 14:35

## 2025-02-27 RX ADMIN — MAGNESIUM SULFATE HEPTAHYDRATE 2 G: 40 INJECTION, SOLUTION INTRAVENOUS at 09:32

## 2025-02-27 RX ADMIN — HYDROMORPHONE HYDROCHLORIDE 1 MG: 1 INJECTION, SOLUTION INTRAMUSCULAR; INTRAVENOUS; SUBCUTANEOUS at 05:30

## 2025-02-27 RX ADMIN — FAMOTIDINE 40 MG: 20 TABLET, FILM COATED ORAL at 09:30

## 2025-02-27 RX ADMIN — BUSPIRONE HYDROCHLORIDE 15 MG: 15 TABLET ORAL at 09:31

## 2025-02-27 RX ADMIN — MONTELUKAST 10 MG: 10 TABLET, FILM COATED ORAL at 20:33

## 2025-02-27 RX ADMIN — SODIUM CHLORIDE 3.38 G: 9 INJECTION, SOLUTION INTRAVENOUS at 10:29

## 2025-02-27 RX ADMIN — CALCIUM CARBONATE 2 TABLET: 500 TABLET, CHEWABLE ORAL at 18:06

## 2025-02-27 RX ADMIN — OXYCODONE AND ACETAMINOPHEN 1 TABLET: 10; 325 TABLET ORAL at 09:31

## 2025-02-27 RX ADMIN — PANTOPRAZOLE SODIUM 40 MG: 40 TABLET, DELAYED RELEASE ORAL at 09:31

## 2025-02-27 RX ADMIN — DULOXETINE HYDROCHLORIDE 30 MG: 30 CAPSULE, DELAYED RELEASE ORAL at 09:31

## 2025-02-27 RX ADMIN — BUSPIRONE HYDROCHLORIDE 15 MG: 15 TABLET ORAL at 20:33

## 2025-02-27 RX ADMIN — OXYCODONE AND ACETAMINOPHEN 1 TABLET: 10; 325 TABLET ORAL at 02:05

## 2025-02-27 RX ADMIN — POTASSIUM CHLORIDE 10 MEQ: 7.46 INJECTION, SOLUTION INTRAVENOUS at 12:03

## 2025-02-27 RX ADMIN — POTASSIUM CHLORIDE 10 MEQ: 7.46 INJECTION, SOLUTION INTRAVENOUS at 09:32

## 2025-02-27 RX ADMIN — SODIUM CHLORIDE 3.38 G: 9 INJECTION, SOLUTION INTRAVENOUS at 17:46

## 2025-02-27 NOTE — PROGRESS NOTES
[facility]   Hospitalist Progress Note  Date: 2025  Patient Name: Ebony Hamilton  : 1980  MRN: [id number]  Date of admission: 2025  Room/Bed: [facility]      Subjective   Subjective     Chief Complaint: Nausea, vomiting    Summary:   Patient is a 44-year-old female recently treated at this facility for abdominal pain, underwent cholecystectomy and is now presenting back with abdominal pain.  See prior discharge summary for details of prior hospital stay.     She has a past medical history of atrial fibrillation off of anticoagulation, gastroparesis, anxiety, depression, chronic pain, gastroesophageal reflux, panic disorder.  On her discharge a few days ago, her pain was improving.  She and her  state that the pain continued to improve after discharge, she was able to start eating regular food.  After eating a doughnut, her pain returned and she presented back to the emergency department.      In the emergency department: Blood pressure 175/110, respiratory rate 32, heart rate 130.  Laboratory evaluation notable for white blood cell count 19.28, platelets 793. CT abdomen was performed notable for ill-defined fluid collection in the gallbladder bed 7 cm x 4 cm with small amounts tracking caudally along the medial margin alert lower lobe, additional peritoneal small fluid collections in the abdomen, peritoneal fluid collection in the pelvis 8 x 5 cm.  Findings concerning for bile leak.  Vital signs and lactic acid both normalized with IV fluid resuscitation and pain control.  She was admitted for further care, general surgery and gastroenterology were consulted.  She underwent ERCP with sphincterotomy/stent placement.  IR was consulted for CT-guided drain placement and fluid collection.  She was initiated on broad-spectrum IV antibiotics, body fluid cultures returned negative.    Interval events:  No acute events overnight.  Feels better today than she did yesterday.  Abdominal pain  continues to improve.  No nausea or vomiting.  Tolerating clear liquids and actually tolerated crackers this morning.    Objective   Objective     Vitals:   Temp:  [98.1 °F (36.7 °C)-98.9 °F (37.2 °C)] 98.9 °F (37.2 °C)  Heart Rate:  [79-97] 97  Resp:  [16-18] 16  BP: (103-148)/(64-98) 103/71    Physical Exam   General: Awake, alert, appears more comfortable  HENT: NCAT  Cardiovascular: RRR, no MRG  Pulmonary: CTAB; no w/r/r  Gastrointestinal: Abdomen less tender, right-sided CARON drain in place with bilious output noted    Result Review    I have personally reviewed these results:  [x]  Laboratory personally reviewed BMP, CBC, magnesium, phosphorus      Lab 02/27/25  0538 02/26/25  0542 02/25/25  0418 02/24/25  0440 02/23/25  0505 02/22/25  1629 02/22/25  1301   WBC 10.61 9.25 12.94* 15.76* 19.34*  --  19.28*   HEMOGLOBIN 10.3* 8.9* 9.0* 11.4* 12.5  --  13.5   HEMATOCRIT 32.7* 28.4* 27.9* 35.6 40.5  --  40.9   PLATELETS 692* 620* 608* 642* 628*  --  793*   NEUTROS ABS 7.90* 6.83  --  13.45* 17.06*  --  17.25*   IMMATURE GRANS (ABS) 0.24* 0.23*  --  0.19* 0.15*  --  0.17*   LYMPHS ABS 1.58 1.64  --  1.06 0.98  --  1.17   MONOS ABS 0.69 0.44  --  0.92* 1.06*  --  0.63   EOS ABS 0.15 0.08  --  0.08 0.01  --  0.01   MCV 90.1 88.5 88.6 89.4 89.8  --  84.0   LACTATE  --   --   --   --   --  1.3 2.9*   PROTIME  --   --   --   --  15.1*  --   --    APTT  --   --   --   --  28.2  --   --          Lab 02/27/25  0538 02/26/25  0542 02/25/25  0418   SODIUM 135* 139 136   POTASSIUM 3.5 2.8* 3.3*   CHLORIDE 100 103 100   CO2 25.9 25.4 25.0   ANION GAP 9.1 10.6 11.0   BUN 7 10 12   CREATININE 0.59 0.52* 0.60   EGFR 115.6 119.1 115.1   GLUCOSE 90 101* 147*   CALCIUM 8.2* 8.0* 8.2*   MAGNESIUM 1.7 1.8  --    PHOSPHORUS 3.5 2.3*  --          Lab 02/24/25  0440 02/22/25  1301   TOTAL PROTEIN 7.0 8.4   ALBUMIN 3.3* 3.9   GLOBULIN  --  4.5   ALT (SGPT) 45* 77*   AST (SGOT) 11 34*   BILIRUBIN 1.3* 0.8   INDIRECT BILIRUBIN 0.5  --     BILIRUBIN DIRECT 0.8*  --    ALK PHOS 146* 139*   LIPASE  --  20         Lab 02/23/25  0505   PROTIME 15.1*   INR 1.14                 Brief Urine Lab Results  (Last result in the past 365 days)        Color   Clarity   Blood   Leuk Est   Nitrite   Protein   CREAT   Urine HCG        02/17/25 0037 Yellow   Clear   Negative   Trace   Negative   Negative                 [x]  Microbiology   [x]  Radiology  []  EKG/Telemetry   []  Cardiology/Vascular   []  Pathology  []  Old records  []  Other:    Assessment & Plan   Assessment / Plan   Bile leak status post ERCP, sphincterotomy, biliary stent placement  Intra-abdominal abscess due to unknown bacterial source  Chronic pain disorder  Anxiety  Hypokalemia  Hypophosphatemia  Depression  Endometriosis  Gastroparesis    Continue to monitor in the hospital for workup and management of the above  Discussed with general surgery, patient is status post IR guided drain placement, cultures negative  Continue with IV Zosyn for now  Advance to regular diet today  Replace magnesium and potassium  Continue with oral Percocet, IV Dilaudid as needed for pain  IV antiemetics as needed  Continue appropriate home medications  Possibly discharge home tomorrow if continued improvement  Lovenox for DVT prophylaxis  Trend renal function and electrolytes with a.m. BMP, magnesium   Trend Hgb and WBC with a.m. CBC    Discussed case with: Bedside RN, general surgery    VTE Prophylaxis:  Mechanical VTE prophylaxis orders are present.        CODE STATUS:   Level Of Support Discussed With: Patient  Code Status (Patient has no pulse and is not breathing): CPR (Attempt to Resuscitate)  Medical Interventions (Patient has pulse or is breathing): Full Support

## 2025-02-27 NOTE — PLAN OF CARE
Goal Outcome Evaluation:  Plan of Care Reviewed With: patient        Progress: improving  Outcome Evaluation: Patient AOx4, VSS, pain treated effectively with PO pain medication. Up with family assist. Advanced to regular diet, tolerating well, treated nausea x1. CARON drain in place. Potassium and magnesium replacement completed per orders. IV abx.

## 2025-02-27 NOTE — PROGRESS NOTES
[facility]     Surgery Progress Note    Patient Name: Ebony Hamilton  :    1980  MRN:    [id number]  Date of admission:  2025  Length of Stay: 5 days    Subjective   44-year-old female with bile leak status post laparoscopic cholecystectomy    No acute events overnight.  Abdominal continues to improve.  Denies any nausea, vomiting, fevers, chills.  Patient states she had a bowel movement earlier this morning.  Awaiting regular diet for lunch.  Objective     Current Facility-Administered Medications:     albuterol sulfate HFA (PROVENTIL HFA;VENTOLIN HFA;PROAIR HFA) inhaler 2 puff, 2 puff, Inhalation, Q6H PRN, Ha Thompson MD    sennosides-docusate (PERICOLACE) 8.6-50 MG per tablet 2 tablet, 2 tablet, Oral, BID PRN **AND** polyethylene glycol (MIRALAX) packet 17 g, 17 g, Oral, Daily PRN **AND** bisacodyl (DULCOLAX) EC tablet 5 mg, 5 mg, Oral, Daily PRN **AND** bisacodyl (DULCOLAX) suppository 10 mg, 10 mg, Rectal, Daily PRN, Ha Thompson MD    bisacodyl (DULCOLAX) suppository 10 mg, 10 mg, Rectal, Daily PRN, Ha Thompson MD    busPIRone (BUSPAR) tablet 15 mg, 15 mg, Oral, TID, Ha Thompson MD, 15 mg at 25 0931    clonazePAM (KlonoPIN) tablet 0.5 mg, 0.5 mg, Oral, BID PRN, Darion Zamora MD    DULoxetine (CYMBALTA) DR capsule 30 mg, 30 mg, Oral, Daily, Ha Thompson MD, 30 mg at 25 0931    famotidine (PEPCID) tablet 40 mg, 40 mg, Oral, Daily, Ha Thompson MD, 40 mg at 25 0930    HYDROmorphone (DILAUDID) injection 1 mg, 1 mg, Intravenous, Q2H PRN, 1 mg at 25 0530 **AND** naloxone (NARCAN) injection 0.4 mg, 0.4 mg, Intravenous, Q5 Min PRN, Darion Zamora MD    magnesium hydroxide (MILK OF MAGNESIA) suspension 10 mL, 10 mL, Oral, Daily, India Chris, APRN, 10 mL at 25 0931    montelukast (SINGULAIR) tablet 10 mg, 10 mg, Oral, Nightly, Ha Thompson MD, 10 mg at 25    ondansetron ODT  (ZOFRAN-ODT) disintegrating tablet 4 mg, 4 mg, Oral, Q6H PRN, Ha Thompson MD, 4 mg at 02/27/25 1008    ondansetron ODT (ZOFRAN-ODT) disintegrating tablet 4 mg, 4 mg, Translingual, Q8H PRN, Ha Thompson MD    oxyCODONE-acetaminophen (PERCOCET)  MG per tablet 1 tablet, 1 tablet, Oral, Q4H PRN, Darion Zamora MD, 1 tablet at 02/27/25 0931    pantoprazole (PROTONIX) EC tablet 40 mg, 40 mg, Oral, Daily, Ha Thompson MD, 40 mg at 02/27/25 0931    piperacillin-tazobactam (ZOSYN) IVPB 3.375 g IVPB in 100 mL NS (VTB), 3.375 g, Intravenous, Q8H, Bruno Simon MD, 3.375 g at 02/27/25 1029    polyethylene glycol (MIRALAX) packet 17 g, 17 g, Oral, Daily, Ha Thompson MD, 17 g at 02/26/25 0829    sodium chloride 0.9 % flush 10 mL, 10 mL, Intravenous, PRN, Ha Thompson MD    sodium chloride 0.9 % flush 10 mL, 10 mL, Intravenous, Q12H, Ha Thompson MD, 10 mL at 02/27/25 0931    sodium chloride 0.9 % flush 10 mL, 10 mL, Intravenous, PRN, Ha Thompson MD    sodium chloride 0.9 % infusion 40 mL, 40 mL, Intravenous, PRN, Ha Thompson MD    Current Diet:    Dietary Orders (From admission, onward)       Start     Ordered    02/27/25 1119  Diet: Regular/House; Fluid Consistency: Thin (IDDSI 0)  Diet Effective Now        References:    Diet Order Definitions   Question Answer Comment   Diets: Regular/House    Fluid Consistency: Thin (IDDSI 0)        02/27/25 1118                    Vitals:   Temp:  [98.1 °F (36.7 °C)-98.9 °F (37.2 °C)] 98.9 °F (37.2 °C)  Heart Rate:  [79-97] 97  Resp:  [16-18] 16  BP: (103-148)/(64-98) 103/71  Physical Exam   General: no acute distress, resting in bed  Respiratory:  non-labored breathing  Cardiovascular:  RRR, non-tachycardic  Abdomen:  soft, non-distended, minimally tender; right sided CARON drain with small amount of bilious output in bulb    LABS:  CBC          2/25/2025    04:18 2/26/2025    05:42 2/27/2025    05:38   CBC    WBC 12.94  9.25  10.61    RBC 3.15  3.21  3.63    Hemoglobin 9.0  8.9  10.3    Hematocrit 27.9  28.4  32.7    MCV 88.6  88.5  90.1    MCH 28.6  27.7  28.4    MCHC 32.3  31.3  31.5    RDW 13.4  13.7  13.6    Platelets 608  620  692      BMP          2/25/2025    04:18 2/26/2025    05:42 2/27/2025    05:38   BMP   BUN 12  10  7    Creatinine 0.60  0.52  0.59    Sodium 136  139  135    Potassium 3.3  2.8  3.5    Chloride 100  103  100    CO2 25.0  25.4  25.9    Calcium 8.2  8.0  8.2      CMP          2/25/2025    04:18 2/26/2025    05:42 2/27/2025    05:38   CMP   Glucose 147  101  90    BUN 12  10  7    Creatinine 0.60  0.52  0.59    EGFR 115.1  119.1  115.6    Sodium 136  139  135    Potassium 3.3  2.8  3.5    Chloride 100  103  100    Calcium 8.2  8.0  8.2    BUN/Creatinine Ratio 20.0  19.2  11.9    Anion Gap 11.0  10.6  9.1        Lab Results (last 24 hours)       Procedure Component Value Units Date/Time    Blood Culture - Blood, Arm, Right [[id number]]  (Normal) Collected: 02/22/25 1301    Specimen: Blood from Arm, Right Updated: 02/27/25 1315     Blood Culture No growth at 5 days    Narrative:      Less than seven (7) mL's of blood was collected.  Insufficient quantity may yield false negative results.    Body Fluid Culture - Drainage, Peritoneum [[id number]] Collected: 02/24/25 1051    Specimen: Drainage from Peritoneum Updated: 02/27/25 0950     Body Fluid Culture No growth at 3 days     Gram Stain Rare (1+) WBCs seen      No organisms seen    Phosphorus [[id number]]  (Normal) Collected: 02/27/25 0538    Specimen: Blood Updated: 02/27/25 0625     Phosphorus 3.5 mg/dL     Basic Metabolic Panel [[id number]]  (Abnormal) Collected: 02/27/25 0538    Specimen: Blood Updated: 02/27/25 0622     Glucose 90 mg/dL      BUN 7 mg/dL      Creatinine 0.59 mg/dL      Sodium 135 mmol/L      Potassium 3.5 mmol/L      Chloride 100 mmol/L      CO2 25.9 mmol/L      Calcium 8.2 mg/dL      BUN/Creatinine Ratio 11.9     Anion Gap 9.1  mmol/L      eGFR 115.6 mL/min/1.73     Narrative:      GFR Categories in Chronic Kidney Disease (CKD)      GFR Category          GFR (mL/min/1.73)    Interpretation  G1                     90 or greater         Normal or high (1)  G2                      60-89                Mild decrease (1)  G3a                   45-59                Mild to moderate decrease  G3b                   30-44                Moderate to severe decrease  G4                    15-29                Severe decrease  G5                    14 or less           Kidney failure          (1)In the absence of evidence of kidney disease, neither GFR category G1 or G2 fulfill the criteria for CKD.    eGFR calculation 2021 CKD-EPI creatinine equation, which does not include race as a factor    Magnesium [[id number]]  (Normal) Collected: 02/27/25 0538    Specimen: Blood Updated: 02/27/25 0622     Magnesium 1.7 mg/dL     CBC & Differential [[id number]]  (Abnormal) Collected: 02/27/25 0538    Specimen: Blood Updated: 02/27/25 0616    Narrative:      The following orders were created for panel order CBC & Differential.  Procedure                               Abnormality         Status                     ---------                               -----------         ------                     CBC Auto Differential[[id number]]        Abnormal            Final result                 Please view results for these tests on the individual orders.    CBC Auto Differential [[id number]]  (Abnormal) Collected: 02/27/25 0538    Specimen: Blood Updated: 02/27/25 0616     WBC 10.61 10*3/mm3      RBC 3.63 10*6/mm3      Hemoglobin 10.3 g/dL      Hematocrit 32.7 %      MCV 90.1 fL      MCH 28.4 pg      MCHC 31.5 g/dL      RDW 13.6 %      RDW-SD 45.0 fl      MPV 8.8 fL      Platelets 692 10*3/mm3      Neutrophil % 74.4 %      Lymphocyte % 14.9 %      Monocyte % 6.5 %      Eosinophil % 1.4 %      Basophil % 0.5 %      Immature Grans % 2.3 %      Neutrophils, Absolute 7.90  10*3/mm3      Lymphocytes, Absolute 1.58 10*3/mm3      Monocytes, Absolute 0.69 10*3/mm3      Eosinophils, Absolute 0.15 10*3/mm3      Basophils, Absolute 0.05 10*3/mm3      Immature Grans, Absolute 0.24 10*3/mm3      nRBC 0.0 /100 WBC     Non-gynecologic Cytology [[id number]] Collected: 02/24/25 1051    Specimen: Aspirate from Peritoneum Updated: 02/26/25 1402     Case Report --     Medical Cytology Report                           Case: [id number]                                  Authorizing Provider:  Josué Castano MD        Collected:           02/24/2025 10:51 AM          Ordering Location:     [address]  Received:            02/25/2025 09:58 AM                                 [facility]                                                                            UNIT                                                                         Pathologist:           Obey Curtis MD                                                            Specimen:    Peritoneum, GALLBLADDER FOSSA FLUID                                                         Final Diagnosis --     Gallbladder fossa fluid, aspiration:   - Negative for malignant cells       Clinical Information --     Bile duct leak. Bile leak from gallbladder bed.   Status post cholecystectomy.   Subcapsular fluid collection superficial to the right hepatic lobe.       Gross Description --     1. Peritoneum.  Fluid, Gallbladder fossa  15 cc dark percy, fibrinous fluid received (1 cytospin prep and a cell block prepared).    Cell block placed in formalin: 12:36 EST 2/25/2025, Abby OAKLEY       Microscopic Description --     Microscopic examination performed.        Blood Culture - Blood, Arm, Right [[id number]]  (Normal) Collected: 02/22/25 1306    Specimen: Blood from Arm, Right Updated: 02/26/25 1330     Blood Culture No growth at 4 days            IMAGING:  Imaging Results (Last 24 Hours)       ** No results found for the last 24  hours. **            [x]  Laboratory  []  Microbiology  []  Radiology  []  EKG/Telemetry   []  Cardiology/Vascular   []  Pathology  []  Old records    Assessment / Plan   Assessment:   Active Hospital Problems    Diagnosis     **Bile leak from gallbladder bed          Plan:    Bile leak  -Afebrile, vitals stable, no leukocytosis  -Status post CT-guided drain placement and ERCP with sphincterotomy/stent (2/24)  -Advance to regular diet  -Monitor CARON drain output  -Anticipate she will be stable for discharge tomorrow     Electronically signed by Josué Castano MD, 02/27/25, 1:20 PM EST.

## 2025-02-27 NOTE — PLAN OF CARE
Goal Outcome Evaluation:  Plan of Care Reviewed With: patient        Progress: no change  Outcome Evaluation: Pt with consistent complaints of pain and requesting pain medication when able to have it. A&Ox4, VSS

## 2025-02-28 ENCOUNTER — READMISSION MANAGEMENT (OUTPATIENT)
Dept: CALL CENTER | Facility: HOSPITAL | Age: 45
End: 2025-02-28
Payer: COMMERCIAL

## 2025-02-28 ENCOUNTER — TELEPHONE (OUTPATIENT)
Dept: SURGERY | Facility: CLINIC | Age: 45
End: 2025-02-28

## 2025-02-28 VITALS
HEART RATE: 70 BPM | BODY MASS INDEX: 33.06 KG/M2 | WEIGHT: 179.68 LBS | DIASTOLIC BLOOD PRESSURE: 64 MMHG | RESPIRATION RATE: 16 BRPM | OXYGEN SATURATION: 99 % | SYSTOLIC BLOOD PRESSURE: 100 MMHG | TEMPERATURE: 97.9 F | HEIGHT: 62 IN

## 2025-02-28 LAB
ANION GAP SERPL CALCULATED.3IONS-SCNC: 8.3 MMOL/L (ref 5–15)
BASOPHILS # BLD AUTO: 0.04 10*3/MM3 (ref 0–0.2)
BASOPHILS NFR BLD AUTO: 0.3 % (ref 0–1.5)
BUN SERPL-MCNC: 7 MG/DL (ref 6–20)
BUN/CREAT SERPL: 11.1 (ref 7–25)
CALCIUM SPEC-SCNC: 7.8 MG/DL (ref 8.6–10.5)
CHLORIDE SERPL-SCNC: 101 MMOL/L (ref 98–107)
CO2 SERPL-SCNC: 26.7 MMOL/L (ref 22–29)
CREAT SERPL-MCNC: 0.63 MG/DL (ref 0.57–1)
DEPRECATED RDW RBC AUTO: 46.7 FL (ref 37–54)
EGFRCR SERPLBLD CKD-EPI 2021: 113.7 ML/MIN/1.73
EOSINOPHIL # BLD AUTO: 0.13 10*3/MM3 (ref 0–0.4)
EOSINOPHIL NFR BLD AUTO: 1 % (ref 0.3–6.2)
ERYTHROCYTE [DISTWIDTH] IN BLOOD BY AUTOMATED COUNT: 13.8 % (ref 12.3–15.4)
GLUCOSE SERPL-MCNC: 101 MG/DL (ref 65–99)
HCT VFR BLD AUTO: 28.7 % (ref 34–46.6)
HGB BLD-MCNC: 8.9 G/DL (ref 12–15.9)
IMM GRANULOCYTES # BLD AUTO: 0.33 10*3/MM3 (ref 0–0.05)
IMM GRANULOCYTES NFR BLD AUTO: 2.5 % (ref 0–0.5)
LYMPHOCYTES # BLD AUTO: 1.66 10*3/MM3 (ref 0.7–3.1)
LYMPHOCYTES NFR BLD AUTO: 12.6 % (ref 19.6–45.3)
MAGNESIUM SERPL-MCNC: 2.2 MG/DL (ref 1.6–2.6)
MCH RBC QN AUTO: 28.3 PG (ref 26.6–33)
MCHC RBC AUTO-ENTMCNC: 31 G/DL (ref 31.5–35.7)
MCV RBC AUTO: 91.4 FL (ref 79–97)
MONOCYTES # BLD AUTO: 0.77 10*3/MM3 (ref 0.1–0.9)
MONOCYTES NFR BLD AUTO: 5.8 % (ref 5–12)
NEUTROPHILS NFR BLD AUTO: 10.29 10*3/MM3 (ref 1.7–7)
NEUTROPHILS NFR BLD AUTO: 77.8 % (ref 42.7–76)
NRBC BLD AUTO-RTO: 0 /100 WBC (ref 0–0.2)
PHOSPHATE SERPL-MCNC: 3.5 MG/DL (ref 2.5–4.5)
PLATELET # BLD AUTO: 572 10*3/MM3 (ref 140–450)
PMV BLD AUTO: 9 FL (ref 6–12)
POTASSIUM SERPL-SCNC: 4 MMOL/L (ref 3.5–5.2)
RBC # BLD AUTO: 3.14 10*6/MM3 (ref 3.77–5.28)
SODIUM SERPL-SCNC: 136 MMOL/L (ref 136–145)
WBC NRBC COR # BLD AUTO: 13.22 10*3/MM3 (ref 3.4–10.8)

## 2025-02-28 PROCEDURE — 25010000002 PIPERACILLIN SOD-TAZOBACTAM PER 1 G: Performed by: STUDENT IN AN ORGANIZED HEALTH CARE EDUCATION/TRAINING PROGRAM

## 2025-02-28 PROCEDURE — 25010000002 HYDROMORPHONE 1 MG/ML SOLUTION: Performed by: INTERNAL MEDICINE

## 2025-02-28 PROCEDURE — 85025 COMPLETE CBC W/AUTO DIFF WBC: CPT | Performed by: INTERNAL MEDICINE

## 2025-02-28 PROCEDURE — 83735 ASSAY OF MAGNESIUM: CPT | Performed by: INTERNAL MEDICINE

## 2025-02-28 PROCEDURE — 84100 ASSAY OF PHOSPHORUS: CPT | Performed by: INTERNAL MEDICINE

## 2025-02-28 PROCEDURE — 80048 BASIC METABOLIC PNL TOTAL CA: CPT | Performed by: INTERNAL MEDICINE

## 2025-02-28 PROCEDURE — 99239 HOSP IP/OBS DSCHRG MGMT >30: CPT | Performed by: INTERNAL MEDICINE

## 2025-02-28 RX ORDER — HYDROCODONE BITARTRATE AND ACETAMINOPHEN 10; 325 MG/1; MG/1
1 TABLET ORAL
Qty: 20 TABLET | Refills: 0 | Status: SHIPPED | OUTPATIENT
Start: 2025-02-28

## 2025-02-28 RX ADMIN — HYDROMORPHONE HYDROCHLORIDE 1 MG: 1 INJECTION, SOLUTION INTRAMUSCULAR; INTRAVENOUS; SUBCUTANEOUS at 09:20

## 2025-02-28 RX ADMIN — Medication 10 ML: at 09:09

## 2025-02-28 RX ADMIN — HYDROMORPHONE HYDROCHLORIDE 1 MG: 1 INJECTION, SOLUTION INTRAMUSCULAR; INTRAVENOUS; SUBCUTANEOUS at 13:18

## 2025-02-28 RX ADMIN — SODIUM CHLORIDE 3.38 G: 9 INJECTION, SOLUTION INTRAVENOUS at 09:09

## 2025-02-28 RX ADMIN — DULOXETINE HYDROCHLORIDE 30 MG: 30 CAPSULE, DELAYED RELEASE ORAL at 09:08

## 2025-02-28 RX ADMIN — BUSPIRONE HYDROCHLORIDE 15 MG: 15 TABLET ORAL at 09:08

## 2025-02-28 RX ADMIN — OXYCODONE AND ACETAMINOPHEN 1 TABLET: 10; 325 TABLET ORAL at 06:59

## 2025-02-28 RX ADMIN — PANTOPRAZOLE SODIUM 40 MG: 40 TABLET, DELAYED RELEASE ORAL at 09:08

## 2025-02-28 RX ADMIN — FAMOTIDINE 40 MG: 20 TABLET, FILM COATED ORAL at 09:08

## 2025-02-28 RX ADMIN — SODIUM CHLORIDE 3.38 G: 9 INJECTION, SOLUTION INTRAVENOUS at 01:52

## 2025-02-28 RX ADMIN — OXYCODONE AND ACETAMINOPHEN 1 TABLET: 10; 325 TABLET ORAL at 11:07

## 2025-02-28 RX ADMIN — OXYCODONE AND ACETAMINOPHEN 1 TABLET: 10; 325 TABLET ORAL at 01:52

## 2025-02-28 NOTE — TELEPHONE ENCOUNTER
Per Dr. Castano, this patient is currently admitted to the hospital and will need to reschedule her appointment that is on for today. We will need to have her come in when he is back in office the week of 3/17.

## 2025-02-28 NOTE — OUTREACH NOTE
Prep Survey      Flowsheet Row Responses   [facility] patient discharged from? [facility]   Is LACE score < 7 ? No   Eligibility [facility]   Date of Admission 02/22/25   Date of Discharge 02/28/25   Discharge Disposition Home or Self Care   Discharge diagnosis Bile leak from gallbladder bed   Does the patient have one of the following disease processes/diagnoses(primary or secondary)? Other   Does the patient have Home health ordered? No   Is there a DME ordered? No   Prep survey completed? Yes            NAYELY BRUCE - Registered Nurse

## 2025-02-28 NOTE — PLAN OF CARE
Goal Outcome Evaluation:      Education complete. Pt discharging.

## 2025-02-28 NOTE — TELEPHONE ENCOUNTER
Hospital called the schedule follow up appt with Dr. Castano after patient was in hospital. Hospital states patient needs a 1 week follow up to remove her CARON drain. Per Dr. Castano, patient does not need it removed in 1 week and can follow up with Dr. Castano when he is back in office.     Per Minesh in scheduling:  I CALLED AND SPOKE TO TATI AT [facility] AND RS'D PT APPT TO WHEN HE COMES BACK

## 2025-02-28 NOTE — PAT
Left message.   Reminded of arrival time at 0630, Entrance C of the [facility]. Instructed to bring or have a  over the age of 18 set up to drive you home the day of procedure.    Instructed on clear liquid diet the day before, nothing red or purple. Call with any questions about the prep or if in need of the prep.  Reminded them not to eat or drink anything am of procedure unless its a sip of water with medications.

## 2025-02-28 NOTE — DISCHARGE SUMMARY
[facility]         HOSPITALIST  DISCHARGE SUMMARY    Patient Name: Ebony Hamilton  : 1980  MRN: [id number]    Date of Admission: 2025  Date of Discharge: 2025  Primary Care Physician: Karen Stover APRN    Consults       Date and Time Order Name Status Description    2025  3:42 PM Inpatient Gastroenterology Consult      2025  7:13 PM Inpatient General Surgery Consult      2025  4:12 PM Hospitalist (on-call MD unless specified)      2025 10:24 AM Inpatient General Surgery Consult Completed     2025  6:03 AM Inpatient Gastroenterology Consult Completed             Active and Resolved Hospital Problems:  Active Hospital Problems    Diagnosis POA    **Bile leak from gallbladder bed [K83.8] Yes      Resolved Hospital Problems   No resolved problems to display.   Bile leak status post ERCP, sphincterotomy, biliary stent placement  Intra-abdominal abscess due to unknown bacterial source  Chronic pain disorder  Anxiety  Hypokalemia  Hypophosphatemia  Depression  Endometriosis  Gastroparesis    Hospital Course     Hospital Course:  Ebony Hamilton is a 44 y.o. female recently treated at this facility for abdominal pain, underwent cholecystectomy and is now presenting back with abdominal pain.  See prior discharge summary for details of prior hospital stay.  She has a past medical history of atrial fibrillation off of anticoagulation, gastroparesis, anxiety, depression, chronic pain, gastroesophageal reflux, panic disorder.  On her discharge a few days ago, her pain was improving.  She and her  state that the pain continued to improve after discharge, she was able to start eating regular food.  After eating a doughnut, her pain returned and she presented back to the emergency department.       In the emergency department: Blood pressure 175/110, respiratory rate 32, heart rate 130.  Laboratory evaluation notable for white blood cell count  19.28, platelets 793. CT abdomen was performed notable for ill-defined fluid collection in the gallbladder bed 7 cm x 4 cm with small amounts tracking caudally along the medial margin alert lower lobe, additional peritoneal small fluid collections in the abdomen, peritoneal fluid collection in the pelvis 8 x 5 cm.  Findings concerning for bile leak.  Vital signs and lactic acid both normalized with IV fluid resuscitation and pain control.  She was admitted for further care, general surgery and gastroenterology were consulted.  She underwent ERCP with sphincterotomy/stent placement.  IR was consulted for CT-guided drain placement and fluid collection.  She was initiated on broad-spectrum IV antibiotics, body fluid cultures returned negative.  She was advanced to regular diet and was tolerating on the day of discharge.  She was cleared for discharge from a surgical perspective.  She was discharged home in stable condition on 2/28/2025.  CARON drain remains in place, but this to be discontinued when she follows up with general surgery within 1 week.  Recommend follow-up with PCP within 1 week.    Day of Discharge     Vital Signs:  Temp:  [97.9 °F (36.6 °C)-98.4 °F (36.9 °C)] 97.9 °F (36.6 °C)  Heart Rate:  [63-80] 70  Resp:  [16-18] 16  BP: ()/(58-84) 100/64  Physical Exam:   General: Awake, alert, appears more comfortable  HENT: NCAT  Cardiovascular: RRR, no MRG  Pulmonary: CTAB; no w/r/r  Gastrointestinal: Abdomen less tender, right-sided CARON drain in place with bilious output noted    Discharge Details        Discharge Medications        Changes to Medications        Instructions Start Date   HYDROcodone-acetaminophen  MG per tablet  Commonly known as: NORCO  What changed: See the new instructions.   1 tablet, Oral, 5 Times Daily PRN      ondansetron 4 MG tablet  Commonly known as: ZOFRAN  What changed: See the new instructions.   TAKE (1) TABLET EVERY 8 HOURS AS NEEDED FOR NAUSEA AND vomiting      traZODone  50 MG tablet  Commonly known as: DESYREL  What changed:   how much to take  how to take this  when to take this  reasons to take this   Take 0.5 to 2 tab PO QHS PRN sleep             Continue These Medications        Instructions Start Date   albuterol sulfate  (90 Base) MCG/ACT inhaler  Commonly known as: PROVENTIL HFA;VENTOLIN HFA;PROAIR HFA   2 puffs, Inhalation, Every 6 Hours PRN      bacitracin 500 UNIT/GM ointment   0.9 g, Topical, 2 Times Daily      bisacodyl 10 MG suppository  Commonly known as: DULCOLAX   Insert 1 suppository into the rectum Daily As Needed.      busPIRone 15 MG tablet  Commonly known as: BUSPAR   15 mg, Oral, 3 Times Daily      CALCIUM PO   1 tablet, Oral, Daily      clonazePAM 0.5 MG tablet  Commonly known as: KlonoPIN   0.5 mg, Oral, 2 Times Daily PRN      cyclobenzaprine 5 MG tablet  Commonly known as: FLEXERIL   5 mg, Oral, 3 Times Daily PRN      DULoxetine 30 MG capsule  Commonly known as: CYMBALTA   30 mg, Oral, Daily, TAKES 30mg AND 60mg TOGETHER FOR A TOTAL OF 90mg      DULoxetine 60 MG capsule  Commonly known as: CYMBALTA   60 mg, Oral, Daily, TAKES 30mg AND 60mg TOGETHER FOR A TOTAL OF 90mg      hydrocortisone 2.5 % cream   1 Application, Topical, 2 Times Daily      Linzess 145 MCG capsule capsule  Generic drug: linaclotide   Take 1 capsule by mouth Every Morning Before Breakfast.      montelukast 10 MG tablet  Commonly known as: Singulair   10 mg, Oral, Nightly      pantoprazole 40 MG EC tablet  Commonly known as: PROTONIX   40 mg, Oral, Daily      polyethylene glycol 17 g packet  Commonly known as: MIRALAX   17 g, Oral, Daily      prochlorperazine 10 MG tablet  Commonly known as: COMPAZINE   Take 1 tablet by mouth Every 6 (Six) Hours As Needed.      sennosides-docusate 8.6-50 MG per tablet  Commonly known as: PERICOLACE   1 tablet, Oral, Daily      tiZANidine 4 MG tablet  Commonly known as: ZANAFLEX   4-8 mg, Every 6 Hours PRN      vitamin C 500 MG tablet  Commonly known  as: ASCORBIC ACID   500 mg, Daily      Vraylar 1.5 MG capsule capsule  Generic drug: Cariprazine HCl   1.5 mg, Oral, Daily             Stop These Medications      ibuprofen 800 MG tablet  Commonly known as: ADVIL,MOTRIN              Allergies   Allergen Reactions    Escitalopram Nausea Only and Rash     Nausea, sweating, rash     Sulfa Antibiotics Anaphylaxis    Baclofen GI Intolerance, Hives and Nausea And Vomiting    Ciprofloxacin Hallucinations    Codeine Hives    Gold-Containing Drug Products Rash    Latex Rash    Nickel Hives    Tegaderm Ag Mesh [Silver] Hives       Discharge Disposition:  Home or Self Care    Diet:  Hospital:  Diet Order   Procedures    Diet: Regular/House; Fluid Consistency: Thin (IDDSI 0)       Discharge Activity:   Activity Instructions       Activity as Tolerated              CODE STATUS:  Code Status and Medical Interventions: CPR (Attempt to Resuscitate); Full Support   Ordered at: 02/22/25 1647     Level Of Support Discussed With:    Patient     Code Status (Patient has no pulse and is not breathing):    CPR (Attempt to Resuscitate)     Medical Interventions (Patient has pulse or is breathing):    Full Support         Future Appointments   Date Time Provider Department Center   2/28/2025  1:45 PM Josué Castano MD [facility] JOSE TIFFANIE       Additional Instructions for the Follow-ups that You Need to Schedule       Discharge Follow-up with PCP   As directed       Currently Documented PCP:    Karen Stover APRN    PCP Phone Number:    [phone]     Follow Up Details: 3-5 days                Pertinent  and/or Most Recent Results     PROCEDURES:   ERCP, IR guided drain placement    LAB RESULTS:      Lab 02/28/25  0445 02/27/25  0538 02/26/25  0542 02/25/25  0418 02/24/25  0440 02/23/25  0505 02/22/25  1629 02/22/25  1301   WBC 13.22* 10.61 9.25 12.94* 15.76* 19.34*  --  19.28*   HEMOGLOBIN 8.9* 10.3* 8.9* 9.0* 11.4* 12.5  --  13.5   HEMATOCRIT 28.7* 32.7* 28.4* 27.9* 35.6 40.5   --  40.9   PLATELETS 572* 692* 620* 608* 642* 628*  --  793*   NEUTROS ABS 10.29* 7.90* 6.83  --  13.45* 17.06*  --  17.25*   IMMATURE GRANS (ABS) 0.33* 0.24* 0.23*  --  0.19* 0.15*  --  0.17*   LYMPHS ABS 1.66 1.58 1.64  --  1.06 0.98  --  1.17   MONOS ABS 0.77 0.69 0.44  --  0.92* 1.06*  --  0.63   EOS ABS 0.13 0.15 0.08  --  0.08 0.01  --  0.01   MCV 91.4 90.1 88.5 88.6 89.4 89.8  --  84.0   LACTATE  --   --   --   --   --   --  1.3 2.9*   PROTIME  --   --   --   --   --  15.1*  --   --    APTT  --   --   --   --   --  28.2  --   --          Lab 02/28/25  0445 02/27/25  0538 02/26/25  0542 02/25/25  0418 02/24/25  0440   SODIUM 136 135* 139 136 132*   POTASSIUM 4.0 3.5 2.8* 3.3* 3.2*   CHLORIDE 101 100 103 100 92*   CO2 26.7 25.9 25.4 25.0 26.6   ANION GAP 8.3 9.1 10.6 11.0 13.4   BUN 7 7 10 12 17   CREATININE 0.63 0.59 0.52* 0.60 0.89   EGFR 113.7 115.6 119.1 115.1 83.1   GLUCOSE 101* 90 101* 147* 101*   CALCIUM 7.8* 8.2* 8.0* 8.2* 9.4   MAGNESIUM 2.2 1.7 1.8  --   --    PHOSPHORUS 3.5 3.5 2.3*  --   --          Lab 02/24/25  0440 02/22/25  1301   TOTAL PROTEIN 7.0 8.4   ALBUMIN 3.3* 3.9   GLOBULIN  --  4.5   ALT (SGPT) 45* 77*   AST (SGOT) 11 34*   BILIRUBIN 1.3* 0.8   INDIRECT BILIRUBIN 0.5  --    BILIRUBIN DIRECT 0.8*  --    ALK PHOS 146* 139*   LIPASE  --  20         Lab 02/23/25  0505   PROTIME 15.1*   INR 1.14                 Brief Urine Lab Results  (Last result in the past 365 days)        Color   Clarity   Blood   Leuk Est   Nitrite   Protein   CREAT   Urine HCG        02/17/25 0037 Yellow   Clear   Negative   Trace   Negative   Negative                 Microbiology Results (last 10 days)       Procedure Component Value - Date/Time    Body Fluid Culture - Drainage, Peritoneum [[id number]] Collected: 02/24/25 1051    Lab Status: Final result Specimen: Drainage from Peritoneum Updated: 02/27/25 0950     Body Fluid Culture No growth at 3 days     Gram Stain Rare (1+) WBCs seen      No organisms seen     Blood Culture - Blood, Arm, Right [[id number]]  (Normal) Collected: 02/22/25 1306    Lab Status: Final result Specimen: Blood from Arm, Right Updated: 02/27/25 1330     Blood Culture No growth at 5 days    Blood Culture - Blood, Arm, Right [[id number]]  (Normal) Collected: 02/22/25 1301    Lab Status: Final result Specimen: Blood from Arm, Right Updated: 02/27/25 1315     Blood Culture No growth at 5 days    Narrative:      Less than seven (7) mL's of blood was collected.  Insufficient quantity may yield false negative results.            CT Guided Percutaneous Drain Peritoneal    Result Date: 2/24/2025  Impression: Technically successful placement of an 8 [place] drain into the hepatic subcapsular fluid collection which yielded 80 cc bilious appearing fluid. A sample was sent for culture. Electronically Signed: Jl Hendrix MD  2/24/2025 12:15 PM EST  Workstation ID: [id number]    CT Abdomen Pelvis With Contrast    Result Date: 2/22/2025  Impression: CT scan of the abdomen and pelvis with IV contrast demonstrating findings consistent with recent cholecystectomy. Findings concerning for bile leak, as above. Electronically Signed: Lisandro Moreira MD  2/22/2025 4:06 PM EST  Workstation ID: [id number]              Results for orders placed in visit on 02/12/15    SCANNED - ECHOCARDIOGRAM      Labs Pending at Discharge:        Time spent on Discharge including face to face service:  34 minutes    Electronically signed by Darion Mckenzie MD, 02/28/25, 12:34 PM EST.

## 2025-03-02 LAB
QT INTERVAL: 362 MS
QTC INTERVAL: 422 MS

## 2025-03-03 ENCOUNTER — TELEPHONE (OUTPATIENT)
Dept: GASTROENTEROLOGY | Facility: CLINIC | Age: 45
End: 2025-03-03
Payer: COMMERCIAL

## 2025-03-03 ENCOUNTER — TRANSITIONAL CARE MANAGEMENT TELEPHONE ENCOUNTER (OUTPATIENT)
Dept: CALL CENTER | Facility: HOSPITAL | Age: 45
End: 2025-03-03
Payer: COMMERCIAL

## 2025-03-03 NOTE — TELEPHONE ENCOUNTER
Spoke with spouse (no MARILUZ on file), verified patient's phone number.      Mr. Askew states he will have patient listen to voice mail and call me on my direct line.

## 2025-03-03 NOTE — TELEPHONE ENCOUNTER
Patient discharged from [facility] on 02.28.25.    Attempted to contact patient, left voicemail message to return call. Provided direct contact information.

## 2025-03-03 NOTE — OUTREACH NOTE
Call Center TCM Note      Flowsheet Row Responses   [facility] patient discharged from? [facility]   Does the patient have one of the following disease processes/diagnoses(primary or secondary)? Other   TCM attempt successful? Yes  [VR- spouse]   Call start time 0939   Call end time 0941   Discharge diagnosis Bile leak from gallbladder bed   Meds reviewed with patient/caregiver? Yes   Is the patient having any side effects they believe may be caused by any medication additions or changes? No   Does the patient have all medications ordered at discharge? Yes   Is the patient taking all medications as directed (includes completed medication regime)? Yes   Comments HOSP DC FU appt 3/5/25 1115 am   Does the patient have an appointment with their PCP within 7-14 days of discharge? Yes   Has home health visited the patient within 72 hours of discharge? N/A   Psychosocial issues? No   Did the patient receive a copy of their discharge instructions? Yes   Nursing interventions Reviewed instructions with patient   What is the patient's perception of their health status since discharge? Improving   Is the patient/caregiver able to teach back signs and symptoms related to disease process for when to call PCP? Yes   Is the patient/caregiver able to teach back signs and symptoms related to disease process for when to call 911? Yes   Is the patient/caregiver able to teach back the hierarchy of who to call/visit for symptoms/problems? PCP, Specialist, Home health nurse, Urgent Care, ED, 911 Yes   TCM call completed? Yes   Wrap up additional comments  reports Pt is doing well. No needs. Appts set.   Call end time 0941            Nicole Montaño RN    3/3/2025, 09:41 EST

## 2025-03-03 NOTE — TELEPHONE ENCOUNTER
----- Message from Margot Pérez sent at 2/24/2025  4:48 PM EST -----  Inpatient     I have reviewed upper endoscopy. Negative results for H. Pylori, metaplasia, dysplasia and malignancy.

## 2025-03-04 ENCOUNTER — TELEPHONE (OUTPATIENT)
Dept: SURGERY | Facility: CLINIC | Age: 45
End: 2025-03-04
Payer: COMMERCIAL

## 2025-03-04 NOTE — TELEPHONE ENCOUNTER
Provider: KITTY    Caller: Ebony Hamilton    Relationship to Patient: Self    Phone Number: [phone]    Reason for Call: PATIENT  STATES PATIENT STILL IN A LOT OF PAIN. DRAINAGE TUBE NOT MUCH FLUID AND IT FEELS DIFFICULT TO BREATHE

## 2025-03-05 ENCOUNTER — OFFICE VISIT (OUTPATIENT)
Dept: FAMILY MEDICINE CLINIC | Facility: CLINIC | Age: 45
End: 2025-03-05
Payer: COMMERCIAL

## 2025-03-05 ENCOUNTER — ANESTHESIA EVENT (OUTPATIENT)
Dept: GASTROENTEROLOGY | Facility: HOSPITAL | Age: 45
End: 2025-03-05
Payer: COMMERCIAL

## 2025-03-05 VITALS
BODY MASS INDEX: 32.96 KG/M2 | HEART RATE: 82 BPM | WEIGHT: 179.1 LBS | SYSTOLIC BLOOD PRESSURE: 102 MMHG | DIASTOLIC BLOOD PRESSURE: 64 MMHG | HEIGHT: 62 IN | TEMPERATURE: 98.5 F | OXYGEN SATURATION: 95 %

## 2025-03-05 DIAGNOSIS — L02.419 AXILLARY ABSCESS: ICD-10-CM

## 2025-03-05 DIAGNOSIS — Z98.890 HISTORY OF BILIARY DUCT STENT PLACEMENT: ICD-10-CM

## 2025-03-05 DIAGNOSIS — Z09 HOSPITAL DISCHARGE FOLLOW-UP: Primary | ICD-10-CM

## 2025-03-05 DIAGNOSIS — Z90.49 STATUS POST CHOLECYSTECTOMY: ICD-10-CM

## 2025-03-05 PROCEDURE — 87205 SMEAR GRAM STAIN: CPT

## 2025-03-05 PROCEDURE — 87147 CULTURE TYPE IMMUNOLOGIC: CPT

## 2025-03-05 PROCEDURE — 87070 CULTURE OTHR SPECIMN AEROBIC: CPT

## 2025-03-05 PROCEDURE — 87186 SC STD MICRODIL/AGAR DIL: CPT

## 2025-03-05 RX ORDER — DOXYCYCLINE 100 MG/1
100 CAPSULE ORAL 2 TIMES DAILY
Qty: 14 CAPSULE | Refills: 0 | Status: SHIPPED | OUTPATIENT
Start: 2025-03-05 | End: 2025-03-07 | Stop reason: SDUPTHER

## 2025-03-05 NOTE — PROGRESS NOTES
"Ebony Hamilton is a 44 y.o. female admitted to [facility] and  is seen for follow up.  Chief Complaint   Patient presents with    Hospital Follow Up Visit     Admit: 02/22/25; Discharge: 02/28/25; Dx: Bile leak from gallbladder bed post ERCP, sphincterotomy, biliary stent placement. Cholecystectomy performed on 02/17/25.    Bile Leak    Mass     Pt has a red mass under right armpit x few weeks.           2/19/2025     5:57 PM 2/28/2025     6:36 PM   Date of TCM Phone Call   [facility]   Date of Admission 2/16/2025 2/22/2025   Date of Discharge 2/19/2025 2/28/2025   Discharge Disposition Home or Self Care Home or Self Care     Discharge summary is available.  Current outpatient and discharge medications have been reconciled for the patient.  Reviewed by: MILDRED Irby    She was admitted for the following reason(s):  Primary admitting diagnosis at discharge was ***.  Pertinent secondary diagnoses include ***.  Procedures performed while hospitalized:{Procedures Performed in Hospital:29690}  Pending results:  {Pending tests:26000::\"none\"}  Pain Control:  {PAIN CONTROL:69719}  Diet: ***  Activity after Discharge: {discharge activity:39598}  Items to be addressed at first follow up visit include:  1. Medication changes: ***    She reports her overall condition is {Improving/worsening/no change:28590} since discharge.  {Specific complaints:33108}.  Social support is said to be {Desc; adequate/inadequate:96731} to meet her needs. ***.     Objective   Vitals:    03/05/25 1130   BP: 102/64   BP Location: Left arm   Patient Position: Sitting   Cuff Size: Adult   Pulse: 82   Temp: 98.5 °F (36.9 °C)   TempSrc: Oral   SpO2: 95%   Weight: 81.2 kg (179 lb 1.6 oz)   Height: 157.5 cm (62\")     Body mass index is 32.76 kg/m².  Physical Exam    Assessment & Plan   Problem List Items Addressed This Visit    None              "

## 2025-03-05 NOTE — ANESTHESIA PREPROCEDURE EVALUATION
Anesthesia Evaluation     Patient summary reviewed and Nursing notes reviewed   NPO Solid Status: > 8 hours  NPO Liquid Status: > 8 hours           Airway   Mallampati: I  TM distance: >3 FB  Neck ROM: full  No difficulty expected  Dental - normal exam     Pulmonary     breath sounds clear to auscultation  (+) a smoker (former - quit yrs ago) Former,  Cardiovascular - normal exam  Exercise tolerance: good (4-7 METS)    ECG reviewed  Rhythm: regular  Rate: normal    (+) dysrhythmias (RELEASED BY CARDIOLOGIST/ELECTROPHYSIST, NO CURRENT MEDS- states it was a one time incident) Atrial Fib    ROS comment:   EKG 10/2024  Heart Rate     55   SINUS BRADYCARDIA   Summary: Otherwise Normal ECG       ECHO 01/2015  Normal valvular function--LVEF 68%            Neuro/Psych  (+) headaches, numbness (cervical radiculopathy), psychiatric history Anxiety and Depression  GI/Hepatic/Renal/Endo    (+) obesity, GI bleeding (rectal bleeding) resolved    Musculoskeletal     (+) chronic pain (takes Norco)  Abdominal    Substance History      OB/GYN      Comment: S/p hysterectomy        Other   arthritis,     ROS/Med Hx Other: Removal of biliary stent, hx of gastroparesis    Previous cholecystectomy , previous bile leak with CARON gallardo     S/p hysterectomy               Phys Exam Other: Tongue piercing removed                  Anesthesia Plan    ASA 3     general   total IV anesthesia  (Total IV Anesthesia    Patient understands anesthesia not responsible for dental damage.      Discussed risks with pt including aspiration, allergic reactions, apnea, advanced airway placement. Pt verbalized understanding. All questions answered.     )  intravenous induction     Anesthetic plan, risks, benefits, and alternatives have been provided, discussed and informed consent has been obtained with: patient.  Pre-procedure education provided  Plan discussed with CRNA.    CODE STATUS:

## 2025-03-05 NOTE — PROGRESS NOTES
Ebony Hamilton is a 44 y.o. female admitted to [facility] and  is seen for follow up.  Chief Complaint   Patient presents with    Hospital Follow Up Visit     Admit: 02/22/25; Discharge: 02/28/25; Dx: Bile leak from gallbladder bed post ERCP, sphincterotomy, biliary stent placement. Cholecystectomy performed on 02/17/25.    Bile Leak    Mass     Pt has a red mass under right armpit x few weeks.       History of Present Illness  The patient is a 44-year-old female who presents today for a hospital follow-up.    She was discharged from [facility] following a cholecystectomy but had to return to the ER with abdominal pain. Upon her return, she was found to be hypertensive with a blood pressure of 175/110, experiencing tachypnea with a respiration rate of 32, and tachycardia with a heart rate of 130. Her white blood cell count was elevated at 19.28, and her platelet count was 793. A CT scan of the abdomen revealed a fluid collection in the gallbladder bed and additional small peritoneal fluid collections in the abdomen measuring up to 8 x 5 cm, raising concerns for a bile leak. She underwent ERCP with sphincterotomy and stent placement, as well as CT-guided drain placement. Antibiotics were initiated, and body fluid cultures returned negative. She was discharged on 02/28/2025 and is scheduled for an EGD with stent removal tomorrow. On evaluation, she reports significant pain and difficulty taking deep breaths.    She reports severe pain and difficulty with deep breathing. The output from her drain has been minimal since her discharge, although it was significant during her hospital stay. She experiences muscle spasms when attempting to breathe deeply. Her appetite and bowel movements are normal. She is currently on pain medication and empties her drain once daily. She anticipates another week and a half with the drain in place. She recalls an incident where the bag burst during removal, necessitating the  use of staples instead of stitches due to bile leakage. She experienced severe pain and vomiting after consuming a bland meal, which persisted for 16 hours before she sought medical attention. She has contacted general surgery regarding her ongoing pain and issues with the drain but has not yet received a response.    She also reports a large abscess under her arm, which has been present for the past 2 weeks. She attributes this to the use of a new deodorant, which she used for a few days before noticing irritation. She discontinued its use but developed an illness that required a 2-week hospital stay. During this time, the abscess continued to enlarge despite daily showers. She finds the abscess extremely uncomfortable and is unable to fully lower her arm.      ALLERGIES  The patient has an allergy to NICKEL.          2/19/2025     5:57 PM 2/28/2025     6:36 PM   Date of TCM Phone Call   [facility]   Date of Admission 2/16/2025 2/22/2025   Date of Discharge 2/19/2025 2/28/2025   Discharge Disposition Home or Self Care Home or Self Care     Discharge summary is available.  Current outpatient and discharge medications have been reconciled for the patient.  Reviewed by: MILDRED Irby      She was admitted for the following reason(s): Abdominal pain  Primary admitting diagnosis at discharge was bile leak from gallbladder bed.  Pertinent secondary diagnoses include intra-abdominal abscess, chronic pain, anxiety.  Procedures performed while hospitalized:Procedures Performed in Hospital: please see EPIC record for further details of results and finding  Pending results:  Pending tests: none  Pain Control:  well controlled  Diet: Advance as tolerated  Activity after Discharge: no strenuous exercise for 1 month and no driving until sees physician  Items to be addressed at first follow up visit include:  1. Medication changes:     Discharge Medications          Changes to  Medications         Instructions Start Date   HYDROcodone-acetaminophen  MG per tablet  Commonly known as: NORCO  What changed: See the new instructions.    1 tablet, Oral, 5 Times Daily PRN        ondansetron 4 MG tablet  Commonly known as: ZOFRAN  What changed: See the new instructions.    TAKE (1) TABLET EVERY 8 HOURS AS NEEDED FOR NAUSEA AND vomiting        traZODone 50 MG tablet  Commonly known as: DESYREL  What changed:   how much to take  how to take this  when to take this  reasons to take this    Take 0.5 to 2 tab PO QHS PRN sleep                  Continue These Medications         Instructions Start Date   albuterol sulfate  (90 Base) MCG/ACT inhaler  Commonly known as: PROVENTIL HFA;VENTOLIN HFA;PROAIR HFA    2 puffs, Inhalation, Every 6 Hours PRN        bacitracin 500 UNIT/GM ointment    0.9 g, Topical, 2 Times Daily        bisacodyl 10 MG suppository  Commonly known as: DULCOLAX    Insert 1 suppository into the rectum Daily As Needed.        busPIRone 15 MG tablet  Commonly known as: BUSPAR    15 mg, Oral, 3 Times Daily        CALCIUM PO    1 tablet, Oral, Daily        clonazePAM 0.5 MG tablet  Commonly known as: KlonoPIN    0.5 mg, Oral, 2 Times Daily PRN        cyclobenzaprine 5 MG tablet  Commonly known as: FLEXERIL    5 mg, Oral, 3 Times Daily PRN        DULoxetine 30 MG capsule  Commonly known as: CYMBALTA    30 mg, Oral, Daily, TAKES 30mg AND 60mg TOGETHER FOR A TOTAL OF 90mg        DULoxetine 60 MG capsule  Commonly known as: CYMBALTA    60 mg, Oral, Daily, TAKES 30mg AND 60mg TOGETHER FOR A TOTAL OF 90mg        hydrocortisone 2.5 % cream    1 Application, Topical, 2 Times Daily        Linzess 145 MCG capsule capsule  Generic drug: linaclotide    Take 1 capsule by mouth Every Morning Before Breakfast.        montelukast 10 MG tablet  Commonly known as: Singulair    10 mg, Oral, Nightly        pantoprazole 40 MG EC tablet  Commonly known as: PROTONIX    40 mg, Oral, Daily       "  polyethylene glycol 17 g packet  Commonly known as: MIRALAX    17 g, Oral, Daily        prochlorperazine 10 MG tablet  Commonly known as: COMPAZINE    Take 1 tablet by mouth Every 6 (Six) Hours As Needed.        sennosides-docusate 8.6-50 MG per tablet  Commonly known as: PERICOLACE    1 tablet, Oral, Daily        tiZANidine 4 MG tablet  Commonly known as: ZANAFLEX    4-8 mg, Every 6 Hours PRN        vitamin C 500 MG tablet  Commonly known as: ASCORBIC ACID    500 mg, Daily        Vraylar 1.5 MG capsule capsule  Generic drug: Cariprazine HCl    1.5 mg, Oral, Daily                  Stop These Medications       ibuprofen 800 MG tablet  Commonly known as: ADVIL,MOTRIN          She reports her overall condition is are improving since discharge.  No specific complaints.  Social support is said to be adequate to meet her needs.       Objective   Vitals:    03/05/25 1130   BP: 102/64   BP Location: Left arm   Patient Position: Sitting   Cuff Size: Adult   Pulse: 82   Temp: 98.5 °F (36.9 °C)   TempSrc: Oral   SpO2: 95%   Weight: 81.2 kg (179 lb 1.6 oz)   Height: 157.5 cm (62\")     Body mass index is 32.76 kg/m².  Physical Exam  Vitals reviewed.   Constitutional:       Appearance: Normal appearance. She is well-developed.   HENT:      Head: Normocephalic and atraumatic.   Eyes:      Conjunctiva/sclera: Conjunctivae normal.      Pupils: Pupils are equal, round, and reactive to light.   Cardiovascular:      Rate and Rhythm: Normal rate and regular rhythm.      Heart sounds: No murmur heard.     No friction rub. No gallop.   Pulmonary:      Effort: Pulmonary effort is normal. No tachypnea, accessory muscle usage or respiratory distress.      Breath sounds: Normal breath sounds. No wheezing or rhonchi.      Comments: Evident spasms intermittently with deep breathing   Abdominal:      General: Bowel sounds are normal. There is no distension.      Palpations: Abdomen is soft.      Tenderness: There is no abdominal tenderness.    "   Comments: CARON drain to right side of abdomen; minimal serous drainage   Skin:     General: Skin is warm and dry.      Findings: Abscess present.      Comments: Large abscess to right axilla; see image   Neurological:      Mental Status: She is alert and oriented to person, place, and time.      Cranial Nerves: No cranial nerve deficit.   Psychiatric:         Mood and Affect: Mood and affect normal.         Behavior: Behavior normal.         Thought Content: Thought content normal.         Judgment: Judgment normal.       Physical Exam  Lungs sound normal.  Abdomen is soft.  There is a very large abscess under her arm.    Results  Laboratory Studies  White blood cells were elevated at 19.28. Platelets at 793. Body fluid cultures were negative.    Imaging  CT of the abdomen was notable for fluid collection in the gallbladder bed with additional peritoneal small fluid collections in the abdomen measuring up to 8 x 5 cm concerning for bile leak.       Assessment & Plan   Problem List Items Addressed This Visit    None  Visit Diagnoses       Hospital discharge follow-up    -  Primary    Axillary abscess        Relevant Medications    doxycycline (VIBRAMYCIN) 100 MG capsule    Other Relevant Orders    Wound Culture - Wound, Axilla, Right    Status post cholecystectomy        History of biliary duct stent placement              Assessment & Plan  1. Post-cholecystectomy status.  She reports significant pain and difficulty taking deep breaths. The abdominal examination reveals a soft abdomen with tenderness around the drain site. There is no evidence of a collapsed lung or fluid in the lungs. The drain output has been minimal since discharge. She will undergo an EGD with stent removal tomorrow. The drain will remain in place for another week and a half to ensure adequate drainage of any remaining fluid collections.    2. Axillary abscess.  She has a large, painful abscess under her arm, which has grown significantly over  the past two weeks. The abscess will be drained today in the clinic. The procedure will involve numbing the area, making an incision, and packing the wound with sterile iodoform packing. A culture of the abscess will be obtained during the procedure. She is advised to return in a few days, likely on Friday, for a follow-up to assess the wound and repack with iodoform. Patient was instructed to keep the area clean and dry with a dressing to cover the wound. If she develops any fevers, chills, extreme pain to the area she is to return or report to the ED.     Incision & Drainage    Date/Time: 3/5/2025 4:02 PM    Performed by: Karen Stover APRN  Authorized by: Karen Stover APRN  Type: abscess  Body area: upper extremity  Location details: right arm  Anesthesia: local infiltration    Anesthesia:  Local Anesthetic: lidocaine 1% with epinephrine  Anesthetic total: 10 mL    Sedation:  Patient sedated: no    Scalpel size: 10  Incision type: single straight  Drainage: purulent  Drainage amount: copious  Wound treatment: wound left open  Packing material: 1/4 in iodoform gauze  Patient tolerance: patient tolerated the procedure well with no immediate complications        PROCEDURE  The patient underwent a cholecystectomy, ERCP with sphincterotomy and stent placement, and CT-guided drain placement during her recent hospitalization.    Patient or patient representative verbalized consent for the use of Ambient Listening during the visit with  MILDRED Irby for chart documentation. 3/5/2025  13:50 EST    Incision & Drainage    Date/Time: 3/5/2025 4:02 PM    Performed by: Karen Stover APRN  Authorized by: Karen tSover APRN  Type: abscess  Body area: upper extremity  Location details: right arm  Anesthesia: local infiltration    Anesthesia:  Local Anesthetic: lidocaine 1% with epinephrine  Anesthetic total: 10 mL    Sedation:  Patient sedated: no    Scalpel size:  10  Incision type: single straight  Drainage: purulent  Drainage amount: copious  Wound treatment: wound left open  Packing material: 1/4 in iodoform gauze  Patient tolerance: patient tolerated the procedure well with no immediate complications

## 2025-03-06 ENCOUNTER — HOSPITAL ENCOUNTER (OUTPATIENT)
Facility: HOSPITAL | Age: 45
Setting detail: HOSPITAL OUTPATIENT SURGERY
Discharge: HOME | End: 2025-03-06
Attending: INTERNAL MEDICINE | Admitting: INTERNAL MEDICINE
Payer: COMMERCIAL

## 2025-03-06 ENCOUNTER — ANESTHESIA (OUTPATIENT)
Dept: GASTROENTEROLOGY | Facility: HOSPITAL | Age: 45
End: 2025-03-06
Payer: COMMERCIAL

## 2025-03-06 VITALS
BODY MASS INDEX: 32.42 KG/M2 | OXYGEN SATURATION: 99 % | TEMPERATURE: 100.3 F | RESPIRATION RATE: 20 BRPM | HEART RATE: 79 BPM | WEIGHT: 177.25 LBS | DIASTOLIC BLOOD PRESSURE: 74 MMHG | SYSTOLIC BLOOD PRESSURE: 114 MMHG

## 2025-03-06 PROCEDURE — 25010000002 FENTANYL CITRATE (PF) 50 MCG/ML SOLUTION: Performed by: NURSE ANESTHETIST, CERTIFIED REGISTERED

## 2025-03-06 PROCEDURE — 25010000002 LIDOCAINE PF 2% 2 % SOLUTION: Performed by: NURSE ANESTHETIST, CERTIFIED REGISTERED

## 2025-03-06 PROCEDURE — 25810000003 LACTATED RINGERS PER 1000 ML: Performed by: NURSE ANESTHETIST, CERTIFIED REGISTERED

## 2025-03-06 PROCEDURE — 25010000002 PROPOFOL 10 MG/ML EMULSION: Performed by: NURSE ANESTHETIST, CERTIFIED REGISTERED

## 2025-03-06 PROCEDURE — 43247 EGD REMOVE FOREIGN BODY: CPT | Performed by: INTERNAL MEDICINE

## 2025-03-06 RX ORDER — PROPOFOL 10 MG/ML
VIAL (ML) INTRAVENOUS AS NEEDED
Status: DISCONTINUED | OUTPATIENT
Start: 2025-03-06 | End: 2025-03-06 | Stop reason: SURG

## 2025-03-06 RX ORDER — FENTANYL CITRATE 50 UG/ML
INJECTION, SOLUTION INTRAMUSCULAR; INTRAVENOUS AS NEEDED
Status: DISCONTINUED | OUTPATIENT
Start: 2025-03-06 | End: 2025-03-06 | Stop reason: SURG

## 2025-03-06 RX ORDER — SODIUM CHLORIDE, SODIUM LACTATE, POTASSIUM CHLORIDE, CALCIUM CHLORIDE 600; 310; 30; 20 MG/100ML; MG/100ML; MG/100ML; MG/100ML
30 INJECTION, SOLUTION INTRAVENOUS CONTINUOUS
Status: DISCONTINUED | OUTPATIENT
Start: 2025-03-06 | End: 2025-03-06 | Stop reason: HOSPADM

## 2025-03-06 RX ORDER — LIDOCAINE HYDROCHLORIDE 20 MG/ML
INJECTION, SOLUTION EPIDURAL; INFILTRATION; INTRACAUDAL; PERINEURAL AS NEEDED
Status: DISCONTINUED | OUTPATIENT
Start: 2025-03-06 | End: 2025-03-06 | Stop reason: SURG

## 2025-03-06 RX ADMIN — SODIUM CHLORIDE, SODIUM LACTATE, POTASSIUM CHLORIDE, AND CALCIUM CHLORIDE 30 ML/HR: .6; .31; .03; .02 INJECTION, SOLUTION INTRAVENOUS at 07:53

## 2025-03-06 RX ADMIN — FENTANYL CITRATE 100 MCG: 50 INJECTION, SOLUTION INTRAMUSCULAR; INTRAVENOUS at 08:55

## 2025-03-06 RX ADMIN — PROPOFOL 250 MCG/KG/MIN: 10 INJECTION, EMULSION INTRAVENOUS at 08:57

## 2025-03-06 RX ADMIN — LIDOCAINE HYDROCHLORIDE 100 MG: 20 INJECTION, SOLUTION EPIDURAL; INFILTRATION; INTRACAUDAL; PERINEURAL at 08:57

## 2025-03-06 RX ADMIN — PROPOFOL 100 MG: 10 INJECTION, EMULSION INTRAVENOUS at 08:57

## 2025-03-06 NOTE — H&P
Pre Procedure History & Physical    Chief Complaint:   Epigastric pain, history of bile leak, bile peritonitis, ERCP and bile duct, pancreatic duct stent placement    Subjective     HPI:   As above    Past Medical History:   Past Medical History:   Diagnosis Date    Allergic     Anxiety     Atrial fibrillation     RELEASED BY CARDIOLOGIST/ELECTROPHYSIST, NO CURRENT MEDS    Chronic pain disorder     Depression     Endometriosis     Headache     Hemorrhoids     Injury of shoulder, right 2009    CHRONIC PAIN    Panic disorder     S/P laparoscopic cholecystectomy 02/17/2025       Past Surgical History:  Past Surgical History:   Procedure Laterality Date    CERVICAL ARTHRODESIS  01/14/2015    Donaldo Albarran    CERVICAL FUSION      C4-7 FUSION, FULL ROM    CHOLECYSTECTOMY N/A 2/17/2025    Procedure: CHOLECYSTECTOMY LAPAROSCOPIC plain language: removal of gallbladder thru small incisions using long instruments and a camera;  Surgeon: Jousé Castano MD;  Location: [facility] MAIN OR;  Service: General;  Laterality: N/A;    COLONOSCOPY N/A 05/31/2022    Procedure: COLONOSCOPY;  Surgeon: Shabbir Baird MD;  Location: [facility] ENDOSCOPY;  Service: General;  Laterality: N/A;  HEMORRHOIDS    ENDOSCOPY N/A 2/17/2025    Procedure: ESOPHAGOGASTRODUODENOSCOPY WITH BIOPSIES;  Surgeon: Sanya Reyes MD;  Location: [facility] ENDOSCOPY;  Service: Gastroenterology;  Laterality: N/A;  GASTRITIS, SMALL HIATAL HERNIA    ERCP N/A 2/24/2025    Procedure: ENDOSCOPIC RETROGRADE CHOLANGIOPANCREATOGRAPHY with bile duct stent placement and pancreatic duct stent placement;  Surgeon: Ha Thompson MD;  Location: [facility] ENDOSCOPY;  Service: Gastroenterology;  Laterality: N/A;  bile duct leeak    EXPLORATORY LAPAROTOMY      HEMORRHOIDECTOMY N/A 05/31/2022    Procedure: HEMORRHOID BANDING;  Surgeon: Shabbir Baird MD;  Location: [facility] ENDOSCOPY;  Service: General;  Laterality: N/A;  BANDS X 2    HEMORRHOIDECTOMY N/A 1/31/2024     Procedure: HEMORRHOIDECTOMY;  Surgeon: Shabbir Baird MD;  Location: [facility];  Service: General;  Laterality: N/A;    HYSTERECTOMY      SHOULDER ARTHROSCOPY Right     SHOULDER SURGERY Left     ARTHROSCOPY LABRAL TEAR X2    TUBAL ABDOMINAL LIGATION         Family History:  Family History   Problem Relation Age of Onset    Depression Mother     Bipolar disorder Mother     Anxiety disorder Mother     Cancer Mother     Heart disease Mother     Hypertension Mother     Anxiety disorder Father     Hypertension Father     Dementia Paternal Uncle     Dementia Paternal Grandmother     Heart disease Other     Colon cancer Neg Hx     Malig Hyperthermia Neg Hx        Social History:   reports that she quit smoking about 6 years ago. Her smoking use included cigarettes. She started smoking about 26 years ago. She has a 5 pack-year smoking history. She has never used smokeless tobacco. She reports current alcohol use. She reports that she does not use drugs.    Medications:   Medications Prior to Admission   Medication Sig Dispense Refill Last Dose/Taking    busPIRone (BUSPAR) 15 MG tablet Take 1 tablet by mouth 3 (Three) Times a Day. 90 tablet 1 3/5/2025    CALCIUM PO Take 1 tablet by mouth Daily.   3/6/2025 Morning    clonazePAM (KlonoPIN) 0.5 MG tablet Take 1 tablet by mouth 2 (Two) Times a Day As Needed for Anxiety. 20 tablet 0 Past Month    cyclobenzaprine (FLEXERIL) 5 MG tablet Take 1 tablet by mouth 3 (Three) Times a Day As Needed for Muscle Spasms. 10 tablet 0 3/6/2025 Morning    doxycycline (VIBRAMYCIN) 100 MG capsule Take 1 capsule by mouth 2 (Two) Times a Day for 7 days. 14 capsule 0 3/5/2025    DULoxetine (CYMBALTA) 30 MG capsule Take 1 capsule by mouth Daily. TAKES 30mg AND 60mg TOGETHER FOR A TOTAL OF 90mg   3/5/2025    DULoxetine (CYMBALTA) 60 MG capsule Take 1 capsule by mouth Daily. TAKES 30mg AND 60mg TOGETHER FOR A TOTAL OF 90mg   3/6/2025 Morning    HYDROcodone-acetaminophen (NORCO)  MG  per tablet Take 1 tablet by mouth 5 (Five) Times a Day As Needed for Moderate Pain for up to 20 doses. 20 tablet 0 3/6/2025 Morning    montelukast (Singulair) 10 MG tablet Take 1 tablet by mouth Every Night. 90 tablet 1 3/6/2025 Morning    pantoprazole (PROTONIX) 40 MG EC tablet Take 1 tablet by mouth Daily. 90 tablet 3 3/6/2025 Morning    prochlorperazine (COMPAZINE) 10 MG tablet Take 1 tablet by mouth Every 6 (Six) Hours As Needed.   3/6/2025 Morning    tiZANidine (ZANAFLEX) 4 MG tablet Take 1-2 tablets by mouth Every 6 (Six) Hours As Needed.   3/5/2025    traZODone (DESYREL) 50 MG tablet Take 0.5 to 2 tab PO QHS PRN sleep (Patient taking differently: Take 0.5-2 tablets by mouth At Night As Needed. Take 0.5 to 2 tab PO QHS PRN sleep) 90 tablet 2 Past Month    vitamin C (ASCORBIC ACID) 500 MG tablet Take 1 tablet by mouth Daily. LAST DOSE 1/28/24   3/6/2025 Morning    albuterol sulfate  (90 Base) MCG/ACT inhaler Inhale 2 puffs Every 6 (Six) Hours As Needed for Wheezing. 18 g 1 More than a month    bacitracin 500 UNIT/GM ointment Apply 1 Application topically to the appropriate area as directed 2 (Two) Times a Day. 14 g 0     bisacodyl (DULCOLAX) 10 MG suppository Insert 1 suppository into the rectum Daily As Needed.       hydrocortisone 2.5 % cream Apply 1 Application topically to the appropriate area as directed 2 (Two) Times a Day. 28 g 2 More than a month    ondansetron (ZOFRAN) 4 MG tablet TAKE (1) TABLET EVERY 8 HOURS AS NEEDED FOR NAUSEA AND vomiting (Patient taking differently: Take 1 tablet by mouth Every 8 (Eight) Hours As Needed.) 30 tablet 1 More than a month    polyethylene glycol (MIRALAX) 17 g packet Take 17 g by mouth Daily. 14 packet 0     sennosides-docusate (senna-docusate sodium) 8.6-50 MG per tablet Take 1 tablet by mouth Daily. 30 tablet 2        Allergies:  Escitalopram, Sulfa antibiotics, Baclofen, Ciprofloxacin, Codeine, Gold-containing drug products, Latex, Nickel, and Tegaderm ag  mesh [silver]        Objective     Weight 80.4 kg (177 lb 4 oz), not currently breastfeeding.    Physical Exam   Constitutional: Pt is oriented to person, place, and time and well-developed, well-nourished, and in no distress.   Mouth/Throat: Oropharynx is clear and moist.   Neck: Normal range of motion.   Cardiovascular: Normal rate, regular rhythm and normal heart sounds.    Pulmonary/Chest: Effort normal and breath sounds normal.   Abdominal: Soft. Nontender  Skin: Skin is warm and dry.   Psychiatric: Mood, memory, affect and judgment normal.     Assessment & Plan     Diagnosis:  Pancreatic duct stent which need to be removed    Anticipated Surgical Procedure:  EGD    The risks, benefits, and alternatives of this procedure have been discussed with the patient or the responsible party- the patient understands and agrees to proceed.

## 2025-03-06 NOTE — ANESTHESIA POSTPROCEDURE EVALUATION
Patient: Ebony Hamilton    Procedure Summary       Date: 03/06/25 Room / Location: [facility] ENDOSCOPY 4 / [facility] ENDOSCOPY    Anesthesia Start: 0850 Anesthesia Stop: 0910    Procedure: ESOPHAGOGASTRODUODENOSCOPY with pancreatic stent removal Diagnosis:       Encounter for removal of biliary stent      (Encounter for removal of biliary stent [Z46.89])    Surgeons: Ha Thompson MD Provider: Wander Castro CRNA    Anesthesia Type: general ASA Status: 3            Anesthesia Type: general    Vitals  Vitals Value Taken Time   /74 03/06/25 0925   Temp 37.9 °C (100.3 °F) 03/06/25 0925   Pulse 79 03/06/25 0925   Resp 20 03/06/25 0925   SpO2 99 % 03/06/25 0925           Post Anesthesia Care and Evaluation    Patient location during evaluation: bedside  Patient participation: complete - patient participated  Level of consciousness: awake  Pain management: adequate    Airway patency: patent  Anesthetic complications: No anesthetic complications  PONV Status: controlled  Cardiovascular status: acceptable and stable  Respiratory status: acceptable

## 2025-03-06 NOTE — TELEPHONE ENCOUNTER
I have called and spoke with this patient.She is reporting moderate amount of pain and stating that she feels like it is hard to breathe at times. She has an appt with Jay to get stent removed tomorrow. I did educate the patient, that if she feels like she is SOB and can't catch her breath then she should proceed to the ED. Patient verbalized understanding.

## 2025-03-07 ENCOUNTER — TELEPHONE (OUTPATIENT)
Dept: GASTROENTEROLOGY | Facility: CLINIC | Age: 45
End: 2025-03-07
Payer: COMMERCIAL

## 2025-03-07 ENCOUNTER — OFFICE VISIT (OUTPATIENT)
Dept: FAMILY MEDICINE CLINIC | Facility: CLINIC | Age: 45
End: 2025-03-07
Payer: COMMERCIAL

## 2025-03-07 ENCOUNTER — TELEPHONE (OUTPATIENT)
Dept: SURGERY | Facility: CLINIC | Age: 45
End: 2025-03-07
Payer: COMMERCIAL

## 2025-03-07 VITALS
HEART RATE: 81 BPM | TEMPERATURE: 97.7 F | OXYGEN SATURATION: 96 % | SYSTOLIC BLOOD PRESSURE: 96 MMHG | HEIGHT: 62 IN | BODY MASS INDEX: 32.37 KG/M2 | DIASTOLIC BLOOD PRESSURE: 54 MMHG | WEIGHT: 175.9 LBS

## 2025-03-07 DIAGNOSIS — L02.419 AXILLARY ABSCESS: ICD-10-CM

## 2025-03-07 DIAGNOSIS — K91.89 POSTOPERATIVE BILE LEAK: Primary | ICD-10-CM

## 2025-03-07 DIAGNOSIS — K83.8 POSTOPERATIVE BILE LEAK: Primary | ICD-10-CM

## 2025-03-07 DIAGNOSIS — Z51.89 WOUND CHECK, ABSCESS: ICD-10-CM

## 2025-03-07 DIAGNOSIS — L02.419 AXILLARY ABSCESS: Primary | ICD-10-CM

## 2025-03-07 RX ORDER — HYDROCODONE BITARTRATE AND ACETAMINOPHEN 10; 325 MG/1; MG/1
1 TABLET ORAL EVERY 4 HOURS PRN
Qty: 15 TABLET | Refills: 0 | Status: ON HOLD | OUTPATIENT
Start: 2025-03-07

## 2025-03-07 RX ORDER — DOXYCYCLINE 100 MG/1
100 CAPSULE ORAL 2 TIMES DAILY
Qty: 14 CAPSULE | Refills: 0 | Status: ON HOLD | OUTPATIENT
Start: 2025-03-07 | End: 2025-03-14

## 2025-03-07 NOTE — TELEPHONE ENCOUNTER
"SOMMER GOMEZ REACHED OUT TO ME AND WANTED TO KNOW IF WE COULD REACH OUT TO THE PHARMACY TO \"they will not fill it unless they speak with Madina Hassan or one of her nurses to clarify that it is in addition to the pain medication from pain management. Is someone able to speak with the pharmacy so that she can get the additional medication? \"     I CALLED THE PHARMACY AND THE LADY I SPOKE WITH STATED THAT PATIENT HAS HYDROCODONE AT HOME FROM PAIN MANAGEMENT. HER ONE FROM PAIN MANAGEMENT IS EXPECTED TO BE FILLED TOMORROW. PAIN MANAGEMENT HYDRO IS 1 EVERY 4 HOURS 4-5X A DAY AND THE ONE FROM US WOULD BE 6X A DAY. THEY WANTED TO MAKE SURE WE WERE AWARE OF THE ONE FROM PAIN MANAGEMENT.     THE PHARMACY WANTED TO KNOW WHEN SHE HAD SURGERY, SINCE PATIENT HAD STATED SHE JUST HAD SURGERY. INFORMED PHARMACY THAT SHE HAD SURGERY WITH DR TANG ON 2/17/25. THEY WERE UNDER THE IMPRESSION FROM THE PATIENT THAT SHE HAD SURGERY JUST LIKE YESTERDAY OR WITHIN THE LAST WEEK. THEY STATE THEY CANNOT FILL THIS SCRIPT UNLESS INFORMED THEY CAN FROM US. AGAIN, SHE WILL GET HER SCRIPT FROM PAIN MANAGEMENT TOMORROW.   "

## 2025-03-07 NOTE — TELEPHONE ENCOUNTER
Caller: Ebony Hamilton    Relationship: Self    Best call back number: [phone]     Requested Prescriptions:   Requested Prescriptions     Pending Prescriptions Disp Refills    doxycycline (VIBRAMYCIN) 100 MG capsule 14 capsule 0     Sig: Take 1 capsule by mouth 2 (Two) Times a Day for 7 days.        Pharmacy where request should be sent: [facility] [place], [place] - [address] [place] [address] [phone] [facility] [phone]      Last office visit with prescribing clinician: 3/7/2025   Last telemedicine visit with prescribing clinician: Visit date not found   Next office visit with prescribing clinician: 3/11/2025     Additional details provided by patient: DOG ATE PRESCRIPTION, PATIENT ONLY HAD 4 DOSES.    PLEASE SEND IN ANOTHER SCRIPT.    CONTACT PATIENT WHEN COMPLETE.    Does the patient have less than a 3 day supply:  [x] Yes  [] No    Would you like a call back once the refill request has been completed: [x] Yes [] No    If the office needs to give you a call back, can they leave a voicemail: [x] Yes [] No    Jazzmine Noriega Rep   03/07/25 16:02 EST

## 2025-03-07 NOTE — PROGRESS NOTES
Chief Complaint  Chief Complaint   Patient presents with    Wound Check     Pt had a right axillary abscess drained at last appt. She is here to have the area checked.       Subjective      Ebony Hamilton presents to [facility]  History of Present Illness  The patient presents today for a wound recheck.    She had a large right axillary abscess, which was incised and drained. The wound culture came back positive for MRSA, and she was initiated on doxycycline on Wednesday. She reports a malodorous smell emanating from her armpit, which she attributes to her inability to wash the area due to the wound. She is uncertain about the presence of an open bump prior to her hospital admission. The bandage was changed last night, and she reports significant drainage from the wound. She has been using chlorhexidine mixed with saline to flush the wound. She has been managing her pain with Norco 10, prescribed by her surgeon, but anticipates running out of the medication tonight. She has requested a refill of her pain medication.    Supplemental Information  She had a procedure yesterday and everything looked good. She will go back in 6 weeks to have the bile duct stent removed. She was told that if it was up to him, he would have taken the drain out yesterday, but it is up to Dr. Castnao's office. She is seeing a dietitian on Wednesday for the gastroparesis and for what was going on with her gallbladder.    ALLERGIES  The patient is allergic to SULFA.    Objective     Medical History:  Past Medical History:   Diagnosis Date    Allergic     Anxiety     Atrial fibrillation     RELEASED BY CARDIOLOGIST/ELECTROPHYSIST, NO CURRENT MEDS    Chronic pain disorder     Depression     Endometriosis     Headache     Hemorrhoids     Injury of shoulder, right 2009    CHRONIC PAIN    Panic disorder     S/P laparoscopic cholecystectomy 02/17/2025     Past Surgical History:   Procedure Laterality Date     CERVICAL ARTHRODESIS  01/14/2015    Donaldo Albarran    CERVICAL FUSION      C4-7 FUSION, FULL ROM    CHOLECYSTECTOMY N/A 2/17/2025    Procedure: CHOLECYSTECTOMY LAPAROSCOPIC plain language: removal of gallbladder thru small incisions using long instruments and a camera;  Surgeon: Josué Castano MD;  Location: [facility] MAIN OR;  Service: General;  Laterality: N/A;    COLONOSCOPY N/A 05/31/2022    Procedure: COLONOSCOPY;  Surgeon: Shabbir Baird MD;  Location: [facility] ENDOSCOPY;  Service: General;  Laterality: N/A;  HEMORRHOIDS    ENDOSCOPY N/A 2/17/2025    Procedure: ESOPHAGOGASTRODUODENOSCOPY WITH BIOPSIES;  Surgeon: Sanya Reyes MD;  Location: [facility] ENDOSCOPY;  Service: Gastroenterology;  Laterality: N/A;  GASTRITIS, SMALL HIATAL HERNIA    ENDOSCOPY N/A 3/6/2025    Procedure: ESOPHAGOGASTRODUODENOSCOPY with pancreatic stent removal;  Surgeon: Ha Thompson MD;  Location: [facility] ENDOSCOPY;  Service: Gastroenterology;  Laterality: N/A;  pancreatic stent removal, hiatal hernia    ERCP N/A 2/24/2025    Procedure: ENDOSCOPIC RETROGRADE CHOLANGIOPANCREATOGRAPHY with bile duct stent placement and pancreatic duct stent placement;  Surgeon: Ha Thompson MD;  Location: [facility] ENDOSCOPY;  Service: Gastroenterology;  Laterality: N/A;  bile duct leeak    EXPLORATORY LAPAROTOMY      HEMORRHOIDECTOMY N/A 05/31/2022    Procedure: HEMORRHOID BANDING;  Surgeon: Shabbir Baird MD;  Location: [facility] ENDOSCOPY;  Service: General;  Laterality: N/A;  BANDS X 2    HEMORRHOIDECTOMY N/A 1/31/2024    Procedure: HEMORRHOIDECTOMY;  Surgeon: Shabbir Baird MD;  Location: [facility] OR [facility];  Service: General;  Laterality: N/A;    HYSTERECTOMY      SHOULDER ARTHROSCOPY Right     SHOULDER SURGERY Left     ARTHROSCOPY LABRAL TEAR X2    TUBAL ABDOMINAL LIGATION        Social History     Tobacco Use    Smoking status: Former     Current packs/day: 0.00     Average packs/day: 0.3 packs/day for 20.0 years  (5.0 ttl pk-yrs)     Types: Cigarettes     Start date: 1999     Quit date: 2019     Years since quittin.1    Smokeless tobacco: Never   Vaping Use    Vaping status: Never Used   Substance Use Topics    Alcohol use: Yes     Comment: rarely    Drug use: Never     Family History   Problem Relation Age of Onset    Depression Mother     Bipolar disorder Mother     Anxiety disorder Mother     Cancer Mother     Heart disease Mother     Hypertension Mother     Anxiety disorder Father     Hypertension Father     Dementia Paternal Uncle     Dementia Paternal Grandmother     Heart disease Other     Colon cancer Neg Hx     Malig Hyperthermia Neg Hx        Medications:  Prior to Admission medications    Medication Sig Start Date End Date Taking? Authorizing Provider   albuterol sulfate  (90 Base) MCG/ACT inhaler Inhale 2 puffs Every 6 (Six) Hours As Needed for Wheezing. 25   Karen Stover APRN   bacitracin 500 UNIT/GM ointment Apply 1 Application topically to the appropriate area as directed 2 (Two) Times a Day. 25   Keo Rao PA   bisacodyl (DULCOLAX) 10 MG suppository Insert 1 suppository into the rectum Daily As Needed. 23   Avani Vela MD   busPIRone (BUSPAR) 15 MG tablet Take 1 tablet by mouth 3 (Three) Times a Day. 25   Karen Stover APRN   CALCIUM PO Take 1 tablet by mouth Daily.    Avani Vela MD   clonazePAM (KlonoPIN) 0.5 MG tablet Take 1 tablet by mouth 2 (Two) Times a Day As Needed for Anxiety. 25   Karen Stover APRN   cyclobenzaprine (FLEXERIL) 5 MG tablet Take 1 tablet by mouth 3 (Three) Times a Day As Needed for Muscle Spasms. 25   Bruno Simon MD   doxycycline (VIBRAMYCIN) 100 MG capsule Take 1 capsule by mouth 2 (Two) Times a Day for 7 days. 3/5/25 3/12/25  Karen Stover APRN   DULoxetine (CYMBALTA) 30 MG capsule Take 1 capsule by mouth Daily. TAKES 30mg AND 60mg TOGETHER FOR A TOTAL OF 90mg     Avani Vela MD   DULoxetine (CYMBALTA) 60 MG capsule Take 1 capsule by mouth Daily. TAKES 30mg AND 60mg TOGETHER FOR A TOTAL OF 90mg    Avani Vela MD   HYDROcodone-acetaminophen (NORCO)  MG per tablet Take 1 tablet by mouth Every 4 (Four) Hours As Needed for Moderate Pain. 3/7/25   Madina Hassan APRN   hydrocortisone 2.5 % cream Apply 1 Application topically to the appropriate area as directed 2 (Two) Times a Day. 6/27/24   Karen Stover APRN   montelukast (Singulair) 10 MG tablet Take 1 tablet by mouth Every Night. 1/17/25   Karen Stover APRN   ondansetron (ZOFRAN) 4 MG tablet TAKE (1) TABLET EVERY 8 HOURS AS NEEDED FOR NAUSEA AND vomiting  Patient taking differently: Take 1 tablet by mouth Every 8 (Eight) Hours As Needed. 10/14/24   Karen Stover APRN   pantoprazole (PROTONIX) 40 MG EC tablet Take 1 tablet by mouth Daily. 11/11/24   Karen Stover APRN   polyethylene glycol (MIRALAX) 17 g packet Take 17 g by mouth Daily. 1/31/24   Shabbir Baird MD   prochlorperazine (COMPAZINE) 10 MG tablet Take 1 tablet by mouth Every 6 (Six) Hours As Needed.    Avani Vela MD   sennosides-docusate (senna-docusate sodium) 8.6-50 MG per tablet Take 1 tablet by mouth Daily. 6/27/24   Karen Stover APRN   tiZANidine (ZANAFLEX) 4 MG tablet Take 1-2 tablets by mouth Every 6 (Six) Hours As Needed. 5/23/24   Avani Vela MD   traZODone (DESYREL) 50 MG tablet Take 0.5 to 2 tab PO QHS PRN sleep  Patient taking differently: Take 0.5-2 tablets by mouth At Night As Needed. Take 0.5 to 2 tab PO QHS PRN sleep 8/30/24   Karen Stover APRN   vitamin C (ASCORBIC ACID) 500 MG tablet Take 1 tablet by mouth Daily. LAST DOSE 1/28/24    Avani Vela MD   HYDROcodone-acetaminophen (NORCO)  MG per tablet Take 1 tablet by mouth 5 (Five) Times a Day As Needed for Moderate Pain for up to 20 doses. 2/28/25 3/7/25  Sera  "MD Darion        Allergies:   Escitalopram, Sulfa antibiotics, Baclofen, Ciprofloxacin, Codeine, Gold-containing drug products, Latex, Nickel, and Tegaderm ag mesh [silver]    Health Maintenance Due   Topic Date Due    TDAP/TD VACCINES (2 - Td or Tdap) 01/01/2022    HEPATITIS C SCREENING  Never done    MAMMOGRAM  Never done    COVID-19 Vaccine (1 - 2024-25 season) Never done         Vital Signs:   BP 96/54 (BP Location: Left arm, Patient Position: Sitting, Cuff Size: Adult)   Pulse 81   Temp 97.7 °F (36.5 °C) (Oral)   Ht 157.5 cm (62\")   Wt 79.8 kg (175 lb 14.4 oz)   SpO2 96%   BMI 32.17 kg/m²     Wt Readings from Last 3 Encounters:   03/07/25 79.8 kg (175 lb 14.4 oz)   03/06/25 80.4 kg (177 lb 4 oz)   03/05/25 81.2 kg (179 lb 1.6 oz)     BP Readings from Last 3 Encounters:   03/07/25 96/54   03/06/25 114/74   03/05/25 102/64       Physical Exam  Vitals reviewed.   Constitutional:       Appearance: Normal appearance. She is well-developed.   HENT:      Head: Normocephalic and atraumatic.   Eyes:      Conjunctiva/sclera: Conjunctivae normal.      Pupils: Pupils are equal, round, and reactive to light.   Cardiovascular:      Rate and Rhythm: Normal rate and regular rhythm.   Pulmonary:      Effort: Pulmonary effort is normal.      Breath sounds: Normal breath sounds.   Skin:     General: Skin is warm and dry.      Findings: Wound present.   Neurological:      Mental Status: She is alert and oriented to person, place, and time.   Psychiatric:         Mood and Affect: Affect normal.       Physical Exam        Result Review :    The following data was reviewed by MILDRED Irby on 03/07/25 at 15:02 EST:        CT Guided Percutaneous Drain Peritoneal    Result Date: 2/24/2025  Impression: Technically successful placement of an 8 Greek drain into the hepatic subcapsular fluid collection which yielded 80 cc bilious appearing fluid. A sample was sent for culture. Electronically Signed: Jl Hendrix MD "  2/24/2025 12:15 PM EST  Workstation ID: [id number]    CT Abdomen Pelvis With Contrast    Result Date: 2/22/2025  Impression: CT scan of the abdomen and pelvis with IV contrast demonstrating findings consistent with recent cholecystectomy. Findings concerning for bile leak, as above. Electronically Signed: Lisandro Moreira MD  2/22/2025 4:06 PM EST  Workstation ID: [id number]    XR Abdomen Flat & Upright    Result Date: 2/17/2025  Nonobstructive bowel gas pattern. No free air. Electronically Signed: Yayo Ford MD  2/17/2025 6:51 AM EST  Workstation ID: [id number]    US Gallbladder    Result Date: 2/17/2025  1. Small gallstone in the gallbladder neck with no biliary obstruction or gallbladder wall thickening. There is a positive sonographic Guzman's sign of questionable significance. If there is continued concern for acute cholecystitis, HIDA scan may be beneficial. 2. Otherwise negative. Electronically Signed: Yayo Ford MD  2/17/2025 12:08 AM EST  Workstation ID: [id number]    XR Femur 2 View Right    Result Date: 1/11/2025  Impression: No acute osseous abnormality. Electronically Signed: Reji Martinez MD  1/11/2025 5:10 PM EST  Workstation ID: [id number]      Results  Laboratory Studies  Wound culture came back as MRSA.               Assessment and Plan    Diagnoses and all orders for this visit:    1. Axillary abscess (Primary)    2. Wound check, abscess       Assessment & Plan  1. Right axillary abscess.  The wound culture confirmed Methicillin-resistant Staphylococcus aureus (MRSA). She was initiated on doxycycline, which is effective against MRSA. The wound was flushed and repacked with iodoform gauze today. She reports no gastrointestinal upset from the medication. She is advised to continue taking doxycycline as prescribed. If the packing comes out, she should pull it out and notify the office. A prescription for Norco 10 mg will be sent to her pharmacy, and the pharmacy will be contacted to authorize the  refill.    Follow-up  The patient will follow up on Tuesday for wound check.    PROCEDURE  Incision and drainage of a large right axillary abscess was performed at previous appointment. The wound was flushed and repacked with iodoform gauze today.            Follow Up   Return in about 4 days (around 3/11/2025) for Recheck.  Patient was given instructions and counseling regarding her condition or for health maintenance advice. Please see specific information pulled into the AVS if appropriate.     Please note that portions of this note were completed with a voice recognition program.    Patient or patient representative verbalized consent for the use of Ambient Listening during the visit with  MILDRED Irby for chart documentation. 3/7/2025  15:01 EST

## 2025-03-07 NOTE — TELEPHONE ENCOUNTER
Patient called and [facility] pharmacy says she needs a PA for her Hydrocodone. Informed patient staff is gone for the day.

## 2025-03-07 NOTE — TELEPHONE ENCOUNTER
PATIENT HAD SURGERY BY DR. TANG.  SHE SAID SHE SPOKE TO APRIL THIS WEEK, AND SHE TOLD HER TO CALL HER, IF SHE NEEDS A REFILL ON PAIN MEDICATION.  SHE ASKED FOR A REFILL TO BE SENT TO [facility]    #[phone]

## 2025-03-08 ENCOUNTER — HOSPITAL ENCOUNTER (INPATIENT)
Facility: HOSPITAL | Age: 45
LOS: 2 days | Discharge: HOME | End: 2025-03-11
Attending: EMERGENCY MEDICINE | Admitting: SURGERY
Payer: COMMERCIAL

## 2025-03-08 ENCOUNTER — APPOINTMENT (OUTPATIENT)
Dept: CT IMAGING | Facility: HOSPITAL | Age: 45
End: 2025-03-08
Payer: COMMERCIAL

## 2025-03-08 DIAGNOSIS — D75.839 THROMBOCYTOSIS: ICD-10-CM

## 2025-03-08 DIAGNOSIS — Z90.49 S/P LAPAROSCOPIC APPENDECTOMY: ICD-10-CM

## 2025-03-08 DIAGNOSIS — K35.80 ACUTE APPENDICITIS, UNSPECIFIED ACUTE APPENDICITIS TYPE: Primary | ICD-10-CM

## 2025-03-08 LAB
BACTERIA SPEC AEROBE CULT: ABNORMAL
GRAM STN SPEC: ABNORMAL
GRAM STN SPEC: ABNORMAL

## 2025-03-08 PROCEDURE — 80053 COMPREHEN METABOLIC PANEL: CPT | Performed by: EMERGENCY MEDICINE

## 2025-03-08 PROCEDURE — 25010000002 ONDANSETRON PER 1 MG: Performed by: EMERGENCY MEDICINE

## 2025-03-08 PROCEDURE — 99285 EMERGENCY DEPT VISIT HI MDM: CPT

## 2025-03-08 PROCEDURE — 85025 COMPLETE CBC W/AUTO DIFF WBC: CPT | Performed by: EMERGENCY MEDICINE

## 2025-03-08 PROCEDURE — 83605 ASSAY OF LACTIC ACID: CPT | Performed by: EMERGENCY MEDICINE

## 2025-03-08 PROCEDURE — 85007 BL SMEAR W/DIFF WBC COUNT: CPT | Performed by: EMERGENCY MEDICINE

## 2025-03-08 PROCEDURE — 83690 ASSAY OF LIPASE: CPT | Performed by: EMERGENCY MEDICINE

## 2025-03-08 PROCEDURE — 25010000002 HYDROMORPHONE 1 MG/ML SOLUTION: Performed by: EMERGENCY MEDICINE

## 2025-03-08 PROCEDURE — 74177 CT ABD & PELVIS W/CONTRAST: CPT

## 2025-03-08 RX ORDER — SODIUM CHLORIDE 0.9 % (FLUSH) 0.9 %
10 SYRINGE (ML) INJECTION AS NEEDED
Status: DISCONTINUED | OUTPATIENT
Start: 2025-03-08 | End: 2025-03-11 | Stop reason: HOSPADM

## 2025-03-08 RX ORDER — ONDANSETRON 2 MG/ML
4 INJECTION INTRAMUSCULAR; INTRAVENOUS ONCE
Status: COMPLETED | OUTPATIENT
Start: 2025-03-09 | End: 2025-03-08

## 2025-03-08 RX ADMIN — ONDANSETRON 4 MG: 2 INJECTION INTRAMUSCULAR; INTRAVENOUS at 23:49

## 2025-03-08 RX ADMIN — HYDROMORPHONE HYDROCHLORIDE 1 MG: 1 INJECTION, SOLUTION INTRAMUSCULAR; INTRAVENOUS; SUBCUTANEOUS at 23:49

## 2025-03-09 ENCOUNTER — ANESTHESIA (OUTPATIENT)
Dept: PERIOP | Facility: HOSPITAL | Age: 45
End: 2025-03-09
Payer: COMMERCIAL

## 2025-03-09 ENCOUNTER — ANESTHESIA EVENT (OUTPATIENT)
Dept: PERIOP | Facility: HOSPITAL | Age: 45
End: 2025-03-09
Payer: COMMERCIAL

## 2025-03-09 ENCOUNTER — READMISSION MANAGEMENT (OUTPATIENT)
Dept: CALL CENTER | Facility: HOSPITAL | Age: 45
End: 2025-03-09
Payer: COMMERCIAL

## 2025-03-09 PROBLEM — K37 APPENDICITIS: Status: ACTIVE | Noted: 2025-03-09

## 2025-03-09 LAB
ALBUMIN SERPL-MCNC: 4 G/DL (ref 3.5–5.2)
ALBUMIN/GLOB SERPL: 0.9 G/DL
ALP SERPL-CCNC: 119 U/L (ref 39–117)
ALT SERPL W P-5'-P-CCNC: 9 U/L (ref 1–33)
ANION GAP SERPL CALCULATED.3IONS-SCNC: 20.9 MMOL/L (ref 5–15)
AST SERPL-CCNC: 11 U/L (ref 1–32)
BASOPHILS # BLD AUTO: 0.07 10*3/MM3 (ref 0–0.2)
BASOPHILS NFR BLD AUTO: 0.5 % (ref 0–1.5)
BILIRUB SERPL-MCNC: 0.4 MG/DL (ref 0–1.2)
BILIRUB UR QL STRIP: NEGATIVE
BUN SERPL-MCNC: 8 MG/DL (ref 6–20)
BUN/CREAT SERPL: 14.3 (ref 7–25)
CALCIUM SPEC-SCNC: 10.2 MG/DL (ref 8.6–10.5)
CHLORIDE SERPL-SCNC: 103 MMOL/L (ref 98–107)
CLARITY UR: CLEAR
CO2 SERPL-SCNC: 21.1 MMOL/L (ref 22–29)
COLOR UR: YELLOW
CREAT SERPL-MCNC: 0.56 MG/DL (ref 0.57–1)
D-LACTATE SERPL-SCNC: 1.6 MMOL/L (ref 0.5–2)
DEPRECATED RDW RBC AUTO: 41.6 FL (ref 37–54)
EGFRCR SERPLBLD CKD-EPI 2021: 117 ML/MIN/1.73
EOSINOPHIL # BLD AUTO: 0.02 10*3/MM3 (ref 0–0.4)
EOSINOPHIL NFR BLD AUTO: 0.2 % (ref 0.3–6.2)
ERYTHROCYTE [DISTWIDTH] IN BLOOD BY AUTOMATED COUNT: 13.3 % (ref 12.3–15.4)
GLOBULIN UR ELPH-MCNC: 4.3 GM/DL
GLUCOSE SERPL-MCNC: 104 MG/DL (ref 65–99)
GLUCOSE UR STRIP-MCNC: NEGATIVE MG/DL
HCT VFR BLD AUTO: 38.2 % (ref 34–46.6)
HGB BLD-MCNC: 12.1 G/DL (ref 12–15.9)
HGB UR QL STRIP.AUTO: NEGATIVE
HOLD SPECIMEN: NORMAL
HOLD SPECIMEN: NORMAL
IMM GRANULOCYTES # BLD AUTO: 0.1 10*3/MM3 (ref 0–0.05)
IMM GRANULOCYTES NFR BLD AUTO: 0.8 % (ref 0–0.5)
KETONES UR QL STRIP: ABNORMAL
LEUKOCYTE ESTERASE UR QL STRIP.AUTO: NEGATIVE
LIPASE SERPL-CCNC: 41 U/L (ref 13–60)
LYMPHOCYTES # BLD AUTO: 1.77 10*3/MM3 (ref 0.7–3.1)
LYMPHOCYTES NFR BLD AUTO: 13.5 % (ref 19.6–45.3)
MCH RBC QN AUTO: 27.5 PG (ref 26.6–33)
MCHC RBC AUTO-ENTMCNC: 31.7 G/DL (ref 31.5–35.7)
MCV RBC AUTO: 86.8 FL (ref 79–97)
MONOCYTES # BLD AUTO: 0.47 10*3/MM3 (ref 0.1–0.9)
MONOCYTES NFR BLD AUTO: 3.6 % (ref 5–12)
NEUTROPHILS NFR BLD AUTO: 10.68 10*3/MM3 (ref 1.7–7)
NEUTROPHILS NFR BLD AUTO: 81.4 % (ref 42.7–76)
NITRITE UR QL STRIP: NEGATIVE
NRBC BLD AUTO-RTO: 0 /100 WBC (ref 0–0.2)
PH UR STRIP.AUTO: 7 [PH] (ref 5–8)
PLATELET # BLD AUTO: 1226 10*3/MM3 (ref 140–450)
PMV BLD AUTO: 9.2 FL (ref 6–12)
POTASSIUM SERPL-SCNC: 3.4 MMOL/L (ref 3.5–5.2)
PROT SERPL-MCNC: 8.3 G/DL (ref 6–8.5)
PROT UR QL STRIP: NEGATIVE
RBC # BLD AUTO: 4.4 10*6/MM3 (ref 3.77–5.28)
RBC MORPH BLD: NORMAL
SMALL PLATELETS BLD QL SMEAR: NORMAL
SODIUM SERPL-SCNC: 145 MMOL/L (ref 136–145)
SP GR UR STRIP: >1.03 (ref 1–1.03)
UROBILINOGEN UR QL STRIP: ABNORMAL
WBC MORPH BLD: NORMAL
WBC NRBC COR # BLD AUTO: 13.11 10*3/MM3 (ref 3.4–10.8)
WHOLE BLOOD HOLD COAG: NORMAL
WHOLE BLOOD HOLD SPECIMEN: NORMAL

## 2025-03-09 PROCEDURE — 87040 BLOOD CULTURE FOR BACTERIA: CPT

## 2025-03-09 PROCEDURE — 25010000002 PROCHLORPERAZINE 10 MG/2ML SOLUTION: Performed by: EMERGENCY MEDICINE

## 2025-03-09 PROCEDURE — 25010000002 FENTANYL CITRATE (PF) 50 MCG/ML SOLUTION

## 2025-03-09 PROCEDURE — 25010000002 DROPERIDOL PER 5 MG: Performed by: ANESTHESIOLOGY

## 2025-03-09 PROCEDURE — 25010000002 LIDOCAINE 1% - EPINEPHRINE 1:100000 1 %-1:100000 SOLUTION: Performed by: SURGERY

## 2025-03-09 PROCEDURE — 25010000002 MORPHINE PER 10 MG: Performed by: SURGERY

## 2025-03-09 PROCEDURE — 81003 URINALYSIS AUTO W/O SCOPE: CPT | Performed by: EMERGENCY MEDICINE

## 2025-03-09 PROCEDURE — 25810000003 LACTATED RINGERS PER 1000 ML: Performed by: SURGERY

## 2025-03-09 PROCEDURE — 25010000002 HYDROMORPHONE 1 MG/ML SOLUTION

## 2025-03-09 PROCEDURE — 94761 N-INVAS EAR/PLS OXIMETRY MLT: CPT

## 2025-03-09 PROCEDURE — 36415 COLL VENOUS BLD VENIPUNCTURE: CPT

## 2025-03-09 PROCEDURE — 25010000002 METOCLOPRAMIDE PER 10 MG: Performed by: ANESTHESIOLOGY

## 2025-03-09 PROCEDURE — 25810000003 SODIUM CHLORIDE 0.9 % SOLUTION

## 2025-03-09 PROCEDURE — 25010000002 ONDANSETRON PER 1 MG

## 2025-03-09 PROCEDURE — 25010000002 MIDAZOLAM PER 1MG: Performed by: ANESTHESIOLOGY

## 2025-03-09 PROCEDURE — 25010000002 SUGAMMADEX 200 MG/2ML SOLUTION

## 2025-03-09 PROCEDURE — 25010000002 HYDROMORPHONE 1 MG/ML SOLUTION: Performed by: EMERGENCY MEDICINE

## 2025-03-09 PROCEDURE — 94799 UNLISTED PULMONARY SVC/PX: CPT

## 2025-03-09 PROCEDURE — 25010000002 ONDANSETRON PER 1 MG: Performed by: SURGERY

## 2025-03-09 PROCEDURE — 25010000002 BUPIVACAINE (PF) 0.25 % SOLUTION: Performed by: SURGERY

## 2025-03-09 PROCEDURE — 44970 LAPAROSCOPY APPENDECTOMY: CPT | Performed by: SURGERY

## 2025-03-09 PROCEDURE — 99223 1ST HOSP IP/OBS HIGH 75: CPT | Performed by: SURGERY

## 2025-03-09 PROCEDURE — 25010000002 LIDOCAINE PF 2% 2 % SOLUTION

## 2025-03-09 PROCEDURE — 25010000002 HYDROMORPHONE 1 MG/ML SOLUTION: Performed by: SURGERY

## 2025-03-09 PROCEDURE — 25010000002 KETOROLAC TROMETHAMINE PER 15 MG

## 2025-03-09 PROCEDURE — 25010000002 DEXAMETHASONE PER 1 MG

## 2025-03-09 PROCEDURE — 44970 LAPAROSCOPY APPENDECTOMY: CPT | Performed by: SPECIALIST/TECHNOLOGIST, OTHER

## 2025-03-09 PROCEDURE — 25810000003 LACTATED RINGERS PER 1000 ML: Performed by: ANESTHESIOLOGY

## 2025-03-09 PROCEDURE — 0DTJ4ZZ RESECTION OF APPENDIX, PERCUTANEOUS ENDOSCOPIC APPROACH: ICD-10-PCS | Performed by: SURGERY

## 2025-03-09 PROCEDURE — 88304 TISSUE EXAM BY PATHOLOGIST: CPT | Performed by: SURGERY

## 2025-03-09 PROCEDURE — 25010000002 PROPOFOL 10 MG/ML EMULSION

## 2025-03-09 PROCEDURE — 25510000001 IOPAMIDOL PER 1 ML: Performed by: EMERGENCY MEDICINE

## 2025-03-09 PROCEDURE — 25010000002 ESMOLOL 100 MG/10ML SOLUTION

## 2025-03-09 PROCEDURE — 25010000002 PIPERACILLIN SOD-TAZOBACTAM PER 1 G: Performed by: SURGERY

## 2025-03-09 DEVICE — ENDOPATH ETS45 2.5MM RELOADS (VASCULAR/THIN)
Type: IMPLANTABLE DEVICE | Site: ABDOMEN | Status: FUNCTIONAL
Brand: ENDOPATH

## 2025-03-09 RX ORDER — ASCORBIC ACID 500 MG
500 TABLET ORAL DAILY
Status: DISCONTINUED | OUTPATIENT
Start: 2025-03-09 | End: 2025-03-11 | Stop reason: HOSPADM

## 2025-03-09 RX ORDER — ONDANSETRON 2 MG/ML
INJECTION INTRAMUSCULAR; INTRAVENOUS AS NEEDED
Status: DISCONTINUED | OUTPATIENT
Start: 2025-03-09 | End: 2025-03-09 | Stop reason: SURG

## 2025-03-09 RX ORDER — PROPOFOL 10 MG/ML
VIAL (ML) INTRAVENOUS AS NEEDED
Status: DISCONTINUED | OUTPATIENT
Start: 2025-03-09 | End: 2025-03-09 | Stop reason: SURG

## 2025-03-09 RX ORDER — ONDANSETRON 2 MG/ML
4 INJECTION INTRAMUSCULAR; INTRAVENOUS ONCE AS NEEDED
Status: DISCONTINUED | OUTPATIENT
Start: 2025-03-09 | End: 2025-03-09 | Stop reason: HOSPADM

## 2025-03-09 RX ORDER — SUCCINYLCHOLINE/SOD CL,ISO/PF 100 MG/5ML
SYRINGE (ML) INTRAVENOUS AS NEEDED
Status: DISCONTINUED | OUTPATIENT
Start: 2025-03-09 | End: 2025-03-09 | Stop reason: SURG

## 2025-03-09 RX ORDER — DEXMEDETOMIDINE HYDROCHLORIDE 100 UG/ML
INJECTION, SOLUTION INTRAVENOUS AS NEEDED
Status: DISCONTINUED | OUTPATIENT
Start: 2025-03-09 | End: 2025-03-09 | Stop reason: SURG

## 2025-03-09 RX ORDER — MAGNESIUM HYDROXIDE 1200 MG/15ML
LIQUID ORAL AS NEEDED
Status: DISCONTINUED | OUTPATIENT
Start: 2025-03-09 | End: 2025-03-09 | Stop reason: HOSPADM

## 2025-03-09 RX ORDER — NALOXONE HCL 0.4 MG/ML
0.1 VIAL (ML) INJECTION
Status: DISCONTINUED | OUTPATIENT
Start: 2025-03-09 | End: 2025-03-11 | Stop reason: HOSPADM

## 2025-03-09 RX ORDER — DROPERIDOL 2.5 MG/ML
0.62 INJECTION, SOLUTION INTRAMUSCULAR; INTRAVENOUS ONCE
Status: COMPLETED | OUTPATIENT
Start: 2025-03-09 | End: 2025-03-09

## 2025-03-09 RX ORDER — NALOXONE HCL 0.4 MG/ML
0.4 VIAL (ML) INJECTION
Status: DISCONTINUED | OUTPATIENT
Start: 2025-03-09 | End: 2025-03-11 | Stop reason: HOSPADM

## 2025-03-09 RX ORDER — DULOXETIN HYDROCHLORIDE 30 MG/1
60 CAPSULE, DELAYED RELEASE ORAL DAILY
Status: DISCONTINUED | OUTPATIENT
Start: 2025-03-09 | End: 2025-03-11 | Stop reason: HOSPADM

## 2025-03-09 RX ORDER — BUPIVACAINE HYDROCHLORIDE 2.5 MG/ML
INJECTION, SOLUTION EPIDURAL; INFILTRATION; INTRACAUDAL AS NEEDED
Status: DISCONTINUED | OUTPATIENT
Start: 2025-03-09 | End: 2025-03-09 | Stop reason: HOSPADM

## 2025-03-09 RX ORDER — SODIUM CHLORIDE 9 MG/ML
40 INJECTION, SOLUTION INTRAVENOUS AS NEEDED
Status: DISCONTINUED | OUTPATIENT
Start: 2025-03-09 | End: 2025-03-11 | Stop reason: HOSPADM

## 2025-03-09 RX ORDER — PROCHLORPERAZINE EDISYLATE 5 MG/ML
5 INJECTION INTRAMUSCULAR; INTRAVENOUS EVERY 6 HOURS PRN
Status: DISCONTINUED | OUTPATIENT
Start: 2025-03-09 | End: 2025-03-11 | Stop reason: HOSPADM

## 2025-03-09 RX ORDER — MORPHINE SULFATE 2 MG/ML
2 INJECTION, SOLUTION INTRAMUSCULAR; INTRAVENOUS
Status: DISCONTINUED | OUTPATIENT
Start: 2025-03-09 | End: 2025-03-09 | Stop reason: SDUPTHER

## 2025-03-09 RX ORDER — METOCLOPRAMIDE HYDROCHLORIDE 5 MG/ML
10 INJECTION INTRAMUSCULAR; INTRAVENOUS
Status: COMPLETED | OUTPATIENT
Start: 2025-03-09 | End: 2025-03-09

## 2025-03-09 RX ORDER — PROMETHAZINE HYDROCHLORIDE 25 MG/1
25 SUPPOSITORY RECTAL ONCE AS NEEDED
Status: DISCONTINUED | OUTPATIENT
Start: 2025-03-09 | End: 2025-03-09 | Stop reason: HOSPADM

## 2025-03-09 RX ORDER — ONDANSETRON 2 MG/ML
4 INJECTION INTRAMUSCULAR; INTRAVENOUS EVERY 6 HOURS PRN
Status: DISCONTINUED | OUTPATIENT
Start: 2025-03-09 | End: 2025-03-09 | Stop reason: SDUPTHER

## 2025-03-09 RX ORDER — SCOPOLAMINE 1 MG/3D
1 PATCH, EXTENDED RELEASE TRANSDERMAL
Status: DISCONTINUED | OUTPATIENT
Start: 2025-03-09 | End: 2025-03-11 | Stop reason: HOSPADM

## 2025-03-09 RX ORDER — PROCHLORPERAZINE EDISYLATE 5 MG/ML
5 INJECTION INTRAMUSCULAR; INTRAVENOUS ONCE
Status: COMPLETED | OUTPATIENT
Start: 2025-03-09 | End: 2025-03-09

## 2025-03-09 RX ORDER — FENTANYL CITRATE 50 UG/ML
INJECTION, SOLUTION INTRAMUSCULAR; INTRAVENOUS AS NEEDED
Status: DISCONTINUED | OUTPATIENT
Start: 2025-03-09 | End: 2025-03-09 | Stop reason: SURG

## 2025-03-09 RX ORDER — HYDROCODONE BITARTRATE AND ACETAMINOPHEN 10; 325 MG/1; MG/1
1 TABLET ORAL EVERY 4 HOURS PRN
Status: DISCONTINUED | OUTPATIENT
Start: 2025-03-09 | End: 2025-03-11 | Stop reason: HOSPADM

## 2025-03-09 RX ORDER — DULOXETIN HYDROCHLORIDE 30 MG/1
30 CAPSULE, DELAYED RELEASE ORAL DAILY
Status: DISCONTINUED | OUTPATIENT
Start: 2025-03-09 | End: 2025-03-11 | Stop reason: HOSPADM

## 2025-03-09 RX ORDER — METOCLOPRAMIDE HYDROCHLORIDE 5 MG/ML
10 INJECTION INTRAMUSCULAR; INTRAVENOUS
Status: DISCONTINUED | OUTPATIENT
Start: 2025-03-10 | End: 2025-03-09

## 2025-03-09 RX ORDER — DEXAMETHASONE SODIUM PHOSPHATE 4 MG/ML
INJECTION, SOLUTION INTRA-ARTICULAR; INTRALESIONAL; INTRAMUSCULAR; INTRAVENOUS; SOFT TISSUE AS NEEDED
Status: DISCONTINUED | OUTPATIENT
Start: 2025-03-09 | End: 2025-03-09 | Stop reason: SURG

## 2025-03-09 RX ORDER — TRAZODONE HYDROCHLORIDE 50 MG/1
50 TABLET ORAL NIGHTLY PRN
Status: DISCONTINUED | OUTPATIENT
Start: 2025-03-09 | End: 2025-03-11 | Stop reason: HOSPADM

## 2025-03-09 RX ORDER — KETOROLAC TROMETHAMINE 30 MG/ML
INJECTION, SOLUTION INTRAMUSCULAR; INTRAVENOUS AS NEEDED
Status: DISCONTINUED | OUTPATIENT
Start: 2025-03-09 | End: 2025-03-09 | Stop reason: SURG

## 2025-03-09 RX ORDER — ACETAMINOPHEN 500 MG
1000 TABLET ORAL ONCE
Status: COMPLETED | OUTPATIENT
Start: 2025-03-09 | End: 2025-03-09

## 2025-03-09 RX ORDER — ONDANSETRON 2 MG/ML
4 INJECTION INTRAMUSCULAR; INTRAVENOUS EVERY 6 HOURS PRN
Status: DISCONTINUED | OUTPATIENT
Start: 2025-03-09 | End: 2025-03-11 | Stop reason: HOSPADM

## 2025-03-09 RX ORDER — LIDOCAINE HYDROCHLORIDE 20 MG/ML
INJECTION, SOLUTION EPIDURAL; INFILTRATION; INTRACAUDAL; PERINEURAL AS NEEDED
Status: DISCONTINUED | OUTPATIENT
Start: 2025-03-09 | End: 2025-03-09 | Stop reason: SURG

## 2025-03-09 RX ORDER — ONDANSETRON 4 MG/1
4 TABLET, ORALLY DISINTEGRATING ORAL EVERY 6 HOURS PRN
Status: DISCONTINUED | OUTPATIENT
Start: 2025-03-09 | End: 2025-03-11 | Stop reason: HOSPADM

## 2025-03-09 RX ORDER — MIDAZOLAM HYDROCHLORIDE 2 MG/2ML
2 INJECTION, SOLUTION INTRAMUSCULAR; INTRAVENOUS ONCE
Status: COMPLETED | OUTPATIENT
Start: 2025-03-09 | End: 2025-03-09

## 2025-03-09 RX ORDER — BUSPIRONE HYDROCHLORIDE 15 MG/1
15 TABLET ORAL 3 TIMES DAILY
Status: DISCONTINUED | OUTPATIENT
Start: 2025-03-09 | End: 2025-03-11 | Stop reason: HOSPADM

## 2025-03-09 RX ORDER — HEPARIN SODIUM 5000 [USP'U]/ML
5000 INJECTION, SOLUTION INTRAVENOUS; SUBCUTANEOUS EVERY 8 HOURS SCHEDULED
Status: DISCONTINUED | OUTPATIENT
Start: 2025-03-10 | End: 2025-03-11 | Stop reason: HOSPADM

## 2025-03-09 RX ORDER — CLONAZEPAM 0.5 MG/1
0.5 TABLET ORAL 2 TIMES DAILY PRN
Status: DISCONTINUED | OUTPATIENT
Start: 2025-03-09 | End: 2025-03-11 | Stop reason: HOSPADM

## 2025-03-09 RX ORDER — LIDOCAINE HYDROCHLORIDE AND EPINEPHRINE 10; 10 MG/ML; UG/ML
INJECTION, SOLUTION INFILTRATION; PERINEURAL AS NEEDED
Status: DISCONTINUED | OUTPATIENT
Start: 2025-03-09 | End: 2025-03-09 | Stop reason: HOSPADM

## 2025-03-09 RX ORDER — PANTOPRAZOLE SODIUM 40 MG/1
40 TABLET, DELAYED RELEASE ORAL
Status: DISCONTINUED | OUTPATIENT
Start: 2025-03-09 | End: 2025-03-11 | Stop reason: HOSPADM

## 2025-03-09 RX ORDER — IOPAMIDOL 755 MG/ML
100 INJECTION, SOLUTION INTRAVASCULAR
Status: COMPLETED | OUTPATIENT
Start: 2025-03-09 | End: 2025-03-09

## 2025-03-09 RX ORDER — ROCURONIUM BROMIDE 10 MG/ML
INJECTION, SOLUTION INTRAVENOUS AS NEEDED
Status: DISCONTINUED | OUTPATIENT
Start: 2025-03-09 | End: 2025-03-09 | Stop reason: SURG

## 2025-03-09 RX ORDER — PROMETHAZINE HYDROCHLORIDE 25 MG/1
25 TABLET ORAL ONCE AS NEEDED
Status: DISCONTINUED | OUTPATIENT
Start: 2025-03-09 | End: 2025-03-09 | Stop reason: HOSPADM

## 2025-03-09 RX ORDER — HYDROCODONE BITARTRATE AND ACETAMINOPHEN 5; 325 MG/1; MG/1
1 TABLET ORAL EVERY 4 HOURS PRN
Status: DISCONTINUED | OUTPATIENT
Start: 2025-03-09 | End: 2025-03-11 | Stop reason: HOSPADM

## 2025-03-09 RX ORDER — MORPHINE SULFATE 2 MG/ML
2 INJECTION, SOLUTION INTRAMUSCULAR; INTRAVENOUS
Status: DISCONTINUED | OUTPATIENT
Start: 2025-03-09 | End: 2025-03-11 | Stop reason: HOSPADM

## 2025-03-09 RX ORDER — OXYCODONE HYDROCHLORIDE 5 MG/1
5 TABLET ORAL
Status: DISCONTINUED | OUTPATIENT
Start: 2025-03-09 | End: 2025-03-09 | Stop reason: HOSPADM

## 2025-03-09 RX ORDER — SODIUM CHLORIDE 0.9 % (FLUSH) 0.9 %
10 SYRINGE (ML) INJECTION AS NEEDED
Status: DISCONTINUED | OUTPATIENT
Start: 2025-03-09 | End: 2025-03-11 | Stop reason: HOSPADM

## 2025-03-09 RX ORDER — ALBUTEROL SULFATE 90 UG/1
2 INHALANT RESPIRATORY (INHALATION) EVERY 6 HOURS PRN
Status: DISCONTINUED | OUTPATIENT
Start: 2025-03-09 | End: 2025-03-11 | Stop reason: HOSPADM

## 2025-03-09 RX ORDER — SODIUM CHLORIDE, SODIUM LACTATE, POTASSIUM CHLORIDE, CALCIUM CHLORIDE 600; 310; 30; 20 MG/100ML; MG/100ML; MG/100ML; MG/100ML
20 INJECTION, SOLUTION INTRAVENOUS CONTINUOUS PRN
Status: ACTIVE | OUTPATIENT
Start: 2025-03-09 | End: 2025-03-10

## 2025-03-09 RX ORDER — SODIUM CHLORIDE 0.9 % (FLUSH) 0.9 %
10 SYRINGE (ML) INJECTION EVERY 12 HOURS SCHEDULED
Status: DISCONTINUED | OUTPATIENT
Start: 2025-03-09 | End: 2025-03-11 | Stop reason: HOSPADM

## 2025-03-09 RX ORDER — MONTELUKAST SODIUM 10 MG/1
10 TABLET ORAL NIGHTLY
Status: DISCONTINUED | OUTPATIENT
Start: 2025-03-09 | End: 2025-03-11 | Stop reason: HOSPADM

## 2025-03-09 RX ORDER — ESMOLOL HYDROCHLORIDE 10 MG/ML
INJECTION INTRAVENOUS AS NEEDED
Status: DISCONTINUED | OUTPATIENT
Start: 2025-03-09 | End: 2025-03-09 | Stop reason: SURG

## 2025-03-09 RX ADMIN — MORPHINE SULFATE 2 MG: 2 INJECTION, SOLUTION INTRAMUSCULAR; INTRAVENOUS at 04:05

## 2025-03-09 RX ADMIN — LIDOCAINE HYDROCHLORIDE 40 MG: 20 INJECTION, SOLUTION INTRAVENOUS at 11:44

## 2025-03-09 RX ADMIN — OXYCODONE HYDROCHLORIDE AND ACETAMINOPHEN 500 MG: 500 TABLET ORAL at 16:12

## 2025-03-09 RX ADMIN — Medication 10 ML: at 08:00

## 2025-03-09 RX ADMIN — MORPHINE SULFATE 2 MG: 2 INJECTION, SOLUTION INTRAMUSCULAR; INTRAVENOUS at 09:10

## 2025-03-09 RX ADMIN — MIDAZOLAM HYDROCHLORIDE 2 MG: 1 INJECTION, SOLUTION INTRAMUSCULAR; INTRAVENOUS at 10:37

## 2025-03-09 RX ADMIN — SODIUM CHLORIDE, POTASSIUM CHLORIDE, SODIUM LACTATE AND CALCIUM CHLORIDE 20 ML/HR: 600; 310; 30; 20 INJECTION, SOLUTION INTRAVENOUS at 14:21

## 2025-03-09 RX ADMIN — Medication 10 ML: at 20:38

## 2025-03-09 RX ADMIN — SODIUM CHLORIDE 3.38 G: 9 INJECTION, SOLUTION INTRAVENOUS at 09:09

## 2025-03-09 RX ADMIN — IOPAMIDOL 100 ML: 755 INJECTION, SOLUTION INTRAVENOUS at 00:11

## 2025-03-09 RX ADMIN — OXYCODONE HYDROCHLORIDE 5 MG: 5 TABLET ORAL at 13:20

## 2025-03-09 RX ADMIN — DEXMEDETOMIDINE 20 MCG: 100 INJECTION, SOLUTION, CONCENTRATE INTRAVENOUS at 12:26

## 2025-03-09 RX ADMIN — ESMOLOL HYDROCHLORIDE 20 MG: 10 INJECTION, SOLUTION INTRAVENOUS at 11:57

## 2025-03-09 RX ADMIN — DROPERIDOL 0.62 MG: 2.5 INJECTION, SOLUTION INTRAMUSCULAR; INTRAVENOUS at 11:26

## 2025-03-09 RX ADMIN — MONTELUKAST 10 MG: 10 TABLET, FILM COATED ORAL at 20:38

## 2025-03-09 RX ADMIN — MORPHINE SULFATE 2 MG: 2 INJECTION, SOLUTION INTRAMUSCULAR; INTRAVENOUS at 19:40

## 2025-03-09 RX ADMIN — ONDANSETRON 4 MG: 2 INJECTION INTRAMUSCULAR; INTRAVENOUS at 11:54

## 2025-03-09 RX ADMIN — BUSPIRONE HYDROCHLORIDE 15 MG: 15 TABLET ORAL at 20:38

## 2025-03-09 RX ADMIN — DULOXETINE HYDROCHLORIDE 60 MG: 30 CAPSULE, DELAYED RELEASE ORAL at 14:24

## 2025-03-09 RX ADMIN — Medication 100 MG: at 11:44

## 2025-03-09 RX ADMIN — SCOLOPAMINE TRANSDERMAL SYSTEM 1 PATCH: 1 PATCH, EXTENDED RELEASE TRANSDERMAL at 11:26

## 2025-03-09 RX ADMIN — HYDROMORPHONE HYDROCHLORIDE 0.5 MG: 1 INJECTION, SOLUTION INTRAMUSCULAR; INTRAVENOUS; SUBCUTANEOUS at 13:08

## 2025-03-09 RX ADMIN — PANTOPRAZOLE SODIUM 40 MG: 40 TABLET, DELAYED RELEASE ORAL at 14:25

## 2025-03-09 RX ADMIN — SODIUM CHLORIDE 1000 ML: 9 INJECTION, SOLUTION INTRAVENOUS at 01:13

## 2025-03-09 RX ADMIN — DEXAMETHASONE SODIUM PHOSPHATE 4 MG: 4 INJECTION, SOLUTION INTRAMUSCULAR; INTRAVENOUS at 11:54

## 2025-03-09 RX ADMIN — ROCURONIUM BROMIDE 45 MG: 10 INJECTION, SOLUTION INTRAVENOUS at 11:48

## 2025-03-09 RX ADMIN — ESMOLOL HYDROCHLORIDE 20 MG: 10 INJECTION, SOLUTION INTRAVENOUS at 12:20

## 2025-03-09 RX ADMIN — HYDROMORPHONE HYDROCHLORIDE 0.5 MG: 1 INJECTION, SOLUTION INTRAMUSCULAR; INTRAVENOUS; SUBCUTANEOUS at 17:46

## 2025-03-09 RX ADMIN — HYDROMORPHONE HYDROCHLORIDE 0.5 MG: 1 INJECTION, SOLUTION INTRAMUSCULAR; INTRAVENOUS; SUBCUTANEOUS at 13:20

## 2025-03-09 RX ADMIN — PROPOFOL 200 MG: 10 INJECTION, EMULSION INTRAVENOUS at 11:44

## 2025-03-09 RX ADMIN — HYDROMORPHONE HYDROCHLORIDE 0.5 MG: 1 INJECTION, SOLUTION INTRAMUSCULAR; INTRAVENOUS; SUBCUTANEOUS at 04:42

## 2025-03-09 RX ADMIN — SODIUM CHLORIDE, POTASSIUM CHLORIDE, SODIUM LACTATE AND CALCIUM CHLORIDE: 600; 310; 30; 20 INJECTION, SOLUTION INTRAVENOUS at 11:44

## 2025-03-09 RX ADMIN — HYDROMORPHONE HYDROCHLORIDE 1 MG: 1 INJECTION, SOLUTION INTRAMUSCULAR; INTRAVENOUS; SUBCUTANEOUS at 01:06

## 2025-03-09 RX ADMIN — SODIUM CHLORIDE 3.38 G: 9 INJECTION, SOLUTION INTRAVENOUS at 16:12

## 2025-03-09 RX ADMIN — PROCHLORPERAZINE EDISYLATE 5 MG: 5 INJECTION INTRAMUSCULAR; INTRAVENOUS at 01:06

## 2025-03-09 RX ADMIN — DEXMEDETOMIDINE 20 MCG: 100 INJECTION, SOLUTION, CONCENTRATE INTRAVENOUS at 12:03

## 2025-03-09 RX ADMIN — KETOROLAC TROMETHAMINE 30 MG: 30 INJECTION, SOLUTION INTRAMUSCULAR; INTRAVENOUS at 12:11

## 2025-03-09 RX ADMIN — HYDROMORPHONE HYDROCHLORIDE 0.5 MG: 1 INJECTION, SOLUTION INTRAMUSCULAR; INTRAVENOUS; SUBCUTANEOUS at 07:47

## 2025-03-09 RX ADMIN — MORPHINE SULFATE 2 MG: 2 INJECTION, SOLUTION INTRAMUSCULAR; INTRAVENOUS at 16:21

## 2025-03-09 RX ADMIN — BUSPIRONE HYDROCHLORIDE 15 MG: 15 TABLET ORAL at 16:12

## 2025-03-09 RX ADMIN — ESMOLOL HYDROCHLORIDE 20 MG: 10 INJECTION, SOLUTION INTRAVENOUS at 11:48

## 2025-03-09 RX ADMIN — TIZANIDINE 4 MG: 4 TABLET ORAL at 19:46

## 2025-03-09 RX ADMIN — HYDROMORPHONE HYDROCHLORIDE 0.5 MG: 1 INJECTION, SOLUTION INTRAMUSCULAR; INTRAVENOUS; SUBCUTANEOUS at 12:10

## 2025-03-09 RX ADMIN — ROCURONIUM BROMIDE 5 MG: 10 INJECTION, SOLUTION INTRAVENOUS at 11:44

## 2025-03-09 RX ADMIN — MORPHINE SULFATE 2 MG: 2 INJECTION, SOLUTION INTRAMUSCULAR; INTRAVENOUS at 23:02

## 2025-03-09 RX ADMIN — ACETAMINOPHEN 1000 MG: 500 TABLET ORAL at 10:37

## 2025-03-09 RX ADMIN — METOCLOPRAMIDE 10 MG: 5 INJECTION, SOLUTION INTRAMUSCULAR; INTRAVENOUS at 10:42

## 2025-03-09 RX ADMIN — SODIUM CHLORIDE 3.38 G: 9 INJECTION, SOLUTION INTRAVENOUS at 03:21

## 2025-03-09 RX ADMIN — FENTANYL CITRATE 100 MCG: 50 INJECTION, SOLUTION INTRAMUSCULAR; INTRAVENOUS at 11:44

## 2025-03-09 RX ADMIN — ONDANSETRON 4 MG: 2 INJECTION INTRAMUSCULAR; INTRAVENOUS at 07:54

## 2025-03-09 RX ADMIN — SUGAMMADEX 200 MG: 100 INJECTION, SOLUTION INTRAVENOUS at 12:39

## 2025-03-09 RX ADMIN — MORPHINE SULFATE 2 MG: 2 INJECTION, SOLUTION INTRAMUSCULAR; INTRAVENOUS at 14:04

## 2025-03-09 RX ADMIN — MORPHINE SULFATE 2 MG: 2 INJECTION, SOLUTION INTRAMUSCULAR; INTRAVENOUS at 06:11

## 2025-03-09 RX ADMIN — DULOXETINE HYDROCHLORIDE 30 MG: 30 CAPSULE, DELAYED RELEASE ORAL at 14:25

## 2025-03-09 NOTE — ED PROVIDER NOTES
Time: 11:33 PM EST  Date of encounter:  3/8/2025  Independent Historian/Clinical History and Information was obtained by:   Patient    History is limited by: N/A    Chief Complaint: Abdominal pain      History of Present Illness:  Patient is a 44 y.o. year old female who presents to the emergency department for evaluation of acute abdominal pain x 1 day.  Patient has a prior medical history consistent for cholecystectomy on 2/17/2025 followed by subsequent hospital admission secondary to a bile duct leak on 2/22/2025.  ERCP was conducted with pancreatic stent placement along with CARON drain placement.  Patient had a follow-up surgical EGD with pancreatic stent removal on 3/6/2025.  Patient has been tolerating postop until today when she suddenly developed acute pain.  Denies known fevers.  Denies symptoms leading into acute abdominal pain event today.  Unable to tolerate p.o. food and water.    Patient Care Team  Primary Care Provider: Karen Stover APRN    Past Medical History:     Allergies   Allergen Reactions    Escitalopram Nausea Only and Rash     Nausea, sweating, rash     Sulfa Antibiotics Anaphylaxis    Baclofen GI Intolerance, Hives and Nausea And Vomiting    Ciprofloxacin Hallucinations    Codeine Hives    Gold-Containing Drug Products Rash    Latex Rash    Nickel Hives    Tegaderm Ag Mesh [Silver] Hives     Past Medical History:   Diagnosis Date    Allergic     Anxiety     Atrial fibrillation     RELEASED BY CARDIOLOGIST/ELECTROPHYSIST, NO CURRENT MEDS    Chronic pain disorder     Depression     Endometriosis     Headache     Hemorrhoids     Injury of shoulder, right 2009    CHRONIC PAIN    Panic disorder     S/P laparoscopic cholecystectomy 02/17/2025     Past Surgical History:   Procedure Laterality Date    CERVICAL ARTHRODESIS  01/14/2015    Donaldo Albarran    CERVICAL FUSION      C4-7 FUSION, FULL ROM    CHOLECYSTECTOMY N/A 2/17/2025    Procedure: CHOLECYSTECTOMY LAPAROSCOPIC plain language:  removal of gallbladder thru small incisions using long instruments and a camera;  Surgeon: Josué Castano MD;  Location: [facility] MAIN OR;  Service: General;  Laterality: N/A;    COLONOSCOPY N/A 05/31/2022    Procedure: COLONOSCOPY;  Surgeon: Shabbir Baird MD;  Location: [facility] ENDOSCOPY;  Service: General;  Laterality: N/A;  HEMORRHOIDS    ENDOSCOPY N/A 2/17/2025    Procedure: ESOPHAGOGASTRODUODENOSCOPY WITH BIOPSIES;  Surgeon: Sanya Reyes MD;  Location: [facility] ENDOSCOPY;  Service: Gastroenterology;  Laterality: N/A;  GASTRITIS, SMALL HIATAL HERNIA    ENDOSCOPY N/A 3/6/2025    Procedure: ESOPHAGOGASTRODUODENOSCOPY with pancreatic stent removal;  Surgeon: Ha Thompson MD;  Location: [facility] ENDOSCOPY;  Service: Gastroenterology;  Laterality: N/A;  pancreatic stent removal, hiatal hernia    ERCP N/A 2/24/2025    Procedure: ENDOSCOPIC RETROGRADE CHOLANGIOPANCREATOGRAPHY with bile duct stent placement and pancreatic duct stent placement;  Surgeon: Ha Thompson MD;  Location: [facility] ENDOSCOPY;  Service: Gastroenterology;  Laterality: N/A;  bile duct leeak    EXPLORATORY LAPAROTOMY      HEMORRHOIDECTOMY N/A 05/31/2022    Procedure: HEMORRHOID BANDING;  Surgeon: Shabbir Baird MD;  Location: [facility] ENDOSCOPY;  Service: General;  Laterality: N/A;  BANDS X 2    HEMORRHOIDECTOMY N/A 1/31/2024    Procedure: HEMORRHOIDECTOMY;  Surgeon: Shabbir Baird MD;  Location: [facility] OR OSC;  Service: General;  Laterality: N/A;    HYSTERECTOMY      SHOULDER ARTHROSCOPY Right     SHOULDER SURGERY Left     ARTHROSCOPY LABRAL TEAR X2    TUBAL ABDOMINAL LIGATION       Family History   Problem Relation Age of Onset    Depression Mother     Bipolar disorder Mother     Anxiety disorder Mother     Cancer Mother     Heart disease Mother     Hypertension Mother     Anxiety disorder Father     Hypertension Father     Dementia Paternal Uncle     Dementia Paternal Grandmother     Heart disease Other      Colon cancer Neg Hx     Malig Hyperthermia Neg Hx        Home Medications:  Prior to Admission medications    Medication Sig Start Date End Date Taking? Authorizing Provider   albuterol sulfate  (90 Base) MCG/ACT inhaler Inhale 2 puffs Every 6 (Six) Hours As Needed for Wheezing. 1/8/25   Karen Stover APRN   bacitracin 500 UNIT/GM ointment Apply 1 Application topically to the appropriate area as directed 2 (Two) Times a Day. 1/11/25   Keo Rao PA   bisacodyl (DULCOLAX) 10 MG suppository Insert 1 suppository into the rectum Daily As Needed. 7/24/23   Avani Vela MD   busPIRone (BUSPAR) 15 MG tablet Take 1 tablet by mouth 3 (Three) Times a Day. 1/17/25   Karen Stover APRN   CALCIUM PO Take 1 tablet by mouth Daily.    Avani Vela MD   clonazePAM (KlonoPIN) 0.5 MG tablet Take 1 tablet by mouth 2 (Two) Times a Day As Needed for Anxiety. 1/28/25   Karen Stover APRN   cyclobenzaprine (FLEXERIL) 5 MG tablet Take 1 tablet by mouth 3 (Three) Times a Day As Needed for Muscle Spasms. 2/19/25   Bruno Simon MD   doxycycline (VIBRAMYCIN) 100 MG capsule Take 1 capsule by mouth 2 (Two) Times a Day for 7 days. 3/7/25 3/14/25  Karen Stover APRN   DULoxetine (CYMBALTA) 30 MG capsule Take 1 capsule by mouth Daily. TAKES 30mg AND 60mg TOGETHER FOR A TOTAL OF 90mg    Avani Vela MD   DULoxetine (CYMBALTA) 60 MG capsule Take 1 capsule by mouth Daily. TAKES 30mg AND 60mg TOGETHER FOR A TOTAL OF 90mg    Avani Vela MD   HYDROcodone-acetaminophen (NORCO)  MG per tablet Take 1 tablet by mouth Every 4 (Four) Hours As Needed for Moderate Pain. 3/7/25   Madina Hassan APRN   hydrocortisone 2.5 % cream Apply 1 Application topically to the appropriate area as directed 2 (Two) Times a Day. 6/27/24   Karen Stover APRN   montelukast (Singulair) 10 MG tablet Take 1 tablet by mouth Every Night. 1/17/25   Karen Stover APRN  "  ondansetron (ZOFRAN) 4 MG tablet TAKE (1) TABLET EVERY 8 HOURS AS NEEDED FOR NAUSEA AND vomiting  Patient taking differently: Take 1 tablet by mouth Every 8 (Eight) Hours As Needed. 10/14/24   Karen Stover APRN   pantoprazole (PROTONIX) 40 MG EC tablet Take 1 tablet by mouth Daily. 24   Karen Stover APRN   polyethylene glycol (MIRALAX) 17 g packet Take 17 g by mouth Daily. 24   Shabbir Baird MD   prochlorperazine (COMPAZINE) 10 MG tablet Take 1 tablet by mouth Every 6 (Six) Hours As Needed.    Avani Vela MD   sennosides-docusate (senna-docusate sodium) 8.6-50 MG per tablet Take 1 tablet by mouth Daily. 24   Karen Stover APRN   tiZANidine (ZANAFLEX) 4 MG tablet Take 1-2 tablets by mouth Every 6 (Six) Hours As Needed. 24   Avani Vela MD   traZODone (DESYREL) 50 MG tablet Take 0.5 to 2 tab PO QHS PRN sleep  Patient taking differently: Take 0.5-2 tablets by mouth At Night As Needed. Take 0.5 to 2 tab PO QHS PRN sleep 24   Karen Stover APRN   vitamin C (ASCORBIC ACID) 500 MG tablet Take 1 tablet by mouth Daily. LAST DOSE 24    Avani Vela MD        Social History:   Social History     Tobacco Use    Smoking status: Former     Current packs/day: 0.00     Average packs/day: 0.3 packs/day for 20.0 years (5.0 ttl pk-yrs)     Types: Cigarettes     Start date: 1999     Quit date: 2019     Years since quittin.1    Smokeless tobacco: Never   Vaping Use    Vaping status: Never Used   Substance Use Topics    Alcohol use: Yes     Comment: rarely    Drug use: Never         Review of Systems:  Review of Systems     Physical Exam:  /90 (BP Location: Left arm, Patient Position: Sitting)   Pulse 72   Temp 98.4 °F (36.9 °C) (Oral)   Resp 26   Ht 157.5 cm (62\")   Wt 78 kg (171 lb 15.3 oz)   SpO2 100%   BMI 31.45 kg/m²     Physical Exam  HENT:      Head: Normocephalic.      Mouth/Throat:      Mouth: " Mucous membranes are moist.   Eyes:      Pupils: Pupils are equal, round, and reactive to light.   Pulmonary:      Effort: Pulmonary effort is normal.   Abdominal:      General: There is no distension.      Tenderness: There is abdominal tenderness in the epigastric area. There is guarding. Negative signs include Guzman's sign, Rovsing's sign and McBurney's sign.   Musculoskeletal:      Cervical back: Neck supple.   Skin:     General: Skin is warm and dry.   Neurological:      General: No focal deficit present.      Mental Status: She is alert and oriented to person, place, and time.   Psychiatric:         Mood and Affect: Mood normal.         Behavior: Behavior normal.                    Medical Decision Making:      Comorbidities that affect care:    Obesity    External Notes reviewed:    Previous Clinic Note: Previous clinic note on 3/7/2025 reviewed      The following orders were placed and all results were independently analyzed by me:  Orders Placed This Encounter   Procedures    CT Abdomen Pelvis With Contrast    [place] Draw    Comprehensive Metabolic Panel    Lipase    Urinalysis With Microscopic If Indicated (No Culture) - Urine, Clean Catch    CBC Auto Differential    Lactic Acid, Plasma    NPO Diet NPO Type: Strict NPO    Undress & Gown    Insert Peripheral IV    CBC & Differential    Green Top (Gel)    Lavender Top    Gold Top - SST    Light Blue Top       Medications Given in the Emergency Department:  Medications   sodium chloride 0.9 % flush 10 mL (has no administration in time range)   HYDROmorphone (DILAUDID) injection 1 mg (has no administration in time range)   ondansetron (ZOFRAN) injection 4 mg (has no administration in time range)        ED Course:    ED Course as of 03/09/25 0141   Sun Mar 09, 2025   0053 CBC reviewed.  Evidence of leukocytosis of 13 and platelets of thousand 226.  Thrombocytosis likely secondary to reactive thrombocytosis as patient has had numerous intra-abdominal surgical  "interventions over the past 30 days.  Previous platelet counts greater than 450 [CB]   0111 Spoke with Dr. Cannon general surgery, agreeable to consult secondary to patient's acute appendicitis on CT abdomen pelvis alongside with right lower quadrant abdominal pain.  Reports that he will \"put in orders\". [CB]   0126 Lactic reviewed.  No acute findings.  Lipase reviewed.  No acute findings. [CB]      ED Course User Index  [CB] Jerson Sena, SCOTT       Labs:    Lab Results (last 24 hours)       ** No results found for the last 24 hours. **             Imaging:    No Radiology Exams Resulted Within Past 24 Hours      Differential Diagnosis and Discussion:    Abdominal Pain: Based on the patient's signs and symptoms, I considered abdominal aortic aneurysm, small bowel obstruction, pancreatitis, acute cholecystitis, acute appendecitis, peptic ulcer disease, gastritis, colitis, endocrine disorders, irritable bowel syndrome and other differential diagnosis an etiology of the patient's abdominal pain.    PROCEDURES:    Labs were collected in the emergency department and all labs were reviewed and interpreted by me.    No orders to display       Procedures    MDM                    Patient Care Considerations:    SEPSIS was considered but is NOT present in the emergency department as SIRS criteria is not present.      Consultants/Shared Management Plan:    Consultant: I have discussed the case with Dr. Cannon who agrees to consult on the patient.    Social Determinants of Health:    Patient has presented with family members who are responsible, reliable and will ensure follow up care.      Disposition and Care Coordination:    Admit:   Through independent evaluation of the patient's history, physical, and imperical data, the patient meets criteria for inpatient admission to the hospital.        Final diagnoses:   None        ED Disposition       None            This medical record created using voice recognition " software.             Jerson Sena PA-C  03/09/25 0142

## 2025-03-09 NOTE — ANESTHESIA PREPROCEDURE EVALUATION
Anesthesia Evaluation     Patient summary reviewed and Nursing notes reviewed   no history of anesthetic complications:   NPO Solid Status: > 8 hours  NPO Liquid Status: > 2 hours           Airway   Mallampati: II  TM distance: >3 FB  Neck ROM: full  No difficulty expected  Dental      Pulmonary - negative pulmonary ROS and normal exam    breath sounds clear to auscultation  Cardiovascular - negative cardio ROS and normal exam  Exercise tolerance: good (4-7 METS)    Rhythm: regular  Rate: normal        Neuro/Psych  (+) headaches, numbness, psychiatric history Anxiety  GI/Hepatic/Renal/Endo    (+) GI bleeding lower resolved    Musculoskeletal     Abdominal    Substance History - negative use     OB/GYN negative ob/gyn ROS         Other   arthritis,     ROS/Med Hx Other: Pt w/ hx of afib, one time, taken to cath lab, not reproducible - no problems since then -- occurred many years ago, since then pt has been released from cardiology w/o sequelae    S/p ercp and cholecystectomy w/in the last month              Latest Reference Range & Units 03/08/25 23:29   WBC 3.40 - 10.80 10*3/mm3 13.11 (H)   RBC 3.77 - 5.28 10*6/mm3 4.40   Hemoglobin 12.0 - 15.9 g/dL 12.1   Hematocrit 34.0 - 46.6 % 38.2   Platelets 140 - 450 10*3/mm3 1,226 (C)   (C): Data is critically high  (H): Data is abnormally high     Latest Reference Range & Units 03/08/25 23:29   Sodium 136 - 145 mmol/L 145   Potassium 3.5 - 5.2 mmol/L 3.4 (L)   Chloride 98 - 107 mmol/L 103   CO2 22.0 - 29.0 mmol/L 21.1 (L)   Anion Gap 5.0 - 15.0 mmol/L 20.9 (H)   BUN 6 - 20 mg/dL 8   Creatinine 0.57 - 1.00 mg/dL 0.56 (L)   BUN/Creatinine Ratio 7.0 - 25.0  14.3   eGFR >60.0 mL/min/1.73 117.0   Glucose 65 - 99 mg/dL 104 (H)   Calcium 8.6 - 10.5 mg/dL 10.2   (L): Data is abnormally low  (H): Data is abnormally high         Anesthesia Plan    ASA 2     general     (Patient understands anesthesia not responsible for dental damage.    Pt s/p cervical fusion/arthrodesis will have  video larygnoscope immediately available, pt s/p tubal    Numerous piercings instructed pt that she would need to remove or have them taped over)  intravenous induction     Anesthetic plan, risks, benefits, and alternatives have been provided, discussed and informed consent has been obtained with: patient.    Use of blood products discussed with patient .    Plan discussed with CRNA.        CODE STATUS:    Code Status (Patient has no pulse and is not breathing): CPR (Attempt to Resuscitate)  Medical Interventions (Patient has pulse or is breathing): Full Support

## 2025-03-09 NOTE — OP NOTE
Preoperative diagnosis: Appendicitis    Postoperative diagnosis: Same    Procedure: Laparoscopic appendectomy; removal of IR drain    Surgeon: Mingo Cannon MD    Anesthesia: General    Assistant: Divina AGUIRRE CSA    EBL: 7 mL    Specimens: Appendix    Complications: None    Indications: 44-year-old female with complicated history after cholecystectomy presented with abdominal pain.  CT was consistent with appendicitis.  She was taken the operating room for appendectomy.    PROCEDURE    Patient was seen in the preoperative holding area.  Risks, benefits, alternatives of the operation were again discussed in detail.  All of the patient and family's questions were answered.  They voiced understanding, and agreed to proceed.    Patient was brought to the operating room.  Monitoring devices and Isidoro stockings were placed.  Anesthesia was administered.  Patient was prepped and draped in standard surgical fashion.  After prepping and draping a timeout was performed to verify both the correct patient and correct procedure.    We began by injecting local anesthesia in the left upper quadrant.  A small nick was made in the skin, and the Veress needle was inserted into the abdomen.  Intra-abdominal placement was verified with a normal saline drop test.  After verification, the abdomen was insufflated to 15 mmHg pressure.  Once the abdomen was insufflated, we injected local anesthesia in the supraumbilical region and made an incision to accommodate a 5 mm trocar.  Then, using the Visiport technique, the abdomen was entered.  Once the abdomen was entered we reinserted the laparoscope and looked underneath our Visiport and Veress needle entry sites, and we saw no obvious underlying injury.  We then placed our subsequent trocars.  After injection of local anesthesia and under direct visualization, a 12 mm trocar was placed in the left lower quadrant and a 5 mm trocar was placed in the suprapubic region.    We began by  mobilizing the appendix.  Once the appendix was identified, it was elevated.  We dissected the mesoappendix free from its attachments with the LigaSure device.  Once we dissected down we made sure we dissected the mesoappendix to the level of the junction of the appendix and the cecum.  We made sure to identify the terminal ileum, and made sure it was well away from our dissection.  We then came across the appendix using a laparoscopic stapler with a vascular load.  Once the appendix was transected, it was placed in an Endo Catch bag and removed from the abdomen.  The specimen was passed off for pathology.      We then inspected our operative field.  We inspected our staple line, which appeared to have fired appropriately with good approximation of tissue and good firing of the staples.  There was no bleeding from the staple line.  We irrigated our operative field and made sure everything appeared to be hemostatic.  We irrigated till the effluent was clear and suctioned that free.  Seeing no obvious bleeding or underlying injury, we covered the operative field with omentum as much as possible.    Because of her CT scan we did explore the area in her gallbladder fossa.  There was some firm indurated tissue and the duodenum appeared to be adhered to the gallbladder fossa.  I did not see any evidence of an abscess.  It did not appear grossly infected or inflamed.  I did not see a clear fluid collection.  We removed the IR placed drain as we noted it in its appropriate position and it went transhepatic.    We then removed the 12 mm trocar, and that site was closed with a Beto-Mauro device and 0 Vicryl suture.  The remaining trocars were then removed under direct visualization, and the abdomen was desufflated.  Skin incisions were closed with subcuticular 4-0 Vicryl stitch and dressed with skin glue.    The patient was then aroused from anesthesia, and taken off the OR table, and taken to PACU in stable condition.   Sponge, needle, and instrument counts were correct x2Juanjose AGUIRRE CSA, was present for the entire procedure and helped in all portions of the case.    Assistant: Divina Gonzalez CSA was responsible for performing the following activities: Suturing, Closing, Placing Dressing, and Held/Positioned Camera and their skilled assistance was necessary for the success of this case.      The operative note was dictated with the help of the dragon dictation system.

## 2025-03-09 NOTE — ANESTHESIA POSTPROCEDURE EVALUATION
Patient: Ebony Hamilton    Procedure Summary       Date: 03/09/25 Room / Location: [facility] OR 07 / [facility] MAIN OR    Anesthesia Start: 1144 Anesthesia Stop: 1252    Procedure: APPENDECTOMY LAPAROSCOPIC POSSIBLE OPEN (Abdomen) Diagnosis:       Acute appendicitis, unspecified acute appendicitis type      (Acute appendicitis, unspecified acute appendicitis type [K35.80])    Surgeons: Mingo Cannon MD Provider: Bandar Pinto CRNA    Anesthesia Type: general ASA Status: 2            Anesthesia Type: general    Vitals  Vitals Value Taken Time   /113 03/09/25 13:40   Temp 36.4 °C (97.5 °F) 03/09/25 13:30   Pulse 82 03/09/25 13:41   Resp 14 03/09/25 13:30   SpO2 98 % 03/09/25 13:40   Vitals shown include unfiled device data.        Post Anesthesia Care and Evaluation    Patient location during evaluation: bedside  Patient participation: complete - patient participated  Level of consciousness: awake  Pain score: 2  Pain management: adequate    Airway patency: patent  Anesthetic complications: No anesthetic complications  PONV Status: none  Cardiovascular status: acceptable and stable  Respiratory status: acceptable  Hydration status: acceptable

## 2025-03-09 NOTE — H&P
[facility]   HISTORY AND PHYSICAL    Patient Name: Ebony Hamilton  : 1980  MRN: [id number]  Primary Care Physician:  Karen Stover APRN  Date of admission: 3/8/2025    Subjective   Subjective     Chief Complaint: Abdominal pain    HPI:    Ebony Hamilton is a 44 y.o. female whose had a complicated recent surgical history.  Had recent cholecystectomy and bile leak.  Then had to have subsequent ERCP and drain placement.  She experienced some upper abdominal pain last night and radiated to the right side.  CT shows evidence of a small fluid collection in her gallbladder fossa as well as possible appendicitis.    Review of Systems   Constitutional:  Positive for activity change and appetite change.   Respiratory: Negative.     Cardiovascular: Negative.    Gastrointestinal:  Positive for abdominal pain, nausea and vomiting.   Genitourinary: Negative.         Personal History     Past Medical History:   Diagnosis Date    Allergic     Anxiety     Atrial fibrillation     RELEASED BY CARDIOLOGIST/ELECTROPHYSIST, NO CURRENT MEDS    Chronic pain disorder     Depression     Endometriosis     Headache     Hemorrhoids     Injury of shoulder, right     CHRONIC PAIN    Panic disorder     S/P laparoscopic cholecystectomy 2025       Past Surgical History:   Procedure Laterality Date    CERVICAL ARTHRODESIS  2015    Donaldo Albarran    CERVICAL FUSION      C4-7 FUSION, FULL ROM    CHOLECYSTECTOMY N/A 2025    Procedure: CHOLECYSTECTOMY LAPAROSCOPIC plain language: removal of gallbladder thru small incisions using long instruments and a camera;  Surgeon: Josué Castano MD;  Location: [facility] MAIN OR;  Service: General;  Laterality: N/A;    COLONOSCOPY N/A 2022    Procedure: COLONOSCOPY;  Surgeon: Shabbir Baird MD;  Location: [facility] ENDOSCOPY;  Service: General;  Laterality: N/A;  HEMORRHOIDS    ENDOSCOPY N/A 2025    Procedure: ESOPHAGOGASTRODUODENOSCOPY WITH  BIOPSIES;  Surgeon: Sanya Reyes MD;  Location: [facility] ENDOSCOPY;  Service: Gastroenterology;  Laterality: N/A;  GASTRITIS, SMALL HIATAL HERNIA    ENDOSCOPY N/A 3/6/2025    Procedure: ESOPHAGOGASTRODUODENOSCOPY with pancreatic stent removal;  Surgeon: Ha Thompson MD;  Location: [facility] ENDOSCOPY;  Service: Gastroenterology;  Laterality: N/A;  pancreatic stent removal, hiatal hernia    ERCP N/A 2/24/2025    Procedure: ENDOSCOPIC RETROGRADE CHOLANGIOPANCREATOGRAPHY with bile duct stent placement and pancreatic duct stent placement;  Surgeon: Ha Thompson MD;  Location: [facility] ENDOSCOPY;  Service: Gastroenterology;  Laterality: N/A;  bile duct leeak    EXPLORATORY LAPAROTOMY      HEMORRHOIDECTOMY N/A 05/31/2022    Procedure: HEMORRHOID BANDING;  Surgeon: Shabbir Baird MD;  Location: [facility] ENDOSCOPY;  Service: General;  Laterality: N/A;  BANDS X 2    HEMORRHOIDECTOMY N/A 1/31/2024    Procedure: HEMORRHOIDECTOMY;  Surgeon: Shabbir Baird MD;  Location: [facility] OR [facility];  Service: General;  Laterality: N/A;    HYSTERECTOMY      SHOULDER ARTHROSCOPY Right     SHOULDER SURGERY Left     ARTHROSCOPY LABRAL TEAR X2    TUBAL ABDOMINAL LIGATION         Family History: family history includes Anxiety disorder in her father and mother; Bipolar disorder in her mother; Cancer in her mother; Dementia in her paternal grandmother and paternal uncle; Depression in her mother; Heart disease in her mother and another family member; Hypertension in her father and mother. Otherwise pertinent FHx was reviewed and not pertinent to current issue.    Social History:  reports that she quit smoking about 6 years ago. Her smoking use included cigarettes. She started smoking about 26 years ago. She has a 5 pack-year smoking history. She has never used smokeless tobacco. She reports current alcohol use. She reports that she does not use drugs.    Home Medications:  Calcium, DULoxetine,  HYDROcodone-acetaminophen, albuterol sulfate HFA, bacitracin, bisacodyl, busPIRone, clonazePAM, cyclobenzaprine, doxycycline, hydrocortisone, montelukast, ondansetron, pantoprazole, polyethylene glycol, prochlorperazine, sennosides-docusate, tiZANidine, traZODone, and vitamin C      Allergies:  Allergies   Allergen Reactions    Escitalopram Nausea Only and Rash     Nausea, sweating, rash     Sulfa Antibiotics Anaphylaxis    Baclofen GI Intolerance, Hives and Nausea And Vomiting    Ciprofloxacin Hallucinations    Codeine Hives    Gold-Containing Drug Products Rash    Latex Rash    Nickel Hives    Tegaderm Ag Mesh [Silver] Hives       Objective   Objective     Vitals:   Temp:  [98.2 °F (36.8 °C)-98.7 °F (37.1 °C)] 98.7 °F (37.1 °C)  Heart Rate:  [65-84] 75  Resp:  [15-26] 20  BP: (138-148)/(76-90) 139/77    Physical Exam     Result Review    Result Review:  I have personally reviewed the results from the time of this admission to 3/9/2025 10:13 EDT and agree with these findings:  [x]  Laboratory  []  Microbiology  [x]  Radiology  []  EKG/Telemetry   []  Cardiology/Vascular   []  Pathology  []  Old records  []  Other:    Lab Results   Component Value Date    WBC 13.11 (H) 03/08/2025    HGB 12.1 03/08/2025    HCT 38.2 03/08/2025    MCV 86.8 03/08/2025    PLT 1,226 (C) 03/08/2025      Lab Results   Component Value Date    GLUCOSE 104 (H) 03/08/2025    CALCIUM 10.2 03/08/2025     03/08/2025    K 3.4 (L) 03/08/2025    CO2 21.1 (L) 03/08/2025     03/08/2025    BUN 8 03/08/2025    CREATININE 0.56 (L) 03/08/2025    EGFR 117.0 03/08/2025    BCR 14.3 03/08/2025    ANIONGAP 20.9 (H) 03/08/2025        Lab Results   Component Value Date    ALT 9 03/08/2025      Most notable findings include: White count 13; CT consistent with appendicitis    Assessment & Plan   Assessment / Plan     Brief Patient Summary:  Ebony Hamilton is a 44 y.o. female who has likely appendicitis    Active Hospital Problems:  Active Hospital  Problems    Diagnosis     **Appendicitis      Plan:  Discussed with the patient in detail  I did offer her antibiotics for surgical treatment for her appendicitis  She elected for surgery  I have recommended surgery secondary to recent data as well as the fact that she could have a small fluid collection in her gallbladder fossa that could be addressed while we are taking out her appendix  Wrist benefits alternatives of surgery were discussed extensively  All questions were answered  Patient voiced understanding agreed to proceed with the above plan      VTE Prophylaxis:  Mechanical VTE prophylaxis orders are present.        CODE STATUS:    Code Status (Patient has no pulse and is not breathing): CPR (Attempt to Resuscitate)  Medical Interventions (Patient has pulse or is breathing): Full Support    Admission Status:  I believe this patient meets inpatient status.    Electronically signed by Mingo Cannon MD, 03/09/25, 10:13 AM EDT.

## 2025-03-09 NOTE — PLAN OF CARE
Goal Outcome Evaluation:              Outcome Evaluation: Pt is alert and oriented with  at bedside.  VSS.  Pt had an appendectomy this shift and doing well.  Pain meds given.  Nausea meds given.  LR running at 20 ml/hr.  Wound Care to be done by Wound Care Team per patient request.  Continue POC.

## 2025-03-10 PROBLEM — Z90.49 S/P LAPAROSCOPIC APPENDECTOMY: Status: ACTIVE | Noted: 2025-03-09

## 2025-03-10 LAB
ALBUMIN SERPL-MCNC: 3.3 G/DL (ref 3.5–5.2)
ALBUMIN/GLOB SERPL: 0.9 G/DL
ALP SERPL-CCNC: 86 U/L (ref 39–117)
ALT SERPL W P-5'-P-CCNC: 8 U/L (ref 1–33)
ANION GAP SERPL CALCULATED.3IONS-SCNC: 13.5 MMOL/L (ref 5–15)
AST SERPL-CCNC: 10 U/L (ref 1–32)
BASOPHILS # BLD AUTO: 0.05 10*3/MM3 (ref 0–0.2)
BASOPHILS NFR BLD AUTO: 0.5 % (ref 0–1.5)
BILIRUB SERPL-MCNC: 0.3 MG/DL (ref 0–1.2)
BUN SERPL-MCNC: 8 MG/DL (ref 6–20)
BUN/CREAT SERPL: 14.3 (ref 7–25)
CALCIUM SPEC-SCNC: 8.8 MG/DL (ref 8.6–10.5)
CHLORIDE SERPL-SCNC: 104 MMOL/L (ref 98–107)
CO2 SERPL-SCNC: 23.5 MMOL/L (ref 22–29)
CREAT SERPL-MCNC: 0.56 MG/DL (ref 0.57–1)
DEPRECATED RDW RBC AUTO: 45.1 FL (ref 37–54)
EGFRCR SERPLBLD CKD-EPI 2021: 117 ML/MIN/1.73
EOSINOPHIL # BLD AUTO: 0.02 10*3/MM3 (ref 0–0.4)
EOSINOPHIL NFR BLD AUTO: 0.2 % (ref 0.3–6.2)
ERYTHROCYTE [DISTWIDTH] IN BLOOD BY AUTOMATED COUNT: 13.6 % (ref 12.3–15.4)
GLOBULIN UR ELPH-MCNC: 3.5 GM/DL
GLUCOSE SERPL-MCNC: 106 MG/DL (ref 65–99)
HCT VFR BLD AUTO: 33.3 % (ref 34–46.6)
HGB BLD-MCNC: 10.1 G/DL (ref 12–15.9)
IMM GRANULOCYTES # BLD AUTO: 0.06 10*3/MM3 (ref 0–0.05)
IMM GRANULOCYTES NFR BLD AUTO: 0.6 % (ref 0–0.5)
LYMPHOCYTES # BLD AUTO: 2.11 10*3/MM3 (ref 0.7–3.1)
LYMPHOCYTES NFR BLD AUTO: 22.4 % (ref 19.6–45.3)
MCH RBC QN AUTO: 27.6 PG (ref 26.6–33)
MCHC RBC AUTO-ENTMCNC: 30.3 G/DL (ref 31.5–35.7)
MCV RBC AUTO: 91 FL (ref 79–97)
MONOCYTES # BLD AUTO: 0.46 10*3/MM3 (ref 0.1–0.9)
MONOCYTES NFR BLD AUTO: 4.9 % (ref 5–12)
NEUTROPHILS NFR BLD AUTO: 6.74 10*3/MM3 (ref 1.7–7)
NEUTROPHILS NFR BLD AUTO: 71.4 % (ref 42.7–76)
NRBC BLD AUTO-RTO: 0 /100 WBC (ref 0–0.2)
PLATELET # BLD AUTO: 876 10*3/MM3 (ref 140–450)
PMV BLD AUTO: 8.9 FL (ref 6–12)
POTASSIUM SERPL-SCNC: 3.6 MMOL/L (ref 3.5–5.2)
PROT SERPL-MCNC: 6.8 G/DL (ref 6–8.5)
RBC # BLD AUTO: 3.66 10*6/MM3 (ref 3.77–5.28)
SODIUM SERPL-SCNC: 141 MMOL/L (ref 136–145)
WBC NRBC COR # BLD AUTO: 9.44 10*3/MM3 (ref 3.4–10.8)

## 2025-03-10 PROCEDURE — 99024 POSTOP FOLLOW-UP VISIT: CPT | Performed by: NURSE PRACTITIONER

## 2025-03-10 PROCEDURE — 80053 COMPREHEN METABOLIC PANEL: CPT | Performed by: SURGERY

## 2025-03-10 PROCEDURE — 25010000002 HEPARIN (PORCINE) PER 1000 UNITS: Performed by: SURGERY

## 2025-03-10 PROCEDURE — 25010000002 HYDROMORPHONE 1 MG/ML SOLUTION: Performed by: SURGERY

## 2025-03-10 PROCEDURE — 25010000002 PIPERACILLIN SOD-TAZOBACTAM PER 1 G: Performed by: SURGERY

## 2025-03-10 PROCEDURE — 25010000002 MORPHINE PER 10 MG: Performed by: SURGERY

## 2025-03-10 PROCEDURE — 85025 COMPLETE CBC W/AUTO DIFF WBC: CPT | Performed by: SURGERY

## 2025-03-10 PROCEDURE — 99024 POSTOP FOLLOW-UP VISIT: CPT | Performed by: SURGERY

## 2025-03-10 RX ORDER — POLYETHYLENE GLYCOL 3350 17 G/17G
17 POWDER, FOR SOLUTION ORAL DAILY
Status: DISCONTINUED | OUTPATIENT
Start: 2025-03-10 | End: 2025-03-11 | Stop reason: HOSPADM

## 2025-03-10 RX ORDER — SODIUM CHLORIDE, SODIUM LACTATE, POTASSIUM CHLORIDE, CALCIUM CHLORIDE 600; 310; 30; 20 MG/100ML; MG/100ML; MG/100ML; MG/100ML
20 INJECTION, SOLUTION INTRAVENOUS CONTINUOUS PRN
Status: ACTIVE | OUTPATIENT
Start: 2025-03-10 | End: 2025-03-11

## 2025-03-10 RX ADMIN — SODIUM CHLORIDE 3.38 G: 9 INJECTION, SOLUTION INTRAVENOUS at 00:09

## 2025-03-10 RX ADMIN — HYDROCODONE BITARTRATE AND ACETAMINOPHEN 1 TABLET: 10; 325 TABLET ORAL at 08:12

## 2025-03-10 RX ADMIN — BUSPIRONE HYDROCHLORIDE 15 MG: 15 TABLET ORAL at 20:31

## 2025-03-10 RX ADMIN — DULOXETINE HYDROCHLORIDE 60 MG: 30 CAPSULE, DELAYED RELEASE ORAL at 08:11

## 2025-03-10 RX ADMIN — MONTELUKAST 10 MG: 10 TABLET, FILM COATED ORAL at 20:31

## 2025-03-10 RX ADMIN — SODIUM CHLORIDE 3.38 G: 9 INJECTION, SOLUTION INTRAVENOUS at 08:12

## 2025-03-10 RX ADMIN — MORPHINE SULFATE 2 MG: 2 INJECTION, SOLUTION INTRAMUSCULAR; INTRAVENOUS at 20:31

## 2025-03-10 RX ADMIN — HYDROMORPHONE HYDROCHLORIDE 0.5 MG: 1 INJECTION, SOLUTION INTRAMUSCULAR; INTRAVENOUS; SUBCUTANEOUS at 06:21

## 2025-03-10 RX ADMIN — BUSPIRONE HYDROCHLORIDE 15 MG: 15 TABLET ORAL at 08:12

## 2025-03-10 RX ADMIN — BUSPIRONE HYDROCHLORIDE 15 MG: 15 TABLET ORAL at 15:17

## 2025-03-10 RX ADMIN — POLYETHYLENE GLYCOL 3350 17 G: 17 POWDER, FOR SOLUTION ORAL at 11:48

## 2025-03-10 RX ADMIN — DULOXETINE HYDROCHLORIDE 30 MG: 30 CAPSULE, DELAYED RELEASE ORAL at 08:12

## 2025-03-10 RX ADMIN — HYDROMORPHONE HYDROCHLORIDE 0.5 MG: 1 INJECTION, SOLUTION INTRAMUSCULAR; INTRAVENOUS; SUBCUTANEOUS at 15:14

## 2025-03-10 RX ADMIN — HEPARIN SODIUM 5000 UNITS: 5000 INJECTION INTRAVENOUS; SUBCUTANEOUS at 22:32

## 2025-03-10 RX ADMIN — HEPARIN SODIUM 5000 UNITS: 5000 INJECTION INTRAVENOUS; SUBCUTANEOUS at 13:08

## 2025-03-10 RX ADMIN — HEPARIN SODIUM 5000 UNITS: 5000 INJECTION INTRAVENOUS; SUBCUTANEOUS at 05:09

## 2025-03-10 RX ADMIN — OXYCODONE HYDROCHLORIDE AND ACETAMINOPHEN 500 MG: 500 TABLET ORAL at 08:12

## 2025-03-10 RX ADMIN — Medication 10 ML: at 08:13

## 2025-03-10 RX ADMIN — SODIUM CHLORIDE 3.38 G: 9 INJECTION, SOLUTION INTRAVENOUS at 16:03

## 2025-03-10 RX ADMIN — HYDROMORPHONE HYDROCHLORIDE 0.5 MG: 1 INJECTION, SOLUTION INTRAMUSCULAR; INTRAVENOUS; SUBCUTANEOUS at 18:51

## 2025-03-10 RX ADMIN — HYDROMORPHONE HYDROCHLORIDE 0.5 MG: 1 INJECTION, SOLUTION INTRAMUSCULAR; INTRAVENOUS; SUBCUTANEOUS at 10:13

## 2025-03-10 RX ADMIN — TIZANIDINE 4 MG: 4 TABLET ORAL at 05:26

## 2025-03-10 RX ADMIN — MORPHINE SULFATE 2 MG: 2 INJECTION, SOLUTION INTRAMUSCULAR; INTRAVENOUS at 05:09

## 2025-03-10 RX ADMIN — MORPHINE SULFATE 2 MG: 2 INJECTION, SOLUTION INTRAMUSCULAR; INTRAVENOUS at 01:04

## 2025-03-10 RX ADMIN — PANTOPRAZOLE SODIUM 40 MG: 40 TABLET, DELAYED RELEASE ORAL at 05:09

## 2025-03-10 RX ADMIN — MORPHINE SULFATE 2 MG: 2 INJECTION, SOLUTION INTRAMUSCULAR; INTRAVENOUS at 16:20

## 2025-03-10 RX ADMIN — HYDROMORPHONE HYDROCHLORIDE 0.5 MG: 1 INJECTION, SOLUTION INTRAMUSCULAR; INTRAVENOUS; SUBCUTANEOUS at 02:45

## 2025-03-10 RX ADMIN — HYDROMORPHONE HYDROCHLORIDE 0.5 MG: 1 INJECTION, SOLUTION INTRAMUSCULAR; INTRAVENOUS; SUBCUTANEOUS at 22:32

## 2025-03-10 RX ADMIN — Medication 10 ML: at 20:31

## 2025-03-10 NOTE — TELEPHONE ENCOUNTER
Patient is scheduled on 04.08.25 for ERCP to remove CBD stent.  Patient made aware previously, see TE dated 02.25.25.

## 2025-03-10 NOTE — OUTREACH NOTE
Medical Week 2 Survey      Flowsheet Row Responses   [facility] patient discharged from? [facility]   Does the patient have one of the following disease processes/diagnoses(primary or secondary)? Other   Week 2 attempt successful? No   Unsuccessful attempts Attempt 1   Revoke Readmitted            Joya ABARCA - Registered Nurse

## 2025-03-10 NOTE — PLAN OF CARE
Goal Outcome Evaluation:           Progress: no change  Outcome Evaluation: Pt aox4, VSS. Pt up to bathroom with standby assist. IV pain meds given throughout the night. Iv abx given. Will continue to monitor.

## 2025-03-10 NOTE — TELEPHONE ENCOUNTER
Pt is currently in patient from surgery with Dr. Cannon over the weekend. Do you want me to call and cx this pain medication?

## 2025-03-10 NOTE — PLAN OF CARE
Goal Outcome Evaluation:  Plan of Care Reviewed With: patient        Progress: improving  Outcome Evaluation: Pt is alert and oriented.  VSS.  Pt is adlib.  PRN pain meds given this shift.  IV abx given.  Continue POC.

## 2025-03-10 NOTE — PROGRESS NOTES
"POST OP PROGRESS NOTE     Patient Name:  Ebony Hamilton  YOB: 1980  [id number]   LOS: 1 day   1 Day Post-Op            Subjective     Interval History:   VSS, afebrile, pain controlled, tolerating diet, WBC count down to 9    Review of Systems:    A complete review of systems was performed and all are negative except what is documented in the HPI.       Objective     Constitutional:  well nourished, no acute distress, appears stated age /76 (BP Location: Left arm, Patient Position: Lying)   Pulse 66   Temp 97.5 °F (36.4 °C) (Oral)   Resp 18   Ht 157.5 cm (62\")   Wt 77.7 kg (171 lb 4.8 oz)   SpO2 94%   BMI 31.33 kg/m²    Eyes:  anicteric sclerae, moist conjunctivae, no lid lag, PERRLA  ENMT:  oropharynx clear, moist mucous membranes  Neck:   full ROM, trachea midline  Cardiovascular: RRR, S1 and S2 present, no MRG, heart rate 66, no pedal edema  Respiratory: lungs CTA, respirations even and unlabored  GI:  Abdomen soft, appropriately tender, nondistended, incisions with opsites intact     Skin:  warm and dry, normal turgor, no rashes  Psychiatric:  alert and oriented x 4, intact judgment and insight, cooperative          Results Review:       I reviewed the patient's new clinical results including  CBC, BMP.     WBC   Date Value Ref Range Status   03/10/2025 9.44 3.40 - 10.80 10*3/mm3 Final     RBC   Date Value Ref Range Status   03/10/2025 3.66 (L) 3.77 - 5.28 10*6/mm3 Final     Hemoglobin   Date Value Ref Range Status   03/10/2025 10.1 (L) 12.0 - 15.9 g/dL Final     Hematocrit   Date Value Ref Range Status   03/10/2025 33.3 (L) 34.0 - 46.6 % Final     MCV   Date Value Ref Range Status   03/10/2025 91.0 79.0 - 97.0 fL Final     MCH   Date Value Ref Range Status   03/10/2025 27.6 26.6 - 33.0 pg Final     MCHC   Date Value Ref Range Status   03/10/2025 30.3 (L) 31.5 - 35.7 g/dL Final     RDW   Date Value Ref Range Status   03/10/2025 13.6 12.3 - 15.4 % Final     RDW-SD   Date Value " "Ref Range Status   03/10/2025 45.1 37.0 - 54.0 fl Final     MPV   Date Value Ref Range Status   03/10/2025 8.9 6.0 - 12.0 fL Final     Platelets   Date Value Ref Range Status   03/10/2025 876 (H) 140 - 450 10*3/mm3 Final     Neutrophil %   Date Value Ref Range Status   03/10/2025 71.4 42.7 - 76.0 % Final     Lymphocyte %   Date Value Ref Range Status   03/10/2025 22.4 19.6 - 45.3 % Final     Monocyte %   Date Value Ref Range Status   03/10/2025 4.9 (L) 5.0 - 12.0 % Final     Eosinophil %   Date Value Ref Range Status   03/10/2025 0.2 (L) 0.3 - 6.2 % Final     Basophil %   Date Value Ref Range Status   03/10/2025 0.5 0.0 - 1.5 % Final     Immature Grans %   Date Value Ref Range Status   03/10/2025 0.6 (H) 0.0 - 0.5 % Final     Neutrophils, Absolute   Date Value Ref Range Status   03/10/2025 6.74 1.70 - 7.00 10*3/mm3 Final     Lymphocytes, Absolute   Date Value Ref Range Status   03/10/2025 2.11 0.70 - 3.10 10*3/mm3 Final     Monocytes, Absolute   Date Value Ref Range Status   03/10/2025 0.46 0.10 - 0.90 10*3/mm3 Final     Eosinophils, Absolute   Date Value Ref Range Status   03/10/2025 0.02 0.00 - 0.40 10*3/mm3 Final     Basophils, Absolute   Date Value Ref Range Status   03/10/2025 0.05 0.00 - 0.20 10*3/mm3 Final     Immature Grans, Absolute   Date Value Ref Range Status   03/10/2025 0.06 (H) 0.00 - 0.05 10*3/mm3 Final     nRBC   Date Value Ref Range Status   03/10/2025 0.0 0.0 - 0.2 /100 WBC Final         Basic Metabolic Panel    Sodium Sodium   Date Value Ref Range Status   03/10/2025 141 136 - 145 mmol/L Final   03/08/2025 145 136 - 145 mmol/L Final      Potassium Potassium   Date Value Ref Range Status   03/10/2025 3.6 3.5 - 5.2 mmol/L Final   03/08/2025 3.4 (L) 3.5 - 5.2 mmol/L Final      Chloride Chloride   Date Value Ref Range Status   03/10/2025 104 98 - 107 mmol/L Final   03/08/2025 103 98 - 107 mmol/L Final      Bicarbonate No results found for: \"PLASMABICARB\"   BUN BUN   Date Value Ref Range Status " "  03/10/2025 8 6 - 20 mg/dL Final   03/08/2025 8 6 - 20 mg/dL Final      Creatinine Creatinine   Date Value Ref Range Status   03/10/2025 0.56 (L) 0.57 - 1.00 mg/dL Final   03/08/2025 0.56 (L) 0.57 - 1.00 mg/dL Final      Calcium Calcium   Date Value Ref Range Status   03/10/2025 8.8 8.6 - 10.5 mg/dL Final   03/08/2025 10.2 8.6 - 10.5 mg/dL Final      Glucose      No components found for: \"GLUCOSE.*\"       Lab Results   Component Value Date    GLUCOSE 106 (H) 03/10/2025    BUN 8 03/10/2025    CREATININE 0.56 (L) 03/10/2025     03/10/2025    K 3.6 03/10/2025     03/10/2025    CALCIUM 8.8 03/10/2025    PROTEINTOT 6.8 03/10/2025    ALBUMIN 3.3 (L) 03/10/2025    ALT 8 03/10/2025    AST 10 03/10/2025    ALKPHOS 86 03/10/2025    BILITOT 0.3 03/10/2025    GLOB 3.5 03/10/2025    AGRATIO 0.9 03/10/2025    BCR 14.3 03/10/2025    ANIONGAP 13.5 03/10/2025    EGFR 117.0 03/10/2025       IMAGING:  Imaging Results (Last 72 Hours)       Procedure Component Value Units Date/Time    CT Abdomen Pelvis With Contrast [[id number]] Collected: 03/09/25 0040     Updated: 03/09/25 0055    Narrative:      CT ABDOMEN PELVIS W CONTRAST-     Date of exam: 3/9/2025, 12:07 A.M.     Indication: RUQ abd. pain.      Comparison: 2/22/2025.     TECHNIQUE:  Axial CT images were obtained of the abdomen and pelvis following the  uneventful intravenous administration of 85 mL (or less) of Isovue-370  contrast agent. Reconstructed 2D (two-dimensional) coronal and sagittal  images were also obtained. Automated exposure control and iterative  construction methods were used. Additionally, the radiation dose  reduction device was turned on for each scan per the ALARA (As Low as  Reasonably Achievable) protocol. No oral contrast agent was administered  for the study. Please see the electronic medical record, EMR (i.e.,  Epic), for the documented dose of intravenous contrast agent as well as  the radiation dose.     FINDINGS:  Interval decrease in the " ascites. The ascitic fluid CT number 6  Hounsfield units (HU) on the current study. There is inflammatory change  about the appendix. Minimal if any inflammatory changes are seen  elsewhere, such as involving the bowel. The findings suggest acute  appendicitis. The appendix is retrocecal in location with in the  subhepatic region. No pneumoperitoneum. No pneumatosis. No portal or  mesenteric venous gas. The patient has undergone cholecystectomy. An  internal biliary stent is in place within the common bile duct. There is  a focal fluid collection in the gallbladder fossa with enhancing  margins. It may represent a biloma. An abscess cannot be excluded. It  does not contain gas. This fluid collection measures approximately 3.8 x  2.5 x 4.2 cm in transverse, anteroposterior (AP), and craniocaudal  extent, respectively, as seen on image 68 of series 2, image 46 of  series 3, image 132 of series 4, and adjacent images. It has an  estimated total volume of 20.8 mL. Its CT number on the initial phase of  the study is 22 Hounsfield units (HU). It is 23 Hounsfield units (HU) on  the delayed phase of the study. The percutaneous drainage catheter  remains in place. The perihepatic and right subphrenic fluid collection  seen on the 2/24/2025 and 2/22/2025 CT studies has decreased  considerably (with minimal if any persisting). There may be mild  atelectasis in the lung bases. Otherwise, no acute findings are seen.          Impression:      1.  Acute appendicitis is suggested. Please correlate clinically.  2.  Interval decrease in the ascites.   3.  There is a persistent gallbladder fossa fluid collection with an  estimated total volume of 20.8 mL. It may represent a urinoma. An  infected fluid collection, such as an abscess cannot be excluded.   4.  An internal biliary stent is in the common bile duct (CBD). There is  pneumobilia.   5.  The inflammatory change seen within the right upper quadrant has  decreased considerably  since the 2/22/2025 CT study.   6.  Interval decrease in the perihepatic and right subphrenic fluid  collection is noted since 2/24/2025. The percutaneous drainage catheter  remains in place and is located laterally.   7.  Please see above comments for further detail.        Portions of this note were completed with a voice recognition program.     3/9/2025 12:52 AM by Romario Romero MD on Workstation: [id number]               Medications:    Current Facility-Administered Medications:     albuterol sulfate HFA (PROVENTIL HFA;VENTOLIN HFA;PROAIR HFA) inhaler 2 puff, 2 puff, Inhalation, Q6H PRN, Mingo Cannon MD    ascorbic acid (VITAMIN C) tablet 500 mg, 500 mg, Oral, Daily, Mingo Cannon MD, 500 mg at 03/10/25 0812    busPIRone (BUSPAR) tablet 15 mg, 15 mg, Oral, TID, Mingo Cannon MD, 15 mg at 03/10/25 0812    clonazePAM (KlonoPIN) tablet 0.5 mg, 0.5 mg, Oral, BID PRN, Mingo Cannon MD    DULoxetine (CYMBALTA) DR capsule 30 mg, 30 mg, Oral, Daily, Mingo Cannon MD, 30 mg at 03/10/25 0812    DULoxetine (CYMBALTA) DR capsule 60 mg, 60 mg, Oral, Daily, Mingo Cannon MD, 60 mg at 03/10/25 0811    heparin (porcine) 5000 UNIT/ML injection 5,000 Units, 5,000 Units, Subcutaneous, Q8H, Mingo Cannon MD, 5,000 Units at 03/10/25 0509    HYDROcodone-acetaminophen (NORCO)  MG per tablet 1 tablet, 1 tablet, Oral, Q4H PRN, Mingo Cannon MD, 1 tablet at 03/10/25 0812    HYDROcodone-acetaminophen (NORCO) 5-325 MG per tablet 1 tablet, 1 tablet, Oral, Q4H PRN, Mingo Cannon MD    HYDROmorphone (DILAUDID) injection 0.5 mg, 0.5 mg, Intravenous, Q2H PRN, 0.5 mg at 03/10/25 1013 **AND** naloxone (NARCAN) injection 0.4 mg, 0.4 mg, Intravenous, Q5 Min PRN, Mingo Cannon MD    HYDROmorphone (DILAUDID) injection 0.5 mg, 0.5 mg, Intravenous, Q2H PRN, 0.5 mg at 03/10/25 0621 **AND** naloxone (NARCAN) injection 0.1 mg, 0.1 mg, Intravenous, Q5 Min PRN, Mingo Cannon MD    [facility]  (SINGULAIR) tablet 10 mg, 10 mg, Oral, Nightly, Mingo Cannon MD, 10 mg at 03/09/25 2038    morphine injection 2 mg, 2 mg, Intravenous, Q2H PRN, 2 mg at 03/10/25 0509 **AND** naloxone (NARCAN) injection 0.4 mg, 0.4 mg, Intravenous, Q5 Min PRN, Mingo Cannon MD    [DISCONTINUED] morphine injection 2 mg, 2 mg, Intravenous, Q2H PRN, 2 mg at 03/09/25 1404 **AND** naloxone (NARCAN) injection 0.4 mg, 0.4 mg, Intravenous, Q5 Min PRN, Mingo Cannon MD    ondansetron ODT (ZOFRAN-ODT) disintegrating tablet 4 mg, 4 mg, Oral, Q6H PRN **OR** ondansetron (ZOFRAN) injection 4 mg, 4 mg, Intravenous, Q6H PRN, Mingo Cannon MD    pantoprazole (PROTONIX) EC tablet 40 mg, 40 mg, Oral, Q AM, Mingo Cannon MD, 40 mg at 03/10/25 0509    piperacillin-tazobactam (ZOSYN) IVPB 3.375 g IVPB in 100 mL NS (VTB), 3.375 g, Intravenous, Q8H, Mingo Cannon MD, 3.375 g at 03/10/25 0812    prochlorperazine (COMPAZINE) injection 5 mg, 5 mg, Intravenous, Q6H PRN, Mingo Cannon MD    scopolamine patch 1 mg/72 hr, 1 patch, Transdermal, Q72H, Mingo Cannon MD, 1 patch at 03/09/25 1126    sodium chloride 0.9 % flush 10 mL, 10 mL, Intravenous, PRN, Mingo Cannon MD    sodium chloride 0.9 % flush 10 mL, 10 mL, Intravenous, Q12H, Mingo Cannon MD, 10 mL at 03/10/25 0813    sodium chloride 0.9 % flush 10 mL, 10 mL, Intravenous, PRN, Mingo Cannon MD    sodium chloride 0.9 % infusion 40 mL, 40 mL, Intravenous, PRN, Mingo Cannon MD    tiZANidine (ZANAFLEX) tablet 4 mg, 4 mg, Oral, Q6H PRN, Mingo Cannon MD, 4 mg at 03/10/25 0526    traZODone (DESYREL) tablet 50 mg, 50 mg, Oral, Nightly PRN, Mingo Cannon MD    Assessment & Plan       Appendicitis    S/P laparoscopic appendectomy     Cont regular diet   Ok to shower   OOB and ambulate   DC home tomorrow          Electronically signed by [place] Chris, MILDRED, 03/10/25, 11:17 AM EDT.    Seen and agree

## 2025-03-11 ENCOUNTER — READMISSION MANAGEMENT (OUTPATIENT)
Dept: CALL CENTER | Facility: HOSPITAL | Age: 45
End: 2025-03-11
Payer: COMMERCIAL

## 2025-03-11 VITALS
TEMPERATURE: 97.1 F | HEART RATE: 78 BPM | SYSTOLIC BLOOD PRESSURE: 138 MMHG | RESPIRATION RATE: 18 BRPM | OXYGEN SATURATION: 96 % | HEIGHT: 62 IN | DIASTOLIC BLOOD PRESSURE: 92 MMHG | BODY MASS INDEX: 31.52 KG/M2 | WEIGHT: 171.3 LBS

## 2025-03-11 LAB
ANION GAP SERPL CALCULATED.3IONS-SCNC: 10.6 MMOL/L (ref 5–15)
BASOPHILS # BLD AUTO: 0.08 10*3/MM3 (ref 0–0.2)
BASOPHILS NFR BLD AUTO: 1.1 % (ref 0–1.5)
BUN SERPL-MCNC: 11 MG/DL (ref 6–20)
BUN/CREAT SERPL: 20 (ref 7–25)
CALCIUM SPEC-SCNC: 8.2 MG/DL (ref 8.6–10.5)
CHLORIDE SERPL-SCNC: 106 MMOL/L (ref 98–107)
CO2 SERPL-SCNC: 25.4 MMOL/L (ref 22–29)
CREAT SERPL-MCNC: 0.55 MG/DL (ref 0.57–1)
DEPRECATED RDW RBC AUTO: 45 FL (ref 37–54)
EGFRCR SERPLBLD CKD-EPI 2021: 117.5 ML/MIN/1.73
EOSINOPHIL # BLD AUTO: 0.13 10*3/MM3 (ref 0–0.4)
EOSINOPHIL NFR BLD AUTO: 1.7 % (ref 0.3–6.2)
ERYTHROCYTE [DISTWIDTH] IN BLOOD BY AUTOMATED COUNT: 13.4 % (ref 12.3–15.4)
GLUCOSE SERPL-MCNC: 93 MG/DL (ref 65–99)
HCT VFR BLD AUTO: 31.7 % (ref 34–46.6)
HGB BLD-MCNC: 9.7 G/DL (ref 12–15.9)
IMM GRANULOCYTES # BLD AUTO: 0.09 10*3/MM3 (ref 0–0.05)
IMM GRANULOCYTES NFR BLD AUTO: 1.2 % (ref 0–0.5)
LYMPHOCYTES # BLD AUTO: 2.93 10*3/MM3 (ref 0.7–3.1)
LYMPHOCYTES NFR BLD AUTO: 39.2 % (ref 19.6–45.3)
MCH RBC QN AUTO: 28 PG (ref 26.6–33)
MCHC RBC AUTO-ENTMCNC: 30.6 G/DL (ref 31.5–35.7)
MCV RBC AUTO: 91.4 FL (ref 79–97)
MONOCYTES # BLD AUTO: 0.37 10*3/MM3 (ref 0.1–0.9)
MONOCYTES NFR BLD AUTO: 4.9 % (ref 5–12)
NEUTROPHILS NFR BLD AUTO: 3.88 10*3/MM3 (ref 1.7–7)
NEUTROPHILS NFR BLD AUTO: 51.9 % (ref 42.7–76)
NRBC BLD AUTO-RTO: 0 /100 WBC (ref 0–0.2)
PLATELET # BLD AUTO: 814 10*3/MM3 (ref 140–450)
PMV BLD AUTO: 8.9 FL (ref 6–12)
POTASSIUM SERPL-SCNC: 3.3 MMOL/L (ref 3.5–5.2)
RBC # BLD AUTO: 3.47 10*6/MM3 (ref 3.77–5.28)
SODIUM SERPL-SCNC: 142 MMOL/L (ref 136–145)
WBC NRBC COR # BLD AUTO: 7.48 10*3/MM3 (ref 3.4–10.8)

## 2025-03-11 PROCEDURE — 94799 UNLISTED PULMONARY SVC/PX: CPT

## 2025-03-11 PROCEDURE — 85025 COMPLETE CBC W/AUTO DIFF WBC: CPT | Performed by: NURSE PRACTITIONER

## 2025-03-11 PROCEDURE — 99024 POSTOP FOLLOW-UP VISIT: CPT | Performed by: NURSE PRACTITIONER

## 2025-03-11 PROCEDURE — 94761 N-INVAS EAR/PLS OXIMETRY MLT: CPT

## 2025-03-11 PROCEDURE — 25010000002 HEPARIN (PORCINE) PER 1000 UNITS: Performed by: SURGERY

## 2025-03-11 PROCEDURE — 25010000002 HYDROMORPHONE 1 MG/ML SOLUTION: Performed by: SURGERY

## 2025-03-11 PROCEDURE — 25010000002 PIPERACILLIN SOD-TAZOBACTAM PER 1 G: Performed by: SURGERY

## 2025-03-11 PROCEDURE — 25010000002 MORPHINE PER 10 MG: Performed by: SURGERY

## 2025-03-11 PROCEDURE — 80048 BASIC METABOLIC PNL TOTAL CA: CPT | Performed by: NURSE PRACTITIONER

## 2025-03-11 RX ORDER — POLYETHYLENE GLYCOL 3350 17 G/17G
17 POWDER, FOR SOLUTION ORAL DAILY
Qty: 7 PACKET | Refills: 0 | Status: SHIPPED | OUTPATIENT
Start: 2025-03-11

## 2025-03-11 RX ORDER — HYDROCODONE BITARTRATE AND ACETAMINOPHEN 5; 325 MG/1; MG/1
1 TABLET ORAL EVERY 4 HOURS PRN
Qty: 18 TABLET | Refills: 0 | Status: SHIPPED | OUTPATIENT
Start: 2025-03-11 | End: 2025-03-14

## 2025-03-11 RX ADMIN — SODIUM CHLORIDE 3.38 G: 9 INJECTION, SOLUTION INTRAVENOUS at 08:18

## 2025-03-11 RX ADMIN — HYDROMORPHONE HYDROCHLORIDE 0.5 MG: 1 INJECTION, SOLUTION INTRAMUSCULAR; INTRAVENOUS; SUBCUTANEOUS at 11:22

## 2025-03-11 RX ADMIN — HEPARIN SODIUM 5000 UNITS: 5000 INJECTION INTRAVENOUS; SUBCUTANEOUS at 05:47

## 2025-03-11 RX ADMIN — HYDROMORPHONE HYDROCHLORIDE 0.5 MG: 1 INJECTION, SOLUTION INTRAMUSCULAR; INTRAVENOUS; SUBCUTANEOUS at 05:47

## 2025-03-11 RX ADMIN — Medication 10 ML: at 08:19

## 2025-03-11 RX ADMIN — DULOXETINE HYDROCHLORIDE 60 MG: 30 CAPSULE, DELAYED RELEASE ORAL at 08:18

## 2025-03-11 RX ADMIN — PANTOPRAZOLE SODIUM 40 MG: 40 TABLET, DELAYED RELEASE ORAL at 05:47

## 2025-03-11 RX ADMIN — HYDROCODONE BITARTRATE AND ACETAMINOPHEN 1 TABLET: 10; 325 TABLET ORAL at 08:19

## 2025-03-11 RX ADMIN — MORPHINE SULFATE 2 MG: 2 INJECTION, SOLUTION INTRAMUSCULAR; INTRAVENOUS at 09:30

## 2025-03-11 RX ADMIN — HYDROCODONE BITARTRATE AND ACETAMINOPHEN 1 TABLET: 10; 325 TABLET ORAL at 00:33

## 2025-03-11 RX ADMIN — POLYETHYLENE GLYCOL 3350 17 G: 17 POWDER, FOR SOLUTION ORAL at 08:21

## 2025-03-11 RX ADMIN — BUSPIRONE HYDROCHLORIDE 15 MG: 15 TABLET ORAL at 08:18

## 2025-03-11 RX ADMIN — MORPHINE SULFATE 2 MG: 2 INJECTION, SOLUTION INTRAMUSCULAR; INTRAVENOUS at 04:16

## 2025-03-11 RX ADMIN — SODIUM CHLORIDE 3.38 G: 9 INJECTION, SOLUTION INTRAVENOUS at 00:28

## 2025-03-11 RX ADMIN — MORPHINE SULFATE 2 MG: 2 INJECTION, SOLUTION INTRAMUSCULAR; INTRAVENOUS at 02:01

## 2025-03-11 RX ADMIN — OXYCODONE HYDROCHLORIDE AND ACETAMINOPHEN 500 MG: 500 TABLET ORAL at 08:19

## 2025-03-11 NOTE — DISCHARGE SUMMARY
[facility]         DISCHARGE SUMMARY    Patient Name: Ebony Hamilton  : 1980  MRN: [id number]    Date of Admission: 3/8/2025  Date of Discharge: 3/11/2025  Primary Care Physician: Karen Stover APRN    Consults       Date and Time Order Name Status Description    3/9/2025  1:03 AM Inpatient General Surgery Consult      2025 10:24 AM Inpatient General Surgery Consult Completed     2025  6:03 AM Inpatient Gastroenterology Consult Completed             Surgical Procedures Since Admission:  Procedure(s):  APPENDECTOMY LAPAROSCOPIC  Surgeon:  Mingo Cannon MD  Status:  2 Days Post-Op  -------------------      Presenting Problem:   Appendicitis [K37]  Thrombocytosis [D75.839]  Acute appendicitis, unspecified acute appendicitis type [K35.80]    Active and Resolved Hospital Problems:  Active Hospital Problems    Diagnosis POA    **Appendicitis [K37] Yes    S/P laparoscopic appendectomy [Z90.49] Not Applicable      Resolved Hospital Problems   No resolved problems to display.         Hospital Course     Hospital Course:  Ebony Hamilton is a 44 y.o. female status post laparoscopic appendectomy.  She has had a complicated medical history recently secondary to a cholecystectomy with bile leak.  She presented to the emergency department with CT showing evidence of appendicitis.  She was taken for laparoscopic appendectomy.  After appendectomy she did well.  She was having some residual pain.  She was discharged home on postoperative day 2 in good condition.  She was tolerating regular diet and pain was controlled.  She will follow-up in the office with either myself or Dr. Castano.  Patient states she felt comfortable for discharge.    Day of Discharge     Vital Signs:  Temp:  [97.1 °F (36.2 °C)-98.2 °F (36.8 °C)] 97.1 °F (36.2 °C)  Heart Rate:  [] 78  Resp:  [18] 18  BP: (109-138)/(67-92) 138/92  Output by Drain (mL) 03/10/25 0701 - 03/10/25 1900 03/10/25 190  - 03/11/25 0700 03/11/25 0701 - 03/11/25 1204 Range Total   Patient has no LDAs of requested type attached.     Physical Exam:  See Progress Note    Pertinent  and/or Most Recent Results     LAB RESULTS:      Lab 03/11/25  0629 03/10/25  0615 03/08/25  2331 03/08/25  2329   WBC 7.48 9.44  --  13.11*   HEMOGLOBIN 9.7* 10.1*  --  12.1   HEMATOCRIT 31.7* 33.3*  --  38.2   PLATELETS 814* 876*  --  1,226*   NEUTROS ABS 3.88 6.74  --  10.68*   IMMATURE GRANS (ABS) 0.09* 0.06*  --  0.10*   LYMPHS ABS 2.93 2.11  --  1.77   MONOS ABS 0.37 0.46  --  0.47   EOS ABS 0.13 0.02  --  0.02   MCV 91.4 91.0  --  86.8   LACTATE  --   --  1.6  --          Lab 03/11/25  0629 03/10/25  0615 03/08/25  2329   SODIUM 142 141 145   POTASSIUM 3.3* 3.6 3.4*   CHLORIDE 106 104 103   CO2 25.4 23.5 21.1*   ANION GAP 10.6 13.5 20.9*   BUN 11 8 8   CREATININE 0.55* 0.56* 0.56*   EGFR 117.5 117.0 117.0   GLUCOSE 93 106* 104*   CALCIUM 8.2* 8.8 10.2         Lab 03/10/25  0615 03/08/25  2329   TOTAL PROTEIN 6.8 8.3   ALBUMIN 3.3* 4.0   GLOBULIN 3.5 4.3   ALT (SGPT) 8 9   AST (SGOT) 10 11   BILIRUBIN 0.3 0.4   ALK PHOS 86 119*   LIPASE  --  41             Brief Urine Lab Results  (Last result in the past 365 days)        Color   Clarity   Blood   Leuk Est   Nitrite   Protein   CREAT   Urine HCG        03/09/25 0320 Yellow   Clear   Negative   Negative   Negative   Negative                 Microbiology Results (last 10 days)       Procedure Component Value - Date/Time    Blood Culture - Blood, Arm, Left [[id number]]  (Normal) Collected: 03/09/25 0144    Lab Status: Preliminary result Specimen: Blood from Arm, Left Updated: 03/11/25 0300     Blood Culture No growth at 2 days    Blood Culture - Blood, Hand, Left [[id number]]  (Normal) Collected: 03/09/25 0144    Lab Status: Preliminary result Specimen: Blood from Hand, Left Updated: 03/11/25 0300     Blood Culture No growth at 2 days    Wound Culture - Wound, Axilla, Right [[id number]]  (Abnormal)   (Susceptibility) Collected: 03/05/25 1322    Lab Status: Final result Specimen: Wound from Axilla, Right Updated: 03/08/25 0836     Wound Culture Light growth (2+) Staphylococcus aureus, MRSA     Comment:   Methicillin resistant Staphylococcus aureus, Patient may be an isolation risk.        Gram Stain Rare (1+) WBCs seen      Few (2+) Gram positive cocci in pairs, chains and clusters    Susceptibility        Staphylococcus aureus, MRSA      GOLDY      Clindamycin Susceptible      Erythromycin Resistant      Oxacillin Resistant      Rifampin Susceptible      Tetracycline Susceptible      Trimethoprim + Sulfamethoxazole Susceptible      Vancomycin Susceptible                               CT Abdomen Pelvis With Contrast  Result Date: 3/9/2025  Impression: 1.  Acute appendicitis is suggested. Please correlate clinically. 2.  Interval decrease in the ascites. 3.  There is a persistent gallbladder fossa fluid collection with an estimated total volume of 20.8 mL. It may represent a urinoma. An infected fluid collection, such as an abscess cannot be excluded. 4.  An internal biliary stent is in the common bile duct (CBD). There is pneumobilia. 5.  The inflammatory change seen within the right upper quadrant has decreased considerably since the 2/22/2025 CT study. 6.  Interval decrease in the perihepatic and right subphrenic fluid collection is noted since 2/24/2025. The percutaneous drainage catheter remains in place and is located laterally. 7.  Please see above comments for further detail.   Portions of this note were completed with a voice recognition program.  3/9/2025 12:52 AM by Romario Romero MD on Workstation: [employer]      CT Guided Percutaneous Drain Peritoneal  Result Date: 2/24/2025  Impression: Impression: Technically successful placement of an 8 [place] drain into the hepatic subcapsular fluid collection which yielded 80 cc bilious appearing fluid. A sample was sent for culture. Electronically Signed: Jl  MD Simeon  2/24/2025 12:15 PM EST  Workstation ID: [id number]    CT Abdomen Pelvis With Contrast  Result Date: 2/22/2025  Impression: Impression: CT scan of the abdomen and pelvis with IV contrast demonstrating findings consistent with recent cholecystectomy. Findings concerning for bile leak, as above. Electronically Signed: Lisandro Moreira MD  2/22/2025 4:06 PM EST  Workstation ID: [id number]    XR Abdomen Flat & Upright  Result Date: 2/17/2025  Impression: Nonobstructive bowel gas pattern. No free air. Electronically Signed: Yayo Ford MD  2/17/2025 6:51 AM EST  Workstation ID: [id number]    US Gallbladder  Result Date: 2/17/2025  Impression: 1. Small gallstone in the gallbladder neck with no biliary obstruction or gallbladder wall thickening. There is a positive sonographic Guzman's sign of questionable significance. If there is continued concern for acute cholecystitis, HIDA scan may be beneficial. 2. Otherwise negative. Electronically Signed: Yayo Ford MD  2/17/2025 12:08 AM EST  Workstation ID: [id number]  Results for orders placed in visit on 02/12/15    SCANNED - ECHOCARDIOGRAM      Labs Pending at Discharge:  Pending Labs       Order Current Status    Tissue Pathology Exam In process    Blood Culture - Blood, Arm, Left Preliminary result    Blood Culture - Blood, Hand, Left Preliminary result            Discharge Details        Discharge Medications        ASK your doctor about these medications        Instructions Start Date   albuterol sulfate  (90 Base) MCG/ACT inhaler  Commonly known as: PROVENTIL HFA;VENTOLIN HFA;PROAIR HFA   2 puffs, Inhalation, Every 6 Hours PRN      bacitracin 500 UNIT/GM ointment   0.9 g, Topical, 2 Times Daily      bisacodyl 10 MG suppository  Commonly known as: DULCOLAX   Insert 1 suppository into the rectum Daily As Needed.      busPIRone 15 MG tablet  Commonly known as: BUSPAR   15 mg, Oral, 3 Times Daily      CALCIUM PO   1 tablet, Daily      clonazePAM 0.5 MG  tablet  Commonly known as: KlonoPIN   0.5 mg, Oral, 2 Times Daily PRN      cyclobenzaprine 5 MG tablet  Commonly known as: FLEXERIL   5 mg, Oral, 3 Times Daily PRN      doxycycline 100 MG capsule  Commonly known as: VIBRAMYCIN   100 mg, Oral, 2 Times Daily      DULoxetine 30 MG capsule  Commonly known as: CYMBALTA   30 mg, Daily      DULoxetine 60 MG capsule  Commonly known as: CYMBALTA   60 mg, Daily      HYDROcodone-acetaminophen  MG per tablet  Commonly known as: NORCO   1 tablet, Oral, Every 4 Hours PRN      hydrocortisone 2.5 % cream   1 Application, Topical, 2 Times Daily      montelukast 10 MG tablet  Commonly known as: Singulair   10 mg, Oral, Nightly      ondansetron 4 MG tablet  Commonly known as: ZOFRAN   TAKE (1) TABLET EVERY 8 HOURS AS NEEDED FOR NAUSEA AND vomiting      pantoprazole 40 MG EC tablet  Commonly known as: PROTONIX   40 mg, Oral, Daily      polyethylene glycol 17 g packet  Commonly known as: MIRALAX   17 g, Oral, Daily      prochlorperazine 10 MG tablet  Commonly known as: COMPAZINE   Take 1 tablet by mouth Every 6 (Six) Hours As Needed.      sennosides-docusate 8.6-50 MG per tablet  Commonly known as: PERICOLACE   1 tablet, Oral, Daily      tiZANidine 4 MG tablet  Commonly known as: ZANAFLEX   4-8 mg, Every 6 Hours PRN      traZODone 50 MG tablet  Commonly known as: DESYREL   Take 0.5 to 2 tab PO QHS PRN sleep      vitamin C 500 MG tablet  Commonly known as: ASCORBIC ACID   500 mg, Daily               Allergies   Allergen Reactions    Escitalopram Nausea Only and Rash     Nausea, sweating, rash     Sulfa Antibiotics Anaphylaxis    Baclofen GI Intolerance, Hives and Nausea And Vomiting    Ciprofloxacin Hallucinations    Codeine Hives    Gold-Containing Drug Products Rash    Latex Rash    Nickel Hives    Tegaderm Ag Mesh [Silver] Hives         Discharge Disposition:      Diet:  Hospital:  Diet Order   Procedures    Diet: Regular/House; Fluid Consistency: Thin (IDDSI 0)       Discharge  Activity:       Future Appointments   Date Time Provider Department Center   3/12/2025  9:00 AM Karen Stover APRN [facility]   3/21/2025  2:30 PM Josué Castano MD [facility]           Time spent on Discharge including face to face service: 15 minutes

## 2025-03-11 NOTE — PROGRESS NOTES
"POST OP PROGRESS NOTE     Patient Name:  Ebony Hamilton  YOB: 1980  [id number]   LOS: 2 days   2 Days Post-Op            Subjective     Interval History:   VSS, afebrile, pain controlled tolerating diet WBC count down to 7    Review of Systems:    A complete review of systems was performed and all are negative except what is documented in the HPI.       Objective     Constitutional:  well nourished, no acute distress, appears stated age /89 (BP Location: Right arm, Patient Position: Lying)   Pulse 78   Temp 97.1 °F (36.2 °C) (Oral)   Resp 18   Ht 157.5 cm (62\")   Wt 77.7 kg (171 lb 4.8 oz)   SpO2 96%   BMI 31.33 kg/m²    Eyes:  anicteric sclerae, moist conjunctivae, no lid lag, PERRLA  ENMT:  oropharynx clear, moist mucous membranes  Neck:   full ROM, trachea midline  Cardiovascular: RRR, S1 and S2 present, no MRG, heart rate 78, no pedal edema  Respiratory: lungs CTA, respirations even and unlabored  GI:  Abdomen soft, appropriately tender, nondistended     Skin:  warm and dry, normal turgor, no rashes  Psychiatric:  alert and oriented x 4, intact judgment and insight, cooperative          Results Review:       I reviewed the patient's new clinical results including  CBC, BMP.     WBC   Date Value Ref Range Status   03/11/2025 7.48 3.40 - 10.80 10*3/mm3 Final     RBC   Date Value Ref Range Status   03/11/2025 3.47 (L) 3.77 - 5.28 10*6/mm3 Final     Hemoglobin   Date Value Ref Range Status   03/11/2025 9.7 (L) 12.0 - 15.9 g/dL Final     Hematocrit   Date Value Ref Range Status   03/11/2025 31.7 (L) 34.0 - 46.6 % Final     MCV   Date Value Ref Range Status   03/11/2025 91.4 79.0 - 97.0 fL Final     MCH   Date Value Ref Range Status   03/11/2025 28.0 26.6 - 33.0 pg Final     MCHC   Date Value Ref Range Status   03/11/2025 30.6 (L) 31.5 - 35.7 g/dL Final     RDW   Date Value Ref Range Status   03/11/2025 13.4 12.3 - 15.4 % Final     RDW-SD   Date Value Ref Range Status   03/11/2025 " 45.0 37.0 - 54.0 fl Final     MPV   Date Value Ref Range Status   03/11/2025 8.9 6.0 - 12.0 fL Final     Platelets   Date Value Ref Range Status   03/11/2025 814 (H) 140 - 450 10*3/mm3 Final     Neutrophil %   Date Value Ref Range Status   03/11/2025 51.9 42.7 - 76.0 % Final     Lymphocyte %   Date Value Ref Range Status   03/11/2025 39.2 19.6 - 45.3 % Final     Monocyte %   Date Value Ref Range Status   03/11/2025 4.9 (L) 5.0 - 12.0 % Final     Eosinophil %   Date Value Ref Range Status   03/11/2025 1.7 0.3 - 6.2 % Final     Basophil %   Date Value Ref Range Status   03/11/2025 1.1 0.0 - 1.5 % Final     Immature Grans %   Date Value Ref Range Status   03/11/2025 1.2 (H) 0.0 - 0.5 % Final     Neutrophils, Absolute   Date Value Ref Range Status   03/11/2025 3.88 1.70 - 7.00 10*3/mm3 Final     Lymphocytes, Absolute   Date Value Ref Range Status   03/11/2025 2.93 0.70 - 3.10 10*3/mm3 Final     Monocytes, Absolute   Date Value Ref Range Status   03/11/2025 0.37 0.10 - 0.90 10*3/mm3 Final     Eosinophils, Absolute   Date Value Ref Range Status   03/11/2025 0.13 0.00 - 0.40 10*3/mm3 Final     Basophils, Absolute   Date Value Ref Range Status   03/11/2025 0.08 0.00 - 0.20 10*3/mm3 Final     Immature Grans, Absolute   Date Value Ref Range Status   03/11/2025 0.09 (H) 0.00 - 0.05 10*3/mm3 Final     nRBC   Date Value Ref Range Status   03/11/2025 0.0 0.0 - 0.2 /100 WBC Final         Basic Metabolic Panel    Sodium Sodium   Date Value Ref Range Status   03/11/2025 142 136 - 145 mmol/L Final   03/10/2025 141 136 - 145 mmol/L Final   03/08/2025 145 136 - 145 mmol/L Final      Potassium Potassium   Date Value Ref Range Status   03/11/2025 3.3 (L) 3.5 - 5.2 mmol/L Final   03/10/2025 3.6 3.5 - 5.2 mmol/L Final   03/08/2025 3.4 (L) 3.5 - 5.2 mmol/L Final      Chloride Chloride   Date Value Ref Range Status   03/11/2025 106 98 - 107 mmol/L Final   03/10/2025 104 98 - 107 mmol/L Final   03/08/2025 103 98 - 107 mmol/L Final     "  Bicarbonate No results found for: \"PLASMABICARB\"   BUN BUN   Date Value Ref Range Status   03/11/2025 11 6 - 20 mg/dL Final   03/10/2025 8 6 - 20 mg/dL Final   03/08/2025 8 6 - 20 mg/dL Final      Creatinine Creatinine   Date Value Ref Range Status   03/11/2025 0.55 (L) 0.57 - 1.00 mg/dL Final   03/10/2025 0.56 (L) 0.57 - 1.00 mg/dL Final   03/08/2025 0.56 (L) 0.57 - 1.00 mg/dL Final      Calcium Calcium   Date Value Ref Range Status   03/11/2025 8.2 (L) 8.6 - 10.5 mg/dL Final   03/10/2025 8.8 8.6 - 10.5 mg/dL Final   03/08/2025 10.2 8.6 - 10.5 mg/dL Final      Glucose      No components found for: \"GLUCOSE.*\"       Lab Results   Component Value Date    GLUCOSE 93 03/11/2025    BUN 11 03/11/2025    CREATININE 0.55 (L) 03/11/2025     03/11/2025    K 3.3 (L) 03/11/2025     03/11/2025    CALCIUM 8.2 (L) 03/11/2025    PROTEINTOT 6.8 03/10/2025    ALBUMIN 3.3 (L) 03/10/2025    ALT 8 03/10/2025    AST 10 03/10/2025    ALKPHOS 86 03/10/2025    BILITOT 0.3 03/10/2025    GLOB 3.5 03/10/2025    AGRATIO 0.9 03/10/2025    BCR 20.0 03/11/2025    ANIONGAP 10.6 03/11/2025    EGFR 117.5 03/11/2025       IMAGING:        Medications:    Current Facility-Administered Medications:     albuterol sulfate HFA (PROVENTIL HFA;VENTOLIN HFA;PROAIR HFA) inhaler 2 puff, 2 puff, Inhalation, Q6H PRN, Mingo Cannon MD    ascorbic acid (VITAMIN C) tablet 500 mg, 500 mg, Oral, Daily, Mingo Cannon MD, 500 mg at 03/11/25 0819    busPIRone (BUSPAR) tablet 15 mg, 15 mg, Oral, TID, Mingo Cannon MD, 15 mg at 03/11/25 0818    clonazePAM (KlonoPIN) tablet 0.5 mg, 0.5 mg, Oral, BID PRN, Mingo Cannon MD    DULoxetine (CYMBALTA) DR capsule 30 mg, 30 mg, Oral, Daily, Mingo Cannon MD, 30 mg at 03/10/25 0812    DULoxetine (CYMBALTA) DR capsule 60 mg, 60 mg, Oral, Daily, Mingo Cannon MD, 60 mg at 03/11/25 0818    heparin (porcine) 5000 UNIT/ML injection 5,000 Units, 5,000 Units, Subcutaneous, Q8H, Mingo Cannon, " MD, 5,000 Units at 03/11/25 0547    HYDROcodone-acetaminophen (NORCO)  MG per tablet 1 tablet, 1 tablet, Oral, Q4H PRN, Mingo Cannon MD, 1 tablet at 03/11/25 0819    HYDROcodone-acetaminophen (NORCO) 5-325 MG per tablet 1 tablet, 1 tablet, Oral, Q4H PRN, Mingo Cannon MD    HYDROmorphone (DILAUDID) injection 0.5 mg, 0.5 mg, Intravenous, Q2H PRN, 0.5 mg at 03/11/25 1122 **AND** naloxone (NARCAN) injection 0.4 mg, 0.4 mg, Intravenous, Q5 Min PRN, Mingo Cannon MD    HYDROmorphone (DILAUDID) injection 0.5 mg, 0.5 mg, Intravenous, Q2H PRN, 0.5 mg at 03/10/25 0621 **AND** naloxone (NARCAN) injection 0.1 mg, 0.1 mg, Intravenous, Q5 Min PRN, Mingo Cannon MD    montelukast (SINGULAIR) tablet 10 mg, 10 mg, Oral, Nightly, Mingo Cannon MD, 10 mg at 03/10/25 2031    morphine injection 2 mg, 2 mg, Intravenous, Q2H PRN, 2 mg at 03/11/25 0930 **AND** naloxone (NARCAN) injection 0.4 mg, 0.4 mg, Intravenous, Q5 Min PRN, Mingo Cannon MD    [DISCONTINUED] morphine injection 2 mg, 2 mg, Intravenous, Q2H PRN, 2 mg at 03/09/25 1404 **AND** naloxone (NARCAN) injection 0.4 mg, 0.4 mg, Intravenous, Q5 Min PRN, Mingo Cannon MD    ondansetron ODT (ZOFRAN-ODT) disintegrating tablet 4 mg, 4 mg, Oral, Q6H PRN **OR** ondansetron (ZOFRAN) injection 4 mg, 4 mg, Intravenous, Q6H PRN, Mingo Cannon MD    pantoprazole (PROTONIX) EC tablet 40 mg, 40 mg, Oral, Q AM, Mingo Cannon MD, 40 mg at 03/11/25 0547    piperacillin-tazobactam (ZOSYN) IVPB 3.375 g IVPB in 100 mL NS (VTB), 3.375 g, Intravenous, Q8H, Mingo Cannon MD, 3.375 g at 03/11/25 0818    polyethylene glycol (MIRALAX) packet 17 g, 17 g, Oral, Daily, India Chris APRN, 17 g at 03/11/25 0821    prochlorperazine (COMPAZINE) injection 5 mg, 5 mg, Intravenous, Q6H PRN, Mingo Cannon MD    scopolamine patch 1 mg/72 hr, 1 patch, Transdermal, Q72H, Mingo Cannon MD, 1 patch at 03/09/25 1126    sodium chloride 0.9 % flush 10  mL, 10 mL, Intravenous, PRN, Mingo Cannon MD    sodium chloride 0.9 % flush 10 mL, 10 mL, Intravenous, Q12H, Mingo Cannon MD, 10 mL at 03/11/25 0819    sodium chloride 0.9 % flush 10 mL, 10 mL, Intravenous, PRN, Mingo Cannon MD    sodium chloride 0.9 % infusion 40 mL, 40 mL, Intravenous, PRN, Mingo Cannon MD    tiZANidine (ZANAFLEX) tablet 4 mg, 4 mg, Oral, Q6H PRN, Mingo Cannon MD, 4 mg at 03/10/25 0526    traZODone (DESYREL) tablet 50 mg, 50 mg, Oral, Nightly PRN, Mingo Cannon MD    Assessment & Plan       Appendicitis    S/P laparoscopic appendectomy   DC home          Electronically signed by India Chris, MILDRED, 03/11/25, 12:05 PM EDT.

## 2025-03-11 NOTE — PLAN OF CARE
Goal Outcome Evaluation:           Progress: improving  Outcome Evaluation: Pt aox4, VSS. Pt ambulated around hallway. PRN pain medications given. Dressing in axillary changed. Iv abx given. Will CTM.

## 2025-03-11 NOTE — OUTREACH NOTE
Prep Survey      Flowsheet Row Responses   [facility] [place], operated by [facility] patient discharged from? [facility]   Is LACE score < 7 ? No   Eligibility [facility]   Date of Admission 03/08/25   Date of Discharge 03/11/25   Discharge Disposition Home or Self Care   Discharge diagnosis *Appendicitis (K37)  Yes    S/P laparoscopic appendectomy   Does the patient have one of the following disease processes/diagnoses(primary or secondary)? General Surgery   Does the patient have Home health ordered? No   Is there a DME ordered? No   Prep survey completed? Yes            NAYELY BRUCE - Registered Nurse

## 2025-03-11 NOTE — SIGNIFICANT NOTE
Wound Eval / Progress Noted     [facility]     Patient Name: Ebony Hamilton  : 1980  MRN: [id number]  Today's Date: 3/11/2025                 Admit Date: 3/8/2025    Visit Dx:    ICD-10-CM ICD-9-CM   1. Acute appendicitis, unspecified acute appendicitis type  K35.80 540.9   2. Thrombocytosis  D75.839 238.71   3. S/P laparoscopic appendectomy  Z90.49 V45.89         Appendicitis    S/P laparoscopic appendectomy        Past Medical History:   Diagnosis Date    Allergic     Anxiety     Atrial fibrillation     RELEASED BY CARDIOLOGIST/ELECTROPHYSIST, NO CURRENT MEDS    Chronic pain disorder     Depression     Endometriosis     Headache     Hemorrhoids     Injury of shoulder, right 2009    CHRONIC PAIN    Panic disorder     S/P laparoscopic appendectomy 2025    S/P laparoscopic cholecystectomy 2025        Past Surgical History:   Procedure Laterality Date    APPENDECTOMY N/A 3/9/2025    Procedure: APPENDECTOMY LAPAROSCOPIC;  Surgeon: Mingo Cannon MD;  Location: [facility] MAIN OR;  Service: General;  Laterality: N/A;    CERVICAL ARTHRODESIS  2015    Donaldo Albarran    CERVICAL FUSION      C4-7 FUSION, FULL ROM    CHOLECYSTECTOMY N/A 2025    Procedure: CHOLECYSTECTOMY LAPAROSCOPIC plain language: removal of gallbladder thru small incisions using long instruments and a camera;  Surgeon: Josué Castano MD;  Location: [facility] MAIN OR;  Service: General;  Laterality: N/A;    COLONOSCOPY N/A 2022    Procedure: COLONOSCOPY;  Surgeon: Shabbir Baird MD;  Location: [facility] ENDOSCOPY;  Service: General;  Laterality: N/A;  HEMORRHOIDS    ENDOSCOPY N/A 2025    Procedure: ESOPHAGOGASTRODUODENOSCOPY WITH BIOPSIES;  Surgeon: Sanya Reyes MD;  Location: [facility] ENDOSCOPY;  Service: Gastroenterology;  Laterality: N/A;  GASTRITIS, SMALL HIATAL HERNIA    ENDOSCOPY N/A 3/6/2025    Procedure: ESOPHAGOGASTRODUODENOSCOPY with pancreatic stent removal;  Surgeon: Ha Thompson  MD [place];  Location: [facility] ENDOSCOPY;  Service: Gastroenterology;  Laterality: N/A;  pancreatic stent removal, hiatal hernia    ERCP N/A 2/24/2025    Procedure: ENDOSCOPIC RETROGRADE CHOLANGIOPANCREATOGRAPHY with bile duct stent placement and pancreatic duct stent placement;  Surgeon: Ha Thompson MD;  Location: [facility] ENDOSCOPY;  Service: Gastroenterology;  Laterality: N/A;  bile duct leeak    EXPLORATORY LAPAROTOMY      HEMORRHOIDECTOMY N/A 05/31/2022    Procedure: HEMORRHOID BANDING;  Surgeon: Shabbir Baird MD;  Location: [facility] ENDOSCOPY;  Service: General;  Laterality: N/A;  BANDS X 2    HEMORRHOIDECTOMY N/A 1/31/2024    Procedure: HEMORRHOIDECTOMY;  Surgeon: Shabbir Baird MD;  [place] [facility] [place] [facility] [place]  Service: General;  Laterality: N/A;    HYSTERECTOMY      SHOULDER ARTHROSCOPY Right     SHOULDER SURGERY Left     ARTHROSCOPY LABRAL TEAR X2    TUBAL ABDOMINAL LIGATION           Physical Assessment:  Wound Right medial mid axillary (Active)   Dressing Appearance intact;moist drainage 03/11/25 1030   Closure None 03/11/25 1030   Base red;moist 03/11/25 1030   Red (%), Wound Tissue Color 100 03/11/25 1030   Periwound pink;intact;dry 03/11/25 1030   Periwound Temperature warm 03/11/25 1030   Periwound Skin Turgor soft 03/11/25 1030   Edges open 03/11/25 1030   Wound Length (cm) 0.9 cm 03/11/25 1030   Wound Width (cm) 0.2 cm 03/11/25 1030   Wound Depth (cm) 0.5 cm 03/11/25 1030   Wound Surface Area (cm^2) 0.14 cm^2 03/11/25 1030   Wound Volume (cm^3) 0.047 cm^3 03/11/25 1030   Undermining [Depth (cm)/Location] 2.1 cm from 6-12 o'clock 03/11/25 1030   Drainage Characteristics/Odor serosanguineous 03/11/25 1030   Drainage Amount small 03/11/25 1030   Care, Wound cleansed with;irrigated with;sterile normal saline 03/11/25 1030   Dressing Care dressing applied;packed with;gauze, iodoform;packing strip;silicone border foam 03/11/25 1030   Periwound Care absorptive dressing applied 03/11/25  1030        Wound Check / Follow-up:  Patient was seen today for a wound consult. Patient is awake, alert and oriented at the time of the assessment; patient was agreeable to the visit. Patient reports having an abscess to the right axilla drained by her PCP; her PCP has been managing the wound and changing the dressing every 2 days.    Wound base is red and moist, very small area of visualization noted. Patient does have undermining from 6-12 o'clock. No evidence of induration noted. Cleansed and irrigated with NS and filled the wound base and undermining with iodoform packing strip. Recommending daily dressing changed with iodoform packing strip and once the patient is discharged she is able to resume her wound checks with her PCP.    Impression: Surgical incision with secondary closure every 2-day  dressing changes    Short term goals: regain skin integrity, skin protection, daily dressing changes, quality skin care and hygiene.    Mireya Brown RN    3/11/2025    12:21 EDT

## 2025-03-12 ENCOUNTER — TRANSITIONAL CARE MANAGEMENT TELEPHONE ENCOUNTER (OUTPATIENT)
Dept: CALL CENTER | Facility: HOSPITAL | Age: 45
End: 2025-03-12
Payer: COMMERCIAL

## 2025-03-12 LAB
CYTO UR: NORMAL
LAB AP CASE REPORT: NORMAL
LAB AP CLINICAL INFORMATION: NORMAL
PATH REPORT.FINAL DX SPEC: NORMAL
PATH REPORT.GROSS SPEC: NORMAL

## 2025-03-12 NOTE — OUTREACH NOTE
Call Center TCM Note      Flowsheet Row Responses   [facility] patient discharged from? [facility]   Does the patient have one of the following disease processes/diagnoses(primary or secondary)? General Surgery   TCM attempt successful? Yes  [VR for Donaldo Askew]   Call start time 1548   Call end time 1549   Discharge diagnosis *Appendicitis (K37)  Yes    S/P laparoscopic appendectomy   Meds reviewed with patient/caregiver? Yes   Is the patient having any side effects they believe may be caused by any medication additions or changes? No   Does the patient have all medications related to this admission filled (includes all antibiotics, pain medications, etc.) Yes   Is the patient taking all medications as directed (includes completed medication regime)? Yes   Comments HOSP DC FU appt 3/18/25@1115am   Does the patient have an appointment with their PCP within 7-14 days of discharge? Yes   Has home health visited the patient within 72 hours of discharge? N/A   Psychosocial issues? No   Did the patient receive a copy of their discharge instructions? Yes   Nursing interventions Reviewed instructions with patient   What is the patient's perception of their health status since discharge? Same  [Pt reports she is sore and tired, no BM yet but taking bowel regimen as ordered.  Pt has no questions today]   Nursing interventions Nurse provided patient education  [post op instructions reviewed]   Is the patient /caregiver able to teach back basic post-op care? Lifting as instructed by MD in discharge instructions, Keep incision areas clean,dry and protected, No tub bath, swimming, or hot tub until instructed by MD, Take showers only when approved by MD-sponge bathe until then, Continue use of incentive spirometry at least 1 week post discharge   Is the patient/caregiver able to teach back signs and symptoms of incisional infection? Increased redness, swelling or pain at the incisonal site, Increased drainage or bleeding,  Incisional warmth, Pus or odor from incision, Fever  [reviewed]   Is the patient/caregiver able to teach back steps to recovery at home? Rest and rebuild strength, gradually increase activity   Is the patient/caregiver able to teach back the hierarchy of who to call/visit for symptoms/problems? PCP, Specialist, Home health nurse, Urgent Care, ED, 911 Yes   TCM call completed? Yes   Call end time [id number]            Isabel Granados RN    3/12/2025, 15:50 EDT

## 2025-03-14 ENCOUNTER — OFFICE VISIT (OUTPATIENT)
Dept: FAMILY MEDICINE CLINIC | Facility: CLINIC | Age: 45
End: 2025-03-14
Payer: COMMERCIAL

## 2025-03-14 VITALS
OXYGEN SATURATION: 98 % | HEART RATE: 55 BPM | SYSTOLIC BLOOD PRESSURE: 104 MMHG | DIASTOLIC BLOOD PRESSURE: 72 MMHG | TEMPERATURE: 97.8 F | BODY MASS INDEX: 32.63 KG/M2 | HEIGHT: 62 IN | WEIGHT: 177.3 LBS

## 2025-03-14 DIAGNOSIS — Z98.890 HISTORY OF BILIARY DUCT STENT PLACEMENT: ICD-10-CM

## 2025-03-14 DIAGNOSIS — Z90.49 S/P LAPAROSCOPIC APPENDECTOMY: ICD-10-CM

## 2025-03-14 DIAGNOSIS — Z90.49 STATUS POST CHOLECYSTECTOMY: ICD-10-CM

## 2025-03-14 DIAGNOSIS — L02.419 AXILLARY ABSCESS: ICD-10-CM

## 2025-03-14 DIAGNOSIS — Z09 HOSPITAL DISCHARGE FOLLOW-UP: Primary | ICD-10-CM

## 2025-03-14 LAB
BACTERIA SPEC AEROBE CULT: NORMAL
BACTERIA SPEC AEROBE CULT: NORMAL

## 2025-03-14 RX ORDER — IBUPROFEN 800 MG/1
TABLET, FILM COATED ORAL EVERY 8 HOURS SCHEDULED
COMMUNITY

## 2025-03-14 NOTE — PROGRESS NOTES
Ebony Hamilton is a 44 y.o. female admitted to [facility] and  is seen for follow up.  Chief Complaint   Patient presents with    Wound Check     Dressing change and wound check for right axillary abscess.       History of Present Illness  The patient presents for a hospital follow-up after an appendectomy.    She was managing well with her drain until she experienced a sudden onset of severe illness, necessitating a return to the hospital on Saturday night. A CT scan revealed thickening around her appendix, leading to an appendectomy performed by Dr. Cannon. The appendix was found to be infected. She has been experiencing drainage from the surgical wound, which was cleaned and repacked by the wound care team on Tuesday. The last bandage was removed today prior to her shower, during which some of the packing material started to come out.          2/19/2025     5:57 PM 2/28/2025     6:36 PM 3/11/2025     6:41 PM   Date of TCM Phone Call   [facility]   Date of Admission 2/16/2025 2/22/2025 3/8/2025   Date of Discharge 2/19/2025 2/28/2025 3/11/2025   Discharge Disposition Home or Self Care Home or Self Care Home or Self Care     Discharge summary is available.  Current outpatient and discharge medications have been reconciled for the patient.  Reviewed by: MILDRED Irby      She was admitted for the following reason(s): Abdominal pain  Primary admitting diagnosis at discharge was appendicitis, status post laparoscopic appendectomy.  Procedures performed while hospitalized:Procedures Performed in Hospital: please see EPIC record for further details of results and finding  Pending results:  Pending tests: none  Pain Control:  well controlled  Diet: Regular diet  Activity after Discharge: activity as tolerated  Items to be addressed at first follow up visit include:  1. Medication changes:         Discharge Medications          ASK your doctor  about these medications         Instructions Start Date   albuterol sulfate  (90 Base) MCG/ACT inhaler  Commonly known as: PROVENTIL HFA;VENTOLIN HFA;PROAIR HFA    2 puffs, Inhalation, Every 6 Hours PRN        bacitracin 500 UNIT/GM ointment    0.9 g, Topical, 2 Times Daily        bisacodyl 10 MG suppository  Commonly known as: DULCOLAX    Insert 1 suppository into the rectum Daily As Needed.        busPIRone 15 MG tablet  Commonly known as: BUSPAR    15 mg, Oral, 3 Times Daily        CALCIUM PO    1 tablet, Daily        clonazePAM 0.5 MG tablet  Commonly known as: KlonoPIN    0.5 mg, Oral, 2 Times Daily PRN        cyclobenzaprine 5 MG tablet  Commonly known as: FLEXERIL    5 mg, Oral, 3 Times Daily PRN        doxycycline 100 MG capsule  Commonly known as: VIBRAMYCIN    100 mg, Oral, 2 Times Daily        DULoxetine 30 MG capsule  Commonly known as: CYMBALTA    30 mg, Daily        DULoxetine 60 MG capsule  Commonly known as: CYMBALTA    60 mg, Daily        HYDROcodone-acetaminophen  MG per tablet  Commonly known as: NORCO    1 tablet, Oral, Every 4 Hours PRN        hydrocortisone 2.5 % cream    1 Application, Topical, 2 Times Daily        montelukast 10 MG tablet  Commonly known as: Singulair    10 mg, Oral, Nightly        ondansetron 4 MG tablet  Commonly known as: ZOFRAN    TAKE (1) TABLET EVERY 8 HOURS AS NEEDED FOR NAUSEA AND vomiting        pantoprazole 40 MG EC tablet  Commonly known as: PROTONIX    40 mg, Oral, Daily        polyethylene glycol 17 g packet  Commonly known as: MIRALAX    17 g, Oral, Daily        prochlorperazine 10 MG tablet  Commonly known as: COMPAZINE    Take 1 tablet by mouth Every 6 (Six) Hours As Needed.        sennosides-docusate 8.6-50 MG per tablet  Commonly known as: PERICOLACE    1 tablet, Oral, Daily        tiZANidine 4 MG tablet  Commonly known as: ZANAFLEX    4-8 mg, Every 6 Hours PRN        traZODone 50 MG tablet  Commonly known as: DESYREL    Take 0.5 to 2 tab PO QHS  "PRN sleep        vitamin C 500 MG tablet  Commonly known as: ASCORBIC ACID    500 mg, Daily           She reports her overall condition is are improving since discharge.  No specific complaints.  Social support is said to be adequate to meet her needs.       Objective   Vitals:    03/14/25 1406   BP: 104/72   BP Location: Left arm   Patient Position: Sitting   Cuff Size: Adult   Pulse: 55   Temp: 97.8 °F (36.6 °C)   TempSrc: Oral   SpO2: 98%   Weight: 80.4 kg (177 lb 4.8 oz)   Height: 157.5 cm (62\")     Body mass index is 32.43 kg/m².  Physical Exam  Vitals reviewed.   Constitutional:       Appearance: Normal appearance. She is well-developed.   HENT:      Head: Normocephalic and atraumatic.   Eyes:      Conjunctiva/sclera: Conjunctivae normal.      Pupils: Pupils are equal, round, and reactive to light.   Cardiovascular:      Rate and Rhythm: Normal rate and regular rhythm.      Heart sounds: No murmur heard.     No friction rub. No gallop.   Pulmonary:      Effort: Pulmonary effort is normal.      Breath sounds: Normal breath sounds. No wheezing or rhonchi.   Abdominal:      General: Bowel sounds are normal. There is no distension.      Palpations: Abdomen is soft.      Tenderness: There is no abdominal tenderness.   Skin:     General: Skin is warm and dry.      Findings: Wound present.      Comments: Right axillary wound with packing, purulent drainage   Neurological:      Mental Status: She is alert and oriented to person, place, and time.      Cranial Nerves: No cranial nerve deficit.   Psychiatric:         Mood and Affect: Mood and affect normal.         Behavior: Behavior normal.         Thought Content: Thought content normal.         Judgment: Judgment normal.       Physical Exam      Results  Imaging  CT scan showed thickening around the appendix.       Assessment & Plan   Problem List Items Addressed This Visit       S/P laparoscopic appendectomy     Other Visit Diagnoses         Hospital discharge " follow-up    -  Primary      Axillary abscess          Status post cholecystectomy          History of biliary duct stent placement              Assessment & Plan    She underwent an appendectomy due to an infected appendix. The wound is still draining and requires packing. She was instructed on how to pack the wound at home to avoid frequent clinic visits. Her  or daughter can assist with this if needed. She will follow up with Dr. Castano on the 21st for both her appendectomy and gallbladder surgery follow-ups.      Patient or patient representative verbalized consent for the use of Ambient Listening during the visit with  MILDRED Irby for chart documentation. 3/14/2025  15:57 EDT

## 2025-03-20 ENCOUNTER — APPOINTMENT (OUTPATIENT)
Dept: GENERAL RADIOLOGY | Facility: HOSPITAL | Age: 45
End: 2025-03-20
Payer: COMMERCIAL

## 2025-03-20 ENCOUNTER — HOSPITAL ENCOUNTER (EMERGENCY)
Facility: HOSPITAL | Age: 45
Discharge: HOME | End: 2025-03-20
Attending: EMERGENCY MEDICINE
Payer: COMMERCIAL

## 2025-03-20 ENCOUNTER — APPOINTMENT (OUTPATIENT)
Dept: CT IMAGING | Facility: HOSPITAL | Age: 45
End: 2025-03-20
Payer: COMMERCIAL

## 2025-03-20 VITALS
TEMPERATURE: 98.1 F | SYSTOLIC BLOOD PRESSURE: 155 MMHG | RESPIRATION RATE: 18 BRPM | HEIGHT: 62 IN | HEART RATE: 85 BPM | BODY MASS INDEX: 33.15 KG/M2 | WEIGHT: 180.12 LBS | DIASTOLIC BLOOD PRESSURE: 89 MMHG | OXYGEN SATURATION: 100 %

## 2025-03-20 DIAGNOSIS — F41.1 ANXIETY REACTION: Primary | ICD-10-CM

## 2025-03-20 DIAGNOSIS — R07.9 CHEST PAIN, UNSPECIFIED TYPE: ICD-10-CM

## 2025-03-20 DIAGNOSIS — R06.00 ACUTE DYSPNEA: ICD-10-CM

## 2025-03-20 LAB
ALBUMIN SERPL-MCNC: 4.6 G/DL (ref 3.5–5.2)
ALBUMIN/GLOB SERPL: 1.1 G/DL
ALP SERPL-CCNC: 97 U/L (ref 39–117)
ALT SERPL W P-5'-P-CCNC: 9 U/L (ref 1–33)
ANION GAP SERPL CALCULATED.3IONS-SCNC: 19.3 MMOL/L (ref 5–15)
AST SERPL-CCNC: 15 U/L (ref 1–32)
BASOPHILS # BLD AUTO: 0.07 10*3/MM3 (ref 0–0.2)
BASOPHILS NFR BLD AUTO: 0.5 % (ref 0–1.5)
BILIRUB SERPL-MCNC: 0.6 MG/DL (ref 0–1.2)
BUN SERPL-MCNC: 10 MG/DL (ref 6–20)
BUN/CREAT SERPL: 14.3 (ref 7–25)
CALCIUM SPEC-SCNC: 10.5 MG/DL (ref 8.6–10.5)
CHLORIDE SERPL-SCNC: 99 MMOL/L (ref 98–107)
CO2 SERPL-SCNC: 19.7 MMOL/L (ref 22–29)
CREAT SERPL-MCNC: 0.7 MG/DL (ref 0.57–1)
D-LACTATE SERPL-SCNC: 2.7 MMOL/L (ref 0.5–2)
DEPRECATED RDW RBC AUTO: 42.4 FL (ref 37–54)
EGFRCR SERPLBLD CKD-EPI 2021: 110.9 ML/MIN/1.73
EOSINOPHIL # BLD AUTO: 0.08 10*3/MM3 (ref 0–0.4)
EOSINOPHIL NFR BLD AUTO: 0.6 % (ref 0.3–6.2)
ERYTHROCYTE [DISTWIDTH] IN BLOOD BY AUTOMATED COUNT: 13.8 % (ref 12.3–15.4)
GLOBULIN UR ELPH-MCNC: 4.2 GM/DL
GLUCOSE SERPL-MCNC: 96 MG/DL (ref 65–99)
HCT VFR BLD AUTO: 45.6 % (ref 34–46.6)
HGB BLD-MCNC: 14.5 G/DL (ref 12–15.9)
HOLD SPECIMEN: NORMAL
HOLD SPECIMEN: NORMAL
IMM GRANULOCYTES # BLD AUTO: 0.12 10*3/MM3 (ref 0–0.05)
IMM GRANULOCYTES NFR BLD AUTO: 0.9 % (ref 0–0.5)
LYMPHOCYTES # BLD AUTO: 3.14 10*3/MM3 (ref 0.7–3.1)
LYMPHOCYTES NFR BLD AUTO: 23.1 % (ref 19.6–45.3)
MCH RBC QN AUTO: 27.1 PG (ref 26.6–33)
MCHC RBC AUTO-ENTMCNC: 31.8 G/DL (ref 31.5–35.7)
MCV RBC AUTO: 85.2 FL (ref 79–97)
MONOCYTES # BLD AUTO: 0.66 10*3/MM3 (ref 0.1–0.9)
MONOCYTES NFR BLD AUTO: 4.9 % (ref 5–12)
NEUTROPHILS NFR BLD AUTO: 70 % (ref 42.7–76)
NEUTROPHILS NFR BLD AUTO: 9.5 10*3/MM3 (ref 1.7–7)
NRBC BLD AUTO-RTO: 0 /100 WBC (ref 0–0.2)
NT-PROBNP SERPL-MCNC: 49 PG/ML (ref 0–450)
PLATELET # BLD AUTO: 690 10*3/MM3 (ref 140–450)
PMV BLD AUTO: 10.1 FL (ref 6–12)
POTASSIUM SERPL-SCNC: 3.9 MMOL/L (ref 3.5–5.2)
PROT SERPL-MCNC: 8.8 G/DL (ref 6–8.5)
QT INTERVAL: 279 MS
QTC INTERVAL: 433 MS
RBC # BLD AUTO: 5.35 10*6/MM3 (ref 3.77–5.28)
SODIUM SERPL-SCNC: 138 MMOL/L (ref 136–145)
TROPONIN T SERPL HS-MCNC: <6 NG/L
WBC NRBC COR # BLD AUTO: 13.57 10*3/MM3 (ref 3.4–10.8)
WHOLE BLOOD HOLD COAG: NORMAL
WHOLE BLOOD HOLD SPECIMEN: NORMAL

## 2025-03-20 PROCEDURE — 25010000002 LORAZEPAM PER 2 MG: Performed by: EMERGENCY MEDICINE

## 2025-03-20 PROCEDURE — 93010 ELECTROCARDIOGRAM REPORT: CPT | Performed by: INTERNAL MEDICINE

## 2025-03-20 PROCEDURE — 83605 ASSAY OF LACTIC ACID: CPT | Performed by: EMERGENCY MEDICINE

## 2025-03-20 PROCEDURE — 71275 CT ANGIOGRAPHY CHEST: CPT

## 2025-03-20 PROCEDURE — 93005 ELECTROCARDIOGRAM TRACING: CPT | Performed by: EMERGENCY MEDICINE

## 2025-03-20 PROCEDURE — 80053 COMPREHEN METABOLIC PANEL: CPT | Performed by: EMERGENCY MEDICINE

## 2025-03-20 PROCEDURE — 83880 ASSAY OF NATRIURETIC PEPTIDE: CPT | Performed by: EMERGENCY MEDICINE

## 2025-03-20 PROCEDURE — 96374 THER/PROPH/DIAG INJ IV PUSH: CPT

## 2025-03-20 PROCEDURE — 84484 ASSAY OF TROPONIN QUANT: CPT | Performed by: EMERGENCY MEDICINE

## 2025-03-20 PROCEDURE — 25810000003 SODIUM CHLORIDE 0.9 % SOLUTION: Performed by: EMERGENCY MEDICINE

## 2025-03-20 PROCEDURE — 25010000002 KETOROLAC TROMETHAMINE PER 15 MG: Performed by: EMERGENCY MEDICINE

## 2025-03-20 PROCEDURE — 96375 TX/PRO/DX INJ NEW DRUG ADDON: CPT

## 2025-03-20 PROCEDURE — 96361 HYDRATE IV INFUSION ADD-ON: CPT

## 2025-03-20 PROCEDURE — 71045 X-RAY EXAM CHEST 1 VIEW: CPT

## 2025-03-20 PROCEDURE — 96376 TX/PRO/DX INJ SAME DRUG ADON: CPT

## 2025-03-20 PROCEDURE — 25510000001 IOPAMIDOL PER 1 ML: Performed by: EMERGENCY MEDICINE

## 2025-03-20 PROCEDURE — 85025 COMPLETE CBC W/AUTO DIFF WBC: CPT | Performed by: EMERGENCY MEDICINE

## 2025-03-20 PROCEDURE — 99285 EMERGENCY DEPT VISIT HI MDM: CPT

## 2025-03-20 RX ORDER — LORAZEPAM 2 MG/ML
1 INJECTION INTRAMUSCULAR ONCE
Status: COMPLETED | OUTPATIENT
Start: 2025-03-20 | End: 2025-03-20

## 2025-03-20 RX ORDER — HYDROCODONE BITARTRATE AND ACETAMINOPHEN 5; 325 MG/1; MG/1
1 TABLET ORAL ONCE
Refills: 0 | Status: COMPLETED | OUTPATIENT
Start: 2025-03-20 | End: 2025-03-20

## 2025-03-20 RX ORDER — KETOROLAC TROMETHAMINE 15 MG/ML
15 INJECTION, SOLUTION INTRAMUSCULAR; INTRAVENOUS ONCE
Status: COMPLETED | OUTPATIENT
Start: 2025-03-20 | End: 2025-03-20

## 2025-03-20 RX ORDER — IOPAMIDOL 755 MG/ML
100 INJECTION, SOLUTION INTRAVASCULAR
Status: COMPLETED | OUTPATIENT
Start: 2025-03-20 | End: 2025-03-20

## 2025-03-20 RX ORDER — SODIUM CHLORIDE 0.9 % (FLUSH) 0.9 %
10 SYRINGE (ML) INJECTION AS NEEDED
Status: DISCONTINUED | OUTPATIENT
Start: 2025-03-20 | End: 2025-03-20 | Stop reason: HOSPADM

## 2025-03-20 RX ADMIN — LORAZEPAM 1 MG: 2 INJECTION INTRAMUSCULAR; INTRAVENOUS at 03:00

## 2025-03-20 RX ADMIN — SODIUM CHLORIDE 1000 ML: 0.9 INJECTION, SOLUTION INTRAVENOUS at 01:35

## 2025-03-20 RX ADMIN — LORAZEPAM 1 MG: 2 INJECTION INTRAMUSCULAR; INTRAVENOUS at 01:36

## 2025-03-20 RX ADMIN — KETOROLAC TROMETHAMINE 15 MG: 15 INJECTION, SOLUTION INTRAMUSCULAR; INTRAVENOUS at 03:01

## 2025-03-20 RX ADMIN — IOPAMIDOL 100 ML: 755 INJECTION, SOLUTION INTRAVENOUS at 02:28

## 2025-03-20 RX ADMIN — HYDROCODONE BITARTRATE AND ACETAMINOPHEN 1 TABLET: 5; 325 TABLET ORAL at 04:34

## 2025-03-20 NOTE — ED PROVIDER NOTES
Time: 1:31 AM EDT  Date of encounter:  3/20/2025  Independent Historian/Clinical History and Information was obtained by:   Patient    History is limited by: N/A    Chief Complaint: Shortness of breath       History of Present Illness:  Patient is a 44 y.o. year old female who presents to the emergency department for evaluation of shortness of breath.  Patient has a history of anxiety, recent surgery who presents with complaints of shortness of breath.  States that she recently had surgery on her belly for appendicitis.  States that this evening she started feeling short of breath.  States that anytime she tried to lay down she got very short of breath.  States that she feels like she cannot catch her breath.  She does report some chest discomfort as well.  Denies history of blood clots.  No other complaints this time.      Patient Care Team  Primary Care Provider: Karen Stover APRN    Past Medical History:     Allergies   Allergen Reactions    Escitalopram Nausea Only and Rash     Nausea, sweating, rash     Sulfa Antibiotics Anaphylaxis    Baclofen GI Intolerance, Hives and Nausea And Vomiting    Ciprofloxacin Hallucinations    Codeine Hives    Gold-Containing Drug Products Rash    Latex Rash    Nickel Hives    Tegaderm Ag Mesh [Silver] Hives     Past Medical History:   Diagnosis Date    Allergic     Anxiety     Atrial fibrillation     RELEASED BY CARDIOLOGIST/ELECTROPHYSIST, NO CURRENT MEDS    Chronic pain disorder     Depression     Endometriosis     Headache     Hemorrhoids     Injury of shoulder, right 2009    CHRONIC PAIN    Panic disorder     S/P laparoscopic appendectomy 03/09/2025    S/P laparoscopic cholecystectomy 02/17/2025     Past Surgical History:   Procedure Laterality Date    APPENDECTOMY N/A 3/9/2025    Procedure: APPENDECTOMY LAPAROSCOPIC;  Surgeon: Mingo Cannon MD;  Location: [facility];  Service: General;  Laterality: N/A;    CERVICAL ARTHRODESIS  01/14/2015    Donaldo  Hodes    CERVICAL FUSION      C4-7 FUSION, FULL ROM    CHOLECYSTECTOMY N/A 2/17/2025    Procedure: CHOLECYSTECTOMY LAPAROSCOPIC plain language: removal of gallbladder thru small incisions using long instruments and a camera;  Surgeon: Josué Castano MD;  Location: [facility] MAIN OR;  Service: General;  Laterality: N/A;    COLONOSCOPY N/A 05/31/2022    Procedure: COLONOSCOPY;  Surgeon: Shabbir Baird MD;  Location: [facility] ENDOSCOPY;  Service: General;  Laterality: N/A;  HEMORRHOIDS    ENDOSCOPY N/A 2/17/2025    Procedure: ESOPHAGOGASTRODUODENOSCOPY WITH BIOPSIES;  Surgeon: Sanya Reyes MD;  Location: [facility] ENDOSCOPY;  Service: Gastroenterology;  Laterality: N/A;  GASTRITIS, SMALL HIATAL HERNIA    ENDOSCOPY N/A 3/6/2025    Procedure: ESOPHAGOGASTRODUODENOSCOPY with pancreatic stent removal;  Surgeon: Ha Thompson MD;  Location: [facility] ENDOSCOPY;  Service: Gastroenterology;  Laterality: N/A;  pancreatic stent removal, hiatal hernia    ERCP N/A 2/24/2025    Procedure: ENDOSCOPIC RETROGRADE CHOLANGIOPANCREATOGRAPHY with bile duct stent placement and pancreatic duct stent placement;  Surgeon: Ha Thompson MD;  Location: [facility] ENDOSCOPY;  Service: Gastroenterology;  Laterality: N/A;  bile duct leeak    EXPLORATORY LAPAROTOMY      HEMORRHOIDECTOMY N/A 05/31/2022    Procedure: HEMORRHOID BANDING;  Surgeon: Shabbir Baird MD;  Location: [facility] ENDOSCOPY;  Service: General;  Laterality: N/A;  BANDS X 2    HEMORRHOIDECTOMY N/A 1/31/2024    Procedure: HEMORRHOIDECTOMY;  Surgeon: Shabbir Baird MD;  Location: [facility] OR [facility];  Service: General;  Laterality: N/A;    HYSTERECTOMY      SHOULDER ARTHROSCOPY Right     SHOULDER SURGERY Left     ARTHROSCOPY LABRAL TEAR X2    TUBAL ABDOMINAL LIGATION       Family History   Problem Relation Age of Onset    Depression Mother     Bipolar disorder Mother     Anxiety disorder Mother     Cancer Mother     Heart disease Mother     Hypertension  Mother     Anxiety disorder Father     Hypertension Father     Dementia Paternal Uncle     Dementia Paternal Grandmother     Heart disease Other     Colon cancer Neg Hx     Malig Hyperthermia Neg Hx        Home Medications:  Prior to Admission medications    Medication Sig Start Date End Date Taking? Authorizing Provider   albuterol sulfate  (90 Base) MCG/ACT inhaler Inhale 2 puffs Every 6 (Six) Hours As Needed for Wheezing. 1/8/25   Karen Stover APRN   busPIRone (BUSPAR) 15 MG tablet Take 1 tablet by mouth 3 (Three) Times a Day. 1/17/25   Karen Stover APRN   CALCIUM PO Take 1 tablet by mouth Daily.    ProviderAvani MD   clonazePAM (KlonoPIN) 0.5 MG tablet Take 1 tablet by mouth 2 (Two) Times a Day As Needed for Anxiety. 1/28/25   Karen Stover APRN   DULoxetine (CYMBALTA) 30 MG capsule Take 1 capsule by mouth Daily. TAKES 30mg AND 60mg TOGETHER FOR A TOTAL OF 90mg    Avani Vela MD   DULoxetine (CYMBALTA) 60 MG capsule Take 1 capsule by mouth Daily. TAKES 30mg AND 60mg TOGETHER FOR A TOTAL OF 90mg    Avani Vela MD   HYDROcodone-acetaminophen (NORCO)  MG per tablet Take 1 tablet by mouth Every 4 (Four) Hours As Needed for Moderate Pain. 3/7/25   Madina Hassan APRN   ibuprofen (ADVIL,MOTRIN) 800 MG tablet Every 8 (Eight) Hours.    ProviderAvani MD   montelukast (Singulair) 10 MG tablet Take 1 tablet by mouth Every Night. 1/17/25   Karen Stover APRN   naloxone (NARCAN) 4 MG/0.1ML nasal spray Call 911. Don't prime. [place] in 1 nostril for overdose. Repeat in 2-3 minutes in other nostril if no or minimal breathing/responsiveness. 3/11/25   India Chris APRN   ondansetron (ZOFRAN) 4 MG tablet TAKE (1) TABLET EVERY 8 HOURS AS NEEDED FOR NAUSEA AND vomiting  Patient taking differently: Take 1 tablet by mouth Every 8 (Eight) Hours As Needed. 10/14/24   Karen Stover APRN   pantoprazole (PROTONIX) 40 MG EC tablet Take  "1 tablet by mouth Daily. 24   Karen Stover APRN   polyethylene glycol (MIRALAX) 17 g packet Mix 17gm (contents of 1 packet) in eight ounces of water or other beverage to drink once daily 3/11/25   India Chris APRN   prochlorperazine (COMPAZINE) 10 MG tablet Take 1 tablet by mouth Every 6 (Six) Hours As Needed.    Avani Vela MD   sennosides-docusate (senna-docusate sodium) 8.6-50 MG per tablet Take 1 tablet by mouth Daily. 24   Karen Stover APRN   tiZANidine (ZANAFLEX) 4 MG tablet Take 1-2 tablets by mouth Every 6 (Six) Hours As Needed. 24   Avani Vela MD   traZODone (DESYREL) 50 MG tablet Take 0.5 to 2 tab PO QHS PRN sleep  Patient taking differently: Take 0.5-2 tablets by mouth At Night As Needed. Take 0.5 to 2 tab PO QHS PRN sleep 24   Karen Stover APRN   vitamin C (ASCORBIC ACID) 500 MG tablet Take 1 tablet by mouth Daily. LAST DOSE 24    ProviderAvani MD        Social History:   Social History     Tobacco Use    Smoking status: Former     Current packs/day: 0.00     Average packs/day: 0.3 packs/day for 20.0 years (5.0 ttl pk-yrs)     Types: Cigarettes     Start date: 1999     Quit date: 2019     Years since quittin.2    Smokeless tobacco: Never   Vaping Use    Vaping status: Never Used   Substance Use Topics    Alcohol use: Yes     Comment: rarely    Drug use: Never         Review of Systems:  Review of Systems     Physical Exam:  /89   Pulse 85   Temp 98.1 °F (36.7 °C) (Oral)   Resp 18   Ht 157.5 cm (62\")   Wt 81.7 kg (180 lb 1.9 oz)   SpO2 100%   BMI 32.94 kg/m²     Physical Exam  Vitals and nursing note reviewed.   HENT:      Head: Normocephalic and atraumatic.   Eyes:      General: No scleral icterus.  Cardiovascular:      Rate and Rhythm: Regular rhythm. Tachycardia present.      Heart sounds: Normal heart sounds.   Pulmonary:      Effort: Tachypnea present.   Abdominal:      Palpations: " Abdomen is soft.      Tenderness: There is no abdominal tenderness.   Musculoskeletal:         General: Normal range of motion.      Cervical back: Normal range of motion.   Skin:     Findings: No rash.   Neurological:      General: No focal deficit present.      Mental Status: She is alert.   Psychiatric:      Comments: Appears anxious                    Medical Decision Making:      Comorbidities that affect care:    Anxiety, A-fib    External Notes reviewed:    Reviewed discharge summary from 3/11/2025      The following orders were placed and all results were independently analyzed by me:  Orders Placed This Encounter   Procedures    XR Chest 1 View    CT Angiogram Chest Pulmonary Embolism    [place] Draw    Comprehensive Metabolic Panel    BNP    High Sensitivity Troponin T    CBC Auto Differential    Lactic Acid, Plasma    ECG 12 Lead ED Triage Standing Order; SOA    CBC & Differential    Green Top (Gel)    Lavender Top    Gold Top - SST    Light Blue Top       Medications Given in the Emergency Department:  Medications   LORazepam (ATIVAN) injection 1 mg (1 mg Intravenous Given 3/20/25 0136)   sodium chloride 0.9 % bolus 1,000 mL (0 mL Intravenous Stopped 3/20/25 0235)   iopamidol (ISOVUE-370) 76 % injection 100 mL (100 mL Intravenous Given 3/20/25 0228)   ketorolac (TORADOL) injection 15 mg (15 mg Intravenous Given 3/20/25 0301)   LORazepam (ATIVAN) injection 1 mg (1 mg Intravenous Given 3/20/25 0300)   HYDROcodone-acetaminophen (NORCO) 5-325 MG per tablet 1 tablet (1 tablet Oral Given 3/20/25 0434)        ED Course:    ED Course as of 03/20/25 1724   Thu Mar 20, 2025   0136 EKG interpreted by me  Time: 1332  Heart rate 144  Sinus tach, ST depressions mainly in the lateral leads.  There is no ST elevation. [MA]   0401 I reevaluated the patient at bedside and reviewed the patient's CT imaging along with her lab work.  We discussed that there are no acute findings on CT imaging patient's heart rate has  improved from 130 down to 70.  She is calm and feels much better at this time.  She continues to have some mild diffuse back pain.  But states that it is improving.  She and family are comfortable plan for discharge home with continued symptom control and outpatient follow-up. [JS]      ED Course User Index  [JS] Chace Griffith MD  [MA] Shabbir Eli MD       Labs:    Lab Results (last 24 hours)       Procedure Component Value Units Date/Time    CBC & Differential [[id number]]  (Abnormal) Collected: 03/20/25 0132    Specimen: Blood from Arm, Left Updated: 03/20/25 0153    Narrative:      The following orders were created for panel order CBC & Differential.  Procedure                               Abnormality         Status                     ---------                               -----------         ------                     CBC Auto Differential[[id number]]        Abnormal            Final result                 Please view results for these tests on the individual orders.    Comprehensive Metabolic Panel [[id number]]  (Abnormal) Collected: 03/20/25 0132    Specimen: Blood from Arm, Left Updated: 03/20/25 0208     Glucose 96 mg/dL      BUN 10 mg/dL      Creatinine 0.70 mg/dL      Sodium 138 mmol/L      Potassium 3.9 mmol/L      Comment: Slight hemolysis detected by analyzer. Result may be falsely elevated.        Chloride 99 mmol/L      CO2 19.7 mmol/L      Calcium 10.5 mg/dL      Total Protein 8.8 g/dL      Albumin 4.6 g/dL      ALT (SGPT) 9 U/L      AST (SGOT) 15 U/L      Comment: Slight hemolysis detected by analyzer. Result may be falsely elevated.        Alkaline Phosphatase 97 U/L      Total Bilirubin 0.6 mg/dL      Globulin 4.2 gm/dL      A/G Ratio 1.1 g/dL      BUN/Creatinine Ratio 14.3     Anion Gap 19.3 mmol/L      eGFR 110.9 mL/min/1.73     Narrative:      GFR Categories in Chronic Kidney Disease (CKD)      GFR Category          GFR (mL/min/1.73)    Interpretation  G1                     90 or  greater         Normal or high (1)  G2                      60-89                Mild decrease (1)  G3a                   45-59                Mild to moderate decrease  G3b                   30-44                Moderate to severe decrease  G4                    15-29                Severe decrease  G5                    14 or less           Kidney failure          (1)In the absence of evidence of kidney disease, neither GFR category G1 or G2 fulfill the criteria for CKD.    eGFR calculation 2021 CKD-EPI creatinine equation, which does not include race as a factor    BNP [[id number]]  (Normal) Collected: 03/20/25 0132    Specimen: Blood from Arm, Left Updated: 03/20/25 0157     proBNP 49.0 pg/mL     Narrative:      This assay is used as an aid in the diagnosis of individuals suspected of having heart failure. It can be used as an aid in the diagnosis of acute decompensated heart failure (ADHF) in patients presenting with signs and symptoms of ADHF to the emergency department (ED). In addition, NT-proBNP of <300 pg/mL indicates ADHF is not likely.    Age Range Result Interpretation  NT-proBNP Concentration (pg/mL:      <50             Positive            >450                   Gray                 300-450                    Negative             <300    50-75           Positive            >900                  Gray                300-900                  Negative            <300      >75             Positive            >1800                  Gray                300-1800                  Negative            <300    High Sensitivity Troponin T [[id number]]  (Normal) Collected: 03/20/25 0132    Specimen: Blood from Arm, Left Updated: 03/20/25 0202     HS Troponin T <6 ng/L     Narrative:      High Sensitive Troponin T Reference Range:  <14.0 ng/L- Negative Female for AMI  <22.0 ng/L- Negative Male for AMI  >=14 - Abnormal Female indicating possible myocardial injury.  >=22 - Abnormal Male indicating possible myocardial  injury.   Clinicians would have to utilize clinical acumen, EKG, Troponin, and serial changes to determine if it is an Acute Myocardial Infarction or myocardial injury due to an underlying chronic condition.         CBC Auto Differential [[id number]]  (Abnormal) Collected: 03/20/25 0132    Specimen: Blood from Arm, Left Updated: 03/20/25 0153     WBC 13.57 10*3/mm3      RBC 5.35 10*6/mm3      Hemoglobin 14.5 g/dL      Hematocrit 45.6 %      MCV 85.2 fL      MCH 27.1 pg      MCHC 31.8 g/dL      RDW 13.8 %      RDW-SD 42.4 fl      MPV 10.1 fL      Platelets 690 10*3/mm3      Neutrophil % 70.0 %      Lymphocyte % 23.1 %      Monocyte % 4.9 %      Eosinophil % 0.6 %      Basophil % 0.5 %      Immature Grans % 0.9 %      Neutrophils, Absolute 9.50 10*3/mm3      Lymphocytes, Absolute 3.14 10*3/mm3      Monocytes, Absolute 0.66 10*3/mm3      Eosinophils, Absolute 0.08 10*3/mm3      Basophils, Absolute 0.07 10*3/mm3      Immature Grans, Absolute 0.12 10*3/mm3      nRBC 0.0 /100 WBC     Lactic Acid, Plasma [[id number]]  (Abnormal) Collected: 03/20/25 0134    Specimen: Blood from Arm, Left Updated: 03/20/25 0220     Lactate 2.7 mmol/L              Imaging:    CT Angiogram Chest Pulmonary Embolism  Result Date: 3/20/2025  CT ANGIOGRAM CHEST PULMONARY EMBOLISM Date of Exam: 3/20/2025 2:07 AM EDT Indication: soa. Comparison: CT abdomen pelvis 3/9/2025 Technique: Axial CT images were obtained of the chest after the uneventful intravenous administration of iodinated contrast utilizing pulmonary embolism protocol.  Reconstructed coronal and sagittal images were also obtained. Automated exposure control and iterative construction methods were used. Findings: No pulmonary embolism or aortic dissection is identified. There is no pleural or pericardial effusion. No adenopathy is seen. Upper abdominal images show changes of relatively recent cholecystectomy with some persistent fluid/stranding partially visible in the gallbladder fossa. A  biliary stent is present with no evidence for biliary obstruction. There is elevation of the right hemidiaphragm. Opacities in the right lower lobe have the appearance of atelectasis rather than pneumonia. Lungs are otherwise clear. No pneumothorax is seen. Patient is status post cervical spinal fusion.     1.No pulmonary embolism or aortic dissection identified. 2.Elevation of the right hemidiaphragm. Opacities in the right lower lobe have the appearance of atelectasis rather than pneumonia. 3.Changes of relatively recent cholecystectomy with some persistent fluid/stranding partially visible in the gallbladder fossa. A biliary stent is present with no evidence for biliary obstruction. Electronically Signed: Yayo Ford MD  3/20/2025 2:54 AM EDT  Workstation ID: [id number]    XR Chest 1 View  Result Date: 3/20/2025  XR CHEST 1 VW Date of Exam: 3/20/2025 1:30 AM EDT Indication: SOA Triage Protocol Comparison: 2/28/2023 Findings: Patient is status post cervical spinal fusion. Cardiac and mediastinal contours are normal. Pulmonary vascularity is normal. There is mild elevation of the right hemidiaphragm with some right basilar atelectasis. Lungs are otherwise clear. No pneumothorax is identified.     Mild elevation of the right hemidiaphragm with right basilar atelectasis. Electronically Signed: Yayo Ford MD  3/20/2025 1:38 AM EDT  Workstation ID: [id number]        Differential Diagnosis and Discussion:    Chest Pain:  Based on the patient's signs and symptoms, I considered aortic dissection, myocardial infaction, pulmonary embolism, cardiac tamponade, pericarditis, pneumothorax, musculoskeletal chest pain and other differential diagnosis as an etiology of the patient's chest pain.   Dyspnea: Differential diagnosis includes but is not limited to metabolic acidosis, neurological disorders, psychogenic, asthma, pneumothorax, upper airway obstruction, COPD, pneumonia, noncardiogenic pulmonary edema, interstitial  lung disease, anemia, congestive heart failure, and pulmonary embolism    PROCEDURES:    Labs were collected in the emergency department and all labs were reviewed and interpreted by me.  X-ray were performed in the emergency department and all X-ray impressions were independently interpreted by me.  An EKG was performed and the EKG was interpreted by me.  CT scan was performed in the emergency department and the CT scan radiology impression was interpreted by me.    ECG 12 Lead ED Triage Standing Order; SOA   Preliminary Result   HEART XFPZ=426  bpm   RR Hrcfbwkg=712  ms   NM Edxmmnti=543  ms   P Horizontal Axis=-7  deg   P Front Axis=64  deg   QRSD Interval=79  ms   QT Qjjlxiin=583  ms   UEpL=445  ms   QRS Axis=21  deg   T Wave Axis=92  deg   - BORDERLINE ECG -   Sinus tachycardia   Left atrial enlargement   Borderline repolarization abnormality   Date and Time of Study:2025-03-20 01:32:39          Procedures    MDM     Patient is a 44-year-old female who presents with complaints of chest pain and shortness of breath.  States this was acute in onset and also was having pain when she was breathing.  When she arrived here she was tachycardic but not hypoxic.  Seemed pretty anxious.  She did recently just have surgery so a CT was done to rule out pulmonary emboli.  Was also given fluids and antianxiety medicine.  Has had improvement since that time.  CT does not show any acute findings.  Patient otherwise stable at this time.  Okay for outpatient follow-up.                Patient Care Considerations:          Consultants/Shared Management Plan:    None    Social Determinants of Health:    Patient is independent, reliable, and has access to care.       Disposition and Care Coordination:    Discharged: The patient is suitable and stable for discharge with no need for consideration of admission.    I have explained the patient´s condition, diagnoses and treatment plan based on the information available to me at this  time. I have answered questions and addressed any concerns. The patient has a good  understanding of the patient´s diagnosis, condition, and treatment plan as can be expected at this point. The vital signs have been stable. The patient´s condition is stable and appropriate for discharge from the emergency department.      The patient will pursue further outpatient evaluation with the primary care physician or other designated or consulting physician as outlined in the discharge instructions. They are agreeable to this plan of care and follow-up instructions have been explained in detail. The patient has received these instructions in written format and have expressed an understanding of the discharge instructions. The patient is aware that any significant change in condition or worsening of symptoms should prompt an immediate return to this or the closest emergency department or call to 911.      Final diagnoses:   Anxiety reaction   Acute dyspnea   Chest pain, unspecified type        ED Disposition       ED Disposition   Discharge    Condition   Stable    Comment   --               This medical record created using voice recognition software.             Shabbir Eli MD  03/20/25 7021

## 2025-03-20 NOTE — ED NOTES
"Pt refused all remaining blood work. Stated \"There's no where left to stick, and I don't want to be stuck anymore.\" Notified RN.  "

## 2025-03-21 ENCOUNTER — READMISSION MANAGEMENT (OUTPATIENT)
Dept: CALL CENTER | Facility: HOSPITAL | Age: 45
End: 2025-03-21

## 2025-03-21 ENCOUNTER — OFFICE VISIT (OUTPATIENT)
Dept: SURGERY | Facility: CLINIC | Age: 45
End: 2025-03-21

## 2025-03-21 VITALS — HEIGHT: 62 IN | BODY MASS INDEX: 32.2 KG/M2 | WEIGHT: 175 LBS

## 2025-03-21 DIAGNOSIS — A49.02 MRSA (METHICILLIN RESISTANT STAPHYLOCOCCUS AUREUS): Primary | ICD-10-CM

## 2025-03-21 DIAGNOSIS — Z98.890 POST-OPERATIVE STATE: ICD-10-CM

## 2025-03-21 RX ORDER — CLINDAMYCIN HYDROCHLORIDE 300 MG/1
300 CAPSULE ORAL 3 TIMES DAILY
Qty: 21 CAPSULE | Refills: 0 | Status: SHIPPED | OUTPATIENT
Start: 2025-03-21

## 2025-03-21 NOTE — OUTREACH NOTE
General Surgery Week 2 Survey      Flowsheet Row Responses   [facility] patient discharged from? [facility]   Does the patient have one of the following disease processes/diagnoses(primary or secondary)? General Surgery   Week 2 attempt successful? Yes   Call start time 1813   Call end time 1817   Discharge diagnosis *Appendicitis (K37)  Yes    S/P laparoscopic appendectomy   Person spoke with today (if not patient) and relationship pt   Meds reviewed with patient/caregiver? Yes   Is the patient having any side effects they believe may be caused by any medication additions or changes? No   Does the patient have all medications related to this admission filled (includes all antibiotics, pain medications, etc.) Yes   Is the patient taking all medications as directed (includes completed medication regime)? Yes   Does the patient have a follow up appointment scheduled with their surgeon? Yes   Has the patient kept scheduled appointments due by today? Yes   Psychosocial issues? No   What is the patient's perception of their health status since discharge? Improving   Nursing interventions Nurse provided patient education   Is the patient /caregiver able to teach back basic post-op care? Take showers only when approved by MD-sponge bathe until then, No tub bath, swimming, or hot tub until instructed by MD, Keep incision areas clean,dry and protected, Lifting as instructed by MD in discharge instructions   Is the patient/caregiver able to teach back signs and symptoms of incisional infection? Increased redness, swelling or pain at the incisonal site, Increased drainage or bleeding, Incisional warmth, Pus or odor from incision, Fever   Is the patient/caregiver able to teach back steps to recovery at home? Eat a well-balance diet   If the patient is a current smoker, are they able to teach back resources for cessation? Not a smoker   Is the patient/caregiver able to teach back the hierarchy of who to call/visit for  symptoms/problems? PCP, Specialist, Home health nurse, Urgent Care, ED, 911 Yes   Week 2 call completed? Yes   Is the patient interested in additional calls from an ambulatory ? No   Would this patient benefit from a Referral to [facility] Social Work? No   Wrap up additional comments Pt states surgeon has pt on antibiotic r/t possible stent issues. Pt has a CT scan on 3/24/25 and Surgeon will call pt with results. Reviewed s/s of sepsis with pt, and when to call 911.   Call end time 1817            Caterina ABARCA - Registered Nurse

## 2025-03-21 NOTE — PROGRESS NOTES
Chief Complaint  Post-op ([place]. PATIENT STATES SHE IS STILL HAVING ABDOMINAL PAIN, RIGHT UPPER BACK, THROWING UP, PASSED OUT THIS MORNING AND BLOOD IN STOOL X YESTERDAY.  )    Subjective    Subjective     Ebony Hamilton is a 44 y.o. female who presents to [facility] GENERAL SURGERY    History of Present Illness  44-year-old female previously known to me for undergoing laparoscopic cholecystectomy.  Her postoperative course was complicated by a bile leak for which she was treated with CT-guided drain placement as well as ERCP/biliary stent.  Approximately 2 weeks ago she began to develop lower abdominal pain and presented to the ED with symptoms consistent with acute appendicitis.  She underwent a laparoscopic appendectomy with Dr. Cannon on 3/9 and had her drain removed at the same time as there was no evidence of a fluid collection in her abdomen.  She was initially doing well postoperatively until earlier this week.  She presented to the ED on Wednesday with severe shortness of breath.  A CT PE protocol was obtained and was negative therefore she was ultimately discharged home.  She is also been taking doxycycline for an MRSA abscess in her right axilla which she says has gotten worse over the past several days.  She denies any nausea, vomiting, fevers, chills, but does endorse an overall weakness as well as some mild upper back pain.  She has scheduled follow-up with gastroenterology for stent removal next month.  She also had an episode earlier this morning where she passed out at home and struck her forehead.  Yesterday she endorses some blood-tinged stool but this has not recurred.  Post-op      Personal History     Social History     Tobacco Use    Smoking status: Former     Current packs/day: 0.00     Average packs/day: 0.3 packs/day for 20.0 years (5.0 ttl pk-yrs)     Types: Cigarettes     Start date: 1999     Quit date: 2019     Years since quittin.2    Smokeless  "tobacco: Never   Vaping Use    Vaping status: Never Used   Substance Use Topics    Alcohol use: Yes     Comment: rarely    Drug use: Never     Allergies   Allergen Reactions    Escitalopram Nausea Only and Rash     Nausea, sweating, rash     Sulfa Antibiotics Anaphylaxis    Baclofen GI Intolerance, Hives and Nausea And Vomiting    Ciprofloxacin Hallucinations    Codeine Hives    Gold-Containing Drug Products Rash    Latex Rash    Nickel Hives    Tegaderm Ag Mesh [Silver] Hives       Objective    Objective   Vital Signs:   Ht 157.5 cm (62\")   Wt 79.4 kg (175 lb)   BMI 32.01 kg/m²       Physical Exam  Constitutional:       Appearance: Normal appearance.   HENT:      Head: Normocephalic and atraumatic.      Mouth/Throat:      Mouth: Mucous membranes are moist.      Pharynx: Oropharynx is clear.   Cardiovascular:      Rate and Rhythm: Normal rate and regular rhythm.   Pulmonary:      Effort: Pulmonary effort is normal. No respiratory distress.   Abdominal:      Comments: All laparoscopic incisions healing well.  Abdomen is soft, nondistended, and overall nontender.  Previous drain site along the right hemiabdomen is healing well.   Musculoskeletal:         General: No swelling. Normal range of motion.      Cervical back: Normal range of motion and neck supple.   Skin:     General: Skin is warm and dry.      Comments: Right axillary open wound with minimal expressible purulence, no surrounding erythema or fluctuance   Neurological:      General: No focal deficit present.      Mental Status: She is alert and oriented to person, place, and time.   Psychiatric:         Mood and Affect: Mood normal.         Behavior: Behavior normal.                  Assessment / Plan      Diagnoses and all orders for this visit:    1. MRSA (methicillin resistant Staphylococcus aureus) (Primary)  -     clindamycin (Cleocin) 300 MG capsule; Take 1 capsule by mouth 3 (Three) Times a Day.  Dispense: 21 capsule; Refill: 0    2. Post-operative " state  -     CT Abdomen Pelvis With Contrast; Future    44-year-old female status post cholecystectomy/bile leak as well as appendectomy.  Recent CTA PE from ED visit reviewed.  Recommend formal dedicated abdominal imaging which will be obtained within the next few days.  Continue MRSA coverage oral antibiotics for axillary abscess.  Will call patient with results of imaging and plan to see in the office again early next week.    Follow Up   Return in about 1 week (around 3/28/2025) for will call with CT results.      Patient was given instructions and counseling regarding her condition or for health maintenance advice. Please see specific information pulled into the AVS if appropriate.     Electronically signed by Josué Castano MD, 03/21/25, 3:10 PM EDT.

## 2025-03-24 ENCOUNTER — TELEPHONE (OUTPATIENT)
Dept: SURGERY | Facility: CLINIC | Age: 45
End: 2025-03-24
Payer: COMMERCIAL

## 2025-03-24 ENCOUNTER — HOSPITAL ENCOUNTER (OUTPATIENT)
Dept: CT IMAGING | Facility: HOSPITAL | Age: 45
Discharge: HOME | End: 2025-03-24
Admitting: STUDENT IN AN ORGANIZED HEALTH CARE EDUCATION/TRAINING PROGRAM
Payer: COMMERCIAL

## 2025-03-24 DIAGNOSIS — Z98.890 POST-OPERATIVE STATE: ICD-10-CM

## 2025-03-24 PROCEDURE — 74177 CT ABD & PELVIS W/CONTRAST: CPT

## 2025-03-24 PROCEDURE — 25510000001 IOPAMIDOL PER 1 ML: Performed by: STUDENT IN AN ORGANIZED HEALTH CARE EDUCATION/TRAINING PROGRAM

## 2025-03-24 RX ORDER — IOPAMIDOL 755 MG/ML
100 INJECTION, SOLUTION INTRAVASCULAR
Status: COMPLETED | OUTPATIENT
Start: 2025-03-24 | End: 2025-03-24

## 2025-03-24 RX ADMIN — IOPAMIDOL 100 ML: 755 INJECTION, SOLUTION INTRAVENOUS at 09:27

## 2025-03-25 ENCOUNTER — OFFICE VISIT (OUTPATIENT)
Dept: FAMILY MEDICINE CLINIC | Facility: CLINIC | Age: 45
End: 2025-03-25
Payer: COMMERCIAL

## 2025-03-25 VITALS
DIASTOLIC BLOOD PRESSURE: 62 MMHG | OXYGEN SATURATION: 100 % | HEART RATE: 99 BPM | BODY MASS INDEX: 32.9 KG/M2 | WEIGHT: 178.8 LBS | HEIGHT: 62 IN | TEMPERATURE: 98.7 F | SYSTOLIC BLOOD PRESSURE: 100 MMHG

## 2025-03-25 DIAGNOSIS — Z90.49 STATUS POST APPENDECTOMY: ICD-10-CM

## 2025-03-25 DIAGNOSIS — F41.1 GENERALIZED ANXIETY DISORDER: ICD-10-CM

## 2025-03-25 DIAGNOSIS — R06.02 SHORTNESS OF BREATH: ICD-10-CM

## 2025-03-25 DIAGNOSIS — J98.11 ATELECTASIS OF RIGHT LUNG: ICD-10-CM

## 2025-03-25 DIAGNOSIS — L02.419 AXILLARY ABSCESS: Primary | ICD-10-CM

## 2025-03-25 DIAGNOSIS — Z98.890 HISTORY OF BILIARY DUCT STENT PLACEMENT: ICD-10-CM

## 2025-03-25 DIAGNOSIS — Z90.49 STATUS POST CHOLECYSTECTOMY: ICD-10-CM

## 2025-03-25 LAB
QT INTERVAL: 279 MS
QTC INTERVAL: 433 MS

## 2025-03-25 RX ORDER — CLONAZEPAM 0.5 MG/1
0.5 TABLET ORAL 2 TIMES DAILY PRN
Qty: 40 TABLET | Refills: 0 | Status: ON HOLD | OUTPATIENT
Start: 2025-03-25

## 2025-03-25 RX ORDER — TRIAMCINOLONE ACETONIDE 40 MG/ML
60 INJECTION, SUSPENSION INTRA-ARTICULAR; INTRAMUSCULAR ONCE
Status: COMPLETED | OUTPATIENT
Start: 2025-03-25 | End: 2025-03-25

## 2025-03-25 RX ORDER — SACCHAROMYCES BOULARDII 250 MG
250 CAPSULE ORAL 2 TIMES DAILY
Qty: 60 CAPSULE | Refills: 0 | Status: ON HOLD | OUTPATIENT
Start: 2025-03-25

## 2025-03-25 RX ADMIN — TRIAMCINOLONE ACETONIDE 60 MG: 40 INJECTION, SUSPENSION INTRA-ARTICULAR; INTRAMUSCULAR at 15:18

## 2025-03-25 NOTE — PROGRESS NOTES
Chief Complaint  Chief Complaint   Patient presents with    Wound Check     Right axilla. States there is either packing or fluid still in the area. She saw her surgeon on Friday and they drained out some foul smelling fluid. She was put on clindamycin.        Subjective      Ebony Hamilton presents to [facility]  History of Present Illness  The patient presents for evaluation of right lung atelectasis, anxiety, and wound care.    She reports experiencing pain during deep inhalation, which she attributes to a previously inserted drain. The pain is localized around the area of the drain insertion. She also mentions that she has been unable to lie flat due to difficulty in breathing, necessitating her to sleep in a seated position. She describes a sensation of breathlessness when attempting to lie flat, accompanied by back pain. The more she tries to breathe, the more it hurts. She notes soreness around the site of the drain but does not experience any pain upon palpation. She has been prescribed clindamycin since Friday due to a persistent foul odor emanating from the wound. She has been changing the dressing on the wound herself. She was not provided with an incentive spirometer upon discharge.     She has been making efforts to manage her anxiety, which has been particularly severe today. She has two remaining doses of her prescribed medication, Klonopin.        Objective     Medical History:  Past Medical History:   Diagnosis Date    Allergic     Anxiety     Atrial fibrillation     RELEASED BY CARDIOLOGIST/ELECTROPHYSIST, NO CURRENT MEDS    Chronic pain disorder     Depression     Endometriosis     Headache     Hemorrhoids     Injury of shoulder, right 2009    CHRONIC PAIN    Panic disorder     Rectal bleeding 2010    S/P laparoscopic appendectomy 03/09/2025    S/P laparoscopic cholecystectomy 02/17/2025     Past Surgical History:   Procedure Laterality Date    APPENDECTOMY  N/A 3/9/2025    Procedure: APPENDECTOMY LAPAROSCOPIC;  Surgeon: Mingo Cannon MD;  Location: [facility] MAIN OR;  Service: General;  Laterality: N/A;    CERVICAL ARTHRODESIS  01/14/2015    Donaldo Albarran    CERVICAL FUSION      C4-7 FUSION, FULL ROM    CHOLECYSTECTOMY N/A 2/17/2025    Procedure: CHOLECYSTECTOMY LAPAROSCOPIC plain language: removal of gallbladder thru small incisions using long instruments and a camera;  Surgeon: Josué Castano MD;  Location: [facility] MAIN OR;  Service: General;  Laterality: N/A;    COLONOSCOPY N/A 05/31/2022    Procedure: COLONOSCOPY;  Surgeon: Shabbir Baird MD;  Location: [facility] ENDOSCOPY;  Service: General;  Laterality: N/A;  HEMORRHOIDS    ENDOSCOPY N/A 2/17/2025    Procedure: ESOPHAGOGASTRODUODENOSCOPY WITH BIOPSIES;  Surgeon: Sanya Reyes MD;  Location: [facility] ENDOSCOPY;  Service: Gastroenterology;  Laterality: N/A;  GASTRITIS, SMALL HIATAL HERNIA    ENDOSCOPY N/A 3/6/2025    Procedure: ESOPHAGOGASTRODUODENOSCOPY with pancreatic stent removal;  Surgeon: Ha Thompson MD;  Location: [facility] ENDOSCOPY;  Service: Gastroenterology;  Laterality: N/A;  pancreatic stent removal, hiatal hernia    ERCP N/A 2/24/2025    Procedure: ENDOSCOPIC RETROGRADE CHOLANGIOPANCREATOGRAPHY with bile duct stent placement and pancreatic duct stent placement;  Surgeon: Ha Thompson MD;  Location: [facility] ENDOSCOPY;  Service: Gastroenterology;  Laterality: N/A;  bile duct leeak    EXPLORATORY LAPAROTOMY      HEMORRHOIDECTOMY N/A 05/31/2022    Procedure: HEMORRHOID BANDING;  Surgeon: Shabbir Baird MD;  Location: [facility] ENDOSCOPY;  Service: General;  Laterality: N/A;  BANDS X 2    HEMORRHOIDECTOMY N/A 1/31/2024    Procedure: HEMORRHOIDECTOMY;  Surgeon: Shabbir Baird MD;  Location: [facility] OR OSC;  Service: General;  Laterality: N/A;    HYSTERECTOMY      SHOULDER ARTHROSCOPY Right     SHOULDER SURGERY Left     ARTHROSCOPY LABRAL TEAR X2    TUBAL ABDOMINAL  LIGATION        Social History     Tobacco Use    Smoking status: Former     Current packs/day: 0.00     Average packs/day: 0.3 packs/day for 20.0 years (5.0 ttl pk-yrs)     Types: Cigarettes     Start date: 1999     Quit date: 2019     Years since quittin.2    Smokeless tobacco: Never   Vaping Use    Vaping status: Never Used   Substance Use Topics    Alcohol use: Yes     Comment: rarely    Drug use: Never     Family History   Problem Relation Age of Onset    Depression Mother     Bipolar disorder Mother     Anxiety disorder Mother     Cancer Mother     Heart disease Mother     Hypertension Mother     Anxiety disorder Father     Hypertension Father     Dementia Paternal Uncle     Dementia Paternal Grandmother     Heart disease Other     Colon cancer Neg Hx     Malig Hyperthermia Neg Hx        Medications:  Prior to Admission medications    Medication Sig Start Date End Date Taking? Authorizing Provider   albuterol sulfate  (90 Base) MCG/ACT inhaler Inhale 2 puffs Every 6 (Six) Hours As Needed for Wheezing. 25  Yes Karen Stover APRN   busPIRone (BUSPAR) 15 MG tablet Take 1 tablet by mouth 3 (Three) Times a Day. 25  Yes Karen Stover APRN   CALCIUM PO Take 1 tablet by mouth Daily.   Yes Avani Vela MD   clindamycin (Cleocin) 300 MG capsule Take 1 capsule by mouth 3 (Three) Times a Day. 3/21/25  Yes Josué Castano MD   clonazePAM (KlonoPIN) 0.5 MG tablet Take 1 tablet by mouth 2 (Two) Times a Day As Needed for Anxiety. 25  Yes Karen Stover APRN   DULoxetine (CYMBALTA) 30 MG capsule Take 1 capsule by mouth Daily. TAKES 30mg AND 60mg TOGETHER FOR A TOTAL OF 90mg   Yes Avani Vela MD   DULoxetine (CYMBALTA) 60 MG capsule Take 1 capsule by mouth Daily. TAKES 30mg AND 60mg TOGETHER FOR A TOTAL OF 90mg   Yes Avani Vela MD   HYDROcodone-acetaminophen (NORCO)  MG per tablet Take 1 tablet by mouth Every 4 (Four) Hours As  Needed for Moderate Pain. 3/7/25  Yes Madina Hassan APRN   ibuprofen (ADVIL,MOTRIN) 800 MG tablet Every 8 (Eight) Hours.   Yes ProviderAvani MD   montelukast (Singulair) 10 MG tablet Take 1 tablet by mouth Every Night. 1/17/25  Yes Karen Stover APRN   naloxone (NARCAN) 4 MG/0.1ML nasal spray Call 911. Don't prime. [place] in 1 nostril for overdose. Repeat in 2-3 minutes in other nostril if no or minimal breathing/responsiveness. 3/11/25  Yes India Chris APRN   ondansetron (ZOFRAN) 4 MG tablet TAKE (1) TABLET EVERY 8 HOURS AS NEEDED FOR NAUSEA AND vomiting  Patient taking differently: Take 1 tablet by mouth Every 8 (Eight) Hours As Needed. 10/14/24  Yes Karen Stover APRN   pantoprazole (PROTONIX) 40 MG EC tablet Take 1 tablet by mouth Daily. 11/11/24  Yes Karen Stover APRN   polyethylene glycol (MIRALAX) 17 g packet Mix 17gm (contents of 1 packet) in eight ounces of water or other beverage to drink once daily 3/11/25  Yes India Chris APRN   prochlorperazine (COMPAZINE) 10 MG tablet Take 1 tablet by mouth Every 6 (Six) Hours As Needed.   Yes Avani Vela MD   sennosides-docusate (senna-docusate sodium) 8.6-50 MG per tablet Take 1 tablet by mouth Daily. 6/27/24  Yes Karen Stover APRN   tiZANidine (ZANAFLEX) 4 MG tablet Take 1-2 tablets by mouth Every 6 (Six) Hours As Needed. 5/23/24  Yes Avani Vela MD   traZODone (DESYREL) 50 MG tablet Take 0.5 to 2 tab PO QHS PRN sleep  Patient taking differently: Take 0.5-2 tablets by mouth At Night As Needed. Take 0.5 to 2 tab PO QHS PRN sleep 8/30/24  Yes Karen Stover APRN   vitamin C (ASCORBIC ACID) 500 MG tablet Take 1 tablet by mouth Daily. LAST DOSE 1/28/24   Yes Provider, Historical, MD        Allergies:   Escitalopram, Sulfa antibiotics, Baclofen, Ciprofloxacin, Codeine, Gold-containing drug products, Latex, Nickel, and Tegaderm ag mesh [silver]    Health Maintenance Due   Topic  "Date Due    TDAP/TD VACCINES (2 - Td or Tdap) 01/01/2022    HEPATITIS C SCREENING  Never done    MAMMOGRAM  Never done    COVID-19 Vaccine (1 - 2024-25 season) Never done         Vital Signs:   /62 (BP Location: Left arm, Patient Position: Sitting, Cuff Size: Adult)   Pulse 99   Temp 98.7 °F (37.1 °C) (Oral)   Ht 157.5 cm (62\")   Wt 81.1 kg (178 lb 12.8 oz)   SpO2 100%   BMI 32.70 kg/m²     Wt Readings from Last 3 Encounters:   03/25/25 81.1 kg (178 lb 12.8 oz)   03/21/25 79.4 kg (175 lb)   03/20/25 81.7 kg (180 lb 1.9 oz)     BP Readings from Last 3 Encounters:   03/25/25 100/62   03/20/25 155/89   03/14/25 104/72       Physical Exam  Vitals reviewed.   Constitutional:       Appearance: Normal appearance. She is well-developed.   HENT:      Head: Normocephalic and atraumatic.   Eyes:      Conjunctiva/sclera: Conjunctivae normal.      Pupils: Pupils are equal, round, and reactive to light.   Cardiovascular:      Rate and Rhythm: Normal rate and regular rhythm.      Heart sounds: No murmur heard.     No friction rub. No gallop.   Pulmonary:      Effort: Pulmonary effort is normal. Tachypnea present. No respiratory distress.      Breath sounds: Normal breath sounds. No stridor or decreased air movement. No wheezing or rhonchi.      Comments: Lungs clear to auscultation with no wheezes or crackles, patient displays tachypnea with shallow breathing but no abnormality on exam aside from anxiety and reported right sided discomfort  Abdominal:      General: Bowel sounds are normal. There is no distension.      Palpations: Abdomen is soft.      Tenderness: There is no abdominal tenderness.   Skin:     General: Skin is warm and dry.      Findings: Wound present.      Comments: Site of previous abscess has minimal serous drainage, no foul odor, no sign of residual packing material   Neurological:      Mental Status: She is alert and oriented to person, place, and time.      Cranial Nerves: No cranial nerve " deficit.   Psychiatric:         Mood and Affect: Mood is anxious. Affect is tearful.         Behavior: Behavior normal.         Thought Content: Thought content normal.         Judgment: Judgment normal.       Physical Exam  Lung sounds are normal with no crackles or wheezing.    Vital Signs  Oxygen saturation is normal.      Result Review :    The following data was reviewed by MILDRED Irby on 03/25/25 at 15:26 EDT:    Common labs          3/10/2025    06:15 3/11/2025    06:29 3/20/2025    01:32   Common Labs   Glucose 106  93  96    BUN 8  11  10    Creatinine 0.56  0.55  0.70    Sodium 141  142  138    Potassium 3.6  3.3  3.9    Chloride 104  106  99    Calcium 8.8  8.2  10.5    Albumin 3.3   4.6    Total Bilirubin 0.3   0.6    Alkaline Phosphatase 86   97    AST (SGOT) 10   15    ALT (SGPT) 8   9    WBC 9.44  7.48  13.57    Hemoglobin 10.1  9.7  14.5    Hematocrit 33.3  31.7  45.6    Platelets 876  814  690        CT Abdomen Pelvis With Contrast  Result Date: 3/24/2025  Impression: 1.Moderate colonic fluid may indicate acute diarrheal illness. 2.Postsurgical changes of cholecystectomy, appendectomy, and hysterectomy. Small amount of fluid within the gallbladder fossa. Indwelling CBD stent appears adequately positioned. 3.Additional findings as detailed above. Electronically Signed: Misha Rubin MD  3/24/2025 9:47 AM EDT  Workstation ID: [id number]    CT Angiogram Chest Pulmonary Embolism  Result Date: 3/20/2025  1.No pulmonary embolism or aortic dissection identified. 2.Elevation of the right hemidiaphragm. Opacities in the right lower lobe have the appearance of atelectasis rather than pneumonia. 3.Changes of relatively recent cholecystectomy with some persistent fluid/stranding partially visible in the gallbladder fossa. A biliary stent is present with no evidence for biliary obstruction. Electronically Signed: Yayo Ford MD  3/20/2025 2:54 AM EDT  Workstation ID: [id number]    XR Chest 1  View  Result Date: 3/20/2025  Mild elevation of the right hemidiaphragm with right basilar atelectasis. Electronically Signed: Yayo Ford MD  3/20/2025 1:38 AM EDT  Workstation ID: [id number]    CT Abdomen Pelvis With Contrast  Result Date: 3/9/2025  1.  Acute appendicitis is suggested. Please correlate clinically. 2.  Interval decrease in the ascites. 3.  There is a persistent gallbladder fossa fluid collection with an estimated total volume of 20.8 mL. It may represent a urinoma. An infected fluid collection, such as an abscess cannot be excluded. 4.  An internal biliary stent is in the common bile duct (CBD). There is pneumobilia. 5.  The inflammatory change seen within the right upper quadrant has decreased considerably since the 2/22/2025 CT study. 6.  Interval decrease in the perihepatic and right subphrenic fluid collection is noted since 2/24/2025. The percutaneous drainage catheter remains in place and is located laterally. 7.  Please see above comments for further detail.   Portions of this note were completed with a voice recognition program.  3/9/2025 12:52 AM by Romario Romero MD on Workstation: [id number]      CT Guided Percutaneous Drain Peritoneal  Result Date: 2/24/2025  Impression: Technically successful placement of an 8 Maltese drain into the hepatic subcapsular fluid collection which yielded 80 cc bilious appearing fluid. A sample was sent for culture. Electronically Signed: Jl Hendrix MD  2/24/2025 12:15 PM EST  Workstation ID: [id number]    CT Abdomen Pelvis With Contrast  Result Date: 2/22/2025  Impression: CT scan of the abdomen and pelvis with IV contrast demonstrating findings consistent with recent cholecystectomy. Findings concerning for bile leak, as above. Electronically Signed: Lisandro Moreira MD  2/22/2025 4:06 PM EST  Workstation ID: [id number]    XR Abdomen Flat & Upright  Result Date: 2/17/2025  Nonobstructive bowel gas pattern. No free air. Electronically Signed: Yayo Ford MD   2/17/2025 6:51 AM EST  Workstation ID: [id number]    US Gallbladder  Result Date: 2/17/2025  1. Small gallstone in the gallbladder neck with no biliary obstruction or gallbladder wall thickening. There is a positive sonographic Guzman's sign of questionable significance. If there is continued concern for acute cholecystitis, HIDA scan may be beneficial. 2. Otherwise negative. Electronically Signed: Yayo Ford MD  2/17/2025 12:08 AM EST  Workstation ID: [id number]    XR Femur 2 View Right  Result Date: 1/11/2025  Impression: No acute osseous abnormality. Electronically Signed: Reji Martinez MD  1/11/2025 5:10 PM EST  Workstation ID: [id number]      Results  Imaging  Atelectasis in the right lung, no pleural effusion or build up of fluid or blood.               Assessment and Plan    Diagnoses and all orders for this visit:    1. Axillary abscess (Primary)    2. Status post cholecystectomy    3. History of biliary duct stent placement    4. Status post appendectomy    5. Atelectasis of right lung    6. Shortness of breath  -     triamcinolone acetonide (KENALOG-40) injection 60 mg    7. Generalized anxiety disorder  -     clonazePAM (KlonoPIN) 0.5 MG tablet; Take 1 tablet by mouth 2 (Two) Times a Day As Needed for Anxiety.  Dispense: 40 tablet; Refill: 0    Other orders  -     saccharomyces boulardii (Florastor) 250 MG capsule; Take 1 capsule by mouth 2 (Two) Times a Day.  Dispense: 60 capsule; Refill: 0       Assessment & Plan  1. Right lung atelectasis.  Imaging shows atelectasis in the lower part of the right lung, likely due to reduced activity post-surgery and pain from a previous drain. There is no pleural effusion or buildup of fluid or blood. Lung sounds are clear, and oxygen saturation is good. Steroids will be prescribed to reduce inflammation. She is advised to take deep breaths to help inflate the lung.    2. Anxiety.  Anxiety has been exacerbated by recent health issues. A refill for Klonopin will be sent  to her pharmacy. She is advised to take it regularly to manage anxiety.    3. Wound care.  The wound appears to be healing well with no signs of infection. Clindamycin was started on Friday due to a foul smell from the wound when seen by general surgery. A probiotic will be prescribed to mitigate potential gastrointestinal side effects from clindamycin. She is advised to continue cleaning and dressing the wound as instructed and monitor for any changes.          Smoking Cessation:    Ebony Alamochner  reports that she quit smoking about 6 years ago. Her smoking use included cigarettes. She started smoking about 26 years ago. She has a 5 pack-year smoking history. She has never used smokeless tobacco.             Follow Up   No follow-ups on file.  Patient was given instructions and counseling regarding her condition or for health maintenance advice. Please see specific information pulled into the AVS if appropriate.     Please note that portions of this note were completed with a voice recognition program.    Patient or patient representative verbalized consent for the use of Ambient Listening during the visit with  MILDRED Irby for chart documentation. 3/25/2025  15:26 EDT

## 2025-03-30 ENCOUNTER — APPOINTMENT (OUTPATIENT)
Dept: CT IMAGING | Facility: HOSPITAL | Age: 45
DRG: 392 | End: 2025-03-30
Payer: COMMERCIAL

## 2025-03-30 ENCOUNTER — READMISSION MANAGEMENT (OUTPATIENT)
Dept: CALL CENTER | Facility: HOSPITAL | Age: 45
End: 2025-03-30
Payer: COMMERCIAL

## 2025-03-30 ENCOUNTER — APPOINTMENT (OUTPATIENT)
Dept: GENERAL RADIOLOGY | Facility: HOSPITAL | Age: 45
DRG: 392 | End: 2025-03-30
Payer: COMMERCIAL

## 2025-03-30 ENCOUNTER — HOSPITAL ENCOUNTER (INPATIENT)
Facility: HOSPITAL | Age: 45
LOS: 5 days | Discharge: HOME | DRG: 392 | End: 2025-04-06
Attending: EMERGENCY MEDICINE | Admitting: FAMILY MEDICINE
Payer: COMMERCIAL

## 2025-03-30 DIAGNOSIS — R10.11 RIGHT UPPER QUADRANT ABDOMINAL PAIN: ICD-10-CM

## 2025-03-30 DIAGNOSIS — F41.1 GENERALIZED ANXIETY DISORDER: ICD-10-CM

## 2025-03-30 DIAGNOSIS — R26.2 DIFFICULTY IN WALKING: ICD-10-CM

## 2025-03-30 DIAGNOSIS — F33.1 MODERATE EPISODE OF RECURRENT MAJOR DEPRESSIVE DISORDER: ICD-10-CM

## 2025-03-30 DIAGNOSIS — R10.9 ABDOMINAL PAIN, UNSPECIFIED ABDOMINAL LOCATION: Primary | ICD-10-CM

## 2025-03-30 PROBLEM — R11.2 INTRACTABLE NAUSEA AND VOMITING: Status: ACTIVE | Noted: 2025-03-30

## 2025-03-30 LAB
ALBUMIN SERPL-MCNC: 4.3 G/DL (ref 3.5–5.2)
ALBUMIN/GLOB SERPL: 1.2 G/DL
ALP SERPL-CCNC: 80 U/L (ref 39–117)
ALT SERPL W P-5'-P-CCNC: 9 U/L (ref 1–33)
ANION GAP SERPL CALCULATED.3IONS-SCNC: 18.5 MMOL/L (ref 5–15)
AST SERPL-CCNC: 12 U/L (ref 1–32)
BASOPHILS # BLD AUTO: 0.05 10*3/MM3 (ref 0–0.2)
BASOPHILS NFR BLD AUTO: 0.5 % (ref 0–1.5)
BILIRUB SERPL-MCNC: 0.4 MG/DL (ref 0–1.2)
BILIRUB UR QL STRIP: NEGATIVE
BUN SERPL-MCNC: 12 MG/DL (ref 6–20)
BUN/CREAT SERPL: 17.6 (ref 7–25)
CALCIUM SPEC-SCNC: 10.3 MG/DL (ref 8.6–10.5)
CHLORIDE SERPL-SCNC: 100 MMOL/L (ref 98–107)
CLARITY UR: CLEAR
CO2 SERPL-SCNC: 18.5 MMOL/L (ref 22–29)
COLOR UR: YELLOW
CREAT SERPL-MCNC: 0.68 MG/DL (ref 0.57–1)
DEPRECATED RDW RBC AUTO: 46.5 FL (ref 37–54)
EGFRCR SERPLBLD CKD-EPI 2021: 111.7 ML/MIN/1.73
EOSINOPHIL # BLD AUTO: 0.07 10*3/MM3 (ref 0–0.4)
EOSINOPHIL NFR BLD AUTO: 0.6 % (ref 0.3–6.2)
ERYTHROCYTE [DISTWIDTH] IN BLOOD BY AUTOMATED COUNT: 14.6 % (ref 12.3–15.4)
GLOBULIN UR ELPH-MCNC: 3.7 GM/DL
GLUCOSE SERPL-MCNC: 85 MG/DL (ref 65–99)
GLUCOSE UR STRIP-MCNC: NEGATIVE MG/DL
HCT VFR BLD AUTO: 40.9 % (ref 34–46.6)
HGB BLD-MCNC: 12.8 G/DL (ref 12–15.9)
HGB UR QL STRIP.AUTO: NEGATIVE
HOLD SPECIMEN: NORMAL
IMM GRANULOCYTES # BLD AUTO: 0.12 10*3/MM3 (ref 0–0.05)
IMM GRANULOCYTES NFR BLD AUTO: 1.1 % (ref 0–0.5)
KETONES UR QL STRIP: NEGATIVE
LEUKOCYTE ESTERASE UR QL STRIP.AUTO: NEGATIVE
LIPASE SERPL-CCNC: 35 U/L (ref 13–60)
LYMPHOCYTES # BLD AUTO: 2.5 10*3/MM3 (ref 0.7–3.1)
LYMPHOCYTES NFR BLD AUTO: 22.6 % (ref 19.6–45.3)
MCH RBC QN AUTO: 28.1 PG (ref 26.6–33)
MCHC RBC AUTO-ENTMCNC: 31.3 G/DL (ref 31.5–35.7)
MCV RBC AUTO: 89.7 FL (ref 79–97)
MONOCYTES # BLD AUTO: 0.53 10*3/MM3 (ref 0.1–0.9)
MONOCYTES NFR BLD AUTO: 4.8 % (ref 5–12)
NEUTROPHILS NFR BLD AUTO: 7.8 10*3/MM3 (ref 1.7–7)
NEUTROPHILS NFR BLD AUTO: 70.4 % (ref 42.7–76)
NITRITE UR QL STRIP: NEGATIVE
NRBC BLD AUTO-RTO: 0 /100 WBC (ref 0–0.2)
NT-PROBNP SERPL-MCNC: <36 PG/ML (ref 0–450)
PH UR STRIP.AUTO: 7.5 [PH] (ref 5–8)
PLATELET # BLD AUTO: 533 10*3/MM3 (ref 140–450)
PMV BLD AUTO: 9.7 FL (ref 6–12)
POTASSIUM SERPL-SCNC: 3.6 MMOL/L (ref 3.5–5.2)
PROT SERPL-MCNC: 8 G/DL (ref 6–8.5)
PROT UR QL STRIP: NEGATIVE
RBC # BLD AUTO: 4.56 10*6/MM3 (ref 3.77–5.28)
SODIUM SERPL-SCNC: 137 MMOL/L (ref 136–145)
SP GR UR STRIP: <=1.005 (ref 1–1.03)
TROPONIN T SERPL HS-MCNC: <6 NG/L
UROBILINOGEN UR QL STRIP: NORMAL
WBC NRBC COR # BLD AUTO: 11.07 10*3/MM3 (ref 3.4–10.8)
WHOLE BLOOD HOLD COAG: NORMAL
WHOLE BLOOD HOLD SPECIMEN: NORMAL

## 2025-03-30 PROCEDURE — 25010000002 KETOROLAC TROMETHAMINE PER 15 MG: Performed by: FAMILY MEDICINE

## 2025-03-30 PROCEDURE — G0378 HOSPITAL OBSERVATION PER HR: HCPCS

## 2025-03-30 PROCEDURE — 25510000001 IOPAMIDOL PER 1 ML: Performed by: EMERGENCY MEDICINE

## 2025-03-30 PROCEDURE — 25010000002 METOCLOPRAMIDE PER 10 MG: Performed by: EMERGENCY MEDICINE

## 2025-03-30 PROCEDURE — 93005 ELECTROCARDIOGRAM TRACING: CPT | Performed by: EMERGENCY MEDICINE

## 2025-03-30 PROCEDURE — 83880 ASSAY OF NATRIURETIC PEPTIDE: CPT

## 2025-03-30 PROCEDURE — 25010000002 HYDROMORPHONE 1 MG/ML SOLUTION: Performed by: EMERGENCY MEDICINE

## 2025-03-30 PROCEDURE — 84484 ASSAY OF TROPONIN QUANT: CPT | Performed by: EMERGENCY MEDICINE

## 2025-03-30 PROCEDURE — 25010000002 LORAZEPAM PER 2 MG: Performed by: EMERGENCY MEDICINE

## 2025-03-30 PROCEDURE — 99285 EMERGENCY DEPT VISIT HI MDM: CPT

## 2025-03-30 PROCEDURE — 25810000003 LACTATED RINGERS PER 1000 ML: Performed by: FAMILY MEDICINE

## 2025-03-30 PROCEDURE — 25010000002 HYDROMORPHONE 1 MG/ML SOLUTION: Performed by: FAMILY MEDICINE

## 2025-03-30 PROCEDURE — 25010000002 METOCLOPRAMIDE PER 10 MG: Performed by: FAMILY MEDICINE

## 2025-03-30 PROCEDURE — 80053 COMPREHEN METABOLIC PANEL: CPT | Performed by: EMERGENCY MEDICINE

## 2025-03-30 PROCEDURE — 25010000002 ONDANSETRON PER 1 MG: Performed by: FAMILY MEDICINE

## 2025-03-30 PROCEDURE — 25010000002 KETOROLAC TROMETHAMINE PER 15 MG: Performed by: EMERGENCY MEDICINE

## 2025-03-30 PROCEDURE — 99222 1ST HOSP IP/OBS MODERATE 55: CPT | Performed by: FAMILY MEDICINE

## 2025-03-30 PROCEDURE — 85025 COMPLETE CBC W/AUTO DIFF WBC: CPT

## 2025-03-30 PROCEDURE — 25010000002 ONDANSETRON PER 1 MG: Performed by: EMERGENCY MEDICINE

## 2025-03-30 PROCEDURE — 74177 CT ABD & PELVIS W/CONTRAST: CPT

## 2025-03-30 PROCEDURE — 25810000003 SODIUM CHLORIDE 0.9 % SOLUTION: Performed by: EMERGENCY MEDICINE

## 2025-03-30 PROCEDURE — 83690 ASSAY OF LIPASE: CPT | Performed by: EMERGENCY MEDICINE

## 2025-03-30 PROCEDURE — 71045 X-RAY EXAM CHEST 1 VIEW: CPT

## 2025-03-30 PROCEDURE — 81003 URINALYSIS AUTO W/O SCOPE: CPT

## 2025-03-30 PROCEDURE — 25010000002 DIPHENHYDRAMINE PER 50 MG: Performed by: EMERGENCY MEDICINE

## 2025-03-30 PROCEDURE — 93005 ELECTROCARDIOGRAM TRACING: CPT

## 2025-03-30 PROCEDURE — 71275 CT ANGIOGRAPHY CHEST: CPT

## 2025-03-30 RX ORDER — DULOXETIN HYDROCHLORIDE 30 MG/1
60 CAPSULE, DELAYED RELEASE ORAL DAILY
Status: DISCONTINUED | OUTPATIENT
Start: 2025-03-30 | End: 2025-03-30

## 2025-03-30 RX ORDER — SODIUM CHLORIDE 9 MG/ML
40 INJECTION, SOLUTION INTRAVENOUS AS NEEDED
Status: DISCONTINUED | OUTPATIENT
Start: 2025-03-30 | End: 2025-04-06 | Stop reason: HOSPADM

## 2025-03-30 RX ORDER — SODIUM CHLORIDE 0.9 % (FLUSH) 0.9 %
10 SYRINGE (ML) INJECTION AS NEEDED
Status: DISCONTINUED | OUTPATIENT
Start: 2025-03-30 | End: 2025-04-06 | Stop reason: HOSPADM

## 2025-03-30 RX ORDER — DULOXETIN HYDROCHLORIDE 30 MG/1
90 CAPSULE, DELAYED RELEASE ORAL DAILY
Status: DISCONTINUED | OUTPATIENT
Start: 2025-03-30 | End: 2025-04-06 | Stop reason: HOSPADM

## 2025-03-30 RX ORDER — LORAZEPAM 2 MG/ML
1 INJECTION INTRAMUSCULAR ONCE
Status: COMPLETED | OUTPATIENT
Start: 2025-03-30 | End: 2025-03-30

## 2025-03-30 RX ORDER — CLONAZEPAM 0.5 MG/1
0.5 TABLET ORAL 2 TIMES DAILY PRN
Status: DISPENSED | OUTPATIENT
Start: 2025-03-30 | End: 2025-04-04

## 2025-03-30 RX ORDER — ONDANSETRON 2 MG/ML
4 INJECTION INTRAMUSCULAR; INTRAVENOUS ONCE
Status: COMPLETED | OUTPATIENT
Start: 2025-03-30 | End: 2025-03-30

## 2025-03-30 RX ORDER — IOPAMIDOL 755 MG/ML
100 INJECTION, SOLUTION INTRAVASCULAR
Status: COMPLETED | OUTPATIENT
Start: 2025-03-30 | End: 2025-03-30

## 2025-03-30 RX ORDER — AMOXICILLIN 250 MG
2 CAPSULE ORAL 2 TIMES DAILY PRN
Status: DISCONTINUED | OUTPATIENT
Start: 2025-03-30 | End: 2025-04-06 | Stop reason: HOSPADM

## 2025-03-30 RX ORDER — SACCHAROMYCES BOULARDII 250 MG
250 CAPSULE ORAL 2 TIMES DAILY
Status: DISCONTINUED | OUTPATIENT
Start: 2025-03-30 | End: 2025-04-06 | Stop reason: HOSPADM

## 2025-03-30 RX ORDER — DULOXETIN HYDROCHLORIDE 30 MG/1
30 CAPSULE, DELAYED RELEASE ORAL DAILY
Status: DISCONTINUED | OUTPATIENT
Start: 2025-03-30 | End: 2025-03-30

## 2025-03-30 RX ORDER — KETOROLAC TROMETHAMINE 30 MG/ML
15 INJECTION, SOLUTION INTRAMUSCULAR; INTRAVENOUS EVERY 6 HOURS PRN
Status: DISCONTINUED | OUTPATIENT
Start: 2025-03-30 | End: 2025-04-01

## 2025-03-30 RX ORDER — MONTELUKAST SODIUM 10 MG/1
10 TABLET ORAL NIGHTLY
Status: DISCONTINUED | OUTPATIENT
Start: 2025-03-30 | End: 2025-04-06 | Stop reason: HOSPADM

## 2025-03-30 RX ORDER — KETOROLAC TROMETHAMINE 30 MG/ML
30 INJECTION, SOLUTION INTRAMUSCULAR; INTRAVENOUS ONCE
Status: COMPLETED | OUTPATIENT
Start: 2025-03-30 | End: 2025-03-30

## 2025-03-30 RX ORDER — POLYETHYLENE GLYCOL 3350 17 G/17G
17 POWDER, FOR SOLUTION ORAL DAILY
Status: DISCONTINUED | OUTPATIENT
Start: 2025-03-30 | End: 2025-04-03

## 2025-03-30 RX ORDER — BISACODYL 5 MG/1
5 TABLET, DELAYED RELEASE ORAL DAILY PRN
Status: DISCONTINUED | OUTPATIENT
Start: 2025-03-30 | End: 2025-04-06 | Stop reason: HOSPADM

## 2025-03-30 RX ORDER — BUSPIRONE HYDROCHLORIDE 10 MG/1
15 TABLET ORAL 3 TIMES DAILY
Status: DISCONTINUED | OUTPATIENT
Start: 2025-03-30 | End: 2025-04-06 | Stop reason: HOSPADM

## 2025-03-30 RX ORDER — POLYETHYLENE GLYCOL 3350 17 G/17G
17 POWDER, FOR SOLUTION ORAL DAILY PRN
Status: DISCONTINUED | OUTPATIENT
Start: 2025-03-30 | End: 2025-04-06 | Stop reason: HOSPADM

## 2025-03-30 RX ORDER — DIPHENHYDRAMINE HYDROCHLORIDE 50 MG/ML
25 INJECTION, SOLUTION INTRAMUSCULAR; INTRAVENOUS ONCE
Status: COMPLETED | OUTPATIENT
Start: 2025-03-30 | End: 2025-03-30

## 2025-03-30 RX ORDER — IPRATROPIUM BROMIDE AND ALBUTEROL SULFATE 2.5; .5 MG/3ML; MG/3ML
3 SOLUTION RESPIRATORY (INHALATION) EVERY 4 HOURS PRN
Status: DISCONTINUED | OUTPATIENT
Start: 2025-03-30 | End: 2025-04-06 | Stop reason: HOSPADM

## 2025-03-30 RX ORDER — SODIUM CHLORIDE 0.9 % (FLUSH) 0.9 %
10 SYRINGE (ML) INJECTION EVERY 12 HOURS SCHEDULED
Status: DISCONTINUED | OUTPATIENT
Start: 2025-03-30 | End: 2025-04-06 | Stop reason: HOSPADM

## 2025-03-30 RX ORDER — METOCLOPRAMIDE HYDROCHLORIDE 5 MG/ML
10 INJECTION INTRAMUSCULAR; INTRAVENOUS EVERY 8 HOURS
Status: DISCONTINUED | OUTPATIENT
Start: 2025-03-30 | End: 2025-04-03

## 2025-03-30 RX ORDER — ACETAMINOPHEN 325 MG/1
650 TABLET ORAL EVERY 6 HOURS PRN
Status: DISCONTINUED | OUTPATIENT
Start: 2025-03-30 | End: 2025-04-06 | Stop reason: HOSPADM

## 2025-03-30 RX ORDER — METOCLOPRAMIDE HYDROCHLORIDE 5 MG/ML
10 INJECTION INTRAMUSCULAR; INTRAVENOUS ONCE
Status: COMPLETED | OUTPATIENT
Start: 2025-03-30 | End: 2025-03-30

## 2025-03-30 RX ORDER — BISACODYL 10 MG
10 SUPPOSITORY, RECTAL RECTAL DAILY PRN
Status: DISCONTINUED | OUTPATIENT
Start: 2025-03-30 | End: 2025-04-06 | Stop reason: HOSPADM

## 2025-03-30 RX ORDER — ENOXAPARIN SODIUM 100 MG/ML
40 INJECTION SUBCUTANEOUS DAILY
Status: DISCONTINUED | OUTPATIENT
Start: 2025-03-31 | End: 2025-04-06

## 2025-03-30 RX ORDER — PANTOPRAZOLE SODIUM 40 MG/10ML
40 INJECTION, POWDER, LYOPHILIZED, FOR SOLUTION INTRAVENOUS EVERY 24 HOURS
Status: DISCONTINUED | OUTPATIENT
Start: 2025-03-30 | End: 2025-04-06 | Stop reason: HOSPADM

## 2025-03-30 RX ORDER — ONDANSETRON 2 MG/ML
4 INJECTION INTRAMUSCULAR; INTRAVENOUS EVERY 6 HOURS PRN
Status: DISCONTINUED | OUTPATIENT
Start: 2025-03-30 | End: 2025-04-06 | Stop reason: HOSPADM

## 2025-03-30 RX ORDER — ALUMINA, MAGNESIA, AND SIMETHICONE 2400; 2400; 240 MG/30ML; MG/30ML; MG/30ML
30 SUSPENSION ORAL ONCE
Status: DISCONTINUED | OUTPATIENT
Start: 2025-03-30 | End: 2025-04-05

## 2025-03-30 RX ORDER — SODIUM CHLORIDE, SODIUM LACTATE, POTASSIUM CHLORIDE, CALCIUM CHLORIDE 600; 310; 30; 20 MG/100ML; MG/100ML; MG/100ML; MG/100ML
100 INJECTION, SOLUTION INTRAVENOUS CONTINUOUS
Status: ACTIVE | OUTPATIENT
Start: 2025-03-30 | End: 2025-03-31

## 2025-03-30 RX ADMIN — ONDANSETRON 4 MG: 2 INJECTION INTRAMUSCULAR; INTRAVENOUS at 22:11

## 2025-03-30 RX ADMIN — Medication 250 MG: at 20:18

## 2025-03-30 RX ADMIN — BUSPIRONE HYDROCHLORIDE 15 MG: 10 TABLET ORAL at 20:17

## 2025-03-30 RX ADMIN — DULOXETINE HYDROCHLORIDE 90 MG: 30 CAPSULE, DELAYED RELEASE ORAL at 20:17

## 2025-03-30 RX ADMIN — HYDROMORPHONE HYDROCHLORIDE 0.5 MG: 1 INJECTION, SOLUTION INTRAMUSCULAR; INTRAVENOUS; SUBCUTANEOUS at 22:42

## 2025-03-30 RX ADMIN — HYDROMORPHONE HYDROCHLORIDE 0.5 MG: 1 INJECTION, SOLUTION INTRAMUSCULAR; INTRAVENOUS; SUBCUTANEOUS at 13:40

## 2025-03-30 RX ADMIN — CLONAZEPAM 0.5 MG: 0.5 TABLET ORAL at 20:17

## 2025-03-30 RX ADMIN — ONDANSETRON 4 MG: 2 INJECTION INTRAMUSCULAR; INTRAVENOUS at 13:38

## 2025-03-30 RX ADMIN — LORAZEPAM 1 MG: 2 INJECTION INTRAMUSCULAR; INTRAVENOUS at 14:50

## 2025-03-30 RX ADMIN — HYDROMORPHONE HYDROCHLORIDE 0.5 MG: 1 INJECTION, SOLUTION INTRAMUSCULAR; INTRAVENOUS; SUBCUTANEOUS at 18:37

## 2025-03-30 RX ADMIN — MONTELUKAST 10 MG: 10 TABLET, FILM COATED ORAL at 20:17

## 2025-03-30 RX ADMIN — IOPAMIDOL 100 ML: 755 INJECTION, SOLUTION INTRAVENOUS at 14:13

## 2025-03-30 RX ADMIN — METOCLOPRAMIDE 10 MG: 5 INJECTION, SOLUTION INTRAMUSCULAR; INTRAVENOUS at 16:00

## 2025-03-30 RX ADMIN — KETOROLAC TROMETHAMINE 30 MG: 30 INJECTION, SOLUTION INTRAMUSCULAR; INTRAVENOUS at 17:12

## 2025-03-30 RX ADMIN — SODIUM CHLORIDE, SODIUM LACTATE, POTASSIUM CHLORIDE, CALCIUM CHLORIDE 100 ML/HR: 20; 30; 600; 310 INJECTION, SOLUTION INTRAVENOUS at 20:19

## 2025-03-30 RX ADMIN — Medication 10 ML: at 20:18

## 2025-03-30 RX ADMIN — KETOROLAC TROMETHAMINE 15 MG: 30 INJECTION, SOLUTION INTRAMUSCULAR; INTRAVENOUS at 20:18

## 2025-03-30 RX ADMIN — METOCLOPRAMIDE 10 MG: 5 INJECTION, SOLUTION INTRAMUSCULAR; INTRAVENOUS at 20:18

## 2025-03-30 RX ADMIN — SODIUM CHLORIDE 1000 ML: 9 INJECTION, SOLUTION INTRAVENOUS at 13:37

## 2025-03-30 RX ADMIN — PANTOPRAZOLE SODIUM 40 MG: 40 INJECTION, POWDER, FOR SOLUTION INTRAVENOUS at 20:18

## 2025-03-30 RX ADMIN — DIPHENHYDRAMINE HYDROCHLORIDE 25 MG: 50 INJECTION, SOLUTION INTRAMUSCULAR; INTRAVENOUS at 15:58

## 2025-03-30 NOTE — ED PROVIDER NOTES
Time: 1:20 PM EDT  Date of encounter:  3/30/2025  Independent Historian/Clinical History and Information was obtained by:   Patient and Family    History is limited by: N/A    Chief Complaint: Chest/abdominal pain      History of Present Illness:  Patient is a 44 y.o. year old female who presents to the emergency department for evaluation of right lateral flank/lower chest wall and right upper quadrant abdominal pain.  Consistent issue for the past 1 to 2 weeks.  No diarrhea.  Patient has started vomiting again in the past 2 days.  Complicated recent surgical history.  No reported fever in the past 2 days.        Patient Care Team  Primary Care Provider: Karen Stover APRN    Past Medical History:     Allergies   Allergen Reactions    Escitalopram Nausea Only and Rash     Nausea, sweating, rash     Sulfa Antibiotics Anaphylaxis    Baclofen GI Intolerance, Hives and Nausea And Vomiting    Ciprofloxacin Hallucinations    Codeine Hives    Gold-Containing Drug Products Rash    Latex Rash    Nickel Hives    Tegaderm Ag Mesh [Silver] Hives     Past Medical History:   Diagnosis Date    Allergic     Anxiety     Atrial fibrillation     RELEASED BY CARDIOLOGIST/ELECTROPHYSIST, NO CURRENT MEDS    Chronic pain disorder     Depression     Endometriosis     Headache     Hemorrhoids     Injury of shoulder, right 2009    CHRONIC PAIN    Panic disorder     Rectal bleeding 2010    S/P laparoscopic appendectomy 03/09/2025    S/P laparoscopic cholecystectomy 02/17/2025     Past Surgical History:   Procedure Laterality Date    APPENDECTOMY N/A 3/9/2025    Procedure: APPENDECTOMY LAPAROSCOPIC;  Surgeon: Mingo Cannon MD;  Location: [facility] OR;  Service: General;  Laterality: N/A;    CERVICAL ARTHRODESIS  01/14/2015    Donaldo Albarran    CERVICAL FUSION      C4-7 FUSION, FULL ROM    CHOLECYSTECTOMY N/A 2/17/2025    Procedure: CHOLECYSTECTOMY LAPAROSCOPIC plain language: removal of gallbladder thru small incisions using  long instruments and a camera;  Surgeon: Josué Castano MD;  Location: [facility] MAIN OR;  Service: General;  Laterality: N/A;    COLONOSCOPY N/A 05/31/2022    Procedure: COLONOSCOPY;  Surgeon: Shabbir Baird MD;  Location: [facility] ENDOSCOPY;  Service: General;  Laterality: N/A;  HEMORRHOIDS    ENDOSCOPY N/A 2/17/2025    Procedure: ESOPHAGOGASTRODUODENOSCOPY WITH BIOPSIES;  Surgeon: Sanya Reyes MD;  Location: [facility] ENDOSCOPY;  Service: Gastroenterology;  Laterality: N/A;  GASTRITIS, SMALL HIATAL HERNIA    ENDOSCOPY N/A 3/6/2025    Procedure: ESOPHAGOGASTRODUODENOSCOPY with pancreatic stent removal;  Surgeon: Ha Thompson MD;  Location: [facility] ENDOSCOPY;  Service: Gastroenterology;  Laterality: N/A;  pancreatic stent removal, hiatal hernia    ERCP N/A 2/24/2025    Procedure: ENDOSCOPIC RETROGRADE CHOLANGIOPANCREATOGRAPHY with bile duct stent placement and pancreatic duct stent placement;  Surgeon: Ha Thompson MD;  Location: [facility] ENDOSCOPY;  Service: Gastroenterology;  Laterality: N/A;  bile duct leeak    EXPLORATORY LAPAROTOMY      HEMORRHOIDECTOMY N/A 05/31/2022    Procedure: HEMORRHOID BANDING;  Surgeon: Shabbir Baird MD;  Location: [facility] ENDOSCOPY;  Service: General;  Laterality: N/A;  BANDS X 2    HEMORRHOIDECTOMY N/A 1/31/2024    Procedure: HEMORRHOIDECTOMY;  Surgeon: Shabbir Baird MD;  Location: [facility] OR OSC;  Service: General;  Laterality: N/A;    HYSTERECTOMY      SHOULDER ARTHROSCOPY Right     SHOULDER SURGERY Left     ARTHROSCOPY LABRAL TEAR X2    TUBAL ABDOMINAL LIGATION       Family History   Problem Relation Age of Onset    Depression Mother     Bipolar disorder Mother     Anxiety disorder Mother     Cancer Mother     Heart disease Mother     Hypertension Mother     Anxiety disorder Father     Hypertension Father     Dementia Paternal Uncle     Dementia Paternal Grandmother     Heart disease Other     Colon cancer Neg Hx     Malig Hyperthermia Neg  Hx        Home Medications:  Prior to Admission medications    Medication Sig Start Date End Date Taking? Authorizing Provider   albuterol sulfate  (90 Base) MCG/ACT inhaler Inhale 2 puffs Every 6 (Six) Hours As Needed for Wheezing. 1/8/25   Karen Stover APRN   busPIRone (BUSPAR) 15 MG tablet Take 1 tablet by mouth 3 (Three) Times a Day. 1/17/25   Karen Stover APRN   CALCIUM PO Take 1 tablet by mouth Daily.    Avani Vela MD   clindamycin (Cleocin) 300 MG capsule Take 1 capsule by mouth 3 (Three) Times a Day. 3/21/25   Josué Castano MD   clonazePAM (KlonoPIN) 0.5 MG tablet Take 1 tablet by mouth 2 (Two) Times a Day As Needed for Anxiety. 3/25/25   Karen Stover APRN   DULoxetine (CYMBALTA) 30 MG capsule Take 1 capsule by mouth Daily. TAKES 30mg AND 60mg TOGETHER FOR A TOTAL OF 90mg    Avani Vela MD   DULoxetine (CYMBALTA) 60 MG capsule Take 1 capsule by mouth Daily. TAKES 30mg AND 60mg TOGETHER FOR A TOTAL OF 90mg    Avani Vela MD   HYDROcodone-acetaminophen (NORCO)  MG per tablet Take 1 tablet by mouth Every 4 (Four) Hours As Needed for Moderate Pain. 3/7/25   Madina Hassan APRN   ibuprofen (ADVIL,MOTRIN) 800 MG tablet Every 8 (Eight) Hours.    ProviderAvani MD   montelukast (Singulair) 10 MG tablet Take 1 tablet by mouth Every Night. 1/17/25   Karen Stover APRN   naloxone (NARCAN) 4 MG/0.1ML nasal spray Call 911. Don't prime. Watkins in 1 nostril for overdose. Repeat in 2-3 minutes in other nostril if no or minimal breathing/responsiveness. 3/11/25   India Chris APRN   ondansetron (ZOFRAN) 4 MG tablet TAKE (1) TABLET EVERY 8 HOURS AS NEEDED FOR NAUSEA AND vomiting  Patient taking differently: Take 1 tablet by mouth Every 8 (Eight) Hours As Needed. 10/14/24   Karen Stover APRN   pantoprazole (PROTONIX) 40 MG EC tablet Take 1 tablet by mouth Daily. 11/11/24   Karen Stover APRN polyethylene  glycol (MIRALAX) 17 g packet Mix 17gm (contents of 1 packet) in eight ounces of water or other beverage to drink once daily 3/11/25   India Chris APRN   prochlorperazine (COMPAZINE) 10 MG tablet Take 1 tablet by mouth Every 6 (Six) Hours As Needed.    Avani Vela MD   saccharomyces boulardii (Florastor) 250 MG capsule Take 1 capsule by mouth 2 (Two) Times a Day. 3/25/25   Karen Stover APRN   sennosides-docusate (senna-docusate sodium) 8.6-50 MG per tablet Take 1 tablet by mouth Daily. 24   Karen Stover APRN   tiZANidine (ZANAFLEX) 4 MG tablet Take 1-2 tablets by mouth Every 6 (Six) Hours As Needed. 24   Avani Vela MD   traZODone (DESYREL) 50 MG tablet Take 0.5 to 2 tab PO QHS PRN sleep  Patient taking differently: Take 0.5-2 tablets by mouth At Night As Needed. Take 0.5 to 2 tab PO QHS PRN sleep 24   Karen Stover APRN   vitamin C (ASCORBIC ACID) 500 MG tablet Take 1 tablet by mouth Daily. LAST DOSE 24    Avani Vela MD        Social History:   Social History     Tobacco Use    Smoking status: Former     Current packs/day: 0.00     Average packs/day: 0.3 packs/day for 20.0 years (5.0 ttl pk-yrs)     Types: Cigarettes     Start date: 1999     Quit date: 2019     Years since quittin.2    Smokeless tobacco: Never   Vaping Use    Vaping status: Never Used   Substance Use Topics    Alcohol use: Yes     Comment: rarely    Drug use: Never         Review of Systems:  Review of Systems   Constitutional:  Negative for chills and fever.   HENT:  Negative for congestion, rhinorrhea and sore throat.    Eyes:  Negative for photophobia.   Respiratory:  Positive for shortness of breath. Negative for apnea, cough and chest tightness.    Cardiovascular:  Positive for chest pain. Negative for palpitations.   Gastrointestinal:  Positive for abdominal pain, nausea and vomiting. Negative for diarrhea.   Endocrine: Negative.   "  Genitourinary:  Negative for difficulty urinating and dysuria.   Musculoskeletal:  Negative for back pain, joint swelling and myalgias.   Skin:  Negative for color change and wound.   Allergic/Immunologic: Negative.    Neurological:  Negative for seizures and headaches.   Psychiatric/Behavioral: Negative.     All other systems reviewed and are negative.       Physical Exam:  BP (!) 170/106   Pulse 114   Temp 98.6 °F (37 °C) (Oral)   Resp 16   Ht 157.5 cm (62\")   Wt 81.1 kg (178 lb 12.7 oz)   SpO2 99%   BMI 32.70 kg/m²     Physical Exam  Vitals and nursing note reviewed.   Constitutional:       General: She is awake.      Appearance: Normal appearance. She is not toxic-appearing.      Comments: Sobbing, hyperventilating   HENT:      Head: Normocephalic and atraumatic.      Nose: Nose normal.      Mouth/Throat:      Mouth: Mucous membranes are moist.   Eyes:      Extraocular Movements: Extraocular movements intact.      Pupils: Pupils are equal, round, and reactive to light.   Cardiovascular:      Rate and Rhythm: Normal rate and regular rhythm.      Heart sounds: Normal heart sounds.   Pulmonary:      Effort: Tachypnea present. No respiratory distress.      Breath sounds: Normal breath sounds. No wheezing, rhonchi or rales.   Abdominal:      General: Bowel sounds are normal.      Palpations: Abdomen is soft.      Tenderness: There is no abdominal tenderness. There is no guarding or rebound.      Comments: No rigidity   Musculoskeletal:         General: No tenderness. Normal range of motion.      Cervical back: Normal range of motion and neck supple.   Skin:     General: Skin is warm and dry.      Coloration: Skin is not jaundiced.   Neurological:      General: No focal deficit present.      Mental Status: She is alert. Mental status is at baseline.   Psychiatric:         Mood and Affect: Mood normal.                    Medical Decision Making:      Comorbidities that affect care:    Chronic pain disorder, " depression, atrial fibrillation    External Notes reviewed:    Hospital Discharge Summary:            [facility]                                                                           DISCHARGE SUMMARY     Patient Name: Ebony Hamilton  : 1980  MRN: [id number]     Date of Admission: 3/8/2025  Date of Discharge: 3/11/2025  Primary Care Physician: Karen Stover APRN     Consults         Date and Time Order Name Status Description     3/9/2025  1:03 AM Inpatient General Surgery Consult         2025 10:24 AM Inpatient General Surgery Consult Completed       2025  6:03 AM Inpatient Gastroenterology Consult Completed                  Surgical Procedures Since Admission:  Procedure(s):  APPENDECTOMY LAPAROSCOPIC  Surgeon:  Mingo Cannon MD  Status:  2 Days Post-Op  -------------------        Presenting Problem:   Appendicitis [K37]  Thrombocytosis [D75.839]  Acute appendicitis, unspecified acute appendicitis type [K35.80]     Active and Resolved Hospital Problems:       Active Hospital Problems     Diagnosis POA    **Appendicitis [K37] Yes    S/P laparoscopic appendectomy [Z90.49] Not Applicable       Resolved Hospital Problems   No resolved problems to display.            Hospital Course      Hospital Course:  Ebony Hamilton is a 44 y.o. female status post laparoscopic appendectomy.  She has had a complicated medical history recently secondary to a cholecystectomy with bile leak.  She presented to the emergency department with CT showing evidence of appendicitis.  She was taken for laparoscopic appendectomy.  After appendectomy she did well.  She was having some residual pain.  She was discharged home on postoperative day 2 in good condition.  She was tolerating regular diet and pain was controlled.  She will follow-up in the office with either myself or Dr. Castaon.  Patient states she felt comfortable for discharge.    The following orders were placed and all results were  independently analyzed by me:  Orders Placed This Encounter   Procedures    XR Chest 1 View    CT Abdomen Pelvis With Contrast    CT Angiogram Chest Pulmonary Embolism    [place] Draw    Comprehensive Metabolic Panel    BNP    High Sensitivity Troponin T    Lipase    Urinalysis With Microscopic If Indicated (No Culture) - Urine, Clean Catch    CBC Auto Differential    NPO Diet NPO Type: Strict NPO    Undress & Gown    Continuous Pulse Oximetry    Vital Signs    Hospitalist (on-call MD unless specified)    Oxygen Therapy- Nasal Cannula; Titrate 1-6 LPM Per SpO2; 90 - 95%    ECG 12 Lead ED Triage Standing Order; SOA    Insert Peripheral IV    CBC & Differential    Green Top (Gel)    Lavender Top    Gold Top - SST    Light Blue Top    Extra Tubes    Gray Top       Medications Given in the Emergency Department:  Medications   sodium chloride 0.9 % flush 10 mL (has no administration in time range)   sodium chloride 0.9 % bolus 1,000 mL (0 mL Intravenous Stopped 3/30/25 1604)   HYDROmorphone (DILAUDID) injection 0.5 mg (0.5 mg Intravenous Given 3/30/25 1340)   ondansetron (ZOFRAN) injection 4 mg (4 mg Intravenous Given 3/30/25 1338)   iopamidol (ISOVUE-370) 76 % injection 100 mL (100 mL Intravenous Given 3/30/25 1413)   LORazepam (ATIVAN) injection 1 mg (1 mg Intravenous Given 3/30/25 1450)   metoclopramide (REGLAN) injection 10 mg (10 mg Intravenous Given 3/30/25 1600)   diphenhydrAMINE (BENADRYL) injection 25 mg (25 mg Intravenous Given 3/30/25 1558)        ED Course:    ED Course as of 03/30/25 1633   Sun Mar 30, 2025   1258 I have personally interpreted the EKG today and it shows no evidence of any acute ischemia or heart arrhythmia.  Sinus tachycardia, heart rate 106 [RP]   1323 Risk/benefits of repeated CT discussed.  White blood cell count improving.  Patient states her chest pain is persistent since the last ED visit and has a negative CT PE protocol.  Unfortunately history somewhat wavering and family implying  that the sudden worsening chest pain was after she was discharged from the emergency department. [RP]   1631 eGFR: 111.7 [RP]      ED Course User Index  [RP] Nigel Goldstein MD       Labs:    Lab Results (last 24 hours)       Procedure Component Value Units Date/Time    CBC & Differential [[id number]]  (Abnormal) Collected: 03/30/25 1209    Specimen: Blood Updated: 03/30/25 1217    Narrative:      The following orders were created for panel order CBC & Differential.  Procedure                               Abnormality         Status                     ---------                               -----------         ------                     CBC Auto Differential[[id number]]        Abnormal            Final result                 Please view results for these tests on the individual orders.    Comprehensive Metabolic Panel [[id number]]  (Abnormal) Collected: 03/30/25 1209    Specimen: Blood Updated: 03/30/25 1307     Glucose 85 mg/dL      BUN 12 mg/dL      Creatinine 0.68 mg/dL      Sodium 137 mmol/L      Potassium 3.6 mmol/L      Chloride 100 mmol/L      CO2 18.5 mmol/L      Calcium 10.3 mg/dL      Total Protein 8.0 g/dL      Albumin 4.3 g/dL      ALT (SGPT) 9 U/L      AST (SGOT) 12 U/L      Alkaline Phosphatase 80 U/L      Total Bilirubin 0.4 mg/dL      Globulin 3.7 gm/dL      A/G Ratio 1.2 g/dL      BUN/Creatinine Ratio 17.6     Anion Gap 18.5 mmol/L      eGFR 111.7 mL/min/1.73     Narrative:      GFR Categories in Chronic Kidney Disease (CKD)      GFR Category          GFR (mL/min/1.73)    Interpretation  G1                     90 or greater         Normal or high (1)  G2                      60-89                Mild decrease (1)  G3a                   45-59                Mild to moderate decrease  G3b                   30-44                Moderate to severe decrease  G4                    15-29                Severe decrease  G5                    14 or less           Kidney failure          (1)In the absence of  evidence of kidney disease, neither GFR category G1 or G2 fulfill the criteria for CKD.    eGFR calculation 2021 CKD-EPI creatinine equation, which does not include race as a factor    BNP [[id number]]  (Normal) Collected: 03/30/25 1209    Specimen: Blood Updated: 03/30/25 1235     proBNP <36.0 pg/mL     Narrative:      This assay is used as an aid in the diagnosis of individuals suspected of having heart failure. It can be used as an aid in the diagnosis of acute decompensated heart failure (ADHF) in patients presenting with signs and symptoms of ADHF to the emergency department (ED). In addition, NT-proBNP of <300 pg/mL indicates ADHF is not likely.    Age Range Result Interpretation  NT-proBNP Concentration (pg/mL:      <50             Positive            >450                   Gray                 300-450                    Negative             <300    50-75           Positive            >900                  Gray                300-900                  Negative            <300      >75             Positive            >1800                  Gray                300-1800                  Negative            <300    High Sensitivity Troponin T [[id number]]  (Normal) Collected: 03/30/25 1209    Specimen: Blood Updated: 03/30/25 1307     HS Troponin T <6 ng/L     Narrative:      High Sensitive Troponin T Reference Range:  <14.0 ng/L- Negative Female for AMI  <22.0 ng/L- Negative Male for AMI  >=14 - Abnormal Female indicating possible myocardial injury.  >=22 - Abnormal Male indicating possible myocardial injury.   Clinicians would have to utilize clinical acumen, EKG, Troponin, and serial changes to determine if it is an Acute Myocardial Infarction or myocardial injury due to an underlying chronic condition.         Lipase [[id number]]  (Normal) Collected: 03/30/25 1209    Specimen: Blood Updated: 03/30/25 1532     Lipase 35 U/L     CBC Auto Differential [[id number]]  (Abnormal) Collected: 03/30/25 1209    Specimen:  Blood Updated: 03/30/25 1217     WBC 11.07 10*3/mm3      RBC 4.56 10*6/mm3      Hemoglobin 12.8 g/dL      Hematocrit 40.9 %      MCV 89.7 fL      MCH 28.1 pg      MCHC 31.3 g/dL      RDW 14.6 %      RDW-SD 46.5 fl      MPV 9.7 fL      Platelets 533 10*3/mm3      Neutrophil % 70.4 %      Lymphocyte % 22.6 %      Monocyte % 4.8 %      Eosinophil % 0.6 %      Basophil % 0.5 %      Immature Grans % 1.1 %      Neutrophils, Absolute 7.80 10*3/mm3      Lymphocytes, Absolute 2.50 10*3/mm3      Monocytes, Absolute 0.53 10*3/mm3      Eosinophils, Absolute 0.07 10*3/mm3      Basophils, Absolute 0.05 10*3/mm3      Immature Grans, Absolute 0.12 10*3/mm3      nRBC 0.0 /100 WBC     Urinalysis With Microscopic If Indicated (No Culture) - Urine, Clean Catch [[id number]]  (Normal) Collected: 03/30/25 1242    Specimen: Urine, Clean Catch Updated: 03/30/25 1250     Color, UA Yellow     Appearance, UA Clear     pH, UA 7.5     Specific Gravity, UA <=1.005     Glucose, UA Negative     Ketones, UA Negative     Bilirubin, UA Negative     Blood, UA Negative     Protein, UA Negative     Leuk Esterase, UA Negative     Nitrite, UA Negative     Urobilinogen, UA 1.0 E.U./dL    Narrative:      Urine microscopic not indicated.             Imaging:    CT Angiogram Chest Pulmonary Embolism  Result Date: 3/30/2025  CT ANGIOGRAM CHEST PULMONARY EMBOLISM Date of Exam: 3/30/2025 1:52 PM EDT Indication: Severe right sided chest pain with inspiration. Comparison: None available. Technique: Axial CT images were obtained of the chest after the uneventful intravenous administration of iodinated contrast utilizing pulmonary embolism protocol.  Reconstructed coronal and sagittal images were also obtained. Automated exposure control and iterative construction methods were used. Findings: PULMONARY VASCULATURE: Pulmonary arteries are widely patent without evidence of embolus.  Main pulmonary artery is normal in size. No evidence of right heart strain.  MEDIASTINUM:Unremarkable. Aortic and heart size are normal. No aortic dissection identified. No mass nor pericardial effusion. CORONARY ARTERIES: No calcified atherosclerotic disease. LUNGS: Lungs are clear. No consolidation. No significant nodule nor interstitial changes. PLEURAL SPACE: No effusion, mass, nor pneumothorax. LYMPH NODES: There are no pathologically enlarged lymph nodes. UPPER ABDOMEN: Unremarkable OSSEOUS STRUCTURES: Appropriate for age with no acute process identified.     Impression: No evidence of pulmonary embolus. No acute intrathoracic findings. Electronically Signed: Ankit Barker MD  3/30/2025 2:24 PM EDT  Workstation ID: [id number]    CT Abdomen Pelvis With Contrast  Result Date: 3/30/2025  CT ABDOMEN PELVIS W CONTRAST Date of Exam: 3/30/2025 1:52 PM EDT Indication: recent surgery, pain abdominal pain. Recent cholecystectomy 2/17/2025, appendectomy 3/9/2025. Comparison: 3/24/2025 Technique: Axial CT images were obtained of the abdomen and pelvis after the uneventful intravenous administration of iodinated contrast. Reconstructed coronal and sagittal images were also obtained. Automated exposure control and iterative construction methods were used. FINDINGS: The lung bases are clear without evidence for focal consolidation or significant pleural effusion. The liver, spleen, and pancreas are stable. Postoperative changes are again noted status post recent cholecystectomy. Residual changes are seen in the gallbladder fossa and within the liver adjacent to the gallbladder fossa. A stent is noted in the common bile duct. The stent is stable in position. The bilateral adrenal glands are symmetric and unremarkable. The bilateral kidneys are symmetric and unremarkable. Postoperative changes are noted status post recent appendectomy. No abnormal fluid collections are seen. There is no bowel dilatation or obstruction. No significant free fluid is observed. No abnormal fluid collections are identified.  No significant lymphadenopathy is seen throughout the abdomen or pelvis. The bladder is partially decompressed. The celiac and superior mesenteric arterial distributions are opacified without evidence for occlusion. No acute osseous abnormalities are observed.     1.Residual postoperative changes are noted status post recent cholecystectomy and appendectomy. 2.No evidence for abnormal fluid collection. No significant hemorrhage or hematoma. 3.No evidence for additional acute abnormality throughout the abdomen or pelvis. Electronically Signed: Irwin Jose MD  3/30/2025 2:23 PM EDT  Workstation ID: [id number]    XR Chest 1 View  Result Date: 3/30/2025  XR CHEST 1 VW Date of Exam: 3/30/2025 12:17 PM EDT Indication: SOA Triage Protocol Comparison: Chest radiograph dated 3/20/2025 Findings: The cardiomediastinal silhouette is within normal limits. Pulmonary vascularity appears normal. There is no focal airspace consolidation, pleural effusion, or pneumothorax. There are no acute osseous findings of the chest.     Impression: No acute cardiopulmonary abnormality. Electronically Signed: Alin Quiroz  3/30/2025 12:30 PM EDT  Workstation ID: [id number]        Differential Diagnosis and Discussion:    Abdominal Pain: Based on the patient's signs and symptoms, I considered abdominal aortic aneurysm, small bowel obstruction, pancreatitis, acute cholecystitis, acute appendecitis, peptic ulcer disease, gastritis, colitis, endocrine disorders, irritable bowel syndrome and other differential diagnosis an etiology of the patient's abdominal pain.  Chest Pain:  Based on the patient's signs and symptoms, I considered aortic dissection, myocardial infaction, pulmonary embolism, cardiac tamponade, pericarditis, pneumothorax, musculoskeletal chest pain and other differential diagnosis as an etiology of the patient's chest pain.     PROCEDURES:    Labs were collected in the emergency department and all labs were reviewed and  interpreted by me.  X-ray were performed in the emergency department and all X-ray impressions were independently interpreted by me.  An EKG was performed and the EKG was interpreted by me.  CT scan was performed in the emergency department and the CT scan radiology impression was interpreted by me.    ECG 12 Lead ED Triage Standing Order; SOA   Preliminary Result   HEART WXVI=794  bpm   RR Xrrimvbr=506  ms   WI Hrcnpdtt=439  ms   P Horizontal Axis=4  deg   P Front Axis=45  deg   QRSD Interval=78  ms   QT Bmilnjmi=001  ms   WTbW=196  ms   QRS Axis=28  deg   T Wave Axis=32  deg   - BORDERLINE ECG -   Sinus tachycardia   Probable left atrial enlargement   Date and Time of Study:2025-03-30 12:05:00          Procedures    MDM     Amount and/or Complexity of Data Reviewed  Clinical lab tests: reviewed  Tests in the radiology section of CPT®: reviewed  Tests in the medicine section of CPT®: reviewed  Decide to obtain previous medical records or to obtain history from someone other than the patient: yes                       Patient Care Considerations:          Consultants/Shared Management Plan:    Hospitalist: I have discussed the case with Dr. Grimes who agrees to accept the patient for admission.  Consultant: I have discussed the case with Dr. Castano who agrees to consult on the patient.    Social Determinants of Health:    Patient is independent, reliable, and has access to care.       Disposition and Care Coordination:    Admit:   Through independent evaluation of the patient's history, physical, and imperical data, the patient meets criteria for inpatient admission to the hospital.        Final diagnoses:   Abdominal pain, unspecified abdominal location        ED Disposition       ED Disposition   Decision to Admit    Condition   --    Comment   --               This medical record created using voice recognition software.             Nigel Goldstein MD  03/30/25 [id number]

## 2025-03-31 LAB
ANION GAP SERPL CALCULATED.3IONS-SCNC: 14.6 MMOL/L (ref 5–15)
BUN SERPL-MCNC: 11 MG/DL (ref 6–20)
BUN/CREAT SERPL: 15.5 (ref 7–25)
CALCIUM SPEC-SCNC: 9.3 MG/DL (ref 8.6–10.5)
CHLORIDE SERPL-SCNC: 105 MMOL/L (ref 98–107)
CO2 SERPL-SCNC: 20.4 MMOL/L (ref 22–29)
CREAT SERPL-MCNC: 0.71 MG/DL (ref 0.57–1)
DEPRECATED RDW RBC AUTO: 47.9 FL (ref 37–54)
EGFRCR SERPLBLD CKD-EPI 2021: 109 ML/MIN/1.73
ERYTHROCYTE [DISTWIDTH] IN BLOOD BY AUTOMATED COUNT: 14.6 % (ref 12.3–15.4)
GLUCOSE SERPL-MCNC: 113 MG/DL (ref 65–99)
HCT VFR BLD AUTO: 36.4 % (ref 34–46.6)
HGB BLD-MCNC: 11.5 G/DL (ref 12–15.9)
MCH RBC QN AUTO: 28.5 PG (ref 26.6–33)
MCHC RBC AUTO-ENTMCNC: 31.6 G/DL (ref 31.5–35.7)
MCV RBC AUTO: 90.1 FL (ref 79–97)
PLATELET # BLD AUTO: 487 10*3/MM3 (ref 140–450)
PMV BLD AUTO: 9.9 FL (ref 6–12)
POTASSIUM SERPL-SCNC: 3.9 MMOL/L (ref 3.5–5.2)
RBC # BLD AUTO: 4.04 10*6/MM3 (ref 3.77–5.28)
SODIUM SERPL-SCNC: 140 MMOL/L (ref 136–145)
WBC NRBC COR # BLD AUTO: 9.27 10*3/MM3 (ref 3.4–10.8)

## 2025-03-31 PROCEDURE — 25010000002 HYDROMORPHONE 1 MG/ML SOLUTION: Performed by: FAMILY MEDICINE

## 2025-03-31 PROCEDURE — G0378 HOSPITAL OBSERVATION PER HR: HCPCS

## 2025-03-31 PROCEDURE — 25010000002 METOCLOPRAMIDE PER 10 MG: Performed by: FAMILY MEDICINE

## 2025-03-31 PROCEDURE — 25810000003 LACTATED RINGERS PER 1000 ML: Performed by: FAMILY MEDICINE

## 2025-03-31 PROCEDURE — 25010000002 KETOROLAC TROMETHAMINE PER 15 MG: Performed by: FAMILY MEDICINE

## 2025-03-31 PROCEDURE — 25010000002 ENOXAPARIN PER 10 MG: Performed by: FAMILY MEDICINE

## 2025-03-31 PROCEDURE — 80048 BASIC METABOLIC PNL TOTAL CA: CPT | Performed by: FAMILY MEDICINE

## 2025-03-31 PROCEDURE — 85027 COMPLETE CBC AUTOMATED: CPT | Performed by: FAMILY MEDICINE

## 2025-03-31 PROCEDURE — 99232 SBSQ HOSP IP/OBS MODERATE 35: CPT | Performed by: FAMILY MEDICINE

## 2025-03-31 PROCEDURE — 25010000002 ONDANSETRON PER 1 MG: Performed by: FAMILY MEDICINE

## 2025-03-31 RX ORDER — TRAZODONE HYDROCHLORIDE 50 MG/1
25 TABLET ORAL NIGHTLY PRN
Status: DISCONTINUED | OUTPATIENT
Start: 2025-03-31 | End: 2025-04-06 | Stop reason: HOSPADM

## 2025-03-31 RX ORDER — SODIUM CHLORIDE, SODIUM LACTATE, POTASSIUM CHLORIDE, CALCIUM CHLORIDE 600; 310; 30; 20 MG/100ML; MG/100ML; MG/100ML; MG/100ML
50 INJECTION, SOLUTION INTRAVENOUS CONTINUOUS
Status: ACTIVE | OUTPATIENT
Start: 2025-03-31 | End: 2025-04-02

## 2025-03-31 RX ADMIN — SODIUM CHLORIDE, SODIUM LACTATE, POTASSIUM CHLORIDE, CALCIUM CHLORIDE 100 ML/HR: 20; 30; 600; 310 INJECTION, SOLUTION INTRAVENOUS at 11:10

## 2025-03-31 RX ADMIN — SODIUM CHLORIDE, SODIUM LACTATE, POTASSIUM CHLORIDE, CALCIUM CHLORIDE 100 ML/HR: 20; 30; 600; 310 INJECTION, SOLUTION INTRAVENOUS at 22:45

## 2025-03-31 RX ADMIN — METOCLOPRAMIDE 10 MG: 5 INJECTION, SOLUTION INTRAMUSCULAR; INTRAVENOUS at 12:55

## 2025-03-31 RX ADMIN — METOCLOPRAMIDE 10 MG: 5 INJECTION, SOLUTION INTRAMUSCULAR; INTRAVENOUS at 03:46

## 2025-03-31 RX ADMIN — BUSPIRONE HYDROCHLORIDE 15 MG: 10 TABLET ORAL at 16:01

## 2025-03-31 RX ADMIN — HYDROMORPHONE HYDROCHLORIDE 0.5 MG: 1 INJECTION, SOLUTION INTRAMUSCULAR; INTRAVENOUS; SUBCUTANEOUS at 16:00

## 2025-03-31 RX ADMIN — BUSPIRONE HYDROCHLORIDE 15 MG: 10 TABLET ORAL at 20:41

## 2025-03-31 RX ADMIN — HYDROMORPHONE HYDROCHLORIDE 0.5 MG: 1 INJECTION, SOLUTION INTRAMUSCULAR; INTRAVENOUS; SUBCUTANEOUS at 11:37

## 2025-03-31 RX ADMIN — DULOXETINE HYDROCHLORIDE 90 MG: 30 CAPSULE, DELAYED RELEASE ORAL at 08:56

## 2025-03-31 RX ADMIN — HYDROMORPHONE HYDROCHLORIDE 0.5 MG: 1 INJECTION, SOLUTION INTRAMUSCULAR; INTRAVENOUS; SUBCUTANEOUS at 07:35

## 2025-03-31 RX ADMIN — HYDROMORPHONE HYDROCHLORIDE 0.5 MG: 1 INJECTION, SOLUTION INTRAMUSCULAR; INTRAVENOUS; SUBCUTANEOUS at 02:54

## 2025-03-31 RX ADMIN — ONDANSETRON 4 MG: 2 INJECTION INTRAMUSCULAR; INTRAVENOUS at 11:37

## 2025-03-31 RX ADMIN — KETOROLAC TROMETHAMINE 15 MG: 30 INJECTION, SOLUTION INTRAMUSCULAR; INTRAVENOUS at 22:57

## 2025-03-31 RX ADMIN — Medication 10 ML: at 20:40

## 2025-03-31 RX ADMIN — ENOXAPARIN SODIUM 40 MG: 100 INJECTION SUBCUTANEOUS at 08:56

## 2025-03-31 RX ADMIN — TIZANIDINE 4 MG: 4 TABLET ORAL at 16:53

## 2025-03-31 RX ADMIN — PANTOPRAZOLE SODIUM 40 MG: 40 INJECTION, POWDER, FOR SOLUTION INTRAVENOUS at 20:40

## 2025-03-31 RX ADMIN — KETOROLAC TROMETHAMINE 15 MG: 30 INJECTION, SOLUTION INTRAMUSCULAR; INTRAVENOUS at 12:55

## 2025-03-31 RX ADMIN — HYDROMORPHONE HYDROCHLORIDE 0.5 MG: 1 INJECTION, SOLUTION INTRAMUSCULAR; INTRAVENOUS; SUBCUTANEOUS at 20:40

## 2025-03-31 RX ADMIN — Medication 250 MG: at 08:56

## 2025-03-31 RX ADMIN — Medication 10 ML: at 08:57

## 2025-03-31 RX ADMIN — BUSPIRONE HYDROCHLORIDE 15 MG: 10 TABLET ORAL at 08:56

## 2025-03-31 RX ADMIN — MONTELUKAST 10 MG: 10 TABLET, FILM COATED ORAL at 20:41

## 2025-03-31 RX ADMIN — Medication 250 MG: at 20:41

## 2025-03-31 RX ADMIN — METOCLOPRAMIDE 10 MG: 5 INJECTION, SOLUTION INTRAMUSCULAR; INTRAVENOUS at 20:41

## 2025-03-31 NOTE — SIGNIFICANT NOTE
Wound Eval / Progress Noted     [facility]     Patient Name: Ebony Hamilton  : 1980  MRN: [id number]  Today's Date: 3/31/2025                 Admit Date: 3/30/2025    Visit Dx:    ICD-10-CM ICD-9-CM   1. Abdominal pain, unspecified abdominal location  R10.9 789.00         Intractable nausea and vomiting    Spinal stenosis in cervical region    NICHOLE (generalized anxiety disorder)    Cervical radiculopathy    Gastroparesis    S/P laparoscopic cholecystectomy    S/P laparoscopic appendectomy    Abdominal pain        Past Medical History:   Diagnosis Date    Allergic     Anxiety     Atrial fibrillation     RELEASED BY CARDIOLOGIST/ELECTROPHYSIST, NO CURRENT MEDS    Chronic pain disorder     Depression     Endometriosis     Headache     Hemorrhoids     Injury of shoulder, right 2009    CHRONIC PAIN    Panic disorder     Rectal bleeding     S/P laparoscopic appendectomy 2025    S/P laparoscopic cholecystectomy 2025        Past Surgical History:   Procedure Laterality Date    APPENDECTOMY N/A 3/9/2025    Procedure: APPENDECTOMY LAPAROSCOPIC;  Surgeon: Mingo Cannon MD;  Location: [facility] MAIN OR;  Service: General;  Laterality: N/A;    CERVICAL ARTHRODESIS  2015    Donaldo Albarran    CERVICAL FUSION      C4-7 FUSION, FULL ROM    CHOLECYSTECTOMY N/A 2025    Procedure: CHOLECYSTECTOMY LAPAROSCOPIC plain language: removal of gallbladder thru small incisions using long instruments and a camera;  Surgeon: Josué Castano MD;  Location: [facility] MAIN OR;  Service: General;  Laterality: N/A;    COLONOSCOPY N/A 2022    Procedure: COLONOSCOPY;  Surgeon: Shabbir Baird MD;  Location: [facility] ENDOSCOPY;  Service: General;  Laterality: N/A;  HEMORRHOIDS    ENDOSCOPY N/A 2025    Procedure: ESOPHAGOGASTRODUODENOSCOPY WITH BIOPSIES;  Surgeon: Sanya Reyes MD;  Location: [facility] ENDOSCOPY;  Service: Gastroenterology;  Laterality: N/A;  GASTRITIS, SMALL HIATAL HERNIA     ENDOSCOPY N/A 3/6/2025    Procedure: ESOPHAGOGASTRODUODENOSCOPY with pancreatic stent removal;  Surgeon: Ha Thompson MD;  Location: [facility] ENDOSCOPY;  Service: Gastroenterology;  Laterality: N/A;  pancreatic stent removal, hiatal hernia    ERCP N/A 2/24/2025    Procedure: ENDOSCOPIC RETROGRADE CHOLANGIOPANCREATOGRAPHY with bile duct stent placement and pancreatic duct stent placement;  Surgeon: Ha Thompson MD;  Location: [facility] ENDOSCOPY;  Service: Gastroenterology;  Laterality: N/A;  bile duct leeak    EXPLORATORY LAPAROTOMY      HEMORRHOIDECTOMY N/A 05/31/2022    Procedure: HEMORRHOID BANDING;  Surgeon: Shabbir Baird MD;  Location: [facility] ENDOSCOPY;  Service: General;  Laterality: N/A;  BANDS X 2    HEMORRHOIDECTOMY N/A 1/31/2024    Procedure: HEMORRHOIDECTOMY;  Surgeon: Shabbir Baird MD;  Location: [facility] OR OSC;  Service: General;  Laterality: N/A;    HYSTERECTOMY      SHOULDER ARTHROSCOPY Right     SHOULDER SURGERY Left     ARTHROSCOPY LABRAL TEAR X2    TUBAL ABDOMINAL LIGATION           Physical Assessment:  Wound Right medial mid axillary (Active)   Wound Image   03/30/25 3946        Wound Check / Follow-up:  wound nurse follow up for right axialla, prior abscess with packing which spouse has done as home.  Patient alert and oriented, on 4NT and agreeable to assessment.   No drainage noted from area,   Firmness noted likely healing ridge.  No odor   Small superficial area remaining just needs re-epithelialization.   Would recommend hygiene and protection with mini optifoam with non adhesive center to protect area until fully re-epithelialized   0.2cmLx0.4cmWx<0.1cmD    Cleansed with NS moist gauze and covered with mini silicone border dressing, patient stated dressing felt better than the 4x4 silicone border dressing. (Redness was noted to whole area where the 4x4 dressing had been in use)     Impression: resolving area, re-epithelialization process ongoing, suggesting  hygiene and protection.     Short term goals:  regain skin integrity     Juany Jeffrey RN    3/31/2025    14:00 EDT

## 2025-03-31 NOTE — H&P
[facility]   HISTORY AND PHYSICAL    Patient Name: Ebony Hamilton  : 1980  MRN: [id number]  Primary Care Physician:  Karen Stover APRN  Date of admission: 3/30/2025    Subjective   Subjective     Chief Complaint: Intractable nausea and vomiting    HPI:    Ebony Hamilton is a 44 y.o. female with past medical history cervical radiculopathy, anxiety, depression, gastroparesis, obesity and GERD resenting to the ED for evaluation of intractable nausea and vomiting.  3 weeks ago patient had a cholecystectomy performed by Dr. Castano which had complications related clips slipped off causing bowel subsequent to the abdomen.  Patient required drain placement.  The following patient had abdominal pain again and was found to have appendicitis which required an appendectomy.  Over the last 2 days patient has been having intractable nausea to where she cannot keep anything down so she came to the ED for further evaluation.  Since her cholecystectomy 3 weeks ago patient has been to the ED on multiple occasions.  In the ED patient was slightly hypertensive with remaining vitals being within normal limits.  Labs show mild leukocytosis and lactic acidosis which responded well to fluids with remaining labs being unremarkable including a normal lipase, proBNP, troponin, and negative UA.  CT of the abdomen with contrast showed postoperative changes of recent cholecystectomy and appendectomy with nothing acute including no evidence of abnormal fluid collections, hemorrhages or hematomas.  When seen patient was still having nausea and vomiting with abdominal discomfort with inspiration.  She denied any recent fevers, headaches, focal weakness, chest pain, palpitation, shortness of breath, cough,  constipation, dysuria, hematuria, hematochezia, melena, or anxiety.  Patient admitted for further evaluation and treatment.    Review of Systems   All systems were reviewed and negative except for: As per  HPI    Personal History     Past Medical History:   Diagnosis Date    Allergic     Anxiety     Atrial fibrillation     RELEASED BY CARDIOLOGIST/ELECTROPHYSIST, NO CURRENT MEDS    Chronic pain disorder     Depression     Endometriosis     Headache     Hemorrhoids     Injury of shoulder, right 2009    CHRONIC PAIN    Panic disorder     Rectal bleeding 2010    S/P laparoscopic appendectomy 03/09/2025    S/P laparoscopic cholecystectomy 02/17/2025       Past Surgical History:   Procedure Laterality Date    APPENDECTOMY N/A 3/9/2025    Procedure: APPENDECTOMY LAPAROSCOPIC;  Surgeon: Mingo Cannon MD;  Location: [facility] MAIN OR;  Service: General;  Laterality: N/A;    CERVICAL ARTHRODESIS  01/14/2015    Donaldo Albarran    CERVICAL FUSION      C4-7 FUSION, FULL ROM    CHOLECYSTECTOMY N/A 2/17/2025    Procedure: CHOLECYSTECTOMY LAPAROSCOPIC plain language: removal of gallbladder thru small incisions using long instruments and a camera;  Surgeon: Josué Castano MD;  Location: [facility] MAIN OR;  Service: General;  Laterality: N/A;    COLONOSCOPY N/A 05/31/2022    Procedure: COLONOSCOPY;  Surgeon: Shabbir Baird MD;  Location: [facility] ENDOSCOPY;  Service: General;  Laterality: N/A;  HEMORRHOIDS    ENDOSCOPY N/A 2/17/2025    Procedure: ESOPHAGOGASTRODUODENOSCOPY WITH BIOPSIES;  Surgeon: Sanya Reyes MD;  Location: [facility] ENDOSCOPY;  Service: Gastroenterology;  Laterality: N/A;  GASTRITIS, SMALL HIATAL HERNIA    ENDOSCOPY N/A 3/6/2025    Procedure: ESOPHAGOGASTRODUODENOSCOPY with pancreatic stent removal;  Surgeon: Ha Thompson MD;  Location: [facility] ENDOSCOPY;  Service: Gastroenterology;  Laterality: N/A;  pancreatic stent removal, hiatal hernia    ERCP N/A 2/24/2025    Procedure: ENDOSCOPIC RETROGRADE CHOLANGIOPANCREATOGRAPHY with bile duct stent placement and pancreatic duct stent placement;  Surgeon: Ha Thompson MD;  Location: [facility] ENDOSCOPY;  Service: Gastroenterology;  Laterality: N/A;   bile duct leeak    EXPLORATORY LAPAROTOMY      HEMORRHOIDECTOMY N/A 05/31/2022    Procedure: HEMORRHOID BANDING;  Surgeon: Shabbir Baird MD;  Location: [facility] ENDOSCOPY;  Service: General;  Laterality: N/A;  BANDS X 2    HEMORRHOIDECTOMY N/A 1/31/2024    Procedure: HEMORRHOIDECTOMY;  Surgeon: Shabbir Baird MD;  Location: [facility] OR OSC;  Service: General;  Laterality: N/A;    HYSTERECTOMY      SHOULDER ARTHROSCOPY Right     SHOULDER SURGERY Left     ARTHROSCOPY LABRAL TEAR X2    TUBAL ABDOMINAL LIGATION         Family History: family history includes Anxiety disorder in her father and mother; Bipolar disorder in her mother; Cancer in her mother; Dementia in her paternal grandmother and paternal uncle; Depression in her mother; Heart disease in her mother and another family member; Hypertension in her father and mother. Otherwise pertinent FHx was reviewed and not pertinent to current issue.    Social History:  reports that she quit smoking about 6 years ago. Her smoking use included cigarettes. She started smoking about 26 years ago. She has a 5 pack-year smoking history. She has never used smokeless tobacco. She reports current alcohol use. She reports that she does not use drugs.    Home Medications:  Calcium, DULoxetine, HYDROcodone-acetaminophen, albuterol sulfate HFA, busPIRone, clindamycin, clonazePAM, ibuprofen, montelukast, naloxone, ondansetron, pantoprazole, polyethylene glycol, prochlorperazine, saccharomyces boulardii, sennosides-docusate, tiZANidine, traZODone, and vitamin C      Allergies:  Allergies   Allergen Reactions    Escitalopram Nausea Only and Rash     Nausea, sweating, rash     Sulfa Antibiotics Anaphylaxis    Baclofen GI Intolerance, Hives and Nausea And Vomiting    Ciprofloxacin Hallucinations    Codeine Hives    Gold-Containing Drug Products Rash    Latex Rash    Nickel Hives    Tegaderm Ag Mesh [Silver] Hives       Objective   Objective     Vitals:   Temp:  [98.6 °F (37  °C)-99.5 °F (37.5 °C)] 99.5 °F (37.5 °C)  Heart Rate:  [] 97  Resp:  [16-20] 20  BP: (127-170)/() 153/97  Physical Exam    Constitutional: Awake, alert   Eyes: PERRLA, sclerae anicteric, no conjunctival injection   HENT: NCAT, mucous membranes moist   Neck: Supple, no thyromegaly, no lymphadenopathy, trachea midline   Respiratory: Clear to auscultation bilaterally, nonlabored respirations    Cardiovascular: RRR, no murmurs, rubs, or gallops, palpable pedal pulses bilaterally   Gastrointestinal: Mild right upper quadrant tenderness, positive bowel sounds, soft, nondistended   Musculoskeletal: No bilateral ankle edema, no clubbing or cyanosis to extremities   Psychiatric: Appropriate affect, cooperative   Neurologic: Oriented x 3, strength symmetric in all extremities, Cranial Nerves grossly intact to confrontation, speech clear   Skin: No rashes     Result Review    Result Review:  I have personally reviewed the results from the time of this admission to 3/30/2025 20:18 EDT and agree with these findings:  [x]  Laboratory list / accordion  []  Microbiology  [x]  Radiology  [x]  EKG/Telemetry   []  Cardiology/Vascular   []  Pathology  []  Old records  []  Other:  Most notable findings include:Labs show mild leukocytosis and lactic acidosis which responded well to fluids with remaining labs being unremarkable including a normal lipase, proBNP, troponin, and negative UA.  CT of the abdomen with contrast showed postoperative changes of recent cholecystectomy and appendectomy with nothing acute including no evidence of abnormal fluid collections, hemorrhages or hematomas.        Assessment & Plan   Assessment / Plan     Brief Patient Summary:  Ebony Hamilton is a 44 y.o. female with past medical history cervical radiculopathy, anxiety, depression, gastroparesis, obesity and GERD resenting to the ED for evaluation of intractable nausea and vomiting.    Active Hospital Problems:  Active Hospital Problems     Diagnosis     **Intractable nausea and vomiting     Abdominal pain     S/P laparoscopic appendectomy     Gastroparesis     S/P laparoscopic cholecystectomy     Cervical radiculopathy     NICHOLE (generalized anxiety disorder)     Spinal stenosis in cervical region      Plan:     Intractable nausea and vomiting  -Admit to telemetry  -Mild lactic acidosis   -Normal lipase  -CT abdomen with postoperative changes but nothing acute  -Likely secondary to gastroparesis  -IVF  -Reglan every 8 hours  -Zofran as needed  -Clear liquid diet, advance as tolerated  -Supportive care    Abdominal pain  -That is post recent cholecystectomy and appendectomy  -Pain only on inspiration.  Minimal pain to palpation  -Evaluated by general surgery in the ED.    -Pain meds as needed      Cervical radiculopathy  Depression, anxiety  Obesity    GI Ppx  DVT ppx    VTE Prophylaxis:  Pharmacologic VTE prophylaxis orders are present.        CODE STATUS:    Code Status (Patient has no pulse and is not breathing): CPR (Attempt to Resuscitate)  Medical Interventions (Patient has pulse or is breathing): Full Support  Level Of Support Discussed With: Patient    Admission Status:  I believe this patient meets observation status.      Electronically signed by Royer Grimes MD, 03/30/25, 8:18 PM EDT.

## 2025-03-31 NOTE — PLAN OF CARE
Goal Outcome Evaluation:  Plan of Care Reviewed With: patient, spouse        Progress: improving  Outcome Evaluation: Patient was new admit at shift change. Upon admit to floor, patient restless, anxious, crying, frustrated, in pain, c/o N&V. MD at bedside around 1930. New admission orders placed and initiated. Pt is A&Ox4 and pleasant. Prn nausea, pain, and anxiety meds given this shift - see mar. C/o right pleuric chest pain, primarily when inhaling - states has been going on since recent surgeries and pt states that she has atelectasis r/t recent back to back surgeries. States has not been able to get a full nights rest in approximately 3 weeks r/t pain and not being able to take a deep breath and feeling SOA. Does state some abdominal tenderness with palpation. Pt N&V with clear emesis at beginning of shift - placed on clear liquid diet per md orders and scheduled/prn nausea meds given. Pt eventually able to sleep some between care and keep down shastas and ate a jello. Pt states prn pain medication does help some. Placed on cont. pulse ox r/t opioid administration. IV fluids initiated and infusing. Pt states relief after being able to sleep some. Spouse at bedside and is supportive of patient. Pt c/o SOA primarily with exertion. When ambulating to bathroom, pt does become tachypneic and tachycardic with hr up in the 150s - stabilizes once resting. BP has been slightly elevated r/t pain this shift. Otherwise VSS, on room air. No further issues/needs at this time.

## 2025-03-31 NOTE — OUTREACH NOTE
General Surgery Week 3 Survey      Flowsheet Row Responses   [facility] facility patient discharged from? [facility]   Does the patient have one of the following disease processes/diagnoses(primary or secondary)? General Surgery   Week 3 attempt successful? No   Unsuccessful attempts Attempt 1   Revoke Readmitted   Revoke Readmitted            Joya ABARCA - Registered Nurse

## 2025-03-31 NOTE — CONSULTS
[facility]   Consult    Patient Name: Ebony Hamilton  : 1980  MRN: [id number]  Primary Care Physician:  Karen Stover APRN  Date of admission: 3/30/2025    Subjective   Subjective     Chief Complaint: Nausea, vomiting, shortness of breath    Consult Reason: Gastroparesis    HPI: Ebony Hamilton is a 44 y.o. female who presents to the ED yesterday complaining of a 2-day history of intractable nausea and vomiting.  She has a recent surgical history of a laparoscopic cholecystectomy complicated by a bile leak postoperatively managed with an ERCP and drain placement.  Approximately 2 weeks after this she developed acute appendicitis requiring laparoscopic appendectomy just over 3 weeks ago.  She has been to the ED multiple times in the past few weeks complaining of shortness of breath, nausea, vomiting, and abdominal pain.  Yesterday she underwent evaluation with a CT abdomen pelvis as well as a CTA PE protocol both which were negative for any acute pathology.  She does have a history of gastroparesis for which she takes a PPI and Compazine.  Since admission to the hospital her symptoms have slightly improved.    Review of Systems   Constitutional:  Negative for chills and fever.   HENT:  Negative for sore throat.    Respiratory:  Negative for shortness of breath.    Cardiovascular:  Negative for chest pain.   Gastrointestinal:  Positive for abdominal pain, nausea and vomiting.   Genitourinary:  Negative for difficulty urinating.   Neurological:  Negative for dizziness.   Psychiatric/Behavioral:  Negative for confusion.        Personal History     Past Medical History:   Diagnosis Date    Allergic     Anxiety     Atrial fibrillation     RELEASED BY CARDIOLOGIST/ELECTROPHYSIST, NO CURRENT MEDS    Chronic pain disorder     Depression     Endometriosis     Headache     Hemorrhoids     Injury of shoulder, right     CHRONIC PAIN    Panic disorder     Rectal bleeding     S/P  laparoscopic appendectomy 03/09/2025    S/P laparoscopic cholecystectomy 02/17/2025       Past Surgical History:   Procedure Laterality Date    APPENDECTOMY N/A 3/9/2025    Procedure: APPENDECTOMY LAPAROSCOPIC;  Surgeon: Mingo Cannon MD;  Location: [facility] MAIN OR;  Service: General;  Laterality: N/A;    CERVICAL ARTHRODESIS  01/14/2015    Donaldo Albarran    CERVICAL FUSION      C4-7 FUSION, FULL ROM    CHOLECYSTECTOMY N/A 2/17/2025    Procedure: CHOLECYSTECTOMY LAPAROSCOPIC plain language: removal of gallbladder thru small incisions using long instruments and a camera;  Surgeon: Josué Castano MD;  Location: [facility] MAIN OR;  Service: General;  Laterality: N/A;    COLONOSCOPY N/A 05/31/2022    Procedure: COLONOSCOPY;  Surgeon: Shabbir Baird MD;  Location: [facility] ENDOSCOPY;  Service: General;  Laterality: N/A;  HEMORRHOIDS    ENDOSCOPY N/A 2/17/2025    Procedure: ESOPHAGOGASTRODUODENOSCOPY WITH BIOPSIES;  Surgeon: Sanya Reyes MD;  Location: [facility] ENDOSCOPY;  Service: Gastroenterology;  Laterality: N/A;  GASTRITIS, SMALL HIATAL HERNIA    ENDOSCOPY N/A 3/6/2025    Procedure: ESOPHAGOGASTRODUODENOSCOPY with pancreatic stent removal;  Surgeon: Ha Thompson MD;  Location: [facility] ENDOSCOPY;  Service: Gastroenterology;  Laterality: N/A;  pancreatic stent removal, hiatal hernia    ERCP N/A 2/24/2025    Procedure: ENDOSCOPIC RETROGRADE CHOLANGIOPANCREATOGRAPHY with bile duct stent placement and pancreatic duct stent placement;  Surgeon: Ha Thompson MD;  Location: [facility] ENDOSCOPY;  Service: Gastroenterology;  Laterality: N/A;  bile duct leeak    EXPLORATORY LAPAROTOMY      HEMORRHOIDECTOMY N/A 05/31/2022    Procedure: HEMORRHOID BANDING;  Surgeon: Shabbir Baird MD;  Location: [facility] ENDOSCOPY;  Service: General;  Laterality: N/A;  BANDS X 2    HEMORRHOIDECTOMY N/A 1/31/2024    Procedure: HEMORRHOIDECTOMY;  Surgeon: Shabbir Baird MD;  Location: [facility];   Service: General;  Laterality: N/A;    HYSTERECTOMY      SHOULDER ARTHROSCOPY Right     SHOULDER SURGERY Left     ARTHROSCOPY LABRAL TEAR X2    TUBAL ABDOMINAL LIGATION         Family History: family history includes Anxiety disorder in her father and mother; Bipolar disorder in her mother; Cancer in her mother; Dementia in her paternal grandmother and paternal uncle; Depression in her mother; Heart disease in her mother and another family member; Hypertension in her father and mother. Otherwise pertinent FHx was reviewed and not pertinent to current issue.    Social History:  reports that she quit smoking about 6 years ago. Her smoking use included cigarettes. She started smoking about 26 years ago. She has a 5 pack-year smoking history. She has never used smokeless tobacco. She reports current alcohol use. She reports that she does not use drugs.    Home Medications:  Calcium, DULoxetine, HYDROcodone-acetaminophen, albuterol sulfate HFA, busPIRone, clindamycin, clonazePAM, ibuprofen, montelukast, naloxone, ondansetron, pantoprazole, polyethylene glycol, prochlorperazine, saccharomyces boulardii, sennosides-docusate, tiZANidine, traZODone, and vitamin C    Allergies:  Allergies   Allergen Reactions    Escitalopram Nausea Only and Rash     Nausea, sweating, rash     Sulfa Antibiotics Anaphylaxis    Baclofen GI Intolerance, Hives and Nausea And Vomiting    Ciprofloxacin Hallucinations    Codeine Hives    Gold-Containing Drug Products Rash    Latex Rash    Nickel Hives    Tegaderm Ag Mesh [Silver] Hives       Objective    Objective     Vitals:   Temp:  [98.3 °F (36.8 °C)-99.5 °F (37.5 °C)] 98.3 °F (36.8 °C)  Heart Rate:  [] 95  Resp:  [20] 20  BP: (120-170)/() 120/88    Physical Exam  Constitutional:       Appearance: Normal appearance.   HENT:      Head: Normocephalic and atraumatic.      Mouth/Throat:      Mouth: Mucous membranes are moist.      Pharynx: Oropharynx is clear.   Cardiovascular:      Rate  and Rhythm: Normal rate and regular rhythm.   Pulmonary:      Effort: Pulmonary effort is normal. No respiratory distress.   Abdominal:      General: There is no distension.      Palpations: Abdomen is soft.      Tenderness: There is no abdominal tenderness.   Musculoskeletal:         General: No swelling. Normal range of motion.      Cervical back: Normal range of motion and neck supple.   Skin:     General: Skin is warm and dry.   Neurological:      General: No focal deficit present.      Mental Status: She is alert and oriented to person, place, and time.   Psychiatric:         Mood and Affect: Mood normal.         Behavior: Behavior normal.         Result Review    Result Review:  I have personally reviewed the results from the time of this admission to 3/31/2025 14:27 EDT and agree with these findings:  [x]  Laboratory  []  Microbiology  [x]  Radiology  []  EKG/Telemetry   []  Cardiology/Vascular   []  Pathology  []  Old records  []  Other:  Most notable findings include: No significant intra-abdominal findings    Assessment & Plan   Assessment / Plan     Brief Patient Summary:  Ebony Hamilton is a 44 y.o. female who presents with intractable nausea and vomiting    Active Hospital Problems:  Active Hospital Problems    Diagnosis     **Intractable nausea and vomiting     Abdominal pain     S/P laparoscopic appendectomy     Gastroparesis     S/P laparoscopic cholecystectomy     Cervical radiculopathy     NICHOLE (generalized anxiety disorder)     Spinal stenosis in cervical region        Plan:   Gastroparesis  -Afebrile, vitals stable, no leukocytosis  -All imaging reviewed  -No concern for acute intra-abdominal process at this time, so would not recommend surgical intervention  -Agree with starting Reglan for gastroparesis symptoms    Electronically signed by Josué Castano MD, 03/31/25, 2:27 PM EDT.

## 2025-04-01 PROBLEM — R11.2 NAUSEA & VOMITING: Status: ACTIVE | Noted: 2025-04-01

## 2025-04-01 PROCEDURE — 25010000002 KETOROLAC TROMETHAMINE PER 15 MG: Performed by: FAMILY MEDICINE

## 2025-04-01 PROCEDURE — 25810000003 LACTATED RINGERS PER 1000 ML: Performed by: FAMILY MEDICINE

## 2025-04-01 PROCEDURE — 97161 PT EVAL LOW COMPLEX 20 MIN: CPT

## 2025-04-01 PROCEDURE — 25010000002 ENOXAPARIN PER 10 MG: Performed by: FAMILY MEDICINE

## 2025-04-01 PROCEDURE — 25010000002 HYDROMORPHONE 1 MG/ML SOLUTION: Performed by: FAMILY MEDICINE

## 2025-04-01 PROCEDURE — 25010000002 METOCLOPRAMIDE PER 10 MG: Performed by: FAMILY MEDICINE

## 2025-04-01 PROCEDURE — 25010000002 HYDROMORPHONE 1 MG/ML SOLUTION: Performed by: INTERNAL MEDICINE

## 2025-04-01 PROCEDURE — 25010000002 ONDANSETRON PER 1 MG: Performed by: FAMILY MEDICINE

## 2025-04-01 PROCEDURE — 99232 SBSQ HOSP IP/OBS MODERATE 35: CPT | Performed by: INTERNAL MEDICINE

## 2025-04-01 RX ORDER — HYDROCODONE BITARTRATE AND ACETAMINOPHEN 10; 325 MG/1; MG/1
1 TABLET ORAL EVERY 4 HOURS PRN
Refills: 0 | Status: DISCONTINUED | OUTPATIENT
Start: 2025-04-01 | End: 2025-04-05

## 2025-04-01 RX ADMIN — PANTOPRAZOLE SODIUM 40 MG: 40 INJECTION, POWDER, FOR SOLUTION INTRAVENOUS at 20:38

## 2025-04-01 RX ADMIN — SODIUM CHLORIDE, SODIUM LACTATE, POTASSIUM CHLORIDE, CALCIUM CHLORIDE 100 ML/HR: 20; 30; 600; 310 INJECTION, SOLUTION INTRAVENOUS at 08:30

## 2025-04-01 RX ADMIN — BUSPIRONE HYDROCHLORIDE 15 MG: 10 TABLET ORAL at 08:43

## 2025-04-01 RX ADMIN — HYDROMORPHONE HYDROCHLORIDE 0.5 MG: 1 INJECTION, SOLUTION INTRAMUSCULAR; INTRAVENOUS; SUBCUTANEOUS at 04:07

## 2025-04-01 RX ADMIN — CLONAZEPAM 0.5 MG: 0.5 TABLET ORAL at 08:44

## 2025-04-01 RX ADMIN — HYDROCODONE BITARTRATE AND ACETAMINOPHEN 1 TABLET: 10; 325 TABLET ORAL at 18:20

## 2025-04-01 RX ADMIN — MONTELUKAST 10 MG: 10 TABLET, FILM COATED ORAL at 20:38

## 2025-04-01 RX ADMIN — TIZANIDINE 4 MG: 4 TABLET ORAL at 12:43

## 2025-04-01 RX ADMIN — METOCLOPRAMIDE 10 MG: 5 INJECTION, SOLUTION INTRAMUSCULAR; INTRAVENOUS at 12:43

## 2025-04-01 RX ADMIN — BUSPIRONE HYDROCHLORIDE 15 MG: 10 TABLET ORAL at 20:38

## 2025-04-01 RX ADMIN — Medication 250 MG: at 20:38

## 2025-04-01 RX ADMIN — TIZANIDINE 4 MG: 4 TABLET ORAL at 04:07

## 2025-04-01 RX ADMIN — SODIUM CHLORIDE, SODIUM LACTATE, POTASSIUM CHLORIDE, CALCIUM CHLORIDE 100 ML/HR: 20; 30; 600; 310 INJECTION, SOLUTION INTRAVENOUS at 18:57

## 2025-04-01 RX ADMIN — Medication 250 MG: at 08:43

## 2025-04-01 RX ADMIN — ONDANSETRON 4 MG: 2 INJECTION INTRAMUSCULAR; INTRAVENOUS at 19:39

## 2025-04-01 RX ADMIN — ENOXAPARIN SODIUM 40 MG: 100 INJECTION SUBCUTANEOUS at 08:44

## 2025-04-01 RX ADMIN — DULOXETINE HYDROCHLORIDE 90 MG: 30 CAPSULE, DELAYED RELEASE ORAL at 08:43

## 2025-04-01 RX ADMIN — HYDROMORPHONE HYDROCHLORIDE 0.5 MG: 1 INJECTION, SOLUTION INTRAMUSCULAR; INTRAVENOUS; SUBCUTANEOUS at 14:21

## 2025-04-01 RX ADMIN — METOCLOPRAMIDE 10 MG: 5 INJECTION, SOLUTION INTRAMUSCULAR; INTRAVENOUS at 04:07

## 2025-04-01 RX ADMIN — BUSPIRONE HYDROCHLORIDE 15 MG: 10 TABLET ORAL at 15:50

## 2025-04-01 RX ADMIN — HYDROMORPHONE HYDROCHLORIDE 0.5 MG: 1 INJECTION, SOLUTION INTRAMUSCULAR; INTRAVENOUS; SUBCUTANEOUS at 20:37

## 2025-04-01 RX ADMIN — Medication 10 ML: at 20:37

## 2025-04-01 RX ADMIN — HYDROMORPHONE HYDROCHLORIDE 0.5 MG: 1 INJECTION, SOLUTION INTRAMUSCULAR; INTRAVENOUS; SUBCUTANEOUS at 09:28

## 2025-04-01 RX ADMIN — KETOROLAC TROMETHAMINE 15 MG: 30 INJECTION, SOLUTION INTRAMUSCULAR; INTRAVENOUS at 08:22

## 2025-04-01 RX ADMIN — Medication 10 ML: at 08:22

## 2025-04-01 RX ADMIN — METOCLOPRAMIDE 10 MG: 5 INJECTION, SOLUTION INTRAMUSCULAR; INTRAVENOUS at 20:38

## 2025-04-01 NOTE — PRE-PROCEDURE INSTRUCTIONS
Left message:  Reminded of arrival time at  0630       , Entrance C of the main hospital. Instructed to bring or have a  over the age of 18 set up to drive you home the day of procedure.      Reminded them not to eat or drink anything am of procedure unless its a sip of water with medications. Hold ibuprofen am of procedure. Use/ bring inhalers am of procedure.

## 2025-04-01 NOTE — PROGRESS NOTES
[facility]   Hospitalist Progress Note  Date: 2025  Patient Name: Ebony Hamilton  : 1980  MRN: [id number]  Date of admission: 3/30/2025      Subjective   Subjective     Chief Complaint: Intractable nausea vomiting    Summary:Ebony Hamilton is a 44 y.o. female  with past medical history cervical radiculopathy, anxiety, depression, gastroparesis, obesity and GERD resenting to the ED for evaluation of intractable nausea and vomiting.  3 weeks ago patient had a cholecystectomy performed by Dr. Castano which had complications related clips slipped off causing bowel subsequent to the abdomen.  Patient required drain placement.  The following patient had abdominal pain again and was found to have appendicitis which required an appendectomy.  Over the last 2 days patient has been having intractable nausea to where she cannot keep anything down so she came to the ED for further evaluation.  Since her cholecystectomy 3 weeks ago patient has been to the ED on multiple occasions.  In the ED patient was slightly hypertensive with remaining vitals being within normal limits.  Labs show mild leukocytosis and lactic acidosis which responded well to fluids with remaining labs being unremarkable including a normal lipase, proBNP, troponin, and negative UA.  CT of the abdomen with contrast showed postoperative changes of recent cholecystectomy and appendectomy with nothing acute including no evidence of abnormal fluid collections, hemorrhages or hematomas.  When seen patient was still having nausea and vomiting with abdominal discomfort with inspiration.  Patient admitted for further evaluation and treatment.  General surgery consulted.  Started on scheduled Reglan and IV fluids.  Advancing diet slowly as tolerated.  Patient planning to return home on discharge once tolerating a diet.    Interval Followup: Sitting up in bed appears to be resting fairly comfortably.    Abdominal pain improving.  Tolerating  regular diet this morning.  She denies any nausea or vomiting at this time.    Review of Systems  Constitutional: Negative for fatigue and fever.   HENT: Negative for sore throat and trouble swallowing.    Eyes: Negative for pain and discharge.   Respiratory: Negative for cough and shortness of breath.    Cardiovascular: Negative for chest pain and palpitations.   Gastrointestinal: Positive for abdominal pain, denies nausea and vomiting.   Endocrine: Negative for cold intolerance and heat intolerance.   Genitourinary: Negative for difficulty urinating and dysuria.   Musculoskeletal: Negative for back pain and neck stiffness.   Skin: Negative for color change and rash.   Neurological: Negative for syncope and headaches.   Hematological: Negative for adenopathy.   Psychiatric/Behavioral: Negative for confusion and hallucinations.    Objective   Objective     Vitals:   Temp:  [97.8 °F (36.6 °C)-98.5 °F (36.9 °C)] 98.5 °F (36.9 °C)  Heart Rate:  [71-96] 96  Resp:  [18-20] 18  BP: (103-140)/(58-98) 115/70  Physical Exam   General: well-developed appearing stated age in no acute distress  HEENT: Normocephalic atraumatic moist membranes pupils equal round reactive light, no scleral icterus no conjunctival injection  Cardiovascular: regular rate and rhythm no appreciable murmurs, no lower extremity edema appreciated  Pulmonary: Clear to auscultation bilaterally, unlabored, no conversational dyspnea appreciated  Gastrointestinal: Soft mildly tender in the left upper quadrant nondistended positive bowel sounds all 4 quadrants no rebound or guarding  Musculoskeletal: No clubbing cyanosis, warm and well-perfused, calves soft symmetric nontender bilaterally  Skin: Clean dry without rashes  Neuro: Cranial nerves II through XII intact grossly no sensorimotor deficits appreciated bilateral upper and lower extremities  Psych: Patient is calm cooperative and appropriate with exam not responding to internal stimuli      Result Review     Result Review:  I have personally reviewed these results and agree with these findings:  [x]  Laboratory  LAB RESULTS:      Lab 03/31/25  0412 03/30/25  1209   WBC 9.27 11.07*   HEMOGLOBIN 11.5* 12.8   HEMATOCRIT 36.4 40.9   PLATELETS 487* 533*   NEUTROS ABS  --  7.80*   IMMATURE GRANS (ABS)  --  0.12*   LYMPHS ABS  --  2.50   MONOS ABS  --  0.53   EOS ABS  --  0.07   MCV 90.1 89.7         Lab 03/31/25  0412 03/30/25  1209   SODIUM 140 137   POTASSIUM 3.9 3.6   CHLORIDE 105 100   CO2 20.4* 18.5*   ANION GAP 14.6 18.5*   BUN 11 12   CREATININE 0.71 0.68   EGFR 109.0 111.7   GLUCOSE 113* 85   CALCIUM 9.3 10.3         Lab 03/30/25  1209   TOTAL PROTEIN 8.0   ALBUMIN 4.3   GLOBULIN 3.7   ALT (SGPT) 9   AST (SGOT) 12   BILIRUBIN 0.4   ALK PHOS 80   LIPASE 35         Lab 03/30/25  1209   PROBNP <36.0   HSTROP T <6                 Brief Urine Lab Results  (Last result in the past 365 days)        Color   Clarity   Blood   Leuk Est   Nitrite   Protein   CREAT   Urine HCG        03/30/25 1242 Yellow   Clear   Negative   Negative   Negative   Negative                 Microbiology Results (last 10 days)       ** No results found for the last 240 hours. **            []  Microbiology  [x]  Radiology  CT Angiogram Chest Pulmonary Embolism  Result Date: 3/30/2025  Impression: No evidence of pulmonary embolus. No acute intrathoracic findings. Electronically Signed: Ankit Barker MD  3/30/2025 2:24 PM EDT  Workstation ID: [id number]    CT Abdomen Pelvis With Contrast  Result Date: 3/30/2025  1.Residual postoperative changes are noted status post recent cholecystectomy and appendectomy. 2.No evidence for abnormal fluid collection. No significant hemorrhage or hematoma. 3.No evidence for additional acute abnormality throughout the abdomen or pelvis. Electronically Signed: Irwin Jose MD  3/30/2025 2:23 PM EDT  Workstation ID: [id number]    XR Chest 1 View  Result Date: 3/30/2025  Impression: No acute cardiopulmonary abnormality.  Electronically Signed: Alin Quiroz  3/30/2025 12:30 PM EDT  Workstation ID: [id number]    CT Abdomen Pelvis With Contrast  Result Date: 3/24/2025  Impression: 1.Moderate colonic fluid may indicate acute diarrheal illness. 2.Postsurgical changes of cholecystectomy, appendectomy, and hysterectomy. Small amount of fluid within the gallbladder fossa. Indwelling CBD stent appears adequately positioned. 3.Additional findings as detailed above. Electronically Signed: Misha Rubin MD  3/24/2025 9:47 AM EDT  Workstation ID: [id number]      [x]  EKG/Telemetry   []  Cardiology/Vascular   []  Pathology  []  Old records  [x]  Other:  Scheduled Meds:aluminum-magnesium hydroxide-simethicone, 30 mL, Oral, Once  busPIRone, 15 mg, Oral, TID  DULoxetine, 90 mg, Oral, Daily  enoxaparin sodium, 40 mg, Subcutaneous, Daily  metoclopramide, 10 mg, Intravenous, Q8H  montelukast, 10 mg, Oral, Nightly  pantoprazole, 40 mg, Intravenous, Q24H  polyethylene glycol, 17 g, Oral, Daily  saccharomyces boulardii, 250 mg, Oral, BID  sodium chloride, 10 mL, Intravenous, Q12H      Continuous Infusions:lactated ringers, 100 mL/hr, Last Rate: 100 mL/hr (04/01/25 0830)      PRN Meds:.  acetaminophen    senna-docusate sodium **AND** polyethylene glycol **AND** bisacodyl **AND** bisacodyl    clonazePAM    HYDROmorphone    ipratropium-albuterol    ketorolac    ondansetron    sodium chloride    sodium chloride    sodium chloride    tiZANidine    traZODone      Assessment & Plan   Assessment / Plan     Assessment/Plan:  Intractable nausea and vomiting  Gastroparesis  Right upper quadrant abdominal pain  Depression with anxiety  Cervical radiculopathy        Patient admitted for further evaluation and treatment  General Surgery consulted thank for assistance  Continue scheduled Reglan  Continue as needed Zofran  Continue IV fluids until ensure tolerating diet  Resume patient's home oral pain management regimen with Dilaudid IV for breakthrough.  Continue home  buspirone, duloxetine scheduled with as needed Klonopin  Continue home tizanidine as needed  Further inpatient orders recommendations pending clinical course       Discussed plan with bedside RN as well as general surgery team.    Disposition: Home once tolerating a diet and abdominal pain improves.    VTE Prophylaxis:  Pharmacologic VTE prophylaxis orders are present.        CODE STATUS:   Code Status (Patient has no pulse and is not breathing): CPR (Attempt to Resuscitate)  Medical Interventions (Patient has pulse or is breathing): Full Support  Level Of Support Discussed With: Patient            Electronically signed by Naila Alejandre MD, 04/01/25, 8:45 AM EDT.

## 2025-04-01 NOTE — PLAN OF CARE
Goal Outcome Evaluation:  Plan of Care Reviewed With: patient, spouse        Progress: improving  Outcome Evaluation: Patient alert and oriented x4 thorughout shift. Medicated for pain per MAR. Wound care completed per orders. Diet advanced this shift. Tolerating well. No new issues at this time.

## 2025-04-01 NOTE — PLAN OF CARE
Goal Outcome Evaluation:  Plan of Care Reviewed With: patient, spouse        Progress: improving  Outcome Evaluation: Pt remains A&Ox4. Continues to c/o right sided pleuric chest pain, worse on inhalation. Also c/o headache. Prn pain medications and prn muscle relaxer given this shift. Pt did not get prn pain meds as frequently this shift, and was able to sleep between care. States nausea has improved and being managed with scheduled nausea regimen. Fluids still infusing. Spouse at bedside and is supportive. Call light in reach, able to make needs known. VSS on room air, can get tachy with exertion. No new issues/needs at this time.

## 2025-04-01 NOTE — THERAPY EVALUATION
Acute Care - Physical Therapy Initial Evaluation  RENETTA Carcamo     Patient Name: Ebony Hamilton  : 1980  MRN: [id number]  Today's Date: 2025      Visit Dx:     ICD-10-CM ICD-9-CM   1. Abdominal pain, unspecified abdominal location  R10.9 789.00   2. Difficulty in walking  R26.2 719.7     Patient Active Problem List   Diagnosis    Acute postoperative pain    Allergic rhinitis    Bulge of cervical disc without myelopathy    Cervical fusion syndrome    Degeneration of intervertebral disc of cervical region    Spinal stenosis in cervical region    Impingement syndrome of shoulder region    Intractable chronic migraine without aura    Mitral valve disorder    Tricuspid valve disorder    Endometriosis    Obesity    SLAP lesion of left shoulder    NICHOLE (generalized anxiety disorder)    Blood in stool    BRBPR (bright red blood per rectum)    Constipation    Diarrhea    Lower abdominal pain    Myalgia    Cervical post-laminectomy syndrome    Cervical radiculopathy    Adenomyosis of uterus    Hemorrhoids    Biliary colic    Intractable abdominal pain    Calculus of gallbladder without cholecystitis without obstruction    Nausea and vomiting    Gastroparesis    S/P laparoscopic cholecystectomy    Bile leak from gallbladder bed    Encounter for removal of biliary stent    Appendicitis    S/P laparoscopic appendectomy    Intractable nausea and vomiting    Abdominal pain     Past Medical History:   Diagnosis Date    Allergic     Anxiety     Atrial fibrillation     RELEASED BY CARDIOLOGIST/ELECTROPHYSIST, NO CURRENT MEDS    Chronic pain disorder     Depression     Endometriosis     Headache     Hemorrhoids     Injury of shoulder, right 2009    CHRONIC PAIN    Panic disorder     Rectal bleeding     S/P laparoscopic appendectomy 2025    S/P laparoscopic cholecystectomy 2025     Past Surgical History:   Procedure Laterality Date    APPENDECTOMY N/A 3/9/2025    Procedure: APPENDECTOMY LAPAROSCOPIC;   Surgeon: Mingo Cannon MD;  Location: [facility] MAIN OR;  Service: General;  Laterality: N/A;    CERVICAL ARTHRODESIS  01/14/2015    Donaldo Albarran    CERVICAL FUSION      C4-7 FUSION, FULL ROM    CHOLECYSTECTOMY N/A 2/17/2025    Procedure: CHOLECYSTECTOMY LAPAROSCOPIC plain language: removal of gallbladder thru small incisions using long instruments and a camera;  Surgeon: Josué Castano MD;  Location: [facility] MAIN OR;  Service: General;  Laterality: N/A;    COLONOSCOPY N/A 05/31/2022    Procedure: COLONOSCOPY;  Surgeon: Shabbir Baird MD;  Location: [facility] ENDOSCOPY;  Service: General;  Laterality: N/A;  HEMORRHOIDS    ENDOSCOPY N/A 2/17/2025    Procedure: ESOPHAGOGASTRODUODENOSCOPY WITH BIOPSIES;  Surgeon: Sanya Reyes MD;  Location: [facility] ENDOSCOPY;  Service: Gastroenterology;  Laterality: N/A;  GASTRITIS, SMALL HIATAL HERNIA    ENDOSCOPY N/A 3/6/2025    Procedure: ESOPHAGOGASTRODUODENOSCOPY with pancreatic stent removal;  Surgeon: Ha Thompson MD;  Location: [facility] ENDOSCOPY;  Service: Gastroenterology;  Laterality: N/A;  pancreatic stent removal, hiatal hernia    ERCP N/A 2/24/2025    Procedure: ENDOSCOPIC RETROGRADE CHOLANGIOPANCREATOGRAPHY with bile duct stent placement and pancreatic duct stent placement;  Surgeon: Ha Thompson MD;  Location: [facility] ENDOSCOPY;  Service: Gastroenterology;  Laterality: N/A;  bile duct leeak    EXPLORATORY LAPAROTOMY      HEMORRHOIDECTOMY N/A 05/31/2022    Procedure: HEMORRHOID BANDING;  Surgeon: Shabbir Baird MD;  Location: [facility] ENDOSCOPY;  Service: General;  Laterality: N/A;  BANDS X 2    HEMORRHOIDECTOMY N/A 1/31/2024    Procedure: HEMORRHOIDECTOMY;  Surgeon: Shabbir Baird MD;  Location: [facility] OR [facility];  Service: General;  Laterality: N/A;    HYSTERECTOMY      SHOULDER ARTHROSCOPY Right     SHOULDER SURGERY Left     ARTHROSCOPY LABRAL TEAR X2    TUBAL ABDOMINAL LIGATION       PT Assessment (Last 12 Hours)       PT  Evaluation and Treatment       [facility] Name 04/01/25 1300          Physical Therapy Time and Intention    Subjective Information no complaints (P)   -TM     Document Type evaluation (P)   -TM     Mode of Treatment individual therapy (P)   -TM     Patient Effort good (P)   -TM     Symptoms Noted During/After Treatment none (P)   -[facility] Name 04/01/25 1300          General Information    Prior Level of Function independent:;all household mobility (P)   -TM     Equipment Currently Used at Home none (P)   -TM     Existing Precautions/Restrictions no known precautions/restrictions (P)   -TM       Row Name 04/01/25 1300          Living Environment    Current Living Arrangements home (P)   -TM     Home Accessibility stairs to enter home (P)   -TM     People in Home spouse (P)   -TM     Primary Care Provided by self (P)   -[facility] Name 04/01/25 1300          Home Main Entrance    Number of Stairs, Main Entrance four (P)   -TM     Stair Railings, Main Entrance railings safe and in good condition (P)   -TM       Row Name 04/01/25 1300          Range of Motion (ROM)    Range of Motion ROM is WNL;bilateral lower extremities (P)   -TM       Row Name 04/01/25 1300          Strength (Manual Muscle Testing)    Strength (Manual Muscle Testing) strength is WNL;bilateral lower extremities (P)   -TM       Row Name 04/01/25 1300          Bed Mobility    Bed Mobility bed mobility (all) activities (P)   -     All Activities, [place] (Bed Mobility) independent (P)   -TM       Row Name 04/01/25 1300          Transfers    Transfers sit-stand transfer (P)   -TM       Row Name 04/01/25 1300          Sit-Stand Transfer    Sit-Stand [place] (Transfers) independent (P)   -TM       Row Name 04/01/25 1300          Gait/Stairs (Locomotion)    Gait/Stairs Locomotion gait/ambulation independence (P)   -     [place] Level (Gait) independent (P)   -     Patient was able to Ambulate yes (P)   -TM     Distance in Feet (Gait) 50  (P)   -       Row Name 04/01/25 1300          Balance    Balance Assessment standing dynamic balance (P)   -TM     Dynamic Standing Balance independent (P)   -TM     Position/Device Used, Standing Balance unsupported (P)   -       Row Name             Wound Right medial mid axillary    Wound - Properties Group Present on Original Admission: Y  -MF Side: Right  -MF Orientation: medial  -MF Location: mid axillary  -MF    Retired Wound - Properties Group Present on Original Admission: Y  -MF Side: Right  -MF Orientation: medial  -MF Location: mid axillary  -MF    Retired Wound - Properties Group Present on Original Admission: Y  -MF Side: Right  -MF Orientation: medial  -MF Location: mid axillary  -MF    Retired Wound - Properties Group Present on Original Admission: Y  -MF Side: Right  -MF Location: mid axillary  -MF      Row Name 04/01/25 1300          Plan of Care Review    Plan of Care Reviewed With patient (P)   -TM     Outcome Evaluation Pt has no difficulty with ambulation and shows no risk of falls. Pt does not require skilled therapy services (P)   -       Row Name 04/01/25 1300          Therapy Assessment/Plan (PT)    Patient/Family Therapy Goals Statement (PT) return home (P)   -TM     Criteria for Skilled Interventions Met (PT) no problems identified which require skilled intervention (P)   -TM     Therapy Frequency (PT) evaluation only (P)   -       Row Name 04/01/25 1300          PT Evaluation Complexity    History, PT Evaluation Complexity no personal factors and/or comorbidities (P)   -TM     Examination of Body Systems (PT Eval Complexity) total of 4 or more elements (P)   -TM     Clinical Presentation (PT Evaluation Complexity) stable (P)   -TM     Clinical Decision Making (PT Evaluation Complexity) low complexity (P)   -TM     Overall Complexity (PT Evaluation Complexity) low complexity (P)   -       Row Name 04/01/25 1300          Therapy Plan Review/Discharge Plan (PT)    Therapy Plan  Review (PT) evaluation/treatment results reviewed;patient (P)   -TM               User Key  (r) = Recorded By, (t) = Taken By, (c) = Cosigned By      Initials Name Provider Type     Marylin Montilla LPN Licensed Nurse     Camacho Bryson, PT Student PT Student                    Physical Therapy Education       Title: PT OT SLP Therapies (Done)       Topic: Physical Therapy (Done)       Point: Mobility training (Done)       Learning Progress Summary            Patient Acceptance, E,TB, VU by TM at 4/1/2025 1341                      Point: Home exercise program (Done)       Learning Progress Summary            Patient Acceptance, E,TB, VU by TM at 4/1/2025 1341                      Point: Body mechanics (Done)       Learning Progress Summary            Patient Acceptance, E,TB, VU by TM at 4/1/2025 1341                      Point: Precautions (Done)       Learning Progress Summary            Patient Acceptance, E,TB, VU by TM at 4/1/2025 1341                                      User Key       Initials Effective Dates Name Provider Type Discipline     02/04/25 -  Camacho Bryson, PT Student PT Student PT                  PT Recommendation and Plan  Anticipated Discharge Disposition (PT): (P) home  Therapy Frequency (PT): (P) evaluation only  Plan of Care Reviewed With: (P) patient  Outcome Evaluation: (P) Pt has no difficulty with ambulation and shows no risk of falls. Pt does not require skilled therapy services   Outcome Measures       Row Name 04/01/25 1300             How much help from another person do you currently need...    Turning from your back to your side while in flat bed without using bedrails? 4 (P)   -TM      Moving from lying on back to sitting on the side of a flat bed without bedrails? 4 (P)   -TM      Moving to and from a bed to a chair (including a wheelchair)? 4 (P)   -TM      Standing up from a chair using your arms (e.g., wheelchair, bedside chair)? 4 (P)   -TM      Climbing 3-5 steps with a  railing? 4 (P)   -TM      To walk in hospital room? 4 (P)   -TM      AM-PAC 6 Clicks Score (PT) 24 (P)   -TM         Functional Assessment    Outcome Measure Options AM-PAC 6 Clicks Basic Mobility (PT) (P)   -TM                User Key  (r) = Recorded By, (t) = Taken By, (c) = Cosigned By      Initials Name Provider Type    TM Camacho Bryson, PT Student PT Student                     Time Calculation:    PT Charges       Row Name 04/01/25 1340             Time Calculation    PT Received On 04/01/25 (P)   -TM      PT Goal Re-Cert Due Date 04/10/25 (P)   -TM         Untimed Charges    PT Eval/Re-eval Minutes 25 (P)   -TM         Total Minutes    Untimed Charges Total Minutes 25 (P)   -TM       Total Minutes 25 (P)   -TM                User Key  (r) = Recorded By, (t) = Taken By, (c) = Cosigned By      Initials Name Provider Type    TM Camacho Bryson, PT Student PT Student                  Therapy Charges for Today       Code Description Service Date Service Provider Modifiers Qty    86243712462 HC PT EVAL LOW COMPLEXITY 2 4/1/2025 Camacho Bryson, PT Student GP 1            PT G-Codes  Outcome Measure Options: (P) AM-PAC 6 Clicks Basic Mobility (PT)  AM-PAC 6 Clicks Score (PT): (P) 24    Camacho Bryson PT Student  4/1/2025

## 2025-04-01 NOTE — PROGRESS NOTES
[facility]   Hospitalist Progress Note  Date: 3/31/2025  Patient Name: Ebony Hamilton  : 1980  MRN: [id number]  Date of admission: 3/30/2025      Subjective   Subjective     Chief Complaint: Intractable nausea vomiting    Summary:Ebony Hamilton is a 44 y.o. female  with past medical history cervical radiculopathy, anxiety, depression, gastroparesis, obesity and GERD resenting to the ED for evaluation of intractable nausea and vomiting.  3 weeks ago patient had a cholecystectomy performed by Dr. Castano which had complications related clips slipped off causing bowel subsequent to the abdomen.  Patient required drain placement.  The following patient had abdominal pain again and was found to have appendicitis which required an appendectomy.  Over the last 2 days patient has been having intractable nausea to where she cannot keep anything down so she came to the ED for further evaluation.  Since her cholecystectomy 3 weeks ago patient has been to the ED on multiple occasions.  In the ED patient was slightly hypertensive with remaining vitals being within normal limits.  Labs show mild leukocytosis and lactic acidosis which responded well to fluids with remaining labs being unremarkable including a normal lipase, proBNP, troponin, and negative UA.  CT of the abdomen with contrast showed postoperative changes of recent cholecystectomy and appendectomy with nothing acute including no evidence of abnormal fluid collections, hemorrhages or hematomas.  When seen patient was still having nausea and vomiting with abdominal discomfort with inspiration.  Patient admitted for further evaluation and treatment.  General surgery consulted.  Started on scheduled Reglan and IV fluids.  Advancing diet slowly as tolerated.  Patient planning to return home on discharge once tolerating a diet.    Interval Followup: Sitting up in bed appears to be resting fairly comfortably.  Patient indicates that she was able to  get up and actually walk a little bit which she has not been able to do in some time.  Still gets short of breath with exertion.  Abdominal pain improving.  Tolerating clears.  Did throw up after eating a cracker earlier this morning.  Afebrile overnight.  Sinus rhythm 80s to 130s on telemetry review.  Blood pressure within normal limits.  Orthostatics remain positive.  Bicarb improved.  Leukocytosis resolved.  Hemoglobin trending down.  No other issues per nursing.    Review of Systems  Constitutional: Negative for fatigue and fever.   HENT: Negative for sore throat and trouble swallowing.    Eyes: Negative for pain and discharge.   Respiratory: Negative for cough and shortness of breath.    Cardiovascular: Negative for chest pain and palpitations.   Gastrointestinal: Positive for abdominal pain, nausea and vomiting.   Endocrine: Negative for cold intolerance and heat intolerance.   Genitourinary: Negative for difficulty urinating and dysuria.   Musculoskeletal: Negative for back pain and neck stiffness.   Skin: Negative for color change and rash.   Neurological: Negative for syncope and headaches.   Hematological: Negative for adenopathy.   Psychiatric/Behavioral: Negative for confusion and hallucinations.    Objective   Objective     Vitals:   Temp:  [98.3 °F (36.8 °C)-98.4 °F (36.9 °C)] 98.3 °F (36.8 °C)  Heart Rate:  [] 88  Resp:  [20] 20  BP: (120-140)/(62-98) 140/90  Physical Exam   General: well-developed appearing stated age in no acute distress  HEENT: Normocephalic atraumatic moist membranes pupils equal round reactive light, no scleral icterus no conjunctival injection  Cardiovascular: regular rate and rhythm no murmurs rubs or gallops S1-S2, no lower extremity edema appreciated  Pulmonary: Clear to auscultation bilaterally no wheezes rales or rhonchi symmetric chest expansion, unlabored, no conversational dyspnea appreciated  Gastrointestinal: Soft mildly tender in the left upper quadrant  nondistended positive bowel sounds all 4 quadrants no rebound or guarding  Musculoskeletal: No clubbing cyanosis, warm and well-perfused, calves soft symmetric nontender bilaterally  Skin: Clean dry without rashes  Neuro: Cranial nerves II through XII intact grossly no sensorimotor deficits appreciated bilateral upper and lower extremities  Psych: Patient is calm cooperative and appropriate with exam not responding to internal stimuli  : No Neely catheter no bladder distention no suprapubic tenderness    Result Review    Result Review:  I have personally reviewed these results and agree with these findings:  [x]  Laboratory  LAB RESULTS:      Lab 03/31/25  0412 03/30/25  1209   WBC 9.27 11.07*   HEMOGLOBIN 11.5* 12.8   HEMATOCRIT 36.4 40.9   PLATELETS 487* 533*   NEUTROS ABS  --  7.80*   IMMATURE GRANS (ABS)  --  0.12*   LYMPHS ABS  --  2.50   MONOS ABS  --  0.53   EOS ABS  --  0.07   MCV 90.1 89.7         Lab 03/31/25  0412 03/30/25  1209   SODIUM 140 137   POTASSIUM 3.9 3.6   CHLORIDE 105 100   CO2 20.4* 18.5*   ANION GAP 14.6 18.5*   BUN 11 12   CREATININE 0.71 0.68   EGFR 109.0 111.7   GLUCOSE 113* 85   CALCIUM 9.3 10.3         Lab 03/30/25  1209   TOTAL PROTEIN 8.0   ALBUMIN 4.3   GLOBULIN 3.7   ALT (SGPT) 9   AST (SGOT) 12   BILIRUBIN 0.4   ALK PHOS 80   LIPASE 35         Lab 03/30/25  1209   PROBNP <36.0   HSTROP T <6                 Brief Urine Lab Results  (Last result in the past 365 days)        Color   Clarity   Blood   Leuk Est   Nitrite   Protein   CREAT   Urine HCG        03/30/25 1242 Yellow   Clear   Negative   Negative   Negative   Negative                 Microbiology Results (last 10 days)       ** No results found for the last 240 hours. **            []  Microbiology  [x]  Radiology  CT Angiogram Chest Pulmonary Embolism  Result Date: 3/30/2025  Impression: No evidence of pulmonary embolus. No acute intrathoracic findings. Electronically Signed: Ankit Barker MD  3/30/2025 2:24 PM EDT   Workstation ID: [id number]    CT Abdomen Pelvis With Contrast  Result Date: 3/30/2025  1.Residual postoperative changes are noted status post recent cholecystectomy and appendectomy. 2.No evidence for abnormal fluid collection. No significant hemorrhage or hematoma. 3.No evidence for additional acute abnormality throughout the abdomen or pelvis. Electronically Signed: Irwin Jose MD  3/30/2025 2:23 PM EDT  Workstation ID: [id number]    XR Chest 1 View  Result Date: 3/30/2025  Impression: No acute cardiopulmonary abnormality. Electronically Signed: Alin Quiroz  3/30/2025 12:30 PM EDT  Workstation ID: [id number]    CT Abdomen Pelvis With Contrast  Result Date: 3/24/2025  Impression: 1.Moderate colonic fluid may indicate acute diarrheal illness. 2.Postsurgical changes of cholecystectomy, appendectomy, and hysterectomy. Small amount of fluid within the gallbladder fossa. Indwelling CBD stent appears adequately positioned. 3.Additional findings as detailed above. Electronically Signed: Misha Rubin MD  3/24/2025 9:47 AM EDT  Workstation ID: [id number]      [x]  EKG/Telemetry   []  Cardiology/Vascular   []  Pathology  []  Old records  [x]  Other:  Scheduled Meds:aluminum-magnesium hydroxide-simethicone, 30 mL, Oral, Once  busPIRone, 15 mg, Oral, TID  DULoxetine, 90 mg, Oral, Daily  enoxaparin sodium, 40 mg, Subcutaneous, Daily  metoclopramide, 10 mg, Intravenous, Q8H  montelukast, 10 mg, Oral, Nightly  pantoprazole, 40 mg, Intravenous, Q24H  polyethylene glycol, 17 g, Oral, Daily  saccharomyces boulardii, 250 mg, Oral, BID  sodium chloride, 10 mL, Intravenous, Q12H      Continuous Infusions:lactated ringers, 100 mL/hr, Last Rate: 100 mL/hr (03/31/25 1110)      PRN Meds:.  acetaminophen    senna-docusate sodium **AND** polyethylene glycol **AND** bisacodyl **AND** bisacodyl    clonazePAM    HYDROmorphone    ipratropium-albuterol    ketorolac    ondansetron    sodium chloride    sodium chloride    sodium chloride     tiZANidine    traZODone      Assessment & Plan   Assessment / Plan     Assessment/Plan:  Intractable nausea and vomiting  Gastroparesis  Right upper quadrant abdominal pain  Depression with anxiety  Cervical radiculopathy        Patient admitted for further evaluation and treatment  General Surgery consulted thank for assistance  Continue scheduled Reglan  Continue as needed Zofran  Continue IV fluids until tolerating diet  Continue pain control with p.o. analgesics IV for breakthrough  Continue home buspirone, duloxetine scheduled with as needed Klonopin  Continue home tizanidine as needed  Lab holiday tomorrow unless clinical status changes  Further inpatient orders recommendations pending clinical course       Discussed plan with bedside RN as well as general surgery team.    Disposition: Home once tolerating a diet and abdominal pain improves.    VTE Prophylaxis:  Pharmacologic VTE prophylaxis orders are present.        CODE STATUS:   Code Status (Patient has no pulse and is not breathing): CPR (Attempt to Resuscitate)  Medical Interventions (Patient has pulse or is breathing): Full Support  Level Of Support Discussed With: Patient

## 2025-04-02 PROCEDURE — 99231 SBSQ HOSP IP/OBS SF/LOW 25: CPT | Performed by: INTERNAL MEDICINE

## 2025-04-02 PROCEDURE — 25810000003 LACTATED RINGERS PER 1000 ML: Performed by: FAMILY MEDICINE

## 2025-04-02 PROCEDURE — 25010000002 METOCLOPRAMIDE PER 10 MG: Performed by: FAMILY MEDICINE

## 2025-04-02 PROCEDURE — 25010000002 ONDANSETRON PER 1 MG: Performed by: FAMILY MEDICINE

## 2025-04-02 PROCEDURE — 25010000002 HYDROMORPHONE 1 MG/ML SOLUTION: Performed by: INTERNAL MEDICINE

## 2025-04-02 PROCEDURE — 25010000002 ENOXAPARIN PER 10 MG: Performed by: FAMILY MEDICINE

## 2025-04-02 RX ADMIN — DULOXETINE HYDROCHLORIDE 90 MG: 30 CAPSULE, DELAYED RELEASE ORAL at 08:12

## 2025-04-02 RX ADMIN — HYDROCODONE BITARTRATE AND ACETAMINOPHEN 1 TABLET: 10; 325 TABLET ORAL at 20:08

## 2025-04-02 RX ADMIN — TIZANIDINE 4 MG: 4 TABLET ORAL at 20:08

## 2025-04-02 RX ADMIN — METOCLOPRAMIDE 10 MG: 5 INJECTION, SOLUTION INTRAMUSCULAR; INTRAVENOUS at 04:15

## 2025-04-02 RX ADMIN — Medication 250 MG: at 08:12

## 2025-04-02 RX ADMIN — HYDROCODONE BITARTRATE AND ACETAMINOPHEN 1 TABLET: 10; 325 TABLET ORAL at 03:46

## 2025-04-02 RX ADMIN — Medication 10 ML: at 20:08

## 2025-04-02 RX ADMIN — POLYETHYLENE GLYCOL 3350 17 G: 17 POWDER, FOR SOLUTION ORAL at 08:12

## 2025-04-02 RX ADMIN — BUSPIRONE HYDROCHLORIDE 15 MG: 10 TABLET ORAL at 20:08

## 2025-04-02 RX ADMIN — BUSPIRONE HYDROCHLORIDE 15 MG: 10 TABLET ORAL at 16:48

## 2025-04-02 RX ADMIN — ONDANSETRON 4 MG: 2 INJECTION INTRAMUSCULAR; INTRAVENOUS at 08:12

## 2025-04-02 RX ADMIN — HYDROMORPHONE HYDROCHLORIDE 0.5 MG: 1 INJECTION, SOLUTION INTRAMUSCULAR; INTRAVENOUS; SUBCUTANEOUS at 23:57

## 2025-04-02 RX ADMIN — HYDROMORPHONE HYDROCHLORIDE 0.5 MG: 1 INJECTION, SOLUTION INTRAMUSCULAR; INTRAVENOUS; SUBCUTANEOUS at 16:48

## 2025-04-02 RX ADMIN — METOCLOPRAMIDE 10 MG: 5 INJECTION, SOLUTION INTRAMUSCULAR; INTRAVENOUS at 20:08

## 2025-04-02 RX ADMIN — TIZANIDINE 4 MG: 4 TABLET ORAL at 08:12

## 2025-04-02 RX ADMIN — HYDROMORPHONE HYDROCHLORIDE 0.5 MG: 1 INJECTION, SOLUTION INTRAMUSCULAR; INTRAVENOUS; SUBCUTANEOUS at 10:05

## 2025-04-02 RX ADMIN — HYDROCODONE BITARTRATE AND ACETAMINOPHEN 1 TABLET: 10; 325 TABLET ORAL at 11:58

## 2025-04-02 RX ADMIN — MONTELUKAST 10 MG: 10 TABLET, FILM COATED ORAL at 20:08

## 2025-04-02 RX ADMIN — SODIUM CHLORIDE, SODIUM LACTATE, POTASSIUM CHLORIDE, CALCIUM CHLORIDE 100 ML/HR: 20; 30; 600; 310 INJECTION, SOLUTION INTRAVENOUS at 04:22

## 2025-04-02 RX ADMIN — ENOXAPARIN SODIUM 40 MG: 100 INJECTION SUBCUTANEOUS at 08:12

## 2025-04-02 RX ADMIN — ONDANSETRON 4 MG: 2 INJECTION INTRAMUSCULAR; INTRAVENOUS at 14:41

## 2025-04-02 RX ADMIN — HYDROMORPHONE HYDROCHLORIDE 0.5 MG: 1 INJECTION, SOLUTION INTRAMUSCULAR; INTRAVENOUS; SUBCUTANEOUS at 04:19

## 2025-04-02 RX ADMIN — METOCLOPRAMIDE 10 MG: 5 INJECTION, SOLUTION INTRAMUSCULAR; INTRAVENOUS at 11:58

## 2025-04-02 RX ADMIN — SODIUM CHLORIDE, SODIUM LACTATE, POTASSIUM CHLORIDE, CALCIUM CHLORIDE 100 ML/HR: 20; 30; 600; 310 INJECTION, SOLUTION INTRAVENOUS at 04:23

## 2025-04-02 RX ADMIN — Medication 250 MG: at 20:08

## 2025-04-02 RX ADMIN — Medication 10 ML: at 08:13

## 2025-04-02 RX ADMIN — PANTOPRAZOLE SODIUM 40 MG: 40 INJECTION, POWDER, FOR SOLUTION INTRAVENOUS at 20:08

## 2025-04-02 RX ADMIN — BUSPIRONE HYDROCHLORIDE 15 MG: 10 TABLET ORAL at 08:12

## 2025-04-02 NOTE — PLAN OF CARE
Goal Outcome Evaluation:  Plan of Care Reviewed With: patient        Progress: improving  Outcome Evaluation: A & O x 4, C/O pain, medication given as ordered PRN. Nausea intermitttent this shift, zofran given PRN. Appears to be resting in bed without pain or discomfort being ntoed.

## 2025-04-02 NOTE — PLAN OF CARE
Goal Outcome Evaluation:  Plan of Care Reviewed With: patient        Progress: no change  Outcome Evaluation: Pt remained A&Ox4 this shift. Also, pt remained on room air maintaining stable oxygen saturation. Pt was medicated with PRN Zanaflex and Diladid to help relieve pain. Also, pt was medicated with PRN Zofran to help relieve nausea.

## 2025-04-02 NOTE — PROGRESS NOTES
[facility]   Hospitalist Progress Note  Date: 2025  Patient Name: Ebony Hamilton  : 1980  MRN: [id number]  Date of admission: 3/30/2025      Subjective   Subjective     Chief Complaint: Intractable nausea vomiting    Summary:Ebony Hamilton is a 44 y.o. female  with past medical history cervical radiculopathy, anxiety, depression, gastroparesis, obesity and GERD resenting to the ED for evaluation of intractable nausea and vomiting.  3 weeks ago patient had a cholecystectomy performed by Dr. Castano which had complications related clips slipped off causing bowel subsequent to the abdomen.  Patient required drain placement.  The following patient had abdominal pain again and was found to have appendicitis which required an appendectomy.  Over the last 2 days patient has been having intractable nausea to where she cannot keep anything down so she came to the ED for further evaluation.  Since her cholecystectomy 3 weeks ago patient has been to the ED on multiple occasions.  In the ED patient was slightly hypertensive with remaining vitals being within normal limits.  Labs show mild leukocytosis and lactic acidosis which responded well to fluids with remaining labs being unremarkable including a normal lipase, proBNP, troponin, and negative UA.  CT of the abdomen with contrast showed postoperative changes of recent cholecystectomy and appendectomy with nothing acute including no evidence of abnormal fluid collections, hemorrhages or hematomas.  When seen patient was still having nausea and vomiting with abdominal discomfort with inspiration.  Patient admitted for further evaluation and treatment.  General surgery consulted.  Started on scheduled Reglan and IV fluids.  Advancing diet slowly as tolerated.  Patient planning to return home on discharge once tolerating a diet.    Interval Followup: Sitting up in bed appears to be resting fairly comfortably.    Abdominal pain improving.  Tolerating  regular diet although does report still having some episodes of nausea specially after eating this morning.      Review of Systems  Constitutional: Negative for fatigue and fever.   HENT: Negative for sore throat and trouble swallowing.    Eyes: Negative for pain and discharge.   Respiratory: Negative for cough and shortness of breath.    Cardiovascular: Negative for chest pain and palpitations.   Gastrointestinal: Positive for abdominal pain, positive nausea, denies vomiting.   Endocrine: Negative for cold intolerance and heat intolerance.   Genitourinary: Negative for difficulty urinating and dysuria.   Musculoskeletal: Negative for back pain and neck stiffness.   Skin: Negative for color change and rash.   Neurological: Negative for syncope and headaches.   Hematological: Negative for adenopathy.   Psychiatric/Behavioral: Negative for confusion and hallucinations.    Objective   Objective     Vitals:   Temp:  [97.5 °F (36.4 °C)-99.3 °F (37.4 °C)] 98.5 °F (36.9 °C)  Heart Rate:  [] 117  Resp:  [16-20] 18  BP: (110-149)/(64-94) 129/86  Physical Exam   General: well-developed appearing stated age in no acute distress  HEENT: Normocephalic atraumatic moist membranes pupils equal round no scleral icterus no conjunctival injection  Cardiovascular: regular rate and rhythm no appreciable murmurs, no lower extremity edema appreciated  Pulmonary: Clear to auscultation bilaterally, unlabored, no conversational dyspnea appreciated  Gastrointestinal: Soft mildly tender in the left upper quadrant nondistended positive bowel sounds all 4 quadrants no rebound or guarding  Musculoskeletal: No clubbing cyanosis, warm and well-perfused, calves soft symmetric nontender bilaterally  Skin: Clean dry without rashes  Neuro: Cranial nerves II through XII intact grossly no sensorimotor deficits appreciated bilateral upper and lower extremities  Psych: Patient is calm cooperative and appropriate with exam not responding to internal  stimuli      Result Review    Result Review:  I have personally reviewed these results and agree with these findings:  [x]  Laboratory  LAB RESULTS:      Lab 03/31/25  0412 03/30/25  1209   WBC 9.27 11.07*   HEMOGLOBIN 11.5* 12.8   HEMATOCRIT 36.4 40.9   PLATELETS 487* 533*   NEUTROS ABS  --  7.80*   IMMATURE GRANS (ABS)  --  0.12*   LYMPHS ABS  --  2.50   MONOS ABS  --  0.53   EOS ABS  --  0.07   MCV 90.1 89.7         Lab 03/31/25  0412 03/30/25  1209   SODIUM 140 137   POTASSIUM 3.9 3.6   CHLORIDE 105 100   CO2 20.4* 18.5*   ANION GAP 14.6 18.5*   BUN 11 12   CREATININE 0.71 0.68   EGFR 109.0 111.7   GLUCOSE 113* 85   CALCIUM 9.3 10.3         Lab 03/30/25  1209   TOTAL PROTEIN 8.0   ALBUMIN 4.3   GLOBULIN 3.7   ALT (SGPT) 9   AST (SGOT) 12   BILIRUBIN 0.4   ALK PHOS 80   LIPASE 35         Lab 03/30/25  1209   PROBNP <36.0   HSTROP T <6                 Brief Urine Lab Results  (Last result in the past 365 days)        Color   Clarity   Blood   Leuk Est   Nitrite   Protein   CREAT   Urine HCG        03/30/25 1242 Yellow   Clear   Negative   Negative   Negative   Negative                 Microbiology Results (last 10 days)       ** No results found for the last 240 hours. **            []  Microbiology  [x]  Radiology  CT Angiogram Chest Pulmonary Embolism  Result Date: 3/30/2025  Impression: No evidence of pulmonary embolus. No acute intrathoracic findings. Electronically Signed: Ankit Barker MD  3/30/2025 2:24 PM EDT  Workstation ID: [id number]    CT Abdomen Pelvis With Contrast  Result Date: 3/30/2025  1.Residual postoperative changes are noted status post recent cholecystectomy and appendectomy. 2.No evidence for abnormal fluid collection. No significant hemorrhage or hematoma. 3.No evidence for additional acute abnormality throughout the abdomen or pelvis. Electronically Signed: Irwin Jose MD  3/30/2025 2:23 PM EDT  Workstation ID: [id number]    XR Chest 1 View  Result Date: 3/30/2025  Impression: No acute  cardiopulmonary abnormality. Electronically Signed: Alin Quiroz  3/30/2025 12:30 PM EDT  Workstation ID: [id number]      [x]  EKG/Telemetry   []  Cardiology/Vascular   []  Pathology  []  Old records  [x]  Other:  Scheduled Meds:aluminum-magnesium hydroxide-simethicone, 30 mL, Oral, Once  busPIRone, 15 mg, Oral, TID  DULoxetine, 90 mg, Oral, Daily  enoxaparin sodium, 40 mg, Subcutaneous, Daily  metoclopramide, 10 mg, Intravenous, Q8H  montelukast, 10 mg, Oral, Nightly  pantoprazole, 40 mg, Intravenous, Q24H  polyethylene glycol, 17 g, Oral, Daily  saccharomyces boulardii, 250 mg, Oral, BID  sodium chloride, 10 mL, Intravenous, Q12H      Continuous Infusions:     PRN Meds:.  acetaminophen    senna-docusate sodium **AND** polyethylene glycol **AND** bisacodyl **AND** bisacodyl    clonazePAM    HYDROcodone-acetaminophen    HYDROmorphone    ipratropium-albuterol    ondansetron    sodium chloride    sodium chloride    sodium chloride    tiZANidine    traZODone      Assessment & Plan   Assessment / Plan     Assessment/Plan:  Intractable nausea and vomiting  Gastroparesis  Right upper quadrant abdominal pain  Depression with anxiety  Cervical radiculopathy        Patient admitted for further evaluation and treatment  General Surgery consulted, consult appreciated   Continue scheduled IV Reglan although will increase every 8 to every 6 hours   Continue as needed Zofran  Continue IV fluids until ensure tolerating diet  Resume patient's home oral pain management regimen with Dilaudid IV for breakthrough.  Continue home buspirone, duloxetine scheduled with as needed Klonopin  Continue home tizanidine as needed  Further inpatient orders recommendations pending clinical course       Discussed plan with bedside RN as well as general surgery team.    Disposition: Home once tolerating a diet and abdominal pain improves.    VTE Prophylaxis:  Pharmacologic VTE prophylaxis orders are present.        CODE STATUS:   Code Status  (Patient has no pulse and is not breathing): CPR (Attempt to Resuscitate)  Medical Interventions (Patient has pulse or is breathing): Full Support  Level Of Support Discussed With: Patient            Electronically signed by Naila Alejandre MD, 04/02/25, 1:41 PM EDT.

## 2025-04-03 ENCOUNTER — ANESTHESIA EVENT (OUTPATIENT)
Dept: GASTROENTEROLOGY | Facility: HOSPITAL | Age: 45
End: 2025-04-03
Payer: COMMERCIAL

## 2025-04-03 ENCOUNTER — APPOINTMENT (OUTPATIENT)
Dept: GENERAL RADIOLOGY | Facility: HOSPITAL | Age: 45
DRG: 392 | End: 2025-04-03
Payer: COMMERCIAL

## 2025-04-03 PROCEDURE — 99232 SBSQ HOSP IP/OBS MODERATE 35: CPT | Performed by: INTERNAL MEDICINE

## 2025-04-03 PROCEDURE — 25010000002 HYDROMORPHONE 1 MG/ML SOLUTION: Performed by: INTERNAL MEDICINE

## 2025-04-03 PROCEDURE — 25010000002 METOCLOPRAMIDE PER 10 MG: Performed by: FAMILY MEDICINE

## 2025-04-03 PROCEDURE — 25010000002 METOCLOPRAMIDE PER 10 MG: Performed by: INTERNAL MEDICINE

## 2025-04-03 PROCEDURE — 25010000002 ONDANSETRON PER 1 MG: Performed by: FAMILY MEDICINE

## 2025-04-03 PROCEDURE — 74018 RADEX ABDOMEN 1 VIEW: CPT

## 2025-04-03 PROCEDURE — 25010000002 ENOXAPARIN PER 10 MG: Performed by: FAMILY MEDICINE

## 2025-04-03 RX ORDER — BISACODYL 5 MG/1
10 TABLET, DELAYED RELEASE ORAL DAILY PRN
Status: DISCONTINUED | OUTPATIENT
Start: 2025-04-03 | End: 2025-04-06 | Stop reason: HOSPADM

## 2025-04-03 RX ORDER — METOCLOPRAMIDE HYDROCHLORIDE 5 MG/ML
10 INJECTION INTRAMUSCULAR; INTRAVENOUS EVERY 6 HOURS
Status: DISCONTINUED | OUTPATIENT
Start: 2025-04-03 | End: 2025-04-03

## 2025-04-03 RX ORDER — DIPHENHYDRAMINE HYDROCHLORIDE, ZINC ACETATE 2; .1 G/100G; G/100G
1 CREAM TOPICAL 3 TIMES DAILY PRN
Status: DISCONTINUED | OUTPATIENT
Start: 2025-04-03 | End: 2025-04-06 | Stop reason: HOSPADM

## 2025-04-03 RX ORDER — METOCLOPRAMIDE 10 MG/1
10 TABLET ORAL
Status: DISCONTINUED | OUTPATIENT
Start: 2025-04-03 | End: 2025-04-06 | Stop reason: HOSPADM

## 2025-04-03 RX ORDER — POLYETHYLENE GLYCOL 3350 17 G/17G
17 POWDER, FOR SOLUTION ORAL 2 TIMES DAILY
Status: DISCONTINUED | OUTPATIENT
Start: 2025-04-04 | End: 2025-04-06 | Stop reason: HOSPADM

## 2025-04-03 RX ORDER — POLYETHYLENE GLYCOL 3350 17 G/17G
17 POWDER, FOR SOLUTION ORAL ONCE
Status: COMPLETED | OUTPATIENT
Start: 2025-04-03 | End: 2025-04-03

## 2025-04-03 RX ADMIN — Medication 10 ML: at 21:06

## 2025-04-03 RX ADMIN — METOCLOPRAMIDE 10 MG: 5 INJECTION, SOLUTION INTRAMUSCULAR; INTRAVENOUS at 12:34

## 2025-04-03 RX ADMIN — METOCLOPRAMIDE 10 MG: 10 TABLET ORAL at 17:25

## 2025-04-03 RX ADMIN — METOCLOPRAMIDE 10 MG: 10 TABLET ORAL at 21:06

## 2025-04-03 RX ADMIN — MAGNESIUM HYDROXIDE 10 ML: 2400 SUSPENSION ORAL at 15:15

## 2025-04-03 RX ADMIN — HYDROMORPHONE HYDROCHLORIDE 0.5 MG: 1 INJECTION, SOLUTION INTRAMUSCULAR; INTRAVENOUS; SUBCUTANEOUS at 18:04

## 2025-04-03 RX ADMIN — HYDROMORPHONE HYDROCHLORIDE 0.5 MG: 1 INJECTION, SOLUTION INTRAMUSCULAR; INTRAVENOUS; SUBCUTANEOUS at 12:34

## 2025-04-03 RX ADMIN — MONTELUKAST 10 MG: 10 TABLET, FILM COATED ORAL at 21:06

## 2025-04-03 RX ADMIN — HYDROCODONE BITARTRATE AND ACETAMINOPHEN 1 TABLET: 10; 325 TABLET ORAL at 15:18

## 2025-04-03 RX ADMIN — TIZANIDINE 4 MG: 4 TABLET ORAL at 09:55

## 2025-04-03 RX ADMIN — DULOXETINE HYDROCHLORIDE 90 MG: 30 CAPSULE, DELAYED RELEASE ORAL at 09:55

## 2025-04-03 RX ADMIN — HYDROCODONE BITARTRATE AND ACETAMINOPHEN 1 TABLET: 10; 325 TABLET ORAL at 09:55

## 2025-04-03 RX ADMIN — HYDROMORPHONE HYDROCHLORIDE 0.5 MG: 1 INJECTION, SOLUTION INTRAMUSCULAR; INTRAVENOUS; SUBCUTANEOUS at 06:33

## 2025-04-03 RX ADMIN — BUSPIRONE HYDROCHLORIDE 15 MG: 10 TABLET ORAL at 15:15

## 2025-04-03 RX ADMIN — POLYETHYLENE GLYCOL 3350 17 G: 17 POWDER, FOR SOLUTION ORAL at 09:55

## 2025-04-03 RX ADMIN — BUSPIRONE HYDROCHLORIDE 15 MG: 10 TABLET ORAL at 09:55

## 2025-04-03 RX ADMIN — Medication 250 MG: at 21:06

## 2025-04-03 RX ADMIN — HYDROCODONE BITARTRATE AND ACETAMINOPHEN 1 TABLET: 10; 325 TABLET ORAL at 04:41

## 2025-04-03 RX ADMIN — Medication 10 ML: at 09:56

## 2025-04-03 RX ADMIN — DIPHENHYDRAMINE HYDROCHLORIDE, ZINC ACETATE 1 APPLICATION: 2; .1 CREAM TOPICAL at 17:25

## 2025-04-03 RX ADMIN — PANTOPRAZOLE SODIUM 40 MG: 40 INJECTION, POWDER, FOR SOLUTION INTRAVENOUS at 21:06

## 2025-04-03 RX ADMIN — HYDROMORPHONE HYDROCHLORIDE 0.5 MG: 1 INJECTION, SOLUTION INTRAMUSCULAR; INTRAVENOUS; SUBCUTANEOUS at 23:56

## 2025-04-03 RX ADMIN — ONDANSETRON 4 MG: 2 INJECTION INTRAMUSCULAR; INTRAVENOUS at 06:32

## 2025-04-03 RX ADMIN — HYDROCODONE BITARTRATE AND ACETAMINOPHEN 1 TABLET: 10; 325 TABLET ORAL at 21:06

## 2025-04-03 RX ADMIN — ENOXAPARIN SODIUM 40 MG: 100 INJECTION SUBCUTANEOUS at 09:55

## 2025-04-03 RX ADMIN — BUSPIRONE HYDROCHLORIDE 15 MG: 10 TABLET ORAL at 21:06

## 2025-04-03 RX ADMIN — Medication 250 MG: at 09:55

## 2025-04-03 RX ADMIN — METOCLOPRAMIDE 10 MG: 5 INJECTION, SOLUTION INTRAMUSCULAR; INTRAVENOUS at 04:41

## 2025-04-03 RX ADMIN — ONDANSETRON 4 MG: 2 INJECTION INTRAMUSCULAR; INTRAVENOUS at 00:11

## 2025-04-03 RX ADMIN — TIZANIDINE 4 MG: 4 TABLET ORAL at 17:25

## 2025-04-03 RX ADMIN — POLYETHYLENE GLYCOL 3350 17 G: 17 POWDER, FOR SOLUTION ORAL at 15:15

## 2025-04-03 NOTE — PROGRESS NOTES
[facility]     Surgery Progress Note    Patient Name: Ebony Hamilton  :    1980  MRN:    [id number]  Date of admission:  3/30/2025  Length of Stay: 2 days    Subjective   44-year-old female with gastroparesis    No acute events overnight.  Still intermittent nausea with regular diet.  Having some constipation.  Objective     Current Facility-Administered Medications:     acetaminophen (TYLENOL) tablet 650 mg, 650 mg, Oral, Q6H PRN, Royer Grimes MD    aluminum-magnesium hydroxide-simethicone (MAALOX MAX) 400-400-40 MG/5ML suspension 30 mL, 30 mL, Oral, Once, Royer Grimes MD    sennosides-docusate (PERICOLACE) 8.6-50 MG per tablet 2 tablet, 2 tablet, Oral, BID PRN **AND** polyethylene glycol (MIRALAX) packet 17 g, 17 g, Oral, Daily PRN **AND** bisacodyl (DULCOLAX) EC tablet 5 mg, 5 mg, Oral, Daily PRN **AND** bisacodyl (DULCOLAX) suppository 10 mg, 10 mg, Rectal, Daily PRN, Royer Grimes MD    busPIRone (BUSPAR) tablet 15 mg, 15 mg, Oral, TID, Royer Grimes MD, 15 mg at 25 0955    clonazePAM (KlonoPIN) tablet 0.5 mg, 0.5 mg, Oral, BID PRN, Royer Grimes MD, 0.5 mg at 25 0844    DULoxetine (CYMBALTA) DR capsule 90 mg, 90 mg, Oral, Daily, Royer Grimes MD, 90 mg at 25 0955    enoxaparin sodium (LOVENOX) syringe 40 mg, 40 mg, Subcutaneous, Daily, Royer Grimse MD, 40 mg at 25 0955    HYDROcodone-acetaminophen (NORCO)  MG per tablet 1 tablet, 1 tablet, Oral, Q4H PRN, Naila Alejandre MD, 1 tablet at 25 0955    HYDROmorphone (DILAUDID) injection 0.5 mg, 0.5 mg, Intravenous, Q6H PRN, Naila Alejandre MD, 0.5 mg at 25 1234    ipratropium-albuterol (DUO-NEB) nebulizer solution 3 mL, 3 mL, Nebulization, Q4H PRN, Royer Grimes MD    magnesium hydroxide (MILK OF MAGNESIA) suspension 10 mL, 10 mL, Oral, Daily, India Chris APRN    metoclopramide (REGLAN) injection 10 mg, 10 mg, Intravenous, Q6H, Naila Alejandre MD,  10 mg at 04/03/25 1234    montelukast (SINGULAIR) tablet 10 mg, 10 mg, Oral, Nightly, Royer Grimes MD, 10 mg at 04/02/25 2008    ondansetron (ZOFRAN) injection 4 mg, 4 mg, Intravenous, Q6H PRN, Royer Grimes MD, 4 mg at 04/03/25 0632    pantoprazole (PROTONIX) injection 40 mg, 40 mg, Intravenous, Q24H, Royer Grimes MD, 40 mg at 04/02/25 2008    polyethylene glycol (MIRALAX) packet 17 g, 17 g, Oral, Daily, Royer Grimes MD, 17 g at 04/03/25 0955    saccharomyces boulardii (FLORASTOR) capsule 250 mg, 250 mg, Oral, BID, Royer Grimes MD, 250 mg at 04/03/25 0955    sodium chloride 0.9 % flush 10 mL, 10 mL, Intravenous, PRN, Nigel Goldstein MD    sodium chloride 0.9 % flush 10 mL, 10 mL, Intravenous, Q12H, Royer Grimes MD, 10 mL at 04/03/25 0956    sodium chloride 0.9 % flush 10 mL, 10 mL, Intravenous, PRN, Royer Grimes MD    sodium chloride 0.9 % infusion 40 mL, 40 mL, Intravenous, PRN, Royer Grimes MD    tiZANidine (ZANAFLEX) tablet 4 mg, 4 mg, Oral, Q8H PRN, Reji Irby MD, 4 mg at 04/03/25 0955    traZODone (DESYREL) tablet 25 mg, 25 mg, Oral, Nightly PRN, Reji Irby MD    Current Diet:    Dietary Orders (From admission, onward)       Start     Ordered    04/01/25 1124  Diet: Regular/House; Fluid Consistency: Thin (IDDSI 0)  Diet Effective Now        References:    Diet Order Definitions   Question Answer Comment   Diets: Regular/House    Fluid Consistency: Thin (IDDSI 0)        04/01/25 1123                    Vitals:   Temp:  [98 °F (36.7 °C)-98.6 °F (37 °C)] 98 °F (36.7 °C)  Heart Rate:  [102-119] 112  Resp:  [16-18] 16  BP: (124-154)/() 142/96  Physical Exam   General: no acute distress, resting in bed  Respiratory:  non-labored breathing  Cardiovascular:  RRR, non-tachycardic  Abdomen:  soft, non-distended, non-tender    LABS:  CBC          3/20/2025    01:32 3/30/2025    12:09 3/31/2025    04:12   CBC   WBC 13.57  11.07  9.27    RBC 5.35  4.56  4.04    Hemoglobin  14.5  12.8  11.5    Hematocrit 45.6  40.9  36.4    MCV 85.2  89.7  90.1    MCH 27.1  28.1  28.5    MCHC 31.8  31.3  31.6    RDW 13.8  14.6  14.6    Platelets 690  533  487      BMP          3/20/2025    01:32 3/30/2025    12:09 3/31/2025    04:12   BMP   BUN 10  12  11    Creatinine 0.70  0.68  0.71    Sodium 138  137  140    Potassium 3.9  3.6  3.9    Chloride 99  100  105    CO2 19.7  18.5  20.4    Calcium 10.5  10.3  9.3      CMP          3/20/2025    01:32 3/30/2025    12:09 3/31/2025    04:12   CMP   Glucose 96  85  113    BUN 10  12  11    Creatinine 0.70  0.68  0.71    EGFR 110.9  111.7  109.0    Sodium 138  137  140    Potassium 3.9  3.6  3.9    Chloride 99  100  105    Calcium 10.5  10.3  9.3    Total Protein 8.8  8.0     Albumin 4.6  4.3     Globulin 4.2  3.7     Total Bilirubin 0.6  0.4     Alkaline Phosphatase 97  80     AST (SGOT) 15  12     ALT (SGPT) 9  9     Albumin/Globulin Ratio 1.1  1.2     BUN/Creatinine Ratio 14.3  17.6  15.5    Anion Gap 19.3  18.5  14.6        Lab Results (last 24 hours)       ** No results found for the last 24 hours. **            IMAGING:  Imaging Results (Last 24 Hours)       Procedure Component Value Units Date/Time    XR Abdomen KUB [[id number]] Collected: 04/03/25 1114     Updated: 04/03/25 1123    Narrative:      XR ABDOMEN KUB    Date of Exam: 4/3/2025 10:50 AM EDT    Indication: worsening nausea    Comparison: CT abdomen and pelvis 3/30/2025    Findings:  Evaluation of the abdomen demonstrates an unremarkable bowel gas pattern without obstructive changes noted. Fecal stasis is noted throughout the colon with debris in the gastric body. There is a biliary stent noted and surgical clips in the right upper   quadrant. Lung bases are clear. Osseous structures are unremarkable.      Impression:      Impression:  Findings compatible with constipation.      Electronically Signed: Martha Barker MD    4/3/2025 11:16 AM EDT    Workstation ID: [id number]            [x]   Laboratory  []  Microbiology  []  Radiology  []  EKG/Telemetry   []  Cardiology/Vascular   []  Pathology  []  Old records    Assessment / Plan   Assessment:   Active Hospital Problems    Diagnosis     **Intractable nausea and vomiting     Nausea & vomiting     Abdominal pain     S/P laparoscopic appendectomy     Gastroparesis     S/P laparoscopic cholecystectomy     Cervical radiculopathy     NICHOLE (generalized anxiety disorder)     Spinal stenosis in cervical region          Plan:    Gastroparesis   -Afebrile, vital stable  -KUB with constipation  -Bowel regimen ordered  -Recommend transitioning Reglan to oral formulation  -Abdomen remains clinically benign  -No need for any surgical intervention at this time, please call with any questions or concerns     Electronically signed by Josué Catsano MD, 04/03/25, 1:49 PM EDT.

## 2025-04-03 NOTE — ANESTHESIA PREPROCEDURE EVALUATION
Anesthesia Evaluation     Patient summary reviewed and Nursing notes reviewed   no history of anesthetic complications:   NPO Solid Status: > 8 hours  NPO Liquid Status: > 8 hours           Airway   Mallampati: I  TM distance: >3 FB  Neck ROM: full  No difficulty expected  Dental - normal exam     Pulmonary - negative pulmonary ROS    breath sounds clear to auscultation    ROS comment: Recent atelectasis right side   Cardiovascular   Exercise tolerance: good (4-7 METS)    Rhythm: regular  Rate: abnormal    (+) dysrhythmias (released from cardiology, no meds) Atrial Fib    PE comment: Sinus tach on monitor ,     Neuro/Psych  (+) headaches, numbness, psychiatric history Depression and Anxiety  GI/Hepatic/Renal/Endo    (+) obesity, GI bleeding lower resolved, liver disease    Musculoskeletal     (+) chronic pain, neck pain  Abdominal    Substance History - negative use     OB/GYN negative ob/gyn ROS         Other   arthritis,     ROS/Med Hx Other: Stent removal     Pt w/ hx of afib, one time, taken to cath lab, not reproducible - no problems since then -- occurred many years ago, since then pt has been released from cardiology w/o sequelae    S/p ercp and cholecystectomy FEB 2025 and appendectomy MAR 2025    Chronic neck and shoulder  pain - takes hydrocodone - last dose at 0500     In contact isolation for MRSA of wound - axilla right arm     S/p hysterectomy           Phys Exam Other: Tongue piercing removed                         Anesthesia Plan    ASA 3     general   total IV anesthesia  (Patient understands anesthesia not responsible for dental damage.      Numerous piercings instructed pt that she would need to remove or have them taped over      Discussed risks with pt including aspiration, allergic reactions, apnea, advanced airway placement. Pt verbalized understanding. All questions answered.   )  intravenous induction     Anesthetic plan, risks, benefits, and alternatives have been provided,  discussed and informed consent has been obtained with: patient.  Pre-procedure education provided  Use of blood products discussed with patient .    Plan discussed with CRNA.        CODE STATUS:

## 2025-04-03 NOTE — PROGRESS NOTES
[facility]   Hospitalist Progress Note  Date: 4/3/2025  Patient Name: Ebony Hamilton  : 1980  MRN: [id number]  Date of admission: 3/30/2025      Subjective   Subjective     Chief Complaint: Intractable nausea vomiting    Summary:Ebony Hamilton is a 44 y.o. female  with past medical history cervical radiculopathy, anxiety, depression, gastroparesis, obesity and GERD resenting to the ED for evaluation of intractable nausea and vomiting.  3 weeks ago patient had a cholecystectomy performed by Dr. Castano which had complications related clips slipped off causing bowel subsequent to the abdomen.  Patient required drain placement.  The following patient had abdominal pain again and was found to have appendicitis which required an appendectomy.  Over the last 2 days patient has been having intractable nausea to where she cannot keep anything down so she came to the ED for further evaluation.  Since her cholecystectomy 3 weeks ago patient has been to the ED on multiple occasions.  In the ED patient was slightly hypertensive with remaining vitals being within normal limits.  Labs show mild leukocytosis and lactic acidosis which responded well to fluids with remaining labs being unremarkable including a normal lipase, proBNP, troponin, and negative UA.  CT of the abdomen with contrast showed postoperative changes of recent cholecystectomy and appendectomy with nothing acute including no evidence of abnormal fluid collections, hemorrhages or hematomas.  When seen patient was still having nausea and vomiting with abdominal discomfort with inspiration.  Patient admitted for further evaluation and treatment.  General surgery consulted.  Started on scheduled Reglan and IV fluids.  Advancing diet slowly as tolerated.  Patient planning to return home on discharge once tolerating a diet.    Interval Followup: Sitting up in bed appears to be resting fairly comfortably.   She reports worsening nausea on this  morning.  She continues to complain of abdominal pain.  She reports she has not had a bowel movement in 2 days now.    Review of Systems  Constitutional: Negative for fatigue and fever.   HENT: Negative for sore throat and trouble swallowing.    Eyes: Negative for pain and discharge.   Respiratory: Negative for cough and shortness of breath.    Cardiovascular: Negative for chest pain and palpitations.   Gastrointestinal: Positive for abdominal pain, positive nausea, denies vomiting.   Endocrine: Negative for cold intolerance and heat intolerance.   Genitourinary: Negative for difficulty urinating and dysuria.   Musculoskeletal: Negative for back pain and neck stiffness.   Skin: Negative for color change and rash.   Neurological: Negative for syncope and headaches.   Hematological: Negative for adenopathy.   Psychiatric/Behavioral: Negative for confusion and hallucinations.    Objective   Objective     Vitals:   Temp:  [98 °F (36.7 °C)-98.6 °F (37 °C)] 98 °F (36.7 °C)  Heart Rate:  [102-119] 112  Resp:  [16-18] 16  BP: (124-154)/() 142/96  Physical Exam   General: well-developed appearing stated age in no acute distress  HEENT: Normocephalic atraumatic moist membranes pupils equal round no scleral icterus no conjunctival injection  Cardiovascular: Mildly tachycardic S1-S2, no appreciable murmurs, no lower extremity edema appreciated  Pulmonary: Clear to auscultation bilaterally, unlabored, no conversational dyspnea appreciated  Gastrointestinal: Soft mildly tender in the left upper quadrant nondistended positive bowel sounds all 4 quadrants no rebound or guarding  Musculoskeletal: No clubbing cyanosis, warm and well-perfused, calves soft symmetric nontender bilaterally  Skin: Clean dry without rashes  Neuro: Cranial nerves II through XII intact grossly no sensorimotor deficits appreciated bilateral upper and lower extremities  Psych: Patient is calm cooperative and appropriate with exam not responding to  internal stimuli      Result Review    Result Review:  I have personally reviewed these results and agree with these findings:  [x]  Laboratory  LAB RESULTS:      Lab 03/31/25  0412 03/30/25  1209   WBC 9.27 11.07*   HEMOGLOBIN 11.5* 12.8   HEMATOCRIT 36.4 40.9   PLATELETS 487* 533*   NEUTROS ABS  --  7.80*   IMMATURE GRANS (ABS)  --  0.12*   LYMPHS ABS  --  2.50   MONOS ABS  --  0.53   EOS ABS  --  0.07   MCV 90.1 89.7         Lab 03/31/25  0412 03/30/25  1209   SODIUM 140 137   POTASSIUM 3.9 3.6   CHLORIDE 105 100   CO2 20.4* 18.5*   ANION GAP 14.6 18.5*   BUN 11 12   CREATININE 0.71 0.68   EGFR 109.0 111.7   GLUCOSE 113* 85   CALCIUM 9.3 10.3         Lab 03/30/25  1209   TOTAL PROTEIN 8.0   ALBUMIN 4.3   GLOBULIN 3.7   ALT (SGPT) 9   AST (SGOT) 12   BILIRUBIN 0.4   ALK PHOS 80   LIPASE 35         Lab 03/30/25  1209   PROBNP <36.0   HSTROP T <6                 Brief Urine Lab Results  (Last result in the past 365 days)        Color   Clarity   Blood   Leuk Est   Nitrite   Protein   CREAT   Urine HCG        03/30/25 1242 Yellow   Clear   Negative   Negative   Negative   Negative                 Microbiology Results (last 10 days)       ** No results found for the last 240 hours. **            []  Microbiology  [x]  Radiology  XR Abdomen KUB  Result Date: 4/3/2025  Impression: Findings compatible with constipation. Electronically Signed: Martha Barker MD  4/3/2025 11:16 AM EDT  Workstation ID: [id number]    CT Angiogram Chest Pulmonary Embolism  Result Date: 3/30/2025  Impression: No evidence of pulmonary embolus. No acute intrathoracic findings. Electronically Signed: Ankit Barker MD  3/30/2025 2:24 PM EDT  Workstation ID: [id number]    CT Abdomen Pelvis With Contrast  Result Date: 3/30/2025  1.Residual postoperative changes are noted status post recent cholecystectomy and appendectomy. 2.No evidence for abnormal fluid collection. No significant hemorrhage or hematoma. 3.No evidence for additional acute abnormality  throughout the abdomen or pelvis. Electronically Signed: Irwin Jose MD  3/30/2025 2:23 PM EDT  Workstation ID: [id number]    XR Chest 1 View  Result Date: 3/30/2025  Impression: No acute cardiopulmonary abnormality. Electronically Signed: Alin Quiroz  3/30/2025 12:30 PM EDT  Workstation ID: [id number]      [x]  EKG/Telemetry   []  Cardiology/Vascular   []  Pathology  []  Old records  [x]  Other:  Scheduled Meds:aluminum-magnesium hydroxide-simethicone, 30 mL, Oral, Once  busPIRone, 15 mg, Oral, TID  DULoxetine, 90 mg, Oral, Daily  enoxaparin sodium, 40 mg, Subcutaneous, Daily  magnesium hydroxide, 10 mL, Oral, Daily  metoclopramide, 10 mg, Oral, 4x Daily AC & at Bedtime  montelukast, 10 mg, Oral, Nightly  pantoprazole, 40 mg, Intravenous, Q24H  polyethylene glycol, 17 g, Oral, Daily  polyethylene glycol, 17 g, Oral, Once  saccharomyces boulardii, 250 mg, Oral, BID  sodium chloride, 10 mL, Intravenous, Q12H      Continuous Infusions:     PRN Meds:.  acetaminophen    bisacodyl    senna-docusate sodium **AND** polyethylene glycol **AND** bisacodyl **AND** bisacodyl    clonazePAM    HYDROcodone-acetaminophen    HYDROmorphone    ipratropium-albuterol    ondansetron    sodium chloride    sodium chloride    sodium chloride    tiZANidine    traZODone      Assessment & Plan   Assessment / Plan     Assessment/Plan:  Intractable nausea and vomiting  Gastroparesis  Constipation  Right upper quadrant abdominal pain  Depression with anxiety  Cervical radiculopathy        Patient admitted for further evaluation and treatment  General Surgery consult noted and appreciated  Continue scheduled Reglan, will switch from IV to oral formulation on today  Continue as needed Zofran  Continue IV fluids until ensure tolerating diet  Resume patient's home oral pain management regimen with Dilaudid IV for breakthrough.  Will change MiraLAX from daily to twice daily dosing.  Also give a one-time dose of Lasix on today as well.  Continue  home buspirone, duloxetine scheduled with as needed Klonopin  Continue home tizanidine as needed  Further inpatient orders recommendations pending clinical course      Disposition: Home once tolerating a diet and abdominal pain improves.    VTE Prophylaxis:  Pharmacologic VTE prophylaxis orders are present.        CODE STATUS:   Code Status (Patient has no pulse and is not breathing): CPR (Attempt to Resuscitate)  Medical Interventions (Patient has pulse or is breathing): Full Support  Level Of Support Discussed With: Patient          Electronically signed by Naila Alejandre MD, 04/03/25, 2:40 PM EDT.

## 2025-04-03 NOTE — PLAN OF CARE
Goal Outcome Evaluation:  Plan of Care Reviewed With: patient        Progress: no change  Outcome Evaluation: Pt remained A&Ox4 this shift. Also, pt remained on room air maintaining stable oxygen saturation. Pt was medicated with PRN Norco, Dilaudid, and Zanaflex to help relieve pain. Pt remained up ad eloy.

## 2025-04-03 NOTE — SIGNIFICANT NOTE
[facility]   Wound Evaluation / Progress Note       Patient Name: Ebony Hamilton    : 1980    MRN: [id number]  Today's Date: 4/3/2025                     Admit Date: 3/30/2025    Visit Dx:    ICD-10-CM ICD-9-CM   1. Abdominal pain, unspecified abdominal location  R10.9 789.00   2. Difficulty in walking  R26.2 719.7       Patient Active Problem List   Diagnosis    Acute postoperative pain    Allergic rhinitis    Bulge of cervical disc without myelopathy    Cervical fusion syndrome    Degeneration of intervertebral disc of cervical region    Spinal stenosis in cervical region    Impingement syndrome of shoulder region    Intractable chronic migraine without aura    Mitral valve disorder    Tricuspid valve disorder    Endometriosis    Obesity    SLAP lesion of left shoulder    NICHOLE (generalized anxiety disorder)    Blood in stool    BRBPR (bright red blood per rectum)    Constipation    Diarrhea    Lower abdominal pain    Myalgia    Cervical post-laminectomy syndrome    Cervical radiculopathy    Adenomyosis of uterus    Hemorrhoids    Biliary colic    Intractable abdominal pain    Calculus of gallbladder without cholecystitis without obstruction    Nausea and vomiting    Gastroparesis    S/P laparoscopic cholecystectomy    Bile leak from gallbladder bed    Encounter for removal of biliary stent    Appendicitis    S/P laparoscopic appendectomy    Intractable nausea and vomiting    Abdominal pain    Nausea & vomiting        Past Medical History:   Diagnosis Date    Allergic     Anxiety     Atrial fibrillation     RELEASED BY CARDIOLOGIST/ELECTROPHYSIST, NO CURRENT MEDS    Chronic pain disorder     Depression     Endometriosis     Headache     Hemorrhoids     Injury of shoulder, right 2009    CHRONIC PAIN    Panic disorder     Rectal bleeding     S/P laparoscopic appendectomy 2025    S/P laparoscopic cholecystectomy 2025        Past Surgical History:   Procedure Laterality Date     APPENDECTOMY N/A 3/9/2025    Procedure: APPENDECTOMY LAPAROSCOPIC;  Surgeon: Mingo Cannon MD;  Location: [facility] MAIN OR;  Service: General;  Laterality: N/A;    CERVICAL ARTHRODESIS  01/14/2015    Donaldo Albarran    CERVICAL FUSION      C4-7 FUSION, FULL ROM    CHOLECYSTECTOMY N/A 2/17/2025    Procedure: CHOLECYSTECTOMY LAPAROSCOPIC plain language: removal of gallbladder thru small incisions using long instruments and a camera;  Surgeon: Josué Castano MD;  Location: [facility] MAIN OR;  Service: General;  Laterality: N/A;    COLONOSCOPY N/A 05/31/2022    Procedure: COLONOSCOPY;  Surgeon: Shabbir Baird MD;  Location: [facility] ENDOSCOPY;  Service: General;  Laterality: N/A;  HEMORRHOIDS    ENDOSCOPY N/A 2/17/2025    Procedure: ESOPHAGOGASTRODUODENOSCOPY WITH BIOPSIES;  Surgeon: Sanya Reyes MD;  Location: [facility] ENDOSCOPY;  Service: Gastroenterology;  Laterality: N/A;  GASTRITIS, SMALL HIATAL HERNIA    ENDOSCOPY N/A 3/6/2025    Procedure: ESOPHAGOGASTRODUODENOSCOPY with pancreatic stent removal;  Surgeon: Ha Thompson MD;  Location: [facility] ENDOSCOPY;  Service: Gastroenterology;  Laterality: N/A;  pancreatic stent removal, hiatal hernia    ERCP N/A 2/24/2025    Procedure: ENDOSCOPIC RETROGRADE CHOLANGIOPANCREATOGRAPHY with bile duct stent placement and pancreatic duct stent placement;  Surgeon: Ha Thompson MD;  Location: [facility] ENDOSCOPY;  Service: Gastroenterology;  Laterality: N/A;  bile duct leeak    EXPLORATORY LAPAROTOMY      HEMORRHOIDECTOMY N/A 05/31/2022    Procedure: HEMORRHOID BANDING;  Surgeon: Shabbir Baird MD;  Location: [facility] ENDOSCOPY;  Service: General;  Laterality: N/A;  BANDS X 2    HEMORRHOIDECTOMY N/A 1/31/2024    Procedure: HEMORRHOIDECTOMY;  Surgeon: Shabbir Baird MD;  Location: [facility] OR [facility];  Service: General;  Laterality: N/A;    HYSTERECTOMY      SHOULDER ARTHROSCOPY Right     SHOULDER SURGERY Left     ARTHROSCOPY LABRAL TEAR X2    TUBAL  ABDOMINAL LIGATION           Physical Assessment:  Wound Right medial mid axillary (Active)   Wound Image   04/03/25 1500   Dressing Appearance open to air 04/03/25 1500   Closure None 04/02/25 1837   Base red;moist 04/03/25 1500   Red (%), Wound Tissue Color 100 04/03/25 1500   Periwound intact;pale white 04/03/25 1500   Periwound Temperature warm 04/03/25 1500   Periwound Skin Turgor soft 04/03/25 1500   Edges rolled/closed 04/03/25 1500   Drainage Amount none 04/03/25 1500   Care, Wound cleansed with;sterile normal saline 04/03/25 1500   Dressing Care open to air 04/03/25 1500        Wound Check / Follow-up:  Seen today on 4NT for wound RN follow-up. Area to right axillary is almost completely closed with a small pin point open area with a red, moist base. Dressing is off and patient states staff has been leaving off for a few days now. Recommend discontinuing the current plan of care and cleansing right axillary with hibiclens daily and pat dry.    Impression: Right axillary wound    Short term goals:  Skin care management; regain skin integrity    Shahid Rutledge, RN,[facility]    4/3/2025    15:38 EDT

## 2025-04-03 NOTE — PLAN OF CARE
Goal Outcome Evaluation:              Outcome Evaluation: alert and oriented x4. vss on RA. c/o nausea/abdominal pain; medicated PRN per MAR. family at bedside. up adlib in room, not a falls risk. no new issues/needs at this time.

## 2025-04-04 PROCEDURE — 94799 UNLISTED PULMONARY SVC/PX: CPT

## 2025-04-04 PROCEDURE — 99232 SBSQ HOSP IP/OBS MODERATE 35: CPT | Performed by: INTERNAL MEDICINE

## 2025-04-04 PROCEDURE — 94664 DEMO&/EVAL PT USE INHALER: CPT

## 2025-04-04 PROCEDURE — 94640 AIRWAY INHALATION TREATMENT: CPT

## 2025-04-04 PROCEDURE — 25010000002 HYDROMORPHONE 1 MG/ML SOLUTION: Performed by: INTERNAL MEDICINE

## 2025-04-04 RX ORDER — ALBUTEROL SULFATE 0.83 MG/ML
2.5 SOLUTION RESPIRATORY (INHALATION) EVERY 12 HOURS
Status: DISCONTINUED | OUTPATIENT
Start: 2025-04-04 | End: 2025-04-05

## 2025-04-04 RX ORDER — LACTULOSE 10 G/15ML
20 SOLUTION ORAL
Status: DISPENSED | OUTPATIENT
Start: 2025-04-04 | End: 2025-04-04

## 2025-04-04 RX ADMIN — METOCLOPRAMIDE 10 MG: 10 TABLET ORAL at 08:44

## 2025-04-04 RX ADMIN — HYDROMORPHONE HYDROCHLORIDE 0.5 MG: 1 INJECTION, SOLUTION INTRAMUSCULAR; INTRAVENOUS; SUBCUTANEOUS at 06:01

## 2025-04-04 RX ADMIN — BUSPIRONE HYDROCHLORIDE 15 MG: 10 TABLET ORAL at 20:54

## 2025-04-04 RX ADMIN — HYDROMORPHONE HYDROCHLORIDE 0.5 MG: 1 INJECTION, SOLUTION INTRAMUSCULAR; INTRAVENOUS; SUBCUTANEOUS at 18:06

## 2025-04-04 RX ADMIN — SENNOSIDES AND DOCUSATE SODIUM 2 TABLET: 50; 8.6 TABLET ORAL at 08:44

## 2025-04-04 RX ADMIN — TIZANIDINE 4 MG: 4 TABLET ORAL at 08:44

## 2025-04-04 RX ADMIN — HYDROCODONE BITARTRATE AND ACETAMINOPHEN 1 TABLET: 10; 325 TABLET ORAL at 08:44

## 2025-04-04 RX ADMIN — HYDROMORPHONE HYDROCHLORIDE 0.5 MG: 1 INJECTION, SOLUTION INTRAMUSCULAR; INTRAVENOUS; SUBCUTANEOUS at 12:06

## 2025-04-04 RX ADMIN — Medication 10 ML: at 08:44

## 2025-04-04 RX ADMIN — POLYETHYLENE GLYCOL 3350 17 G: 17 POWDER, FOR SOLUTION ORAL at 10:19

## 2025-04-04 RX ADMIN — Medication 250 MG: at 20:54

## 2025-04-04 RX ADMIN — METOCLOPRAMIDE 10 MG: 10 TABLET ORAL at 18:06

## 2025-04-04 RX ADMIN — METOCLOPRAMIDE 10 MG: 10 TABLET ORAL at 12:06

## 2025-04-04 RX ADMIN — BUSPIRONE HYDROCHLORIDE 15 MG: 10 TABLET ORAL at 18:06

## 2025-04-04 RX ADMIN — ALBUTEROL SULFATE 2.5 MG: 2.5 SOLUTION RESPIRATORY (INHALATION) at 11:52

## 2025-04-04 RX ADMIN — HYDROCODONE BITARTRATE AND ACETAMINOPHEN 1 TABLET: 10; 325 TABLET ORAL at 14:20

## 2025-04-04 RX ADMIN — MONTELUKAST 10 MG: 10 TABLET, FILM COATED ORAL at 20:54

## 2025-04-04 RX ADMIN — LACTULOSE 20 G: 10 SOLUTION ORAL at 12:06

## 2025-04-04 RX ADMIN — LACTULOSE 20 G: 10 SOLUTION ORAL at 10:18

## 2025-04-04 RX ADMIN — POLYETHYLENE GLYCOL 3350 17 G: 17 POWDER, FOR SOLUTION ORAL at 08:44

## 2025-04-04 RX ADMIN — DULOXETINE HYDROCHLORIDE 90 MG: 30 CAPSULE, DELAYED RELEASE ORAL at 08:44

## 2025-04-04 RX ADMIN — Medication 250 MG: at 08:44

## 2025-04-04 RX ADMIN — HYDROCODONE BITARTRATE AND ACETAMINOPHEN 1 TABLET: 10; 325 TABLET ORAL at 02:14

## 2025-04-04 RX ADMIN — BUSPIRONE HYDROCHLORIDE 15 MG: 10 TABLET ORAL at 08:44

## 2025-04-04 RX ADMIN — Medication 10 ML: at 20:54

## 2025-04-04 RX ADMIN — METOCLOPRAMIDE 10 MG: 10 TABLET ORAL at 20:54

## 2025-04-04 RX ADMIN — PANTOPRAZOLE SODIUM 40 MG: 40 INJECTION, POWDER, FOR SOLUTION INTRAVENOUS at 20:53

## 2025-04-04 RX ADMIN — MAGNESIUM HYDROXIDE 10 ML: 2400 SUSPENSION ORAL at 10:19

## 2025-04-04 NOTE — PLAN OF CARE
Goal Outcome Evaluation:  Plan of Care Reviewed With: patient        Progress: no change  Outcome Evaluation: Pt remained A&Ox4 this shift. Also, pt remained on room air maintaining stable oxygen saturation. Also, pt was medicated with PRN Dilaudid, Norco, and Zanaflex to help relieve pain. Also, pt was medicated with PRN Pericolace to help faciliate a BM. Pt was able to have several BMs this shift. Wound/skin care completed as per order. Pt tolerated well.

## 2025-04-04 NOTE — PROGRESS NOTES
[facility]   Hospitalist Progress Note  Date: 2025  Patient Name: Ebony Hamilton  : 1980  MRN: [id number]  Date of admission: 3/30/2025      Subjective   Subjective     Chief Complaint: Intractable nausea vomiting    Summary:Ebony Hamilton is a 44 y.o. female  with past medical history cervical radiculopathy, anxiety, depression, gastroparesis, obesity and GERD resenting to the ED for evaluation of intractable nausea and vomiting.  3 weeks ago patient had a cholecystectomy performed by Dr. Castano which had complications related clips slipped off causing bowel subsequent to the abdomen.  Patient required drain placement.  The following patient had abdominal pain again and was found to have appendicitis which required an appendectomy.  Over the last 2 days patient has been having intractable nausea to where she cannot keep anything down so she came to the ED for further evaluation.  Since her cholecystectomy 3 weeks ago patient has been to the ED on multiple occasions.  In the ED patient was slightly hypertensive with remaining vitals being within normal limits.  Labs show mild leukocytosis and lactic acidosis which responded well to fluids with remaining labs being unremarkable including a normal lipase, proBNP, troponin, and negative UA.  CT of the abdomen with contrast showed postoperative changes of recent cholecystectomy and appendectomy with nothing acute including no evidence of abnormal fluid collections, hemorrhages or hematomas.  When seen patient was still having nausea and vomiting with abdominal discomfort with inspiration.  Patient admitted for further evaluation and treatment.  General surgery consulted.  Started on scheduled Reglan and IV fluids.  Advancing diet slowly as tolerated.  Patient planning to return home on discharge once tolerating a diet.    Interval Followup: Sitting up in bed appears to be resting fairly comfortably.   She reports continued nausea and  abdominal pain on this morning.  She still has not had a bowel movement.  She also complains of pain in her right side she takes deep breath she reports has been ongoing for admission.    Review of Systems  Constitutional: Negative for fatigue and fever.   HENT: Negative for sore throat and trouble swallowing.    Eyes: Negative for pain and discharge.   Respiratory: Negative for cough and shortness of breath.    Cardiovascular: Negative for chest pain and palpitations.   Gastrointestinal: Positive for abdominal pain, positive nausea, denies vomiting.   Endocrine: Negative for cold intolerance and heat intolerance.   Genitourinary: Negative for difficulty urinating and dysuria.   Musculoskeletal: Negative for back pain and neck stiffness.   Skin: Negative for color change and rash.   Neurological: Negative for syncope and headaches.   Hematological: Negative for adenopathy.   Psychiatric/Behavioral: Negative for confusion and hallucinations.    Objective   Objective     Vitals:   Temp:  [97.4 °F (36.3 °C)-98.2 °F (36.8 °C)] 98 °F (36.7 °C)  Heart Rate:  [] 100  Resp:  [16-18] 18  BP: (110-166)/() 118/94  Physical Exam   General: well-developed appearing stated age in no acute distress  HEENT: Normocephalic atraumatic moist membranes pupils equal round no scleral icterus no conjunctival injection  Cardiovascular: Mildly tachycardic S1-S2, no appreciable murmurs, no lower extremity edema appreciated  Pulmonary: Clear to auscultation bilaterally, unlabored, no conversational dyspnea appreciated  Gastrointestinal: Soft mildly tender in the left upper quadrant nondistended positive bowel sounds all 4 quadrants no rebound or guarding  Musculoskeletal: No clubbing cyanosis, warm and well-perfused, calves soft symmetric nontender bilaterally  Skin: Clean dry without rashes  Neuro: Cranial nerves II through XII intact grossly no sensorimotor deficits appreciated bilateral upper and lower extremities  Psych:  Patient is calm cooperative and appropriate with exam not responding to internal stimuli      Result Review    Result Review:  I have personally reviewed these results and agree with these findings:  [x]  Laboratory  LAB RESULTS:      Lab 03/31/25  0412 03/30/25  1209   WBC 9.27 11.07*   HEMOGLOBIN 11.5* 12.8   HEMATOCRIT 36.4 40.9   PLATELETS 487* 533*   NEUTROS ABS  --  7.80*   IMMATURE GRANS (ABS)  --  0.12*   LYMPHS ABS  --  2.50   MONOS ABS  --  0.53   EOS ABS  --  0.07   MCV 90.1 89.7         Lab 03/31/25  0412 03/30/25  1209   SODIUM 140 137   POTASSIUM 3.9 3.6   CHLORIDE 105 100   CO2 20.4* 18.5*   ANION GAP 14.6 18.5*   BUN 11 12   CREATININE 0.71 0.68   EGFR 109.0 111.7   GLUCOSE 113* 85   CALCIUM 9.3 10.3         Lab 03/30/25  1209   TOTAL PROTEIN 8.0   ALBUMIN 4.3   GLOBULIN 3.7   ALT (SGPT) 9   AST (SGOT) 12   BILIRUBIN 0.4   ALK PHOS 80   LIPASE 35         Lab 03/30/25  1209   PROBNP <36.0   HSTROP T <6                 Brief Urine Lab Results  (Last result in the past 365 days)        Color   Clarity   Blood   Leuk Est   Nitrite   Protein   CREAT   Urine HCG        03/30/25 1242 Yellow   Clear   Negative   Negative   Negative   Negative                 Microbiology Results (last 10 days)       ** No results found for the last 240 hours. **            []  Microbiology  [x]  Radiology  XR Abdomen KUB  Result Date: 4/3/2025  Impression: Findings compatible with constipation. Electronically Signed: Martha Barker MD  4/3/2025 11:16 AM EDT  Workstation ID: [id number]    CT Angiogram Chest Pulmonary Embolism  Result Date: 3/30/2025  Impression: No evidence of pulmonary embolus. No acute intrathoracic findings. Electronically Signed: Ankit Barker MD  3/30/2025 2:24 PM EDT  Workstation ID: [id number]    CT Abdomen Pelvis With Contrast  Result Date: 3/30/2025  1.Residual postoperative changes are noted status post recent cholecystectomy and appendectomy. 2.No evidence for abnormal fluid collection. No  significant hemorrhage or hematoma. 3.No evidence for additional acute abnormality throughout the abdomen or pelvis. Electronically Signed: Irwin Jose MD  3/30/2025 2:23 PM EDT  Workstation ID: [id number]    XR Chest 1 View  Result Date: 3/30/2025  Impression: No acute cardiopulmonary abnormality. Electronically Signed: Alin Richie  3/30/2025 12:30 PM EDT  Workstation ID: [id number]      [x]  EKG/Telemetry   []  Cardiology/Vascular   []  Pathology  []  Old records  [x]  Other:  Scheduled Meds:aluminum-magnesium hydroxide-simethicone, 30 mL, Oral, Once  busPIRone, 15 mg, Oral, TID  DULoxetine, 90 mg, Oral, Daily  enoxaparin sodium, 40 mg, Subcutaneous, Daily  magnesium hydroxide, 10 mL, Oral, Daily  metoclopramide, 10 mg, Oral, 4x Daily AC & at Bedtime  montelukast, 10 mg, Oral, Nightly  pantoprazole, 40 mg, Intravenous, Q24H  polyethylene glycol, 17 g, Oral, BID  saccharomyces boulardii, 250 mg, Oral, BID  sodium chloride, 10 mL, Intravenous, Q12H      Continuous Infusions:     PRN Meds:.  acetaminophen    bisacodyl    senna-docusate sodium **AND** polyethylene glycol **AND** bisacodyl **AND** bisacodyl    clonazePAM    diphenhydrAMINE-zinc acetate    HYDROcodone-acetaminophen    HYDROmorphone    ipratropium-albuterol    ondansetron    sodium chloride    sodium chloride    sodium chloride    tiZANidine    traZODone      Assessment & Plan   Assessment / Plan     Assessment/Plan:  Intractable nausea and vomiting  Gastroparesis  Constipation  Right upper quadrant abdominal pain  Depression with anxiety  Cervical radiculopathy  Pleuritic chest pain        Patient admitted for further evaluation and treatment  General Surgery consult noted and appreciated  Continue scheduled Reglan, switch to oral formulation  continue as needed Zofran  Status post IV fluids  Continue patient's home oral pain management regimen with Dilaudid IV for breakthrough.  Continue MiraLAX  twice daily dosing.   Will give lactulose 20 g p.o.  every 2 hours x 3 doses.  Further dosing based on response.  Continue home buspirone, duloxetine scheduled with as needed Klonopin  Continue home tizanidine as needed  Patient with resistant pleuritic pain.  Workup has been negative for evidence of PE, pneumonia, infarct or any other significant findings.  Will continue pulmonary toiletry with incentive spirometry and ambulation.  Will add flutter valve therapy and scheduled DuoNebs x 2 on today  Further inpatient orders recommendations pending clinical course      Disposition: Home once tolerating a diet and abdominal pain improves.    VTE Prophylaxis:  Pharmacologic VTE prophylaxis orders are present.        CODE STATUS:   Code Status (Patient has no pulse and is not breathing): CPR (Attempt to Resuscitate)  Medical Interventions (Patient has pulse or is breathing): Full Support  Level Of Support Discussed With: Patient          Electronically signed by Naila Alejandre MD, 04/04/25, 1:14 PM EDT.

## 2025-04-04 NOTE — PLAN OF CARE
Goal Outcome Evaluation:              Outcome Evaluation: alert and oriented x4. vss on RA. medicated frequently for c/o right back/abdominal pain when breathing. spouse at bedside, attentive to pt and pt needs. no new issues/needs at this time.

## 2025-04-05 LAB
ANION GAP SERPL CALCULATED.3IONS-SCNC: 12.2 MMOL/L (ref 5–15)
BUN SERPL-MCNC: 7 MG/DL (ref 6–20)
BUN/CREAT SERPL: 12.5 (ref 7–25)
CALCIUM SPEC-SCNC: 9.4 MG/DL (ref 8.6–10.5)
CHLORIDE SERPL-SCNC: 102 MMOL/L (ref 98–107)
CO2 SERPL-SCNC: 24.8 MMOL/L (ref 22–29)
CREAT SERPL-MCNC: 0.56 MG/DL (ref 0.57–1)
EGFRCR SERPLBLD CKD-EPI 2021: 117 ML/MIN/1.73
GLUCOSE SERPL-MCNC: 101 MG/DL (ref 65–99)
MAGNESIUM SERPL-MCNC: 2.1 MG/DL (ref 1.6–2.6)
POTASSIUM SERPL-SCNC: 4.3 MMOL/L (ref 3.5–5.2)
SODIUM SERPL-SCNC: 139 MMOL/L (ref 136–145)
WHOLE BLOOD HOLD SPECIMEN: NORMAL

## 2025-04-05 PROCEDURE — 25010000002 HYDROMORPHONE 1 MG/ML SOLUTION: Performed by: INTERNAL MEDICINE

## 2025-04-05 PROCEDURE — 99232 SBSQ HOSP IP/OBS MODERATE 35: CPT | Performed by: INTERNAL MEDICINE

## 2025-04-05 PROCEDURE — 83735 ASSAY OF MAGNESIUM: CPT | Performed by: INTERNAL MEDICINE

## 2025-04-05 PROCEDURE — 80048 BASIC METABOLIC PNL TOTAL CA: CPT | Performed by: INTERNAL MEDICINE

## 2025-04-05 RX ORDER — HYDROCODONE BITARTRATE AND ACETAMINOPHEN 10; 325 MG/1; MG/1
1.5 TABLET ORAL EVERY 4 HOURS PRN
Refills: 0 | Status: DISCONTINUED | OUTPATIENT
Start: 2025-04-05 | End: 2025-04-06 | Stop reason: HOSPADM

## 2025-04-05 RX ORDER — LIDOCAINE 4 G/G
1 PATCH TOPICAL
Status: DISCONTINUED | OUTPATIENT
Start: 2025-04-05 | End: 2025-04-06 | Stop reason: HOSPADM

## 2025-04-05 RX ORDER — SIMETHICONE 80 MG
120 TABLET,CHEWABLE ORAL
Status: DISCONTINUED | OUTPATIENT
Start: 2025-04-05 | End: 2025-04-06 | Stop reason: HOSPADM

## 2025-04-05 RX ADMIN — TIZANIDINE 4 MG: 4 TABLET ORAL at 09:33

## 2025-04-05 RX ADMIN — METOCLOPRAMIDE 10 MG: 10 TABLET ORAL at 20:06

## 2025-04-05 RX ADMIN — DULOXETINE HYDROCHLORIDE 90 MG: 30 CAPSULE, DELAYED RELEASE ORAL at 09:33

## 2025-04-05 RX ADMIN — SIMETHICONE 120 MG: 80 TABLET, CHEWABLE ORAL at 11:52

## 2025-04-05 RX ADMIN — HYDROMORPHONE HYDROCHLORIDE 0.5 MG: 1 INJECTION, SOLUTION INTRAMUSCULAR; INTRAVENOUS; SUBCUTANEOUS at 05:33

## 2025-04-05 RX ADMIN — HYDROCODONE BITARTRATE AND ACETAMINOPHEN 1 TABLET: 10; 325 TABLET ORAL at 09:20

## 2025-04-05 RX ADMIN — MONTELUKAST 10 MG: 10 TABLET, FILM COATED ORAL at 20:06

## 2025-04-05 RX ADMIN — BUSPIRONE HYDROCHLORIDE 15 MG: 10 TABLET ORAL at 20:07

## 2025-04-05 RX ADMIN — Medication 250 MG: at 20:06

## 2025-04-05 RX ADMIN — IPRATROPIUM BROMIDE AND ALBUTEROL SULFATE 3 ML: .5; 3 SOLUTION RESPIRATORY (INHALATION) at 15:46

## 2025-04-05 RX ADMIN — HYDROCODONE BITARTRATE AND ACETAMINOPHEN 1.5 TABLET: 10; 325 TABLET ORAL at 15:45

## 2025-04-05 RX ADMIN — TIZANIDINE 4 MG: 4 TABLET ORAL at 20:06

## 2025-04-05 RX ADMIN — Medication 10 ML: at 20:07

## 2025-04-05 RX ADMIN — SIMETHICONE 120 MG: 80 TABLET, CHEWABLE ORAL at 18:04

## 2025-04-05 RX ADMIN — BUSPIRONE HYDROCHLORIDE 15 MG: 10 TABLET ORAL at 09:21

## 2025-04-05 RX ADMIN — Medication 250 MG: at 09:21

## 2025-04-05 RX ADMIN — POLYETHYLENE GLYCOL 3350 17 G: 17 POWDER, FOR SOLUTION ORAL at 20:08

## 2025-04-05 RX ADMIN — METOCLOPRAMIDE 10 MG: 10 TABLET ORAL at 11:42

## 2025-04-05 RX ADMIN — Medication 10 ML: at 09:22

## 2025-04-05 RX ADMIN — SIMETHICONE 120 MG: 80 TABLET, CHEWABLE ORAL at 20:06

## 2025-04-05 RX ADMIN — POLYETHYLENE GLYCOL 3350 17 G: 17 POWDER, FOR SOLUTION ORAL at 09:22

## 2025-04-05 RX ADMIN — METOCLOPRAMIDE 10 MG: 10 TABLET ORAL at 09:20

## 2025-04-05 RX ADMIN — BUSPIRONE HYDROCHLORIDE 15 MG: 10 TABLET ORAL at 15:44

## 2025-04-05 RX ADMIN — HYDROCODONE BITARTRATE AND ACETAMINOPHEN 1 TABLET: 10; 325 TABLET ORAL at 00:01

## 2025-04-05 RX ADMIN — HYDROMORPHONE HYDROCHLORIDE 0.5 MG: 1 INJECTION, SOLUTION INTRAMUSCULAR; INTRAVENOUS; SUBCUTANEOUS at 11:41

## 2025-04-05 RX ADMIN — PANTOPRAZOLE SODIUM 40 MG: 40 INJECTION, POWDER, FOR SOLUTION INTRAVENOUS at 20:07

## 2025-04-05 RX ADMIN — HYDROCODONE BITARTRATE AND ACETAMINOPHEN 1.5 TABLET: 10; 325 TABLET ORAL at 20:07

## 2025-04-05 RX ADMIN — METOCLOPRAMIDE 10 MG: 10 TABLET ORAL at 18:04

## 2025-04-05 RX ADMIN — MAGNESIUM HYDROXIDE 10 ML: 2400 SUSPENSION ORAL at 09:21

## 2025-04-05 RX ADMIN — LIDOCAINE PAIN RELIEF 1 PATCH: 560 PATCH TOPICAL at 11:41

## 2025-04-05 NOTE — PLAN OF CARE
Goal Outcome Evaluation:  Plan of Care Reviewed With: patient        Progress: no change  Outcome Evaluation: Pt remains A&Ox4, on room air. Medicated for c/o pain per mar. Scheduled miralax refused this shift due to pt c/o multiple bms today.  Skin care completed per orders. Up ad eloy in room, no needs at this time.

## 2025-04-05 NOTE — PROGRESS NOTES
[facility]   Hospitalist Progress Note  Date: 2025  Patient Name: Ebony Hamilton  : 1980  MRN: [id number]  Date of admission: 3/30/2025      Subjective   Subjective     Chief Complaint: Intractable nausea vomiting    Summary:Ebony Hamilton is a 44 y.o. female  with past medical history cervical radiculopathy, anxiety, depression, gastroparesis, obesity and GERD resenting to the ED for evaluation of intractable nausea and vomiting.  3 weeks ago patient had a cholecystectomy performed by Dr. Castano which had complications related clips slipped off causing bowel subsequent to the abdomen.  Patient required drain placement.  The following patient had abdominal pain again and was found to have appendicitis which required an appendectomy.  Over the last 2 days patient has been having intractable nausea to where she cannot keep anything down so she came to the ED for further evaluation.  Since her cholecystectomy 3 weeks ago patient has been to the ED on multiple occasions.  In the ED patient was slightly hypertensive with remaining vitals being within normal limits.  Labs show mild leukocytosis and lactic acidosis which responded well to fluids with remaining labs being unremarkable including a normal lipase, proBNP, troponin, and negative UA.  CT of the abdomen with contrast showed postoperative changes of recent cholecystectomy and appendectomy with nothing acute including no evidence of abnormal fluid collections, hemorrhages or hematomas.  When seen patient was still having nausea and vomiting with abdominal discomfort with inspiration.  Patient admitted for further evaluation and treatment.  General surgery consulted.  Started on scheduled Reglan and IV fluids.  Advancing diet slowly as tolerated.  Patient planning to return home on discharge once tolerating a diet.    Interval Followup: Patient initially seen sleeping she appears to be fairly comfortably.   She awakens easily.  She  complains of  right upper quadrant abdominal pain and nausea.  Reports that the pain is worse when she tries to take deep breaths.  She has had multiple bowel movements on yesterday evening after receiving lactulose.      Review of Systems  Constitutional: Negative for fatigue and fever.   HENT: Negative for sore throat and trouble swallowing.    Eyes: Negative for pain and discharge.   Respiratory: Negative for cough and shortness of breath.    Cardiovascular: Negative for chest pain and palpitations.   Gastrointestinal: Positive for abdominal pain, positive nausea, denies vomiting.   Endocrine: Negative for cold intolerance and heat intolerance.   Genitourinary: Negative for difficulty urinating and dysuria.   Musculoskeletal: Negative for back pain and neck stiffness.   Skin: Negative for color change and rash.   Neurological: Negative for syncope and headaches.   Hematological: Negative for adenopathy.   Psychiatric/Behavioral: Negative for confusion and hallucinations.    Objective   Objective     Vitals:   Temp:  [97.9 °F (36.6 °C)-98.5 °F (36.9 °C)] 98.4 °F (36.9 °C)  Heart Rate:  [] 86  Resp:  [16-20] 18  BP: (118-153)/() 118/82  Physical Exam   General: well-developed appearing stated age in no acute distress  HEENT: Normocephalic atraumatic moist membranes pupils equal round no scleral icterus no conjunctival injection  Cardiovascular: Regular S1-S2, no appreciable murmurs, no lower extremity edema appreciated  Pulmonary: Clear to auscultation bilaterally, unlabored, no conversational dyspnea appreciated  Gastrointestinal: Soft mildly tender in the right upper quadrant-no rebound or guarding, nondistended, positive bowel sounds   Musculoskeletal: No clubbing cyanosis, warm and well-perfused, calves soft symmetric nontender bilaterally  Skin: Clean dry without rashes  Neuro: Cranial nerves II through XII intact grossly no sensorimotor deficits appreciated bilateral upper and lower  extremities  Psych: Patient is calm cooperative and appropriate with exam not responding to internal stimuli      Result Review    Result Review:  I have personally reviewed these results and agree with these findings:  [x]  Laboratory  LAB RESULTS:      Lab 03/31/25  0412 03/30/25  1209   WBC 9.27 11.07*   HEMOGLOBIN 11.5* 12.8   HEMATOCRIT 36.4 40.9   PLATELETS 487* 533*   NEUTROS ABS  --  7.80*   IMMATURE GRANS (ABS)  --  0.12*   LYMPHS ABS  --  2.50   MONOS ABS  --  0.53   EOS ABS  --  0.07   MCV 90.1 89.7         Lab 04/05/25  0853 03/31/25  0412 03/30/25  1209   SODIUM 139 140 137   POTASSIUM 4.3 3.9 3.6   CHLORIDE 102 105 100   CO2 24.8 20.4* 18.5*   ANION GAP 12.2 14.6 18.5*   BUN 7 11 12   CREATININE 0.56* 0.71 0.68   EGFR 117.0 109.0 111.7   GLUCOSE 101* 113* 85   CALCIUM 9.4 9.3 10.3   MAGNESIUM 2.1  --   --          Lab 03/30/25  1209   TOTAL PROTEIN 8.0   ALBUMIN 4.3   GLOBULIN 3.7   ALT (SGPT) 9   AST (SGOT) 12   BILIRUBIN 0.4   ALK PHOS 80   LIPASE 35         Lab 03/30/25  1209   PROBNP <36.0   HSTROP T <6                 Brief Urine Lab Results  (Last result in the past 365 days)        Color   Clarity   Blood   Leuk Est   Nitrite   Protein   CREAT   Urine HCG        03/30/25 1242 Yellow   Clear   Negative   Negative   Negative   Negative                 Microbiology Results (last 10 days)       ** No results found for the last 240 hours. **            []  Microbiology  [x]  Radiology  XR Abdomen KUB  Result Date: 4/3/2025  Impression: Findings compatible with constipation. Electronically Signed: Martha Barker MD  4/3/2025 11:16 AM EDT  Workstation ID: [id number]    CT Angiogram Chest Pulmonary Embolism  Result Date: 3/30/2025  Impression: No evidence of pulmonary embolus. No acute intrathoracic findings. Electronically Signed: Ankit Barker MD  3/30/2025 2:24 PM EDT  Workstation ID: [id number]    CT Abdomen Pelvis With Contrast  Result Date: 3/30/2025  1.Residual postoperative changes are noted  status post recent cholecystectomy and appendectomy. 2.No evidence for abnormal fluid collection. No significant hemorrhage or hematoma. 3.No evidence for additional acute abnormality throughout the abdomen or pelvis. Electronically Signed: Irwin Jose MD  3/30/2025 2:23 PM EDT  Workstation ID: [id number]    XR Chest 1 View  Result Date: 3/30/2025  Impression: No acute cardiopulmonary abnormality. Electronically Signed: Alin Quiroz  3/30/2025 12:30 PM EDT  Workstation ID: [id number]      [x]  EKG/Telemetry   []  Cardiology/Vascular   []  Pathology  []  Old records  [x]  Other:  Scheduled Meds:busPIRone, 15 mg, Oral, TID  DULoxetine, 90 mg, Oral, Daily  enoxaparin sodium, 40 mg, Subcutaneous, Daily  Lidocaine, 1 patch, Transdermal, Q24H  magnesium hydroxide, 10 mL, Oral, Daily  metoclopramide, 10 mg, Oral, 4x Daily AC & at Bedtime  montelukast, 10 mg, Oral, Nightly  pantoprazole, 40 mg, Intravenous, Q24H  polyethylene glycol, 17 g, Oral, BID  saccharomyces boulardii, 250 mg, Oral, BID  simethicone, 120 mg, Oral, 4x Daily AC & at Bedtime  sodium chloride, 10 mL, Intravenous, Q12H      Continuous Infusions:     PRN Meds:.  acetaminophen    bisacodyl    senna-docusate sodium **AND** polyethylene glycol **AND** bisacodyl **AND** bisacodyl    diphenhydrAMINE-zinc acetate    HYDROcodone-acetaminophen    ipratropium-albuterol    ondansetron    sodium chloride    sodium chloride    sodium chloride    tiZANidine    traZODone      Assessment & Plan   Assessment / Plan     Assessment/Plan:  Intractable nausea and vomiting.  No further vomiting  Gastroparesis  Constipation.  Resolved  Right upper quadrant abdominal pain  Depression with anxiety  Cervical radiculopathy  Pleuritic chest pain        Patient admitted for further evaluation and treatment  General Surgery consult noted and appreciated  Continue scheduled Reglan, switch to oral formulation  continue as needed Zofran  Will add scheduled simethicone.  Will  discontinue Dilaudid at this time.  Status post IV fluids  Continue patient's home oral pain management regimen   Continue MiraLAX  twice daily dosing.   Continue home buspirone, duloxetine scheduled with as needed Klonopin  Continue home tizanidine as needed  Patient with resistant pleuritic pain.  Workup has been negative for evidence of PE, pneumonia, infarct or any other significant findings.  Will continue pulmonary toiletry with incentive spirometry and ambulation.  Continue flutter valve therapy and scheduled DuoNebs x 2 on today  Continue to encourage regular ambulation  Further inpatient orders recommendations pending clinical course      Disposition: Home once tolerating a diet and abdominal pain improves.    VTE Prophylaxis:  Pharmacologic VTE prophylaxis orders are present.        CODE STATUS:   Code Status (Patient has no pulse and is not breathing): CPR (Attempt to Resuscitate)  Medical Interventions (Patient has pulse or is breathing): Full Support  Level Of Support Discussed With: Patient          Electronically signed by Naila Alejandre MD, 04/05/25, 3:08 PM EDT.

## 2025-04-05 NOTE — PLAN OF CARE
"Goal Outcome Evaluation:  Plan of Care Reviewed With: patient           Outcome Evaluation: Pt A&O x4 on room air. Medicated 2x this shift for pain. Dilaudid was discontinued this shift per provider and Norco is now Q4. Lidocaine patch applied under Right breast for pain. Pt was encouraged to ambulate in [place] and pt stated \"she will wait for her  to walk her\". Safety checks maintained this shift. Call light within reach and pt able to make needs known.    Keiko Cohn RN                           "

## 2025-04-06 ENCOUNTER — READMISSION MANAGEMENT (OUTPATIENT)
Dept: CALL CENTER | Facility: HOSPITAL | Age: 45
End: 2025-04-06
Payer: COMMERCIAL

## 2025-04-06 VITALS
HEIGHT: 64 IN | SYSTOLIC BLOOD PRESSURE: 118 MMHG | OXYGEN SATURATION: 95 % | WEIGHT: 172.4 LBS | TEMPERATURE: 98.1 F | BODY MASS INDEX: 29.43 KG/M2 | RESPIRATION RATE: 18 BRPM | DIASTOLIC BLOOD PRESSURE: 84 MMHG | HEART RATE: 93 BPM

## 2025-04-06 PROCEDURE — 99238 HOSP IP/OBS DSCHRG MGMT 30/<: CPT | Performed by: INTERNAL MEDICINE

## 2025-04-06 RX ORDER — SIMETHICONE 80 MG
120 TABLET,CHEWABLE ORAL
Start: 2025-04-06

## 2025-04-06 RX ORDER — TRIAMCINOLONE ACETONIDE 0.25 MG/G
1 CREAM TOPICAL EVERY 12 HOURS SCHEDULED
Qty: 15 G | Refills: 0 | COMMUNITY
Start: 2025-04-06

## 2025-04-06 RX ORDER — ONDANSETRON 4 MG/1
4 TABLET, FILM COATED ORAL EVERY 8 HOURS PRN
Qty: 15 TABLET | Refills: 0 | Status: SHIPPED | OUTPATIENT
Start: 2025-04-06 | End: 2025-04-06

## 2025-04-06 RX ORDER — HYDROCODONE BITARTRATE AND ACETAMINOPHEN 10; 325 MG/1; MG/1
1.5 TABLET ORAL EVERY 4 HOURS PRN
Qty: 27 TABLET | Refills: 0 | Status: SHIPPED | OUTPATIENT
Start: 2025-04-06 | End: 2025-04-06

## 2025-04-06 RX ORDER — TRIAMCINOLONE ACETONIDE 0.25 MG/G
1 CREAM TOPICAL EVERY 12 HOURS SCHEDULED
Status: DISCONTINUED | OUTPATIENT
Start: 2025-04-06 | End: 2025-04-06 | Stop reason: HOSPADM

## 2025-04-06 RX ORDER — DIPHENHYDRAMINE HYDROCHLORIDE, ZINC ACETATE 2; .1 G/100G; G/100G
1 CREAM TOPICAL 3 TIMES DAILY PRN
Qty: 15 G | Refills: 0 | COMMUNITY
Start: 2025-04-06

## 2025-04-06 RX ORDER — METOCLOPRAMIDE 10 MG/1
10 TABLET ORAL
Qty: 120 TABLET | Refills: 0 | Status: SHIPPED | OUTPATIENT
Start: 2025-04-06

## 2025-04-06 RX ORDER — BUSPIRONE HYDROCHLORIDE 15 MG/1
15 TABLET ORAL 3 TIMES DAILY PRN
Start: 2025-04-06

## 2025-04-06 RX ORDER — ONDANSETRON 4 MG/1
4 TABLET, FILM COATED ORAL EVERY 8 HOURS PRN
Qty: 15 TABLET | Refills: 0 | Status: SHIPPED | OUTPATIENT
Start: 2025-04-06

## 2025-04-06 RX ORDER — METOCLOPRAMIDE 10 MG/1
10 TABLET ORAL
Qty: 120 TABLET | Refills: 0 | Status: SHIPPED | OUTPATIENT
Start: 2025-04-06 | End: 2025-04-06

## 2025-04-06 RX ORDER — HYDROCODONE BITARTRATE AND ACETAMINOPHEN 10; 325 MG/1; MG/1
1.5 TABLET ORAL EVERY 4 HOURS PRN
Qty: 27 TABLET | Refills: 0 | Status: SHIPPED | OUTPATIENT
Start: 2025-04-06

## 2025-04-06 RX ADMIN — HYDROCODONE BITARTRATE AND ACETAMINOPHEN 1.5 TABLET: 10; 325 TABLET ORAL at 11:55

## 2025-04-06 RX ADMIN — BUSPIRONE HYDROCHLORIDE 15 MG: 10 TABLET ORAL at 08:55

## 2025-04-06 RX ADMIN — TIZANIDINE 4 MG: 4 TABLET ORAL at 09:04

## 2025-04-06 RX ADMIN — Medication 250 MG: at 08:55

## 2025-04-06 RX ADMIN — HYDROCODONE BITARTRATE AND ACETAMINOPHEN 1.5 TABLET: 10; 325 TABLET ORAL at 07:27

## 2025-04-06 RX ADMIN — METOCLOPRAMIDE 10 MG: 10 TABLET ORAL at 08:55

## 2025-04-06 RX ADMIN — METOCLOPRAMIDE 10 MG: 10 TABLET ORAL at 11:55

## 2025-04-06 RX ADMIN — SIMETHICONE 120 MG: 80 TABLET, CHEWABLE ORAL at 11:55

## 2025-04-06 RX ADMIN — MAGNESIUM HYDROXIDE 10 ML: 2400 SUSPENSION ORAL at 09:04

## 2025-04-06 RX ADMIN — POLYETHYLENE GLYCOL 3350 17 G: 17 POWDER, FOR SOLUTION ORAL at 08:55

## 2025-04-06 RX ADMIN — Medication 10 ML: at 08:57

## 2025-04-06 RX ADMIN — DULOXETINE HYDROCHLORIDE 90 MG: 30 CAPSULE, DELAYED RELEASE ORAL at 08:56

## 2025-04-06 RX ADMIN — SIMETHICONE 120 MG: 80 TABLET, CHEWABLE ORAL at 08:55

## 2025-04-06 NOTE — OUTREACH NOTE
Prep Survey      Flowsheet Row Responses   [facility] patient discharged from? [facility]   Is LACE score < 7 ? No   Eligibility [facility]   Date of Admission 03/30/25   Date of Discharge 04/06/25   Discharge Disposition Home or Self Care   Discharge diagnosis Intractable nausea and vomiting, lap kay with complications 3 weeks ago   Does the patient have one of the following disease processes/diagnoses(primary or secondary)? Other   Does the patient have Home health ordered? No   Is there a DME ordered? No   Prep survey completed? Yes            Joay ABARCA - Registered Nurse

## 2025-04-06 NOTE — DISCHARGE SUMMARY
[facility]         HOSPITALIST  DISCHARGE SUMMARY    Patient Name: Ebony Hamilton  : 1980  MRN: [id number]    Date of Admission: 3/30/2025  Date of Discharge: 2025  Primary Care Physician: Karen Stover APRN    Consults       Date and Time Order Name Status Description    3/31/2025 12:29 PM Inpatient General Surgery Consult      3/30/2025  3:34 PM Hospitalist (on-call MD unless specified)              Active and Resolved Hospital Problems:  Active Hospital Problems    Diagnosis POA    **Intractable nausea and vomiting [R11.2] Yes    Nausea & vomiting [R11.2] Yes    Abdominal pain [R10.9] Unknown    S/P laparoscopic appendectomy [Z90.49] Not Applicable    Gastroparesis [K31.84] Yes    S/P laparoscopic cholecystectomy [Z90.49] Not Applicable    Cervical radiculopathy [M54.12] Yes    NICHOLE (generalized anxiety disorder) [F41.1] Yes    Spinal stenosis in cervical region [M48.02] Yes      Resolved Hospital Problems   No resolved problems to display.       Hospital Course     Hospital Course:  Ebony Hamilton is a 44 y.o. female  with past medical history cervical radiculopathy, anxiety, depression, gastroparesis, obesity and GERD resenting to the ED for evaluation of intractable nausea and vomiting.  3 weeks ago patient had a cholecystectomy performed by Dr. Castano which had complications related clips slipped off causing bowel subsequent to the abdomen.  Patient required drain placement.  The following patient had abdominal pain again and was found to have appendicitis which required an appendectomy.  Over the last 2 days patient has been having intractable nausea to where she cannot keep anything down so she came to the ED for further evaluation.  Since her cholecystectomy 3 weeks ago patient has been to the ED on multiple occasions.  In the ED patient was slightly hypertensive with remaining vitals being within normal limits.  Labs show mild leukocytosis and lactic  acidosis which responded well to fluids with remaining labs being unremarkable including a normal lipase, proBNP, troponin, and negative UA.  CT of the abdomen with contrast showed postoperative changes of recent cholecystectomy and appendectomy with nothing acute including no evidence of abnormal fluid collections, hemorrhages or hematomas.  When seen patient was still having nausea and vomiting with abdominal discomfort with inspiration.  Patient admitted for further evaluation and treatment.  General surgery consulted.  Started on scheduled Reglan and IV fluids.  Advanced diet slowly as tolerated.  Her nausea and vomiting improved she was addition to oral Reglan.  She continued to complain of persistent abdominal pain.  A KUB was performed on 2 days prior to her discharge and showed copious amounts of stool.  The patient was placed on a bowel regimen resolved her constipation.  Patient did continue to complain of abdominal pain particular in her right upper quadrant especially with deep breaths as felt that this was most likely gas pain from her recent laparoscopic surgeries and she was placed on scheduled Gas-X.  This morning the patient reports her abdominal pain is much better.  She is tolerating a regular diet.  She be discharged home on today.    Note the patient did develop a rash in the area of Lovenox administrations.  She has been prescribed a steroid and Benadryl cream at discharge.  Lovenox will be added to her allergy list.    DISCHARGE Follow Up Recommendations: I have recommended patient follow up with Motility specialist for further evaluation and ongoing management of gastroparesis.  Day of Discharge     Vital Signs:  Temp:  [97.2 °F (36.2 °C)-98.8 °F (37.1 °C)] 98.1 °F (36.7 °C)  Heart Rate:  [] 93  Resp:  [18] 18  BP: (110-144)/() 118/84  Physical Exam:   General: well-developed appearing stated age in no acute distress  HEENT: Normocephalic atraumatic moist membranes pupils equal  round no scleral icterus no conjunctival injection  Cardiovascular: Regular S1-S2, no appreciable murmurs, no lower extremity edema appreciated  Pulmonary: Clear to auscultation bilaterally, unlabored, no conversational dyspnea appreciated  Gastrointestinal: Soft mildly tender in the right upper quadrant-no rebound or guarding, nondistended, positive bowel sounds   Musculoskeletal: No clubbing cyanosis, warm and well-perfused, calves soft symmetric nontender bilaterally  Skin: Clean dry without rashes  Neuro: Cranial nerves II through XII intact grossly no sensorimotor deficits appreciated bilateral upper and lower extremities  Psych: Patient is calm cooperative and appropriate with exam not responding to internal stimuli    Discharge Details        Discharge Medications        New Medications        Instructions Start Date   diphenhydrAMINE-zinc acetate 2-0.1 % cream   1 Application, Topical, 3 Times Daily PRN      metoclopramide 10 MG tablet  Commonly known as: REGLAN   10 mg, Oral, 4 Times Daily Before Meals & Nightly      simethicone 80 MG chewable tablet  Commonly known as: MYLICON   120 mg, Oral, 4 Times Daily Before Meals & Nightly      triamcinolone 0.025 % cream  Commonly known as: KENALOG   1 Application, Topical, Every 12 Hours Scheduled             Changes to Medications        Instructions Start Date   busPIRone 15 MG tablet  Commonly known as: BUSPAR  What changed:   when to take this  reasons to take this   15 mg, Oral, 3 Times Daily PRN      HYDROcodone-acetaminophen  MG per tablet  Commonly known as: NORCO  What changed:   how much to take  reasons to take this   1.5 tablets, Oral, Every 4 Hours PRN      Narcan 4 MG/0.1ML nasal spray  Generic drug: naloxone  What changed:   how much to take  how to take this  when to take this  reasons to take this   Call 911. Don't prime. [place] in 1 nostril for overdose. Repeat in 2-3 minutes in other nostril if no or minimal breathing/responsiveness.       ondansetron 4 MG tablet  Commonly known as: ZOFRAN  What changed: See the new instructions.   4 mg, Oral, Every 8 Hours PRN      traZODone 50 MG tablet  Commonly known as: DESYREL  What changed:   how much to take  how to take this  when to take this  reasons to take this   Take 0.5 to 2 tab PO QHS PRN sleep             Continue These Medications        Instructions Start Date   albuterol sulfate  (90 Base) MCG/ACT inhaler  Commonly known as: PROVENTIL HFA;VENTOLIN HFA;PROAIR HFA   2 puffs, Inhalation, Every 6 Hours PRN      CALCIUM PO   1 tablet, Daily      clonazePAM 0.5 MG tablet  Commonly known as: KlonoPIN   0.5 mg, Oral, 2 Times Daily PRN      DULoxetine 30 MG capsule  Commonly known as: CYMBALTA   30 mg, Daily      DULoxetine 60 MG capsule  Commonly known as: CYMBALTA   60 mg, Daily      ibuprofen 800 MG tablet  Commonly known as: ADVIL,MOTRIN   800 mg, Oral, Every 8 Hours Scheduled      montelukast 10 MG tablet  Commonly known as: Singulair   10 mg, Oral, Nightly      pantoprazole 40 MG EC tablet  Commonly known as: PROTONIX   40 mg, Oral, Daily      polyethylene glycol 17 g packet  Commonly known as: MIRALAX   Mix 17gm (contents of 1 packet) in eight ounces of water or other beverage to drink once daily      saccharomyces boulardii 250 MG capsule  Commonly known as: Florastor   250 mg, Oral, 2 Times Daily      sennosides-docusate 8.6-50 MG per tablet  Commonly known as: PERICOLACE   1 tablet, Oral, Daily      tiZANidine 4 MG tablet  Commonly known as: ZANAFLEX   4-8 mg, Every 6 Hours PRN      vitamin C 500 MG tablet  Commonly known as: ASCORBIC ACID   500 mg, Daily             Stop These Medications      clindamycin 300 MG capsule  Commonly known as: Cleocin     prochlorperazine 10 MG tablet  Commonly known as: COMPAZINE              Allergies   Allergen Reactions    Escitalopram Nausea Only and Rash     Nausea, sweating, rash     Sulfa Antibiotics Anaphylaxis    Baclofen GI Intolerance, Hives and  Nausea And Vomiting    Ciprofloxacin Hallucinations    Codeine Hives    Gold-Containing Drug Products Rash    Latex Rash    Nickel Hives    Tegaderm Ag Mesh [Silver] Hives       Discharge Disposition:  Home or Self Care    Diet:  Hospital:  Diet Order   Procedures    Diet: Regular/House; Fluid Consistency: Thin (IDDSI 0)       Discharge Activity:   Activity Instructions       Other Activity Instructions      Activity Instructions: As tolerated            CODE STATUS:  Code Status and Medical Interventions: CPR (Attempt to Resuscitate); Full Support   Ordered at: 03/30/25 8897     Code Status (Patient has no pulse and is not breathing):    CPR (Attempt to Resuscitate)     Medical Interventions (Patient has pulse or is breathing):    Full Support     Level Of Support Discussed With:    Patient         No future appointments.    Additional Instructions for the Follow-ups that You Need to Schedule       Call MD With Problems / Concerns   As directed      Instructions: Worsening abdominal pain, intractable vomiting or any symptoms concerning to the patient    Order Comments: Instructions: Worsening abdominal pain, intractable vomiting or any symptoms concerning to the patient         Discharge Follow-up with PCP   As directed       Currently Documented PCP:    Karen Stover APRN    PCP Phone Number:    [phone]     Follow Up Details: hospital follow up 1 week                Pertinent  and/or Most Recent Results     PROCEDURES:   None    LAB RESULTS:      Lab 03/31/25  0412   WBC 9.27   HEMOGLOBIN 11.5*   HEMATOCRIT 36.4   PLATELETS 487*   MCV 90.1         Lab 04/05/25  0853 03/31/25  0412   SODIUM 139 140   POTASSIUM 4.3 3.9   CHLORIDE 102 105   CO2 24.8 20.4*   ANION GAP 12.2 14.6   BUN 7 11   CREATININE 0.56* 0.71   EGFR 117.0 109.0   GLUCOSE 101* 113*   CALCIUM 9.4 9.3   MAGNESIUM 2.1  --                          Brief Urine Lab Results  (Last result in the past 365 days)        Color   Clarity   Blood    Leuk Est   Nitrite   Protein   CREAT   Urine HCG        03/30/25 1242 Yellow   Clear   Negative   Negative   Negative   Negative                 Microbiology Results (last 10 days)       ** No results found for the last 240 hours. **            XR Abdomen KUB  Result Date: 4/3/2025  Impression: Impression: Findings compatible with constipation. Electronically Signed: Martha Barker MD  4/3/2025 11:16 AM EDT  Workstation ID: [id number]    CT Angiogram Chest Pulmonary Embolism  Result Date: 3/30/2025  Impression: Impression: No evidence of pulmonary embolus. No acute intrathoracic findings. Electronically Signed: Ankit Barker MD  3/30/2025 2:24 PM EDT  Workstation ID: [id number]    CT Abdomen Pelvis With Contrast  Result Date: 3/30/2025  Impression: 1.Residual postoperative changes are noted status post recent cholecystectomy and appendectomy. 2.No evidence for abnormal fluid collection. No significant hemorrhage or hematoma. 3.No evidence for additional acute abnormality throughout the abdomen or pelvis. Electronically Signed: Irwin Jose MD  3/30/2025 2:23 PM EDT  Workstation ID: [id number]    XR Chest 1 View  Result Date: 3/30/2025  Impression: Impression: No acute cardiopulmonary abnormality. Electronically Signed: Alin Quiroz  3/30/2025 12:30 PM EDT  Workstation ID: [id number]    CT Abdomen Pelvis With Contrast  Result Date: 3/24/2025  Impression: Impression: 1.Moderate colonic fluid may indicate acute diarrheal illness. 2.Postsurgical changes of cholecystectomy, appendectomy, and hysterectomy. Small amount of fluid within the gallbladder fossa. Indwelling CBD stent appears adequately positioned. 3.Additional findings as detailed above. Electronically Signed: Misha Rubin MD  3/24/2025 9:47 AM EDT  Workstation ID: [id number]    CT Angiogram Chest Pulmonary Embolism  Result Date: 3/20/2025  Impression: 1.No pulmonary embolism or aortic dissection identified. 2.Elevation of the right hemidiaphragm. Opacities in  the right lower lobe have the appearance of atelectasis rather than pneumonia. 3.Changes of relatively recent cholecystectomy with some persistent fluid/stranding partially visible in the gallbladder fossa. A biliary stent is present with no evidence for biliary obstruction. Electronically Signed: Yayo Ford MD  3/20/2025 2:54 AM EDT  Workstation ID: [id number]    XR Chest 1 View  Result Date: 3/20/2025  Impression: Mild elevation of the right hemidiaphragm with right basilar atelectasis. Electronically Signed: Yayo Ford MD  3/20/2025 1:38 AM EDT  Workstation ID: [id number]    CT Abdomen Pelvis With Contrast  Result Date: 3/9/2025  Impression: 1.  Acute appendicitis is suggested. Please correlate clinically. 2.  Interval decrease in the ascites. 3.  There is a persistent gallbladder fossa fluid collection with an estimated total volume of 20.8 mL. It may represent a urinoma. An infected fluid collection, such as an abscess cannot be excluded. 4.  An internal biliary stent is in the common bile duct (CBD). There is pneumobilia. 5.  The inflammatory change seen within the right upper quadrant has decreased considerably since the 2/22/2025 CT study. 6.  Interval decrease in the perihepatic and right subphrenic fluid collection is noted since 2/24/2025. The percutaneous drainage catheter remains in place and is located laterally. 7.  Please see above comments for further detail.   Portions of this note were completed with a voice recognition program.  3/9/2025 12:52 AM by Romario Romero MD on Workstation: [email or web address]                Results for orders placed in visit on 02/12/15    SCANNED - ECHOCARDIOGRAM      Labs Pending at Discharge: None        Time spent on Discharge including face to face service: 30 minutes    Electronically signed by Naila Alejandre MD, 04/06/25, 1:35 PM EDT.

## 2025-04-06 NOTE — PLAN OF CARE
Goal Outcome Evaluation:  Plan of Care Reviewed With: patient        Progress: no change  Outcome Evaluation: pt is axo4, c/o of epigastric pain, medicated per mar. wc done. up ad eloy,  at [facility]. pt is to discharge home. no other concerns noted.

## 2025-04-06 NOTE — PLAN OF CARE
Goal Outcome Evaluation:  Plan of Care Reviewed With: patient        Progress: no change  Outcome Evaluation: Pt remains A&Ox4, on room air. Medicated for c/o pain per mar once so far this shift. Up ad eloy in room, bed alarm refused and form signed in pt chart. Possible dc home today. No needs at this time.

## 2025-04-07 ENCOUNTER — TRANSITIONAL CARE MANAGEMENT TELEPHONE ENCOUNTER (OUTPATIENT)
Dept: CALL CENTER | Facility: HOSPITAL | Age: 45
End: 2025-04-07
Payer: COMMERCIAL

## 2025-04-07 ENCOUNTER — TELEPHONE (OUTPATIENT)
Dept: GASTROENTEROLOGY | Facility: CLINIC | Age: 45
End: 2025-04-07
Payer: COMMERCIAL

## 2025-04-07 NOTE — OUTREACH NOTE
Call Center TCM Note      Flowsheet Row Responses   [facility] patient discharged from? [facility]   Does the patient have one of the following disease processes/diagnoses(primary or secondary)? Other   TCM attempt successful? Yes   Call start time 1123   Call end time 1125   Discharge diagnosis Intractable nausea and vomiting, lap kay with complications 3 weeks ago   Meds reviewed with patient/caregiver? Yes   Is the patient having any side effects they believe may be caused by any medication additions or changes? No   Does the patient have all medications ordered at discharge? Yes   Is the patient taking all medications as directed (includes completed medication regime)? Yes   Comments HOSP DC FU appt 4/15/25 1115 am   Does the patient have an appointment with their PCP within 7-14 days of discharge? Yes   Has home health visited the patient within 72 hours of discharge? N/A   Psychosocial issues? No   Did the patient receive a copy of their discharge instructions? Yes   Nursing interventions Reviewed instructions with patient   What is the patient's perception of their health status since discharge? Improving   Is the patient/caregiver able to teach back signs and symptoms related to disease process for when to call PCP? Yes   Is the patient/caregiver able to teach back signs and symptoms related to disease process for when to call 911? Yes   Is the patient/caregiver able to teach back the hierarchy of who to call/visit for symptoms/problems? PCP, Specialist, Home health nurse, Urgent Care, ED, 911 Yes   TCM call completed? Yes   Wrap up additional comments Pt reports she is doing better. PCP appt set. Pt will continue to monitor for any s/s of infection.   Call end time 1125            WILBER SHEPARD - Registered Nurse    4/7/2025, 11:25 EDT

## 2025-04-07 NOTE — TELEPHONE ENCOUNTER
ENDO RECONCILIATION  Verify source of procedure(s): Other  If other, please list source: Ordered from previous ERCP        TIME OUT-CONFIRM CORRECT PROCEDURE: ERCP        Cardiology: No  Pulmonology: No  Blood thinner: No  GLP-1: NO  Additional DX/indication for procedure: N/A  Please include any other notes relevant to endo reconciliation: N/A

## 2025-04-07 NOTE — PAYOR COMM NOTE
"Joy Ebony Dey (44 y.o. Female)       Date of Birth   1980    Social Security Number       Address   [address] [place] [address]    Home Phone   [phone]    MRN   [id number]       Hindu   None    Marital Status                               Admission Date   3/30/2025    Admission Type   Emergency    Admitting Provider   Royer Grimes MD    Attending Provider       Department, Room/Bed   [address], 4005/1       Discharge Date   4/6/2025    Discharge Disposition   Home or Self Care    Discharge Destination                                 Attending Provider: (none)   Allergies: Escitalopram, Sulfa Antibiotics, Baclofen, Ciprofloxacin, Codeine, Gold-containing Drug Products, Latex, Lovenox [Enoxaparin Sodium], Nickel, Tegaderm Ag Mesh [Silver]    Isolation: None   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)   Code Status: Prior    Ht: 161.3 cm (63.5\")   Wt: 78.2 kg (172 lb 6.4 oz)    Admission Cmt: None   Principal Problem: Intractable nausea and vomiting [R11.2]                   Active Insurance as of 3/30/2025       Primary Coverage       Payor Plan Insurance Group Employer/Plan Group    [employer]HE[employer] BLUE CROSS ANTHEM BLUE CROSS BLUE SHIELD PPO [id number]       Payor Plan Address Payor Plan Phone Number Payor Plan Fax Number Effective Dates    [address] [phone]  3/1/2025 - None Entered    [place] [address]         Subscriber Name Subscriber Birth Date Member ID       King Askew 1980 [id number]                     Emergency Contacts        (Rel.) Home Phone Work Phone Mobile Phone    KING ASKEW (Spouse) [phone] -- [phone]        Case Certified total -dc 4/6    [facility]   [phone]-  F [phone]                       Naila Alejandre MD   Physician  Hospitalist     Progress Notes     Signed     Date of Service: 04/02/25 1337  Creation Time: 04/02/25 1337     Signed     "   Expand All Collapse All     [facility]  Date: 2025  Patient Name: Ebony Hamilton  : 1980  MRN: [id number]  Date of admission: 3/30/2025        Subjective[]Expand by Default  Subjective      Chief Complaint: Intractable nausea vomiting     Summary:Ebony Hamilton is a 44 y.o. female  with past medical history cervical radiculopathy, anxiety, depression, gastroparesis, obesity and GERD resenting to the ED for evaluation of intractable nausea and vomiting.  3 weeks ago patient had a cholecystectomy performed by Dr. Castano which had complications related clips slipped off causing bowel subsequent to the abdomen.  Patient required drain placement.  The following patient had abdominal pain again and was found to have appendicitis which required an appendectomy.  Over the last 2 days patient has been having intractable nausea to where she cannot keep anything down so she came to the ED for further evaluation.  Since her cholecystectomy 3 weeks ago patient has been to the ED on multiple occasions.  In the ED patient was slightly hypertensive with remaining vitals being within normal limits.  Labs show mild leukocytosis and lactic acidosis which responded well to fluids with remaining labs being unremarkable including a normal lipase, proBNP, troponin, and negative UA.  CT of the abdomen with contrast showed postoperative changes of recent cholecystectomy and appendectomy with nothing acute including no evidence of abnormal fluid collections, hemorrhages or hematomas.  When seen patient was still having nausea and vomiting with abdominal discomfort with inspiration.  Patient admitted for further evaluation and treatment.  General surgery consulted.  Started on scheduled Reglan and IV fluids.  Advancing diet slowly as tolerated.  Patient planning to return home on discharge once tolerating a diet.     Interval Followup: Sitting up in bed appears to be resting fairly  comfortably.    Abdominal pain improving.  Tolerating regular diet although does report still having some episodes of nausea specially after eating this morning.        Review of Systems  Constitutional: Negative for fatigue and fever.   HENT: Negative for sore throat and trouble swallowing.    Eyes: Negative for pain and discharge.   Respiratory: Negative for cough and shortness of breath.    Cardiovascular: Negative for chest pain and palpitations.   Gastrointestinal: Positive for abdominal pain, positive nausea, denies vomiting.   Endocrine: Negative for cold intolerance and heat intolerance.   Genitourinary: Negative for difficulty urinating and dysuria.   Musculoskeletal: Negative for back pain and neck stiffness.   Skin: Negative for color change and rash.   Neurological: Negative for syncope and headaches.   Hematological: Negative for adenopathy.   Psychiatric/Behavioral: Negative for confusion and hallucinations.     Objective  Objective      Vitals:   Temp:  [97.5 °F (36.4 °C)-99.3 °F (37.4 °C)] 98.5 °F (36.9 °C)  Heart Rate:  [] 117  Resp:  [16-20] 18  BP: (110-149)/(64-94) 129/86  Physical Exam             General: well-developed appearing stated age in no acute distress  HEENT: Normocephalic atraumatic moist membranes pupils equal round no scleral icterus no conjunctival injection  Cardiovascular: regular rate and rhythm no appreciable murmurs, no lower extremity edema appreciated  Pulmonary: Clear to auscultation bilaterally, unlabored, no conversational dyspnea appreciated  Gastrointestinal: Soft mildly tender in the left upper quadrant nondistended positive bowel sounds all 4 quadrants no rebound or guarding  Musculoskeletal: No clubbing cyanosis, warm and well-perfused, calves soft symmetric nontender bilaterally  Skin: Clean dry without rashes  Neuro: Cranial nerves II through XII intact grossly no sensorimotor deficits appreciated bilateral upper and lower extremities  Psych: Patient is calm  cooperative and appropriate with exam not responding to internal stimuli        Result Review  Result Review:  I have personally reviewed these results and agree with these findings:  [x]  Laboratory  LAB RESULTS:           Lab 03/31/25  0412 03/30/25  1209   WBC 9.27 11.07*   HEMOGLOBIN 11.5* 12.8   HEMATOCRIT 36.4 40.9   PLATELETS 487* 533*   NEUTROS ABS  --  7.80*   IMMATURE GRANS (ABS)  --  0.12*   LYMPHS ABS  --  2.50   MONOS ABS  --  0.53   EOS ABS  --  0.07   MCV 90.1 89.7               Lab 03/31/25  0412 03/30/25  1209   SODIUM 140 137   POTASSIUM 3.9 3.6   CHLORIDE 105 100   CO2 20.4* 18.5*   ANION GAP 14.6 18.5*   BUN 11 12   CREATININE 0.71 0.68   EGFR 109.0 111.7   GLUCOSE 113* 85   CALCIUM 9.3 10.3              Lab 03/30/25  1209   TOTAL PROTEIN 8.0   ALBUMIN 4.3   GLOBULIN 3.7   ALT (SGPT) 9   AST (SGOT) 12   BILIRUBIN 0.4   ALK PHOS 80   LIPASE 35              Lab 03/30/25  1209   PROBNP <36.0   HSTROP T <6                  Brief Urine Lab Results  (Last result in the past 365 days)          Color   Clarity   Blood   Leuk Est   Nitrite   Protein   CREAT   Urine HCG         03/30/25 1242 Yellow    Clear    Negative    Negative    Negative    Negative                          Microbiology Results (last 10 days)         ** No results found for the last 240 hours. **                []  Microbiology  [x]  Radiology  CT Angiogram Chest Pulmonary Embolism  Result Date: 3/30/2025  Impression: No evidence of pulmonary embolus. No acute intrathoracic findings. Electronically Signed: Ankit Barker MD  3/30/2025 2:24 PM EDT  Workstation ID: [id number]     CT Abdomen Pelvis With Contrast  Result Date: 3/30/2025  1.Residual postoperative changes are noted status post recent cholecystectomy and appendectomy. 2.No evidence for abnormal fluid collection. No significant hemorrhage or hematoma. 3.No evidence for additional acute abnormality throughout the abdomen or pelvis. Electronically Signed: Irwin Jose MD   3/30/2025 2:23 PM EDT  Workstation ID: [id number]     XR Chest 1 View  Result Date: 3/30/2025  Impression: No acute cardiopulmonary abnormality. Electronically Signed: Alin Quiroz  3/30/2025 12:30 PM EDT  Workstation ID: [id number]        [x]  EKG/Telemetry   []  Cardiology/Vascular   []  Pathology  []  Old records  [x]  Other:  Scheduled Meds:  Scheduled Medication   aluminum-magnesium hydroxide-simethicone, 30 mL, Oral, Once  busPIRone, 15 mg, Oral, TID  DULoxetine, 90 mg, Oral, Daily  enoxaparin sodium, 40 mg, Subcutaneous, Daily  metoclopramide, 10 mg, Intravenous, Q8H  montelukast, 10 mg, Oral, Nightly  pantoprazole, 40 mg, Intravenous, Q24H  polyethylene glycol, 17 g, Oral, Daily  saccharomyces boulardii, 250 mg, Oral, BID  sodium chloride, 10 mL, Intravenous, Q12H         Continuous Infusions:  Infusion Medications            PRN Meds:.  PRN Medication     acetaminophen    senna-docusate sodium **AND** polyethylene glycol **AND** bisacodyl **AND** bisacodyl    clonazePAM    HYDROcodone-acetaminophen    HYDROmorphone    ipratropium-albuterol    ondansetron    sodium chloride    sodium chloride    sodium chloride    tiZANidine    traZODone           Assessment & Plan  Assessment / Plan      Assessment/Plan:  Intractable nausea and vomiting  Gastroparesis  Right upper quadrant abdominal pain  Depression with anxiety  Cervical radiculopathy           Patient admitted for further evaluation and treatment  General Surgery consulted, consult appreciated   Continue scheduled IV Reglan although will increase every 8 to every 6 hours   Continue as needed Zofran  Continue IV fluids until ensure tolerating diet  Resume patient's home oral pain management regimen with Dilaudid IV for breakthrough.  Continue home buspirone, duloxetine scheduled with as needed Klonopin  Continue home tizanidine as needed  Further inpatient orders recommendations pending clinical course        Discussed plan with bedside RN as well as  general surgery team.     Disposition: Home once tolerating a diet and abdominal pain improves.     VTE Prophylaxis:  Pharmacologic VTE prophylaxis orders are present.           CODE STATUS:   Code Status (Patient has no pulse and is not breathing): CPR (Attempt to Resuscitate)  Medical Interventions (Patient has pulse or is breathing): Full Support  Level Of Support Discussed With: Patient                 Electronically signed by Naila Alejandre MD, 25, 1:41 PM EDT.                                    Josué Castano MD   Physician  Surgery     Progress Notes     Signed     Date of Service: 25 134  Creation Time: 25     Signed       Expand All Collapse [facility]     Surgery Progress Note     Patient Name:            Ebony Hamilton  :                           1980  MRN:                           [id number]  Date of admission:    3/30/2025  Length of Stay:          2 days     Subjective   44-year-old female with gastroparesis     No acute events overnight.  Still intermittent nausea with regular diet.  Having some constipation.  Objective     Current Medications      Current Facility-Administered Medications:     acetaminophen (TYLENOL) tablet 650 mg, 650 mg, Oral, Q6H PRN, Royer Grimes MD    aluminum-magnesium hydroxide-simethicone (MAALOX MAX) 400-400-40 MG/5ML suspension 30 mL, 30 mL, Oral, Once, Royer Grimes MD    sennosides-docusate (PERICOLACE) 8.6-50 MG per tablet 2 tablet, 2 tablet, Oral, BID PRN **AND** polyethylene glycol (MIRALAX) packet 17 g, 17 g, Oral, Daily PRN **AND** bisacodyl (DULCOLAX) EC tablet 5 mg, 5 mg, Oral, Daily PRN **AND** bisacodyl (DULCOLAX) suppository 10 mg, 10 mg, Rectal, Daily PRN, Royer Grimes MD    busPIRone (BUSPAR) tablet 15 mg, 15 mg, Oral, TID, Royer Grimes MD, 15 mg at 25 0955    clonazePAM (KlonoPIN) tablet 0.5 mg, 0.5 mg, Oral, BID PRN, Royer Grimes MD, 0.5 mg at 25 0844    DULoxetine  (CYMBALTA) DR capsule 90 mg, 90 mg, Oral, Daily, Royer Grimes MD, 90 mg at 04/03/25 0955    enoxaparin sodium (LOVENOX) syringe 40 mg, 40 mg, Subcutaneous, Daily, Royer Grimes MD, 40 mg at 04/03/25 0955    HYDROcodone-acetaminophen (NORCO)  MG per tablet 1 tablet, 1 tablet, Oral, Q4H PRN, Naila Alejandre MD, 1 tablet at 04/03/25 0955    HYDROmorphone (DILAUDID) injection 0.5 mg, 0.5 mg, Intravenous, Q6H PRN, Naila Alejandre MD, 0.5 mg at 04/03/25 1234    ipratropium-albuterol (DUO-NEB) nebulizer solution 3 mL, 3 mL, Nebulization, Q4H PRN, Royer Grimes MD    magnesium hydroxide (MILK OF MAGNESIA) suspension 10 mL, 10 mL, Oral, Daily, India Chris APRN    metoclopramide (REGLAN) injection 10 mg, 10 mg, Intravenous, Q6H, Naila Alejandre MD, 10 mg at 04/03/25 1234    montelukast (SINGULAIR) tablet 10 mg, 10 mg, Oral, Nightly, Royer Grimes MD, 10 mg at 04/02/25 2008    ondansetron (ZOFRAN) injection 4 mg, 4 mg, Intravenous, Q6H PRN, Royer Grimes MD, 4 mg at 04/03/25 0632    pantoprazole (PROTONIX) injection 40 mg, 40 mg, Intravenous, Q24H, Royer Grimes MD, 40 mg at 04/02/25 2008    polyethylene glycol (MIRALAX) packet 17 g, 17 g, Oral, Daily, Royer Grimes MD, 17 g at 04/03/25 0955    saccharomyces boulardii (FLORASTOR) capsule 250 mg, 250 mg, Oral, BID, Royer Grimes MD, 250 mg at 04/03/25 0955    sodium chloride 0.9 % flush 10 mL, 10 mL, Intravenous, PRN, Nigel Goldstein MD    sodium chloride 0.9 % flush 10 mL, 10 mL, Intravenous, Q12H, Royer Grimes MD, 10 mL at 04/03/25 0956    sodium chloride 0.9 % flush 10 mL, 10 mL, Intravenous, PRN, Royer Grimes MD    sodium chloride 0.9 % infusion 40 mL, 40 mL, Intravenous, PRN, Royer Grimes MD    tiZANidine (ZANAFLEX) tablet 4 mg, 4 mg, Oral, Q8H PRN, Reji Irby MD, 4 mg at 04/03/25 0955    traZODone (DESYREL) tablet 25 mg, 25 mg, Oral, Nightly PRN, Reji Irby MD        Current Diet:     Dietary Orders (From admission, onward)          Start     Ordered     04/01/25 1124   Diet: Regular/House; Fluid Consistency: Thin (IDDSI 0)  Diet Effective Now        References:    Diet Order Definitions   Question Answer Comment   Diets: Regular/House     Fluid Consistency: Thin (IDDSI 0)         04/01/25 1123                          Vitals:   Temp:  [98 °F (36.7 °C)-98.6 °F (37 °C)] 98 °F (36.7 °C)  Heart Rate:  [102-119] 112  Resp:  [16-18] 16  BP: (124-154)/() 142/96  Physical Exam             General: no acute distress, resting in bed  Respiratory:  non-labored breathing  Cardiovascular:  RRR, non-tachycardic  Abdomen:  soft, non-distended, non-tender     LABS:  CBC Results   CBC            3/20/2025    01:32 3/30/2025    12:09 3/31/2025    04:12   CBC   WBC 13.57  11.07  9.27    RBC 5.35  4.56  4.04    Hemoglobin 14.5  12.8  11.5    Hematocrit 45.6  40.9  36.4    MCV 85.2  89.7  90.1    MCH 27.1  28.1  28.5    MCHC 31.8  31.3  31.6    RDW 13.8  14.6  14.6    Platelets 690  533  487          Basic Metabolic Profile Reults:   BMP            3/20/2025    01:32 3/30/2025    12:09 3/31/2025    04:12   BMP   BUN 10  12  11    Creatinine 0.70  0.68  0.71    Sodium 138  137  140    Potassium 3.9  3.6  3.9    Chloride 99  100  105    CO2 19.7  18.5  20.4    Calcium 10.5  10.3  9.3          CMP Results:   CMP            3/20/2025    01:32 3/30/2025    12:09 3/31/2025    04:12   CMP   Glucose 96  85  113    BUN 10  12  11    Creatinine 0.70  0.68  0.71    EGFR 110.9  111.7  109.0    Sodium 138  137  140    Potassium 3.9  3.6  3.9    Chloride 99  100  105    Calcium 10.5  10.3  9.3    Total Protein 8.8  8.0      Albumin 4.6  4.3      Globulin 4.2  3.7      Total Bilirubin 0.6  0.4      Alkaline Phosphatase 97  80      AST (SGOT) 15  12      ALT (SGPT) 9  9      Albumin/Globulin Ratio 1.1  1.2      BUN/Creatinine Ratio 14.3  17.6  15.5    Anion Gap 19.3  18.5  14.6             Lab Results (last 24 hours)          ** No results found for the last 24 hours. **                IMAGING:  Imaging Results (Last 24 Hours)         Procedure Component Value Units Date/Time     XR Abdomen KUB [[id number]] Collected: 04/03/25 1114       Updated: 04/03/25 1123     Narrative:       XR ABDOMEN KUB     Date of Exam: 4/3/2025 10:50 AM EDT     Indication: worsening nausea     Comparison: CT abdomen and pelvis 3/30/2025     Findings:  Evaluation of the abdomen demonstrates an unremarkable bowel gas pattern without obstructive changes noted. Fecal stasis is noted throughout the colon with debris in the gastric body. There is a biliary stent noted and surgical clips in the right upper   quadrant. Lung bases are clear. Osseous structures are unremarkable.        Impression:       Impression:  Findings compatible with constipation.        Electronically Signed: Martha Barker MD    4/3/2025 11:16 AM EDT    Workstation ID: [id number]                [x]  Laboratory  []  Microbiology  []  Radiology  []  EKG/Telemetry   []  Cardiology/Vascular   []  Pathology  []  Old records     Assessment / Plan   Assessment:        Active Hospital Problems     Diagnosis      **Intractable nausea and vomiting      Nausea & vomiting      Abdominal pain      S/P laparoscopic appendectomy      Gastroparesis      S/P laparoscopic cholecystectomy      Cervical radiculopathy      NICHOLE (generalized anxiety disorder)      Spinal stenosis in cervical region              Plan:    Gastroparesis   -Afebrile, vital stable  -KUB with constipation  -Bowel regimen ordered  -Recommend transitioning Reglan to oral formulation  -Abdomen remains clinically benign  -No need for any surgical intervention at this time, please call with any questions or concerns     Electronically signed by Josué Castano MD, 04/03/25, 1:49 PM EDT.                        Naila Alejandre MD   Physician  Hospitalist     Progress Notes     Signed     Date of Service: 04/03/25 1435  Creation Time:  25 1435     Signed       Expand All Collapse All     [facility] Hospitalist Progress Note  Date: 4/3/2025  Patient Name: Ebony Hamilton  : 1980  MRN: [id number]  Date of admission: 3/30/2025        Subjective[]Expand by Default  Subjective      Chief Complaint: Intractable nausea vomiting     Summary:Ebony Hamilton is a 44 y.o. female  with past medical history cervical radiculopathy, anxiety, depression, gastroparesis, obesity and GERD resenting to the ED for evaluation of intractable nausea and vomiting.  3 weeks ago patient had a cholecystectomy performed by Dr. Castano which had complications related clips slipped off causing bowel subsequent to the abdomen.  Patient required drain placement.  The following patient had abdominal pain again and was found to have appendicitis which required an appendectomy.  Over the last 2 days patient has been having intractable nausea to where she cannot keep anything down so she came to the ED for further evaluation.  Since her cholecystectomy 3 weeks ago patient has been to the ED on multiple occasions.  In the ED patient was slightly hypertensive with remaining vitals being within normal limits.  Labs show mild leukocytosis and lactic acidosis which responded well to fluids with remaining labs being unremarkable including a normal lipase, proBNP, troponin, and negative UA.  CT of the abdomen with contrast showed postoperative changes of recent cholecystectomy and appendectomy with nothing acute including no evidence of abnormal fluid collections, hemorrhages or hematomas.  When seen patient was still having nausea and vomiting with abdominal discomfort with inspiration.  Patient admitted for further evaluation and treatment.  General surgery consulted.  Started on scheduled Reglan and IV fluids.  Advancing diet slowly as tolerated.  Patient planning to return home on discharge once tolerating a diet.     Interval Followup: Sitting up in bed  appears to be resting fairly comfortably.   She reports worsening nausea on this morning.  She continues to complain of abdominal pain.  She reports she has not had a bowel movement in 2 days now.     Review of Systems  Constitutional: Negative for fatigue and fever.   HENT: Negative for sore throat and trouble swallowing.    Eyes: Negative for pain and discharge.   Respiratory: Negative for cough and shortness of breath.    Cardiovascular: Negative for chest pain and palpitations.   Gastrointestinal: Positive for abdominal pain, positive nausea, denies vomiting.   Endocrine: Negative for cold intolerance and heat intolerance.   Genitourinary: Negative for difficulty urinating and dysuria.   Musculoskeletal: Negative for back pain and neck stiffness.   Skin: Negative for color change and rash.   Neurological: Negative for syncope and headaches.   Hematological: Negative for adenopathy.   Psychiatric/Behavioral: Negative for confusion and hallucinations.     Objective  Objective      Vitals:   Temp:  [98 °F (36.7 °C)-98.6 °F (37 °C)] 98 °F (36.7 °C)  Heart Rate:  [102-119] 112  Resp:  [16-18] 16  BP: (124-154)/() 142/96  Physical Exam             General: well-developed appearing stated age in no acute distress  HEENT: Normocephalic atraumatic moist membranes pupils equal round no scleral icterus no conjunctival injection  Cardiovascular: Mildly tachycardic S1-S2, no appreciable murmurs, no lower extremity edema appreciated  Pulmonary: Clear to auscultation bilaterally, unlabored, no conversational dyspnea appreciated  Gastrointestinal: Soft mildly tender in the left upper quadrant nondistended positive bowel sounds all 4 quadrants no rebound or guarding  Musculoskeletal: No clubbing cyanosis, warm and well-perfused, calves soft symmetric nontender bilaterally  Skin: Clean dry without rashes  Neuro: Cranial nerves II through XII intact grossly no sensorimotor deficits appreciated bilateral upper and lower  extremities  Psych: Patient is calm cooperative and appropriate with exam not responding to internal stimuli        Result Review  Result Review:  I have personally reviewed these results and agree with these findings:  [x]  Laboratory  LAB RESULTS:           Lab 03/31/25  0412 03/30/25  1209   WBC 9.27 11.07*   HEMOGLOBIN 11.5* 12.8   HEMATOCRIT 36.4 40.9   PLATELETS 487* 533*   NEUTROS ABS  --  7.80*   IMMATURE GRANS (ABS)  --  0.12*   LYMPHS ABS  --  2.50   MONOS ABS  --  0.53   EOS ABS  --  0.07   MCV 90.1 89.7               Lab 03/31/25  0412 03/30/25  1209   SODIUM 140 137   POTASSIUM 3.9 3.6   CHLORIDE 105 100   CO2 20.4* 18.5*   ANION GAP 14.6 18.5*   BUN 11 12   CREATININE 0.71 0.68   EGFR 109.0 111.7   GLUCOSE 113* 85   CALCIUM 9.3 10.3              Lab 03/30/25  1209   TOTAL PROTEIN 8.0   ALBUMIN 4.3   GLOBULIN 3.7   ALT (SGPT) 9   AST (SGOT) 12   BILIRUBIN 0.4   ALK PHOS 80   LIPASE 35              Lab 03/30/25  1209   PROBNP <36.0   HSTROP T <6                  Brief Urine Lab Results  (Last result in the past 365 days)          Color   Clarity   Blood   Leuk Est   Nitrite   Protein   CREAT   Urine HCG         03/30/25 1242 Yellow    Clear    Negative    Negative    Negative    Negative                          Microbiology Results (last 10 days)         ** No results found for the last 240 hours. **                []  Microbiology  [x]  Radiology  XR Abdomen KUB  Result Date: 4/3/2025  Impression: Findings compatible with constipation. Electronically Signed: Martha Barker MD  4/3/2025 11:16 AM EDT  Workstation ID: [id number]     CT Angiogram Chest Pulmonary Embolism  Result Date: 3/30/2025  Impression: No evidence of pulmonary embolus. No acute intrathoracic findings. Electronically Signed: Ankit Barker MD  3/30/2025 2:24 PM EDT  Workstation ID: [id number]     CT Abdomen Pelvis With Contrast  Result Date: 3/30/2025  1.Residual postoperative changes are noted status post recent cholecystectomy and  appendectomy. 2.No evidence for abnormal fluid collection. No significant hemorrhage or hematoma. 3.No evidence for additional acute abnormality throughout the abdomen or pelvis. Electronically Signed: Irwin Jose MD  3/30/2025 2:23 PM EDT  Workstation ID: [id number]     XR Chest 1 View  Result Date: 3/30/2025  Impression: No acute cardiopulmonary abnormality. Electronically Signed: Alin Quiroz  3/30/2025 12:30 PM EDT  Workstation ID: [id number]        [x]  EKG/Telemetry   []  Cardiology/Vascular   []  Pathology  []  Old records  [x]  Other:  Scheduled Meds:  Scheduled Medication   aluminum-magnesium hydroxide-simethicone, 30 mL, Oral, Once  busPIRone, 15 mg, Oral, TID  DULoxetine, 90 mg, Oral, Daily  enoxaparin sodium, 40 mg, Subcutaneous, Daily  magnesium hydroxide, 10 mL, Oral, Daily  metoclopramide, 10 mg, Oral, 4x Daily AC & at Bedtime  montelukast, 10 mg, Oral, Nightly  pantoprazole, 40 mg, Intravenous, Q24H  polyethylene glycol, 17 g, Oral, Daily  polyethylene glycol, 17 g, Oral, Once  saccharomyces boulardii, 250 mg, Oral, BID  sodium chloride, 10 mL, Intravenous, Q12H         Continuous Infusions:  Infusion Medications            PRN Meds:.  PRN Medication     acetaminophen    bisacodyl    senna-docusate sodium **AND** polyethylene glycol **AND** bisacodyl **AND** bisacodyl    clonazePAM    HYDROcodone-acetaminophen    HYDROmorphone    ipratropium-albuterol    ondansetron    sodium chloride    sodium chloride    sodium chloride    tiZANidine    traZODone           Assessment & Plan  Assessment / Plan      Assessment/Plan:  Intractable nausea and vomiting  Gastroparesis  Constipation  Right upper quadrant abdominal pain  Depression with anxiety  Cervical radiculopathy           Patient admitted for further evaluation and treatment  General Surgery consult noted and appreciated  Continue scheduled Reglan, will switch from IV to oral formulation on today  Continue as needed Zofran  Continue IV fluids until  ensure tolerating diet  Resume patient's home oral pain management regimen with Dilaudid IV for breakthrough.  Will change MiraLAX from daily to twice daily dosing.  Also give a one-time dose of Lasix on today as well.  Continue home buspirone, duloxetine scheduled with as needed Klonopin  Continue home tizanidine as needed  Further inpatient orders recommendations pending clinical course        Disposition: Home once tolerating a diet and abdominal pain improves.     VTE Prophylaxis:  Pharmacologic VTE prophylaxis orders are present.           CODE STATUS:   Code Status (Patient has no pulse and is not breathing): CPR (Attempt to Resuscitate)  Medical Interventions (Patient has pulse or is breathing): Full Support  Level Of Support Discussed With: Patient              Electronically signed by Naila Alejandre MD, 25, 2:40 PM EDT.                                    Naila Alejandre MD   Physician  Hospitalist     Progress Notes     Signed     Date of Service: 25  Creation Time: 25     Signed       Expand All Collapse [facility]   Hospitalist Progress Note  Date: 2025  Patient Name: Ebony Hamilton  : 1980  MRN: [id number]  Date of admission: 3/30/2025        Subjective[]Expand by Default  Subjective      Chief Complaint: Intractable nausea vomiting     Summary:Ebony Hamilton is a 44 y.o. female  with past medical history cervical radiculopathy, anxiety, depression, gastroparesis, obesity and GERD resenting to the ED for evaluation of intractable nausea and vomiting.  3 weeks ago patient had a cholecystectomy performed by Dr. Castano which had complications related clips slipped off causing bowel subsequent to the abdomen.  Patient required drain placement.  The following patient had abdominal pain again and was found to have appendicitis which required an appendectomy.  Over the last 2 days patient has been having intractable nausea to where  she cannot keep anything down so she came to the ED for further evaluation.  Since her cholecystectomy 3 weeks ago patient has been to the ED on multiple occasions.  In the ED patient was slightly hypertensive with remaining vitals being within normal limits.  Labs show mild leukocytosis and lactic acidosis which responded well to fluids with remaining labs being unremarkable including a normal lipase, proBNP, troponin, and negative UA.  CT of the abdomen with contrast showed postoperative changes of recent cholecystectomy and appendectomy with nothing acute including no evidence of abnormal fluid collections, hemorrhages or hematomas.  When seen patient was still having nausea and vomiting with abdominal discomfort with inspiration.  Patient admitted for further evaluation and treatment.  General surgery consulted.  Started on scheduled Reglan and IV fluids.  Advancing diet slowly as tolerated.  Patient planning to return home on discharge once tolerating a diet.     Interval Followup: Sitting up in bed appears to be resting fairly comfortably.   She reports continued nausea and abdominal pain on this morning.  She still has not had a bowel movement.  She also complains of pain in her right side she takes deep breath she reports has been ongoing for admission.     Review of Systems  Constitutional: Negative for fatigue and fever.   HENT: Negative for sore throat and trouble swallowing.    Eyes: Negative for pain and discharge.   Respiratory: Negative for cough and shortness of breath.    Cardiovascular: Negative for chest pain and palpitations.   Gastrointestinal: Positive for abdominal pain, positive nausea, denies vomiting.   Endocrine: Negative for cold intolerance and heat intolerance.   Genitourinary: Negative for difficulty urinating and dysuria.   Musculoskeletal: Negative for back pain and neck stiffness.   Skin: Negative for color change and rash.   Neurological: Negative for syncope and headaches.    Hematological: Negative for adenopathy.   Psychiatric/Behavioral: Negative for confusion and hallucinations.     Objective  Objective      Vitals:   Temp:  [97.4 °F (36.3 °C)-98.2 °F (36.8 °C)] 98 °F (36.7 °C)  Heart Rate:  [] 100  Resp:  [16-18] 18  BP: (110-166)/() 118/94  Physical Exam             General: well-developed appearing stated age in no acute distress  HEENT: Normocephalic atraumatic moist membranes pupils equal round no scleral icterus no conjunctival injection  Cardiovascular: Mildly tachycardic S1-S2, no appreciable murmurs, no lower extremity edema appreciated  Pulmonary: Clear to auscultation bilaterally, unlabored, no conversational dyspnea appreciated  Gastrointestinal: Soft mildly tender in the left upper quadrant nondistended positive bowel sounds all 4 quadrants no rebound or guarding  Musculoskeletal: No clubbing cyanosis, warm and well-perfused, calves soft symmetric nontender bilaterally  Skin: Clean dry without rashes  Neuro: Cranial nerves II through XII intact grossly no sensorimotor deficits appreciated bilateral upper and lower extremities  Psych: Patient is calm cooperative and appropriate with exam not responding to internal stimuli        Result Review  Result Review:  I have personally reviewed these results and agree with these findings:  [x]  Laboratory  LAB RESULTS:           Lab 03/31/25  0412 03/30/25  1209   WBC 9.27 11.07*   HEMOGLOBIN 11.5* 12.8   HEMATOCRIT 36.4 40.9   PLATELETS 487* 533*   NEUTROS ABS  --  7.80*   IMMATURE GRANS (ABS)  --  0.12*   LYMPHS ABS  --  2.50   MONOS ABS  --  0.53   EOS ABS  --  0.07   MCV 90.1 89.7               Lab 03/31/25  0412 03/30/25  1209   SODIUM 140 137   POTASSIUM 3.9 3.6   CHLORIDE 105 100   CO2 20.4* 18.5*   ANION GAP 14.6 18.5*   BUN 11 12   CREATININE 0.71 0.68   EGFR 109.0 111.7   GLUCOSE 113* 85   CALCIUM 9.3 10.3              Lab 03/30/25  1209   TOTAL PROTEIN 8.0   ALBUMIN 4.3   GLOBULIN 3.7   ALT (SGPT) 9   AST  (SGOT) 12   BILIRUBIN 0.4   ALK PHOS 80   LIPASE 35              Lab 03/30/25  1209   PROBNP <36.0   HSTROP T <6                  Brief Urine Lab Results  (Last result in the past 365 days)          Color   Clarity   Blood   Leuk Est   Nitrite   Protein   CREAT   Urine HCG         03/30/25 1242 Yellow    Clear    Negative    Negative    Negative    Negative                          Microbiology Results (last 10 days)         ** No results found for the last 240 hours. **                []  Microbiology  [x]  Radiology  XR Abdomen KUB  Result Date: 4/3/2025  Impression: Findings compatible with constipation. Electronically Signed: Martha Barker MD  4/3/2025 11:16 AM EDT  Workstation ID: [id number]     CT Angiogram Chest Pulmonary Embolism  Result Date: 3/30/2025  Impression: No evidence of pulmonary embolus. No acute intrathoracic findings. Electronically Signed: Ankit Barker MD  3/30/2025 2:24 PM EDT  Workstation ID: [id number]     CT Abdomen Pelvis With Contrast  Result Date: 3/30/2025  1.Residual postoperative changes are noted status post recent cholecystectomy and appendectomy. 2.No evidence for abnormal fluid collection. No significant hemorrhage or hematoma. 3.No evidence for additional acute abnormality throughout the abdomen or pelvis. Electronically Signed: Irwin Jose MD  3/30/2025 2:23 PM EDT  Workstation ID: [id number]     XR Chest 1 View  Result Date: 3/30/2025  Impression: No acute cardiopulmonary abnormality. Electronically Signed: Alin Quiroz  3/30/2025 12:30 PM EDT  Workstation ID: [id number]        [x]  EKG/Telemetry   []  Cardiology/Vascular   []  Pathology  []  Old records  [x]  Other:  Scheduled Meds:  Scheduled Medication   aluminum-magnesium hydroxide-simethicone, 30 mL, Oral, Once  busPIRone, 15 mg, Oral, TID  DULoxetine, 90 mg, Oral, Daily  enoxaparin sodium, 40 mg, Subcutaneous, Daily  magnesium hydroxide, 10 mL, Oral, Daily  metoclopramide, 10 mg, Oral, 4x Daily AC & at  Bedtime  montelukast, 10 mg, Oral, Nightly  pantoprazole, 40 mg, Intravenous, Q24H  polyethylene glycol, 17 g, Oral, BID  saccharomyces boulardii, 250 mg, Oral, BID  sodium chloride, 10 mL, Intravenous, Q12H         Continuous Infusions:  Infusion Medications            PRN Meds:.  PRN Medication     acetaminophen    bisacodyl    senna-docusate sodium **AND** polyethylene glycol **AND** bisacodyl **AND** bisacodyl    clonazePAM    diphenhydrAMINE-zinc acetate    HYDROcodone-acetaminophen    HYDROmorphone    ipratropium-albuterol    ondansetron    sodium chloride    sodium chloride    sodium chloride    tiZANidine    traZODone           Assessment & Plan  Assessment / Plan      Assessment/Plan:  Intractable nausea and vomiting  Gastroparesis  Constipation  Right upper quadrant abdominal pain  Depression with anxiety  Cervical radiculopathy  Pleuritic chest pain           Patient admitted for further evaluation and treatment  General Surgery consult noted and appreciated  Continue scheduled Reglan, switch to oral formulation  continue as needed Zofran  Status post IV fluids  Continue patient's home oral pain management regimen with Dilaudid IV for breakthrough.  Continue MiraLAX  twice daily dosing.   Will give lactulose 20 g p.o. every 2 hours x 3 doses.  Further dosing based on response.  Continue home buspirone, duloxetine scheduled with as needed Klonopin  Continue home tizanidine as needed  Patient with resistant pleuritic pain.  Workup has been negative for evidence of PE, pneumonia, infarct or any other significant findings.  Will continue pulmonary toiletry with incentive spirometry and ambulation.  Will add flutter valve therapy and scheduled DuoNebs x 2 on today  Further inpatient orders recommendations pending clinical course        Disposition: Home once tolerating a diet and abdominal pain improves.     VTE Prophylaxis:  Pharmacologic VTE prophylaxis orders are present.           CODE STATUS:   Code  Status (Patient has no pulse and is not breathing): CPR (Attempt to Resuscitate)  Medical Interventions (Patient has pulse or is breathing): Full Support  Level Of Support Discussed With: Patient              Electronically signed by Naila Alejandre MD, 25, 1:14 PM EDT.                                       Naila Alejandre MD   Physician  Hospitalist     Progress Notes     Signed     Date of Service: 25 150  Creation Time: 25     Signed       Expand All Collapse All     [facility]   Hospitalist Progress Note  Date: 2025  Patient Name: Ebony Hamilton  : 1980  MRN: [id number]  Date of admission: 3/30/2025        Subjective[]Expand by Default  Subjective      Chief Complaint: Intractable nausea vomiting     Summary:Ebony Hamilton is a 44 y.o. female  with past medical history cervical radiculopathy, anxiety, depression, gastroparesis, obesity and GERD resenting to the ED for evaluation of intractable nausea and vomiting.  3 weeks ago patient had a cholecystectomy performed by Dr. Castano which had complications related clips slipped off causing bowel subsequent to the abdomen.  Patient required drain placement.  The following patient had abdominal pain again and was found to have appendicitis which required an appendectomy.  Over the last 2 days patient has been having intractable nausea to where she cannot keep anything down so she came to the ED for further evaluation.  Since her cholecystectomy 3 weeks ago patient has been to the ED on multiple occasions.  In the ED patient was slightly hypertensive with remaining vitals being within normal limits.  Labs show mild leukocytosis and lactic acidosis which responded well to fluids with remaining labs being unremarkable including a normal lipase, proBNP, troponin, and negative UA.  CT of the abdomen with contrast showed postoperative changes of recent cholecystectomy and appendectomy with nothing acute  including no evidence of abnormal fluid collections, hemorrhages or hematomas.  When seen patient was still having nausea and vomiting with abdominal discomfort with inspiration.  Patient admitted for further evaluation and treatment.  General surgery consulted.  Started on scheduled Reglan and IV fluids.  Advancing diet slowly as tolerated.  Patient planning to return home on discharge once tolerating a diet.     Interval Followup: Patient initially seen sleeping she appears to be fairly comfortably.   She awakens easily.  She complains of  right upper quadrant abdominal pain and nausea.  Reports that the pain is worse when she tries to take deep breaths.  She has had multiple bowel movements on yesterday evening after receiving lactulose.       Review of Systems  Constitutional: Negative for fatigue and fever.   HENT: Negative for sore throat and trouble swallowing.    Eyes: Negative for pain and discharge.   Respiratory: Negative for cough and shortness of breath.    Cardiovascular: Negative for chest pain and palpitations.   Gastrointestinal: Positive for abdominal pain, positive nausea, denies vomiting.   Endocrine: Negative for cold intolerance and heat intolerance.   Genitourinary: Negative for difficulty urinating and dysuria.   Musculoskeletal: Negative for back pain and neck stiffness.   Skin: Negative for color change and rash.   Neurological: Negative for syncope and headaches.   Hematological: Negative for adenopathy.   Psychiatric/Behavioral: Negative for confusion and hallucinations.     Objective  Objective      Vitals:   Temp:  [97.9 °F (36.6 °C)-98.5 °F (36.9 °C)] 98.4 °F (36.9 °C)  Heart Rate:  [] 86  Resp:  [16-20] 18  BP: (118-153)/() 118/82  Physical Exam             General: well-developed appearing stated age in no acute distress  HEENT: Normocephalic atraumatic moist membranes pupils equal round no scleral icterus no conjunctival injection  Cardiovascular: Regular S1-S2, no  appreciable murmurs, no lower extremity edema appreciated  Pulmonary: Clear to auscultation bilaterally, unlabored, no conversational dyspnea appreciated  Gastrointestinal: Soft mildly tender in the right upper quadrant-no rebound or guarding, nondistended, positive bowel sounds   Musculoskeletal: No clubbing cyanosis, warm and well-perfused, calves soft symmetric nontender bilaterally  Skin: Clean dry without rashes  Neuro: Cranial nerves II through XII intact grossly no sensorimotor deficits appreciated bilateral upper and lower extremities  Psych: Patient is calm cooperative and appropriate with exam not responding to internal stimuli        Result Review  Result Review:  I have personally reviewed these results and agree with these findings:  [x]  Laboratory  LAB RESULTS:           Lab 03/31/25  0412 03/30/25  1209   WBC 9.27 11.07*   HEMOGLOBIN 11.5* 12.8   HEMATOCRIT 36.4 40.9   PLATELETS 487* 533*   NEUTROS ABS  --  7.80*   IMMATURE GRANS (ABS)  --  0.12*   LYMPHS ABS  --  2.50   MONOS ABS  --  0.53   EOS ABS  --  0.07   MCV 90.1 89.7                Lab 04/05/25  0853 03/31/25  0412 03/30/25  1209   SODIUM 139 140 137   POTASSIUM 4.3 3.9 3.6   CHLORIDE 102 105 100   CO2 24.8 20.4* 18.5*   ANION GAP 12.2 14.6 18.5*   BUN 7 11 12   CREATININE 0.56* 0.71 0.68   EGFR 117.0 109.0 111.7   GLUCOSE 101* 113* 85   CALCIUM 9.4 9.3 10.3   MAGNESIUM 2.1  --   --               Lab 03/30/25  1209   TOTAL PROTEIN 8.0   ALBUMIN 4.3   GLOBULIN 3.7   ALT (SGPT) 9   AST (SGOT) 12   BILIRUBIN 0.4   ALK PHOS 80   LIPASE 35              Lab 03/30/25  1209   PROBNP <36.0   HSTROP T <6                  Brief Urine Lab Results  (Last result in the past 365 days)          Color   Clarity   Blood   Leuk Est   Nitrite   Protein   CREAT   Urine HCG         03/30/25 1242 Yellow    Clear    Negative    Negative    Negative    Negative                          Microbiology Results (last 10 days)         ** No results found for the last 240  hours. **                []  Microbiology  [x]  Radiology  XR Abdomen KUB  Result Date: 4/3/2025  Impression: Findings compatible with constipation. Electronically Signed: Martha Barker MD  4/3/2025 11:16 AM EDT  Workstation ID: [id number]     CT Angiogram Chest Pulmonary Embolism  Result Date: 3/30/2025  Impression: No evidence of pulmonary embolus. No acute intrathoracic findings. Electronically Signed: Ankit Barker MD  3/30/2025 2:24 PM EDT  Workstation ID: [id number]     CT Abdomen Pelvis With Contrast  Result Date: 3/30/2025  1.Residual postoperative changes are noted status post recent cholecystectomy and appendectomy. 2.No evidence for abnormal fluid collection. No significant hemorrhage or hematoma. 3.No evidence for additional acute abnormality throughout the abdomen or pelvis. Electronically Signed: Irwin Jose MD  3/30/2025 2:23 PM EDT  Workstation ID: [id number]     XR Chest 1 View  Result Date: 3/30/2025  Impression: No acute cardiopulmonary abnormality. Electronically Signed: Alin Quiroz  3/30/2025 12:30 PM EDT  Workstation ID: [id number]        [x]  EKG/Telemetry   []  Cardiology/Vascular   []  Pathology  []  Old records  [x]  Other:  Scheduled Meds:  Scheduled Medication   busPIRone, 15 mg, Oral, TID  DULoxetine, 90 mg, Oral, Daily  enoxaparin sodium, 40 mg, Subcutaneous, Daily  Lidocaine, 1 patch, Transdermal, Q24H  magnesium hydroxide, 10 mL, Oral, Daily  metoclopramide, 10 mg, Oral, 4x Daily AC & at Bedtime  montelukast, 10 mg, Oral, Nightly  pantoprazole, 40 mg, Intravenous, Q24H  polyethylene glycol, 17 g, Oral, BID  saccharomyces boulardii, 250 mg, Oral, BID  simethicone, 120 mg, Oral, 4x Daily AC & at Bedtime  sodium chloride, 10 mL, Intravenous, Q12H         Continuous Infusions:  Infusion Medications            PRN Meds:.  PRN Medication     acetaminophen    bisacodyl    senna-docusate sodium **AND** polyethylene glycol **AND** bisacodyl **AND** bisacodyl    diphenhydrAMINE-zinc  acetate    HYDROcodone-acetaminophen    ipratropium-albuterol    ondansetron    sodium chloride    sodium chloride    sodium chloride    tiZANidine    traZODone           Assessment & Plan  Assessment / Plan      Assessment/Plan:  Intractable nausea and vomiting.  No further vomiting  Gastroparesis  Constipation.  Resolved  Right upper quadrant abdominal pain  Depression with anxiety  Cervical radiculopathy  Pleuritic chest pain           Patient admitted for further evaluation and treatment  General Surgery consult noted and appreciated  Continue scheduled Reglan, switch to oral formulation  continue as needed Zofran  Will add scheduled simethicone.  Will discontinue Dilaudid at this time.  Status post IV fluids  Continue patient's home oral pain management regimen   Continue MiraLAX  twice daily dosing.   Continue home buspirone, duloxetine scheduled with as needed Klonopin  Continue home tizanidine as needed  Patient with resistant pleuritic pain.  Workup has been negative for evidence of PE, pneumonia, infarct or any other significant findings.  Will continue pulmonary toiletry with incentive spirometry and ambulation.  Continue flutter valve therapy and scheduled DuoNebs x 2 on today  Continue to encourage regular ambulation  Further inpatient orders recommendations pending clinical course        Disposition: Home once tolerating a diet and abdominal pain improves.     VTE Prophylaxis:  Pharmacologic VTE prophylaxis orders are present.           CODE STATUS:   Code Status (Patient has no pulse and is not breathing): CPR (Attempt to Resuscitate)  Medical Interventions (Patient has pulse or is breathing): Full Support  Level Of Support Discussed With: Patient              Electronically signed by Naila Alejandre MD, 04/05/25, 3:08 PM EDT.                                                     Discharge Summaryl        Tabatha Mortensen RN at 04/06/25 8228          Goal Outcome Evaluation:  Plan of Care  "Reviewed With: patient        Progress: no change  Outcome Evaluation: pt is axo4, c/o of epigastric pain, medicated per mar. wc done. up ad eloy,  at bedisde. pt is to discharge home. no other concerns noted.                               Electronically signed by Tabatha Mortensen RN at 25 1415       Lee Ann Beckham RN at 25 0435          Goal Outcome Evaluation:  Plan of Care Reviewed With: patient        Progress: no change  Outcome Evaluation: Pt remains A&Ox4, on room air. Medicated for c/o pain per mar once so far this shift. Up ad eloy in room, bed alarm refused and form signed in pt chart. Possible dc home today. No needs at this time.                               Electronically signed by Lee Ann Beckham RN at 25 0431       Keiko Cohn RN at 25 1631          Goal Outcome Evaluation:  Plan of Care Reviewed With: patient           Outcome Evaluation: Pt A&O x4 on room air. Medicated 2x this shift for pain. Dilaudid was discontinued this shift per provider and Norco is now Q4. Lidocaine patch applied under Right breast for pain. Pt was encouraged to ambulate in [place] and pt stated \"she will wait for her  to walk her\". Safety checks maintained this shift. Call light within reach and pt able to make needs known.    Keiko Cohn RN                             Electronically signed by Keiko Cohn RN at 25 1661       Naila Alejandre MD at 25 1501           [facility] Hospitalist Progress Note  Date: 2025  Patient Name: Ebony Hamilton  : 1980  MRN: [id number]  Date of admission: 3/30/2025      Subjective  Subjective     Chief Complaint: Intractable nausea vomiting    Summary:Ebony Hamilton is a 44 y.o. female  with past medical history cervical radiculopathy, anxiety, depression, gastroparesis, obesity and GERD resenting to the ED for evaluation of intractable nausea and vomiting.  3 weeks ago patient had a " cholecystectomy performed by Dr. Castano which had complications related clips slipped off causing bowel subsequent to the abdomen.  Patient required drain placement.  The following patient had abdominal pain again and was found to have appendicitis which required an appendectomy.  Over the last 2 days patient has been having intractable nausea to where she cannot keep anything down so she came to the ED for further evaluation.  Since her cholecystectomy 3 weeks ago patient has been to the ED on multiple occasions.  In the ED patient was slightly hypertensive with remaining vitals being within normal limits.  Labs show mild leukocytosis and lactic acidosis which responded well to fluids with remaining labs being unremarkable including a normal lipase, proBNP, troponin, and negative UA.  CT of the abdomen with contrast showed postoperative changes of recent cholecystectomy and appendectomy with nothing acute including no evidence of abnormal fluid collections, hemorrhages or hematomas.  When seen patient was still having nausea and vomiting with abdominal discomfort with inspiration.  Patient admitted for further evaluation and treatment.  General surgery consulted.  Started on scheduled Reglan and IV fluids.  Advancing diet slowly as tolerated.  Patient planning to return home on discharge once tolerating a diet.    Interval Followup: Patient initially seen sleeping she appears to be fairly comfortably.   She awakens easily.  She complains of  right upper quadrant abdominal pain and nausea.  Reports that the pain is worse when she tries to take deep breaths.  She has had multiple bowel movements on yesterday evening after receiving lactulose.      Review of Systems  Constitutional: Negative for fatigue and fever.   HENT: Negative for sore throat and trouble swallowing.    Eyes: Negative for pain and discharge.   Respiratory: Negative for cough and shortness of breath.    Cardiovascular: Negative for chest pain and  palpitations.   Gastrointestinal: Positive for abdominal pain, positive nausea, denies vomiting.   Endocrine: Negative for cold intolerance and heat intolerance.   Genitourinary: Negative for difficulty urinating and dysuria.   Musculoskeletal: Negative for back pain and neck stiffness.   Skin: Negative for color change and rash.   Neurological: Negative for syncope and headaches.   Hematological: Negative for adenopathy.   Psychiatric/Behavioral: Negative for confusion and hallucinations.    Objective  Objective     Vitals:   Temp:  [97.9 °F (36.6 °C)-98.5 °F (36.9 °C)] 98.4 °F (36.9 °C)  Heart Rate:  [] 86  Resp:  [16-20] 18  BP: (118-153)/() 118/82  Physical Exam   General: well-developed appearing stated age in no acute distress  HEENT: Normocephalic atraumatic moist membranes pupils equal round no scleral icterus no conjunctival injection  Cardiovascular: Regular S1-S2, no appreciable murmurs, no lower extremity edema appreciated  Pulmonary: Clear to auscultation bilaterally, unlabored, no conversational dyspnea appreciated  Gastrointestinal: Soft mildly tender in the right upper quadrant-no rebound or guarding, nondistended, positive bowel sounds   Musculoskeletal: No clubbing cyanosis, warm and well-perfused, calves soft symmetric nontender bilaterally  Skin: Clean dry without rashes  Neuro: Cranial nerves II through XII intact grossly no sensorimotor deficits appreciated bilateral upper and lower extremities  Psych: Patient is calm cooperative and appropriate with exam not responding to internal stimuli      Result Review   Result Review:  I have personally reviewed these results and agree with these findings:  [x]  Laboratory  LAB RESULTS:      Lab 03/31/25  0412 03/30/25  1209   WBC 9.27 11.07*   HEMOGLOBIN 11.5* 12.8   HEMATOCRIT 36.4 40.9   PLATELETS 487* 533*   NEUTROS ABS  --  7.80*   IMMATURE GRANS (ABS)  --  0.12*   LYMPHS ABS  --  2.50   MONOS ABS  --  0.53   EOS ABS  --  0.07   MCV 90.1  89.7         Lab 04/05/25  0853 03/31/25  0412 03/30/25  1209   SODIUM 139 140 137   POTASSIUM 4.3 3.9 3.6   CHLORIDE 102 105 100   CO2 24.8 20.4* 18.5*   ANION GAP 12.2 14.6 18.5*   BUN 7 11 12   CREATININE 0.56* 0.71 0.68   EGFR 117.0 109.0 111.7   GLUCOSE 101* 113* 85   CALCIUM 9.4 9.3 10.3   MAGNESIUM 2.1  --   --          Lab 03/30/25  1209   TOTAL PROTEIN 8.0   ALBUMIN 4.3   GLOBULIN 3.7   ALT (SGPT) 9   AST (SGOT) 12   BILIRUBIN 0.4   ALK PHOS 80   LIPASE 35         Lab 03/30/25  1209   PROBNP <36.0   HSTROP T <6                 Brief Urine Lab Results  (Last result in the past 365 days)        Color   Clarity   Blood   Leuk Est   Nitrite   Protein   CREAT   Urine HCG        03/30/25 1242 Yellow   Clear   Negative   Negative   Negative   Negative                 Microbiology Results (last 10 days)       ** No results found for the last 240 hours. **            []  Microbiology  [x]  Radiology  XR Abdomen KUB  Result Date: 4/3/2025  Impression: Findings compatible with constipation. Electronically Signed: Martha Barker MD  4/3/2025 11:16 AM EDT  Workstation ID: [id number]    CT Angiogram Chest Pulmonary Embolism  Result Date: 3/30/2025  Impression: No evidence of pulmonary embolus. No acute intrathoracic findings. Electronically Signed: Ankit Barker MD  3/30/2025 2:24 PM EDT  Workstation ID: [id number]    CT Abdomen Pelvis With Contrast  Result Date: 3/30/2025  1.Residual postoperative changes are noted status post recent cholecystectomy and appendectomy. 2.No evidence for abnormal fluid collection. No significant hemorrhage or hematoma. 3.No evidence for additional acute abnormality throughout the abdomen or pelvis. Electronically Signed: Irwin Jose MD  3/30/2025 2:23 PM EDT  Workstation ID: [id number]    XR Chest 1 View  Result Date: 3/30/2025  Impression: No acute cardiopulmonary abnormality. Electronically Signed: Alin Quiroz  3/30/2025 12:30 PM EDT  Workstation ID: [id number]      [x]  EKG/Telemetry    []  Cardiology/Vascular   []  Pathology  []  Old records  [x]  Other:  Scheduled Meds:busPIRone, 15 mg, Oral, TID  DULoxetine, 90 mg, Oral, Daily  enoxaparin sodium, 40 mg, Subcutaneous, Daily  Lidocaine, 1 patch, Transdermal, Q24H  magnesium hydroxide, 10 mL, Oral, Daily  metoclopramide, 10 mg, Oral, 4x Daily AC & at Bedtime  montelukast, 10 mg, Oral, Nightly  pantoprazole, 40 mg, Intravenous, Q24H  polyethylene glycol, 17 g, Oral, BID  saccharomyces boulardii, 250 mg, Oral, BID  simethicone, 120 mg, Oral, 4x Daily AC & at Bedtime  sodium chloride, 10 mL, Intravenous, Q12H      Continuous Infusions:     PRN Meds:.  acetaminophen    bisacodyl    senna-docusate sodium **AND** polyethylene glycol **AND** bisacodyl **AND** bisacodyl    diphenhydrAMINE-zinc acetate    HYDROcodone-acetaminophen    ipratropium-albuterol    ondansetron    sodium chloride    sodium chloride    sodium chloride    tiZANidine    traZODone      Assessment & Plan  Assessment / Plan     Assessment/Plan:  Intractable nausea and vomiting.  No further vomiting  Gastroparesis  Constipation.  Resolved  Right upper quadrant abdominal pain  Depression with anxiety  Cervical radiculopathy  Pleuritic chest pain        Patient admitted for further evaluation and treatment  General Surgery consult noted and appreciated  Continue scheduled Reglan, switch to oral formulation  continue as needed Zofran  Will add scheduled simethicone.  Will discontinue Dilaudid at this time.  Status post IV fluids  Continue patient's home oral pain management regimen   Continue MiraLAX  twice daily dosing.   Continue home buspirone, duloxetine scheduled with as needed Klonopin  Continue home tizanidine as needed  Patient with resistant pleuritic pain.  Workup has been negative for evidence of PE, pneumonia, infarct or any other significant findings.  Will continue pulmonary toiletry with incentive spirometry and ambulation.  Continue flutter valve therapy and scheduled  DuoNebs x 2 on today  Continue to encourage regular ambulation  Further inpatient orders recommendations pending clinical course      Disposition: Home once tolerating a diet and abdominal pain improves.    VTE Prophylaxis:  Pharmacologic VTE prophylaxis orders are present.        CODE STATUS:   Code Status (Patient has no pulse and is not breathing): CPR (Attempt to Resuscitate)  Medical Interventions (Patient has pulse or is breathing): Full Support  Level Of Support Discussed With: Patient          Electronically signed by Naila Alejandre MD, 25, 3:08 PM EDT.                       Electronically signed by Naila Alejandre MD at 25 1512       Lee Ann Beckham RN at 25 0421          Goal Outcome Evaluation:  Plan of Care Reviewed With: patient        Progress: no change  Outcome Evaluation: Pt remains A&Ox4, on room air. Medicated for c/o pain per mar. Scheduled miralax refused this shift due to pt c/o multiple bms today.  Skin care completed per orders. Up ad eloy in room, no needs at this time.                               Electronically signed by Lee Ann Beckham, RN at 25 0421       Dalia Lau RN at 25 1800          Goal Outcome Evaluation:  Plan of Care Reviewed With: patient        Progress: no change  Outcome Evaluation: Pt remained A&Ox4 this shift. Also, pt remained on room air maintaining stable oxygen saturation. Also, pt was medicated with PRN Dilaudid, Norco, and Zanaflex to help relieve pain. Also, pt was medicated with PRN Pericolace to help faciliate a BM. Pt was able to have several BMs this shift. Wound/skin care completed as per order. Pt tolerated well.                               Electronically signed by Dalia Lau, RN at 25 1934       Naila Alejandre MD at 25 0844           [facility] Hospitalist Progress Note  Date: 2025  Patient Name: Ebony Hamilton  : 1980  MRN: [id number]  Date of admission:  3/30/2025      Subjective  Subjective     Chief Complaint: Intractable nausea vomiting    Summary:Ebony Hamilton is a 44 y.o. female  with past medical history cervical radiculopathy, anxiety, depression, gastroparesis, obesity and GERD resenting to the ED for evaluation of intractable nausea and vomiting.  3 weeks ago patient had a cholecystectomy performed by Dr. Castano which had complications related clips slipped off causing bowel subsequent to the abdomen.  Patient required drain placement.  The following patient had abdominal pain again and was found to have appendicitis which required an appendectomy.  Over the last 2 days patient has been having intractable nausea to where she cannot keep anything down so she came to the ED for further evaluation.  Since her cholecystectomy 3 weeks ago patient has been to the ED on multiple occasions.  In the ED patient was slightly hypertensive with remaining vitals being within normal limits.  Labs show mild leukocytosis and lactic acidosis which responded well to fluids with remaining labs being unremarkable including a normal lipase, proBNP, troponin, and negative UA.  CT of the abdomen with contrast showed postoperative changes of recent cholecystectomy and appendectomy with nothing acute including no evidence of abnormal fluid collections, hemorrhages or hematomas.  When seen patient was still having nausea and vomiting with abdominal discomfort with inspiration.  Patient admitted for further evaluation and treatment.  General surgery consulted.  Started on scheduled Reglan and IV fluids.  Advancing diet slowly as tolerated.  Patient planning to return home on discharge once tolerating a diet.    Interval Followup: Sitting up in bed appears to be resting fairly comfortably.   She reports continued nausea and abdominal pain on this morning.  She still has not had a bowel movement.  She also complains of pain in her right side she takes deep breath she reports has  been ongoing for admission.    Review of Systems  Constitutional: Negative for fatigue and fever.   HENT: Negative for sore throat and trouble swallowing.    Eyes: Negative for pain and discharge.   Respiratory: Negative for cough and shortness of breath.    Cardiovascular: Negative for chest pain and palpitations.   Gastrointestinal: Positive for abdominal pain, positive nausea, denies vomiting.   Endocrine: Negative for cold intolerance and heat intolerance.   Genitourinary: Negative for difficulty urinating and dysuria.   Musculoskeletal: Negative for back pain and neck stiffness.   Skin: Negative for color change and rash.   Neurological: Negative for syncope and headaches.   Hematological: Negative for adenopathy.   Psychiatric/Behavioral: Negative for confusion and hallucinations.    Objective  Objective     Vitals:   Temp:  [97.4 °F (36.3 °C)-98.2 °F (36.8 °C)] 98 °F (36.7 °C)  Heart Rate:  [] 100  Resp:  [16-18] 18  BP: (110-166)/() 118/94  Physical Exam   General: well-developed appearing stated age in no acute distress  HEENT: Normocephalic atraumatic moist membranes pupils equal round no scleral icterus no conjunctival injection  Cardiovascular: Mildly tachycardic S1-S2, no appreciable murmurs, no lower extremity edema appreciated  Pulmonary: Clear to auscultation bilaterally, unlabored, no conversational dyspnea appreciated  Gastrointestinal: Soft mildly tender in the left upper quadrant nondistended positive bowel sounds all 4 quadrants no rebound or guarding  Musculoskeletal: No clubbing cyanosis, warm and well-perfused, calves soft symmetric nontender bilaterally  Skin: Clean dry without rashes  Neuro: Cranial nerves II through XII intact grossly no sensorimotor deficits appreciated bilateral upper and lower extremities  Psych: Patient is calm cooperative and appropriate with exam not responding to internal stimuli      Result Review   Result Review:  I have personally reviewed these  results and agree with these findings:  [x]  Laboratory  LAB RESULTS:      Lab 03/31/25  0412 03/30/25  1209   WBC 9.27 11.07*   HEMOGLOBIN 11.5* 12.8   HEMATOCRIT 36.4 40.9   PLATELETS 487* 533*   NEUTROS ABS  --  7.80*   IMMATURE GRANS (ABS)  --  0.12*   LYMPHS ABS  --  2.50   MONOS ABS  --  0.53   EOS ABS  --  0.07   MCV 90.1 89.7         Lab 03/31/25  0412 03/30/25  1209   SODIUM 140 137   POTASSIUM 3.9 3.6   CHLORIDE 105 100   CO2 20.4* 18.5*   ANION GAP 14.6 18.5*   BUN 11 12   CREATININE 0.71 0.68   EGFR 109.0 111.7   GLUCOSE 113* 85   CALCIUM 9.3 10.3         Lab 03/30/25  1209   TOTAL PROTEIN 8.0   ALBUMIN 4.3   GLOBULIN 3.7   ALT (SGPT) 9   AST (SGOT) 12   BILIRUBIN 0.4   ALK PHOS 80   LIPASE 35         Lab 03/30/25  1209   PROBNP <36.0   HSTROP T <6                 Brief Urine Lab Results  (Last result in the past 365 days)        Color   Clarity   Blood   Leuk Est   Nitrite   Protein   CREAT   Urine HCG        03/30/25 1242 Yellow   Clear   Negative   Negative   Negative   Negative                 Microbiology Results (last 10 days)       ** No results found for the last 240 hours. **            []  Microbiology  [x]  Radiology  XR Abdomen KUB  Result Date: 4/3/2025  Impression: Findings compatible with constipation. Electronically Signed: Martha Barker MD  4/3/2025 11:16 AM EDT  Workstation ID: [id number]    CT Angiogram Chest Pulmonary Embolism  Result Date: 3/30/2025  Impression: No evidence of pulmonary embolus. No acute intrathoracic findings. Electronically Signed: Ankit Barker MD  3/30/2025 2:24 PM EDT  Workstation ID: [id number]    CT Abdomen Pelvis With Contrast  Result Date: 3/30/2025  1.Residual postoperative changes are noted status post recent cholecystectomy and appendectomy. 2.No evidence for abnormal fluid collection. No significant hemorrhage or hematoma. 3.No evidence for additional acute abnormality throughout the abdomen or pelvis. Electronically Signed: Irwin Jose MD  3/30/2025  2:23 PM EDT  Workstation ID: [id number]    XR Chest 1 View  Result Date: 3/30/2025  Impression: No acute cardiopulmonary abnormality. Electronically Signed: Alin Quiroz  3/30/2025 12:30 PM EDT  Workstation ID: [id number]      [x]  EKG/Telemetry   []  Cardiology/Vascular   []  Pathology  []  Old records  [x]  Other:  Scheduled Meds:aluminum-magnesium hydroxide-simethicone, 30 mL, Oral, Once  busPIRone, 15 mg, Oral, TID  DULoxetine, 90 mg, Oral, Daily  enoxaparin sodium, 40 mg, Subcutaneous, Daily  magnesium hydroxide, 10 mL, Oral, Daily  metoclopramide, 10 mg, Oral, 4x Daily AC & at Bedtime  montelukast, 10 mg, Oral, Nightly  pantoprazole, 40 mg, Intravenous, Q24H  polyethylene glycol, 17 g, Oral, BID  saccharomyces boulardii, 250 mg, Oral, BID  sodium chloride, 10 mL, Intravenous, Q12H      Continuous Infusions:     PRN Meds:.  acetaminophen    bisacodyl    senna-docusate sodium **AND** polyethylene glycol **AND** bisacodyl **AND** bisacodyl    clonazePAM    diphenhydrAMINE-zinc acetate    HYDROcodone-acetaminophen    HYDROmorphone    ipratropium-albuterol    ondansetron    sodium chloride    sodium chloride    sodium chloride    tiZANidine    traZODone      Assessment & Plan  Assessment / Plan     Assessment/Plan:  Intractable nausea and vomiting  Gastroparesis  Constipation  Right upper quadrant abdominal pain  Depression with anxiety  Cervical radiculopathy  Pleuritic chest pain        Patient admitted for further evaluation and treatment  General Surgery consult noted and appreciated  Continue scheduled Reglan, switch to oral formulation  continue as needed Zofran  Status post IV fluids  Continue patient's home oral pain management regimen with Dilaudid IV for breakthrough.  Continue MiraLAX  twice daily dosing.   Will give lactulose 20 g p.o. every 2 hours x 3 doses.  Further dosing based on response.  Continue home buspirone, duloxetine scheduled with as needed Klonopin  Continue home tizanidine as  needed  Patient with resistant pleuritic pain.  Workup has been negative for evidence of PE, pneumonia, infarct or any other significant findings.  Will continue pulmonary toiletry with incentive spirometry and ambulation.  Will add flutter valve therapy and scheduled DuoNebs x 2 on today  Further inpatient orders recommendations pending clinical course      Disposition: Home once tolerating a diet and abdominal pain improves.    VTE Prophylaxis:  Pharmacologic VTE prophylaxis orders are present.        CODE STATUS:   Code Status (Patient has no pulse and is not breathing): CPR (Attempt to Resuscitate)  Medical Interventions (Patient has pulse or is breathing): Full Support  Level Of Support Discussed With: Patient          Electronically signed by Naila Alejandre MD, 25, 1:14 PM EDT.                     Electronically signed by Naila Alejandre MD at 25 1314       Jessi Jimenez RN at 25 0401          Goal Outcome Evaluation:              Outcome Evaluation: alert and oriented x4. vss on RA. medicated frequently for c/o right back/abdominal pain when breathing. spouse at bedside, attentive to pt and pt needs. no new issues/needs at this time.                               Electronically signed by Jessi Jimenez RN at 25 0401       Dalia Lau RN at 25 1826          Goal Outcome Evaluation:  Plan of Care Reviewed With: patient        Progress: no change  Outcome Evaluation: Pt remained A&Ox4 this shift. Also, pt remained on room air maintaining stable oxygen saturation. Pt was medicated with PRN Norco, Dilaudid, and Zanaflex to help relieve pain. Pt remained up ad eloy.                               Electronically signed by Dalia Lau RN at 25 1826       Shahid Jackson RN at 25 8667       Summary:Wound RN Follow-up                 [facility]   Wound Evaluation / Progress Note       Patient Name: Ebony Hamilton    :  1980    MRN: [id number]  Today's Date: 4/3/2025                     Admit Date: 3/30/2025    Visit Dx:    ICD-10-CM ICD-9-CM   1. Abdominal pain, unspecified abdominal location  R10.9 789.00   2. Difficulty in walking  R26.2 719.7       Patient Active Problem List   Diagnosis    Acute postoperative pain    Allergic rhinitis    Bulge of cervical disc without myelopathy    Cervical fusion syndrome    Degeneration of intervertebral disc of cervical region    Spinal stenosis in cervical region    Impingement syndrome of shoulder region    Intractable chronic migraine without aura    Mitral valve disorder    Tricuspid valve disorder    Endometriosis    Obesity    SLAP lesion of left shoulder    NICHOLE (generalized anxiety disorder)    Blood in stool    BRBPR (bright red blood per rectum)    Constipation    Diarrhea    Lower abdominal pain    Myalgia    Cervical post-laminectomy syndrome    Cervical radiculopathy    Adenomyosis of uterus    Hemorrhoids    Biliary colic    Intractable abdominal pain    Calculus of gallbladder without cholecystitis without obstruction    Nausea and vomiting    Gastroparesis    S/P laparoscopic cholecystectomy    Bile leak from gallbladder bed    Encounter for removal of biliary stent    Appendicitis    S/P laparoscopic appendectomy    Intractable nausea and vomiting    Abdominal pain    Nausea & vomiting        Past Medical History:   Diagnosis Date    Allergic     Anxiety     Atrial fibrillation     RELEASED BY CARDIOLOGIST/ELECTROPHYSIST, NO CURRENT MEDS    Chronic pain disorder     Depression     Endometriosis     Headache     Hemorrhoids     Injury of shoulder, right 2009    CHRONIC PAIN    Panic disorder     Rectal bleeding 2010    S/P laparoscopic appendectomy 03/09/2025    S/P laparoscopic cholecystectomy 02/17/2025        Past Surgical History:   Procedure Laterality Date    APPENDECTOMY N/A 3/9/2025    Procedure: APPENDECTOMY LAPAROSCOPIC;  Surgeon: Mingo Cannon MD;   Location: [facility] MAIN OR;  Service: General;  Laterality: N/A;    CERVICAL ARTHRODESIS  01/14/2015    Donaldo Albarran    CERVICAL FUSION      C4-7 FUSION, FULL ROM    CHOLECYSTECTOMY N/A 2/17/2025    Procedure: CHOLECYSTECTOMY LAPAROSCOPIC plain language: removal of gallbladder thru small incisions using long instruments and a camera;  Surgeon: Josué Castano MD;  Location: [facility] MAIN OR;  Service: General;  Laterality: N/A;    COLONOSCOPY N/A 05/31/2022    Procedure: COLONOSCOPY;  Surgeon: Shabbir Baird MD;  Location: [facility] ENDOSCOPY;  Service: General;  Laterality: N/A;  HEMORRHOIDS    ENDOSCOPY N/A 2/17/2025    Procedure: ESOPHAGOGASTRODUODENOSCOPY WITH BIOPSIES;  Surgeon: Sanya Reyes MD;  Location: [facility] ENDOSCOPY;  Service: Gastroenterology;  Laterality: N/A;  GASTRITIS, SMALL HIATAL HERNIA    ENDOSCOPY N/A 3/6/2025    Procedure: ESOPHAGOGASTRODUODENOSCOPY with pancreatic stent removal;  Surgeon: Ha Thompson MD;  Location: [facility] ENDOSCOPY;  Service: Gastroenterology;  Laterality: N/A;  pancreatic stent removal, hiatal hernia    ERCP N/A 2/24/2025    Procedure: ENDOSCOPIC RETROGRADE CHOLANGIOPANCREATOGRAPHY with bile duct stent placement and pancreatic duct stent placement;  Surgeon: Ha Thompson MD;  Location: [facility] ENDOSCOPY;  Service: Gastroenterology;  Laterality: N/A;  bile duct leeak    EXPLORATORY LAPAROTOMY      HEMORRHOIDECTOMY N/A 05/31/2022    Procedure: HEMORRHOID BANDING;  Surgeon: Shabbir Baird MD;  Location: [facility] ENDOSCOPY;  Service: General;  Laterality: N/A;  BANDS X 2    HEMORRHOIDECTOMY N/A 1/31/2024    Procedure: HEMORRHOIDECTOMY;  Surgeon: Shabbir Baird MD;  Location: [facility] OR [facility];  Service: General;  Laterality: N/A;    HYSTERECTOMY      SHOULDER ARTHROSCOPY Right     SHOULDER SURGERY Left     ARTHROSCOPY LABRAL TEAR X2    TUBAL ABDOMINAL LIGATION           Physical Assessment:  Wound Right medial mid axillary (Active)   Wound  Image   25 1500   Dressing Appearance open to air 25 1500   Closure None 25 1837   Base red;moist 25 1500   Red (%), Wound Tissue Color 100 25 1500   Periwound intact;pale white 25 1500   Periwound Temperature warm 25 1500   Periwound Skin Turgor soft 25 1500   Edges rolled/closed 25 1500   Drainage Amount none 25 1500   Care, Wound cleansed with;sterile normal saline 25 1500   Dressing Care open to air 25 1500        Wound Check / Follow-up:  Seen today on 4NT for wound RN follow-up. Area to right axillary is almost completely closed with a small pin point open area with a red, moist base. Dressing is off and patient states staff has been leaving off for a few days now. Recommend discontinuing the current plan of care and cleansing right axillary with hibiclens daily and pat dry.    Impression: Right axillary wound    Short term goals:  Skin care management; regain skin integrity    Shahid Jackson RN,[facility]    4/3/2025    15:38 EDT       Electronically signed by Shahid Jackson RN at 25 1540       Naila Alejandre MD at 25 1435           [facility] Hospitalist Progress Note  Date: 4/3/2025  Patient Name: Ebony Hamilton  : 1980  MRN: [id number]  Date of admission: 3/30/2025      Subjective  Subjective     Chief Complaint: Intractable nausea vomiting    Summary:Ebony Hamilton is a 44 y.o. female  with past medical history cervical radiculopathy, anxiety, depression, gastroparesis, obesity and GERD resenting to the ED for evaluation of intractable nausea and vomiting.  3 weeks ago patient had a cholecystectomy performed by Dr. Castano which had complications related clips slipped off causing bowel subsequent to the abdomen.  Patient required drain placement.  The following patient had abdominal pain again and was found to have appendicitis which required an appendectomy.  Over the last 2 days patient  has been having intractable nausea to where she cannot keep anything down so she came to the ED for further evaluation.  Since her cholecystectomy 3 weeks ago patient has been to the ED on multiple occasions.  In the ED patient was slightly hypertensive with remaining vitals being within normal limits.  Labs show mild leukocytosis and lactic acidosis which responded well to fluids with remaining labs being unremarkable including a normal lipase, proBNP, troponin, and negative UA.  CT of the abdomen with contrast showed postoperative changes of recent cholecystectomy and appendectomy with nothing acute including no evidence of abnormal fluid collections, hemorrhages or hematomas.  When seen patient was still having nausea and vomiting with abdominal discomfort with inspiration.  Patient admitted for further evaluation and treatment.  General surgery consulted.  Started on scheduled Reglan and IV fluids.  Advancing diet slowly as tolerated.  Patient planning to return home on discharge once tolerating a diet.    Interval Followup: Sitting up in bed appears to be resting fairly comfortably.   She reports worsening nausea on this morning.  She continues to complain of abdominal pain.  She reports she has not had a bowel movement in 2 days now.    Review of Systems  Constitutional: Negative for fatigue and fever.   HENT: Negative for sore throat and trouble swallowing.    Eyes: Negative for pain and discharge.   Respiratory: Negative for cough and shortness of breath.    Cardiovascular: Negative for chest pain and palpitations.   Gastrointestinal: Positive for abdominal pain, positive nausea, denies vomiting.   Endocrine: Negative for cold intolerance and heat intolerance.   Genitourinary: Negative for difficulty urinating and dysuria.   Musculoskeletal: Negative for back pain and neck stiffness.   Skin: Negative for color change and rash.   Neurological: Negative for syncope and headaches.   Hematological: Negative for  adenopathy.   Psychiatric/Behavioral: Negative for confusion and hallucinations.    Objective  Objective     Vitals:   Temp:  [98 °F (36.7 °C)-98.6 °F (37 °C)] 98 °F (36.7 °C)  Heart Rate:  [102-119] 112  Resp:  [16-18] 16  BP: (124-154)/() 142/96  Physical Exam   General: well-developed appearing stated age in no acute distress  HEENT: Normocephalic atraumatic moist membranes pupils equal round no scleral icterus no conjunctival injection  Cardiovascular: Mildly tachycardic S1-S2, no appreciable murmurs, no lower extremity edema appreciated  Pulmonary: Clear to auscultation bilaterally, unlabored, no conversational dyspnea appreciated  Gastrointestinal: Soft mildly tender in the left upper quadrant nondistended positive bowel sounds all 4 quadrants no rebound or guarding  Musculoskeletal: No clubbing cyanosis, warm and well-perfused, calves soft symmetric nontender bilaterally  Skin: Clean dry without rashes  Neuro: Cranial nerves II through XII intact grossly no sensorimotor deficits appreciated bilateral upper and lower extremities  Psych: Patient is calm cooperative and appropriate with exam not responding to internal stimuli      Result Review   Result Review:  I have personally reviewed these results and agree with these findings:  [x]  Laboratory  LAB RESULTS:      Lab 03/31/25  0412 03/30/25  1209   WBC 9.27 11.07*   HEMOGLOBIN 11.5* 12.8   HEMATOCRIT 36.4 40.9   PLATELETS 487* 533*   NEUTROS ABS  --  7.80*   IMMATURE GRANS (ABS)  --  0.12*   LYMPHS ABS  --  2.50   MONOS ABS  --  0.53   EOS ABS  --  0.07   MCV 90.1 89.7         Lab 03/31/25  0412 03/30/25  1209   SODIUM 140 137   POTASSIUM 3.9 3.6   CHLORIDE 105 100   CO2 20.4* 18.5*   ANION GAP 14.6 18.5*   BUN 11 12   CREATININE 0.71 0.68   EGFR 109.0 111.7   GLUCOSE 113* 85   CALCIUM 9.3 10.3         Lab 03/30/25  1209   TOTAL PROTEIN 8.0   ALBUMIN 4.3   GLOBULIN 3.7   ALT (SGPT) 9   AST (SGOT) 12   BILIRUBIN 0.4   ALK PHOS 80   LIPASE 35          Lab 03/30/25  1209   PROBNP <36.0   HSTROP T <6                 Brief Urine Lab Results  (Last result in the past 365 days)        Color   Clarity   Blood   Leuk Est   Nitrite   Protein   CREAT   Urine HCG        03/30/25 1242 Yellow   Clear   Negative   Negative   Negative   Negative                 Microbiology Results (last 10 days)       ** No results found for the last 240 hours. **            []  Microbiology  [x]  Radiology  XR Abdomen KUB  Result Date: 4/3/2025  Impression: Findings compatible with constipation. Electronically Signed: Martha Barker MD  4/3/2025 11:16 AM EDT  Workstation ID: [id number]    CT Angiogram Chest Pulmonary Embolism  Result Date: 3/30/2025  Impression: No evidence of pulmonary embolus. No acute intrathoracic findings. Electronically Signed: Ankit Barker MD  3/30/2025 2:24 PM EDT  Workstation ID: [id number]    CT Abdomen Pelvis With Contrast  Result Date: 3/30/2025  1.Residual postoperative changes are noted status post recent cholecystectomy and appendectomy. 2.No evidence for abnormal fluid collection. No significant hemorrhage or hematoma. 3.No evidence for additional acute abnormality throughout the abdomen or pelvis. Electronically Signed: Irwin Jose MD  3/30/2025 2:23 PM EDT  Workstation ID: [id number]    XR Chest 1 View  Result Date: 3/30/2025  Impression: No acute cardiopulmonary abnormality. Electronically Signed: Alin Quiroz  3/30/2025 12:30 PM EDT  Workstation ID: [id number]      [x]  EKG/Telemetry   []  Cardiology/Vascular   []  Pathology  []  Old records  [x]  Other:  Scheduled Meds:aluminum-magnesium hydroxide-simethicone, 30 mL, Oral, Once  busPIRone, 15 mg, Oral, TID  DULoxetine, 90 mg, Oral, Daily  enoxaparin sodium, 40 mg, Subcutaneous, Daily  magnesium hydroxide, 10 mL, Oral, Daily  metoclopramide, 10 mg, Oral, 4x Daily AC & at Bedtime  montelukast, 10 mg, Oral, Nightly  pantoprazole, 40 mg, Intravenous, Q24H  polyethylene glycol, 17 g, Oral,  Daily  polyethylene glycol, 17 g, Oral, Once  saccharomyces boulardii, 250 mg, Oral, BID  sodium chloride, 10 mL, Intravenous, Q12H      Continuous Infusions:     PRN Meds:.  acetaminophen    bisacodyl    senna-docusate sodium **AND** polyethylene glycol **AND** bisacodyl **AND** bisacodyl    clonazePAM    HYDROcodone-acetaminophen    HYDROmorphone    ipratropium-albuterol    ondansetron    sodium chloride    sodium chloride    sodium chloride    tiZANidine    traZODone      Assessment & Plan  Assessment / Plan     Assessment/Plan:  Intractable nausea and vomiting  Gastroparesis  Constipation  Right upper quadrant abdominal pain  Depression with anxiety  Cervical radiculopathy        Patient admitted for further evaluation and treatment  General Surgery consult noted and appreciated  Continue scheduled Reglan, will switch from IV to oral formulation on today  Continue as needed Zofran  Continue IV fluids until ensure tolerating diet  Resume patient's home oral pain management regimen with Dilaudid IV for breakthrough.  Will change MiraLAX from daily to twice daily dosing.  Also give a one-time dose of Lasix on today as well.  Continue home buspirone, duloxetine scheduled with as needed Klonopin  Continue home tizanidine as needed  Further inpatient orders recommendations pending clinical course      Disposition: Home once tolerating a diet and abdominal pain improves.    VTE Prophylaxis:  Pharmacologic VTE prophylaxis orders are present.        CODE STATUS:   Code Status (Patient has no pulse and is not breathing): CPR (Attempt to Resuscitate)  Medical Interventions (Patient has pulse or is breathing): Full Support  Level Of Support Discussed With: Patient          Electronically signed by Naila Alejandre MD, 04/03/25, 2:40 PM EDT.                   Electronically signed by Naila Alejandre MD at 04/03/25 5327       Josué Castano MD at 04/03/25 1344           [facility]     Surgery Progress  Note    Patient Name: Ebony Hamilton  :    1980  MRN:    [id number]  Date of admission:  3/30/2025  Length of Stay: 2 days    Subjective   44-year-old female with gastroparesis    No acute events overnight.  Still intermittent nausea with regular diet.  Having some constipation.  Objective     Current Facility-Administered Medications:     acetaminophen (TYLENOL) tablet 650 mg, 650 mg, Oral, Q6H PRN, Royer Grimes MD    aluminum-magnesium hydroxide-simethicone (MAALOX MAX) 400-400-40 MG/5ML suspension 30 mL, 30 mL, Oral, Once, Royer Grimes MD    sennosides-docusate (PERICOLACE) 8.6-50 MG per tablet 2 tablet, 2 tablet, Oral, BID PRN **AND** polyethylene glycol (MIRALAX) packet 17 g, 17 g, Oral, Daily PRN **AND** bisacodyl (DULCOLAX) EC tablet 5 mg, 5 mg, Oral, Daily PRN **AND** bisacodyl (DULCOLAX) suppository 10 mg, 10 mg, Rectal, Daily PRN, Royer Grimes MD    busPIRone (BUSPAR) tablet 15 mg, 15 mg, Oral, TID, Royer Grimes MD, 15 mg at 25 0955    clonazePAM (KlonoPIN) tablet 0.5 mg, 0.5 mg, Oral, BID PRN, Royer Grimes MD, 0.5 mg at 25 0844    DULoxetine (CYMBALTA) DR capsule 90 mg, 90 mg, Oral, Daily, Royer Grimes MD, 90 mg at 25 0955    enoxaparin sodium (LOVENOX) syringe 40 mg, 40 mg, Subcutaneous, Daily, Royer Grimes MD, 40 mg at 25 0955    HYDROcodone-acetaminophen (NORCO)  MG per tablet 1 tablet, 1 tablet, Oral, Q4H PRN, Naila Alejandre MD, 1 tablet at 25 0955    HYDROmorphone (DILAUDID) injection 0.5 mg, 0.5 mg, Intravenous, Q6H PRN, Naila Alejandre MD, 0.5 mg at 25 1234    ipratropium-albuterol (DUO-NEB) nebulizer solution 3 mL, 3 mL, Nebulization, Q4H PRN, Royer Grimes MD    magnesium hydroxide (MILK OF MAGNESIA) suspension 10 mL, 10 mL, Oral, Daily, India Chris APRN    metoclopramide (REGLAN) injection 10 mg, 10 mg, Intravenous, Q6H, Naila Alejandre MD, 10 mg at 25 1234    montelukast  (SINGULAIR) tablet 10 mg, 10 mg, Oral, Nightly, Royer Grimes MD, 10 mg at 04/02/25 2008    ondansetron (ZOFRAN) injection 4 mg, 4 mg, Intravenous, Q6H PRN, Royer Grimes MD, 4 mg at 04/03/25 0632    pantoprazole (PROTONIX) injection 40 mg, 40 mg, Intravenous, Q24H, Royer Grimes MD, 40 mg at 04/02/25 2008    polyethylene glycol (MIRALAX) packet 17 g, 17 g, Oral, Daily, Royer Grimes MD, 17 g at 04/03/25 0955    saccharomyces boulardii (FLORASTOR) capsule 250 mg, 250 mg, Oral, BID, Royer Grimes MD, 250 mg at 04/03/25 0955    sodium chloride 0.9 % flush 10 mL, 10 mL, Intravenous, PRN, Nigel Goldstein MD    sodium chloride 0.9 % flush 10 mL, 10 mL, Intravenous, Q12H, Royer Grimes MD, 10 mL at 04/03/25 0956    sodium chloride 0.9 % flush 10 mL, 10 mL, Intravenous, PRN, Royer Grimes MD    sodium chloride 0.9 % infusion 40 mL, 40 mL, Intravenous, PRN, Royer Grimes MD    tiZANidine (ZANAFLEX) tablet 4 mg, 4 mg, Oral, Q8H PRN, Reji Irby MD, 4 mg at 04/03/25 0955    traZODone (DESYREL) tablet 25 mg, 25 mg, Oral, Nightly PRN, Reji Irby MD    Current Diet:    Dietary Orders (From admission, onward)       Start     Ordered    04/01/25 1124  Diet: Regular/House; Fluid Consistency: Thin (IDDSI 0)  Diet Effective Now        References:    Diet Order Definitions   Question Answer Comment   Diets: Regular/House    Fluid Consistency: Thin (IDDSI 0)        04/01/25 1123                    Vitals:   Temp:  [98 °F (36.7 °C)-98.6 °F (37 °C)] 98 °F (36.7 °C)  Heart Rate:  [102-119] 112  Resp:  [16-18] 16  BP: (124-154)/() 142/96  Physical Exam   General: no acute distress, resting in bed  Respiratory:  non-labored breathing  Cardiovascular:  RRR, non-tachycardic  Abdomen:  soft, non-distended, non-tender    LABS:  CBC          3/20/2025    01:32 3/30/2025    12:09 3/31/2025    04:12   CBC   WBC 13.57  11.07  9.27    RBC 5.35  4.56  4.04    Hemoglobin 14.5  12.8  11.5    Hematocrit 45.6   40.9  36.4    MCV 85.2  89.7  90.1    MCH 27.1  28.1  28.5    MCHC 31.8  31.3  31.6    RDW 13.8  14.6  14.6    Platelets 690  533  487      BMP          3/20/2025    01:32 3/30/2025    12:09 3/31/2025    04:12   BMP   BUN 10  12  11    Creatinine 0.70  0.68  0.71    Sodium 138  137  140    Potassium 3.9  3.6  3.9    Chloride 99  100  105    CO2 19.7  18.5  20.4    Calcium 10.5  10.3  9.3      CMP          3/20/2025    01:32 3/30/2025    12:09 3/31/2025    04:12   CMP   Glucose 96  85  113    BUN 10  12  11    Creatinine 0.70  0.68  0.71    EGFR 110.9  111.7  109.0    Sodium 138  137  140    Potassium 3.9  3.6  3.9    Chloride 99  100  105    Calcium 10.5  10.3  9.3    Total Protein 8.8  8.0     Albumin 4.6  4.3     Globulin 4.2  3.7     Total Bilirubin 0.6  0.4     Alkaline Phosphatase 97  80     AST (SGOT) 15  12     ALT (SGPT) 9  9     Albumin/Globulin Ratio 1.1  1.2     BUN/Creatinine Ratio 14.3  17.6  15.5    Anion Gap 19.3  18.5  14.6        Lab Results (last 24 hours)       ** No results found for the last 24 hours. **            IMAGING:  Imaging Results (Last 24 Hours)       Procedure Component Value Units Date/Time    XR Abdomen KUB [[id number]] Collected: 04/03/25 1114     Updated: 04/03/25 1123    Narrative:      XR ABDOMEN KUB    Date of Exam: 4/3/2025 10:50 AM EDT    Indication: worsening nausea    Comparison: CT abdomen and pelvis 3/30/2025    Findings:  Evaluation of the abdomen demonstrates an unremarkable bowel gas pattern without obstructive changes noted. Fecal stasis is noted throughout the colon with debris in the gastric body. There is a biliary stent noted and surgical clips in the right upper   quadrant. Lung bases are clear. Osseous structures are unremarkable.      Impression:      Impression:  Findings compatible with constipation.      Electronically Signed: Martha Barkre MD    4/3/2025 11:16 AM EDT    Workstation ID: [id number]            [x]  Laboratory  []  Microbiology  []   Radiology  []  EKG/Telemetry   []  Cardiology/Vascular   []  Pathology  []  Old records    Assessment / Plan   Assessment:   Active Hospital Problems    Diagnosis     **Intractable nausea and vomiting     Nausea & vomiting     Abdominal pain     S/P laparoscopic appendectomy     Gastroparesis     S/P laparoscopic cholecystectomy     Cervical radiculopathy     NICHOLE (generalized anxiety disorder)     Spinal stenosis in cervical region          Plan:    Gastroparesis   -Afebrile, vital stable  -KUB with constipation  -Bowel regimen ordered  -Recommend transitioning Reglan to oral formulation  -Abdomen remains clinically benign  -No need for any surgical intervention at this time, please call with any questions or concerns     Electronically signed by Josué Castano MD, 25, 1:49 PM EDT.         Electronically signed by Josué Castano MD at 25 1353       Jessi Jimenez RN at 25 0429          Goal Outcome Evaluation:              Outcome Evaluation: alert and oriented x4. vss on RA. c/o nausea/abdominal pain; medicated PRN per MAR. family at bedside. up adlib in room, not a falls risk. no new issues/needs at this time.                               Electronically signed by Jessi Jimenez RN at 25 0429       Dalia Lau RN at 25 1800          Goal Outcome Evaluation:  Plan of Care Reviewed With: patient        Progress: no change  Outcome Evaluation: Pt remained A&Ox4 this shift. Also, pt remained on room air maintaining stable oxygen saturation. Pt was medicated with PRN Zanaflex and Diladid to help relieve pain. Also, pt was medicated with PRN Zofran to help relieve nausea.                               Electronically signed by Dalia Lau RN at 25 2074       Naila Alejandre MD at 25 1337           [facility]  Date: [facility]  Patient Name: Ebony Hamilton  : 1980  MRN: [id number]  Date of admission:  3/30/2025      Subjective  Subjective     Chief Complaint: Intractable nausea vomiting    Summary:Ebony Hamilton is a 44 y.o. female  with past medical history cervical radiculopathy, anxiety, depression, gastroparesis, obesity and GERD resenting to the ED for evaluation of intractable nausea and vomiting.  3 weeks ago patient had a cholecystectomy performed by Dr. Castano which had complications related clips slipped off causing bowel subsequent to the abdomen.  Patient required drain placement.  The following patient had abdominal pain again and was found to have appendicitis which required an appendectomy.  Over the last 2 days patient has been having intractable nausea to where she cannot keep anything down so she came to the ED for further evaluation.  Since her cholecystectomy 3 weeks ago patient has been to the ED on multiple occasions.  In the ED patient was slightly hypertensive with remaining vitals being within normal limits.  Labs show mild leukocytosis and lactic acidosis which responded well to fluids with remaining labs being unremarkable including a normal lipase, proBNP, troponin, and negative UA.  CT of the abdomen with contrast showed postoperative changes of recent cholecystectomy and appendectomy with nothing acute including no evidence of abnormal fluid collections, hemorrhages or hematomas.  When seen patient was still having nausea and vomiting with abdominal discomfort with inspiration.  Patient admitted for further evaluation and treatment.  General surgery consulted.  Started on scheduled Reglan and IV fluids.  Advancing diet slowly as tolerated.  Patient planning to return home on discharge once tolerating a diet.    Interval Followup: Sitting up in bed appears to be resting fairly comfortably.    Abdominal pain improving.  Tolerating regular diet although does report still having some episodes of nausea specially after eating this morning.      Review of Systems  Constitutional:  Negative for fatigue and fever.   HENT: Negative for sore throat and trouble swallowing.    Eyes: Negative for pain and discharge.   Respiratory: Negative for cough and shortness of breath.    Cardiovascular: Negative for chest pain and palpitations.   Gastrointestinal: Positive for abdominal pain, positive nausea, denies vomiting.   Endocrine: Negative for cold intolerance and heat intolerance.   Genitourinary: Negative for difficulty urinating and dysuria.   Musculoskeletal: Negative for back pain and neck stiffness.   Skin: Negative for color change and rash.   Neurological: Negative for syncope and headaches.   Hematological: Negative for adenopathy.   Psychiatric/Behavioral: Negative for confusion and hallucinations.    Objective  Objective     Vitals:   Temp:  [97.5 °F (36.4 °C)-99.3 °F (37.4 °C)] 98.5 °F (36.9 °C)  Heart Rate:  [] 117  Resp:  [16-20] 18  BP: (110-149)/(64-94) 129/86  Physical Exam   General: well-developed appearing stated age in no acute distress  HEENT: Normocephalic atraumatic moist membranes pupils equal round no scleral icterus no conjunctival injection  Cardiovascular: regular rate and rhythm no appreciable murmurs, no lower extremity edema appreciated  Pulmonary: Clear to auscultation bilaterally, unlabored, no conversational dyspnea appreciated  Gastrointestinal: Soft mildly tender in the left upper quadrant nondistended positive bowel sounds all 4 quadrants no rebound or guarding  Musculoskeletal: No clubbing cyanosis, warm and well-perfused, calves soft symmetric nontender bilaterally  Skin: Clean dry without rashes  Neuro: Cranial nerves II through XII intact grossly no sensorimotor deficits appreciated bilateral upper and lower extremities  Psych: Patient is calm cooperative and appropriate with exam not responding to internal stimuli      Result Review   Result Review:  I have personally reviewed these results and agree with these findings:  [x]  Laboratory  LAB RESULTS:       Lab 03/31/25  0412 03/30/25  1209   WBC 9.27 11.07*   HEMOGLOBIN 11.5* 12.8   HEMATOCRIT 36.4 40.9   PLATELETS 487* 533*   NEUTROS ABS  --  7.80*   IMMATURE GRANS (ABS)  --  0.12*   LYMPHS ABS  --  2.50   MONOS ABS  --  0.53   EOS ABS  --  0.07   MCV 90.1 89.7         Lab 03/31/25  0412 03/30/25  1209   SODIUM 140 137   POTASSIUM 3.9 3.6   CHLORIDE 105 100   CO2 20.4* 18.5*   ANION GAP 14.6 18.5*   BUN 11 12   CREATININE 0.71 0.68   EGFR 109.0 111.7   GLUCOSE 113* 85   CALCIUM 9.3 10.3         Lab 03/30/25  1209   TOTAL PROTEIN 8.0   ALBUMIN 4.3   GLOBULIN 3.7   ALT (SGPT) 9   AST (SGOT) 12   BILIRUBIN 0.4   ALK PHOS 80   LIPASE 35         Lab 03/30/25  1209   PROBNP <36.0   HSTROP T <6                 Brief Urine Lab Results  (Last result in the past 365 days)        Color   Clarity   Blood   Leuk Est   Nitrite   Protein   CREAT   Urine HCG        03/30/25 1242 Yellow   Clear   Negative   Negative   Negative   Negative                 Microbiology Results (last 10 days)       ** No results found for the last 240 hours. **            []  Microbiology  [x]  Radiology  CT Angiogram Chest Pulmonary Embolism  Result Date: 3/30/2025  Impression: No evidence of pulmonary embolus. No acute intrathoracic findings. Electronically Signed: Ankit Barker MD  3/30/2025 2:24 PM EDT  Workstation ID: [id number]    CT Abdomen Pelvis With Contrast  Result Date: 3/30/2025  1.Residual postoperative changes are noted status post recent cholecystectomy and appendectomy. 2.No evidence for abnormal fluid collection. No significant hemorrhage or hematoma. 3.No evidence for additional acute abnormality throughout the abdomen or pelvis. Electronically Signed: Irwin Jose MD  3/30/2025 2:23 PM EDT  Workstation ID: [id number]    XR Chest 1 View  Result Date: 3/30/2025  Impression: No acute cardiopulmonary abnormality. Electronically Signed: Alin Quiroz  3/30/2025 12:30 PM EDT  Workstation ID: [id number]      [x]  EKG/Telemetry   []   Cardiology/Vascular   []  Pathology  []  Old records  [x]  Other:  Scheduled Meds:aluminum-magnesium hydroxide-simethicone, 30 mL, Oral, Once  busPIRone, 15 mg, Oral, TID  DULoxetine, 90 mg, Oral, Daily  enoxaparin sodium, 40 mg, Subcutaneous, Daily  metoclopramide, 10 mg, Intravenous, Q8H  montelukast, 10 mg, Oral, Nightly  pantoprazole, 40 mg, Intravenous, Q24H  polyethylene glycol, 17 g, Oral, Daily  saccharomyces boulardii, 250 mg, Oral, BID  sodium chloride, 10 mL, Intravenous, Q12H      Continuous Infusions:     PRN Meds:.  acetaminophen    senna-docusate sodium **AND** polyethylene glycol **AND** bisacodyl **AND** bisacodyl    clonazePAM    HYDROcodone-acetaminophen    HYDROmorphone    ipratropium-albuterol    ondansetron    sodium chloride    sodium chloride    sodium chloride    tiZANidine    traZODone      Assessment & Plan  Assessment / Plan     Assessment/Plan:  Intractable nausea and vomiting  Gastroparesis  Right upper quadrant abdominal pain  Depression with anxiety  Cervical radiculopathy        Patient admitted for further evaluation and treatment  General Surgery consulted, consult appreciated   Continue scheduled IV Reglan although will increase every 8 to every 6 hours   Continue as needed Zofran  Continue IV fluids until ensure tolerating diet  Resume patient's home oral pain management regimen with Dilaudid IV for breakthrough.  Continue home buspirone, duloxetine scheduled with as needed Klonopin  Continue home tizanidine as needed  Further inpatient orders recommendations pending clinical course       Discussed plan with bedside RN as well as general surgery team.    Disposition: Home once tolerating a diet and abdominal pain improves.    VTE Prophylaxis:  Pharmacologic VTE prophylaxis orders are present.        CODE STATUS:   Code Status (Patient has no pulse and is not breathing): CPR (Attempt to Resuscitate)  Medical Interventions (Patient has pulse or is breathing): Full Support  Level Of  Support Discussed With: Patient            Electronically signed by Naila Alejandre MD, 25, 1:41 PM EDT.               Electronically signed by Naila Alejandre MD at 25 1341       Lee Ann Obando, RN at 25 0359          Goal Outcome Evaluation:  Plan of Care Reviewed With: patient        Progress: improving  Outcome Evaluation: A & O x 4, C/O pain, medication given as ordered PRN. Nausea intermitttent this shift, zofran given PRN. Appears to be resting in bed without pain or discomfort being ntoed.                               Electronically signed by Lee Ann Obando, RN at 25 0359       Katlin Valladares, RN at 25 1614          Goal Outcome Evaluation:  Plan of Care Reviewed With: patient, spouse        Progress: improving  Outcome Evaluation: Patient alert and oriented x4 thorughout shift. Medicated for pain per MAR. Wound care completed per orders. Diet advanced this shift. Tolerating well. No new issues at this time.                               Electronically signed by Katlin Valladares, RN at 25 1614       Camacho Bryson, PT Student at 25 1341       Attestation signed by Jesse Ochoa PT at 25 1346      Patient evaluation and/or treatment was performed under the direct supervision of a licensed physical therapist. Jesse Ochoa, PT                      Acute Care - Physical Therapy Initial Evaluation   Dona     Patient Name: Ebony Hamilton  : 1980  MRN: [id number]  Today's Date: 2025      Visit Dx:     ICD-10-CM ICD-9-CM   1. Abdominal pain, unspecified abdominal location  R10.9 789.00   2. Difficulty in walking  R26.2 719.7     Patient Active Problem List   Diagnosis    Acute postoperative pain    Allergic rhinitis    Bulge of cervical disc without myelopathy    Cervical fusion syndrome    Degeneration of intervertebral disc of cervical region    Spinal stenosis in cervical region    Impingement syndrome of shoulder region     Intractable chronic migraine without aura    Mitral valve disorder    Tricuspid valve disorder    Endometriosis    Obesity    SLAP lesion of left shoulder    NICHOLE (generalized anxiety disorder)    Blood in stool    BRBPR (bright red blood per rectum)    Constipation    Diarrhea    Lower abdominal pain    Myalgia    Cervical post-laminectomy syndrome    Cervical radiculopathy    Adenomyosis of uterus    Hemorrhoids    Biliary colic    Intractable abdominal pain    Calculus of gallbladder without cholecystitis without obstruction    Nausea and vomiting    Gastroparesis    S/P laparoscopic cholecystectomy    Bile leak from gallbladder bed    Encounter for removal of biliary stent    Appendicitis    S/P laparoscopic appendectomy    Intractable nausea and vomiting    Abdominal pain     Past Medical History:   Diagnosis Date    Allergic     Anxiety     Atrial fibrillation     RELEASED BY CARDIOLOGIST/ELECTROPHYSIST, NO CURRENT MEDS    Chronic pain disorder     Depression     Endometriosis     Headache     Hemorrhoids     Injury of shoulder, right 2009    CHRONIC PAIN    Panic disorder     Rectal bleeding 2010    S/P laparoscopic appendectomy 03/09/2025    S/P laparoscopic cholecystectomy 02/17/2025     Past Surgical History:   Procedure Laterality Date    APPENDECTOMY N/A 3/9/2025    Procedure: APPENDECTOMY LAPAROSCOPIC;  Surgeon: Mingo Cannon MD;  Location: [facility] MAIN OR;  Service: General;  Laterality: N/A;    CERVICAL ARTHRODESIS  01/14/2015    Donaldo Albarran    CERVICAL FUSION      C4-7 FUSION, FULL ROM    CHOLECYSTECTOMY N/A 2/17/2025    Procedure: CHOLECYSTECTOMY LAPAROSCOPIC plain language: removal of gallbladder thru small incisions using long instruments and a camera;  Surgeon: Josué Castano MD;  Location: [facility] MAIN OR;  Service: General;  Laterality: N/A;    COLONOSCOPY N/A 05/31/2022    Procedure: COLONOSCOPY;  Surgeon: Shabbir Baird MD;  Location: [facility] ENDOSCOPY;  Service: General;   Laterality: N/A;  HEMORRHOIDS    ENDOSCOPY N/A 2/17/2025    Procedure: ESOPHAGOGASTRODUODENOSCOPY WITH BIOPSIES;  Surgeon: Sanya Reyes MD;  Location: [facility] ENDOSCOPY;  Service: Gastroenterology;  Laterality: N/A;  GASTRITIS, SMALL HIATAL HERNIA    ENDOSCOPY N/A 3/6/2025    Procedure: ESOPHAGOGASTRODUODENOSCOPY with pancreatic stent removal;  Surgeon: Ha Thompson MD;  Location: [facility] ENDOSCOPY;  Service: Gastroenterology;  Laterality: N/A;  pancreatic stent removal, hiatal hernia    ERCP N/A 2/24/2025    Procedure: ENDOSCOPIC RETROGRADE CHOLANGIOPANCREATOGRAPHY with bile duct stent placement and pancreatic duct stent placement;  Surgeon: Ha Thompson MD;  Location: [facility] ENDOSCOPY;  Service: Gastroenterology;  Laterality: N/A;  bile duct leeak    EXPLORATORY LAPAROTOMY      HEMORRHOIDECTOMY N/A 05/31/2022    Procedure: HEMORRHOID BANDING;  Surgeon: Shabbir Baird MD;  Location: [facility] ENDOSCOPY;  Service: General;  Laterality: N/A;  BANDS X 2    HEMORRHOIDECTOMY N/A 1/31/2024    Procedure: HEMORRHOIDECTOMY;  Surgeon: Shabbir Baird MD;  Location: [facility] OR [facility];  Service: General;  Laterality: N/A;    HYSTERECTOMY      SHOULDER ARTHROSCOPY Right     SHOULDER SURGERY Left     ARTHROSCOPY LABRAL TEAR X2    TUBAL ABDOMINAL LIGATION       PT Assessment (Last 12 Hours)       PT Evaluation and Treatment       Row Name 04/01/25 1300          Physical Therapy Time and Intention    Subjective Information no complaints (P)   -TM     Document Type evaluation (P)   -TM     Mode of Treatment individual therapy (P)   -TM     Patient Effort good (P)   -TM     Symptoms Noted During/After Treatment none (P)   -TM       Row Name 04/01/25 1300          General Information    Prior Level of Function independent:;all household mobility (P)   -TM     Equipment Currently Used at Home none (P)   -TM     Existing Precautions/Restrictions no known precautions/restrictions (P)   -TM       Row Name  04/01/25 1300          Living Environment    Current Living Arrangements home (P)   -     Home Accessibility stairs to enter home (P)   -     People in Home spouse (P)   -     Primary Care Provided by self (P)   -       Row Name 04/01/25 1300          Home Main Entrance    Number of Stairs, Main Entrance four (P)   -TM     Stair Railings, Main Entrance railings safe and in good condition (P)   -       Row Name 04/01/25 1300          Range of Motion (ROM)    Range of Motion ROM is WNL;bilateral lower extremities (P)   -[facility] Name 04/01/25 1300          Strength (Manual Muscle Testing)    Strength (Manual Muscle Testing) strength is WNL;bilateral lower extremities (P)   -       Row Name 04/01/25 1300          Bed Mobility    Bed Mobility bed mobility (all) activities (P)   -     All Activities, [place] (Bed Mobility) independent (P)   -[facility] Name 04/01/25 1300          Transfers    Transfers sit-stand transfer (P)   -[facility] Name 04/01/25 1300          Sit-Stand Transfer    Sit-Stand [place] (Transfers) independent (P)   -[facility] Name 04/01/25 1300          Gait/Stairs (Locomotion)    Gait/Stairs Locomotion gait/ambulation independence (P)   -     [place] Level (Gait) independent (P)   -     Patient was able to Ambulate yes (P)   -     Distance in Feet (Gait) 50 (P)   -[facility] Name 04/01/25 1300          Balance    Balance Assessment standing dynamic balance (P)   -     Dynamic Standing Balance independent (P)   -TM     Position/Device Used, Standing Balance unsupported (P)   -       Row Name             Wound Right medial mid axillary    Wound - Properties Group Present on Original Admission: Y  -MF Side: Right  -MF Orientation: medial  -MF Location: mid axillary  -MF    Retired Wound - Properties Group Present on Original Admission: Y  -MF Side: Right  -MF Orientation: medial  -MF Location: mid axillary  -MF    Retired Wound - Properties Group  Present on Original Admission: Y  -MF Side: Right  -MF Orientation: medial  -MF Location: mid axillary  -MF    Retired Wound - Properties Group Present on Original Admission: Y  -MF Side: Right  -MF Location: mid axillary  -MF      Row Name 04/01/25 1300          Plan of Care Review    Plan of Care Reviewed With patient (P)   -TM     Outcome Evaluation Pt has no difficulty with ambulation and shows no risk of falls. Pt does not require skilled therapy services (P)   -TM       Row Name 04/01/25 1300          Therapy Assessment/Plan (PT)    Patient/Family Therapy Goals Statement (PT) return home (P)   -TM     Criteria for Skilled Interventions Met (PT) no problems identified which require skilled intervention (P)   -TM     Therapy Frequency (PT) evaluation only (P)   -TM       Row Name 04/01/25 1300          PT Evaluation Complexity    History, PT Evaluation Complexity no personal factors and/or comorbidities (P)   -TM     Examination of Body Systems (PT Eval Complexity) total of 4 or more elements (P)   -TM     Clinical Presentation (PT Evaluation Complexity) stable (P)   -TM     Clinical Decision Making (PT Evaluation Complexity) low complexity (P)   -TM     Overall Complexity (PT Evaluation Complexity) low complexity (P)   -TM       Row Name 04/01/25 1300          Therapy Plan Review/Discharge Plan (PT)    Therapy Plan Review (PT) evaluation/treatment results reviewed;patient (P)   -TM               User Key  (r) = Recorded By, (t) = Taken By, (c) = Cosigned By      Initials Name Provider Type    Marylin Fitzgerald LPN Licensed Nurse    Camacho Landis, PT Student PT Student                    Physical Therapy Education       Title: PT OT SLP Therapies (Done)       Topic: Physical Therapy (Done)       Point: Mobility training (Done)       Learning Progress Summary            Patient Acceptance, E,TB, VU by TM at 4/1/2025 1341                      Point: Home exercise program (Done)       Learning Progress Summary             Patient Acceptance, E,TB, VU by TM at 4/1/2025 1341                      Point: Body mechanics (Done)       Learning Progress Summary            Patient Acceptance, E,TB, VU by TM at 4/1/2025 1341                      Point: Precautions (Done)       Learning Progress Summary            Patient Acceptance, E,TB, VU by TM at 4/1/2025 1341                                      User Key       Initials Effective Dates Name Provider Type Discipline    TM 02/04/25 -  Camacho Bryson, PT Student PT Student PT                  PT Recommendation and Plan  Anticipated Discharge Disposition (PT): (P) home  Therapy Frequency (PT): (P) evaluation only  Plan of Care Reviewed With: (P) patient  Outcome Evaluation: (P) Pt has no difficulty with ambulation and shows no risk of falls. Pt does not require skilled therapy services   Outcome Measures       Row Name 04/01/25 1300             How much help from another person do you currently need...    Turning from your back to your side while in flat bed without using bedrails? 4 (P)   -TM      Moving from lying on back to sitting on the side of a flat bed without bedrails? 4 (P)   -TM      Moving to and from a bed to a chair (including a wheelchair)? 4 (P)   -TM      Standing up from a chair using your arms (e.g., wheelchair, bedside chair)? 4 (P)   -TM      Climbing 3-5 steps with a railing? 4 (P)   -TM      To walk in hospital room? 4 (P)   -TM      AM-PAC 6 Clicks Score (PT) 24 (P)   -TM         Functional Assessment    Outcome Measure Options AM-PAC 6 Clicks Basic Mobility (PT) (P)   -TM                User Key  (r) = Recorded By, (t) = Taken By, (c) = Cosigned By      Initials Name Provider Type    TM Camacho Bryson, PT Student PT Student                     Time Calculation:    PT Charges       Row Name 04/01/25 1340             Time Calculation    PT Received On 04/01/25 (P)   -TM      PT Goal Re-Cert Due Date 04/10/25 (P)   -TM         Untimed Charges    PT Eval/Re-eval  Minutes 25 (P)   -TM         Total Minutes    Untimed Charges Total Minutes 25 (P)   -TM       Total Minutes 25 (P)   -TM                User Key  (r) = Recorded By, (t) = Taken By, (c) = Cosigned By      Initials Name Provider Type    TM Camacho Bryson, PT Student PT Student                  Therapy Charges for Today       Code Description Service Date Service Provider Modifiers Qty    79470888630 HC PT EVAL LOW COMPLEXITY 2 2025 Camacho Bryson PT Student GP 1            PT G-Codes  Outcome Measure Options: (P) AM-PAC 6 Clicks Basic Mobility (PT)  AM-PAC 6 Clicks Score (PT): (P) 24    Camacho Bryson PT Student  2025      Electronically signed by Jesse Ochoa PT at 25 1346       Camacho Bryson PT Student at 25 1340       Attestation signed by Jesse Ochoa PT at 25 1345      Patient evaluation and/or treatment was performed under the direct supervision of a licensed physical therapist. Jesse Ochoa PT                      Goal Outcome Evaluation:  Plan of Care Reviewed With: (P) patient           Outcome Evaluation: (P) Pt has no difficulty with ambulation and shows no risk of falls. Pt does not require skilled therapy services    Anticipated Discharge Disposition (PT): (P) home                          Electronically signed by Jesse Ochoa PT at 25 1345       Naila Alejandre MD at 25 0845           [facility]  [facility]  Patient Name: Ebony Hamilton  : 1980  MRN: [id number]  Date of admission: 3/30/2025      Subjective  Subjective     Chief Complaint: Intractable nausea vomiting    Summary:Ebony Hamilton is a 44 y.o. female  with past medical history cervical radiculopathy, anxiety, depression, gastroparesis, obesity and GERD resenting to the ED for evaluation of intractable nausea and vomiting.  3 weeks ago patient had a cholecystectomy performed by Dr. Castano which had complications related clips  slipped off causing bowel subsequent to the abdomen.  Patient required drain placement.  The following patient had abdominal pain again and was found to have appendicitis which required an appendectomy.  Over the last 2 days patient has been having intractable nausea to where she cannot keep anything down so she came to the ED for further evaluation.  Since her cholecystectomy 3 weeks ago patient has been to the ED on multiple occasions.  In the ED patient was slightly hypertensive with remaining vitals being within normal limits.  Labs show mild leukocytosis and lactic acidosis which responded well to fluids with remaining labs being unremarkable including a normal lipase, proBNP, troponin, and negative UA.  CT of the abdomen with contrast showed postoperative changes of recent cholecystectomy and appendectomy with nothing acute including no evidence of abnormal fluid collections, hemorrhages or hematomas.  When seen patient was still having nausea and vomiting with abdominal discomfort with inspiration.  Patient admitted for further evaluation and treatment.  General surgery consulted.  Started on scheduled Reglan and IV fluids.  Advancing diet slowly as tolerated.  Patient planning to return home on discharge once tolerating a diet.    Interval Followup: Sitting up in bed appears to be resting fairly comfortably.    Abdominal pain improving.  Tolerating regular diet this morning.  She denies any nausea or vomiting at this time.    Review of Systems  Constitutional: Negative for fatigue and fever.   HENT: Negative for sore throat and trouble swallowing.    Eyes: Negative for pain and discharge.   Respiratory: Negative for cough and shortness of breath.    Cardiovascular: Negative for chest pain and palpitations.   Gastrointestinal: Positive for abdominal pain, denies nausea and vomiting.   Endocrine: Negative for cold intolerance and heat intolerance.   Genitourinary: Negative for difficulty urinating and dysuria.    Musculoskeletal: Negative for back pain and neck stiffness.   Skin: Negative for color change and rash.   Neurological: Negative for syncope and headaches.   Hematological: Negative for adenopathy.   Psychiatric/Behavioral: Negative for confusion and hallucinations.    Objective  Objective     Vitals:   Temp:  [97.8 °F (36.6 °C)-98.5 °F (36.9 °C)] 98.5 °F (36.9 °C)  Heart Rate:  [71-96] 96  Resp:  [18-20] 18  BP: (103-140)/(58-98) 115/70  Physical Exam   General: well-developed appearing stated age in no acute distress  HEENT: Normocephalic atraumatic moist membranes pupils equal round reactive light, no scleral icterus no conjunctival injection  Cardiovascular: regular rate and rhythm no appreciable murmurs, no lower extremity edema appreciated  Pulmonary: Clear to auscultation bilaterally, unlabored, no conversational dyspnea appreciated  Gastrointestinal: Soft mildly tender in the left upper quadrant nondistended positive bowel sounds all 4 quadrants no rebound or guarding  Musculoskeletal: No clubbing cyanosis, warm and well-perfused, calves soft symmetric nontender bilaterally  Skin: Clean dry without rashes  Neuro: Cranial nerves II through XII intact grossly no sensorimotor deficits appreciated bilateral upper and lower extremities  Psych: Patient is calm cooperative and appropriate with exam not responding to internal stimuli      Result Review   Result Review:  I have personally reviewed these results and agree with these findings:  [x]  Laboratory  LAB RESULTS:      Lab 03/31/25  0412 03/30/25  1209   WBC 9.27 11.07*   HEMOGLOBIN 11.5* 12.8   HEMATOCRIT 36.4 40.9   PLATELETS 487* 533*   NEUTROS ABS  --  7.80*   IMMATURE GRANS (ABS)  --  0.12*   LYMPHS ABS  --  2.50   MONOS ABS  --  0.53   EOS ABS  --  0.07   MCV 90.1 89.7         Lab 03/31/25  0412 03/30/25  1209   SODIUM 140 137   POTASSIUM 3.9 3.6   CHLORIDE 105 100   CO2 20.4* 18.5*   ANION GAP 14.6 18.5*   BUN 11 12   CREATININE 0.71 0.68   EGFR  109.0 111.7   GLUCOSE 113* 85   CALCIUM 9.3 10.3         Lab 03/30/25  1209   TOTAL PROTEIN 8.0   ALBUMIN 4.3   GLOBULIN 3.7   ALT (SGPT) 9   AST (SGOT) 12   BILIRUBIN 0.4   ALK PHOS 80   LIPASE 35         Lab 03/30/25  1209   PROBNP <36.0   HSTROP T <6                 Brief Urine Lab Results  (Last result in the past 365 days)        Color   Clarity   Blood   Leuk Est   Nitrite   Protein   CREAT   Urine HCG        03/30/25 1242 Yellow   Clear   Negative   Negative   Negative   Negative                 Microbiology Results (last 10 days)       ** No results found for the last 240 hours. **            []  Microbiology  [x]  Radiology  CT Angiogram Chest Pulmonary Embolism  Result Date: 3/30/2025  Impression: No evidence of pulmonary embolus. No acute intrathoracic findings. Electronically Signed: Ankit Barker MD  3/30/2025 2:24 PM EDT  Workstation ID: [id number]    CT Abdomen Pelvis With Contrast  Result Date: 3/30/2025  1.Residual postoperative changes are noted status post recent cholecystectomy and appendectomy. 2.No evidence for abnormal fluid collection. No significant hemorrhage or hematoma. 3.No evidence for additional acute abnormality throughout the abdomen or pelvis. Electronically Signed: Irwin Jose MD  3/30/2025 2:23 PM EDT  Workstation ID: [id number]    XR Chest 1 View  Result Date: 3/30/2025  Impression: No acute cardiopulmonary abnormality. Electronically Signed: Alin Quiroz  3/30/2025 12:30 PM EDT  Workstation ID: [id number]    CT Abdomen Pelvis With Contrast  Result Date: 3/24/2025  Impression: 1.Moderate colonic fluid may indicate acute diarrheal illness. 2.Postsurgical changes of cholecystectomy, appendectomy, and hysterectomy. Small amount of fluid within the gallbladder fossa. Indwelling CBD stent appears adequately positioned. 3.Additional findings as detailed above. Electronically Signed: Misha Rubin MD  3/24/2025 9:47 AM EDT  Workstation ID: [id number]      [x]  EKG/Telemetry   []   Cardiology/Vascular   []  Pathology  []  Old records  [x]  Other:  Scheduled Meds:aluminum-magnesium hydroxide-simethicone, 30 mL, Oral, Once  busPIRone, 15 mg, Oral, TID  DULoxetine, 90 mg, Oral, Daily  enoxaparin sodium, 40 mg, Subcutaneous, Daily  metoclopramide, 10 mg, Intravenous, Q8H  montelukast, 10 mg, Oral, Nightly  pantoprazole, 40 mg, Intravenous, Q24H  polyethylene glycol, 17 g, Oral, Daily  saccharomyces boulardii, 250 mg, Oral, BID  sodium chloride, 10 mL, Intravenous, Q12H      Continuous Infusions:lactated ringers, 100 mL/hr, Last Rate: 100 mL/hr (04/01/25 0830)      PRN Meds:.  acetaminophen    senna-docusate sodium **AND** polyethylene glycol **AND** bisacodyl **AND** bisacodyl    clonazePAM    HYDROmorphone    ipratropium-albuterol    ketorolac    ondansetron    sodium chloride    sodium chloride    sodium chloride    tiZANidine    traZODone      Assessment & Plan  Assessment / Plan     Assessment/Plan:  Intractable nausea and vomiting  Gastroparesis  Right upper quadrant abdominal pain  Depression with anxiety  Cervical radiculopathy        Patient admitted for further evaluation and treatment  General Surgery consulted thank for assistance  Continue scheduled Reglan  Continue as needed Zofran  Continue IV fluids until ensure tolerating diet  Resume patient's home oral pain management regimen with Dilaudid IV for breakthrough.  Continue home buspirone, duloxetine scheduled with as needed Klonopin  Continue home tizanidine as needed  Further inpatient orders recommendations pending clinical course       Discussed plan with bedside RN as well as general surgery team.    Disposition: Home once tolerating a diet and abdominal pain improves.    VTE Prophylaxis:  Pharmacologic VTE prophylaxis orders are present.        CODE STATUS:   Code Status (Patient has no pulse and is not breathing): CPR (Attempt to Resuscitate)  Medical Interventions (Patient has pulse or is breathing): Full Support  Level Of  Support Discussed With: Patient            Electronically signed by Naila Alejandre MD, 25, 8:45 AM EDT.             Electronically signed by Naila Alejandre MD at 25 1559       Flory Tolbert, RN at 25 0503          Goal Outcome Evaluation:  Plan of Care Reviewed With: patient, spouse        Progress: improving  Outcome Evaluation: Pt remains A&Ox4. Continues to c/o right sided pleuric chest pain, worse on inhalation. Also c/o headache. Prn pain medications and prn muscle relaxer given this shift. Pt did not get prn pain meds as frequently this shift, and was able to sleep between care. States nausea has improved and being managed with scheduled nausea regimen. Fluids still infusing. Spouse at bedside and is supportive. Call light in reach, able to make needs known. VSS on room air, can get tachy with exertion. No new issues/needs at this time.                               Electronically signed by Flory Tolbert, RN at 25 0500       Reji Irby MD at 25 2100           [facility]  [facility]  Patient Name: Ebony Hamilton  : 1980  MRN: [id number]  Date of admission: 3/30/2025      Subjective  Subjective     Chief Complaint: Intractable nausea vomiting    Summary:Ebony Hamilton is a 44 y.o. female  with past medical history cervical radiculopathy, anxiety, depression, gastroparesis, obesity and GERD resenting to the ED for evaluation of intractable nausea and vomiting.  3 weeks ago patient had a cholecystectomy performed by Dr. Castano which had complications related clips slipped off causing bowel subsequent to the abdomen.  Patient required drain placement.  The following patient had abdominal pain again and was found to have appendicitis which required an appendectomy.  Over the last 2 days patient has been having intractable nausea to where she cannot keep anything down so she came to the ED for further  evaluation.  Since her cholecystectomy 3 weeks ago patient has been to the ED on multiple occasions.  In the ED patient was slightly hypertensive with remaining vitals being within normal limits.  Labs show mild leukocytosis and lactic acidosis which responded well to fluids with remaining labs being unremarkable including a normal lipase, proBNP, troponin, and negative UA.  CT of the abdomen with contrast showed postoperative changes of recent cholecystectomy and appendectomy with nothing acute including no evidence of abnormal fluid collections, hemorrhages or hematomas.  When seen patient was still having nausea and vomiting with abdominal discomfort with inspiration.  Patient admitted for further evaluation and treatment.  General surgery consulted.  Started on scheduled Reglan and IV fluids.  Advancing diet slowly as tolerated.  Patient planning to return home on discharge once tolerating a diet.    Interval Followup: Sitting up in bed appears to be resting fairly comfortably.  Patient indicates that she was able to get up and actually walk a little bit which she has not been able to do in some time.  Still gets short of breath with exertion.  Abdominal pain improving.  Tolerating clears.  Did throw up after eating a cracker earlier this morning.  Afebrile overnight.  Sinus rhythm 80s to 130s on telemetry review.  Blood pressure within normal limits.  Orthostatics remain positive.  Bicarb improved.  Leukocytosis resolved.  Hemoglobin trending down.  No other issues per nursing.    Review of Systems  Constitutional: Negative for fatigue and fever.   HENT: Negative for sore throat and trouble swallowing.    Eyes: Negative for pain and discharge.   Respiratory: Negative for cough and shortness of breath.    Cardiovascular: Negative for chest pain and palpitations.   Gastrointestinal: Positive for abdominal pain, nausea and vomiting.   Endocrine: Negative for cold intolerance and heat intolerance.   Genitourinary:  Negative for difficulty urinating and dysuria.   Musculoskeletal: Negative for back pain and neck stiffness.   Skin: Negative for color change and rash.   Neurological: Negative for syncope and headaches.   Hematological: Negative for adenopathy.   Psychiatric/Behavioral: Negative for confusion and hallucinations.    Objective  Objective     Vitals:   Temp:  [98.3 °F (36.8 °C)-98.4 °F (36.9 °C)] 98.3 °F (36.8 °C)  Heart Rate:  [] 88  Resp:  [20] 20  BP: (120-140)/(62-98) 140/90  Physical Exam   General: well-developed appearing stated age in no acute distress  HEENT: Normocephalic atraumatic moist membranes pupils equal round reactive light, no scleral icterus no conjunctival injection  Cardiovascular: regular rate and rhythm no murmurs rubs or gallops S1-S2, no lower extremity edema appreciated  Pulmonary: Clear to auscultation bilaterally no wheezes rales or rhonchi symmetric chest expansion, unlabored, no conversational dyspnea appreciated  Gastrointestinal: Soft mildly tender in the left upper quadrant nondistended positive bowel sounds all 4 quadrants no rebound or guarding  Musculoskeletal: No clubbing cyanosis, warm and well-perfused, calves soft symmetric nontender bilaterally  Skin: Clean dry without rashes  Neuro: Cranial nerves II through XII intact grossly no sensorimotor deficits appreciated bilateral upper and lower extremities  Psych: Patient is calm cooperative and appropriate with exam not responding to internal stimuli  : No Neely catheter no bladder distention no suprapubic tenderness    Result Review   Result Review:  I have personally reviewed these results and agree with these findings:  [x]  Laboratory  LAB RESULTS:      Lab 03/31/25  0412 03/30/25  1209   WBC 9.27 11.07*   HEMOGLOBIN 11.5* 12.8   HEMATOCRIT 36.4 40.9   PLATELETS 487* 533*   NEUTROS ABS  --  7.80*   IMMATURE GRANS (ABS)  --  0.12*   LYMPHS ABS  --  2.50   MONOS ABS  --  0.53   EOS ABS  --  0.07   MCV 90.1 89.7          Lab 03/31/25  0412 03/30/25  1209   SODIUM 140 137   POTASSIUM 3.9 3.6   CHLORIDE 105 100   CO2 20.4* 18.5*   ANION GAP 14.6 18.5*   BUN 11 12   CREATININE 0.71 0.68   EGFR 109.0 111.7   GLUCOSE 113* 85   CALCIUM 9.3 10.3         Lab 03/30/25  1209   TOTAL PROTEIN 8.0   ALBUMIN 4.3   GLOBULIN 3.7   ALT (SGPT) 9   AST (SGOT) 12   BILIRUBIN 0.4   ALK PHOS 80   LIPASE 35         Lab 03/30/25  1209   PROBNP <36.0   HSTROP T <6                 Brief Urine Lab Results  (Last result in the past 365 days)        Color   Clarity   Blood   Leuk Est   Nitrite   Protein   CREAT   Urine HCG        03/30/25 1242 Yellow   Clear   Negative   Negative   Negative   Negative                 Microbiology Results (last 10 days)       ** No results found for the last 240 hours. **            []  Microbiology  [x]  Radiology  CT Angiogram Chest Pulmonary Embolism  Result Date: 3/30/2025  Impression: No evidence of pulmonary embolus. No acute intrathoracic findings. Electronically Signed: Ankit Barker MD  3/30/2025 2:24 PM EDT  Workstation ID: [id number]    CT Abdomen Pelvis With Contrast  Result Date: 3/30/2025  1.Residual postoperative changes are noted status post recent cholecystectomy and appendectomy. 2.No evidence for abnormal fluid collection. No significant hemorrhage or hematoma. 3.No evidence for additional acute abnormality throughout the abdomen or pelvis. Electronically Signed: Irwin Jose MD  3/30/2025 2:23 PM EDT  Workstation ID: [id number]    XR Chest 1 View  Result Date: 3/30/2025  Impression: No acute cardiopulmonary abnormality. Electronically Signed: Alin Quiroz  3/30/2025 12:30 PM EDT  Workstation ID: [id number]    CT Abdomen Pelvis With Contrast  Result Date: 3/24/2025  Impression: 1.Moderate colonic fluid may indicate acute diarrheal illness. 2.Postsurgical changes of cholecystectomy, appendectomy, and hysterectomy. Small amount of fluid within the gallbladder fossa. Indwelling CBD stent appears adequately  positioned. 3.Additional findings as detailed above. Electronically Signed: Misha Rubin MD  3/24/2025 9:47 AM EDT  Workstation ID: [id number]      [x]  EKG/Telemetry   []  Cardiology/Vascular   []  Pathology  []  Old records  [x]  Other:  Scheduled Meds:aluminum-magnesium hydroxide-simethicone, 30 mL, Oral, Once  busPIRone, 15 mg, Oral, TID  DULoxetine, 90 mg, Oral, Daily  enoxaparin sodium, 40 mg, Subcutaneous, Daily  metoclopramide, 10 mg, Intravenous, Q8H  montelukast, 10 mg, Oral, Nightly  pantoprazole, 40 mg, Intravenous, Q24H  polyethylene glycol, 17 g, Oral, Daily  saccharomyces boulardii, 250 mg, Oral, BID  sodium chloride, 10 mL, Intravenous, Q12H      Continuous Infusions:lactated ringers, 100 mL/hr, Last Rate: 100 mL/hr (03/31/25 1110)      PRN Meds:.  acetaminophen    senna-docusate sodium **AND** polyethylene glycol **AND** bisacodyl **AND** bisacodyl    clonazePAM    HYDROmorphone    ipratropium-albuterol    ketorolac    ondansetron    sodium chloride    sodium chloride    sodium chloride    tiZANidine    traZODone      Assessment & Plan  Assessment / Plan     Assessment/Plan:  Intractable nausea and vomiting  Gastroparesis  Right upper quadrant abdominal pain  Depression with anxiety  Cervical radiculopathy        Patient admitted for further evaluation and treatment  General Surgery consulted thank for assistance  Continue scheduled Reglan  Continue as needed Zofran  Continue IV fluids until tolerating diet  Continue pain control with p.o. analgesics IV for breakthrough  Continue home buspirone, duloxetine scheduled with as needed Klonopin  Continue home tizanidine as needed  Lab holiday tomorrow unless clinical status changes  Further inpatient orders recommendations pending clinical course       Discussed plan with bedside RN as well as general surgery team.    Disposition: Home once tolerating a diet and abdominal pain improves.    VTE Prophylaxis:  Pharmacologic VTE prophylaxis orders are  present.        CODE STATUS:   Code Status (Patient has no pulse and is not breathing): CPR (Attempt to Resuscitate)  Medical Interventions (Patient has pulse or is breathing): Full Support  Level Of Support Discussed With: Patient                       Electronically signed by Reji Irby MD at 25       Cris Damian RN at 25 1636          Goal Outcome Evaluation:                                              Electronically signed by Cris Damian RN at 25 1636       Josué Castano MD at 25 1427          [facility]   Consult    Patient Name: bEony Hamilton  : 1980  MRN: [id number]  Primary Care Physician:  Karen Stover APRN  Date of admission: 3/30/2025    Subjective  Subjective     Chief Complaint: Nausea, vomiting, shortness of breath    Consult Reason: Gastroparesis    HPI: Ebony Hamilton is a 44 y.o. female who presents to the ED yesterday complaining of a 2-day history of intractable nausea and vomiting.  She has a recent surgical history of a laparoscopic cholecystectomy complicated by a bile leak postoperatively managed with an ERCP and drain placement.  Approximately 2 weeks after this she developed acute appendicitis requiring laparoscopic appendectomy just over 3 weeks ago.  She has been to the ED multiple times in the past few weeks complaining of shortness of breath, nausea, vomiting, and abdominal pain.  Yesterday she underwent evaluation with a CT abdomen pelvis as well as a CTA PE protocol both which were negative for any acute pathology.  She does have a history of gastroparesis for which she takes a PPI and Compazine.  Since admission to the hospital her symptoms have slightly improved.    Review of Systems   Constitutional:  Negative for chills and fever.   HENT:  Negative for sore throat.    Respiratory:  Negative for shortness of breath.    Cardiovascular:  Negative for chest pain.   Gastrointestinal:  Positive for abdominal  pain, nausea and vomiting.   Genitourinary:  Negative for difficulty urinating.   Neurological:  Negative for dizziness.   Psychiatric/Behavioral:  Negative for confusion.        Personal History     Past Medical History:   Diagnosis Date    Allergic     Anxiety     Atrial fibrillation     RELEASED BY CARDIOLOGIST/ELECTROPHYSIST, NO CURRENT MEDS    Chronic pain disorder     Depression     Endometriosis     Headache     Hemorrhoids     Injury of shoulder, right 2009    CHRONIC PAIN    Panic disorder     Rectal bleeding 2010    S/P laparoscopic appendectomy 03/09/2025    S/P laparoscopic cholecystectomy 02/17/2025       Past Surgical History:   Procedure Laterality Date    APPENDECTOMY N/A 3/9/2025    Procedure: APPENDECTOMY LAPAROSCOPIC;  Surgeon: Mingo Cannon MD;  Location: [facility] MAIN OR;  Service: General;  Laterality: N/A;    CERVICAL ARTHRODESIS  01/14/2015    Donaldo Albarran    CERVICAL FUSION      C4-7 FUSION, FULL ROM    CHOLECYSTECTOMY N/A 2/17/2025    Procedure: CHOLECYSTECTOMY LAPAROSCOPIC plain language: removal of gallbladder thru small incisions using long instruments and a camera;  Surgeon: Josué Castano MD;  Location: [facility] MAIN OR;  Service: General;  Laterality: N/A;    COLONOSCOPY N/A 05/31/2022    Procedure: COLONOSCOPY;  Surgeon: Shabbir Baird MD;  Location: [facility] ENDOSCOPY;  Service: General;  Laterality: N/A;  HEMORRHOIDS    ENDOSCOPY N/A 2/17/2025    Procedure: ESOPHAGOGASTRODUODENOSCOPY WITH BIOPSIES;  Surgeon: Sanya Reyes MD;  Location: [facility] ENDOSCOPY;  Service: Gastroenterology;  Laterality: N/A;  GASTRITIS, SMALL HIATAL HERNIA    ENDOSCOPY N/A 3/6/2025    Procedure: ESOPHAGOGASTRODUODENOSCOPY with pancreatic stent removal;  Surgeon: Ha Thompson MD;  Location: [facility] ENDOSCOPY;  Service: Gastroenterology;  Laterality: N/A;  pancreatic stent removal, hiatal hernia    ERCP N/A 2/24/2025    Procedure: ENDOSCOPIC RETROGRADE CHOLANGIOPANCREATOGRAPHY with  bile duct stent placement and pancreatic duct stent placement;  Surgeon: Ha Thompson MD;  Location: [facility] ENDOSCOPY;  Service: Gastroenterology;  Laterality: N/A;  bile duct leeak    EXPLORATORY LAPAROTOMY      HEMORRHOIDECTOMY N/A 05/31/2022    Procedure: HEMORRHOID BANDING;  Surgeon: Shabbir Baird MD;  Location: [facility] ENDOSCOPY;  Service: General;  Laterality: N/A;  BANDS X 2    HEMORRHOIDECTOMY N/A 1/31/2024    Procedure: HEMORRHOIDECTOMY;  Surgeon: Shabbir Baird MD;  Location: [facility] OR OSC;  Service: General;  Laterality: N/A;    HYSTERECTOMY      SHOULDER ARTHROSCOPY Right     SHOULDER SURGERY Left     ARTHROSCOPY LABRAL TEAR X2    TUBAL ABDOMINAL LIGATION         Family History: family history includes Anxiety disorder in her father and mother; Bipolar disorder in her mother; Cancer in her mother; Dementia in her paternal grandmother and paternal uncle; Depression in her mother; Heart disease in her mother and another family member; Hypertension in her father and mother. Otherwise pertinent FHx was reviewed and not pertinent to current issue.    Social History:  reports that she quit smoking about 6 years ago. Her smoking use included cigarettes. She started smoking about 26 years ago. She has a 5 pack-year smoking history. She has never used smokeless tobacco. She reports current alcohol use. She reports that she does not use drugs.    Home Medications:  Calcium, DULoxetine, HYDROcodone-acetaminophen, albuterol sulfate HFA, busPIRone, clindamycin, clonazePAM, ibuprofen, montelukast, naloxone, ondansetron, pantoprazole, polyethylene glycol, prochlorperazine, saccharomyces boulardii, sennosides-docusate, tiZANidine, traZODone, and vitamin C    Allergies:  Allergies   Allergen Reactions    Escitalopram Nausea Only and Rash     Nausea, sweating, rash     Sulfa Antibiotics Anaphylaxis    Baclofen GI Intolerance, Hives and Nausea And Vomiting    Ciprofloxacin Hallucinations    Codeine  Hives    Gold-Containing Drug Products Rash    Latex Rash    Nickel Hives    Tegaderm Ag Mesh [Silver] Hives       Objective   Objective     Vitals:   Temp:  [98.3 °F (36.8 °C)-99.5 °F (37.5 °C)] 98.3 °F (36.8 °C)  Heart Rate:  [] 95  Resp:  [20] 20  BP: (120-170)/() 120/88    Physical Exam  Constitutional:       Appearance: Normal appearance.   HENT:      Head: Normocephalic and atraumatic.      Mouth/Throat:      Mouth: Mucous membranes are moist.      Pharynx: Oropharynx is clear.   Cardiovascular:      Rate and Rhythm: Normal rate and regular rhythm.   Pulmonary:      Effort: Pulmonary effort is normal. No respiratory distress.   Abdominal:      General: There is no distension.      Palpations: Abdomen is soft.      Tenderness: There is no abdominal tenderness.   Musculoskeletal:         General: No swelling. Normal range of motion.      Cervical back: Normal range of motion and neck supple.   Skin:     General: Skin is warm and dry.   Neurological:      General: No focal deficit present.      Mental Status: She is alert and oriented to person, place, and time.   Psychiatric:         Mood and Affect: Mood normal.         Behavior: Behavior normal.         Result Review   Result Review:  I have personally reviewed the results from the time of this admission to 3/31/2025 14:27 EDT and agree with these findings:  [x]  Laboratory  []  Microbiology  [x]  Radiology  []  EKG/Telemetry   []  Cardiology/Vascular   []  Pathology  []  Old records  []  Other:  Most notable findings include: No significant intra-abdominal findings    Assessment & Plan  Assessment / Plan     Brief Patient Summary:  Ebony Hamilton is a 44 y.o. female who presents with intractable nausea and vomiting    Active Hospital Problems:  Active Hospital Problems    Diagnosis     **Intractable nausea and vomiting     Abdominal pain     S/P laparoscopic appendectomy     Gastroparesis     S/P laparoscopic cholecystectomy     Cervical  radiculopathy     NICHOLE (generalized anxiety disorder)     Spinal stenosis in cervical region        Plan:   Gastroparesis  -Afebrile, vitals stable, no leukocytosis  -All imaging reviewed  -No concern for acute intra-abdominal process at this time, so would not recommend surgical intervention  -Agree with starting Reglan for gastroparesis symptoms    Electronically signed by Josué Castano MD, 25, 2:27 PM EDT.        Electronically signed by Josué Castano MD at 25 1433       Juany Jeffrey, RN at 25 1215           Wound Eval / Progress Noted    [facility]     Patient Name: Ebony Hamilton  : 1980  MRN: [id number]  Today's Date: 3/31/2025                 Admit Date: 3/30/2025    Visit Dx:    ICD-10-CM ICD-9-CM   1. Abdominal pain, unspecified abdominal location  R10.9 789.00         Intractable nausea and vomiting    Spinal stenosis in cervical region    NICHOLE (generalized anxiety disorder)    Cervical radiculopathy    Gastroparesis    S/P laparoscopic cholecystectomy    S/P laparoscopic appendectomy    Abdominal pain        Past Medical History:   Diagnosis Date    Allergic     Anxiety     Atrial fibrillation     RELEASED BY CARDIOLOGIST/ELECTROPHYSIST, NO CURRENT MEDS    Chronic pain disorder     Depression     Endometriosis     Headache     Hemorrhoids     Injury of shoulder, right 2009    CHRONIC PAIN    Panic disorder     Rectal bleeding     S/P laparoscopic appendectomy 2025    S/P laparoscopic cholecystectomy 2025        Past Surgical History:   Procedure Laterality Date    APPENDECTOMY N/A 3/9/2025    Procedure: APPENDECTOMY LAPAROSCOPIC;  Surgeon: Mingo Cannon MD;  Location: [facility];  Service: General;  Laterality: N/A;    CERVICAL ARTHRODESIS  2015    Donaldo Albarran    CERVICAL FUSION      C4-7 FUSION, FULL ROM    CHOLECYSTECTOMY N/A 2025    Procedure: CHOLECYSTECTOMY LAPAROSCOPIC plain language: removal of gallbladder thru small  incisions using long instruments and a camera;  Surgeon: Josué Castano MD;  Location: [facility] MAIN OR;  Service: General;  Laterality: N/A;    COLONOSCOPY N/A 05/31/2022    Procedure: COLONOSCOPY;  Surgeon: Shabbir Baird MD;  Location: [facility] ENDOSCOPY;  Service: General;  Laterality: N/A;  HEMORRHOIDS    ENDOSCOPY N/A 2/17/2025    Procedure: ESOPHAGOGASTRODUODENOSCOPY WITH BIOPSIES;  Surgeon: Sanya Reyes MD;  Location: [facility] ENDOSCOPY;  Service: Gastroenterology;  Laterality: N/A;  GASTRITIS, SMALL HIATAL HERNIA    ENDOSCOPY N/A 3/6/2025    Procedure: ESOPHAGOGASTRODUODENOSCOPY with pancreatic stent removal;  Surgeon: Ha Thompson MD;  Location: [facility] ENDOSCOPY;  Service: Gastroenterology;  Laterality: N/A;  pancreatic stent removal, hiatal hernia    ERCP N/A 2/24/2025    Procedure: ENDOSCOPIC RETROGRADE CHOLANGIOPANCREATOGRAPHY with bile duct stent placement and pancreatic duct stent placement;  Surgeon: Ha Thompson MD;  Location: [facility] ENDOSCOPY;  Service: Gastroenterology;  Laterality: N/A;  bile duct leeak    EXPLORATORY LAPAROTOMY      HEMORRHOIDECTOMY N/A 05/31/2022    Procedure: HEMORRHOID BANDING;  Surgeon: Shabbir Baird MD;  Location: [facility] ENDOSCOPY;  Service: General;  Laterality: N/A;  BANDS X 2    HEMORRHOIDECTOMY N/A 1/31/2024    Procedure: HEMORRHOIDECTOMY;  Surgeon: Shabbir Baird MD;  Location: [facility] OR [facility];  Service: General;  Laterality: N/A;    HYSTERECTOMY      SHOULDER ARTHROSCOPY Right     SHOULDER SURGERY Left     ARTHROSCOPY LABRAL TEAR X2    TUBAL ABDOMINAL LIGATION           Physical Assessment:  Wound Right medial mid axillary (Active)   Wound Image   03/30/25 3434        Wound Check / Follow-up:  wound nurse follow up for right axialla, prior abscess with packing which spouse has done as home.  Patient alert and oriented, on 4NT and agreeable to assessment.   No drainage noted from area,   Firmness noted likely healing  ridge.  No odor   Small superficial area remaining just needs re-epithelialization.   Would recommend hygiene and protection with mini optifoam with non adhesive center to protect area until fully re-epithelialized   0.2cmLx0.4cmWx<0.1cmD    Cleansed with NS moist gauze and covered with mini silicone border dressing, patient stated dressing felt better than the 4x4 silicone border dressing. (Redness was noted to whole area where the 4x4 dressing had been in use)     Impression: resolving area, re-epithelialization process ongoing, suggesting hygiene and protection.     Short term goals:  regain skin integrity     Juany Jeffrey RN    3/31/2025    14:00 EDT     Electronically signed by Juany Jeffrey RN at 03/31/25 1404        To prevent performance issues, not all notes are shown.    Go to Chart Review to see the rest of the notes.

## 2025-04-08 ENCOUNTER — APPOINTMENT (OUTPATIENT)
Dept: GENERAL RADIOLOGY | Facility: HOSPITAL | Age: 45
End: 2025-04-08
Payer: COMMERCIAL

## 2025-04-08 ENCOUNTER — ANESTHESIA (OUTPATIENT)
Dept: GASTROENTEROLOGY | Facility: HOSPITAL | Age: 45
End: 2025-04-08
Payer: COMMERCIAL

## 2025-04-08 ENCOUNTER — HOSPITAL ENCOUNTER (OUTPATIENT)
Facility: HOSPITAL | Age: 45
Setting detail: HOSPITAL OUTPATIENT SURGERY
Discharge: HOME | End: 2025-04-08
Attending: INTERNAL MEDICINE | Admitting: INTERNAL MEDICINE
Payer: COMMERCIAL

## 2025-04-08 VITALS
WEIGHT: 169.31 LBS | RESPIRATION RATE: 14 BRPM | DIASTOLIC BLOOD PRESSURE: 89 MMHG | SYSTOLIC BLOOD PRESSURE: 126 MMHG | HEART RATE: 85 BPM | OXYGEN SATURATION: 98 % | BODY MASS INDEX: 29.52 KG/M2 | TEMPERATURE: 97.8 F

## 2025-04-08 DIAGNOSIS — Z46.89 ENCOUNTER FOR REMOVAL OF BILIARY STENT: ICD-10-CM

## 2025-04-08 PROCEDURE — 25010000002 ESMOLOL 100 MG/10ML SOLUTION

## 2025-04-08 PROCEDURE — 25010000002 FENTANYL CITRATE (PF) 50 MCG/ML SOLUTION

## 2025-04-08 PROCEDURE — C1769 GUIDE WIRE: HCPCS | Performed by: INTERNAL MEDICINE

## 2025-04-08 PROCEDURE — 25010000002 DEXAMETHASONE PER 1 MG

## 2025-04-08 PROCEDURE — C1726 CATH, BAL DIL, NON-VASCULAR: HCPCS | Performed by: INTERNAL MEDICINE

## 2025-04-08 PROCEDURE — 25010000002 ONDANSETRON PER 1 MG

## 2025-04-08 PROCEDURE — 76000 FLUOROSCOPY <1 HR PHYS/QHP: CPT

## 2025-04-08 PROCEDURE — 25010000002 SUGAMMADEX 200 MG/2ML SOLUTION

## 2025-04-08 PROCEDURE — 25010000002 PROPOFOL 10 MG/ML EMULSION

## 2025-04-08 PROCEDURE — 25010000002 LABETALOL 5 MG/ML SOLUTION

## 2025-04-08 PROCEDURE — 25510000001 IOPAMIDOL 61 % SOLUTION: Performed by: INTERNAL MEDICINE

## 2025-04-08 PROCEDURE — 25810000003 LACTATED RINGERS PER 1000 ML: Performed by: NURSE PRACTITIONER

## 2025-04-08 PROCEDURE — 25010000002 LIDOCAINE PF 2% 2 % SOLUTION

## 2025-04-08 RX ORDER — DEXMEDETOMIDINE HYDROCHLORIDE 100 UG/ML
INJECTION, SOLUTION INTRAVENOUS AS NEEDED
Status: DISCONTINUED | OUTPATIENT
Start: 2025-04-08 | End: 2025-04-08 | Stop reason: SURG

## 2025-04-08 RX ORDER — SODIUM CHLORIDE, SODIUM LACTATE, POTASSIUM CHLORIDE, CALCIUM CHLORIDE 600; 310; 30; 20 MG/100ML; MG/100ML; MG/100ML; MG/100ML
30 INJECTION, SOLUTION INTRAVENOUS CONTINUOUS PRN
Status: DISCONTINUED | OUTPATIENT
Start: 2025-04-08 | End: 2025-04-08 | Stop reason: HOSPADM

## 2025-04-08 RX ORDER — DEXAMETHASONE SODIUM PHOSPHATE 4 MG/ML
INJECTION, SOLUTION INTRA-ARTICULAR; INTRALESIONAL; INTRAMUSCULAR; INTRAVENOUS; SOFT TISSUE AS NEEDED
Status: DISCONTINUED | OUTPATIENT
Start: 2025-04-08 | End: 2025-04-08 | Stop reason: SURG

## 2025-04-08 RX ORDER — ESMOLOL HYDROCHLORIDE 10 MG/ML
INJECTION INTRAVENOUS AS NEEDED
Status: DISCONTINUED | OUTPATIENT
Start: 2025-04-08 | End: 2025-04-08 | Stop reason: SURG

## 2025-04-08 RX ORDER — SODIUM CHLORIDE, SODIUM LACTATE, POTASSIUM CHLORIDE, CALCIUM CHLORIDE 600; 310; 30; 20 MG/100ML; MG/100ML; MG/100ML; MG/100ML
30 INJECTION, SOLUTION INTRAVENOUS CONTINUOUS
Status: DISCONTINUED | OUTPATIENT
Start: 2025-04-08 | End: 2025-04-08 | Stop reason: HOSPADM

## 2025-04-08 RX ORDER — IOPAMIDOL 612 MG/ML
INJECTION, SOLUTION INTRATHECAL AS NEEDED
Status: DISCONTINUED | OUTPATIENT
Start: 2025-04-08 | End: 2025-04-08 | Stop reason: HOSPADM

## 2025-04-08 RX ORDER — ONDANSETRON 2 MG/ML
INJECTION INTRAMUSCULAR; INTRAVENOUS AS NEEDED
Status: DISCONTINUED | OUTPATIENT
Start: 2025-04-08 | End: 2025-04-08 | Stop reason: SURG

## 2025-04-08 RX ORDER — LIDOCAINE HYDROCHLORIDE 20 MG/ML
INJECTION, SOLUTION EPIDURAL; INFILTRATION; INTRACAUDAL; PERINEURAL AS NEEDED
Status: DISCONTINUED | OUTPATIENT
Start: 2025-04-08 | End: 2025-04-08 | Stop reason: SURG

## 2025-04-08 RX ORDER — LABETALOL HYDROCHLORIDE 5 MG/ML
INJECTION, SOLUTION INTRAVENOUS AS NEEDED
Status: DISCONTINUED | OUTPATIENT
Start: 2025-04-08 | End: 2025-04-08 | Stop reason: SURG

## 2025-04-08 RX ORDER — PROPOFOL 10 MG/ML
VIAL (ML) INTRAVENOUS AS NEEDED
Status: DISCONTINUED | OUTPATIENT
Start: 2025-04-08 | End: 2025-04-08 | Stop reason: SURG

## 2025-04-08 RX ORDER — FENTANYL CITRATE 50 UG/ML
INJECTION, SOLUTION INTRAMUSCULAR; INTRAVENOUS AS NEEDED
Status: DISCONTINUED | OUTPATIENT
Start: 2025-04-08 | End: 2025-04-08 | Stop reason: SURG

## 2025-04-08 RX ORDER — ROCURONIUM BROMIDE 10 MG/ML
INJECTION, SOLUTION INTRAVENOUS AS NEEDED
Status: DISCONTINUED | OUTPATIENT
Start: 2025-04-08 | End: 2025-04-08 | Stop reason: SURG

## 2025-04-08 RX ADMIN — LABETALOL HYDROCHLORIDE 5 MG: 5 INJECTION, SOLUTION INTRAVENOUS at 08:47

## 2025-04-08 RX ADMIN — FENTANYL CITRATE 50 MCG: 50 INJECTION, SOLUTION INTRAMUSCULAR; INTRAVENOUS at 08:16

## 2025-04-08 RX ADMIN — SODIUM CHLORIDE, SODIUM LACTATE, POTASSIUM CHLORIDE, CALCIUM CHLORIDE 30 ML/HR: 20; 30; 600; 310 INJECTION, SOLUTION INTRAVENOUS at 07:37

## 2025-04-08 RX ADMIN — DEXMEDETOMIDINE 10 MCG: 100 INJECTION, SOLUTION, CONCENTRATE INTRAVENOUS at 08:34

## 2025-04-08 RX ADMIN — PROPOFOL 140 MG: 10 INJECTION, EMULSION INTRAVENOUS at 08:12

## 2025-04-08 RX ADMIN — DEXMEDETOMIDINE 10 MCG: 100 INJECTION, SOLUTION, CONCENTRATE INTRAVENOUS at 08:38

## 2025-04-08 RX ADMIN — ESMOLOL HYDROCHLORIDE 10 MG: 10 INJECTION, SOLUTION INTRAVENOUS at 08:26

## 2025-04-08 RX ADMIN — SUGAMMADEX 200 MG: 100 INJECTION, SOLUTION INTRAVENOUS at 09:06

## 2025-04-08 RX ADMIN — ESMOLOL HYDROCHLORIDE 10 MG: 10 INJECTION, SOLUTION INTRAVENOUS at 08:28

## 2025-04-08 RX ADMIN — LABETALOL HYDROCHLORIDE 5 MG: 5 INJECTION, SOLUTION INTRAVENOUS at 08:43

## 2025-04-08 RX ADMIN — ESMOLOL HYDROCHLORIDE 10 MG: 10 INJECTION, SOLUTION INTRAVENOUS at 08:36

## 2025-04-08 RX ADMIN — FENTANYL CITRATE 50 MCG: 50 INJECTION, SOLUTION INTRAMUSCULAR; INTRAVENOUS at 08:12

## 2025-04-08 RX ADMIN — LIDOCAINE HYDROCHLORIDE 60 MG: 20 INJECTION, SOLUTION INTRAVENOUS at 08:12

## 2025-04-08 RX ADMIN — DEXAMETHASONE SODIUM PHOSPHATE 4 MG: 4 INJECTION, SOLUTION INTRAMUSCULAR; INTRAVENOUS at 08:30

## 2025-04-08 RX ADMIN — PROPOFOL 150 MCG/KG/MIN: 10 INJECTION, EMULSION INTRAVENOUS at 08:14

## 2025-04-08 RX ADMIN — ONDANSETRON 4 MG: 2 INJECTION INTRAMUSCULAR; INTRAVENOUS at 08:30

## 2025-04-08 RX ADMIN — ROCURONIUM BROMIDE 50 MG: 10 INJECTION, SOLUTION INTRAVENOUS at 08:12

## 2025-04-08 NOTE — ANESTHESIA POSTPROCEDURE EVALUATION
Patient: Ebony Hamilton    Procedure Summary       Date: 04/08/25 Room / Location: [facility] ENDOSCOPY 4 / [facility] ENDOSCOPY    Anesthesia Start: 0808 Anesthesia Stop: 0918    Procedure: ENDOSCOPIC RETROGRADE CHOLANGIOPANCREATOGRAPHY WITH STENT REMOVAL Diagnosis:       Encounter for removal of biliary stent      (Encounter for removal of biliary stent [Z46.89])    Surgeons: Ha Thompson MD Provider: Michelle Torres CRNA    Anesthesia Type: general ASA Status: 3            Anesthesia Type: general    Vitals  Vitals Value Taken Time   /89 04/08/25 10:21   Temp 36.6 °C (97.8 °F) 04/08/25 09:16   Pulse 105 04/08/25 10:35   Resp 14 04/08/25 10:21   SpO2 99 % 04/08/25 10:35   Vitals shown include unfiled device data.        Post Anesthesia Care and Evaluation    Post-procedure mental status: acceptable.  Pain management: satisfactory to patient    Airway patency: patent  Anesthetic complications: No anesthetic complications    Cardiovascular status: acceptable  Respiratory status: acceptable    Comments: Per chart review

## 2025-04-08 NOTE — H&P
Pre Procedure History & Physical    Chief Complaint:   History of bile leak, ERCP, stent placement    Subjective     HPI:   Past history of common bile duct stent for bile leak    Past Medical History:   Past Medical History:   Diagnosis Date    Allergic     Anxiety     Atrial fibrillation     RELEASED BY CARDIOLOGIST/ELECTROPHYSIST, NO CURRENT MEDS    Chronic pain disorder     Depression     Endometriosis     Headache     Hemorrhoids     Injury of shoulder, right 2009    CHRONIC PAIN    Panic disorder     Rectal bleeding 2010    S/P laparoscopic appendectomy 03/09/2025    S/P laparoscopic cholecystectomy 02/17/2025       Past Surgical History:  Past Surgical History:   Procedure Laterality Date    APPENDECTOMY N/A 3/9/2025    Procedure: APPENDECTOMY LAPAROSCOPIC;  Surgeon: Mingo Cannon MD;  Location: [facility] MAIN OR;  Service: General;  Laterality: N/A;    CERVICAL ARTHRODESIS  01/14/2015    Donaldo Albarran    CERVICAL FUSION      C4-7 FUSION, FULL ROM    CHOLECYSTECTOMY N/A 2/17/2025    Procedure: CHOLECYSTECTOMY LAPAROSCOPIC plain language: removal of gallbladder thru small incisions using long instruments and a camera;  Surgeon: oJsué Castano MD;  Location: [facility] MAIN OR;  Service: General;  Laterality: N/A;    COLONOSCOPY N/A 05/31/2022    Procedure: COLONOSCOPY;  Surgeon: Shabbir Baird MD;  Location: [facility] ENDOSCOPY;  Service: General;  Laterality: N/A;  HEMORRHOIDS    ENDOSCOPY N/A 2/17/2025    Procedure: ESOPHAGOGASTRODUODENOSCOPY WITH BIOPSIES;  Surgeon: Sanya Reyes MD;  Location: [facility] ENDOSCOPY;  Service: Gastroenterology;  Laterality: N/A;  GASTRITIS, SMALL HIATAL HERNIA    ENDOSCOPY N/A 3/6/2025    Procedure: ESOPHAGOGASTRODUODENOSCOPY with pancreatic stent removal;  Surgeon: Ha Thompson MD;  Location: [facility] ENDOSCOPY;  Service: Gastroenterology;  Laterality: N/A;  pancreatic stent removal, hiatal hernia    ERCP N/A 2/24/2025    Procedure: ENDOSCOPIC RETROGRADE  CHOLANGIOPANCREATOGRAPHY with bile duct stent placement and pancreatic duct stent placement;  Surgeon: Ha Thompson MD;  Location: [facility] ENDOSCOPY;  Service: Gastroenterology;  Laterality: N/A;  bile duct leeak    EXPLORATORY LAPAROTOMY      HEMORRHOIDECTOMY N/A 05/31/2022    Procedure: HEMORRHOID BANDING;  Surgeon: Shabbir Baird MD;  Location: [facility] ENDOSCOPY;  Service: General;  Laterality: N/A;  BANDS X 2    HEMORRHOIDECTOMY N/A 1/31/2024    Procedure: HEMORRHOIDECTOMY;  Surgeon: Shabbir Baird MD;  Location: [facility] OR [facility];  Service: General;  Laterality: N/A;    HYSTERECTOMY      SHOULDER ARTHROSCOPY Right     SHOULDER SURGERY Left     ARTHROSCOPY LABRAL TEAR X2    TUBAL ABDOMINAL LIGATION         Family History:  Family History   Problem Relation Age of Onset    Depression Mother     Bipolar disorder Mother     Anxiety disorder Mother     Cancer Mother     Heart disease Mother     Hypertension Mother     Anxiety disorder Father     Hypertension Father     Dementia Paternal Uncle     Dementia Paternal Grandmother     Heart disease Other     Colon cancer Neg Hx     Malig Hyperthermia Neg Hx        Social History:   reports that she quit smoking about 6 years ago. Her smoking use included cigarettes. She started smoking about 26 years ago. She has a 5 pack-year smoking history. She has never used smokeless tobacco. She reports current alcohol use. She reports that she does not use drugs.    Medications:   Medications Prior to Admission   Medication Sig Dispense Refill Last Dose/Taking    albuterol sulfate  (90 Base) MCG/ACT inhaler Inhale 2 puffs Every 6 (Six) Hours As Needed for Wheezing. 18 g 1     busPIRone (BUSPAR) 15 MG tablet Take 1 tablet by mouth 3 (Three) Times a Day As Needed (Anxiety).       CALCIUM PO Take 1 tablet by mouth Daily.       clonazePAM (KlonoPIN) 0.5 MG tablet Take 1 tablet by mouth 2 (Two) Times a Day As Needed for Anxiety. 40 tablet 0      diphenhydrAMINE-zinc acetate 2-0.1 % cream Apply 1 Application topically to the appropriate area as directed 3 (Three) Times a Day As Needed for Itching or Rash. 15 g 0     DULoxetine (CYMBALTA) 30 MG capsule Take 1 capsule by mouth Daily. TAKES 30mg AND 60mg TOGETHER FOR A TOTAL OF 90mg       DULoxetine (CYMBALTA) 60 MG capsule Take 1 capsule by mouth Daily. TAKES 30mg AND 60mg TOGETHER FOR A TOTAL OF 90mg       HYDROcodone-acetaminophen (NORCO)  MG per tablet Take 1.5 tablets by mouth Every 4 (Four) Hours As Needed for Moderate Pain or Severe Pain. 27 tablet 0     ibuprofen (ADVIL,MOTRIN) 800 MG tablet Take 1 tablet by mouth Every 8 (Eight) Hours.       metoclopramide (REGLAN) 10 MG tablet Take 1 tablet by mouth 4 (Four) Times a Day Before Meals & at Bedtime. 120 tablet 0     montelukast (Singulair) 10 MG tablet Take 1 tablet by mouth Every Night. 90 tablet 1     naloxone (NARCAN) 4 MG/0.1ML nasal spray Call 911. Don't prime. [place] in 1 nostril for overdose. Repeat in 2-3 minutes in other nostril if no or minimal breathing/responsiveness. (Patient taking differently: Administer 1 spray into the nostril(s) as directed by provider As Needed. Call 911. Don't prime. [place] in 1 nostril for overdose. Repeat in 2-3 minutes in other nostril if no or minimal breathing/responsiveness.) 2 each 0     ondansetron (ZOFRAN) 4 MG tablet Take 1 tablet by mouth Every 8 (Eight) Hours As Needed for Nausea or Vomiting. 15 tablet 0     pantoprazole (PROTONIX) 40 MG EC tablet Take 1 tablet by mouth Daily. 90 tablet 3     polyethylene glycol (MIRALAX) 17 g packet Mix 17gm (contents of 1 packet) in eight ounces of water or other beverage to drink once daily 7 packet 0     saccharomyces boulardii (Florastor) 250 MG capsule Take 1 capsule by mouth 2 (Two) Times a Day. 60 capsule 0     sennosides-docusate (senna-docusate sodium) 8.6-50 MG per tablet Take 1 tablet by mouth Daily. 30 tablet 2     simethicone (MYLICON) 80 MG chewable  tablet Chew 1.5 tablets 4 (Four) Times a Day Before Meals & at Bedtime.       tiZANidine (ZANAFLEX) 4 MG tablet Take 1-2 tablets by mouth Every 6 (Six) Hours As Needed.       traZODone (DESYREL) 50 MG tablet Take 0.5 to 2 tab PO QHS PRN sleep (Patient taking differently: Take 0.5-2 tablets by mouth At Night As Needed. Take 0.5 to 2 tab PO QHS PRN sleep) 90 tablet 2     triamcinolone (KENALOG) 0.025 % cream Apply 1 Application topically to the appropriate area as directed Every 12 (Twelve) Hours. 15 g 0     vitamin C (ASCORBIC ACID) 500 MG tablet Take 1 tablet by mouth Daily. LAST DOSE 1/28/24          Allergies:  Escitalopram, Sulfa antibiotics, Baclofen, Ciprofloxacin, Codeine, Gold-containing drug products, Latex, Lovenox [enoxaparin sodium], Nickel, and Tegaderm ag mesh [silver]        Objective     Weight 76.8 kg (169 lb 5 oz), not currently breastfeeding.    Physical Exam   Constitutional: Pt is oriented to person, place, and time and well-developed, well-nourished, and in no distress.   Mouth/Throat: Oropharynx is clear and moist.   Neck: Normal range of motion.   Cardiovascular: Normal rate, regular rhythm and normal heart sounds.    Pulmonary/Chest: Effort normal and breath sounds normal.   Abdominal: Soft. Nontender  Skin: Skin is warm and dry.   Psychiatric: Mood, memory, affect and judgment normal.     Assessment & Plan     Diagnosis:  History of bile duct injury    Anticipated Surgical Procedure:  ERCP    The risks, benefits, and alternatives of this procedure have been discussed with the patient or the responsible party- the patient understands and agrees to proceed.

## 2025-04-10 ENCOUNTER — TELEPHONE (OUTPATIENT)
Dept: FAMILY MEDICINE CLINIC | Facility: CLINIC | Age: 45
End: 2025-04-10

## 2025-04-10 NOTE — TELEPHONE ENCOUNTER
Caller: TERE STARR    Relationship to patient: Other    Best call back number: [phone]    Patient is needing: CALLER-DELGADO, [facility] THE PROVIDER KNOW IF THEY NEEDED ANYTHING TO PLEASE REACH OUT AND THEY WOULD ASSIST.    HUB ADVISED CALLER TO REACH OUT TO PATIENT AND PER CALLER THEY ARE HAVING TROUBLE GETTING A HOLD OF THE PATIENT, BUT INSISTED ON HUB LETTING PROVIDER KNOW.

## 2025-04-10 NOTE — TELEPHONE ENCOUNTER
Spoke with patient. She stated she has received messages from them but she has been in the hospital. She has an appt with Karen Stover for 04/15/2025.

## 2025-04-13 LAB
QT INTERVAL: 323 MS
QTC INTERVAL: 430 MS

## 2025-04-14 ENCOUNTER — TELEPHONE (OUTPATIENT)
Dept: GASTROENTEROLOGY | Facility: CLINIC | Age: 45
End: 2025-04-14
Payer: COMMERCIAL

## 2025-04-14 DIAGNOSIS — K80.51 CALCULUS OF BILE DUCT WITHOUT CHOLECYSTITIS WITH OBSTRUCTION: Primary | ICD-10-CM

## 2025-04-14 NOTE — TELEPHONE ENCOUNTER
ERCP 4/8/2025: Stent removed, MRI MRCP recommended in 2 weeks, patient needs follow-up in the office as well

## 2025-04-15 ENCOUNTER — OFFICE VISIT (OUTPATIENT)
Dept: FAMILY MEDICINE CLINIC | Facility: CLINIC | Age: 45
End: 2025-04-15
Payer: COMMERCIAL

## 2025-04-15 VITALS
OXYGEN SATURATION: 98 % | BODY MASS INDEX: 29.3 KG/M2 | HEIGHT: 64 IN | WEIGHT: 171.6 LBS | SYSTOLIC BLOOD PRESSURE: 94 MMHG | DIASTOLIC BLOOD PRESSURE: 60 MMHG | HEART RATE: 95 BPM | TEMPERATURE: 98.6 F

## 2025-04-15 DIAGNOSIS — F41.1 GENERALIZED ANXIETY DISORDER: ICD-10-CM

## 2025-04-15 DIAGNOSIS — J30.2 SEASONAL ALLERGIC RHINITIS, UNSPECIFIED TRIGGER: ICD-10-CM

## 2025-04-15 DIAGNOSIS — F33.1 MODERATE EPISODE OF RECURRENT MAJOR DEPRESSIVE DISORDER: ICD-10-CM

## 2025-04-15 RX ORDER — ALBUTEROL SULFATE 90 UG/1
2 INHALANT RESPIRATORY (INHALATION) EVERY 6 HOURS PRN
Qty: 18 G | Refills: 1 | Status: SHIPPED | OUTPATIENT
Start: 2025-04-15

## 2025-04-15 RX ORDER — BUSPIRONE HYDROCHLORIDE 15 MG/1
15 TABLET ORAL 3 TIMES DAILY PRN
Qty: 90 TABLET | Refills: 5
Start: 2025-04-15

## 2025-04-15 NOTE — TELEPHONE ENCOUNTER
I can give her a Valium for the claustrophobia ; however, can patient confirm with her surgeon regarding her hardware if it is MRI safe?  If it is only titanium that is okay.  Patient is usually receive cards if unable to have MRI or if they are conditional

## 2025-04-15 NOTE — TELEPHONE ENCOUNTER
"Spoke to patient and informed of MILDRED Caal recommendations.  Verified patient understanding.     Patient would like office to schedule MRI MRCP.  Patient states that she is claustrophobic and does have \"hardware in neck\".      Please advise, is patient OK to proceed with MRI?    Scheduled follow up with MILDRED Mcneill on Friday, 06.13.25 at 1300.  "

## 2025-04-15 NOTE — PROGRESS NOTES
Chief Complaint  Chief Complaint   Patient presents with    Surgery follow up     Removal of biliary stent on 04/08/2025       Subjective      Ebony Hamilton presents to [facility]  History of Present Illness  The patient presents for evaluation of abdominal pain and anxiety.    A significant improvement in her condition, particularly regarding abdominal pain, is reported. She was discharged from the hospital on Sunday night after a week-long stay, during which surgery for stent removal was performed on Tuesday morning. Post-discharge care includes the use of an inspirometer and flutter valve. Despite the stent removal, no noticeable difference is perceived. Activity levels have increased, allowing the performance of normal activities. Follow-up with this office was advised. Information was provided that there was a lot of scar tissue, and imaging is to be scheduled in 1 to 2 weeks.     Reglan, taken 4 times daily, appears to be effective. Food intake is maintained, though limited. Consumption of steak and salad has been successful without adverse effects. Continuation with small meals and gradual reintroduction of other foods as tolerated is advised. Reglan was started in the hospital, easing stomach pain and allowing the start of drinking and eating.    Currently, BuSpar is taken 3 times daily for breakthrough anxiety, initially administered during the hospital stay and proving beneficial. Albuterol is also being used.      Objective     Medical History:  Past Medical History:   Diagnosis Date    Allergic     Anxiety     Atrial fibrillation     RELEASED BY CARDIOLOGIST/ELECTROPHYSIST, NO CURRENT MEDS    Chronic pain disorder     Depression     Endometriosis     Headache     Hemorrhoids     Injury of shoulder, right 2009    CHRONIC PAIN    Panic disorder     Rectal bleeding 2010    S/P laparoscopic appendectomy 03/09/2025    S/P laparoscopic cholecystectomy 02/17/2025     Past  Surgical History:   Procedure Laterality Date    APPENDECTOMY N/A 3/9/2025    Procedure: APPENDECTOMY LAPAROSCOPIC;  Surgeon: Mingo Cannon MD;  Location: formerly [facility] MAIN OR;  Service: General;  Laterality: N/A;    CERVICAL ARTHRODESIS  01/14/2015    Donaldo Albarran    CERVICAL FUSION      C4-7 FUSION, FULL ROM    CHOLECYSTECTOMY N/A 2/17/2025    Procedure: CHOLECYSTECTOMY LAPAROSCOPIC plain language: removal of gallbladder thru small incisions using long instruments and a camera;  Surgeon: Josué Castano MD;  Location: formerly [facility] MAIN OR;  Service: General;  Laterality: N/A;    COLONOSCOPY N/A 05/31/2022    Procedure: COLONOSCOPY;  Surgeon: Shabbir Baird MD;  Location: formerly [facility] ENDOSCOPY;  Service: General;  Laterality: N/A;  HEMORRHOIDS    ENDOSCOPY N/A 2/17/2025    Procedure: ESOPHAGOGASTRODUODENOSCOPY WITH BIOPSIES;  Surgeon: Sanya Reyes MD;  Location: formerly [facility] ENDOSCOPY;  Service: Gastroenterology;  Laterality: N/A;  GASTRITIS, SMALL HIATAL HERNIA    ENDOSCOPY N/A 3/6/2025    Procedure: ESOPHAGOGASTRODUODENOSCOPY with pancreatic stent removal;  Surgeon: Ha Thompson MD;  Location: formerly [facility] ENDOSCOPY;  Service: Gastroenterology;  Laterality: N/A;  pancreatic stent removal, hiatal hernia    ERCP N/A 2/24/2025    Procedure: ENDOSCOPIC RETROGRADE CHOLANGIOPANCREATOGRAPHY with bile duct stent placement and pancreatic duct stent placement;  Surgeon: Ha Thompson MD;  Location: formerly [facility] ENDOSCOPY;  Service: Gastroenterology;  Laterality: N/A;  bile duct leeak    ERCP N/A 4/8/2025    Procedure: ENDOSCOPIC RETROGRADE CHOLANGIOPANCREATOGRAPHY WITH STENT REMOVAL;  Surgeon: Ha Thompson MD;  Location: formerly [facility] ENDOSCOPY;  Service: Gastroenterology;  Laterality: N/A;  STENT REMOVAL    EXPLORATORY LAPAROTOMY      HEMORRHOIDECTOMY N/A 05/31/2022    Procedure: HEMORRHOID BANDING;  Surgeon: Shabbir Baird MD;  Location: formerly [facility] ENDOSCOPY;  Service: General;  Laterality: N/A;  BANDS X 2     HEMORRHOIDECTOMY N/A 2024    Procedure: HEMORRHOIDECTOMY;  Surgeon: Shabbir Baird MD;  Location: [facility];  Service: General;  Laterality: N/A;    HYSTERECTOMY      SHOULDER ARTHROSCOPY Right     SHOULDER SURGERY Left     ARTHROSCOPY LABRAL TEAR X2    TUBAL ABDOMINAL LIGATION        Social History     Tobacco Use    Smoking status: Former     Current packs/day: 0.00     Average packs/day: 0.3 packs/day for 20.0 years (5.0 ttl pk-yrs)     Types: Cigarettes     Start date: 1999     Quit date: 2019     Years since quittin.2    Smokeless tobacco: Never   Vaping Use    Vaping status: Never Used   Substance Use Topics    Alcohol use: Yes     Comment: rarely    Drug use: Never     Family History   Problem Relation Age of Onset    Depression Mother     Bipolar disorder Mother     Anxiety disorder Mother     Cancer Mother     Heart disease Mother     Hypertension Mother     Anxiety disorder Father     Hypertension Father     Dementia Paternal Uncle     Dementia Paternal Grandmother     Heart disease Other     Colon cancer Neg Hx     Malig Hyperthermia Neg Hx        Medications:  Prior to Admission medications    Medication Sig Start Date End Date Taking? Authorizing Provider   albuterol sulfate  (90 Base) MCG/ACT inhaler Inhale 2 puffs Every 6 (Six) Hours As Needed for Wheezing. 25  Yes Karen Stover APRN   busPIRone (BUSPAR) 15 MG tablet Take 1 tablet by mouth 3 (Three) Times a Day As Needed (Anxiety). 25  Yes Naila Alejandre MD   CALCIUM PO Take 1 tablet by mouth Daily.   Yes Provider, MD Avani   clonazePAM (KlonoPIN) 0.5 MG tablet Take 1 tablet by mouth 2 (Two) Times a Day As Needed for Anxiety. 3/25/25  Yes Karen Stover APRN   diphenhydrAMINE-zinc acetate 2-0.1 % cream Apply 1 Application topically to the appropriate area as directed 3 (Three) Times a Day As Needed for Itching or Rash. 25  Yes Naila Alejandre MD   DULoxetine  (CYMBALTA) 30 MG capsule Take 1 capsule by mouth Daily. TAKES 30mg AND 60mg TOGETHER FOR A TOTAL OF 90mg   Yes Avani Vela MD   DULoxetine (CYMBALTA) 60 MG capsule Take 1 capsule by mouth Daily. TAKES 30mg AND 60mg TOGETHER FOR A TOTAL OF 90mg   Yes Avani Vela MD   HYDROcodone-acetaminophen (NORCO)  MG per tablet Take 1.5 tablets by mouth Every 4 (Four) Hours As Needed for Moderate Pain or Severe Pain. 4/6/25  Yes Naial Alejandre MD   ibuprofen (ADVIL,MOTRIN) 800 MG tablet Take 1 tablet by mouth Every 8 (Eight) Hours.   Yes Avani Vela MD   metoclopramide (REGLAN) 10 MG tablet Take 1 tablet by mouth 4 (Four) Times a Day Before Meals & at Bedtime. 4/6/25  Yes Naila Alejandre MD   montelukast (Singulair) 10 MG tablet Take 1 tablet by mouth Every Night. 1/17/25  Yes Karen Stover APRN   naloxone (NARCAN) 4 MG/0.1ML nasal spray Call 911. Don't prime. [place] in 1 nostril for overdose. Repeat in 2-3 minutes in other nostril if no or minimal breathing/responsiveness.  Patient taking differently: Administer 1 spray into the nostril(s) as directed by provider As Needed. Call 911. Don't prime. [place] in 1 nostril for overdose. Repeat in 2-3 minutes in other nostril if no or minimal breathing/responsiveness. 3/11/25  Yes India Chris APRN   ondansetron (ZOFRAN) 4 MG tablet Take 1 tablet by mouth Every 8 (Eight) Hours As Needed for Nausea or Vomiting. 4/6/25  Yes Naila Alejandre MD   pantoprazole (PROTONIX) 40 MG EC tablet Take 1 tablet by mouth Daily. 11/11/24  Yes Karen Stover APRN   polyethylene glycol (MIRALAX) 17 g packet Mix 17gm (contents of 1 packet) in eight ounces of water or other beverage to drink once daily 3/11/25  Yes India Chris APRN   saccharomyces boulardii (Florastor) 250 MG capsule Take 1 capsule by mouth 2 (Two) Times a Day. 3/25/25  Yes Karen Stover APRN   sennosides-docusate (senna-docusate sodium)  "8.6-50 MG per tablet Take 1 tablet by mouth Daily. 6/27/24  Yes Karen Stover APRN   simethicone (MYLICON) 80 MG chewable tablet Chew 1.5 tablets 4 (Four) Times a Day Before Meals & at Bedtime. 4/6/25  Yes Naila Alejandre MD   tiZANidine (ZANAFLEX) 4 MG tablet Take 1-2 tablets by mouth Every 6 (Six) Hours As Needed. 5/23/24  Yes ProviderAvani MD   traZODone (DESYREL) 50 MG tablet Take 0.5 to 2 tab PO QHS PRN sleep  Patient taking differently: Take 0.5-2 tablets by mouth At Night As Needed. Take 0.5 to 2 tab PO QHS PRN sleep 8/30/24  Yes Kraen Stover APRN   triamcinolone (KENALOG) 0.025 % cream Apply 1 Application topically to the appropriate area as directed Every 12 (Twelve) Hours. 4/6/25  Yes Naila Alejandre MD   vitamin C (ASCORBIC ACID) 500 MG tablet Take 1 tablet by mouth Daily. LAST DOSE 1/28/24   Yes ProviderAvani MD        Allergies:   Escitalopram, Sulfa antibiotics, Baclofen, Ciprofloxacin, Codeine, Gold-containing drug products, Latex, Lovenox [enoxaparin sodium], Nickel, and Tegaderm ag mesh [silver]    Health Maintenance Due   Topic Date Due    TDAP/TD VACCINES (2 - Td or Tdap) 01/01/2022    HEPATITIS C SCREENING  Never done    MAMMOGRAM  Never done    COVID-19 Vaccine (1 - 2024-25 season) Never done         Vital Signs:   BP 94/60 (BP Location: Left arm, Patient Position: Sitting, Cuff Size: Adult)   Pulse 95   Temp 98.6 °F (37 °C) (Oral)   Ht 161.3 cm (63.5\")   Wt 77.8 kg (171 lb 9.6 oz)   SpO2 98%   BMI 29.92 kg/m²     Wt Readings from Last 3 Encounters:   04/15/25 77.8 kg (171 lb 9.6 oz)   04/08/25 76.8 kg (169 lb 5 oz)   03/30/25 78.2 kg (172 lb 6.4 oz)     BP Readings from Last 3 Encounters:   04/15/25 94/60   04/08/25 126/89   04/06/25 118/84       Physical Exam  Vitals reviewed.   Constitutional:       Appearance: Normal appearance. She is well-developed.   HENT:      Head: Normocephalic and atraumatic.   Eyes:      Conjunctiva/sclera: " Conjunctivae normal.      Pupils: Pupils are equal, round, and reactive to light.   Cardiovascular:      Rate and Rhythm: Normal rate and regular rhythm.      Heart sounds: No murmur heard.     No friction rub. No gallop.   Pulmonary:      Effort: Pulmonary effort is normal.      Breath sounds: Normal breath sounds. No wheezing or rhonchi.   Abdominal:      General: Bowel sounds are normal. There is no distension.      Palpations: Abdomen is soft.      Tenderness: There is no abdominal tenderness.   Skin:     General: Skin is warm and dry.   Neurological:      Mental Status: She is alert and oriented to person, place, and time.      Cranial Nerves: No cranial nerve deficit.   Psychiatric:         Mood and Affect: Mood and affect normal.         Behavior: Behavior normal.         Thought Content: Thought content normal.         Judgment: Judgment normal.       Physical Exam  Respiratory: Clear to auscultation, no wheezing, rales or rhonchi      Result Review :    The following data was reviewed by MILDRED Irby on 04/15/25 at 11:38 EDT:    Common labs          3/30/2025    12:09 3/31/2025    04:12 4/5/2025    08:53   Common Labs   Glucose 85  113  101    BUN 12  11  7    Creatinine 0.68  0.71  0.56    Sodium 137  140  139    Potassium 3.6  3.9  4.3    Chloride 100  105  102    Calcium 10.3  9.3  9.4    Albumin 4.3      Total Bilirubin 0.4      Alkaline Phosphatase 80      AST (SGOT) 12      ALT (SGPT) 9      WBC 11.07  9.27     Hemoglobin 12.8  11.5     Hematocrit 40.9  36.4     Platelets 533  487         XR Abdomen KUB  Result Date: 4/3/2025  Impression: Findings compatible with constipation. Electronically Signed: Martha Barker MD  4/3/2025 11:16 AM EDT  Workstation ID: [id number]    CT Angiogram Chest Pulmonary Embolism  Result Date: 3/30/2025  Impression: No evidence of pulmonary embolus. No acute intrathoracic findings. Electronically Signed: Ankit Barker MD  3/30/2025 2:24 PM EDT  Workstation  ID: [id number]    CT Abdomen Pelvis With Contrast  Result Date: 3/30/2025  1.Residual postoperative changes are noted status post recent cholecystectomy and appendectomy. 2.No evidence for abnormal fluid collection. No significant hemorrhage or hematoma. 3.No evidence for additional acute abnormality throughout the abdomen or pelvis. Electronically Signed: Irwin Jose MD  3/30/2025 2:23 PM EDT  Workstation ID: [id number]    XR Chest 1 View  Result Date: 3/30/2025  Impression: No acute cardiopulmonary abnormality. Electronically Signed: Alin Quiroz  3/30/2025 12:30 PM EDT  Workstation ID: [id number]    CT Abdomen Pelvis With Contrast  Result Date: 3/24/2025  Impression: 1.Moderate colonic fluid may indicate acute diarrheal illness. 2.Postsurgical changes of cholecystectomy, appendectomy, and hysterectomy. Small amount of fluid within the gallbladder fossa. Indwelling CBD stent appears adequately positioned. 3.Additional findings as detailed above. Electronically Signed: Misha Rubin MD  3/24/2025 9:47 AM EDT  Workstation ID: [id number]    CT Angiogram Chest Pulmonary Embolism  Result Date: 3/20/2025  1.No pulmonary embolism or aortic dissection identified. 2.Elevation of the right hemidiaphragm. Opacities in the right lower lobe have the appearance of atelectasis rather than pneumonia. 3.Changes of relatively recent cholecystectomy with some persistent fluid/stranding partially visible in the gallbladder fossa. A biliary stent is present with no evidence for biliary obstruction. Electronically Signed: Yayo Ford MD  3/20/2025 2:54 AM EDT  Workstation ID: [id number]    XR Chest 1 View  Result Date: 3/20/2025  Mild elevation of the right hemidiaphragm with right basilar atelectasis. Electronically Signed: Yayo Ford MD  3/20/2025 1:38 AM EDT  Workstation ID: [id number]    CT Abdomen Pelvis With Contrast  Result Date: 3/9/2025  1.  Acute appendicitis is suggested. Please correlate clinically. 2.  Interval  decrease in the ascites. 3.  There is a persistent gallbladder fossa fluid collection with an estimated total volume of 20.8 mL. It may represent a urinoma. An infected fluid collection, such as an abscess cannot be excluded. 4.  An internal biliary stent is in the common bile duct (CBD). There is pneumobilia. 5.  The inflammatory change seen within the right upper quadrant has decreased considerably since the 2/22/2025 CT study. 6.  Interval decrease in the perihepatic and right subphrenic fluid collection is noted since 2/24/2025. The percutaneous drainage catheter remains in place and is located laterally. 7.  Please see above comments for further detail.   Portions of this note were completed with a voice recognition program.  3/9/2025 12:52 AM by Romario Romero MD on Workstation: [email or web address]      CT Guided Percutaneous Drain Peritoneal  Result Date: 2/24/2025  Impression: Technically successful placement of an 8 Italian drain into the hepatic subcapsular fluid collection which yielded 80 cc bilious appearing fluid. A sample was sent for culture. Electronically Signed: Jl Hendrix MD  2/24/2025 12:15 PM EST  Workstation ID: [id number]    CT Abdomen Pelvis With Contrast  Result Date: 2/22/2025  Impression: CT scan of the abdomen and pelvis with IV contrast demonstrating findings consistent with recent cholecystectomy. Findings concerning for bile leak, as above. Electronically Signed: Lisandro Moreira MD  2/22/2025 4:06 PM EST  Workstation ID: [id number]    XR Abdomen Flat & Upright  Result Date: 2/17/2025  Nonobstructive bowel gas pattern. No free air. Electronically Signed: Yayo Ford MD  2/17/2025 6:51 AM EST  Workstation ID: [id number]    US Gallbladder  Result Date: 2/17/2025  1. Small gallstone in the gallbladder neck with no biliary obstruction or gallbladder wall thickening. There is a positive sonographic Guzman's sign of questionable significance. If there is continued concern for acute cholecystitis, HIDA  scan may be beneficial. 2. Otherwise negative. Electronically Signed: Yayo Ford MD  2/17/2025 12:08 AM EST  Workstation ID: [id number]    XR Femur 2 View Right  Result Date: 1/11/2025  Impression: No acute osseous abnormality. Electronically Signed: Reji Martinez MD  1/11/2025 5:10 PM EST  Workstation ID: [id number]      Results                 Assessment and Plan    Diagnoses and all orders for this visit:    1. Generalized anxiety disorder  -     busPIRone (BUSPAR) 15 MG tablet; Take 1 tablet by mouth 3 (Three) Times a Day As Needed (Anxiety).  Dispense: 90 tablet; Refill: 5    2. Moderate episode of recurrent major depressive disorder  -     busPIRone (BUSPAR) 15 MG tablet; Take 1 tablet by mouth 3 (Three) Times a Day As Needed (Anxiety).  Dispense: 90 tablet; Refill: 5    3. Seasonal allergic rhinitis, unspecified trigger  -     albuterol sulfate  (90 Base) MCG/ACT inhaler; Inhale 2 puffs Every 6 (Six) Hours As Needed for Wheezing.  Dispense: 18 g; Refill: 1       Assessment & Plan  1. Abdominal pain.  - Improvement in abdominal discomfort noted.  - ERCP performed on 04/08/2025 with stent removal; MRI/MRCP recommended in 2 weeks.  - Reglan prescribed for 3 months, to be tapered off and used as needed thereafter.  - Advised to continue small meals and gradually reintroduce other foods as tolerated.    2. Anxiety.  - Currently taking BuSpar 3 times daily for breakthrough anxiety, which has been beneficial.  - Also using albuterol.  - Prescriptions for BuSpar and albuterol will be refilled today.          Smoking Cessation:    Ebony Hamilton  reports that she quit smoking about 6 years ago. Her smoking use included cigarettes. She started smoking about 26 years ago. She has a 5 pack-year smoking history. She has never used smokeless tobacco.             Follow Up   Return in about 3 months (around 7/15/2025) for Next scheduled follow up.  Patient was given instructions and counseling regarding her  condition or for health maintenance advice. Please see specific information pulled into the AVS if appropriate.     Please note that portions of this note were completed with a voice recognition program.    Patient or patient representative verbalized consent for the use of Ambient Listening during the visit with  MILDRED Irby for chart documentation. 4/15/2025  11:38 EDT

## 2025-04-16 ENCOUNTER — READMISSION MANAGEMENT (OUTPATIENT)
Dept: CALL CENTER | Facility: HOSPITAL | Age: 45
End: 2025-04-16
Payer: COMMERCIAL

## 2025-04-16 DIAGNOSIS — G47.00 INSOMNIA, UNSPECIFIED TYPE: ICD-10-CM

## 2025-04-16 DIAGNOSIS — R05.9 COUGH, UNSPECIFIED TYPE: ICD-10-CM

## 2025-04-16 RX ORDER — MONTELUKAST SODIUM 10 MG/1
10 TABLET ORAL NIGHTLY
Qty: 90 TABLET | Refills: 1 | Status: SHIPPED | OUTPATIENT
Start: 2025-04-16

## 2025-04-16 RX ORDER — TRAZODONE HYDROCHLORIDE 50 MG/1
TABLET ORAL
Qty: 90 TABLET | Refills: 2 | Status: SHIPPED | OUTPATIENT
Start: 2025-04-16

## 2025-04-16 NOTE — OUTREACH NOTE
Medical Week 2 Survey      Flowsheet Row Responses   [facility] patient discharged from? [facility]   Does the patient have one of the following disease processes/diagnoses(primary or secondary)? Other   Week 2 attempt successful? Yes   Call start time 1209   Discharge diagnosis Intractable nausea and vomiting, lap kay with complications 3 weeks ago   Call end time 1210   Meds reviewed with patient/caregiver? Yes   Is the patient having any side effects they believe may be caused by any medication additions or changes? No   Does the patient have all medications ordered at discharge? Yes   Is the patient taking all medications as directed (includes completed medication regime)? Yes   Does the patient have a primary care provider?  Yes   Does the patient have an appointment with their PCP within 7 days of discharge? Yes   Has the patient kept scheduled appointments due by today? Yes   Has home health visited the patient within 72 hours of discharge? N/A   Psychosocial issues? No   What is the patient's perception of their health status since discharge? Improving   Is the patient/caregiver able to teach back the hierarchy of who to call/visit for symptoms/problems? PCP, Specialist, Home health nurse, Urgent Care, ED, 911 Yes   Week 2 Call Completed? Yes   Graduated Yes   Call end time 1210            Rina ABARCA - Registered Nurse

## 2025-04-16 NOTE — TELEPHONE ENCOUNTER
Spoke with patient.  She states she has had MRIs after hardware was placed, and she knows it is safe.    Scheduled next available MRI at [facility] on Tuesday, 05.20.25 at 1145,. Arrival time of 1115. NPO for 2 hours prior.    Is this timing OK?

## 2025-04-16 NOTE — TELEPHONE ENCOUNTER
Changing to an urgent request:  K80.51 Calculus of bile duct without cholecystitis with obstruction       Rescheduled MRI MRCP at [facility] on Wednesday, 04.23.25 at 1430 with arrival time of 1400.  NPO for 2 hours prior.    Patient unable to make that day/time.    ___________________________________    Rescheduled to MRI MRCP at [facility] on Friday, 04.25.25 with arrival time of 1715, scan time of 1745.  NPO 2 hours prior    Spoke with patient and informed of procedure date/time and prep.  Patient verbalized understanding.

## 2025-04-24 ENCOUNTER — OFFICE VISIT (OUTPATIENT)
Dept: FAMILY MEDICINE CLINIC | Facility: CLINIC | Age: 45
End: 2025-04-24
Payer: COMMERCIAL

## 2025-04-24 ENCOUNTER — HOSPITAL ENCOUNTER (OUTPATIENT)
Facility: HOSPITAL | Age: 45
Setting detail: OBSERVATION
Discharge: HOME | End: 2025-04-26
Attending: EMERGENCY MEDICINE | Admitting: FAMILY MEDICINE
Payer: COMMERCIAL

## 2025-04-24 VITALS
TEMPERATURE: 97.6 F | DIASTOLIC BLOOD PRESSURE: 74 MMHG | OXYGEN SATURATION: 99 % | WEIGHT: 171.1 LBS | HEART RATE: 59 BPM | SYSTOLIC BLOOD PRESSURE: 102 MMHG | HEIGHT: 64 IN | BODY MASS INDEX: 29.21 KG/M2

## 2025-04-24 DIAGNOSIS — R55 POSTURAL DIZZINESS WITH NEAR SYNCOPE: ICD-10-CM

## 2025-04-24 DIAGNOSIS — I95.1 ORTHOSTATIC HYPOTENSION: Primary | ICD-10-CM

## 2025-04-24 DIAGNOSIS — R26.2 DIFFICULTY WALKING: ICD-10-CM

## 2025-04-24 DIAGNOSIS — R55 SYNCOPE, UNSPECIFIED SYNCOPE TYPE: Primary | ICD-10-CM

## 2025-04-24 DIAGNOSIS — R00.1 SYMPTOMATIC BRADYCARDIA: ICD-10-CM

## 2025-04-24 DIAGNOSIS — R42 POSTURAL DIZZINESS WITH NEAR SYNCOPE: ICD-10-CM

## 2025-04-24 DIAGNOSIS — R09.89 DECREASED TISSUE PERFUSION: ICD-10-CM

## 2025-04-24 LAB
ALBUMIN SERPL-MCNC: 3.9 G/DL (ref 3.5–5.2)
ALBUMIN/GLOB SERPL: 1.3 G/DL
ALP SERPL-CCNC: 66 U/L (ref 39–117)
ALT SERPL W P-5'-P-CCNC: 13 U/L (ref 1–33)
AMPHET+METHAMPHET UR QL: NEGATIVE
AMPHETAMINES UR QL: NEGATIVE
ANION GAP SERPL CALCULATED.3IONS-SCNC: 11.9 MMOL/L (ref 5–15)
AST SERPL-CCNC: 14 U/L (ref 1–32)
BARBITURATES UR QL SCN: NEGATIVE
BASOPHILS # BLD AUTO: 0.05 10*3/MM3 (ref 0–0.2)
BASOPHILS NFR BLD AUTO: 0.5 % (ref 0–1.5)
BENZODIAZ UR QL SCN: NEGATIVE
BILIRUB SERPL-MCNC: 0.5 MG/DL (ref 0–1.2)
BILIRUB UR QL STRIP: NEGATIVE
BUN SERPL-MCNC: 11 MG/DL (ref 6–20)
BUN/CREAT SERPL: 15.1 (ref 7–25)
BUPRENORPHINE SERPL-MCNC: NEGATIVE NG/ML
CALCIUM SPEC-SCNC: 9.2 MG/DL (ref 8.6–10.5)
CANNABINOIDS SERPL QL: NEGATIVE
CHLORIDE SERPL-SCNC: 99 MMOL/L (ref 98–107)
CLARITY UR: CLEAR
CO2 SERPL-SCNC: 24.1 MMOL/L (ref 22–29)
COCAINE UR QL: NEGATIVE
COLOR UR: YELLOW
CREAT SERPL-MCNC: 0.73 MG/DL (ref 0.57–1)
DEPRECATED RDW RBC AUTO: 48.7 FL (ref 37–54)
EGFRCR SERPLBLD CKD-EPI 2021: 105.5 ML/MIN/1.73
EOSINOPHIL # BLD AUTO: 0.08 10*3/MM3 (ref 0–0.4)
EOSINOPHIL NFR BLD AUTO: 0.8 % (ref 0.3–6.2)
ERYTHROCYTE [DISTWIDTH] IN BLOOD BY AUTOMATED COUNT: 14.6 % (ref 12.3–15.4)
FENTANYL UR-MCNC: NEGATIVE NG/ML
GLOBULIN UR ELPH-MCNC: 3.1 GM/DL
GLUCOSE SERPL-MCNC: 113 MG/DL (ref 65–99)
GLUCOSE UR STRIP-MCNC: NEGATIVE MG/DL
HCT VFR BLD AUTO: 39.9 % (ref 34–46.6)
HGB BLD-MCNC: 12.5 G/DL (ref 12–15.9)
HGB UR QL STRIP.AUTO: NEGATIVE
HOLD SPECIMEN: NORMAL
HOLD SPECIMEN: NORMAL
IMM GRANULOCYTES # BLD AUTO: 0.04 10*3/MM3 (ref 0–0.05)
IMM GRANULOCYTES NFR BLD AUTO: 0.4 % (ref 0–0.5)
KETONES UR QL STRIP: NEGATIVE
LEUKOCYTE ESTERASE UR QL STRIP.AUTO: NEGATIVE
LYMPHOCYTES # BLD AUTO: 1.97 10*3/MM3 (ref 0.7–3.1)
LYMPHOCYTES NFR BLD AUTO: 19.4 % (ref 19.6–45.3)
MAGNESIUM SERPL-MCNC: 1.9 MG/DL (ref 1.6–2.6)
MCH RBC QN AUTO: 28.3 PG (ref 26.6–33)
MCHC RBC AUTO-ENTMCNC: 31.3 G/DL (ref 31.5–35.7)
MCV RBC AUTO: 90.5 FL (ref 79–97)
METHADONE UR QL SCN: NEGATIVE
MONOCYTES # BLD AUTO: 0.45 10*3/MM3 (ref 0.1–0.9)
MONOCYTES NFR BLD AUTO: 4.4 % (ref 5–12)
NEUTROPHILS NFR BLD AUTO: 7.59 10*3/MM3 (ref 1.7–7)
NEUTROPHILS NFR BLD AUTO: 74.5 % (ref 42.7–76)
NITRITE UR QL STRIP: NEGATIVE
NRBC BLD AUTO-RTO: 0 /100 WBC (ref 0–0.2)
OPIATES UR QL: POSITIVE
OXYCODONE UR QL SCN: NEGATIVE
PCP UR QL SCN: NEGATIVE
PH UR STRIP.AUTO: 6 [PH] (ref 5–8)
PLATELET # BLD AUTO: 479 10*3/MM3 (ref 140–450)
PMV BLD AUTO: 9.7 FL (ref 6–12)
POTASSIUM SERPL-SCNC: 4.1 MMOL/L (ref 3.5–5.2)
PROT SERPL-MCNC: 7 G/DL (ref 6–8.5)
PROT UR QL STRIP: NEGATIVE
RBC # BLD AUTO: 4.41 10*6/MM3 (ref 3.77–5.28)
SODIUM SERPL-SCNC: 135 MMOL/L (ref 136–145)
SP GR UR STRIP: 1.01 (ref 1–1.03)
TRICYCLICS UR QL SCN: NEGATIVE
TROPONIN T SERPL HS-MCNC: <6 NG/L
UROBILINOGEN UR QL STRIP: NORMAL
WBC NRBC COR # BLD AUTO: 10.18 10*3/MM3 (ref 3.4–10.8)
WHOLE BLOOD HOLD COAG: NORMAL
WHOLE BLOOD HOLD SPECIMEN: NORMAL

## 2025-04-24 PROCEDURE — 25810000003 SODIUM CHLORIDE 0.9 % SOLUTION: Performed by: FAMILY MEDICINE

## 2025-04-24 PROCEDURE — 82746 ASSAY OF FOLIC ACID SERUM: CPT | Performed by: PHYSICIAN ASSISTANT

## 2025-04-24 PROCEDURE — 81025 URINE PREGNANCY TEST: CPT | Performed by: PHYSICIAN ASSISTANT

## 2025-04-24 PROCEDURE — 36415 COLL VENOUS BLD VENIPUNCTURE: CPT

## 2025-04-24 PROCEDURE — 99285 EMERGENCY DEPT VISIT HI MDM: CPT

## 2025-04-24 PROCEDURE — 93005 ELECTROCARDIOGRAM TRACING: CPT

## 2025-04-24 PROCEDURE — G0378 HOSPITAL OBSERVATION PER HR: HCPCS

## 2025-04-24 PROCEDURE — 80307 DRUG TEST PRSMV CHEM ANLYZR: CPT

## 2025-04-24 PROCEDURE — 25810000003 SODIUM CHLORIDE 0.9 % SOLUTION: Performed by: EMERGENCY MEDICINE

## 2025-04-24 PROCEDURE — 99222 1ST HOSP IP/OBS MODERATE 55: CPT | Performed by: FAMILY MEDICINE

## 2025-04-24 PROCEDURE — 82607 VITAMIN B-12: CPT | Performed by: PHYSICIAN ASSISTANT

## 2025-04-24 PROCEDURE — 81003 URINALYSIS AUTO W/O SCOPE: CPT

## 2025-04-24 PROCEDURE — 93005 ELECTROCARDIOGRAM TRACING: CPT | Performed by: EMERGENCY MEDICINE

## 2025-04-24 PROCEDURE — 84484 ASSAY OF TROPONIN QUANT: CPT

## 2025-04-24 PROCEDURE — 80050 GENERAL HEALTH PANEL: CPT

## 2025-04-24 PROCEDURE — 96374 THER/PROPH/DIAG INJ IV PUSH: CPT

## 2025-04-24 PROCEDURE — 25010000002 KETOROLAC TROMETHAMINE PER 15 MG: Performed by: EMERGENCY MEDICINE

## 2025-04-24 PROCEDURE — 83735 ASSAY OF MAGNESIUM: CPT

## 2025-04-24 RX ORDER — POLYETHYLENE GLYCOL 3350 17 G/17G
17 POWDER, FOR SOLUTION ORAL DAILY PRN
Status: DISCONTINUED | OUTPATIENT
Start: 2025-04-24 | End: 2025-04-26 | Stop reason: HOSPADM

## 2025-04-24 RX ORDER — SODIUM CHLORIDE 0.9 % (FLUSH) 0.9 %
10 SYRINGE (ML) INJECTION AS NEEDED
Status: DISCONTINUED | OUTPATIENT
Start: 2025-04-24 | End: 2025-04-26 | Stop reason: HOSPADM

## 2025-04-24 RX ORDER — KETOROLAC TROMETHAMINE 15 MG/ML
15 INJECTION, SOLUTION INTRAMUSCULAR; INTRAVENOUS EVERY 6 HOURS PRN
Status: DISCONTINUED | OUTPATIENT
Start: 2025-04-24 | End: 2025-04-24

## 2025-04-24 RX ORDER — KETOROLAC TROMETHAMINE 15 MG/ML
15 INJECTION, SOLUTION INTRAMUSCULAR; INTRAVENOUS ONCE
Status: COMPLETED | OUTPATIENT
Start: 2025-04-24 | End: 2025-04-24

## 2025-04-24 RX ORDER — BISACODYL 5 MG/1
5 TABLET, DELAYED RELEASE ORAL DAILY PRN
Status: DISCONTINUED | OUTPATIENT
Start: 2025-04-24 | End: 2025-04-26 | Stop reason: HOSPADM

## 2025-04-24 RX ORDER — HYDROCODONE BITARTRATE AND ACETAMINOPHEN 10; 325 MG/1; MG/1
1 TABLET ORAL ONCE
Refills: 0 | Status: COMPLETED | OUTPATIENT
Start: 2025-04-24 | End: 2025-04-24

## 2025-04-24 RX ORDER — ONDANSETRON 2 MG/ML
4 INJECTION INTRAMUSCULAR; INTRAVENOUS EVERY 6 HOURS PRN
Status: DISCONTINUED | OUTPATIENT
Start: 2025-04-24 | End: 2025-04-26 | Stop reason: HOSPADM

## 2025-04-24 RX ORDER — SODIUM CHLORIDE 9 MG/ML
125 INJECTION, SOLUTION INTRAVENOUS CONTINUOUS
Status: ACTIVE | OUTPATIENT
Start: 2025-04-25 | End: 2025-04-25

## 2025-04-24 RX ORDER — IPRATROPIUM BROMIDE AND ALBUTEROL SULFATE 2.5; .5 MG/3ML; MG/3ML
3 SOLUTION RESPIRATORY (INHALATION) EVERY 4 HOURS PRN
Status: DISCONTINUED | OUTPATIENT
Start: 2025-04-24 | End: 2025-04-26 | Stop reason: HOSPADM

## 2025-04-24 RX ORDER — SODIUM CHLORIDE 9 MG/ML
40 INJECTION, SOLUTION INTRAVENOUS AS NEEDED
Status: DISCONTINUED | OUTPATIENT
Start: 2025-04-24 | End: 2025-04-26 | Stop reason: HOSPADM

## 2025-04-24 RX ORDER — SODIUM CHLORIDE 0.9 % (FLUSH) 0.9 %
10 SYRINGE (ML) INJECTION EVERY 12 HOURS SCHEDULED
Status: DISCONTINUED | OUTPATIENT
Start: 2025-04-25 | End: 2025-04-26 | Stop reason: HOSPADM

## 2025-04-24 RX ORDER — BISACODYL 10 MG
10 SUPPOSITORY, RECTAL RECTAL DAILY PRN
Status: DISCONTINUED | OUTPATIENT
Start: 2025-04-24 | End: 2025-04-26 | Stop reason: HOSPADM

## 2025-04-24 RX ORDER — AMOXICILLIN 250 MG
2 CAPSULE ORAL 2 TIMES DAILY PRN
Status: DISCONTINUED | OUTPATIENT
Start: 2025-04-24 | End: 2025-04-26 | Stop reason: HOSPADM

## 2025-04-24 RX ADMIN — KETOROLAC TROMETHAMINE 15 MG: 15 INJECTION, SOLUTION INTRAMUSCULAR; INTRAVENOUS at 20:46

## 2025-04-24 RX ADMIN — HYDROCODONE BITARTRATE AND ACETAMINOPHEN 1 TABLET: 10; 325 TABLET ORAL at 22:18

## 2025-04-24 RX ADMIN — Medication 10 ML: at 23:45

## 2025-04-24 RX ADMIN — SODIUM CHLORIDE 1000 ML: 9 INJECTION, SOLUTION INTRAVENOUS at 20:45

## 2025-04-24 RX ADMIN — SODIUM CHLORIDE 1000 ML: 9 INJECTION, SOLUTION INTRAVENOUS at 20:46

## 2025-04-24 RX ADMIN — SODIUM CHLORIDE 125 ML/HR: 9 INJECTION, SOLUTION INTRAVENOUS at 23:46

## 2025-04-24 NOTE — PROGRESS NOTES
Chief Complaint  Chief Complaint   Patient presents with    Hypotension     Pt's blood pressure has been running low    Syncope     Pt has had episodes of passing out when standing up.       Subjective      Ebony Hamilton presents to [facility]  History of Present Illness  The patient presents for evaluation of dizziness and syncope.    Persistent dizziness is attributed to her medication regimen. However, syncopal episodes have started occurring over the past few weeks. These episodes occur when transitioning from a seated or supine position to standing, and even during ambulation. An incident was reported where she felt hot, dizzy, and nearly fainted while cooking breakfast, requiring assistance from her daughters. A similar episode occurred at a [employer] store, where profuse sweating and near syncope were experienced. Blood pressure typically registers in the low 100s. Significant weight loss and decreased appetite are reported, although steak and other proteins continue to be consumed. Daily caffeine intake is limited to one soda. No chest pain, palpitations, irregular heartbeats, or skipped beats are reported. No bleeding from surgical incisions, vaginally, rectally, or hematemesis is reported. A history of borderline tachycardia is noted, with heart rates usually in the 90s. Foot swelling and discoloration occur upon standing or sitting for extended periods. No abdominal pain or internal bleeding is reported, but usual stomach pain is present. Fatigue and dizziness are reported. No shortness of breath or edema is experienced. The medication regimen includes Klonopin as needed, not daily, and Norco four times daily, a dosage that has remained consistent for 13 years. Reglan was discontinued due to a dystonic reaction. Following syncopal episodes, all CNS depressants were discontinued, and pain medication was halved to rule out medication overload.    PAST SURGICAL  HISTORY:  She underwent a hysterectomy and is currently amenorrheic. Multiple surgeries and infections may have contributed to suspected anemia.       Objective     Medical History:  Past Medical History:   Diagnosis Date    Allergic     Anxiety     Atrial fibrillation     RELEASED BY CARDIOLOGIST/ELECTROPHYSIST, NO CURRENT MEDS    Chronic pain disorder     Depression     Endometriosis     Headache     Hemorrhoids     Injury of shoulder, right 2009    CHRONIC PAIN    Panic disorder     Rectal bleeding 2010    S/P laparoscopic appendectomy 03/09/2025    S/P laparoscopic cholecystectomy 02/17/2025     Past Surgical History:   Procedure Laterality Date    APPENDECTOMY N/A 3/9/2025    Procedure: APPENDECTOMY LAPAROSCOPIC;  Surgeon: Mingo Cannon MD;  Location: [facility] MAIN OR;  Service: General;  Laterality: N/A;    CERVICAL ARTHRODESIS  01/14/2015    Donaldo Albarran    CERVICAL FUSION      C4-7 FUSION, FULL ROM    CHOLECYSTECTOMY N/A 2/17/2025    Procedure: CHOLECYSTECTOMY LAPAROSCOPIC plain language: removal of gallbladder thru small incisions using long instruments and a camera;  Surgeon: Josué Castano MD;  Location: [facility] MAIN OR;  Service: General;  Laterality: N/A;    COLONOSCOPY N/A 05/31/2022    Procedure: COLONOSCOPY;  Surgeon: Shabbir Baird MD;  Location: [facility] ENDOSCOPY;  Service: General;  Laterality: N/A;  HEMORRHOIDS    ENDOSCOPY N/A 2/17/2025    Procedure: ESOPHAGOGASTRODUODENOSCOPY WITH BIOPSIES;  Surgeon: Sanya Reyes MD;  Location: [facility] ENDOSCOPY;  Service: Gastroenterology;  Laterality: N/A;  GASTRITIS, SMALL HIATAL HERNIA    ENDOSCOPY N/A 3/6/2025    Procedure: ESOPHAGOGASTRODUODENOSCOPY with pancreatic stent removal;  Surgeon: Ha Thompson MD;  Location: [facility] ENDOSCOPY;  Service: Gastroenterology;  Laterality: N/A;  pancreatic stent removal, hiatal hernia    ERCP N/A 2/24/2025    Procedure: ENDOSCOPIC RETROGRADE CHOLANGIOPANCREATOGRAPHY with bile duct stent  placement and pancreatic duct stent placement;  Surgeon: Ha Thompson MD;  Location: [facility] ENDOSCOPY;  Service: Gastroenterology;  Laterality: N/A;  bile duct leeak    ERCP N/A 2025    Procedure: ENDOSCOPIC RETROGRADE CHOLANGIOPANCREATOGRAPHY WITH STENT REMOVAL;  Surgeon: Ha Thompson MD;  Location: [facility] ENDOSCOPY;  Service: Gastroenterology;  Laterality: N/A;  STENT REMOVAL    EXPLORATORY LAPAROTOMY      HEMORRHOIDECTOMY N/A 2022    Procedure: HEMORRHOID BANDING;  Surgeon: Shabbir Baird MD;  Location: [facility] ENDOSCOPY;  Service: General;  Laterality: N/A;  BANDS X 2    HEMORRHOIDECTOMY N/A 2024    Procedure: HEMORRHOIDECTOMY;  Surgeon: Shabbir Baird MD;  Location: [facility] OR [facility];  Service: General;  Laterality: N/A;    HYSTERECTOMY      SHOULDER ARTHROSCOPY Right     SHOULDER SURGERY Left     ARTHROSCOPY LABRAL TEAR X2    TUBAL ABDOMINAL LIGATION        Social History     Tobacco Use    Smoking status: Former     Current packs/day: 0.00     Average packs/day: 0.3 packs/day for 23.3 years (6.0 ttl pk-yrs)     Types: Cigarettes     Start date: 1999     Quit date: 2019     Years since quittin.3    Smokeless tobacco: Never   Vaping Use    Vaping status: Never Used   Substance Use Topics    Alcohol use: Yes     Comment: rarely    Drug use: Never     Family History   Problem Relation Age of Onset    Depression Mother     Bipolar disorder Mother     Anxiety disorder Mother     Cancer Mother     Heart disease Mother     Hypertension Mother     Anxiety disorder Father     Hypertension Father     Dementia Paternal Uncle     Dementia Paternal Grandmother     Heart disease Other     Colon cancer Neg Hx     Malig Hyperthermia Neg Hx        Medications:  Prior to Admission medications    Medication Sig Start Date End Date Taking? Authorizing Provider   albuterol sulfate  (90 Base) MCG/ACT inhaler Inhale 2 puffs Every 6 (Six) Hours As Needed for Wheezing.  4/15/25  Yes Karen Stover APRN   busPIRone (BUSPAR) 15 MG tablet Take 1 tablet by mouth 3 (Three) Times a Day As Needed (Anxiety). 4/15/25  Yes Karen Stover APRN   CALCIUM PO Take 1 tablet by mouth Daily.   Yes Avani Vela MD   clonazePAM (KlonoPIN) 0.5 MG tablet Take 1 tablet by mouth 2 (Two) Times a Day As Needed for Anxiety. 3/25/25  Yes Karen Stover APRN   diphenhydrAMINE-zinc acetate 2-0.1 % cream Apply 1 Application topically to the appropriate area as directed 3 (Three) Times a Day As Needed for Itching or Rash. 4/6/25  Yes Naila Alejandre MD   DULoxetine (CYMBALTA) 30 MG capsule Take 1 capsule by mouth Daily. TAKES 30mg AND 60mg TOGETHER FOR A TOTAL OF 90mg   Yes Avani Vela MD   DULoxetine (CYMBALTA) 60 MG capsule Take 1 capsule by mouth Daily. TAKES 30mg AND 60mg TOGETHER FOR A TOTAL OF 90mg   Yes Avani Vela MD   HYDROcodone-acetaminophen (NORCO)  MG per tablet Take 1.5 tablets by mouth Every 4 (Four) Hours As Needed for Moderate Pain or Severe Pain. 4/6/25  Yes Naila Alejandre MD   ibuprofen (ADVIL,MOTRIN) 800 MG tablet Take 1 tablet by mouth Every 8 (Eight) Hours.   Yes Avani Vela MD   metoclopramide (REGLAN) 10 MG tablet Take 1 tablet by mouth 4 (Four) Times a Day Before Meals & at Bedtime. 4/6/25  Yes Naila Alejandre MD   montelukast (Singulair) 10 MG tablet Take 1 tablet by mouth Every Night. 4/16/25  Yes Karen Stover APRN   naloxone (NARCAN) 4 MG/0.1ML nasal spray Call 911. Don't prime. [place] in 1 nostril for overdose. Repeat in 2-3 minutes in other nostril if no or minimal breathing/responsiveness.  Patient taking differently: Administer 1 spray into the nostril(s) as directed by provider As Needed. Call 911. Don't prime. [place] in 1 nostril for overdose. Repeat in 2-3 minutes in other nostril if no or minimal breathing/responsiveness. 3/11/25  Yes India Chris APRN   ondansetron  (ZOFRAN) 4 MG tablet Take 1 tablet by mouth Every 8 (Eight) Hours As Needed for Nausea or Vomiting. 4/6/25  Yes Naila Alejandre MD   pantoprazole (PROTONIX) 40 MG EC tablet Take 1 tablet by mouth Daily. 11/11/24  Yes Karen Stover APRN   polyethylene glycol (MIRALAX) 17 g packet Mix 17gm (contents of 1 packet) in eight ounces of water or other beverage to drink once daily 3/11/25  Yes India Chris APRN   saccharomyces boulardii (Florastor) 250 MG capsule Take 1 capsule by mouth 2 (Two) Times a Day. 3/25/25  Yes Karen Stover APRN   sennosides-docusate (senna-docusate sodium) 8.6-50 MG per tablet Take 1 tablet by mouth Daily. 6/27/24  Yes Karen Stover APRN   simethicone (MYLICON) 80 MG chewable tablet Chew 1.5 tablets 4 (Four) Times a Day Before Meals & at Bedtime. 4/6/25  Yes Naila Alejandre MD   tiZANidine (ZANAFLEX) 4 MG tablet Take 1-2 tablets by mouth Every 6 (Six) Hours As Needed. 5/23/24  Yes ProviderAvani MD   traZODone (DESYREL) 50 MG tablet Take 0.5 to 2 tab PO QHS PRN sleep 4/16/25  Yes Karne Stover APRN   triamcinolone (KENALOG) 0.025 % cream Apply 1 Application topically to the appropriate area as directed Every 12 (Twelve) Hours. 4/6/25  Yes Naila Alejandre MD   vitamin C (ASCORBIC ACID) 500 MG tablet Take 1 tablet by mouth Daily. LAST DOSE 1/28/24   Yes ProviderAvani MD        Allergies:   Escitalopram, Sulfa antibiotics, Baclofen, Ciprofloxacin, Codeine, Gold-containing drug products, Latex, Lovenox [enoxaparin sodium], Nickel, and Tegaderm ag mesh [silver]    Health Maintenance Due   Topic Date Due    TDAP/TD VACCINES (2 - Td or Tdap) 01/01/2022    HEPATITIS C SCREENING  Never done    MAMMOGRAM  Never done    COVID-19 Vaccine (1 - 2024-25 season) Never done         Vital Signs:   /74 (BP Location: Right arm, Patient Position: Sitting, Cuff Size: Adult)   Pulse 59   Temp 97.6 °F (36.4 °C) (Oral)   Ht  "161.3 cm (63.5\")   Wt 77.6 kg (171 lb 1.6 oz)   SpO2 99%   BMI 29.83 kg/m²     Wt Readings from Last 3 Encounters:   04/24/25 77.6 kg (171 lb)   04/24/25 77.6 kg (171 lb 1.6 oz)   04/15/25 77.8 kg (171 lb 9.6 oz)     BP Readings from Last 3 Encounters:   04/24/25 104/67   04/24/25 102/74   04/15/25 94/60       Physical Exam  Vitals reviewed.   Constitutional:       Appearance: Normal appearance. She is well-developed. She is ill-appearing.   HENT:      Head: Normocephalic and atraumatic.   Eyes:      Conjunctiva/sclera: Conjunctivae normal.      Pupils: Pupils are equal, round, and reactive to light.   Cardiovascular:      Rate and Rhythm: Regular rhythm. Bradycardia present.      Heart sounds: No murmur heard.     No friction rub. No gallop.      Comments: Decreased blood flow to lower extremities  Pulmonary:      Effort: Pulmonary effort is normal.      Breath sounds: Normal breath sounds. No wheezing or rhonchi.   Abdominal:      General: Bowel sounds are normal. There is no distension.      Palpations: Abdomen is soft.      Tenderness: There is no abdominal tenderness. There is no right CVA tenderness, left CVA tenderness, guarding or rebound.   Musculoskeletal:      Right lower leg: No edema.      Left lower leg: No edema.   Skin:     General: Skin is warm and dry.   Neurological:      Mental Status: She is alert and oriented to person, place, and time.      Cranial Nerves: No cranial nerve deficit.   Psychiatric:         Mood and Affect: Mood and affect normal.         Behavior: Behavior normal.         Thought Content: Thought content normal.         Judgment: Judgment normal.      Comments: lethargic       Physical Exam  General: Pale appearance.  Cardiovascular: Bradycardia with a heart rate of 47.  Extremities: Mild swelling in feet, purple discoloration noted.      Result Review :    The following data was reviewed by MILDRED Irby on 04/24/25 at 16:43 EDT:    Common labs          " 3/31/2025    04:12 4/5/2025    08:53 4/24/2025    16:18   Common Labs   Glucose 113  101     BUN 11  7     Creatinine 0.71  0.56     Sodium 140  139     Potassium 3.9  4.3     Chloride 105  102     Calcium 9.3  9.4     WBC 9.27   10.18    Hemoglobin 11.5   12.5    Hematocrit 36.4   39.9    Platelets 487   479        XR Abdomen KUB  Result Date: 4/3/2025  Impression: Findings compatible with constipation. Electronically Signed: Martha Barker MD  4/3/2025 11:16 AM EDT  Workstation ID: [id number]    CT Angiogram Chest Pulmonary Embolism  Result Date: 3/30/2025  Impression: No evidence of pulmonary embolus. No acute intrathoracic findings. Electronically Signed: Ankit Barker MD  3/30/2025 2:24 PM EDT  Workstation ID: [id number]    CT Abdomen Pelvis With Contrast  Result Date: 3/30/2025  1.Residual postoperative changes are noted status post recent cholecystectomy and appendectomy. 2.No evidence for abnormal fluid collection. No significant hemorrhage or hematoma. 3.No evidence for additional acute abnormality throughout the abdomen or pelvis. Electronically Signed: Irwin Jose MD  3/30/2025 2:23 PM EDT  Workstation ID: [id number]    XR Chest 1 View  Result Date: 3/30/2025  Impression: No acute cardiopulmonary abnormality. Electronically Signed: Alin Quiroz  3/30/2025 12:30 PM EDT  Workstation ID: [id number]    CT Abdomen Pelvis With Contrast  Result Date: 3/24/2025  Impression: 1.Moderate colonic fluid may indicate acute diarrheal illness. 2.Postsurgical changes of cholecystectomy, appendectomy, and hysterectomy. Small amount of fluid within the gallbladder fossa. Indwelling CBD stent appears adequately positioned. 3.Additional findings as detailed above. Electronically Signed: Misha Rubin MD  3/24/2025 9:47 AM EDT  Workstation ID: [id number]    CT Angiogram Chest Pulmonary Embolism  Result Date: 3/20/2025  1.No pulmonary embolism or aortic dissection identified. 2.Elevation of the right hemidiaphragm. Opacities  in the right lower lobe have the appearance of atelectasis rather than pneumonia. 3.Changes of relatively recent cholecystectomy with some persistent fluid/stranding partially visible in the gallbladder fossa. A biliary stent is present with no evidence for biliary obstruction. Electronically Signed: Yayo Ford MD  3/20/2025 2:54 AM EDT  Workstation ID: [id number]    XR Chest 1 View  Result Date: 3/20/2025  Mild elevation of the right hemidiaphragm with right basilar atelectasis. Electronically Signed: Yayo Ford MD  3/20/2025 1:38 AM EDT  Workstation ID: [id number]    CT Abdomen Pelvis With Contrast  Result Date: 3/9/2025  1.  Acute appendicitis is suggested. Please correlate clinically. 2.  Interval decrease in the ascites. 3.  There is a persistent gallbladder fossa fluid collection with an estimated total volume of 20.8 mL. It may represent a urinoma. An infected fluid collection, such as an abscess cannot be excluded. 4.  An internal biliary stent is in the common bile duct (CBD). There is pneumobilia. 5.  The inflammatory change seen within the right upper quadrant has decreased considerably since the 2/22/2025 CT study. 6.  Interval decrease in the perihepatic and right subphrenic fluid collection is noted since 2/24/2025. The percutaneous drainage catheter remains in place and is located laterally. 7.  Please see above comments for further detail.   Portions of this note were completed with a voice recognition program.  3/9/2025 12:52 AM by Romario Romero MD on Workstation: [id number]      CT Guided Percutaneous Drain Peritoneal  Result Date: 2/24/2025  Impression: Technically successful placement of an 8 [place]ian drain into the hepatic subcapsular fluid collection which yielded 80 cc bilious appearing fluid. A sample was sent for culture. Electronically Signed: Jl Hendrix MD  2/24/2025 12:15 PM EST  Workstation ID: [id number]    CT Abdomen Pelvis With Contrast  Result Date: 2/22/2025  Impression: CT scan  of the abdomen and pelvis with IV contrast demonstrating findings consistent with recent cholecystectomy. Findings concerning for bile leak, as above. Electronically Signed: Lisandro Moreira MD  2/22/2025 4:06 PM EST  Workstation ID: [id number]    XR Abdomen Flat & Upright  Result Date: 2/17/2025  Nonobstructive bowel gas pattern. No free air. Electronically Signed: Yayo Ford MD  2/17/2025 6:51 AM EST  Workstation ID: [id number]    US Gallbladder  Result Date: 2/17/2025  1. Small gallstone in the gallbladder neck with no biliary obstruction or gallbladder wall thickening. There is a positive sonographic Guzman's sign of questionable significance. If there is continued concern for acute cholecystitis, HIDA scan may be beneficial. 2. Otherwise negative. Electronically Signed: Yayo Ford MD  2/17/2025 12:08 AM EST  Workstation ID: [id number]    XR Femur 2 View Right  Result Date: 1/11/2025  Impression: No acute osseous abnormality. Electronically Signed: Reji Martinez MD  1/11/2025 5:10 PM EST  Workstation ID: [id number]      Results  Labs   - Hemoglobin: 03/30/2025, 12.8 g/dL   - Hemoglobin: 03/31/2025, 11.5 g/dL          ECG 12 Lead    Date/Time: 4/24/2025 2:49 PM  Performed by: Karen Stover APRN    Authorized by: Karen Stover APRN  Comparison: not compared with previous ECG   Rhythm: sinus bradycardia  Rate: bradycardic  BPM: 47  QRS axis: normal    Clinical impression: abnormal EKG              Assessment and Plan    Diagnoses and all orders for this visit:    1. Syncope, unspecified syncope type (Primary)    2. Postural dizziness with near syncope    3. Symptomatic bradycardia    4. Decreased tissue perfusion    Other orders  -     ECG 12 Lead       Assessment & Plan  1. Symptomatic bradycardia.  - Presents with a heart rate of 47 bpm, symptomatic with dizziness and syncopal episodes.  - Physical exam reveals pallor and decreased blood flow to extremities.  - EKG ordered to evaluate cardiac  function.  - Hospitalization recommended for further evaluation and management.    2. Hypotension.  - Blood pressure is lower than usual, contributing to dizziness and fainting spells.  - Physical exam findings include pallor and decreased blood flow to extremities.  - Advised to go to the hospital for further evaluation and management.  - Hospitalization recommended for immediate evaluation and management.    3. Suspected hypovolemic shock.  - Exhibits signs of hypovolemic shock, including pallor and decreased blood flow to extremities.  - Physical exam findings include pallor and decreased blood flow to extremities.  - Immediate hospitalization recommended for further evaluation and management.  - Hospitalization recommended for immediate evaluation and management.    4. Suspected anemia.  - Hemoglobin levels have fluctuated, with a drop from 12.8 to 11.5 within a day.  - Physical exam reveals pallor and fatigue.  - Hospitalization recommended for immediate lab work and potential transfusion if necessary.  - Hospitalization recommended for immediate evaluation and management.                    Follow Up   No follow-ups on file.  Patient was given instructions and counseling regarding her condition or for health maintenance advice. Please see specific information pulled into the AVS if appropriate.     Please note that portions of this note were completed with a voice recognition program.    Patient or patient representative verbalized consent for the use of Ambient Listening during the visit with  MILDRED Irby for chart documentation. 4/24/2025  15:41 EDT

## 2025-04-24 NOTE — ED PROVIDER NOTES
Time: 5:11 PM EDT  Date of encounter:  4/24/2025  Independent Historian/Clinical History and Information was obtained by:   Patient    History is limited by: N/A    Chief Complaint   Patient presents with    Syncope    Dizziness         History of Present Illness:  Patient is a 44 y.o. year old female who presents to the emergency department for evaluation of dizziness and syncope x 2 weeks.   at bedside reports that he got orthostatics done with her at home with her standing being 60/40.  Reports nausea without vomiting or diarrhea.  Patient has a history significant for SVT possible afib.  Denies anticoagulation.  Reports 4-5 episodes of syncope.  Denies seizure history.  Patient reports she started taking Reglan for gastroparesis in the last 60 days.  Reports that she only takes her trazodone, hydrocodone, and Klonopin as needed and has since stopped using those medications since her syncopal events.  Denies fevers.  Denies changes in vision or hearing.  Reports headache at this time    Patient Care Team  Primary Care Provider: Karen Stover APRN    Past Medical History:     Allergies   Allergen Reactions    Escitalopram Nausea Only and Rash     Nausea, sweating, rash     Sulfa Antibiotics Anaphylaxis    Baclofen GI Intolerance, Hives and Nausea And Vomiting    Ciprofloxacin Hallucinations    Codeine Hives    Gold-Containing Drug Products Rash    Latex Rash    Lovenox [Enoxaparin Sodium] Rash    Nickel Hives    Tegaderm Ag Mesh [Silver] Hives     Past Medical History:   Diagnosis Date    Allergic     Anxiety     Atrial fibrillation     RELEASED BY CARDIOLOGIST/ELECTROPHYSIST, NO CURRENT MEDS    Chronic pain disorder     Depression     Endometriosis     Headache     Hemorrhoids     Injury of shoulder, right 2009    CHRONIC PAIN    Panic disorder     Rectal bleeding 2010    S/P laparoscopic appendectomy 03/09/2025    S/P laparoscopic cholecystectomy 02/17/2025     Past Surgical History:   Procedure  Laterality Date    APPENDECTOMY N/A 3/9/2025    Procedure: APPENDECTOMY LAPAROSCOPIC;  Surgeon: Mingo Cannon MD;  Location: [facility] MAIN OR;  Service: General;  Laterality: N/A;    CERVICAL ARTHRODESIS  01/14/2015    Donaldo Albarran    CERVICAL FUSION      C4-7 FUSION, FULL ROM    CHOLECYSTECTOMY N/A 2/17/2025    Procedure: CHOLECYSTECTOMY LAPAROSCOPIC plain language: removal of gallbladder thru small incisions using long instruments and a camera;  Surgeon: Josué Castano MD;  Location: [facility] MAIN OR;  Service: General;  Laterality: N/A;    COLONOSCOPY N/A 05/31/2022    Procedure: COLONOSCOPY;  Surgeon: Shabbir Baird MD;  Location: [facility] ENDOSCOPY;  Service: General;  Laterality: N/A;  HEMORRHOIDS    ENDOSCOPY N/A 2/17/2025    Procedure: ESOPHAGOGASTRODUODENOSCOPY WITH BIOPSIES;  Surgeon: Sanya Reyes MD;  Location: [facility] ENDOSCOPY;  Service: Gastroenterology;  Laterality: N/A;  GASTRITIS, SMALL HIATAL HERNIA    ENDOSCOPY N/A 3/6/2025    Procedure: ESOPHAGOGASTRODUODENOSCOPY with pancreatic stent removal;  Surgeon: Ha Thompson MD;  Location: [facility] ENDOSCOPY;  Service: Gastroenterology;  Laterality: N/A;  pancreatic stent removal, hiatal hernia    ERCP N/A 2/24/2025    Procedure: ENDOSCOPIC RETROGRADE CHOLANGIOPANCREATOGRAPHY with bile duct stent placement and pancreatic duct stent placement;  Surgeon: Ha Thompson MD;  Location: [facility] ENDOSCOPY;  Service: Gastroenterology;  Laterality: N/A;  bile duct leeak    ERCP N/A 4/8/2025    Procedure: ENDOSCOPIC RETROGRADE CHOLANGIOPANCREATOGRAPHY WITH STENT REMOVAL;  Surgeon: Ha Thompson MD;  Location: [facility] ENDOSCOPY;  Service: Gastroenterology;  Laterality: N/A;  STENT REMOVAL    EXPLORATORY LAPAROTOMY      HEMORRHOIDECTOMY N/A 05/31/2022    Procedure: HEMORRHOID BANDING;  Surgeon: Shabbir Baird MD;  Location: [facility] ENDOSCOPY;  Service: General;  Laterality: N/A;  BANDS X 2    HEMORRHOIDECTOMY N/A 1/31/2024     Procedure: HEMORRHOIDECTOMY;  Surgeon: Shabbir Baird MD;  Location: [facility] OR [facility];  Service: General;  Laterality: N/A;    HYSTERECTOMY      SHOULDER ARTHROSCOPY Right     SHOULDER SURGERY Left     ARTHROSCOPY LABRAL TEAR X2    TUBAL ABDOMINAL LIGATION       Family History   Problem Relation Age of Onset    Depression Mother     Bipolar disorder Mother     Anxiety disorder Mother     Cancer Mother     Heart disease Mother     Hypertension Mother     Anxiety disorder Father     Hypertension Father     Dementia Paternal Uncle     Dementia Paternal Grandmother     Heart disease Other     Colon cancer Neg Hx     Malig Hyperthermia Neg Hx        Home Medications:  Prior to Admission medications    Medication Sig Start Date End Date Taking? Authorizing Provider   albuterol sulfate  (90 Base) MCG/ACT inhaler Inhale 2 puffs Every 6 (Six) Hours As Needed for Wheezing. 4/15/25   Karen Stover APRN   busPIRone (BUSPAR) 15 MG tablet Take 1 tablet by mouth 3 (Three) Times a Day As Needed (Anxiety). 4/15/25   Karen Stover APRN   CALCIUM PO Take 1 tablet by mouth Daily.    Avani Vela MD   clonazePAM (KlonoPIN) 0.5 MG tablet Take 1 tablet by mouth 2 (Two) Times a Day As Needed for Anxiety. 3/25/25   Karen Stover APRN   diphenhydrAMINE-zinc acetate 2-0.1 % cream Apply 1 Application topically to the appropriate area as directed 3 (Three) Times a Day As Needed for Itching or Rash. 4/6/25   Naila Alejandre MD   DULoxetine (CYMBALTA) 30 MG capsule Take 1 capsule by mouth Daily. TAKES 30mg AND 60mg TOGETHER FOR A TOTAL OF 90mg    Avani Vela MD   DULoxetine (CYMBALTA) 60 MG capsule Take 1 capsule by mouth Daily. TAKES 30mg AND 60mg TOGETHER FOR A TOTAL OF 90mg    Avani Vela MD   HYDROcodone-acetaminophen (NORCO)  MG per tablet Take 1.5 tablets by mouth Every 4 (Four) Hours As Needed for Moderate Pain or Severe Pain. 4/6/25   Vasser-Smith,  MD Naila   ibuprofen (ADVIL,MOTRIN) 800 MG tablet Take 1 tablet by mouth Every 8 (Eight) Hours.    ProviderAvani MD   montelukast (Singulair) 10 MG tablet Take 1 tablet by mouth Every Night. 4/16/25   Karen Stover APRN   naloxone (NARCAN) 4 MG/0.1ML nasal spray Call 911. Don't prime. Marydel in 1 nostril for overdose. Repeat in 2-3 minutes in other nostril if no or minimal breathing/responsiveness.  Patient taking differently: Administer 1 spray into the nostril(s) as directed by provider As Needed. Call 911. Don't prime. Marydel in 1 nostril for overdose. Repeat in 2-3 minutes in other nostril if no or minimal breathing/responsiveness. 3/11/25   India Chris APRN   ondansetron (ZOFRAN) 4 MG tablet Take 1 tablet by mouth Every 8 (Eight) Hours As Needed for Nausea or Vomiting. 4/6/25   Naila Alejandre MD   pantoprazole (PROTONIX) 40 MG EC tablet Take 1 tablet by mouth Daily. 11/11/24   Karen Stover APRN   polyethylene glycol (MIRALAX) 17 g packet Mix 17gm (contents of 1 packet) in eight ounces of water or other beverage to drink once daily 3/11/25   India Chris APRN   saccharomyces boulardii (Florastor) 250 MG capsule Take 1 capsule by mouth 2 (Two) Times a Day. 3/25/25   Karen Stover APRN   sennosides-docusate (senna-docusate sodium) 8.6-50 MG per tablet Take 1 tablet by mouth Daily. 6/27/24   Karen Stover APRN   simethicone (MYLICON) 80 MG chewable tablet Chew 1.5 tablets 4 (Four) Times a Day Before Meals & at Bedtime. 4/6/25   Naila Alejandre MD   tiZANidine (ZANAFLEX) 4 MG tablet Take 1-2 tablets by mouth Every 6 (Six) Hours As Needed. 5/23/24   ProviderAvani MD   traZODone (DESYREL) 50 MG tablet Take 0.5 to 2 tab PO QHS PRN sleep 4/16/25   Karen Stover APRN   triamcinolone (KENALOG) 0.025 % cream Apply 1 Application topically to the appropriate area as directed Every 12 (Twelve) Hours. 4/6/25   Pili,  "MD Naila   vitamin C (ASCORBIC ACID) 500 MG tablet Take 1 tablet by mouth Daily. LAST DOSE 24    Provider, MD Avani   metoclopramide (REGLAN) 10 MG tablet Take 1 tablet by mouth 4 (Four) Times a Day Before Meals & at Bedtime. 25  Naila Alejandre MD        Social History:   Social History     Tobacco Use    Smoking status: Former     Current packs/day: 0.00     Average packs/day: 0.3 packs/day for 23.3 years (6.0 ttl pk-yrs)     Types: Cigarettes     Start date: 1999     Quit date: 2019     Years since quittin.3    Smokeless tobacco: Never   Vaping Use    Vaping status: Never Used   Substance Use Topics    Alcohol use: Yes     Comment: rarely    Drug use: Never         Review of Systems:  Review of Systems   Constitutional:  Negative for chills and fever.   HENT:  Negative for congestion, rhinorrhea and sore throat.    Eyes:  Negative for pain and visual disturbance.   Respiratory:  Negative for apnea, cough, chest tightness and shortness of breath.    Cardiovascular:  Negative for chest pain and palpitations.   Gastrointestinal:  Negative for abdominal pain, diarrhea, nausea and vomiting.   Genitourinary:  Negative for difficulty urinating and dysuria.   Musculoskeletal:  Negative for joint swelling and myalgias.   Skin:  Negative for color change.   Neurological:  Positive for syncope. Negative for seizures and headaches.   Psychiatric/Behavioral: Negative.     All other systems reviewed and are negative.       Physical Exam:  /85   Pulse 84   Temp 98.1 °F (36.7 °C) (Oral)   Resp 16   Ht 161.3 cm (63.5\")   Wt 77.6 kg (171 lb)   SpO2 97%   BMI 29.82 kg/m²         Physical Exam  Vitals and nursing note reviewed.   Constitutional:       General: She is not in acute distress.     Appearance: Normal appearance. She is not toxic-appearing.   HENT:      Head: Normocephalic and atraumatic.      Jaw: There is normal jaw occlusion.      Mouth/Throat:      Mouth: " Mucous membranes are moist.   Eyes:      General: Lids are normal.      Extraocular Movements: Extraocular movements intact.      Conjunctiva/sclera: Conjunctivae normal.      Pupils: Pupils are equal, round, and reactive to light.   Cardiovascular:      Rate and Rhythm: Normal rate and regular rhythm.      Pulses: Normal pulses.      Heart sounds: Normal heart sounds.   Pulmonary:      Effort: Pulmonary effort is normal. No respiratory distress.      Breath sounds: Normal breath sounds. No wheezing or rhonchi.   Abdominal:      General: Abdomen is flat. There is no distension.      Palpations: Abdomen is soft.      Tenderness: There is no abdominal tenderness. There is no guarding or rebound.   Musculoskeletal:         General: Normal range of motion.      Cervical back: Normal range of motion and neck supple.      Right lower leg: No edema.      Left lower leg: No edema.   Skin:     General: Skin is warm and dry.   Neurological:      General: No focal deficit present.      Mental Status: She is alert and oriented to person, place, and time. Mental status is at baseline.   Psychiatric:         Mood and Affect: Mood normal.         Behavior: Behavior normal.                      Procedures:  Procedures      Medical Decision Making:      Comorbidities that affect care:    Recent gallbladder surgery    External Notes reviewed:    Hospital Discharge Summary: Patient was last admitted for intractable nausea and vomiting.      The following orders were placed and all results were independently analyzed by me:  Orders Placed This Encounter   Procedures    [place] Draw    Comprehensive Metabolic Panel    Magnesium    High Sensitivity Troponin T    CBC Auto Differential    Urine Drug Screen - Urine, Clean Catch    Urinalysis With Culture If Indicated -    Fentanyl, Urine - Urine, Clean Catch    Diet: Regular/House; Fluid Consistency: Thin (IDDSI 0)    Undress & Gown    Continuous Pulse Oximetry    Vital Signs    Orthostatic  Blood Pressure    Orthostatic Vitals (Blood Pressure & Heart Rate)    Code Status and Medical Interventions: CPR (Attempt to Resuscitate); Full Support    Inpatient Hospitalist Consult    Oxygen Therapy- Nasal Cannula; Titrate 1-6 LPM Per SpO2; 90 - 95%    POC Glucose Once    ECG 12 Lead Syncope    Insert Peripheral IV    Initiate Observation Status    CBC & Differential    Green Top (Gel)    Lavender Top    Gold Top - SST    Light Blue Top       Medications Given in the Emergency Department:  Medications   sodium chloride 0.9 % flush 10 mL (has no administration in time range)   sodium chloride 0.9 % bolus 1,000 mL (1,000 mL Intravenous New Bag 4/24/25 2046)   sodium chloride 0.9 % bolus 1,000 mL (1,000 mL Intravenous New Bag 4/24/25 2045)   ketorolac (TORADOL) injection 15 mg (15 mg Intravenous Given 4/24/25 2046)   HYDROcodone-acetaminophen (NORCO)  MG per tablet 1 tablet (1 tablet Oral Given 4/24/25 2218)        ED Course:    The patient was initially evaluated in the triage area where orders were placed. The patient was later dispositioned by Nimesh Dunbar MD.      The patient was advised to stay for completion of workup which includes but is not limited to communication of labs and radiological results, reassessment and plan. The patient was advised that leaving prior to disposition by a provider could result in critical findings that are not communicated to the patient.     ED Course as of 04/24/25 2247   Thu Apr 24, 2025   1716 --- PROVIDER IN TRIAGE NOTE ---    The patient was evaluated by Jerson gallegos in triage. Orders were placed and the patient is currently awaiting disposition.  [CB]      ED Course User Index  [CB] Jerson Sena, SCOTT       Labs:    Lab Results (last 24 hours)       Procedure Component Value Units Date/Time    CBC & Differential [[id number]]  (Abnormal) Collected: 04/24/25 1618    Specimen: Blood from Arm, Right Updated: 04/24/25 1631    Narrative:      The  following orders were created for panel order CBC & Differential.  Procedure                               Abnormality         Status                     ---------                               -----------         ------                     CBC Auto Differential[[id number]]        Abnormal            Final result                 Please view results for these tests on the individual orders.    Comprehensive Metabolic Panel [[id number]]  (Abnormal) Collected: 04/24/25 1618    Specimen: Blood from Arm, Right Updated: 04/24/25 1658     Glucose 113 mg/dL      BUN 11 mg/dL      Creatinine 0.73 mg/dL      Sodium 135 mmol/L      Potassium 4.1 mmol/L      Chloride 99 mmol/L      CO2 24.1 mmol/L      Calcium 9.2 mg/dL      Total Protein 7.0 g/dL      Albumin 3.9 g/dL      ALT (SGPT) 13 U/L      AST (SGOT) 14 U/L      Alkaline Phosphatase 66 U/L      Total Bilirubin 0.5 mg/dL      Globulin 3.1 gm/dL      A/G Ratio 1.3 g/dL      BUN/Creatinine Ratio 15.1     Anion Gap 11.9 mmol/L      eGFR 105.5 mL/min/1.73     Narrative:      GFR Categories in Chronic Kidney Disease (CKD)      GFR Category          GFR (mL/min/1.73)    Interpretation  G1                     90 or greater         Normal or high (1)  G2                      60-89                Mild decrease (1)  G3a                   45-59                Mild to moderate decrease  G3b                   30-44                Moderate to severe decrease  G4                    15-29                Severe decrease  G5                    14 or less           Kidney failure          (1)In the absence of evidence of kidney disease, neither GFR category G1 or G2 fulfill the criteria for CKD.    eGFR calculation 2021 CKD-EPI creatinine equation, which does not include race as a factor    Magnesium [[id number]]  (Normal) Collected: 04/24/25 1618    Specimen: Blood from Arm, Right Updated: 04/24/25 1658     Magnesium 1.9 mg/dL     High Sensitivity Troponin T [[id number]]  (Normal) Collected:  04/24/25 1618    Specimen: Blood from Arm, Right Updated: 04/24/25 1658     HS Troponin T <6 ng/L     Narrative:      High Sensitive Troponin T Reference Range:  <14.0 ng/L- Negative Female for AMI  <22.0 ng/L- Negative Male for AMI  >=14 - Abnormal Female indicating possible myocardial injury.  >=22 - Abnormal Male indicating possible myocardial injury.   Clinicians would have to utilize clinical acumen, EKG, Troponin, and serial changes to determine if it is an Acute Myocardial Infarction or myocardial injury due to an underlying chronic condition.         CBC Auto Differential [[id number]]  (Abnormal) Collected: 04/24/25 1618    Specimen: Blood from Arm, Right Updated: 04/24/25 1631     WBC 10.18 10*3/mm3      RBC 4.41 10*6/mm3      Hemoglobin 12.5 g/dL      Hematocrit 39.9 %      MCV 90.5 fL      MCH 28.3 pg      MCHC 31.3 g/dL      RDW 14.6 %      RDW-SD 48.7 fl      MPV 9.7 fL      Platelets 479 10*3/mm3      Neutrophil % 74.5 %      Lymphocyte % 19.4 %      Monocyte % 4.4 %      Eosinophil % 0.8 %      Basophil % 0.5 %      Immature Grans % 0.4 %      Neutrophils, Absolute 7.59 10*3/mm3      Lymphocytes, Absolute 1.97 10*3/mm3      Monocytes, Absolute 0.45 10*3/mm3      Eosinophils, Absolute 0.08 10*3/mm3      Basophils, Absolute 0.05 10*3/mm3      Immature Grans, Absolute 0.04 10*3/mm3      nRBC 0.0 /100 WBC     Urine Drug Screen - Urine, Clean Catch [[id number]]  (Abnormal) Collected: 04/24/25 1910    Specimen: Urine, Clean Catch Updated: 04/24/25 2049     THC, Screen, Urine Negative     Phencyclidine (PCP), Urine Negative     Cocaine Screen, Urine Negative     Methamphetamine, Ur Negative     Opiate Screen Positive     Amphetamine Screen, Urine Negative     Benzodiazepine Screen, Urine Negative     Tricyclic Antidepressants Screen Negative     Methadone Screen, Urine Negative     Barbiturates Screen, Urine Negative     Oxycodone Screen, Urine Negative     Buprenorphine, Screen, Urine Negative    Narrative:       Cutoff For Drugs Screened:    Amphetamines               500 ng/ml  Barbiturates               200 ng/ml  Benzodiazepines            150 ng/ml  Cocaine                    150 ng/ml  Methadone                  200 ng/ml  Opiates                    100 ng/ml  Phencyclidine               25 ng/ml  THC                         50 ng/ml  Methamphetamine            500 ng/ml  Tricyclic Antidepressants  300 ng/ml  Oxycodone                  100 ng/ml  Buprenorphine               10 ng/ml    The normal value for all drugs tested is negative. This report includes unconfirmed screening results, with the cutoff values listed, to be used for medical treatment purposes only.  Unconfirmed results must not be used for non-medical purposes such as employment or legal testing.  Clinical consideration should be applied to any drug of abuse test, particularly when unconfirmed results are used.      Fentanyl, Urine - Urine, Clean Catch [[id number]]  (Normal) Collected: 04/24/25 1910    Specimen: Urine, Clean Catch Updated: 04/24/25 2048     Fentanyl, Urine Negative    Narrative:      Negative Threshold:      Fentanyl 5 ng/mL     The normal value for the drug tested is negative. This report includes final unconfirmed screening results to be used for medical treatment purposes only. Unconfirmed results must not be used for non-medical purposes such as employment or legal testing. Clinical consideration should be applied to any drug of abuse test, particularly when unconfirmed results are used.           Urinalysis With Culture If Indicated - Urine, Clean Catch [[id number]]  (Normal) Collected: 04/24/25 1912    Specimen: Urine, Clean Catch Updated: 04/24/25 1944     Color, UA Yellow     Appearance, UA Clear     pH, UA 6.0     Specific Gravity, UA 1.009     Glucose, UA Negative     Ketones, UA Negative     Bilirubin, UA Negative     Blood, UA Negative     Protein, UA Negative     Leuk Esterase, UA Negative     Nitrite, UA Negative      Urobilinogen, UA 1.0 E.U./dL    Narrative:      In absence of clinical symptoms, the presence of pyuria, bacteria, and/or nitrites on the urinalysis result does not correlate with infection.  Urine microscopic not indicated.             Imaging:    No Radiology Exams Resulted Within Past 24 Hours      Differential Diagnosis and Discussion:      Syncope: Differential diagnosis includes but is not limited to TIA, hyperventilation, aortic stenosis, pulmonary emboli, myocardial disease, bradycardia arrhythmia, heart block, tachyarrhythmia, vasovagal, orthostatic hypotension, ruptured AAA, aortic dissection, subarachnoid hemorrhage, seizure, hypoglycemia.    Labs were collected in the emergency department and all labs were reviewed and interpreted by me.  X-ray were performed in the emergency department and all X-ray impressions were independently interpreted by me.  An EKG was performed and the EKG was interpreted by me.    MDM     The patient´s CBC that was reviewed and interpreted by me shows no abnormalities of critical concern. Of note, there is no anemia requiring a blood transfusion and the platelet count is acceptable.  The patient´s CMP that was reviewed and interpretted by me shows no abnormalities of critical concern. Of note, the patient´s sodium and potassium are acceptable. The patient´s liver enzymes are unremarkable. The patient´s renal function (creatinine) is preserved. The patient has a normal anion gap.  Troponin is negative.  Patient was given 2 L of fluids in the ED.  Patient still remained dizzy with positional change.              Patient Care Considerations:    PERC: I used the PERC score to risk stratify the patient for PE and a CT of the chest was considered but ultimately not indicated in today's visit.      Consultants/Shared Management Plan:    Case was discussed with Dr. Grimes who agrees with admission.    Social Determinants of Health:    Patient is independent, reliable, and has access  to care.       Disposition and Care Coordination:    Admit:   Through independent evaluation of the patient's history, physical, and imperical data, the patient meets criteria for inpatient admission to the hospital.        Final diagnoses:   Orthostatic hypotension        ED Disposition       ED Disposition   Decision to Admit    Condition   --    Comment   Level of Care: Remote Telemetry [26]   Diagnosis: Near syncope [[id number]]   Admitting Physician: ALYSE RIVERA [[id number]]                 This medical record created using voice recognition software.             Nimesh Dunbar MD  04/24/25 3911

## 2025-04-25 ENCOUNTER — APPOINTMENT (OUTPATIENT)
Dept: GENERAL RADIOLOGY | Facility: HOSPITAL | Age: 45
End: 2025-04-25
Payer: COMMERCIAL

## 2025-04-25 ENCOUNTER — APPOINTMENT (OUTPATIENT)
Dept: CT IMAGING | Facility: HOSPITAL | Age: 45
End: 2025-04-25
Payer: COMMERCIAL

## 2025-04-25 ENCOUNTER — APPOINTMENT (OUTPATIENT)
Dept: CARDIOLOGY | Facility: HOSPITAL | Age: 45
End: 2025-04-25
Payer: COMMERCIAL

## 2025-04-25 PROBLEM — I95.1 ORTHOSTATIC HYPOTENSION: Status: ACTIVE | Noted: 2025-04-25

## 2025-04-25 LAB
ANION GAP SERPL CALCULATED.3IONS-SCNC: 13.4 MMOL/L (ref 5–15)
AORTIC DIMENSIONLESS INDEX: 0.76 (DI)
ASCENDING AORTA: 2.8 CM
AV MEAN PRESS GRAD SYS DOP V1V2: 3.2 MMHG
AV VMAX SYS DOP: 118.1 CM/SEC
B-HCG UR QL: NEGATIVE
BH CV ECHO MEAS - AO MAX PG: 5.6 MMHG
BH CV ECHO MEAS - AO V2 VTI: 24.7 CM
BH CV ECHO MEAS - AVA(I,D): 1.93 CM2
BH CV ECHO MEAS - EDV(MOD-SP2): 83.6 ML
BH CV ECHO MEAS - EDV(MOD-SP4): 90.2 ML
BH CV ECHO MEAS - EF(MOD-SP2): 64.4 %
BH CV ECHO MEAS - EF(MOD-SP4): 63.1 %
BH CV ECHO MEAS - ESV(MOD-SP2): 29.8 ML
BH CV ECHO MEAS - ESV(MOD-SP4): 33.3 ML
BH CV ECHO MEAS - IVS/LVPW: 1.11 CM
BH CV ECHO MEAS - IVSD: 1 CM
BH CV ECHO MEAS - LA DIMENSION: 3.3 CM
BH CV ECHO MEAS - LAT PEAK E' VEL: 14.3 CM/SEC
BH CV ECHO MEAS - LV DIASTOLIC VOL/BSA (35-75): 50.5 CM2
BH CV ECHO MEAS - LV MAX PG: 2.7 MMHG
BH CV ECHO MEAS - LV MEAN PG: 1.45 MMHG
BH CV ECHO MEAS - LV SYSTOLIC VOL/BSA (12-30): 18.7 CM2
BH CV ECHO MEAS - LV V1 MAX: 80 CM/SEC
BH CV ECHO MEAS - LV V1 VTI: 18.8 CM
BH CV ECHO MEAS - LVIDD: 4.6 CM
BH CV ECHO MEAS - LVIDS: 3.3 CM
BH CV ECHO MEAS - LVOT AREA: 2.5 CM2
BH CV ECHO MEAS - LVOT DIAM: 1.8 CM
BH CV ECHO MEAS - LVPWD: 0.9 CM
BH CV ECHO MEAS - MED PEAK E' VEL: 8.7 CM/SEC
BH CV ECHO MEAS - MV A MAX VEL: 55.5 CM/SEC
BH CV ECHO MEAS - MV E MAX VEL: 68.6 CM/SEC
BH CV ECHO MEAS - MV E/A: 1.24
BH CV ECHO MEAS - SV(LVOT): 47.7 ML
BH CV ECHO MEAS - SV(MOD-SP2): 53.8 ML
BH CV ECHO MEAS - SV(MOD-SP4): 56.9 ML
BH CV ECHO MEAS - SVI(LVOT): 26.7 ML/M2
BH CV ECHO MEAS - SVI(MOD-SP2): 30.1 ML/M2
BH CV ECHO MEAS - SVI(MOD-SP4): 31.9 ML/M2
BH CV ECHO MEAS - TAPSE (>1.6): 1.82 CM
BH CV ECHO MEAS - TR MAX PG: 8.2 MMHG
BH CV ECHO MEAS - TR MAX VEL: 142.9 CM/SEC
BH CV ECHO MEASUREMENTS AVERAGE E/E' RATIO: 5.97
BH CV XLRA - TDI S': 11 CM/SEC
BUN SERPL-MCNC: 13 MG/DL (ref 6–20)
BUN/CREAT SERPL: 18.3 (ref 7–25)
CALCIUM SPEC-SCNC: 8.7 MG/DL (ref 8.6–10.5)
CHLORIDE SERPL-SCNC: 103 MMOL/L (ref 98–107)
CO2 SERPL-SCNC: 21.6 MMOL/L (ref 22–29)
CREAT SERPL-MCNC: 0.71 MG/DL (ref 0.57–1)
D DIMER PPP FEU-MCNC: 0.29 MCGFEU/ML (ref 0–0.5)
DEPRECATED RDW RBC AUTO: 49.1 FL (ref 37–54)
EGFRCR SERPLBLD CKD-EPI 2021: 109 ML/MIN/1.73
ERYTHROCYTE [DISTWIDTH] IN BLOOD BY AUTOMATED COUNT: 14.6 % (ref 12.3–15.4)
FOLATE SERPL-MCNC: 6.49 NG/ML (ref 4.78–24.2)
GLUCOSE SERPL-MCNC: 120 MG/DL (ref 65–99)
HCT VFR BLD AUTO: 36.6 % (ref 34–46.6)
HGB BLD-MCNC: 11.5 G/DL (ref 12–15.9)
LEFT ATRIUM VOLUME INDEX: 26.2 ML/M2
MAGNESIUM SERPL-MCNC: 1.9 MG/DL (ref 1.6–2.6)
MCH RBC QN AUTO: 28.5 PG (ref 26.6–33)
MCHC RBC AUTO-ENTMCNC: 31.4 G/DL (ref 31.5–35.7)
MCV RBC AUTO: 90.8 FL (ref 79–97)
PLATELET # BLD AUTO: 413 10*3/MM3 (ref 140–450)
PMV BLD AUTO: 10 FL (ref 6–12)
POTASSIUM SERPL-SCNC: 3.4 MMOL/L (ref 3.5–5.2)
QT INTERVAL: 305 MS
QT INTERVAL: 423 MS
QT INTERVAL: 431 MS
QTC INTERVAL: 369 MS
QTC INTERVAL: 393 MS
QTC INTERVAL: 426 MS
RBC # BLD AUTO: 4.03 10*6/MM3 (ref 3.77–5.28)
SINUS: 2.8 CM
SODIUM SERPL-SCNC: 138 MMOL/L (ref 136–145)
TSH SERPL DL<=0.05 MIU/L-ACNC: 1.38 UIU/ML (ref 0.27–4.2)
VIT B12 BLD-MCNC: 392 PG/ML (ref 211–946)
WBC NRBC COR # BLD AUTO: 9.37 10*3/MM3 (ref 3.4–10.8)

## 2025-04-25 PROCEDURE — G0378 HOSPITAL OBSERVATION PER HR: HCPCS

## 2025-04-25 PROCEDURE — 85379 FIBRIN DEGRADATION QUANT: CPT | Performed by: PHYSICIAN ASSISTANT

## 2025-04-25 PROCEDURE — 93306 TTE W/DOPPLER COMPLETE: CPT

## 2025-04-25 PROCEDURE — 25010000002 MAGNESIUM SULFATE IN D5W 1G/100ML (PREMIX) 1-5 GM/100ML-% SOLUTION: Performed by: PHYSICIAN ASSISTANT

## 2025-04-25 PROCEDURE — 85027 COMPLETE CBC AUTOMATED: CPT | Performed by: FAMILY MEDICINE

## 2025-04-25 PROCEDURE — 71045 X-RAY EXAM CHEST 1 VIEW: CPT

## 2025-04-25 PROCEDURE — 93005 ELECTROCARDIOGRAM TRACING: CPT | Performed by: PHYSICIAN ASSISTANT

## 2025-04-25 PROCEDURE — 80048 BASIC METABOLIC PNL TOTAL CA: CPT | Performed by: FAMILY MEDICINE

## 2025-04-25 PROCEDURE — 93306 TTE W/DOPPLER COMPLETE: CPT | Performed by: INTERNAL MEDICINE

## 2025-04-25 PROCEDURE — 99233 SBSQ HOSP IP/OBS HIGH 50: CPT | Performed by: INTERNAL MEDICINE

## 2025-04-25 PROCEDURE — 70450 CT HEAD/BRAIN W/O DYE: CPT

## 2025-04-25 PROCEDURE — 83735 ASSAY OF MAGNESIUM: CPT | Performed by: PHYSICIAN ASSISTANT

## 2025-04-25 PROCEDURE — 93005 ELECTROCARDIOGRAM TRACING: CPT | Performed by: FAMILY MEDICINE

## 2025-04-25 RX ORDER — HYDROCODONE BITARTRATE AND ACETAMINOPHEN 10; 325 MG/1; MG/1
1 TABLET ORAL EVERY 4 HOURS PRN
Refills: 0 | Status: DISCONTINUED | OUTPATIENT
Start: 2025-04-25 | End: 2025-04-26 | Stop reason: HOSPADM

## 2025-04-25 RX ORDER — MAGNESIUM SULFATE 1 G/100ML
1 INJECTION INTRAVENOUS ONCE
Status: COMPLETED | OUTPATIENT
Start: 2025-04-25 | End: 2025-04-25

## 2025-04-25 RX ORDER — PANTOPRAZOLE SODIUM 40 MG/1
40 TABLET, DELAYED RELEASE ORAL
Status: DISCONTINUED | OUTPATIENT
Start: 2025-04-25 | End: 2025-04-26 | Stop reason: HOSPADM

## 2025-04-25 RX ORDER — DULOXETIN HYDROCHLORIDE 30 MG/1
60 CAPSULE, DELAYED RELEASE ORAL EVERY MORNING
Status: DISCONTINUED | OUTPATIENT
Start: 2025-04-25 | End: 2025-04-26 | Stop reason: HOSPADM

## 2025-04-25 RX ORDER — CLONAZEPAM 0.5 MG/1
0.5 TABLET ORAL 2 TIMES DAILY PRN
Status: DISCONTINUED | OUTPATIENT
Start: 2025-04-25 | End: 2025-04-26 | Stop reason: HOSPADM

## 2025-04-25 RX ORDER — DULOXETIN HYDROCHLORIDE 30 MG/1
30 CAPSULE, DELAYED RELEASE ORAL EVERY EVENING
Status: DISCONTINUED | OUTPATIENT
Start: 2025-04-25 | End: 2025-04-26 | Stop reason: HOSPADM

## 2025-04-25 RX ORDER — ALBUTEROL SULFATE 0.83 MG/ML
2.5 SOLUTION RESPIRATORY (INHALATION) EVERY 6 HOURS PRN
Status: DISCONTINUED | OUTPATIENT
Start: 2025-04-25 | End: 2025-04-26 | Stop reason: HOSPADM

## 2025-04-25 RX ORDER — TRAZODONE HYDROCHLORIDE 50 MG/1
50 TABLET ORAL NIGHTLY PRN
Status: DISCONTINUED | OUTPATIENT
Start: 2025-04-25 | End: 2025-04-26 | Stop reason: HOSPADM

## 2025-04-25 RX ORDER — POLYETHYLENE GLYCOL 3350 17 G/17G
17 POWDER, FOR SOLUTION ORAL DAILY
Status: DISCONTINUED | OUTPATIENT
Start: 2025-04-25 | End: 2025-04-26 | Stop reason: HOSPADM

## 2025-04-25 RX ORDER — MONTELUKAST SODIUM 10 MG/1
10 TABLET ORAL NIGHTLY
Status: DISCONTINUED | OUTPATIENT
Start: 2025-04-25 | End: 2025-04-26 | Stop reason: HOSPADM

## 2025-04-25 RX ORDER — POTASSIUM CHLORIDE 750 MG/1
30 CAPSULE, EXTENDED RELEASE ORAL ONCE
Status: COMPLETED | OUTPATIENT
Start: 2025-04-25 | End: 2025-04-25

## 2025-04-25 RX ORDER — ACETAMINOPHEN 325 MG/1
650 TABLET ORAL EVERY 6 HOURS PRN
Status: DISCONTINUED | OUTPATIENT
Start: 2025-04-25 | End: 2025-04-26 | Stop reason: HOSPADM

## 2025-04-25 RX ORDER — HYDROCODONE BITARTRATE AND ACETAMINOPHEN 10; 325 MG/1; MG/1
1.5 TABLET ORAL EVERY 8 HOURS PRN
Refills: 0 | Status: DISCONTINUED | OUTPATIENT
Start: 2025-04-25 | End: 2025-04-25

## 2025-04-25 RX ORDER — HYDROCODONE BITARTRATE AND ACETAMINOPHEN 10; 325 MG/1; MG/1
1 TABLET ORAL EVERY 8 HOURS PRN
Refills: 0 | Status: DISCONTINUED | OUTPATIENT
Start: 2025-04-25 | End: 2025-04-25

## 2025-04-25 RX ORDER — SACCHAROMYCES BOULARDII 250 MG
250 CAPSULE ORAL 2 TIMES DAILY
Status: DISCONTINUED | OUTPATIENT
Start: 2025-04-25 | End: 2025-04-26 | Stop reason: HOSPADM

## 2025-04-25 RX ADMIN — POLYETHYLENE GLYCOL (3350) 17 G: 17 POWDER, FOR SOLUTION ORAL at 09:16

## 2025-04-25 RX ADMIN — MONTELUKAST 10 MG: 10 TABLET, FILM COATED ORAL at 02:05

## 2025-04-25 RX ADMIN — HYDROCODONE BITARTRATE AND ACETAMINOPHEN 1 TABLET: 10; 325 TABLET ORAL at 11:19

## 2025-04-25 RX ADMIN — HYDROCODONE BITARTRATE AND ACETAMINOPHEN 1 TABLET: 10; 325 TABLET ORAL at 19:48

## 2025-04-25 RX ADMIN — ACETAMINOPHEN 650 MG: 325 TABLET ORAL at 00:35

## 2025-04-25 RX ADMIN — PANTOPRAZOLE SODIUM 40 MG: 40 TABLET, DELAYED RELEASE ORAL at 05:43

## 2025-04-25 RX ADMIN — Medication 10 ML: at 21:22

## 2025-04-25 RX ADMIN — MONTELUKAST 10 MG: 10 TABLET, FILM COATED ORAL at 21:22

## 2025-04-25 RX ADMIN — Medication 10 ML: at 09:16

## 2025-04-25 RX ADMIN — TIZANIDINE 4 MG: 4 TABLET ORAL at 02:05

## 2025-04-25 RX ADMIN — HYDROCODONE BITARTRATE AND ACETAMINOPHEN 1 TABLET: 10; 325 TABLET ORAL at 15:22

## 2025-04-25 RX ADMIN — POTASSIUM CHLORIDE 30 MEQ: 750 CAPSULE, EXTENDED RELEASE ORAL at 09:16

## 2025-04-25 RX ADMIN — HYDROCODONE BITARTRATE AND ACETAMINOPHEN 1 TABLET: 10; 325 TABLET ORAL at 23:56

## 2025-04-25 RX ADMIN — Medication 250 MG: at 09:16

## 2025-04-25 RX ADMIN — MAGNESIUM SULFATE 1 G: 1 INJECTION INTRAVENOUS at 12:15

## 2025-04-25 RX ADMIN — Medication 250 MG: at 21:22

## 2025-04-25 RX ADMIN — CLONAZEPAM 0.5 MG: 0.5 TABLET ORAL at 02:05

## 2025-04-25 RX ADMIN — HYDROCODONE BITARTRATE AND ACETAMINOPHEN 1 TABLET: 10; 325 TABLET ORAL at 06:17

## 2025-04-25 RX ADMIN — DULOXETINE 30 MG: 30 CAPSULE, DELAYED RELEASE ORAL at 23:56

## 2025-04-25 RX ADMIN — DULOXETINE 60 MG: 30 CAPSULE, DELAYED RELEASE ORAL at 06:17

## 2025-04-25 NOTE — H&P
[facility]   HISTORY AND PHYSICAL    Patient Name: Ebony Hamilton  : 1980  MRN: [id number]  Primary Care Physician:  Karen Stover APRN  Date of admission: 2025    Subjective   Subjective     Chief Complaint: Syncopal episode    HPI:    Ebony Hamilton is a 44 y.o. female with past medical history of cervical radiculopathy, migraines, anxiety, depression, gastroparesis, A-fib not on AC, chronic pulling on opiates, and GERD presented to the ED for evaluation of multiple syncopal episodes.  Patient states that for the last 2 weeks she has not been feeling well with increased weakness, lethargy and decreased appetite.  Patient has had at least 4 syncopal episodes since then.   Patient will become dizzy upon standing and home systolic blood pressure will be in the 60s.  Patient went to PCP to be evaluated, noticed her hypotension with bradycardia and given her multiple syncopal episodes she was sent to the ED for further evaluation.  In the ED patient was bradycardic and hypertensive on arrival with positive orthostatics.  Drug screen was positive for opiates and labs were relatively unremarkable given her chronic conditions including a negative UA, negative troponin, normal WBC, and normal magnesium level.  EKG shows sinus bradycardia with no significant ST-T changes.  When seen patient was crying about her back and neck pain asking for her home pain medications.  When asked she denied any recent fevers, chills, headaches, focal weakness, chest pain, palpitation, shortness of breath, cough, abdominal pain, nausea, vomiting, diarrhea, constipation, dysuria, hematuria, hematochezia, melena, or anxiety.  Patient admitted for further evaluation and treatment.    Review of Systems   All systems were reviewed and negative except for: As per HPI    Personal History     Past Medical History:   Diagnosis Date    Allergic     Anxiety     Atrial fibrillation     RELEASED BY  CARDIOLOGIST/ELECTROPHYSIST, NO CURRENT MEDS    Chronic pain disorder     Depression     Endometriosis     Headache     Hemorrhoids     Injury of shoulder, right 2009    CHRONIC PAIN    Panic disorder     Rectal bleeding 2010    S/P laparoscopic appendectomy 03/09/2025    S/P laparoscopic cholecystectomy 02/17/2025       Past Surgical History:   Procedure Laterality Date    APPENDECTOMY N/A 3/9/2025    Procedure: APPENDECTOMY LAPAROSCOPIC;  Surgeon: Mingo Cannon MD;  Location: [facility] MAIN OR;  Service: General;  Laterality: N/A;    CERVICAL ARTHRODESIS  01/14/2015    Donaldo Lolawolf    CERVICAL FUSION      C4-7 FUSION, FULL ROM    CHOLECYSTECTOMY N/A 2/17/2025    Procedure: CHOLECYSTECTOMY LAPAROSCOPIC plain language: removal of gallbladder thru small incisions using long instruments and a camera;  Surgeon: Josué Castano MD;  Location: [facility] MAIN OR;  Service: General;  Laterality: N/A;    COLONOSCOPY N/A 05/31/2022    Procedure: COLONOSCOPY;  Surgeon: Shabbir Baird MD;  Location: [facility] ENDOSCOPY;  Service: General;  Laterality: N/A;  HEMORRHOIDS    ENDOSCOPY N/A 2/17/2025    Procedure: ESOPHAGOGASTRODUODENOSCOPY WITH BIOPSIES;  Surgeon: Sanya Reyes MD;  Location: [facility] ENDOSCOPY;  Service: Gastroenterology;  Laterality: N/A;  GASTRITIS, SMALL HIATAL HERNIA    ENDOSCOPY N/A 3/6/2025    Procedure: ESOPHAGOGASTRODUODENOSCOPY with pancreatic stent removal;  Surgeon: Ha Thompson MD;  Location: [facility] ENDOSCOPY;  Service: Gastroenterology;  Laterality: N/A;  pancreatic stent removal, hiatal hernia    ERCP N/A 2/24/2025    Procedure: ENDOSCOPIC RETROGRADE CHOLANGIOPANCREATOGRAPHY with bile duct stent placement and pancreatic duct stent placement;  Surgeon: Ha Thompson MD;  Location: [facility] ENDOSCOPY;  Service: Gastroenterology;  Laterality: N/A;  bile duct leeak    ERCP N/A 4/8/2025    Procedure: ENDOSCOPIC RETROGRADE CHOLANGIOPANCREATOGRAPHY WITH STENT REMOVAL;  Surgeon:  Ha Thompson MD;  Location: [facility] ENDOSCOPY;  Service: Gastroenterology;  Laterality: N/A;  STENT REMOVAL    EXPLORATORY LAPAROTOMY      HEMORRHOIDECTOMY N/A 05/31/2022    Procedure: HEMORRHOID BANDING;  Surgeon: Shabbir Baird MD;  Location: [facility] ENDOSCOPY;  Service: General;  Laterality: N/A;  BANDS X 2    HEMORRHOIDECTOMY N/A 1/31/2024    Procedure: HEMORRHOIDECTOMY;  Surgeon: Shabbir Baird MD;  Location: [facility] OR [facility]SC;  Service: General;  Laterality: N/A;    HYSTERECTOMY      SHOULDER ARTHROSCOPY Right     SHOULDER SURGERY Left     ARTHROSCOPY LABRAL TEAR X2    TUBAL ABDOMINAL LIGATION         Family History: family history includes Anxiety disorder in her father and mother; Bipolar disorder in her mother; Cancer in her mother; Dementia in her paternal grandmother and paternal uncle; Depression in her mother; Heart disease in her mother and another family member; Hypertension in her father and mother. Otherwise pertinent FHx was reviewed and not pertinent to current issue.    Social History:  reports that she quit smoking about 6 years ago. Her smoking use included cigarettes. She started smoking about 26 years ago. She has a 6 pack-year smoking history. She has never used smokeless tobacco. She reports current alcohol use. She reports that she does not use drugs.    Home Medications:  Calcium, DULoxetine, HYDROcodone-acetaminophen, albuterol sulfate HFA, busPIRone, clonazePAM, diphenhydrAMINE-zinc acetate, ibuprofen, montelukast, naloxone, ondansetron, pantoprazole, polyethylene glycol, saccharomyces boulardii, sennosides-docusate, simethicone, tiZANidine, traZODone, triamcinolone, and vitamin C      Allergies:  Allergies   Allergen Reactions    Escitalopram Nausea Only and Rash     Nausea, sweating, rash     Sulfa Antibiotics Anaphylaxis    Baclofen GI Intolerance, Hives and Nausea And Vomiting    Ciprofloxacin Hallucinations    Codeine Hives    Gold-Containing Drug Products Rash     Latex Rash    Lovenox [Enoxaparin Sodium] Rash    Nickel Hives    Tegaderm Ag Mesh [Silver] Hives       Objective   Objective     Vitals:   Temp:  [97.6 °F (36.4 °C)-98.1 °F (36.7 °C)] 98.1 °F (36.7 °C)  Heart Rate:  [44-90] 84  Resp:  [16] 16  BP: ()/(61-85) 124/85  Physical Exam    Constitutional: Awake, alert   Eyes: PERRLA, sclerae anicteric, no conjunctival injection   HENT: NCAT, mucous membranes moist   Neck: Supple, no thyromegaly, no lymphadenopathy, trachea midline   Respiratory: Clear to auscultation bilaterally, nonlabored respirations    Cardiovascular: RRR, no murmurs, rubs, or gallops, palpable pedal pulses bilaterally   Gastrointestinal: Positive bowel sounds, soft, nontender, nondistended   Musculoskeletal: No bilateral ankle edema, no clubbing or cyanosis to extremities   Psychiatric: Appropriate affect, cooperative   Neurologic: Oriented x 3, strength symmetric in all extremities, Cranial Nerves grossly intact to confrontation, speech clear   Skin: No rashes     Result Review    Result Review:  I have personally reviewed the results from the time of this admission to 4/24/2025 22:16 EDT and agree with these findings:  [x]  Laboratory list / accordion  []  Microbiology  [x]  Radiology  [x]  EKG/Telemetry   []  Cardiology/Vascular   []  Pathology  []  Old records  []  Other:  Most notable findings include: Drug screen was positive for opiates and labs were relatively unremarkable given her chronic conditions including a negative UA, negative troponin, normal WBC, and normal magnesium level.  EKG shows sinus bradycardia with no significant ST-T changes.      Assessment & Plan   Assessment / Plan     Brief Patient Summary:  Ebony Hamilton is a 44 y.o. female with past medical history of cervical radiculopathy, migraines, anxiety, depression, gastroparesis, A-fib not on AC, chronic pulling on opiates, and GERD presented to the ED for evaluation of multiple syncopal episodes.    Active  Hospital Problems:  Active Hospital Problems    Diagnosis     **Syncopal episodes     Orthostatic hypotension     Gastroparesis     Cervical radiculopathy     NICHOLE (generalized anxiety disorder)     Cervical fusion syndrome      Plan:     Syncope  -Admit telemetry  -Patient was bradycardic on arrival  -Positive orthostatics  -Fall precautions  -EKG reviewed.  Repeat with tachycardia  -Initial troponin negative, will trend  -TSH  - Repeat orthostatic Vitals in the a.m.  -IVF  -Echocardiogram  -We will consult cardiology warranted  -Patient does use opiates heavily for pain.  1.5 Norco 10 mg tabs every 4 hours  -Will adjust pain med frequency and dose  -Start midodrine admitted  -Supportive care    Chronic pain  Gastroparesis  Anxiety/depression    GI ppx  DVT ppx    VTE Prophylaxis:  Mechanical VTE prophylaxis orders are signed & held.          CODE STATUS:    Code Status (Patient has no pulse and is not breathing): CPR (Attempt to Resuscitate)  Medical Interventions (Patient has pulse or is breathing): Full Support  Level Of Support Discussed With: Patient    Admission Status:  I believe this patient meets observation status.      Electronically signed by Royer Grimes MD, 04/24/25, 10:16 PM EDT.

## 2025-04-25 NOTE — PROGRESS NOTES
[facility]   Hospitalist Progress Note  Date: 2025  Patient Name: Ebony Hamilton  : 1980  MRN: [id number]  Date of admission: 2025      Subjective   Subjective     Chief Complaint: Syncope     Summary: Ebony Hamilton is a 44 y.o. female with past medical history of cervical radiculopathy, migraines, anxiety, depression, gastroparesis, A-fib not on AC, chronic pulling on opiates, and GERD presented to the ED for evaluation of multiple syncopal episodes.  Patient states that for the last 2 weeks she has not been feeling well with increased weakness, lethargy and decreased appetite.  Patient has had at least 4 syncopal episodes since then.   Patient will become dizzy upon standing and home systolic blood pressure will be in the 60s.  Patient went to PCP to be evaluated, noticed her hypotension with bradycardia and given her multiple syncopal episodes she was sent to the ED for further evaluation.  In the ED patient was bradycardic and hypertensive on arrival with positive orthostatics.  Drug screen was positive for opiates and labs were relatively unremarkable given her chronic conditions including a negative UA, negative troponin, normal WBC, and normal magnesium level.  EKG shows sinus bradycardia with no significant ST-T changes.  When seen patient was crying about her back and neck pain asking for her home pain medications.  When asked she denied any recent fevers, chills, headaches, focal weakness, chest pain, palpitation, shortness of breath, cough, abdominal pain, nausea, vomiting, diarrhea, constipation, dysuria, hematuria, hematochezia, melena, or anxiety.  Patient admitted for further evaluation and treatment.     Interval Followup: Patient seen and examined resting comfortably telemetry reviewed episode of SVT through the night currently normal sinus rhythm in the 60s.  Evidently patient with recurrent syncopal episodes was seen by her primary care provider yesterday  afternoon was noted to be bradycardic with heart rate in the 40s also hypotensive denies beta-blocker or calcium channel blocker use brought to the emergency department has been admitted to the hospitalist service.  Checking 2D echo cortisol checking thyroid profile B12 folate levels check D-dimer if positive will get CTA chest bradycardia and episode of SVT consult cardiology for opinion.  Additionally will get CT scan of the head    Objective   Objective     Vitals:   Temp:  [97.6 °F (36.4 °C)-98.2 °F (36.8 °C)] 98.2 °F (36.8 °C)  Heart Rate:  [] 79  Resp:  [16-18] 18  BP: ()/(59-88) 94/61    Physical Exam   Constitutional: Awake alert oriented no acute distress  Respiratory: Clear  Cardiovascular: RRR  GI: Abdomen soft nontender  Neuro: No focal deficits    Result Review    Result Review:  I have personally reviewed the results from the time of this admission to 4/25/2025 12:07 EDT and agree with these findings:  []  Laboratory  []  Microbiology  []  Radiology  []  EKG/Telemetry   []  Cardiology/Vascular   []  Pathology  []  Old records  []  Other:    Assessment & Plan   Assessment / Plan     Assessment:    Recurrent syncopal episodes  Sinus bradycardia  Intermittent tachycardia  History of SVT  Chronic pain syndrome on chronic opiates  Cervical fusion syndrome  Anxiety  Depression  Gastroparesis    Plan:    IV fluids  Check orthostatic vitals every shift  Check 2D echocardiogram  Check thyroid profile cortisol level B12 and folate levels  Continue cardiac telemetry  Resume appropriate home medications  Check D-dimer if positive get CTA chest to rule out PE  Consult cardiology  Check CT scan of the head  Further treatment contingent upon her hospital course  Discussed plan with RN.    VTE Prophylaxis:  Mechanical VTE prophylaxis orders are present.        CODE STATUS:   Code Status (Patient has no pulse and is not breathing): CPR (Attempt to Resuscitate)  Medical Interventions (Patient has pulse or is  breathing): Full Support  Level Of Support Discussed With: Patient    Electronically signed by BRANDON Sky, 04/25/25, 12:07 PM EDT.  Addendum;  Patient seen during rounds with the physician assistant.  No more episode of syncope.  Does feel mildly dizzy on standing up.  It is like things are blacking out.  No spinning sensation.  No nausea or vomiting or palpitations.  Patient seen by cardiology.  Await 2D echo.  Continue telemetry.  Likely discharge home in a.m. if cleared by cardiology.  Electronically signed by Cristhian Gofdrey MD, 04/25/25, 6:23 PM EDT.

## 2025-04-25 NOTE — PLAN OF CARE
Goal Outcome Evaluation:      VSS. Pt is Aox4. She c/o pain to the neck and has been getting PO NORCO. She has no other complaints at this time. Continue plan of care.

## 2025-04-25 NOTE — PLAN OF CARE
Goal Outcome Evaluation:  Plan of Care Reviewed With: patient        Progress: no change  Outcome Evaluation: Pt. reported from ED for syncopal episode. Pt. arrived on floor and reported 10/10 headache and back pain, medication given per mar, patient vital signs stable. Continue plan of care.

## 2025-04-25 NOTE — SIGNIFICANT NOTE
Pt  out for echo and then out of the room for CT  
Has The Growth Been Previously Biopsied?: has not been previously biopsied

## 2025-04-25 NOTE — CONSULTS
[facility]   Cardiology Consult Note    Patient Name: Ebony Hamilton  : 1980  MRN: [id number]  Primary Care Physician:  Karen Stover APRN  Referring Physician: Karen Stover, *    Date of admission: 2025    Subjective   Subjective     Reason for Consultation : Syncope    Chief Complaint : Syncope, Weakness and palpitations.     HPI:  Ebony Hamilton is a 44 y.o. female with past medical history of SVT, inappropriate sinus node tachycardia. She apparently also has gastroparesis and recently had gallbladder removal with some complications and also had appendectomy. She was home recovering and reports that over the last few days was feeling week with palpitations. She was trying to get up a chair and felt dizzy with subsequent episode of syncope. Apparently her Pulse was slow in the 40 bpm with BP 60 mmHg systolic taken by her . On arrival to the ED her EKG initially showed sinus bradycardia @ 40 bpm and then sinus tachycardia with repolarization abnormalities. Her ECHO was unremarkable and showed normal cardiac function and no significant valvular disease. Her Labs showed hypokalemia.She was orthostatic. No evidence of bleeding.      Review of Systems   All systems were reviewed and negative except for: syncope and palpitations.     Personal History     Past Medical History:   Diagnosis Date    Allergic     Anxiety     Atrial fibrillation     RELEASED BY CARDIOLOGIST/ELECTROPHYSIST, NO CURRENT MEDS    Chronic pain disorder     Depression     Endometriosis     Headache     Hemorrhoids     Injury of shoulder, right 2009    CHRONIC PAIN    Panic disorder     Rectal bleeding     S/P laparoscopic appendectomy 2025    S/P laparoscopic cholecystectomy 2025              Family History: family history includes Anxiety disorder in her father and mother; Bipolar disorder in her mother; Cancer in her mother; Dementia in her paternal grandmother and paternal  uncle; Depression in her mother; Heart disease in her mother and another family member; Hypertension in her father and mother. Otherwise pertinent FHx was reviewed and not pertinent to current issue.    Social History:  reports that she quit smoking about 6 years ago. Her smoking use included cigarettes. She started smoking about 26 years ago. She has a 6 pack-year smoking history. She has never used smokeless tobacco. She reports current alcohol use. She reports that she does not use drugs.    Home Medications:  Calcium, DULoxetine, HYDROcodone-acetaminophen, albuterol sulfate HFA, busPIRone, clonazePAM, diphenhydrAMINE-zinc acetate, ibuprofen, montelukast, naloxone, ondansetron, pantoprazole, polyethylene glycol, saccharomyces boulardii, sennosides-docusate, simethicone, tiZANidine, traZODone, triamcinolone, and vitamin C    Allergies:  Allergies   Allergen Reactions    Escitalopram Nausea Only and Rash     Nausea, sweating, rash     Sulfa Antibiotics Anaphylaxis    Baclofen GI Intolerance, Hives and Nausea And Vomiting    Ciprofloxacin Hallucinations    Codeine Hives    Gold-Containing Drug Products Rash    Latex Rash    Lovenox [Enoxaparin Sodium] Rash    Nickel Hives    Tegaderm Ag Mesh [Silver] Hives       Objective    Objective     Vitals:   Temp:  [97.7 °F (36.5 °C)-98.2 °F (36.8 °C)] 98.2 °F (36.8 °C)  Heart Rate:  [] 79  Resp:  [16-18] 18  BP: ()/(59-88) 94/61      Physical Exam:   Alert   Neck: no JVD, no bruits   Heart. Regular, no gallops, no rubs, no murmurs.   Lungs: no rales, no wheezing   Abdomen: BS +, scan, mildly tender   LE: no edema   Neurologic. No motor deficits.     Result Review    Result Review:  I have personally reviewed the results from the time of this admission to 4/25/2025 15:10 EDT and agree with these findings:  [x]  Laboratory  []  Microbiology  [x]  Radiology  [x]  EKG/Telemetry   [x]  Cardiology/Vascular   []  Pathology  [x]  Old records  []  Other:  Most notable  findings include:     CMP          4/5/2025    08:53 4/24/2025    16:18 4/25/2025    04:48   CMP   Glucose 101  113  120    BUN 7  11  13    Creatinine 0.56  0.73  0.71    EGFR 117.0  105.5  109.0    Sodium 139  135  138    Potassium 4.3  4.1  3.4    Chloride 102  99  103    Calcium 9.4  9.2  8.7    Total Protein  7.0     Albumin  3.9     Globulin  3.1     Total Bilirubin  0.5     Alkaline Phosphatase  66     AST (SGOT)  14     ALT (SGPT)  13     Albumin/Globulin Ratio  1.3     BUN/Creatinine Ratio 12.5  15.1  18.3    Anion Gap 12.2  11.9  13.4       CBC          3/31/2025    04:12 4/24/2025    16:18 4/25/2025    04:48   CBC   WBC 9.27  10.18  9.37    RBC 4.04  4.41  4.03    Hemoglobin 11.5  12.5  11.5    Hematocrit 36.4  39.9  36.6    MCV 90.1  90.5  90.8    MCH 28.5  28.3  28.5    MCHC 31.6  31.3  31.4    RDW 14.6  14.6  14.6    Platelets 487  479  413       Lab Results   Component Value Date    TROPONINT <6 04/24/2025         Assessment & Plan   Assessment / Plan     Brief Patient Summary:  Ebony Hamilton is a 44 y.o. female who presented with Syncope and palpitations. She has a history of SVT and inappropriate tachycardia. She had a history of attempt ablation but was aborted due to risk for needing PM. She now with orthostatic hypotension and syncope associated with clinical features suggestive of vasovagal event. So far, no significant arrhythmias on Tele. Her labs were significant for hypokalemia today.     Active Hospital Problems:  Active Hospital Problems    Diagnosis     **Syncopal episodes     Orthostatic hypotension     Gastroparesis     Cervical radiculopathy     NICHOLE (generalized anxiety disorder)     Cervical fusion syndrome          Plan:     I would proceed with IV fluids, K replacement  and advised to wear compression stockings . Continue with Tele monitoring and continue to check for orthostatic hypotension . The patient has some features of dysautonomia. Avoid any medication that could  prolong QT. Cortisol level results is pending but not clear evidence of adrenal insufficiency.     Electronically signed by Porter Piedra MD, 04/25/25, 3:10 PM EDT.

## 2025-04-26 ENCOUNTER — READMISSION MANAGEMENT (OUTPATIENT)
Dept: CALL CENTER | Facility: HOSPITAL | Age: 45
End: 2025-04-26
Payer: COMMERCIAL

## 2025-04-26 VITALS
BODY MASS INDEX: 31.6 KG/M2 | WEIGHT: 171.74 LBS | HEIGHT: 62 IN | TEMPERATURE: 97.7 F | DIASTOLIC BLOOD PRESSURE: 84 MMHG | SYSTOLIC BLOOD PRESSURE: 121 MMHG | OXYGEN SATURATION: 98 % | RESPIRATION RATE: 20 BRPM | HEART RATE: 69 BPM

## 2025-04-26 PROBLEM — R55 SYNCOPAL EPISODES: Status: RESOLVED | Noted: 2025-04-24 | Resolved: 2025-04-26

## 2025-04-26 LAB
ALBUMIN SERPL-MCNC: 3.5 G/DL (ref 3.5–5.2)
ALBUMIN SERPL-MCNC: 3.6 G/DL (ref 3.5–5.2)
ALBUMIN/GLOB SERPL: 1.3 G/DL
ALP SERPL-CCNC: 56 U/L (ref 39–117)
ALP SERPL-CCNC: 59 U/L (ref 39–117)
ALT SERPL W P-5'-P-CCNC: 10 U/L (ref 1–33)
ALT SERPL W P-5'-P-CCNC: 9 U/L (ref 1–33)
ANION GAP SERPL CALCULATED.3IONS-SCNC: 11.2 MMOL/L (ref 5–15)
ANION GAP SERPL CALCULATED.3IONS-SCNC: 11.7 MMOL/L (ref 5–15)
AST SERPL-CCNC: 8 U/L (ref 1–32)
AST SERPL-CCNC: 9 U/L (ref 1–32)
BASOPHILS # BLD AUTO: 0.04 10*3/MM3 (ref 0–0.2)
BASOPHILS NFR BLD AUTO: 0.5 % (ref 0–1.5)
BILIRUB CONJ SERPL-MCNC: 0.1 MG/DL (ref 0–0.3)
BILIRUB INDIRECT SERPL-MCNC: 0.1 MG/DL
BILIRUB SERPL-MCNC: 0.2 MG/DL (ref 0–1.2)
BILIRUB SERPL-MCNC: 0.2 MG/DL (ref 0–1.2)
BUN SERPL-MCNC: 10 MG/DL (ref 6–20)
BUN SERPL-MCNC: 8 MG/DL (ref 6–20)
BUN/CREAT SERPL: 15.4 (ref 7–25)
BUN/CREAT SERPL: 18.2 (ref 7–25)
CALCIUM SPEC-SCNC: 8.7 MG/DL (ref 8.6–10.5)
CALCIUM SPEC-SCNC: 8.8 MG/DL (ref 8.6–10.5)
CHLORIDE SERPL-SCNC: 101 MMOL/L (ref 98–107)
CHLORIDE SERPL-SCNC: 104 MMOL/L (ref 98–107)
CO2 SERPL-SCNC: 22.3 MMOL/L (ref 22–29)
CO2 SERPL-SCNC: 22.8 MMOL/L (ref 22–29)
CORTIS SERPL-MCNC: 0.53 MCG/DL
CREAT SERPL-MCNC: 0.52 MG/DL (ref 0.57–1)
CREAT SERPL-MCNC: 0.55 MG/DL (ref 0.57–1)
DEPRECATED RDW RBC AUTO: 49.8 FL (ref 37–54)
EGFRCR SERPLBLD CKD-EPI 2021: 117.5 ML/MIN/1.73
EGFRCR SERPLBLD CKD-EPI 2021: 119.1 ML/MIN/1.73
EOSINOPHIL # BLD AUTO: 0.14 10*3/MM3 (ref 0–0.4)
EOSINOPHIL NFR BLD AUTO: 1.9 % (ref 0.3–6.2)
ERYTHROCYTE [DISTWIDTH] IN BLOOD BY AUTOMATED COUNT: 14.8 % (ref 12.3–15.4)
GLOBULIN UR ELPH-MCNC: 2.7 GM/DL
GLUCOSE SERPL-MCNC: 112 MG/DL (ref 65–99)
GLUCOSE SERPL-MCNC: 118 MG/DL (ref 65–99)
HCT VFR BLD AUTO: 35 % (ref 34–46.6)
HGB BLD-MCNC: 10.9 G/DL (ref 12–15.9)
IMM GRANULOCYTES # BLD AUTO: 0.03 10*3/MM3 (ref 0–0.05)
IMM GRANULOCYTES NFR BLD AUTO: 0.4 % (ref 0–0.5)
LYMPHOCYTES # BLD AUTO: 2.26 10*3/MM3 (ref 0.7–3.1)
LYMPHOCYTES NFR BLD AUTO: 31 % (ref 19.6–45.3)
MAGNESIUM SERPL-MCNC: 2 MG/DL (ref 1.6–2.6)
MAGNESIUM SERPL-MCNC: 2 MG/DL (ref 1.6–2.6)
MCH RBC QN AUTO: 28.5 PG (ref 26.6–33)
MCHC RBC AUTO-ENTMCNC: 31.1 G/DL (ref 31.5–35.7)
MCV RBC AUTO: 91.4 FL (ref 79–97)
MONOCYTES # BLD AUTO: 0.46 10*3/MM3 (ref 0.1–0.9)
MONOCYTES NFR BLD AUTO: 6.3 % (ref 5–12)
NEUTROPHILS NFR BLD AUTO: 4.37 10*3/MM3 (ref 1.7–7)
NEUTROPHILS NFR BLD AUTO: 59.9 % (ref 42.7–76)
NRBC BLD AUTO-RTO: 0 /100 WBC (ref 0–0.2)
PHOSPHATE SERPL-MCNC: 4.1 MG/DL (ref 2.5–4.5)
PLATELET # BLD AUTO: 380 10*3/MM3 (ref 140–450)
PMV BLD AUTO: 10.1 FL (ref 6–12)
POTASSIUM SERPL-SCNC: 4.1 MMOL/L (ref 3.5–5.2)
POTASSIUM SERPL-SCNC: 4.2 MMOL/L (ref 3.5–5.2)
PROT SERPL-MCNC: 6.1 G/DL (ref 6–8.5)
PROT SERPL-MCNC: 6.3 G/DL (ref 6–8.5)
RBC # BLD AUTO: 3.83 10*6/MM3 (ref 3.77–5.28)
SODIUM SERPL-SCNC: 135 MMOL/L (ref 136–145)
SODIUM SERPL-SCNC: 138 MMOL/L (ref 136–145)
WBC NRBC COR # BLD AUTO: 7.3 10*3/MM3 (ref 3.4–10.8)

## 2025-04-26 PROCEDURE — 85025 COMPLETE CBC W/AUTO DIFF WBC: CPT | Performed by: PHYSICIAN ASSISTANT

## 2025-04-26 PROCEDURE — 83735 ASSAY OF MAGNESIUM: CPT | Performed by: PHYSICIAN ASSISTANT

## 2025-04-26 PROCEDURE — 80053 COMPREHEN METABOLIC PANEL: CPT | Performed by: PHYSICIAN ASSISTANT

## 2025-04-26 PROCEDURE — 80048 BASIC METABOLIC PNL TOTAL CA: CPT | Performed by: PHYSICIAN ASSISTANT

## 2025-04-26 PROCEDURE — 82248 BILIRUBIN DIRECT: CPT | Performed by: PHYSICIAN ASSISTANT

## 2025-04-26 PROCEDURE — 99239 HOSP IP/OBS DSCHRG MGMT >30: CPT | Performed by: INTERNAL MEDICINE

## 2025-04-26 PROCEDURE — 84100 ASSAY OF PHOSPHORUS: CPT | Performed by: PHYSICIAN ASSISTANT

## 2025-04-26 PROCEDURE — G0378 HOSPITAL OBSERVATION PER HR: HCPCS

## 2025-04-26 PROCEDURE — 82533 TOTAL CORTISOL: CPT | Performed by: PHYSICIAN ASSISTANT

## 2025-04-26 PROCEDURE — 97161 PT EVAL LOW COMPLEX 20 MIN: CPT

## 2025-04-26 RX ADMIN — HYDROCODONE BITARTRATE AND ACETAMINOPHEN 1 TABLET: 10; 325 TABLET ORAL at 14:39

## 2025-04-26 RX ADMIN — POLYETHYLENE GLYCOL (3350) 17 G: 17 POWDER, FOR SOLUTION ORAL at 10:23

## 2025-04-26 RX ADMIN — Medication 10 ML: at 10:25

## 2025-04-26 RX ADMIN — HYDROCODONE BITARTRATE AND ACETAMINOPHEN 1 TABLET: 10; 325 TABLET ORAL at 10:24

## 2025-04-26 RX ADMIN — HYDROCODONE BITARTRATE AND ACETAMINOPHEN 1 TABLET: 10; 325 TABLET ORAL at 05:12

## 2025-04-26 RX ADMIN — PANTOPRAZOLE SODIUM 40 MG: 40 TABLET, DELAYED RELEASE ORAL at 05:12

## 2025-04-26 RX ADMIN — Medication 250 MG: at 10:24

## 2025-04-26 RX ADMIN — DULOXETINE 60 MG: 30 CAPSULE, DELAYED RELEASE ORAL at 05:12

## 2025-04-26 RX ADMIN — HYDROCODONE BITARTRATE AND ACETAMINOPHEN 1 TABLET: 10; 325 TABLET ORAL at 10:31

## 2025-04-26 NOTE — DISCHARGE SUMMARY
[facility]        HOSPITALIST  DISCHARGE SUMMARY    Patient Name: Ebony Hamilton  : 1980  MRN: [id number]    Date of Admission: 2025  Date of Discharge:  2025  Primary Care Physician: Karen Stover APRN    Consults       Date and Time Order Name Status Description    2025 11:00 AM Inpatient Cardiology Consult      2025  9:22 PM Inpatient Hospitalist Consult              Active and Resolved Hospital Problems:  Active Hospital Problems    Diagnosis POA    Orthostatic hypotension [I95.1] Yes    Gastroparesis [K31.84] Yes    Cervical radiculopathy [M54.12] Yes    NICHOLE (generalized anxiety disorder) [F41.1] Yes    Cervical fusion syndrome [Q76.1] Not Applicable      Resolved Hospital Problems    Diagnosis POA    **Syncopal episodes [R55] Yes     Recurrent syncopal episodes.  Vasovagal with some components of autonomic dysfunction and orthostasis.  Sinus bradycardia  Intermittent tachycardia  History of SVT  Chronic pain syndrome on chronic opiates  Cervical fusion syndrome  Anxiety  Depression  Gastroparesis  Hospital Course     Hospital Course:  Ebony Hamilton is a 44 y.o. female with past medical history of cervical radiculopathy, migraines, anxiety, depression, gastroparesis, A-fib not on AC, chronic pulling on opiates, and GERD presented to the ED for evaluation of multiple syncopal episodes.  Patient states that for the last 2 weeks she has not been feeling well with increased weakness, lethargy and decreased appetite.  Patient has had at least 4 syncopal episodes since then.   Patient will become dizzy upon standing and home systolic blood pressure will be in the 60s.  Patient went to PCP to be evaluated, noticed her hypotension with bradycardia and given her multiple syncopal episodes she was sent to the ED for further evaluation.  In the ED patient was bradycardic and hypertensive on arrival with positive orthostatics.  Drug screen was positive  for opiates and labs were relatively unremarkable given her chronic conditions including a negative UA, negative troponin, normal WBC, and normal magnesium level.  EKG shows sinus bradycardia with no significant ST-T changes.  When seen patient was crying about her back and neck pain asking for her home pain medications.  When asked she denied any recent fevers, chills, headaches, focal weakness, chest pain, palpitation, shortness of breath, cough, abdominal pain, nausea, vomiting, diarrhea, constipation, dysuria, hematuria, hematochezia, melena, or anxiety.  Patient admitted for further evaluation and treatment.  Patient seen by cardiology.  2D echo negative.  CT head negative.  Chest x-ray negative.     Interval Followup:   Episodes of bradycardia and low 40s.  On telemetry sinus bradycardia and mid 50s.  Blood pressure stable on room air.  Did have initial positive orthostatic systolic blood pressure  Episodes of lightheaded with blacking out feeling last night when trying to get shower resolved shortly when she leaned against the sink in the bathroom.  No passing out since in the hospital.  Cortisol low but patient has been on opiates.  Need repeat as an outpatient.  Patient cleared by cardiology to be released recommend to hold Zanaflex to avoid orthostatic.  Patient missed her MRCP scheduled by GI as an outpatient yesterday.  CMP and CBC negative    DISCHARGE Follow Up Recommendations for labs and diagnostics: PCP and cardiology.  Cardiology to do stress test and tilt table test as an outpatient.  Compression stocking as per cardiology.  Reschedule MRCP as per GI as an outpatient      Day of Discharge     Vital Signs:  Temp:  [97.7 °F (36.5 °C)-98.1 °F (36.7 °C)] 97.7 °F (36.5 °C)  Heart Rate:  [40-97] 69  Resp:  [16-20] 20  BP: (101-130)/(65-84) 121/84    Physical Exam:     Constitutional: Awake alert oriented no acute distress  Respiratory: Clear  Cardiovascular: RRR  GI: Abdomen soft nontender  Neuro: No  focal deficits  Discharge Details        Discharge Medications        Changes to Medications        Instructions Start Date   traZODone 50 MG tablet  Commonly known as: DESYREL  What changed:   how much to take  how to take this  when to take this  additional instructions   Take 0.5 to 2 tab PO QHS PRN sleep             Continue These Medications        Instructions Start Date   albuterol sulfate  (90 Base) MCG/ACT inhaler  Commonly known as: PROVENTIL HFA;VENTOLIN HFA;PROAIR HFA   2 puffs, Inhalation, Every 6 Hours PRN      busPIRone 15 MG tablet  Commonly known as: BUSPAR   15 mg, Oral, 3 Times Daily PRN      CALCIUM PO   1 tablet, Daily      clonazePAM 0.5 MG tablet  Commonly known as: KlonoPIN   0.5 mg, Oral, 2 Times Daily PRN      diphenhydrAMINE-zinc acetate 2-0.1 % cream   1 Application, Topical, 3 Times Daily PRN      DULoxetine 30 MG capsule  Commonly known as: CYMBALTA   30 mg, Every Evening      DULoxetine 60 MG capsule  Commonly known as: CYMBALTA   60 mg, Every Morning      HYDROcodone-acetaminophen  MG per tablet  Commonly known as: NORCO   1.5 tablets, Oral, Every 4 Hours PRN      ibuprofen 800 MG tablet  Commonly known as: ADVIL,MOTRIN   800 mg, Every 8 Hours Scheduled      montelukast 10 MG tablet  Commonly known as: Singulair   10 mg, Oral, Nightly      Narcan 4 MG/0.1ML nasal spray  Generic drug: naloxone   Call 911. Don't prime. [place] in 1 nostril for overdose. Repeat in 2-3 minutes in other nostril if no or minimal breathing/responsiveness.      ondansetron 4 MG tablet  Commonly known as: ZOFRAN   4 mg, Oral, Every 8 Hours PRN      pantoprazole 40 MG EC tablet  Commonly known as: PROTONIX   40 mg, Oral, Daily      polyethylene glycol 17 g packet  Commonly known as: MIRALAX   Mix 17gm (contents of 1 packet) in eight ounces of water or other beverage to drink once daily      saccharomyces boulardii 250 MG capsule  Commonly known as: Florastor   250 mg, Oral, 2 Times Daily       sennosides-docusate 8.6-50 MG per tablet  Commonly known as: PERICOLACE   1 tablet, Oral, Daily      simethicone 80 MG chewable tablet  Commonly known as: MYLICON   120 mg, Oral, 4 Times Daily Before Meals & Nightly      triamcinolone 0.025 % cream  Commonly known as: KENALOG   1 Application, Topical, Every 12 Hours Scheduled      vitamin C 500 MG tablet  Commonly known as: ASCORBIC ACID   500 mg, Oral, Daily             Stop These Medications      tiZANidine 4 MG tablet  Commonly known as: ZANAFLEX              Allergies   Allergen Reactions    Escitalopram Nausea Only and Rash     Nausea, sweating, rash     Sulfa Antibiotics Anaphylaxis    Baclofen GI Intolerance, Hives and Nausea And Vomiting    Ciprofloxacin Hallucinations    Codeine Hives    Gold-Containing Drug Products Rash    Latex Rash    Lovenox [Enoxaparin Sodium] Rash    Nickel Hives    Tegaderm Ag Mesh [Silver] Hives       Discharge Disposition:  Home or Self Care    Diet:  Diet Instructions       Diet: Regular/House Diet; Thin (IDDSI 0)      Discharge Diet: Regular/House Diet    Fluid Consistency: Thin (IDDSI 0)            Discharge Activity:   Activity Instructions       Activity as Tolerated              CODE STATUS:  Code Status and Medical Interventions: CPR (Attempt to Resuscitate); Full Support   Ordered at: 04/24/25 1679     Code Status (Patient has no pulse and is not breathing):    CPR (Attempt to Resuscitate)     Medical Interventions (Patient has pulse or is breathing):    Full Support     Level Of Support Discussed With:    Patient         Future Appointments   Date Time Provider Department Center   6/13/2025  1:00 PM Ethel Mar APRN [facility]       Additional Instructions for the Follow-ups that You Need to Schedule       Discharge Follow-up with PCP   As directed       Currently Documented PCP:    Karen Stover APRN    PCP Phone Number:    [phone]     Follow Up Details: 2 weeks        Discharge Follow-up  with Specified Provider: Dr. Carlos Piedra; 1 Week   As directed      To: Dr. Carlos Piedra   Follow Up: 1 Week   Follow Up Details: Syncope                Pertinent  and/or Most Recent Results     PROCEDURES:   * Cannot find OR case *     LAB RESULTS:      Lab 04/26/25  0457 04/25/25  1132 04/25/25 0448 04/24/25  1618   WBC 7.30  --  9.37 10.18   HEMOGLOBIN 10.9*  --  11.5* 12.5   HEMATOCRIT 35.0  --  36.6 39.9   PLATELETS 380  --  413 479*   NEUTROS ABS 4.37  --   --  7.59*   IMMATURE GRANS (ABS) 0.03  --   --  0.04   LYMPHS ABS 2.26  --   --  1.97   MONOS ABS 0.46  --   --  0.45   EOS ABS 0.14  --   --  0.08   MCV 91.4  --  90.8 90.5   D DIMER QUANT  --  0.29  --   --          Lab 04/26/25  0457 04/26/25  0006 04/25/25 0448 04/24/25  1618   SODIUM 138 135* 138 135*   POTASSIUM 4.1 4.2 3.4* 4.1   CHLORIDE 104 101 103 99   CO2 22.8 22.3 21.6* 24.1   ANION GAP 11.2 11.7 13.4 11.9   BUN 8 10 13 11   CREATININE 0.52* 0.55* 0.71 0.73   EGFR 119.1 117.5 109.0 105.5   GLUCOSE 112* 118* 120* 113*   CALCIUM 8.8 8.7 8.7 9.2   MAGNESIUM 2.0 2.0 1.9 1.9   PHOSPHORUS 4.1  --   --   --    TSH  --   --   --  1.380         Lab 04/26/25 0457 04/26/25  0006 04/24/25  1618   TOTAL PROTEIN 6.1 6.3 7.0   ALBUMIN 3.5 3.6 3.9   GLOBULIN  --  2.7 3.1   ALT (SGPT) 9 10 13   AST (SGOT) 8 9 14   BILIRUBIN 0.2 0.2 0.5   INDIRECT BILIRUBIN 0.1  --   --    BILIRUBIN DIRECT 0.1  --   --    ALK PHOS 56 59 66         Lab 04/24/25 1618   HSTROP T <6             Lab 04/24/25  1618   FOLATE 6.49   VITAMIN B 12 392         Brief Urine Lab Results  (Last result in the past 365 days)        Color   Clarity   Blood   Leuk Est   Nitrite   Protein   CREAT   Urine HCG        04/24/25 1912               Negative       04/24/25 1912 Yellow   Clear   Negative   Negative   Negative   Negative                 Microbiology Results (last 10 days)       ** No results found for the last 240 hours. **            CT Head Without Contrast  Result Date:  4/25/2025  Impression: Impression: No acute intracranial abnormality identified. Electronically Signed: Jeff Gamboa MD  4/25/2025 1:01 PM EDT  Workstation ID: [id number]    XR Chest 1 View  Result Date: 4/25/2025  Impression: Impression: No acute cardiopulmonary abnormality is identified. Electronically Signed: Romina Prieto MD  4/25/2025 12:53 PM EDT  Workstation ID: [id number]    XR Abdomen KUB  Result Date: 4/3/2025  Impression: Impression: Findings compatible with constipation. Electronically Signed: Martha Barker MD  4/3/2025 11:16 AM EDT  Workstation ID: [id number]    CT Angiogram Chest Pulmonary Embolism  Result Date: 3/30/2025  Impression: Impression: No evidence of pulmonary embolus. No acute intrathoracic findings. Electronically Signed: Ankit Barker MD  3/30/2025 2:24 PM EDT  Workstation ID: [id number]    CT Abdomen Pelvis With Contrast  Result Date: 3/30/2025  Impression: 1.Residual postoperative changes are noted status post recent cholecystectomy and appendectomy. 2.No evidence for abnormal fluid collection. No significant hemorrhage or hematoma. 3.No evidence for additional acute abnormality throughout the abdomen or pelvis. Electronically Signed: Irwin Jose MD  3/30/2025 2:23 PM EDT  Workstation ID: [id number]    XR Chest 1 View  Result Date: 3/30/2025  Impression: Impression: No acute cardiopulmonary abnormality. Electronically Signed: Alin Quiroz  3/30/2025 12:30 PM EDT  Workstation ID: [id number]              Results for orders placed during the hospital encounter of 04/24/25    Adult Transthoracic Echo Complete W/ Cont if Necessary Per Protocol    Interpretation Summary    Left ventricular ejection fraction appears to be 61 - 65%.    Left ventricular diastolic function was normal.      Imaging Results (All)       Procedure Component Value Units Date/Time    CT Head Without Contrast [[id number]] Collected: 04/25/25 1300     Updated: 04/25/25 1304    Narrative:      CT HEAD WO CONTRAST    Date  of Exam: 4/25/2025 11:32 AM EDT    Indication: syncope.    Comparison: None available.    Technique: Axial CT images were obtained of the head without contrast administration.  Reconstructed coronal and sagittal images were also obtained. Automated exposure control and iterative construction methods were used.      Findings: There is metallic artifact arising from earrings limiting the exam. Patient refused removal of earrings as per information provided by the technologist  There is no evidence of hemorrhage. There is no mass effect or midline shift.    There is no extracerebral collection.    Ventricles are normal in size and configuration for patient's stated age.     Posterior fossa is within normal limits.    Calvarium and skull base appear intact.  Visualized sinuses show no air fluid levels. Visualized orbits are unremarkable.        Impression:      Impression:  No acute intracranial abnormality identified.        Electronically Signed: Jeff Gamboa MD    4/25/2025 1:01 PM EDT    Workstation ID: [id number]    XR Chest 1 View [[id number]] Collected: 04/25/25 1252     Updated: 04/25/25 1255    Narrative:      XR CHEST 1 VW    Date of Exam: 4/25/2025 12:31 PM EDT    Indication: syncope    Comparison: AP chest x-ray 3/30/2025    Findings:  Lungs are adequately expanded and appear clear. No pneumothorax or large pleural effusion is seen. Cardiomediastinal contours appear stable.      Impression:      Impression:  No acute cardiopulmonary abnormality is identified.        Electronically Signed: Romina Prieto MD    4/25/2025 12:53 PM EDT    Workstation ID: [id number]             Labs Pending at Discharge:          Time spent on Discharge including face to face service: 31 minutes  Part of this note may be an electronic transcription/translation of spoken language to printed text using the Dragon Dictation System.     TElectronically signed by Cristhian Godfrey MD, 04/26/25, 3:01 PM EDT.

## 2025-04-26 NOTE — PLAN OF CARE
Goal Outcome Evaluation:              Outcome Evaluation: (P) Patient does not have any deficits and is able to transfer and ambulate with independence. Skilled PT services are not indicated at this time.    Anticipated Discharge Disposition (PT): (P) home

## 2025-04-26 NOTE — OUTREACH NOTE
Prep Survey      Flowsheet Row Responses   [facility] patient discharged from? [facility]   Is LACE score < 7 ? Yes   Eligibility [facility]   Date of Admission 04/24/25   Date of Discharge 04/26/25   Discharge Disposition Home or Self Care   Discharge diagnosis *Syncopal episodes   Does the patient have one of the following disease processes/diagnoses(primary or secondary)? Other   Does the patient have Home health ordered? No   Is there a DME ordered? No   Prep survey completed? Yes            NAYELY BRUCE - Registered Nurse

## 2025-04-26 NOTE — THERAPY EVALUATION
Acute Care - Physical Therapy Initial Evaluation  RENETTA Carcamo     Patient Name: Ebony Hamilton  : 1980  MRN: [id number]  Today's Date: 2025      Visit Dx:     ICD-10-CM ICD-9-CM   1. Orthostatic hypotension  I95.1 458.0   2. Difficulty walking  R26.2 719.7     Patient Active Problem List   Diagnosis    Acute postoperative pain    Allergic rhinitis    Bulge of cervical disc without myelopathy    Cervical fusion syndrome    Degeneration of intervertebral disc of cervical region    Spinal stenosis in cervical region    Impingement syndrome of shoulder region    Intractable chronic migraine without aura    Mitral valve disorder    Tricuspid valve disorder    Endometriosis    Obesity    SLAP lesion of left shoulder    NICHOLE (generalized anxiety disorder)    Blood in stool    BRBPR (bright red blood per rectum)    Constipation    Diarrhea    Lower abdominal pain    Myalgia    Cervical post-laminectomy syndrome    Cervical radiculopathy    Adenomyosis of uterus    Hemorrhoids    Biliary colic    Intractable abdominal pain    Calculus of gallbladder without cholecystitis without obstruction    Nausea and vomiting    Gastroparesis    S/P laparoscopic cholecystectomy    Bile leak from gallbladder bed    Encounter for removal of biliary stent    Appendicitis    S/P laparoscopic appendectomy    Intractable nausea and vomiting    Abdominal pain    Nausea & vomiting    Syncopal episodes    Orthostatic hypotension     Past Medical History:   Diagnosis Date    Allergic     Anxiety     Atrial fibrillation     RELEASED BY CARDIOLOGIST/ELECTROPHYSIST, NO CURRENT MEDS    Chronic pain disorder     Depression     Endometriosis     Headache     Hemorrhoids     Injury of shoulder, right 2009    CHRONIC PAIN    Panic disorder     Rectal bleeding     S/P laparoscopic appendectomy 2025    S/P laparoscopic cholecystectomy 2025     Past Surgical History:   Procedure Laterality Date    APPENDECTOMY N/A 3/9/2025     Procedure: APPENDECTOMY LAPAROSCOPIC;  Surgeon: Mingo Cannon MD;  Location: [facility] MAIN OR;  Service: General;  Laterality: N/A;    CERVICAL ARTHRODESIS  01/14/2015    Donaldo Albarran    CERVICAL FUSION      C4-7 FUSION, FULL ROM    CHOLECYSTECTOMY N/A 2/17/2025    Procedure: CHOLECYSTECTOMY LAPAROSCOPIC plain language: removal of gallbladder thru small incisions using long instruments and a camera;  Surgeon: Josué Castano MD;  Location: [facility] MAIN OR;  Service: General;  Laterality: N/A;    COLONOSCOPY N/A 05/31/2022    Procedure: COLONOSCOPY;  Surgeon: Shabbir Baird MD;  Location: [facility] ENDOSCOPY;  Service: General;  Laterality: N/A;  HEMORRHOIDS    ENDOSCOPY N/A 2/17/2025    Procedure: ESOPHAGOGASTRODUODENOSCOPY WITH BIOPSIES;  Surgeon: Sanya Reyes MD;  Location: [facility] ENDOSCOPY;  Service: Gastroenterology;  Laterality: N/A;  GASTRITIS, SMALL HIATAL HERNIA    ENDOSCOPY N/A 3/6/2025    Procedure: ESOPHAGOGASTRODUODENOSCOPY with pancreatic stent removal;  Surgeon: Ha Thompson MD;  Location: [facility] ENDOSCOPY;  Service: Gastroenterology;  Laterality: N/A;  pancreatic stent removal, hiatal hernia    ERCP N/A 2/24/2025    Procedure: ENDOSCOPIC RETROGRADE CHOLANGIOPANCREATOGRAPHY with bile duct stent placement and pancreatic duct stent placement;  Surgeon: Ha Thompson MD;  Location: [facility] ENDOSCOPY;  Service: Gastroenterology;  Laterality: N/A;  bile duct leeak    ERCP N/A 4/8/2025    Procedure: ENDOSCOPIC RETROGRADE CHOLANGIOPANCREATOGRAPHY WITH STENT REMOVAL;  Surgeon: Ha Thompson MD;  Location: [facility] ENDOSCOPY;  Service: Gastroenterology;  Laterality: N/A;  STENT REMOVAL    EXPLORATORY LAPAROTOMY      HEMORRHOIDECTOMY N/A 05/31/2022    Procedure: HEMORRHOID BANDING;  Surgeon: Shabbir Baird MD;  Location: [facility] ENDOSCOPY;  Service: General;  Laterality: N/A;  BANDS X 2    HEMORRHOIDECTOMY N/A 1/31/2024    Procedure: HEMORRHOIDECTOMY;  Surgeon:  Shabbir Baird MD;  [facility] [facility] [place];  Service: General;  Laterality: N/A;    HYSTERECTOMY      SHOULDER ARTHROSCOPY Right     SHOULDER SURGERY Left     ARTHROSCOPY LABRAL TEAR X2    TUBAL ABDOMINAL LIGATION       PT Assessment (Last 12 Hours)       PT Evaluation and Treatment       Row Name 04/26/25 1100          Physical Therapy Time and Intention    Subjective Information no complaints (P)   -RA     Document Type evaluation (P)   -RA     Mode of Treatment individual therapy;physical therapy (P)   -RA     Patient Effort good (P)   -RA     Symptoms Noted During/After Treatment none (P)   -RA       Row Name 04/26/25 1100          General Information    Patient Profile Reviewed yes (P)   -RA     Patient Observations alert;cooperative;agree to therapy (P)   -RA     Prior Level of Function independent:;all household mobility;community mobility;gait;transfer;bed mobility;ADL's (P)   -RA     Equipment Currently Used at Home none (P)   -RA     Existing Precautions/Restrictions no known precautions/restrictions (P)   -RA       Row Name 04/26/25 1100          Living Environment    Current Living Arrangements home (P)   -RA     Home Accessibility stairs to enter home (P)   -RA     People in Home spouse (P)   -RA     Primary Care Provided by self (P)   -RA       Row Name 04/26/25 1100          Home Main Entrance    Number of Stairs, Main Entrance three (P)   -RA       Row Name 04/26/25 1100          Home Use of Assistive/Adaptive Equipment    Equipment Currently Used at Home none (P)   -RA       Row Name 04/26/25 1100          Cognition    Orientation Status (Cognition) oriented x 3 (P)   -RA       Row Name 04/26/25 1100          Range of Motion (ROM)    Range of Motion ROM is WFL (P)   -RA       Row Name 04/26/25 1100          Strength (Manual Muscle Testing)    Strength (Manual Muscle Testing) strength is WFL (P)   -RA       Row Name 04/26/25 1100          Bed Mobility    Bed Mobility supine-sit (P)   -RA      Supine-Sit [place] (Bed Mobility) independent (P)   -RA       Row Name 04/26/25 1100          Transfers    Transfers sit-stand transfer;stand-sit transfer (P)   -RA       Row Name 04/26/25 1100          Sit-Stand Transfer    Sit-Stand [place] (Transfers) independent (P)   -RA       Row Name 04/26/25 1100          Stand-Sit Transfer    Stand-Sit [place] (Transfers) independent (P)   -RA       Row Name 04/26/25 1100          Gait/Stairs (Locomotion)    Gait/Stairs Locomotion gait/ambulation independence (P)   -RA     [place] Level (Gait) independent (P)   -RA     Assistive Device (Gait) other (see comments) (P)   no AD  -RA     Patient was able to Ambulate yes (P)   -RA     Distance in Feet (Gait) 300 (P)   -RA       Row Name 04/26/25 1100          Balance    Balance Assessment standing dynamic balance (P)   -RA     Dynamic Standing Balance independent (P)   -RA       Row Name 04/26/25 1100          Plan of Care Review    Outcome Evaluation Patient does not have any deficits and is able to transfer and ambulate with independence. Skilled PT services are not indicated at this time. (P)   -RA       Row Name 04/26/25 1100          Therapy Assessment/Plan (PT)    Criteria for Skilled Interventions Met (PT) no problems identified which require skilled intervention (P)   -RA     Therapy Frequency (PT) evaluation only (P)   -RA       Row Name 04/26/25 1100          PT Evaluation Complexity    History, PT Evaluation Complexity no personal factors and/or comorbidities (P)   -RA     Examination of Body Systems (PT Eval Complexity) total of 4 or more elements (P)   -RA     Clinical Presentation (PT Evaluation Complexity) stable (P)   -RA     Clinical Decision Making (PT Evaluation Complexity) low complexity (P)   -RA     Overall Complexity (PT Evaluation Complexity) low complexity (P)   -RA       Row Name 04/26/25 1100          Physical Therapy Goals    Problem Specific Goal Selection (PT) problem specific  goal 1, PT (P)   -RA       Row Name 04/26/25 1100          Problem Specific Goal 1 (PT)    Problem Specific Goal 1 (PT) Complete PT evaluation (P)   -RA     Time Frame (Problem Specific Goal 1, PT) 1 day (P)   -RA     Progress/Outcome (Problem Specific Goal 1, PT) goal met (P)   -RA               User Key  (r) = Recorded By, (t) = Taken By, (c) = Cosigned By      Initials Name Provider Type    Janette Tello, PT Student PT Student                      PT Recommendation and Plan  Anticipated Discharge Disposition (PT): (P) home  Therapy Frequency (PT): (P) evaluation only  Outcome Evaluation: (P) Patient does not have any deficits and is able to transfer and ambulate with independence. Skilled PT services are not indicated at this time.   Outcome Measures       Row Name 04/26/25 1100             How much help from another person do you currently need...    Turning from your back to your side while in flat bed without using bedrails? 4 (P)   -RA      Moving from lying on back to sitting on the side of a flat bed without bedrails? 4 (P)   -RA      Moving to and from a bed to a chair (including a wheelchair)? 4 (P)   -RA      Standing up from a chair using your arms (e.g., wheelchair, bedside chair)? 4 (P)   -RA      Climbing 3-5 steps with a railing? 4 (P)   -RA      To walk in hospital room? 4 (P)   -RA      AM-PAC 6 Clicks Score (PT) 24 (P)   -RA                User Key  (r) = Recorded By, (t) = Taken By, (c) = Cosigned By      Initials Name Provider Type    Janette Tello, PT Student PT Student                     Time Calculation:    PT Charges       Row Name 04/26/25 1144             Time Calculation    PT Received On 04/26/25 (P)   -RA         Untimed Charges    PT Eval/Re-eval Minutes 15 (P)   -RA         Total Minutes    Untimed Charges Total Minutes 15 (P)   -RA       Total Minutes 15 (P)   -RA                User Key  (r) = Recorded By, (t) = Taken By, (c) = Cosigned By      Initials Name Provider Type     Janette Tello, PT Student PT Student                      PT G-Codes  AM-PAC 6 Clicks Score (PT): (P) 24    Janette Mancuso, PT Student  4/26/2025

## 2025-04-26 NOTE — PROGRESS NOTES
[facility]     Cardiology Progress Note    Patient Name: Ebony Hamilton  : 1980  MRN: [id number]  Primary Care Physician:  Karen Stover APRN  Date of admission: 2025    Subjective   Subjective     Chief Complaint: The patient is cardiac wise stable. No recurrent syncope.     Interval HPI:    Patient with noted bradycardia but no other arrhythmias.    Review of Systems   All systems were reviewed and negative except for: generalized weakness.    Objective   Objective     Vitals:   Temp:  [97.7 °F (36.5 °C)-98.1 °F (36.7 °C)] 97.7 °F (36.5 °C)  Heart Rate:  [40-97] 69  Resp:  [16-20] 20  BP: (101-130)/(65-84) 121/84  Physical Exam      Alert  Neck: no JVD  Heart. Regular, no gallops, no murmurs.  Lungs; no rales, no wheezing  LE: no edema  Neurologic. No motor deficits.     Scheduled Meds:DULoxetine, 30 mg, Oral, Q PM  DULoxetine, 60 mg, Oral, QAM  montelukast, 10 mg, Oral, Nightly  pantoprazole, 40 mg, Oral, Q AM  polyethylene glycol, 17 g, Oral, Daily  saccharomyces boulardii, 250 mg, Oral, BID  sodium chloride, 10 mL, Intravenous, Q12H      Continuous Infusions:        Result Review    Result Review:  I have personally reviewed the results from the time of this admission to 2025 14:16 EDT and agree with these findings:  [x]  Laboratory  []  Microbiology  [x]  Radiology  [x]  EKG/Telemetry   [x]  Cardiology/Vascular   []  Pathology  []  Old records  []  Other:  Most notable findings include:     CBC          2025    16:18 2025    04:48 2025    04:57   CBC   WBC 10.18  9.37  7.30    RBC 4.41  4.03  3.83    Hemoglobin 12.5  11.5  10.9    Hematocrit 39.9  36.6  35.0    MCV 90.5  90.8  91.4    MCH 28.3  28.5  28.5    MCHC 31.3  31.4  31.1    RDW 14.6  14.6  14.8    Platelets 479  413  380      CMP          2025    16:18 2025    04:48 2025    00:06 2025    04:57   CMP   Glucose 113  120  118  112    BUN 11  13  10  8    Creatinine 0.73  0.71  0.55   0.52    EGFR 105.5  109.0  117.5  119.1    Sodium 135  138  135  138    Potassium 4.1  3.4  4.2  4.1    Chloride 99  103  101  104    Calcium 9.2  8.7  8.7  8.8    Total Protein 7.0   6.3  6.1    Albumin 3.9   3.6  3.5    Globulin 3.1   2.7     Total Bilirubin 0.5   0.2  0.2    Alkaline Phosphatase 66   59  56    AST (SGOT) 14   9  8    ALT (SGPT) 13   10  9    Albumin/Globulin Ratio 1.3   1.3     BUN/Creatinine Ratio 15.1  18.3  18.2  15.4    Anion Gap 11.9  13.4  11.7  11.2       CARDIAC LABS:      Lab 04/24/25  1618   HSTROP T <6        Assessment & Plan   Assessment / Plan     Brief Patient Summary:  Ebony Hamilton is a 44 y.o. female with Syncope, orthostatic hypotension and intermittent bradycardia.     Active Hospital Problems:  Active Hospital Problems    Diagnosis     **Syncopal episodes     Orthostatic hypotension     Gastroparesis     Cervical radiculopathy     NICHOLE (generalized anxiety disorder)     Cervical fusion syndrome            Plan:     I would continue with increase in oral fluid intake. Should wear compression stockings daily. Avoid Xanaflex or any medication that could cause bradycardia. Will re-evaluate as outpatient and consider treadmill stress test to evaluate for sinus node competence and Tilt Table Test..        CODE STATUS:   Code Status (Patient has no pulse and is not breathing): CPR (Attempt to Resuscitate)  Medical Interventions (Patient has pulse or is breathing): Full Support  Level Of Support Discussed With: Patient      Electronically signed by Porter Piedra MD, 04/26/25, 2:16 PM EDT.

## 2025-04-26 NOTE — PLAN OF CARE
"Goal Outcome Evaluation:  Plan of Care Reviewed With: patient        Progress: no change     Pt HR dropped between 42-47 momentarily and pt stated she felt \"yucky\" and that she felt tired. On call hospitalist notified and CMP, Magnesium, and EKG ordered. Will continue to monitor and notify AM nurse for Cardiology rounding.                           "

## 2025-04-28 ENCOUNTER — TRANSITIONAL CARE MANAGEMENT TELEPHONE ENCOUNTER (OUTPATIENT)
Dept: CALL CENTER | Facility: HOSPITAL | Age: 45
End: 2025-04-28
Payer: COMMERCIAL

## 2025-04-28 NOTE — OUTREACH NOTE
Call Center TCM Note      Flowsheet Row Responses   [facility] patient discharged from? [facility]   Does the patient have one of the following disease processes/diagnoses(primary or secondary)? Other   TCM attempt successful? Yes   Call start time 1551   Call end time 1552   Discharge diagnosis *Syncopal episodes   Person spoke with today (if not patient) and relationship Patient   Meds reviewed with patient/caregiver? Yes   Is the patient having any side effects they believe may be caused by any medication additions or changes? No   Does the patient have all medications ordered at discharge? Yes   Is the patient taking all medications as directed (includes completed medication regime)? Yes   Does the patient have an appointment with their PCP within 7-14 days of discharge? No   Nursing Interventions Patient declined scheduling/rescheduling appointment at this time   Psychosocial issues? No   Did the patient receive a copy of their discharge instructions? Yes   Nursing interventions Reviewed instructions with patient   What is the patient's perception of their health status since discharge? Improving   Is the patient/caregiver able to teach back signs and symptoms related to disease process for when to call PCP? Yes   Is the patient/caregiver able to teach back signs and symptoms related to disease process for when to call 911? Yes   Is the patient/caregiver able to teach back the hierarchy of who to call/visit for symptoms/problems? PCP, Specialist, Home health nurse, Urgent Care, ED, 911 Yes   If the patient is a current smoker, are they able to teach back resources for cessation? Not a smoker   TCM call completed? Yes   Wrap up additional comments Pt reports she is doing better. No needs at this time.   Call end time 1552   Would this patient benefit from a Referral to Amb Social Work? No   Is the patient interested in additional calls from an ambulatory ? No            Ryan TEJEDA - Registered  Nurse    4/28/2025, 15:52 EDT

## 2025-05-01 LAB
QT INTERVAL: 305 MS
QT INTERVAL: 423 MS
QTC INTERVAL: 393 MS
QTC INTERVAL: 426 MS

## 2025-05-05 DIAGNOSIS — R05.9 COUGH, UNSPECIFIED TYPE: ICD-10-CM

## 2025-05-05 DIAGNOSIS — F41.1 GENERALIZED ANXIETY DISORDER: ICD-10-CM

## 2025-05-06 DIAGNOSIS — F41.1 GENERALIZED ANXIETY DISORDER: ICD-10-CM

## 2025-05-06 RX ORDER — CLONAZEPAM 0.5 MG/1
0.5 TABLET ORAL 2 TIMES DAILY PRN
Qty: 40 TABLET | Refills: 0 | Status: SHIPPED | OUTPATIENT
Start: 2025-05-06

## 2025-05-06 RX ORDER — MONTELUKAST SODIUM 10 MG/1
10 TABLET ORAL NIGHTLY
Qty: 90 TABLET | Refills: 1 | Status: CANCELLED | OUTPATIENT
Start: 2025-05-06

## 2025-05-06 RX ORDER — CLONAZEPAM 0.5 MG/1
0.5 TABLET ORAL 2 TIMES DAILY PRN
Qty: 40 TABLET | Refills: 0 | OUTPATIENT
Start: 2025-05-06

## 2025-05-07 ENCOUNTER — TELEPHONE (OUTPATIENT)
Dept: FAMILY MEDICINE CLINIC | Facility: CLINIC | Age: 45
End: 2025-05-07
Payer: COMMERCIAL

## 2025-05-07 DIAGNOSIS — R42 POSTURAL DIZZINESS WITH NEAR SYNCOPE: ICD-10-CM

## 2025-05-07 DIAGNOSIS — R00.1 SYMPTOMATIC BRADYCARDIA: Primary | ICD-10-CM

## 2025-05-07 DIAGNOSIS — R55 POSTURAL DIZZINESS WITH NEAR SYNCOPE: ICD-10-CM

## 2025-05-07 NOTE — TELEPHONE ENCOUNTER
Spoke with patient's  regarding patient's current status. She was admitted to the hospital again recently and told that she may have some sort of autonomic nervous system dysfunction due to all the abdominal surgeries she has had. Her blood pressure and pulse continue to stay low and she has passed out 2 times in the past 4 days. They recommended she see cardiology for a stress test and tilt table test. Patient's  would like guidance as to whether she should see cardiology or if there is something else that can be done as patient is not doing well. Please advise.

## 2025-06-02 ENCOUNTER — APPOINTMENT (OUTPATIENT)
Dept: MRI IMAGING | Facility: HOSPITAL | Age: 45
End: 2025-06-02
Payer: COMMERCIAL

## 2025-06-02 ENCOUNTER — HOSPITAL ENCOUNTER (INPATIENT)
Facility: HOSPITAL | Age: 45
LOS: 5 days | Discharge: HOME | End: 2025-06-10
Attending: EMERGENCY MEDICINE | Admitting: INTERNAL MEDICINE
Payer: COMMERCIAL

## 2025-06-02 ENCOUNTER — APPOINTMENT (OUTPATIENT)
Dept: CT IMAGING | Facility: HOSPITAL | Age: 45
End: 2025-06-02
Payer: COMMERCIAL

## 2025-06-02 DIAGNOSIS — R10.13 EPIGASTRIC PAIN: ICD-10-CM

## 2025-06-02 DIAGNOSIS — D72.829 LEUKOCYTOSIS, UNSPECIFIED TYPE: ICD-10-CM

## 2025-06-02 DIAGNOSIS — R11.2 INTRACTABLE NAUSEA AND VOMITING: ICD-10-CM

## 2025-06-02 DIAGNOSIS — E87.20 LACTIC ACIDOSIS: ICD-10-CM

## 2025-06-02 DIAGNOSIS — R11.14 BILIOUS VOMITING WITH NAUSEA: ICD-10-CM

## 2025-06-02 DIAGNOSIS — R10.9 INTRACTABLE ABDOMINAL PAIN: Primary | ICD-10-CM

## 2025-06-02 LAB
ALBUMIN SERPL-MCNC: 4.7 G/DL (ref 3.5–5.2)
ALBUMIN/GLOB SERPL: 1.5 G/DL
ALP SERPL-CCNC: 73 U/L (ref 39–117)
ALT SERPL W P-5'-P-CCNC: 10 U/L (ref 1–33)
ANION GAP SERPL CALCULATED.3IONS-SCNC: 20.9 MMOL/L (ref 5–15)
AST SERPL-CCNC: 11 U/L (ref 1–32)
BASOPHILS # BLD AUTO: 0.07 10*3/MM3 (ref 0–0.2)
BASOPHILS NFR BLD AUTO: 0.4 % (ref 0–1.5)
BILIRUB SERPL-MCNC: 0.5 MG/DL (ref 0–1.2)
BILIRUB UR QL STRIP: NEGATIVE
BUN SERPL-MCNC: 8.8 MG/DL (ref 6–20)
BUN/CREAT SERPL: 12.1 (ref 7–25)
CALCIUM SPEC-SCNC: 9.9 MG/DL (ref 8.6–10.5)
CHLORIDE SERPL-SCNC: 106 MMOL/L (ref 98–107)
CLARITY UR: CLEAR
CO2 SERPL-SCNC: 19.1 MMOL/L (ref 22–29)
COLOR UR: YELLOW
CREAT SERPL-MCNC: 0.73 MG/DL (ref 0.57–1)
D-LACTATE SERPL-SCNC: 2.3 MMOL/L (ref 0.5–2)
D-LACTATE SERPL-SCNC: 2.7 MMOL/L (ref 0.5–2)
D-LACTATE SERPL-SCNC: 4.4 MMOL/L (ref 0.5–2)
DEPRECATED RDW RBC AUTO: 47.7 FL (ref 37–54)
EGFRCR SERPLBLD CKD-EPI 2021: 105.5 ML/MIN/1.73
EOSINOPHIL # BLD AUTO: 0.02 10*3/MM3 (ref 0–0.4)
EOSINOPHIL NFR BLD AUTO: 0.1 % (ref 0.3–6.2)
ERYTHROCYTE [DISTWIDTH] IN BLOOD BY AUTOMATED COUNT: 14.8 % (ref 12.3–15.4)
GLOBULIN UR ELPH-MCNC: 3.1 GM/DL
GLUCOSE SERPL-MCNC: 120 MG/DL (ref 65–99)
GLUCOSE UR STRIP-MCNC: NEGATIVE MG/DL
HCT VFR BLD AUTO: 41.5 % (ref 34–46.6)
HGB BLD-MCNC: 13.6 G/DL (ref 12–15.9)
HGB UR QL STRIP.AUTO: NEGATIVE
HOLD SPECIMEN: NORMAL
HOLD SPECIMEN: NORMAL
IMM GRANULOCYTES # BLD AUTO: 0.17 10*3/MM3 (ref 0–0.05)
IMM GRANULOCYTES NFR BLD AUTO: 0.9 % (ref 0–0.5)
KETONES UR QL STRIP: ABNORMAL
LEUKOCYTE ESTERASE UR QL STRIP.AUTO: NEGATIVE
LIPASE SERPL-CCNC: 22 U/L (ref 13–60)
LYMPHOCYTES # BLD AUTO: 1.69 10*3/MM3 (ref 0.7–3.1)
LYMPHOCYTES NFR BLD AUTO: 9 % (ref 19.6–45.3)
MCH RBC QN AUTO: 28.9 PG (ref 26.6–33)
MCHC RBC AUTO-ENTMCNC: 32.8 G/DL (ref 31.5–35.7)
MCV RBC AUTO: 88.1 FL (ref 79–97)
MONOCYTES # BLD AUTO: 0.77 10*3/MM3 (ref 0.1–0.9)
MONOCYTES NFR BLD AUTO: 4.1 % (ref 5–12)
NEUTROPHILS NFR BLD AUTO: 16.01 10*3/MM3 (ref 1.7–7)
NEUTROPHILS NFR BLD AUTO: 85.5 % (ref 42.7–76)
NITRITE UR QL STRIP: NEGATIVE
NRBC BLD AUTO-RTO: 0 /100 WBC (ref 0–0.2)
PH UR STRIP.AUTO: 5.5 [PH] (ref 5–8)
PLATELET # BLD AUTO: 569 10*3/MM3 (ref 140–450)
PMV BLD AUTO: 9.9 FL (ref 6–12)
POTASSIUM SERPL-SCNC: 3.6 MMOL/L (ref 3.5–5.2)
PROT SERPL-MCNC: 7.8 G/DL (ref 6–8.5)
PROT UR QL STRIP: NEGATIVE
RBC # BLD AUTO: 4.71 10*6/MM3 (ref 3.77–5.28)
SODIUM SERPL-SCNC: 146 MMOL/L (ref 136–145)
SP GR UR STRIP: 1.01 (ref 1–1.03)
UROBILINOGEN UR QL STRIP: ABNORMAL
WBC NRBC COR # BLD AUTO: 18.73 10*3/MM3 (ref 3.4–10.8)
WHOLE BLOOD HOLD COAG: NORMAL
WHOLE BLOOD HOLD SPECIMEN: NORMAL

## 2025-06-02 PROCEDURE — 83605 ASSAY OF LACTIC ACID: CPT | Performed by: EMERGENCY MEDICINE

## 2025-06-02 PROCEDURE — 25010000002 HYDROMORPHONE 1 MG/ML SOLUTION: Performed by: EMERGENCY MEDICINE

## 2025-06-02 PROCEDURE — 99285 EMERGENCY DEPT VISIT HI MDM: CPT

## 2025-06-02 PROCEDURE — 93010 ELECTROCARDIOGRAM REPORT: CPT | Performed by: INTERNAL MEDICINE

## 2025-06-02 PROCEDURE — 25810000003 SODIUM CHLORIDE 0.9 % SOLUTION 250 ML FLEX CONT: Performed by: INTERNAL MEDICINE

## 2025-06-02 PROCEDURE — 25010000002 HYDROMORPHONE 1 MG/ML SOLUTION: Performed by: INTERNAL MEDICINE

## 2025-06-02 PROCEDURE — 36415 COLL VENOUS BLD VENIPUNCTURE: CPT

## 2025-06-02 PROCEDURE — 74177 CT ABD & PELVIS W/CONTRAST: CPT

## 2025-06-02 PROCEDURE — 93005 ELECTROCARDIOGRAM TRACING: CPT | Performed by: EMERGENCY MEDICINE

## 2025-06-02 PROCEDURE — 87040 BLOOD CULTURE FOR BACTERIA: CPT | Performed by: EMERGENCY MEDICINE

## 2025-06-02 PROCEDURE — 25510000001 IOPAMIDOL PER 1 ML: Performed by: EMERGENCY MEDICINE

## 2025-06-02 PROCEDURE — 25010000002 DIPHENHYDRAMINE PER 50 MG: Performed by: EMERGENCY MEDICINE

## 2025-06-02 PROCEDURE — G0378 HOSPITAL OBSERVATION PER HR: HCPCS

## 2025-06-02 PROCEDURE — 81003 URINALYSIS AUTO W/O SCOPE: CPT | Performed by: EMERGENCY MEDICINE

## 2025-06-02 PROCEDURE — 25010000002 KETOROLAC TROMETHAMINE PER 15 MG: Performed by: EMERGENCY MEDICINE

## 2025-06-02 PROCEDURE — 25010000002 ACETAMINOPHEN 10 MG/ML SOLUTION: Performed by: EMERGENCY MEDICINE

## 2025-06-02 PROCEDURE — 25010000002 DROPERIDOL PER 5 MG: Performed by: EMERGENCY MEDICINE

## 2025-06-02 PROCEDURE — 25010000002 CEFTRIAXONE PER 250 MG: Performed by: INTERNAL MEDICINE

## 2025-06-02 PROCEDURE — 80053 COMPREHEN METABOLIC PANEL: CPT | Performed by: EMERGENCY MEDICINE

## 2025-06-02 PROCEDURE — 25010000002 AZITHROMYCIN PER 500 MG: Performed by: INTERNAL MEDICINE

## 2025-06-02 PROCEDURE — 25810000003 SODIUM CHLORIDE 0.9 % SOLUTION: Performed by: EMERGENCY MEDICINE

## 2025-06-02 PROCEDURE — 85025 COMPLETE CBC W/AUTO DIFF WBC: CPT | Performed by: EMERGENCY MEDICINE

## 2025-06-02 PROCEDURE — 25010000002 ONDANSETRON PER 1 MG: Performed by: INTERNAL MEDICINE

## 2025-06-02 PROCEDURE — 25010000002 ONDANSETRON PER 1 MG: Performed by: EMERGENCY MEDICINE

## 2025-06-02 PROCEDURE — 83605 ASSAY OF LACTIC ACID: CPT | Performed by: INTERNAL MEDICINE

## 2025-06-02 PROCEDURE — 25010000002 PROCHLORPERAZINE 10 MG/2ML SOLUTION: Performed by: INTERNAL MEDICINE

## 2025-06-02 PROCEDURE — 99222 1ST HOSP IP/OBS MODERATE 55: CPT | Performed by: INTERNAL MEDICINE

## 2025-06-02 PROCEDURE — 83690 ASSAY OF LIPASE: CPT | Performed by: EMERGENCY MEDICINE

## 2025-06-02 PROCEDURE — 25010000002 DIPHENHYDRAMINE PER 50 MG: Performed by: INTERNAL MEDICINE

## 2025-06-02 PROCEDURE — 25010000002 KETOROLAC TROMETHAMINE PER 15 MG: Performed by: INTERNAL MEDICINE

## 2025-06-02 PROCEDURE — 63710000001 PROMETHAZINE PER 25 MG: Performed by: INTERNAL MEDICINE

## 2025-06-02 PROCEDURE — 63710000001 DIPHENHYDRAMINE PER 50 MG: Performed by: INTERNAL MEDICINE

## 2025-06-02 PROCEDURE — 25810000003 SODIUM CHLORIDE 0.9 % SOLUTION: Performed by: INTERNAL MEDICINE

## 2025-06-02 RX ORDER — PROCHLORPERAZINE MALEATE 10 MG
10 TABLET ORAL EVERY 6 HOURS PRN
Status: ON HOLD | COMMUNITY
Start: 2025-05-07 | End: 2025-06-10

## 2025-06-02 RX ORDER — ONDANSETRON 2 MG/ML
4 INJECTION INTRAMUSCULAR; INTRAVENOUS ONCE
Status: COMPLETED | OUTPATIENT
Start: 2025-06-02 | End: 2025-06-02

## 2025-06-02 RX ORDER — DIPHENHYDRAMINE HCL 25 MG
25 CAPSULE ORAL EVERY 6 HOURS PRN
Status: DISCONTINUED | OUTPATIENT
Start: 2025-06-02 | End: 2025-06-02

## 2025-06-02 RX ORDER — POLYETHYLENE GLYCOL 3350 17 G/17G
17 POWDER, FOR SOLUTION ORAL DAILY PRN
Status: DISCONTINUED | OUTPATIENT
Start: 2025-06-02 | End: 2025-06-10 | Stop reason: HOSPADM

## 2025-06-02 RX ORDER — PROCHLORPERAZINE EDISYLATE 5 MG/ML
10 INJECTION INTRAMUSCULAR; INTRAVENOUS EVERY 8 HOURS PRN
Status: DISCONTINUED | OUTPATIENT
Start: 2025-06-02 | End: 2025-06-10 | Stop reason: HOSPADM

## 2025-06-02 RX ORDER — SODIUM CHLORIDE 9 MG/ML
75 INJECTION, SOLUTION INTRAVENOUS CONTINUOUS
Status: DISCONTINUED | OUTPATIENT
Start: 2025-06-02 | End: 2025-06-03

## 2025-06-02 RX ORDER — HYDROCODONE BITARTRATE AND ACETAMINOPHEN 10; 325 MG/1; MG/1
1 TABLET ORAL EVERY 6 HOURS PRN
Refills: 0 | Status: DISCONTINUED | OUTPATIENT
Start: 2025-06-02 | End: 2025-06-05

## 2025-06-02 RX ORDER — ACETAMINOPHEN 10 MG/ML
1000 INJECTION, SOLUTION INTRAVENOUS ONCE
Status: COMPLETED | OUTPATIENT
Start: 2025-06-02 | End: 2025-06-03

## 2025-06-02 RX ORDER — DULOXETIN HYDROCHLORIDE 30 MG/1
30 CAPSULE, DELAYED RELEASE ORAL EVERY EVENING
Status: DISCONTINUED | OUTPATIENT
Start: 2025-06-02 | End: 2025-06-10 | Stop reason: HOSPADM

## 2025-06-02 RX ORDER — SODIUM CHLORIDE 9 MG/ML
40 INJECTION, SOLUTION INTRAVENOUS AS NEEDED
Status: DISCONTINUED | OUTPATIENT
Start: 2025-06-02 | End: 2025-06-10 | Stop reason: HOSPADM

## 2025-06-02 RX ORDER — PROMETHAZINE HYDROCHLORIDE 25 MG/1
25 TABLET ORAL EVERY 8 HOURS PRN
Status: DISCONTINUED | OUTPATIENT
Start: 2025-06-02 | End: 2025-06-02

## 2025-06-02 RX ORDER — ACETAMINOPHEN 325 MG/1
650 TABLET ORAL EVERY 6 HOURS PRN
Status: DISCONTINUED | OUTPATIENT
Start: 2025-06-02 | End: 2025-06-10 | Stop reason: HOSPADM

## 2025-06-02 RX ORDER — BISACODYL 10 MG
10 SUPPOSITORY, RECTAL RECTAL DAILY PRN
Status: DISCONTINUED | OUTPATIENT
Start: 2025-06-02 | End: 2025-06-10 | Stop reason: HOSPADM

## 2025-06-02 RX ORDER — AMOXICILLIN 250 MG
2 CAPSULE ORAL 2 TIMES DAILY PRN
Status: DISCONTINUED | OUTPATIENT
Start: 2025-06-02 | End: 2025-06-10 | Stop reason: HOSPADM

## 2025-06-02 RX ORDER — DIPHENHYDRAMINE HYDROCHLORIDE 50 MG/ML
25 INJECTION, SOLUTION INTRAMUSCULAR; INTRAVENOUS ONCE
Status: COMPLETED | OUTPATIENT
Start: 2025-06-02 | End: 2025-06-02

## 2025-06-02 RX ORDER — ACETAMINOPHEN 10 MG/ML
1000 INJECTION, SOLUTION INTRAVENOUS ONCE
Status: COMPLETED | OUTPATIENT
Start: 2025-06-02 | End: 2025-06-02

## 2025-06-02 RX ORDER — CLONAZEPAM 0.5 MG/1
0.5 TABLET ORAL 2 TIMES DAILY PRN
Status: DISCONTINUED | OUTPATIENT
Start: 2025-06-02 | End: 2025-06-05

## 2025-06-02 RX ORDER — POLYETHYLENE GLYCOL 3350 17 G/17G
17 POWDER, FOR SOLUTION ORAL DAILY
Status: DISCONTINUED | OUTPATIENT
Start: 2025-06-02 | End: 2025-06-05

## 2025-06-02 RX ORDER — KETOROLAC TROMETHAMINE 30 MG/ML
30 INJECTION, SOLUTION INTRAMUSCULAR; INTRAVENOUS ONCE
Status: COMPLETED | OUTPATIENT
Start: 2025-06-02 | End: 2025-06-02

## 2025-06-02 RX ORDER — MONTELUKAST SODIUM 10 MG/1
10 TABLET ORAL NIGHTLY
Status: DISCONTINUED | OUTPATIENT
Start: 2025-06-02 | End: 2025-06-10 | Stop reason: HOSPADM

## 2025-06-02 RX ORDER — SODIUM CHLORIDE 0.9 % (FLUSH) 0.9 %
10 SYRINGE (ML) INJECTION EVERY 12 HOURS SCHEDULED
Status: DISCONTINUED | OUTPATIENT
Start: 2025-06-02 | End: 2025-06-10 | Stop reason: HOSPADM

## 2025-06-02 RX ORDER — METOCLOPRAMIDE HYDROCHLORIDE 5 MG/ML
10 INJECTION INTRAMUSCULAR; INTRAVENOUS ONCE
Status: DISCONTINUED | OUTPATIENT
Start: 2025-06-02 | End: 2025-06-03

## 2025-06-02 RX ORDER — DROPERIDOL 2.5 MG/ML
2.5 INJECTION, SOLUTION INTRAMUSCULAR; INTRAVENOUS ONCE
Status: COMPLETED | OUTPATIENT
Start: 2025-06-02 | End: 2025-06-02

## 2025-06-02 RX ORDER — BISACODYL 5 MG/1
5 TABLET, DELAYED RELEASE ORAL DAILY PRN
Status: DISCONTINUED | OUTPATIENT
Start: 2025-06-02 | End: 2025-06-10 | Stop reason: HOSPADM

## 2025-06-02 RX ORDER — KETOROLAC TROMETHAMINE 15 MG/ML
15 INJECTION, SOLUTION INTRAMUSCULAR; INTRAVENOUS EVERY 6 HOURS PRN
Status: DISPENSED | OUTPATIENT
Start: 2025-06-02 | End: 2025-06-07

## 2025-06-02 RX ORDER — ONDANSETRON 2 MG/ML
4 INJECTION INTRAMUSCULAR; INTRAVENOUS EVERY 6 HOURS PRN
Status: DISCONTINUED | OUTPATIENT
Start: 2025-06-02 | End: 2025-06-10 | Stop reason: HOSPADM

## 2025-06-02 RX ORDER — SODIUM CHLORIDE 0.9 % (FLUSH) 0.9 %
10 SYRINGE (ML) INJECTION AS NEEDED
Status: DISCONTINUED | OUTPATIENT
Start: 2025-06-02 | End: 2025-06-10 | Stop reason: HOSPADM

## 2025-06-02 RX ORDER — DULOXETIN HYDROCHLORIDE 30 MG/1
60 CAPSULE, DELAYED RELEASE ORAL EVERY MORNING
Status: DISCONTINUED | OUTPATIENT
Start: 2025-06-02 | End: 2025-06-10 | Stop reason: HOSPADM

## 2025-06-02 RX ORDER — DIPHENHYDRAMINE HYDROCHLORIDE 50 MG/ML
12.5 INJECTION, SOLUTION INTRAMUSCULAR; INTRAVENOUS EVERY 6 HOURS PRN
Status: DISCONTINUED | OUTPATIENT
Start: 2025-06-02 | End: 2025-06-04

## 2025-06-02 RX ORDER — SODIUM CHLORIDE 0.9 % (FLUSH) 0.9 %
10 SYRINGE (ML) INJECTION AS NEEDED
Status: DISCONTINUED | OUTPATIENT
Start: 2025-06-02 | End: 2025-06-08

## 2025-06-02 RX ORDER — SIMETHICONE 80 MG
120 TABLET,CHEWABLE ORAL
Status: DISCONTINUED | OUTPATIENT
Start: 2025-06-02 | End: 2025-06-06

## 2025-06-02 RX ORDER — TRAZODONE HYDROCHLORIDE 50 MG/1
50 TABLET ORAL NIGHTLY
Status: DISCONTINUED | OUTPATIENT
Start: 2025-06-02 | End: 2025-06-10 | Stop reason: HOSPADM

## 2025-06-02 RX ORDER — IOPAMIDOL 755 MG/ML
100 INJECTION, SOLUTION INTRAVASCULAR
Status: COMPLETED | OUTPATIENT
Start: 2025-06-02 | End: 2025-06-02

## 2025-06-02 RX ORDER — PANTOPRAZOLE SODIUM 40 MG/1
40 TABLET, DELAYED RELEASE ORAL DAILY
Status: DISCONTINUED | OUTPATIENT
Start: 2025-06-02 | End: 2025-06-03

## 2025-06-02 RX ADMIN — CEFTRIAXONE SODIUM 2000 MG: 2 INJECTION, POWDER, FOR SOLUTION INTRAMUSCULAR; INTRAVENOUS at 10:17

## 2025-06-02 RX ADMIN — ONDANSETRON 4 MG: 2 INJECTION INTRAMUSCULAR; INTRAVENOUS at 15:08

## 2025-06-02 RX ADMIN — KETOROLAC TROMETHAMINE 15 MG: 15 INJECTION, SOLUTION INTRAMUSCULAR; INTRAVENOUS at 21:11

## 2025-06-02 RX ADMIN — SODIUM CHLORIDE 75 ML/HR: 9 INJECTION, SOLUTION INTRAVENOUS at 09:43

## 2025-06-02 RX ADMIN — CLONAZEPAM 0.5 MG: 0.5 TABLET ORAL at 16:34

## 2025-06-02 RX ADMIN — ACETAMINOPHEN 1000 MG: 10 INJECTION INTRAVENOUS at 05:56

## 2025-06-02 RX ADMIN — PROCHLORPERAZINE EDISYLATE 10 MG: 5 INJECTION INTRAMUSCULAR; INTRAVENOUS at 10:17

## 2025-06-02 RX ADMIN — KETOROLAC TROMETHAMINE 15 MG: 15 INJECTION, SOLUTION INTRAMUSCULAR; INTRAVENOUS at 13:33

## 2025-06-02 RX ADMIN — DIPHENHYDRAMINE HYDROCHLORIDE 25 MG: 50 INJECTION, SOLUTION INTRAMUSCULAR; INTRAVENOUS at 04:58

## 2025-06-02 RX ADMIN — HYDROMORPHONE HYDROCHLORIDE 1 MG: 1 INJECTION, SOLUTION INTRAMUSCULAR; INTRAVENOUS; SUBCUTANEOUS at 03:46

## 2025-06-02 RX ADMIN — HYDROMORPHONE HYDROCHLORIDE 0.5 MG: 1 INJECTION, SOLUTION INTRAMUSCULAR; INTRAVENOUS; SUBCUTANEOUS at 11:24

## 2025-06-02 RX ADMIN — AZITHROMYCIN DIHYDRATE 500 MG: 500 INJECTION, POWDER, LYOPHILIZED, FOR SOLUTION INTRAVENOUS at 10:17

## 2025-06-02 RX ADMIN — DIPHENHYDRAMINE HYDROCHLORIDE 12.5 MG: 50 INJECTION, SOLUTION INTRAMUSCULAR; INTRAVENOUS at 21:20

## 2025-06-02 RX ADMIN — SODIUM CHLORIDE 1000 ML: 9 INJECTION, SOLUTION INTRAVENOUS at 04:52

## 2025-06-02 RX ADMIN — HYDROCODONE BITARTRATE AND ACETAMINOPHEN 1 TABLET: 10; 325 TABLET ORAL at 09:15

## 2025-06-02 RX ADMIN — SODIUM CHLORIDE 1000 ML: 9 INJECTION, SOLUTION INTRAVENOUS at 03:43

## 2025-06-02 RX ADMIN — IOPAMIDOL 100 ML: 755 INJECTION, SOLUTION INTRAVENOUS at 04:11

## 2025-06-02 RX ADMIN — DULOXETINE 30 MG: 30 CAPSULE, DELAYED RELEASE ORAL at 18:07

## 2025-06-02 RX ADMIN — KETOROLAC TROMETHAMINE 30 MG: 30 INJECTION, SOLUTION INTRAMUSCULAR; INTRAVENOUS at 07:16

## 2025-06-02 RX ADMIN — DROPERIDOL 2.5 MG: 2.5 INJECTION, SOLUTION INTRAMUSCULAR; INTRAVENOUS at 04:48

## 2025-06-02 RX ADMIN — DIPHENHYDRAMINE HYDROCHLORIDE 25 MG: 25 CAPSULE ORAL at 09:47

## 2025-06-02 RX ADMIN — SIMETHICONE 120 MG: 80 TABLET, CHEWABLE ORAL at 18:07

## 2025-06-02 RX ADMIN — PROMETHAZINE HYDROCHLORIDE 25 MG: 25 TABLET ORAL at 14:01

## 2025-06-02 RX ADMIN — PROCHLORPERAZINE EDISYLATE 10 MG: 5 INJECTION INTRAMUSCULAR; INTRAVENOUS at 21:11

## 2025-06-02 RX ADMIN — HYDROCODONE BITARTRATE AND ACETAMINOPHEN 1 TABLET: 10; 325 TABLET ORAL at 15:08

## 2025-06-02 RX ADMIN — Medication 10 ML: at 21:12

## 2025-06-02 RX ADMIN — ONDANSETRON 4 MG: 2 INJECTION INTRAMUSCULAR; INTRAVENOUS at 09:14

## 2025-06-02 RX ADMIN — ONDANSETRON 4 MG: 2 INJECTION INTRAMUSCULAR; INTRAVENOUS at 03:45

## 2025-06-02 RX ADMIN — Medication 10 ML: at 09:43

## 2025-06-02 NOTE — H&P
[facility]   HOSPITALIST HISTORY AND PHYSICAL  Date: 2025   Patient Name: Ebony Hamilton  : 1980  MRN: [id number]  Primary Care Physician:  Karen Stover APRN  Date of admission: 2025    Subjective   Subjective     Chief Complaint: abdominal pain with nausea and vomiting     HPI:    Ebony Hamilton is a 44 y.o. female with previous history of anxiety, cervical disc disease, opioid dependence, depression, history of atrial fibrillation who presented for abdominal pain nausea and vomiting.  Patient underwent a cholecystectomy in 2025 with subsequent bile leak patient then underwent ERCP with drain placement in April.  After that patient developed appendicitis and had an appendectomy.  Patient presented to the hospital today as she states since last night she developed nausea vomiting and upper epigastric abdominal pain.  Patient has required significant amount of pain medication and antiemetics without any improvement in her symptoms.  Patient had a CT of the abdomen and pelvis which showed new mild infiltrates in the lower lobe of the right lung possibly may represent pneumonia however no acute abdominal process noted.  Patient was noted to have an elevated white count at 18,000.  Patient's lactic acid was elevated at 4.4 has improved to 2.7.  Patient's sodium elevated at 146.  Electrolytes within normal limits.  Hospitalist has been asked for admission for further evaluation including GI evaluation of her abdominal pain.       Personal History     Past Medical History:  Past Medical History:   Diagnosis Date    Allergic     Anxiety     Atrial fibrillation     RELEASED BY CARDIOLOGIST/ELECTROPHYSIST, NO CURRENT MEDS    Chronic pain disorder     Depression     Endometriosis     Headache     Hemorrhoids     Injury of shoulder, right 2009    CHRONIC PAIN    Panic disorder     Rectal bleeding     S/P laparoscopic appendectomy 2025    S/P laparoscopic  cholecystectomy 02/17/2025       Past Surgical History:  Past Surgical History:   Procedure Laterality Date    APPENDECTOMY N/A 3/9/2025    Procedure: APPENDECTOMY LAPAROSCOPIC;  Surgeon: Mingo Cannon MD;  Location: [facility] MAIN OR;  Service: General;  Laterality: N/A;    CERVICAL ARTHRODESIS  01/14/2015    Donaldo Albarran    CERVICAL FUSION      C4-7 FUSION, FULL ROM    CHOLECYSTECTOMY N/A 2/17/2025    Procedure: CHOLECYSTECTOMY LAPAROSCOPIC plain language: removal of gallbladder thru small incisions using long instruments and a camera;  Surgeon: Josué Castano MD;  Location: [facility] MAIN OR;  Service: General;  Laterality: N/A;    COLONOSCOPY N/A 05/31/2022    Procedure: COLONOSCOPY;  Surgeon: Shabbir Baird MD;  Location: [facility] ENDOSCOPY;  Service: General;  Laterality: N/A;  HEMORRHOIDS    ENDOSCOPY N/A 2/17/2025    Procedure: ESOPHAGOGASTRODUODENOSCOPY WITH BIOPSIES;  Surgeon: Sanya Reyes MD;  Location: [facility] ENDOSCOPY;  Service: Gastroenterology;  Laterality: N/A;  GASTRITIS, SMALL HIATAL HERNIA    ENDOSCOPY N/A 3/6/2025    Procedure: ESOPHAGOGASTRODUODENOSCOPY with pancreatic stent removal;  Surgeon: Ha Thompson MD;  Location: [facility] ENDOSCOPY;  Service: Gastroenterology;  Laterality: N/A;  pancreatic stent removal, hiatal hernia    ERCP N/A 2/24/2025    Procedure: ENDOSCOPIC RETROGRADE CHOLANGIOPANCREATOGRAPHY with bile duct stent placement and pancreatic duct stent placement;  Surgeon: Ha Thompson MD;  Location: [facility] ENDOSCOPY;  Service: Gastroenterology;  Laterality: N/A;  bile duct leeak    ERCP N/A 4/8/2025    Procedure: ENDOSCOPIC RETROGRADE CHOLANGIOPANCREATOGRAPHY WITH STENT REMOVAL;  Surgeon: Ha Thompson MD;  Location: [facility] ENDOSCOPY;  Service: Gastroenterology;  Laterality: N/A;  STENT REMOVAL    EXPLORATORY LAPAROTOMY      HEMORRHOIDECTOMY N/A 05/31/2022    Procedure: HEMORRHOID BANDING;  Surgeon: Shabbir Baird MD;  Location: [facility]  ENDOSCOPY;  Service: General;  Laterality: N/A;  BANDS X 2    HEMORRHOIDECTOMY N/A 2024    Procedure: HEMORRHOIDECTOMY;  Surgeon: Shabbir Baird MD;  Location: [facility] OR [facility];  Service: General;  Laterality: N/A;    HYSTERECTOMY      SHOULDER ARTHROSCOPY Right     SHOULDER SURGERY Left     ARTHROSCOPY LABRAL TEAR X2    TUBAL ABDOMINAL LIGATION         Family History:   Family History   Problem Relation Age of Onset    Depression Mother     Bipolar disorder Mother     Anxiety disorder Mother     Cancer Mother     Heart disease Mother     Hypertension Mother     Anxiety disorder Father     Hypertension Father     Dementia Paternal Uncle     Dementia Paternal Grandmother     Heart disease Other     Colon cancer Neg Hx     Malig Hyperthermia Neg Hx        Social History:   Social History     Socioeconomic History    Marital status:    Tobacco Use    Smoking status: Former     Current packs/day: 0.00     Average packs/day: 0.3 packs/day for 23.3 years (6.0 ttl pk-yrs)     Types: Cigarettes     Start date: 1999     Quit date: 2019     Years since quittin.4    Smokeless tobacco: Never   Vaping Use    Vaping status: Never Used   Substance and Sexual Activity    Alcohol use: Not Currently     Comment: rarely    Drug use: Never    Sexual activity: Yes     Partners: Male     Birth control/protection: Tubal ligation, Hysterectomy       Home Medications:  Calcium, DULoxetine, HYDROcodone-acetaminophen, albuterol sulfate HFA, clonazePAM, diphenhydrAMINE-zinc acetate, ibuprofen, montelukast, naloxone, ondansetron, pantoprazole, polyethylene glycol, prochlorperazine, sennosides-docusate, simethicone, tiZANidine, traZODone, and vitamin C    Allergies:  Allergies   Allergen Reactions    Escitalopram Nausea Only and Rash     Nausea, sweating, rash     Sulfa Antibiotics Anaphylaxis    Baclofen GI Intolerance, Hives and Nausea And Vomiting    Ciprofloxacin Hallucinations    Codeine Hives    Gold-Containing  Drug Products Rash    Latex Rash    Lovenox [Enoxaparin Sodium] Rash    Nickel Hives    Tegaderm Ag Mesh [Silver] Hives       Review of Systems  History obtained from the patient  General ROS: Positive for Fatigue, Negative for - chills, fever, malaise, or night sweats  Psychological ROS: negative for - anxiety, depression, hallucinations, or mood swings  Ophthalmic ROS: negative for - blurry vision, double vision, or itchy eyes  ENT ROS: negative for - headaches, nasal congestion, sneezing, or sore throat  Hematological and Lymphatic ROS: negative for - bleeding problems, bruising, or jaundice  Endocrine ROS: negative for - malaise/lethargy, polydipsia/polyuria, or temperature intolerance  Respiratory ROS: negative for - cough, orthopnea, shortness of breath, sputum changes, tachypnea, or wheezing  Cardiovascular ROS: negative for - chest pain, dyspnea on exertion, edema, palpitations, or paroxysmal nocturnal dyspnea  Gastrointestinal ROS: Positive for nausea vomiting and diffuse abdominal pain  Genito-Urinary ROS: negative for - change in urinary stream, hematuria, incontinence, or urinary frequency/urgency  Musculoskeletal ROS: Positive for chronic cervical neck pain  Neurological ROS: negative for - confusion, dizziness, gait disturbance, headaches, impaired coordination/balance, memory loss, numbness/tingling, seizures, or visual changes  Dermatological ROS: negative for dry skin and rash       Objective   Objective     Vitals:   Temp:  [97.7 °F (36.5 °C)-99.3 °F (37.4 °C)] 99.3 °F (37.4 °C)  Heart Rate:  [67-96] 87  Resp:  [17-24] 22  BP: (163-188)/(106-112) 181/106    Physical Exam    Constitutional: Resting in bed sleeping.  When awoken patient states she is having 10 out of 10 pain   Eyes: Pupils equal, sclerae anicteric, no conjunctival injection   HENT: NCAT, mucous membranes moist   Neck: Supple, no thyromegaly, no lymphadenopathy, trachea midline   Respiratory: Clear to auscultation bilaterally,  "nonlabored respirations    Cardiovascular: RRR, no murmurs, rubs, or gallops, palpable pedal pulses bilaterally   Gastrointestinal: Positive bowel sounds, soft, tenderness to palpation in the epigastric region   Musculoskeletal: No bilateral ankle edema, no clubbing or cyanosis to extremities   Psychiatric: Patient is tearful   Neurologic: Oriented x 3, strength symmetric in all extremities, Cranial Nerves grossly intact to confrontation, speech clear   Skin: No rashes     Result Review    Result Review:  I have personally reviewed the results from the time of this admission to 6/2/2025 08:19 EDT and agree with these findings:  [x]  Laboratory  BMP          4/25/2025    04:48 4/26/2025    00:06 4/26/2025    04:57 6/2/2025    03:18   BMP   BUN 13  10  8  8.8    Creatinine 0.71  0.55  0.52  0.73    Sodium 138  135  138  146    Potassium 3.4  4.2  4.1  3.6    Chloride 103  101  104  106    CO2 21.6  22.3  22.8  19.1    Calcium 8.7  8.7  8.8  9.9      CBC          4/25/2025    04:48 4/26/2025    04:57 6/2/2025    03:18   CBC   WBC 9.37  7.30  18.73    RBC 4.03  3.83  4.71    Hemoglobin 11.5  10.9  13.6    Hematocrit 36.6  35.0  41.5    MCV 90.8  91.4  88.1    MCH 28.5  28.5  28.9    MCHC 31.4  31.1  32.8    RDW 14.6  14.8  14.8    Platelets 413  380  569        [x]  Microbiology  No results found for: \"ACANTHNAEG\", \"AFBCX\", \"BPERTUSSISCX\", \"BLOODCX\"  No results found for: \"BCIDPCR\", \"CXREFLEX\", \"CSFCX\", \"CULTURETIS\"  No results found for: \"CULTURES\", \"HSVCX\", \"URCX\"  No results found for: \"EYECULTURE\", \"GCCX\", \"HSVCULTURE\", \"LABHSV\"  No results found for: \"LEGIONELLA\", \"MRSACX\", \"MUMPSCX\", \"MYCOPLASCX\"  No results found for: \"NOCARDIACX\", \"STOOLCX\"  No results found for: \"THROATCX\", \"UNSTIMCULT\", \"URINECX\", \"CULTURE\", \"VZVCULTUR\"  No results found for: \"VIRALCULTU\", \"WOUNDCX\"    [x]  Radiology     CT Abdomen Pelvis With Contrast  Result Date: 6/2/2025  CT ABDOMEN PELVIS W CONTRAST-  Date of exam: 6/2/2025 4:10 AM.  " Comparisons: 3/30/2025; 3/24/2025; 3/9/2025; 2/24/2025; 6/13/2024.  INDICATIONS: 44-year-old female w/ h/o upper abdominal pain & nausea for 1 day; + leukocytosis.  TECHNIQUE: Axial CT images were obtained of the abdomen and pelvis following the uneventful intravenous administration of 80 mL (or less) of Isovue-370 contrast agent. Reconstructed 2D (two-dimensional) coronal and sagittal images were also obtained. Automated exposure control and iterative construction methods were used. Additionally, the radiation dose reduction device was turned on for each scan per the ALARA (As Low as Reasonably Achievable) protocol. No oral contrast agent was administered for the study. Please see the electronic medical record, EMR (i.e., Epic), for the documented dose of intravenous contrast agent as well as the radiation dose. Despite best efforts, poor image quality limits interpretation of the study. For instance, motion artifact obscures detail. Repeat (delayed) CT images were also obtained.  FINDINGS: New mild groundglass opacities involve the anterior aspect of the lower lobe of the right lung, such as seen on image 5 of series 5 and adjacent images. These findings are nonspecific. They may be a manifestation of an infectious/inflammatory pneumonitis/pneumonia. The patient has undergone cholecystectomy, appendectomy, and hysterectomy. The previously seen common bile duct stent has been removed (since 3/30/2025). There is dilatation of the extrahepatic biliary tree without definite CT evidence of choledocholithiasis. There is mild pneumobilia, seen previously. No acute pancreatitis. Probably no significant dilatation of the main pancreatic duct. Please correlate with pertinent lab values. There are colonic diverticula without acute diverticulitis. There are suspected small renal cysts. There is a new 3 cm left adnexal cyst. It probably represents a normal functional ovarian cyst. The right ovary is not clearly visualized. It  may be surgically absent. No other acute findings are seen. The other incidental/chronic findings are as described in prior exam reports.       Impression: New mild infiltrates are seen in the lower lobe of the right lung and may represent pneumonia. There has been interval removal of the common bile duct stent with a small amount of pneumobilia. Motion artifact obscures detail. No other definite acute findings are appreciated. Please see above comments for further detail.   Portions of this note were completed with a voice recognition program.  6/2/2025 4:34 AM by Romario Romero MD on Workstation: [id number]        []  EKG/Telemetry   []  Cardiology/Vascular   []  Pathology  []  Old records  []  Other:      Assessment & Plan   Assessment / Plan     Assessment/Plan:   Abdominal pain of unclear etiology  Nausea and vomiting  Lactic acidosis  Possible right lower lobe pneumonia however patient does not have any respiratory symptoms  Leukocytosis  Anxiety and depression  Chronic opiate dependence  Cervical degenerative disc disease  History of gastroparesis        PLAN  -- Patient will be admitted for observation  --Dr. Thompson has been consulted for ongoing abdominal pain. MRCP pending  -- At this time continue antiemetics and continue patient's home pain medication.  Would avoid IV narcotics in the setting of history of gastroparesis  -- At this time we will start patient on IV antibiotics to cover for possible pneumonia  -- Resume home medication  --Continue patient on IV fluids  --Advance diet as tolerated  -- Repeat CBC and BMP in the morning to monitor electrolytes      VTE Prophylaxis:  Mechanical VTE prophylaxis orders are present.        CODE STATUS:    Code Status (Patient has no pulse and is not breathing): CPR (Attempt to Resuscitate)  Medical Interventions (Patient has pulse or is breathing): Full Support          Electronically signed by Horacio Adams DO, 06/02/25, 8:19 AM EDT.

## 2025-06-02 NOTE — H&P (VIEW-ONLY)
[facility]  Initial Inpatient Consult Note    Referring Provider: ED    Reason for Consultation: Abdominal pain    Subjective     History of present illness:    44 y.o. female with a past medical history of anxiety, A-fib, and panic disorder presented to the ED for evaluation of abdominal pain.  Patient had a cholecystectomy back in February which had a complication related to the clips slipped off causing subsequent bile leak.  Patient then underwent ERCP with drain placement in April.  She then developed appendicitis and had a laparoscopic appendectomy.  She then was admitted to the hospital for vasovagal syncope and today she presented to the ED for abdominal pain that started around 11 PM last night with associated nausea and vomiting.  Patient states she is having epigastric pain that radiates through to her back.  During visit patient is in tears and seems to be in a lot of pain so she was unable to provide adequate history.    6/2/2025 CT abdomen/pelvis  New mild infiltrates are seen in the lower lobe of the right lung and  may represent pneumonia. There has been interval removal of the common  bile duct stent with a small amount of pneumobilia. Motion artifact  obscures detail. No other definite acute findings are appreciated.  Please see above comments for further detail.     Patient's LFTs are normal  WBC-18.73  Platelets-569  Lactate-2.7    Past Medical History:  Past Medical History:   Diagnosis Date    Allergic     Anxiety     Atrial fibrillation     RELEASED BY CARDIOLOGIST/ELECTROPHYSIST, NO CURRENT MEDS    Chronic pain disorder     Depression     Endometriosis     Headache     Hemorrhoids     Injury of shoulder, right 2009    CHRONIC PAIN    Panic disorder     Rectal bleeding 2010    S/P laparoscopic appendectomy 03/09/2025    S/P laparoscopic cholecystectomy 02/17/2025     Past Surgical History:  Past Surgical History:   Procedure Laterality Date    APPENDECTOMY N/A 3/9/2025     Procedure: APPENDECTOMY LAPAROSCOPIC;  Surgeon: Mingo Cannon MD;  Location: [facility] MAIN OR;  Service: General;  Laterality: N/A;    CERVICAL ARTHRODESIS  01/14/2015    Donaldo Albarran    CERVICAL FUSION      C4-7 FUSION, FULL ROM    CHOLECYSTECTOMY N/A 2/17/2025    Procedure: CHOLECYSTECTOMY LAPAROSCOPIC plain language: removal of gallbladder thru small incisions using long instruments and a camera;  Surgeon: Josué Castano MD;  Location: [facility] MAIN OR;  Service: General;  Laterality: N/A;    COLONOSCOPY N/A 05/31/2022    Procedure: COLONOSCOPY;  Surgeon: Shabbir Baird MD;  Location: [facility] ENDOSCOPY;  Service: General;  Laterality: N/A;  HEMORRHOIDS    ENDOSCOPY N/A 2/17/2025    Procedure: ESOPHAGOGASTRODUODENOSCOPY WITH BIOPSIES;  Surgeon: Sanya Reyes MD;  Location: [facility] ENDOSCOPY;  Service: Gastroenterology;  Laterality: N/A;  GASTRITIS, SMALL HIATAL HERNIA    ENDOSCOPY N/A 3/6/2025    Procedure: ESOPHAGOGASTRODUODENOSCOPY with pancreatic stent removal;  Surgeon: Ha Thompson MD;  Location: [facility] ENDOSCOPY;  Service: Gastroenterology;  Laterality: N/A;  pancreatic stent removal, hiatal hernia    ERCP N/A 2/24/2025    Procedure: ENDOSCOPIC RETROGRADE CHOLANGIOPANCREATOGRAPHY with bile duct stent placement and pancreatic duct stent placement;  Surgeon: Ha Thompson MD;  Location: [facility] ENDOSCOPY;  Service: Gastroenterology;  Laterality: N/A;  bile duct leeak    ERCP N/A 4/8/2025    Procedure: ENDOSCOPIC RETROGRADE CHOLANGIOPANCREATOGRAPHY WITH STENT REMOVAL;  Surgeon: Ha Thompson MD;  Location: [facility] ENDOSCOPY;  Service: Gastroenterology;  Laterality: N/A;  STENT REMOVAL    EXPLORATORY LAPAROTOMY      HEMORRHOIDECTOMY N/A 05/31/2022    Procedure: HEMORRHOID BANDING;  Surgeon: Shabbir Baird MD;  Location: [facility] ENDOSCOPY;  Service: General;  Laterality: N/A;  BANDS X 2    HEMORRHOIDECTOMY N/A 1/31/2024    Procedure: HEMORRHOIDECTOMY;  Surgeon:  Shabbir Baird MD;  Location: [facility] OR [facility];  Service: General;  Laterality: N/A;    HYSTERECTOMY      SHOULDER ARTHROSCOPY Right     SHOULDER SURGERY Left     ARTHROSCOPY LABRAL TEAR X2    TUBAL ABDOMINAL LIGATION        Social History:   Social History     Tobacco Use    Smoking status: Former     Current packs/day: 0.00     Average packs/day: 0.3 packs/day for 23.3 years (6.0 ttl pk-yrs)     Types: Cigarettes     Start date: 1999     Quit date: 2019     Years since quittin.4    Smokeless tobacco: Never   Substance Use Topics    Alcohol use: Not Currently     Comment: rarely      Family History:  Family History   Problem Relation Age of Onset    Depression Mother     Bipolar disorder Mother     Anxiety disorder Mother     Cancer Mother     Heart disease Mother     Hypertension Mother     Anxiety disorder Father     Hypertension Father     Dementia Paternal Uncle     Dementia Paternal Grandmother     Heart disease Other     Colon cancer Neg Hx     Malig Hyperthermia Neg Hx        Home Meds:  Medications Prior to Admission   Medication Sig Dispense Refill Last Dose/Taking    albuterol sulfate  (90 Base) MCG/ACT inhaler Inhale 2 puffs Every 6 (Six) Hours As Needed for Wheezing. 18 g 1 Taking As Needed    CALCIUM PO Take 1 tablet by mouth Daily.   2025    clonazePAM (KlonoPIN) 0.5 MG tablet Take 1 tablet by mouth 2 (Two) Times a Day As Needed for Anxiety. 40 tablet 0 Taking As Needed    diphenhydrAMINE-zinc acetate 2-0.1 % cream Apply 1 Application topically to the appropriate area as directed 3 (Three) Times a Day As Needed for Itching or Rash. 15 g 0 Taking As Needed    DULoxetine (CYMBALTA) 30 MG capsule Take 1 capsule by mouth Every Evening.   2025    DULoxetine (CYMBALTA) 60 MG capsule Take 1 capsule by mouth Every Morning.   2025    HYDROcodone-acetaminophen (NORCO)  MG per tablet Take 1.5 tablets by mouth Every 4 (Four) Hours As Needed for Moderate Pain or Severe  Pain. 27 tablet 0 6/1/2025    ibuprofen (ADVIL,MOTRIN) 800 MG tablet Take 1 tablet by mouth Every 8 (Eight) Hours.   Taking    montelukast (Singulair) 10 MG tablet Take 1 tablet by mouth Every Night. 90 tablet 1 Taking    ondansetron (ZOFRAN) 4 MG tablet Take 1 tablet by mouth Every 8 (Eight) Hours As Needed for Nausea or Vomiting. 15 tablet 0 Taking As Needed    pantoprazole (PROTONIX) 40 MG EC tablet Take 1 tablet by mouth Daily. 90 tablet 3 6/1/2025    polyethylene glycol (MIRALAX) 17 g packet Mix 17gm (contents of 1 packet) in eight ounces of water or other beverage to drink once daily 7 packet 0 Taking    prochlorperazine (COMPAZINE) 10 MG tablet Take 1 tablet by mouth Every 6 (Six) Hours As Needed for Nausea or Vomiting.   Taking As Needed    sennosides-docusate (senna-docusate sodium) 8.6-50 MG per tablet Take 1 tablet by mouth Daily. 30 tablet 2 Taking    simethicone (MYLICON) 80 MG chewable tablet Chew 1.5 tablets 4 (Four) Times a Day Before Meals & at Bedtime.   Taking    tiZANidine (ZANAFLEX) 4 MG tablet TAKE 1-2 TABLETS EVERY 6 HOURS AS NEEDED FOR MUSCLE SPASMS   Taking    traZODone (DESYREL) 50 MG tablet Take 0.5 to 2 tab PO QHS PRN sleep (Patient taking differently: Take 1 tablet by mouth Every Night.) 90 tablet 2 Taking Differently    vitamin C (ASCORBIC ACID) 500 MG tablet Take 1 tablet by mouth Daily.   6/1/2025    naloxone (NARCAN) 4 MG/0.1ML nasal spray Call 911. Don't prime. [place] in 1 nostril for overdose. Repeat in 2-3 minutes in other nostril if no or minimal breathing/responsiveness. (Patient taking differently: Administer 1 spray into the nostril(s) as directed by provider As Needed. Call 911. Don't prime. [place] in 1 nostril for overdose. Repeat in 2-3 minutes in other nostril if no or minimal breathing/responsiveness.) 2 each 0      Current Meds:   DULoxetine, 30 mg, Oral, Q PM  DULoxetine, 60 mg, Oral, QAM  metoclopramide, 10 mg, Intravenous, Once  montelukast, 10 mg, Oral,  Nightly  pantoprazole, 40 mg, Oral, Daily  polyethylene glycol, 17 g, Oral, Daily  simethicone, 120 mg, Oral, 4x Daily AC & at Bedtime  sodium chloride, 10 mL, Intravenous, Q12H  traZODone, 50 mg, Oral, Nightly      Allergies:  Allergies   Allergen Reactions    Escitalopram Nausea Only and Rash     Nausea, sweating, rash     Sulfa Antibiotics Anaphylaxis    Baclofen GI Intolerance, Hives and Nausea And Vomiting    Ciprofloxacin Hallucinations    Codeine Hives    Gold-Containing Drug Products Rash    Latex Rash    Lovenox [Enoxaparin Sodium] Rash    Nickel Hives    Tegaderm Ag Mesh [Silver] Hives     Review of Systems  Pertinent items are noted in HPI     Objective     Vital Signs  Temp:  [97.7 °F (36.5 °C)-99.3 °F (37.4 °C)] 98.5 °F (36.9 °C)  Heart Rate:  [67-96] 69  Resp:  [17-24] 22  BP: (163-188)/(106-112) 173/109  Physical Exam:  General Appearance:    Alert, cooperative, in no acute distress   Head:    Normocephalic, without obvious abnormality, atraumatic   Eyes:          conjunctivae and sclerae normal, no icterus   Throat:   no thrush, oral mucosa moist   Neck:   Supple, no adenopathy   Lungs:   Unlabored breathing    Heart:    Regular rhythm and normal rate    Chest Wall:    No abnormalities observed   Abdomen:     Soft, non distended, tender in epigastric area   Extremities:   no edema, no redness   Skin:   No bruising or rash   Psychiatric:  normal mood and insight     Results Review:   I reviewed the patient's new clinical results.    Results from last 7 days   Lab Units 06/02/25  0318   WBC 10*3/mm3 18.73*   HEMOGLOBIN g/dL 13.6   HEMATOCRIT % 41.5   PLATELETS 10*3/mm3 569*     Results from last 7 days   Lab Units 06/02/25  0318   SODIUM mmol/L 146*   POTASSIUM mmol/L 3.6   CHLORIDE mmol/L 106   CO2 mmol/L 19.1*   BUN mg/dL 8.8   CREATININE mg/dL 0.73   CALCIUM mg/dL 9.9   BILIRUBIN mg/dL 0.5   ALK PHOS U/L 73   ALT (SGPT) U/L 10   AST (SGOT) U/L 11   GLUCOSE mg/dL 120*         Lab Results   Lab Value  Date/Time    LIPASE 22 06/02/2025 0318    LIPASE 35 03/30/2025 1209    LIPASE 41 03/08/2025 2329    LIPASE 20 02/22/2025 1301    LIPASE 25 02/16/2025 2217    LIPASE 29 02/02/2024 0412       Radiology:  CT Abdomen Pelvis With Contrast  Result Date: 6/2/2025  New mild infiltrates are seen in the lower lobe of the right lung and may represent pneumonia. There has been interval removal of the common bile duct stent with a small amount of pneumobilia. Motion artifact obscures detail. No other definite acute findings are appreciated. Please see above comments for further detail.   Portions of this note were completed with a voice recognition program.  6/2/2025 4:34 AM by Romario Romero MD on Workstation: [employer]         Assessment & Plan     Abdominal pain       Assessment:  Abdominal pain  Nausea vomiting    Plan:  Will order MRCP stat  Patient's LFTs are normal, lipase is normal  Patient needs to be on broad-spectrum antibiotic to treat pneumonia  Will await results of MRCP  Patient understands and agrees to the plan      I discussed the patients findings and my recommendations with patient.    Electronically signed by MILDRED Vazquez, 06/02/25, 9:45 AM EDT.    Patient examined and interviewed.  I agree with above.  Patient is having epigastric pain with nausea vomiting.  Etiology is not clear.  Patient lipase was normal and liver enzymes are also completely within normal limits.  Her initial lactate level was mildly elevated.  CT scan of abdomen pelvis does not show any evidence of pancreatitis or bowel ischemia.  I will repeat stat lactate level to make sure the lactate level is normalized.  Also check results of MRCP.  Depending on the course and results of these tests we may decide to do upper endoscopy in the morning if patient continued to have abdominal pain.  Risk and benefits have been discussed.

## 2025-06-02 NOTE — CONSULTS
[facility]  Initial Inpatient Consult Note    Referring Provider: ED    Reason for Consultation: Abdominal pain    Subjective     History of present illness:    44 y.o. female with a past medical history of anxiety, A-fib, and panic disorder presented to the ED for evaluation of abdominal pain.  Patient had a cholecystectomy back in February which had a complication related to the clips slipped off causing subsequent bile leak.  Patient then underwent ERCP with drain placement in April.  She then developed appendicitis and had a laparoscopic appendectomy.  She then was admitted to the hospital for vasovagal syncope and today she presented to the ED for abdominal pain that started around 11 PM last night with associated nausea and vomiting.  Patient states she is having epigastric pain that radiates through to her back.  During visit patient is in tears and seems to be in a lot of pain so she was unable to provide adequate history.    6/2/2025 CT abdomen/pelvis  New mild infiltrates are seen in the lower lobe of the right lung and  may represent pneumonia. There has been interval removal of the common  bile duct stent with a small amount of pneumobilia. Motion artifact  obscures detail. No other definite acute findings are appreciated.  Please see above comments for further detail.     Patient's LFTs are normal  WBC-18.73  Platelets-569  Lactate-2.7    Past Medical History:  Past Medical History:   Diagnosis Date    Allergic     Anxiety     Atrial fibrillation     RELEASED BY CARDIOLOGIST/ELECTROPHYSIST, NO CURRENT MEDS    Chronic pain disorder     Depression     Endometriosis     Headache     Hemorrhoids     Injury of shoulder, right 2009    CHRONIC PAIN    Panic disorder     Rectal bleeding 2010    S/P laparoscopic appendectomy 03/09/2025    S/P laparoscopic cholecystectomy 02/17/2025     Past Surgical History:  Past Surgical History:   Procedure Laterality Date    APPENDECTOMY N/A 3/9/2025     Procedure: APPENDECTOMY LAPAROSCOPIC;  Surgeon: Mingo Cannon MD;  Location: [facility] MAIN OR;  Service: General;  Laterality: N/A;    CERVICAL ARTHRODESIS  01/14/2015    Donaldo Albarran    CERVICAL FUSION      C4-7 FUSION, FULL ROM    CHOLECYSTECTOMY N/A 2/17/2025    Procedure: CHOLECYSTECTOMY LAPAROSCOPIC plain language: removal of gallbladder thru small incisions using long instruments and a camera;  Surgeon: Josué Castano MD;  Location: [facility] MAIN OR;  Service: General;  Laterality: N/A;    COLONOSCOPY N/A 05/31/2022    Procedure: COLONOSCOPY;  Surgeon: Shabbir Baird MD;  Location: [facility] ENDOSCOPY;  Service: General;  Laterality: N/A;  HEMORRHOIDS    ENDOSCOPY N/A 2/17/2025    Procedure: ESOPHAGOGASTRODUODENOSCOPY WITH BIOPSIES;  Surgeon: Sanya Reyes MD;  Location: [facility] ENDOSCOPY;  Service: Gastroenterology;  Laterality: N/A;  GASTRITIS, SMALL HIATAL HERNIA    ENDOSCOPY N/A 3/6/2025    Procedure: ESOPHAGOGASTRODUODENOSCOPY with pancreatic stent removal;  Surgeon: Ha Thompson MD;  Location: [facility] ENDOSCOPY;  Service: Gastroenterology;  Laterality: N/A;  pancreatic stent removal, hiatal hernia    ERCP N/A 2/24/2025    Procedure: ENDOSCOPIC RETROGRADE CHOLANGIOPANCREATOGRAPHY with bile duct stent placement and pancreatic duct stent placement;  Surgeon: Ha Thompson MD;  Location: [facility] ENDOSCOPY;  Service: Gastroenterology;  Laterality: N/A;  bile duct leeak    ERCP N/A 4/8/2025    Procedure: ENDOSCOPIC RETROGRADE CHOLANGIOPANCREATOGRAPHY WITH STENT REMOVAL;  Surgeon: Ha Thompson MD;  Location: [facility] ENDOSCOPY;  Service: Gastroenterology;  Laterality: N/A;  STENT REMOVAL    EXPLORATORY LAPAROTOMY      HEMORRHOIDECTOMY N/A 05/31/2022    Procedure: HEMORRHOID BANDING;  Surgeon: Shabbir Baird MD;  Location: [facility] ENDOSCOPY;  Service: General;  Laterality: N/A;  BANDS X 2    HEMORRHOIDECTOMY N/A 1/31/2024    Procedure: HEMORRHOIDECTOMY;  Surgeon:  Shabbir Baird MD;  [place] [facility] [place] [facility]  Service: General;  Laterality: N/A;    HYSTERECTOMY      SHOULDER ARTHROSCOPY Right     SHOULDER SURGERY Left     ARTHROSCOPY LABRAL TEAR X2    TUBAL ABDOMINAL LIGATION        Social History:   Social History     Tobacco Use    Smoking status: Former     Current packs/day: 0.00     Average packs/day: 0.3 packs/day for 23.3 years (6.0 ttl pk-yrs)     Types: Cigarettes     Start date: 1999     Quit date: 2019     Years since quittin.4    Smokeless tobacco: Never   Substance Use Topics    Alcohol use: Not Currently     Comment: rarely      Family History:  Family History   Problem Relation Age of Onset    Depression Mother     Bipolar disorder Mother     Anxiety disorder Mother     Cancer Mother     Heart disease Mother     Hypertension Mother     Anxiety disorder Father     Hypertension Father     Dementia Paternal Uncle     Dementia Paternal Grandmother     Heart disease Other     Colon cancer Neg Hx     Malig Hyperthermia Neg Hx        Home Meds:  Medications Prior to Admission   Medication Sig Dispense Refill Last Dose/Taking    albuterol sulfate  (90 Base) MCG/ACT inhaler Inhale 2 puffs Every 6 (Six) Hours As Needed for Wheezing. 18 g 1 Taking As Needed    CALCIUM PO Take 1 tablet by mouth Daily.   2025    clonazePAM (KlonoPIN) 0.5 MG tablet Take 1 tablet by mouth 2 (Two) Times a Day As Needed for Anxiety. 40 tablet 0 Taking As Needed    diphenhydrAMINE-zinc acetate 2-0.1 % cream Apply 1 Application topically to the appropriate area as directed 3 (Three) Times a Day As Needed for Itching or Rash. 15 g 0 Taking As Needed    DULoxetine (CYMBALTA) 30 MG capsule Take 1 capsule by mouth Every Evening.   2025    DULoxetine (CYMBALTA) 60 MG capsule Take 1 capsule by mouth Every Morning.   2025    HYDROcodone-acetaminophen (NORCO)  MG per tablet Take 1.5 tablets by mouth Every 4 (Four) Hours As Needed for Moderate Pain or Severe  Pain. 27 tablet 0 6/1/2025    ibuprofen (ADVIL,MOTRIN) 800 MG tablet Take 1 tablet by mouth Every 8 (Eight) Hours.   Taking    montelukast (Singulair) 10 MG tablet Take 1 tablet by mouth Every Night. 90 tablet 1 Taking    ondansetron (ZOFRAN) 4 MG tablet Take 1 tablet by mouth Every 8 (Eight) Hours As Needed for Nausea or Vomiting. 15 tablet 0 Taking As Needed    pantoprazole (PROTONIX) 40 MG EC tablet Take 1 tablet by mouth Daily. 90 tablet 3 6/1/2025    polyethylene glycol (MIRALAX) 17 g packet Mix 17gm (contents of 1 packet) in eight ounces of water or other beverage to drink once daily 7 packet 0 Taking    prochlorperazine (COMPAZINE) 10 MG tablet Take 1 tablet by mouth Every 6 (Six) Hours As Needed for Nausea or Vomiting.   Taking As Needed    sennosides-docusate (senna-docusate sodium) 8.6-50 MG per tablet Take 1 tablet by mouth Daily. 30 tablet 2 Taking    simethicone (MYLICON) 80 MG chewable tablet Chew 1.5 tablets 4 (Four) Times a Day Before Meals & at Bedtime.   Taking    tiZANidine (ZANAFLEX) 4 MG tablet TAKE 1-2 TABLETS EVERY 6 HOURS AS NEEDED FOR MUSCLE SPASMS   Taking    traZODone (DESYREL) 50 MG tablet Take 0.5 to 2 tab PO QHS PRN sleep (Patient taking differently: Take 1 tablet by mouth Every Night.) 90 tablet 2 Taking Differently    vitamin C (ASCORBIC ACID) 500 MG tablet Take 1 tablet by mouth Daily.   6/1/2025    naloxone (NARCAN) 4 MG/0.1ML nasal spray Call 911. Don't prime. [place] in 1 nostril for overdose. Repeat in 2-3 minutes in other nostril if no or minimal breathing/responsiveness. (Patient taking differently: Administer 1 spray into the nostril(s) as directed by provider As Needed. Call 911. Don't prime. [place] in 1 nostril for overdose. Repeat in 2-3 minutes in other nostril if no or minimal breathing/responsiveness.) 2 each 0      Current Meds:   DULoxetine, 30 mg, Oral, Q PM  DULoxetine, 60 mg, Oral, QAM  metoclopramide, 10 mg, Intravenous, Once  montelukast, 10 mg, Oral,  Nightly  pantoprazole, 40 mg, Oral, Daily  polyethylene glycol, 17 g, Oral, Daily  simethicone, 120 mg, Oral, 4x Daily AC & at Bedtime  sodium chloride, 10 mL, Intravenous, Q12H  traZODone, 50 mg, Oral, Nightly      Allergies:  Allergies   Allergen Reactions    Escitalopram Nausea Only and Rash     Nausea, sweating, rash     Sulfa Antibiotics Anaphylaxis    Baclofen GI Intolerance, Hives and Nausea And Vomiting    Ciprofloxacin Hallucinations    Codeine Hives    Gold-Containing Drug Products Rash    Latex Rash    Lovenox [Enoxaparin Sodium] Rash    Nickel Hives    Tegaderm Ag Mesh [Silver] Hives     Review of Systems  Pertinent items are noted in HPI     Objective     Vital Signs  Temp:  [97.7 °F (36.5 °C)-99.3 °F (37.4 °C)] 98.5 °F (36.9 °C)  Heart Rate:  [67-96] 69  Resp:  [17-24] 22  BP: (163-188)/(106-112) 173/109  Physical Exam:  General Appearance:    Alert, cooperative, in no acute distress   Head:    Normocephalic, without obvious abnormality, atraumatic   Eyes:          conjunctivae and sclerae normal, no icterus   Throat:   no thrush, oral mucosa moist   Neck:   Supple, no adenopathy   Lungs:   Unlabored breathing    Heart:    Regular rhythm and normal rate    Chest Wall:    No abnormalities observed   Abdomen:     Soft, non distended, tender in epigastric area   Extremities:   no edema, no redness   Skin:   No bruising or rash   Psychiatric:  normal mood and insight     Results Review:   I reviewed the patient's new clinical results.    Results from last 7 days   Lab Units 06/02/25  0318   WBC 10*3/mm3 18.73*   HEMOGLOBIN g/dL 13.6   HEMATOCRIT % 41.5   PLATELETS 10*3/mm3 569*     Results from last 7 days   Lab Units 06/02/25  0318   SODIUM mmol/L 146*   POTASSIUM mmol/L 3.6   CHLORIDE mmol/L 106   CO2 mmol/L 19.1*   BUN mg/dL 8.8   CREATININE mg/dL 0.73   CALCIUM mg/dL 9.9   BILIRUBIN mg/dL 0.5   ALK PHOS U/L 73   ALT (SGPT) U/L 10   AST (SGOT) U/L 11   GLUCOSE mg/dL 120*         Lab Results   Lab Value  Date/Time    LIPASE 22 06/02/2025 0318    LIPASE 35 03/30/2025 1209    LIPASE 41 03/08/2025 2329    LIPASE 20 02/22/2025 1301    LIPASE 25 02/16/2025 2217    LIPASE 29 02/02/2024 0412       Radiology:  CT Abdomen Pelvis With Contrast  Result Date: 6/2/2025  New mild infiltrates are seen in the lower lobe of the right lung and may represent pneumonia. There has been interval removal of the common bile duct stent with a small amount of pneumobilia. Motion artifact obscures detail. No other definite acute findings are appreciated. Please see above comments for further detail.   Portions of this note were completed with a voice recognition program.  6/2/2025 4:34 AM by Romario Romero MD on Workstation: [email or web address]         Assessment & Plan     Abdominal pain       Assessment:  Abdominal pain  Nausea vomiting    Plan:  Will order MRCP stat  Patient's LFTs are normal, lipase is normal  Patient needs to be on broad-spectrum antibiotic to treat pneumonia  Will await results of MRCP  Patient understands and agrees to the plan      I discussed the patients findings and my recommendations with patient.    Electronically signed by MILDRED Vazquez, 06/02/25, 9:45 AM EDT.    Patient examined and interviewed.  I agree with above.  Patient is having epigastric pain with nausea vomiting.  Etiology is not clear.  Patient lipase was normal and liver enzymes are also completely within normal limits.  Her initial lactate level was mildly elevated.  CT scan of abdomen pelvis does not show any evidence of pancreatitis or bowel ischemia.  I will repeat stat lactate level to make sure the lactate level is normalized.  Also check results of MRCP.  Depending on the course and results of these tests we may decide to do upper endoscopy in the morning if patient continued to have abdominal pain.  Risk and benefits have been discussed.

## 2025-06-02 NOTE — PLAN OF CARE
Goal Outcome Evaluation:           Progress: no change  Outcome Evaluation: Patient aox4, vss. Patient c/o pain to upper abdomen and middle back, given pain medication as ordered with little relief of pain. Patient c/o nausea/vomiting, given medication as ordered, with little relief of symptoms. Patient is shaking and crying at this time. Patient tried x2 to have abdominal mri but unable to lay flat for the procedure. Notified MD. Continues on NS @ 75ml/hr, tolerating well. Skin is intact and no edema noted. Lungs are CTA, room air. Abdomen is soft and tender, bowel sounds are positive. Patient oriented to room and call light in reach.

## 2025-06-02 NOTE — ED PROVIDER NOTES
Time: 4:51 AM EDT  Date of encounter:  6/2/2025  Independent Historian/Clinical History and Information was obtained by:   Patient    History is limited by: N/A    Chief Complaint: abdominal pain      History of Present Illness:  Patient is a 44 y.o. year old female who presents to the emergency department for evaluation of Abdominal pain.  Patient had a cholecystectomy which had a complication related to clip slipped off causing subsequent bile leak.  This was managed with ERCP and drain placement.  After that she developed appendicitis.  She had a laparoscopic appendectomy.  Patient subsequently was admitted for vasovagal syncope.  Today she came in with abdominal pain that she states started around 11 PM.  She reports associated nausea and vomiting.  Denies diarrhea or constipation.  No fever or chills.  She also has a history of gastroparesis.      Patient Care Team  Primary Care Provider: Karen Stover APRN    Past Medical History:     Allergies   Allergen Reactions    Escitalopram Nausea Only and Rash     Nausea, sweating, rash     Sulfa Antibiotics Anaphylaxis    Baclofen GI Intolerance, Hives and Nausea And Vomiting    Ciprofloxacin Hallucinations    Codeine Hives    Gold-Containing Drug Products Rash    Latex Rash    Lovenox [Enoxaparin Sodium] Rash    Nickel Hives    Tegaderm Ag Mesh [Silver] Hives     Past Medical History:   Diagnosis Date    Allergic     Anxiety     Atrial fibrillation     RELEASED BY CARDIOLOGIST/ELECTROPHYSIST, NO CURRENT MEDS    Chronic pain disorder     Depression     Endometriosis     Headache     Hemorrhoids     Injury of shoulder, right 2009    CHRONIC PAIN    Panic disorder     Rectal bleeding 2010    S/P laparoscopic appendectomy 03/09/2025    S/P laparoscopic cholecystectomy 02/17/2025     Past Surgical History:   Procedure Laterality Date    APPENDECTOMY N/A 3/9/2025    Procedure: APPENDECTOMY LAPAROSCOPIC;  Surgeon: Mingo Cannon MD;  Location: [facility] OR;   Service: General;  Laterality: N/A;    CERVICAL ARTHRODESIS  01/14/2015    Donaldo Albarran    CERVICAL FUSION      C4-7 FUSION, FULL ROM    CHOLECYSTECTOMY N/A 2/17/2025    Procedure: CHOLECYSTECTOMY LAPAROSCOPIC plain language: removal of gallbladder thru small incisions using long instruments and a camera;  Surgeon: Josué Castano MD;  Location: [facility] MAIN OR;  Service: General;  Laterality: N/A;    COLONOSCOPY N/A 05/31/2022    Procedure: COLONOSCOPY;  Surgeon: Shabbir Baird MD;  Location: [facility] ENDOSCOPY;  Service: General;  Laterality: N/A;  HEMORRHOIDS    ENDOSCOPY N/A 2/17/2025    Procedure: ESOPHAGOGASTRODUODENOSCOPY WITH BIOPSIES;  Surgeon: Sanya Reyes MD;  Location: [facility] ENDOSCOPY;  Service: Gastroenterology;  Laterality: N/A;  GASTRITIS, SMALL HIATAL HERNIA    ENDOSCOPY N/A 3/6/2025    Procedure: ESOPHAGOGASTRODUODENOSCOPY with pancreatic stent removal;  Surgeon: Ha Thompson MD;  Location: [facility] ENDOSCOPY;  Service: Gastroenterology;  Laterality: N/A;  pancreatic stent removal, hiatal hernia    ERCP N/A 2/24/2025    Procedure: ENDOSCOPIC RETROGRADE CHOLANGIOPANCREATOGRAPHY with bile duct stent placement and pancreatic duct stent placement;  Surgeon: Ha Thompson MD;  Location: [facility] ENDOSCOPY;  Service: Gastroenterology;  Laterality: N/A;  bile duct leeak    ERCP N/A 4/8/2025    Procedure: ENDOSCOPIC RETROGRADE CHOLANGIOPANCREATOGRAPHY WITH STENT REMOVAL;  Surgeon: Ha Thompson MD;  Location: [facility] ENDOSCOPY;  Service: Gastroenterology;  Laterality: N/A;  STENT REMOVAL    EXPLORATORY LAPAROTOMY      HEMORRHOIDECTOMY N/A 05/31/2022    Procedure: HEMORRHOID BANDING;  Surgeon: Shabbir Baird MD;  Location: [facility] ENDOSCOPY;  Service: General;  Laterality: N/A;  BANDS X 2    HEMORRHOIDECTOMY N/A 1/31/2024    Procedure: HEMORRHOIDECTOMY;  Surgeon: Shabbir Baird MD;  Location: [facility] OR OSC;  Service: General;  Laterality: N/A;     HYSTERECTOMY      SHOULDER ARTHROSCOPY Right     SHOULDER SURGERY Left     ARTHROSCOPY LABRAL TEAR X2    TUBAL ABDOMINAL LIGATION       Family History   Problem Relation Age of Onset    Depression Mother     Bipolar disorder Mother     Anxiety disorder Mother     Cancer Mother     Heart disease Mother     Hypertension Mother     Anxiety disorder Father     Hypertension Father     Dementia Paternal Uncle     Dementia Paternal Grandmother     Heart disease Other     Colon cancer Neg Hx     Malig Hyperthermia Neg Hx        Home Medications:  Prior to Admission medications    Medication Sig Start Date End Date Taking? Authorizing Provider   prochlorperazine (COMPAZINE) 10 MG tablet TAKE (1) TABLET BY MOUTH EVERY 6 HOURS AS NEEDED FOR NAUSEA AND VOMITING 5/7/25  Yes Avani Vela MD   tiZANidine (ZANAFLEX) 4 MG tablet TAKE 1-2 TABLETS EVERY 6 HOURS AS NEEDED FOR MUSCLE SPASMS 5/7/25  Yes Avani Vela MD   albuterol sulfate  (90 Base) MCG/ACT inhaler Inhale 2 puffs Every 6 (Six) Hours As Needed for Wheezing. 4/15/25   Karen Stover APRN   busPIRone (BUSPAR) 15 MG tablet Take 1 tablet by mouth 3 (Three) Times a Day As Needed (Anxiety).  Patient not taking: Reported on 6/2/2025 4/15/25   Karen Stover APRN   CALCIUM PO Take 1 tablet by mouth Daily.    ProviderAvani MD   clonazePAM (KlonoPIN) 0.5 MG tablet Take 1 tablet by mouth 2 (Two) Times a Day As Needed for Anxiety. 5/6/25   Karen Stover APRN   diphenhydrAMINE-zinc acetate 2-0.1 % cream Apply 1 Application topically to the appropriate area as directed 3 (Three) Times a Day As Needed for Itching or Rash. 4/6/25   Naila Alejandre MD   DULoxetine (CYMBALTA) 30 MG capsule Take 1 capsule by mouth Every Evening.    ProviderAvani MD   DULoxetine (CYMBALTA) 60 MG capsule Take 1 capsule by mouth Every Morning.    ProviderAvani MD   HYDROcodone-acetaminophen (NORCO)  MG per tablet Take  1.5 tablets by mouth Every 4 (Four) Hours As Needed for Moderate Pain or Severe Pain. 4/6/25   Naila Alejandre MD   ibuprofen (ADVIL,MOTRIN) 800 MG tablet Take 1 tablet by mouth Every 8 (Eight) Hours.    Provider, MD Avani   montelukast (Singulair) 10 MG tablet Take 1 tablet by mouth Every Night. 4/16/25   Karen Stover APRN   naloxone (NARCAN) 4 MG/0.1ML nasal spray Call 911. Don't prime. [place] in 1 nostril for overdose. Repeat in 2-3 minutes in other nostril if no or minimal breathing/responsiveness.  Patient taking differently: Administer 1 spray into the nostril(s) as directed by provider As Needed. Call 911. Don't prime. [place] in 1 nostril for overdose. Repeat in 2-3 minutes in other nostril if no or minimal breathing/responsiveness. 3/11/25   India Chris APRN   ondansetron (ZOFRAN) 4 MG tablet Take 1 tablet by mouth Every 8 (Eight) Hours As Needed for Nausea or Vomiting. 4/6/25   Naila Alejandre MD   pantoprazole (PROTONIX) 40 MG EC tablet Take 1 tablet by mouth Daily. 11/11/24   Karen Stover APRN   polyethylene glycol (MIRALAX) 17 g packet Mix 17gm (contents of 1 packet) in eight ounces of water or other beverage to drink once daily 3/11/25   India Chris APRN   saccharomyces boulardii (Florastor) 250 MG capsule Take 1 capsule by mouth 2 (Two) Times a Day. 3/25/25   Karen Stover APRN   sennosides-docusate (senna-docusate sodium) 8.6-50 MG per tablet Take 1 tablet by mouth Daily. 6/27/24   Karen Stover APRN   simethicone (MYLICON) 80 MG chewable tablet Chew 1.5 tablets 4 (Four) Times a Day Before Meals & at Bedtime. 4/6/25   Naila Alejandre MD   traZODone (DESYREL) 50 MG tablet Take 0.5 to 2 tab PO QHS PRN sleep  Patient taking differently: Take 1 tablet by mouth Every Night. 4/16/25   Karen Stover APRN   triamcinolone (KENALOG) 0.025 % cream Apply 1 Application topically to the appropriate area as directed Every  "12 (Twelve) Hours. 25   Naila Alejandre MD   vitamin C (ASCORBIC ACID) 500 MG tablet Take 1 tablet by mouth Daily.    Provider, MD Avani        Social History:   Social History     Tobacco Use    Smoking status: Former     Current packs/day: 0.00     Average packs/day: 0.3 packs/day for 23.3 years (6.0 ttl pk-yrs)     Types: Cigarettes     Start date: 1999     Quit date: 2019     Years since quittin.4    Smokeless tobacco: Never   Vaping Use    Vaping status: Never Used   Substance Use Topics    Alcohol use: Not Currently     Comment: rarely    Drug use: Never         Review of Systems:  Review of Systems   Constitutional:  Negative for chills and fever.   HENT:  Negative for congestion, ear pain and sore throat.    Eyes:  Negative for pain.   Respiratory:  Negative for cough, chest tightness and shortness of breath.    Cardiovascular:  Negative for chest pain.   Gastrointestinal:  Positive for abdominal pain, nausea and vomiting. Negative for diarrhea.   Genitourinary:  Negative for flank pain and hematuria.   Musculoskeletal:  Negative for joint swelling.   Skin:  Negative for pallor.   Neurological:  Negative for seizures and headaches.   All other systems reviewed and are negative.       Physical Exam:  BP (!) 147/118 (BP Location: Right arm, Patient Position: Lying)   Pulse 81   Temp 98.4 °F (36.9 °C) (Axillary)   Resp 22   Ht 157.5 cm (62\")   Wt 80.4 kg (177 lb 4 oz)   SpO2 96%   BMI 32.42 kg/m²     Physical Exam  Constitutional:       General: She is in acute distress.      Appearance: Normal appearance.   HENT:      Head: Normocephalic and atraumatic.      Nose: Nose normal.      Mouth/Throat:      Mouth: Mucous membranes are moist.   Eyes:      Extraocular Movements: Extraocular movements intact.      Conjunctiva/sclera: Conjunctivae normal.      Pupils: Pupils are equal, round, and reactive to light.   Cardiovascular:      Rate and Rhythm: Normal rate and regular " rhythm.      Pulses: Normal pulses.      Heart sounds: Normal heart sounds.   Pulmonary:      Effort: Pulmonary effort is normal.      Breath sounds: Normal breath sounds.   Abdominal:      General: There is no distension.      Palpations: Abdomen is soft.      Tenderness: There is abdominal tenderness in the epigastric area.   Musculoskeletal:         General: Normal range of motion.      Cervical back: Normal range of motion.   Skin:     General: Skin is warm and dry.      Capillary Refill: Capillary refill takes less than 2 seconds.   Neurological:      General: No focal deficit present.      Mental Status: She is alert and oriented to person, place, and time. Mental status is at baseline.   Psychiatric:         Mood and Affect: Mood normal.         Behavior: Behavior normal.                    Medical Decision Making:      Comorbidities that affect care:    Recent appendectomy and cholecystectomy, panic disorder, anxiety    External Notes reviewed:    Hospital Discharge Summary: Patient was admitted on 4/25/2025 and discharged on 4/26/2025 for orthostatic hypotension, gastroparesis, cervical radiculopathy, generalized anxiety disorder, cervical fusion syndrome      The following orders were placed and all results were independently analyzed by me:  Orders Placed This Encounter   Procedures    Blood Culture - Blood,    Blood Culture - Blood,    CT Abdomen Pelvis With Contrast    MRI abdomen wo contrast mrcp    [place] Draw    Comprehensive Metabolic Panel    Lipase    Urinalysis With Microscopic If Indicated (No Culture) - Urine, Clean Catch    CBC Auto Differential    Lactic Acid, Plasma    STAT Lactic Acid, Reflex    Lactic Acid, Plasma    Comprehensive Metabolic Panel    CBC (No Diff)    Magnesium    Diet: Liquid; Clear Liquid; Fluid Consistency: Thin (IDDSI 0)    Undress & Gown    Vital Signs    Intake & Output    Weigh Patient    Oral Care    Place Sequential Compression Device    Maintain Sequential  Compression Device    Remote Cardiac Monitor    Maintain IV Access    Activity - Ad Daly    Opioid Administration - Document EtCO2 and / or SpO2 With Each Set of Vitals & Any Change in Patient Status    Opioid Administration - Notify Provider Hypercapnic Monitoring    Opioid Administration - Continuous Pulse Oximetry (SpO2)    Apply please call me results of STAT lactic acid level at  my cell phone  Until Discontinued    Code Status and Medical Interventions: CPR (Attempt to Resuscitate); Full Support    IP Consult to General Surgery    Inpatient Hospitalist Consult    Inpatient General Surgery Consult    Inpatient Gastroenterology Consult    ECG 12 Lead QT Measurement    Insert Peripheral IV    Insert Peripheral IV    Initiate Observation Status    CBC & Differential    Green Top (Gel)    Lavender Top    Gold Top - SST    Light Blue Top       Medications Given in the Emergency Department:  Medications   sodium chloride 0.9 % flush 10 mL (has no administration in time range)   metoclopramide (REGLAN) injection 10 mg (10 mg Intravenous Not Given 6/2/25 0724)   sodium chloride 0.9 % flush 10 mL (10 mL Intravenous Given 6/2/25 0943)   sodium chloride 0.9 % flush 10 mL (has no administration in time range)   sodium chloride 0.9 % infusion 40 mL (has no administration in time range)   ondansetron (ZOFRAN) injection 4 mg (4 mg Intravenous Given 6/2/25 1508)   sennosides-docusate (PERICOLACE) 8.6-50 MG per tablet 2 tablet (has no administration in time range)     And   polyethylene glycol (MIRALAX) packet 17 g (has no administration in time range)     And   bisacodyl (DULCOLAX) EC tablet 5 mg (has no administration in time range)     And   bisacodyl (DULCOLAX) suppository 10 mg (has no administration in time range)   HYDROcodone-acetaminophen (NORCO)  MG per tablet 1 tablet (1 tablet Oral Given 6/2/25 1508)   DULoxetine (CYMBALTA) DR capsule 30 mg (30 mg Oral Given 6/2/25 5807)   DULoxetine (CYMBALTA)   capsule 60 mg (60 mg Oral Not Given 6/2/25 0933)   montelukast (SINGULAIR) tablet 10 mg (has no administration in time range)   pantoprazole (PROTONIX) EC tablet 40 mg (40 mg Oral Not Given 6/2/25 0933)   simethicone (MYLICON) chewable tablet 120 mg (120 mg Oral Given 6/2/25 1807)   clonazePAM (KlonoPIN) tablet 0.5 mg (0.5 mg Oral Given 6/2/25 1634)   polyethylene glycol (MIRALAX) packet 17 g (17 g Oral Not Given 6/2/25 0933)   tiZANidine (ZANAFLEX) tablet 4 mg (has no administration in time range)   traZODone (DESYREL) tablet 50 mg (has no administration in time range)   prochlorperazine (COMPAZINE) injection 10 mg (10 mg Intravenous Given 6/2/25 2111)   acetaminophen (TYLENOL) tablet 650 mg (has no administration in time range)   sodium chloride 0.9 % infusion (75 mL/hr Intravenous New Bag 6/2/25 0943)   cefTRIAXone (ROCEPHIN) in NS 2 GRAMS/20ml IV PUSH syringe (2,000 mg Intravenous Given 6/2/25 1017)   azithromycin (ZITHROMAX) 500 mg in sodium chloride 0.9 % 250 mL IVPB-VTB (500 mg Intravenous New Bag 6/2/25 1017)   ketorolac (TORADOL) injection 15 mg (15 mg Intravenous Given 6/2/25 2111)   diphenhydrAMINE (BENADRYL) injection 12.5 mg (has no administration in time range)   HYDROmorphone (DILAUDID) injection 1 mg (1 mg Intravenous Given 6/2/25 0346)   ondansetron (ZOFRAN) injection 4 mg (4 mg Intravenous Given 6/2/25 0345)   sodium chloride 0.9 % bolus 1,000 mL (0 mL Intravenous Stopped 6/2/25 0457)   iopamidol (ISOVUE-370) 76 % injection 100 mL (100 mL Intravenous Given 6/2/25 0411)   droperidol (INAPSINE) injection 2.5 mg (2.5 mg Intravenous Given 6/2/25 0448)   sodium chloride 0.9 % bolus 1,000 mL (0 mL Intravenous Stopped 6/2/25 0603)   diphenhydrAMINE (BENADRYL) injection 25 mg (25 mg Intravenous Given 6/2/25 5858)   acetaminophen (OFIRMEV) injection 1,000 mg (1,000 mg Intravenous Given 6/2/25 1256)   ketorolac (TORADOL) injection 30 mg (30 mg Intravenous Given 6/2/25 5116)   HYDROmorphone (DILAUDID)  injection 0.5 mg (0.5 mg Intravenous Given 6/2/25 1124)        ED Course:    ED Course as of 06/02/25 2112 Mon Jun 02, 2025   0700 ECG 12 Lead QT Measurement  Normal sinus rhythm with rate of 67. QRS normal. [place] interval normal. QTc interval is normal. No ST elevation or depression. No T wave abnormalities. This EKG was interpreted by me.  [LD]      ED Course User Index  [LD] Orlando Diamond MD       Labs:    Lab Results (last 24 hours)       Procedure Component Value Units Date/Time    CBC & Differential [[id number]]  (Abnormal) Collected: 06/02/25 0318    Specimen: Blood from Arm, Left Updated: 06/02/25 0333    Narrative:      The following orders were created for panel order CBC & Differential.  Procedure                               Abnormality         Status                     ---------                               -----------         ------                     CBC Auto Differential[[id number]]        Abnormal            Final result                 Please view results for these tests on the individual orders.    Comprehensive Metabolic Panel [[id number]]  (Abnormal) Collected: 06/02/25 0318    Specimen: Blood from Arm, Left Updated: 06/02/25 0351     Glucose 120 mg/dL      BUN 8.8 mg/dL      Creatinine 0.73 mg/dL      Sodium 146 mmol/L      Potassium 3.6 mmol/L      Chloride 106 mmol/L      CO2 19.1 mmol/L      Calcium 9.9 mg/dL      Total Protein 7.8 g/dL      Albumin 4.7 g/dL      ALT (SGPT) 10 U/L      AST (SGOT) 11 U/L      Alkaline Phosphatase 73 U/L      Total Bilirubin 0.5 mg/dL      Globulin 3.1 gm/dL      A/G Ratio 1.5 g/dL      BUN/Creatinine Ratio 12.1     Anion Gap 20.9 mmol/L      eGFR 105.5 mL/min/1.73     Narrative:      GFR Categories in Chronic Kidney Disease (CKD)              GFR Category          GFR (mL/min/1.73)    Interpretation  G1                    90 or greater        Normal or high (1)  G2                    60-89                Mild decrease (1)  G3a                   45-59                 Mild to moderate decrease  G3b                   30-44                Moderate to severe decrease  G4                    15-29                Severe decrease  G5                    14 or less           Kidney failure    (1)In the absence of evidence of kidney disease, neither GFR category G1 or G2 fulfill the criteria for CKD.    eGFR calculation 2021 CKD-EPI creatinine equation, which does not include race as a factor    Lipase [[id number]]  (Normal) Collected: 06/02/25 0318    Specimen: Blood from Arm, Left Updated: 06/02/25 0351     Lipase 22 U/L     CBC Auto Differential [[id number]]  (Abnormal) Collected: 06/02/25 0318    Specimen: Blood from Arm, Left Updated: 06/02/25 0333     WBC 18.73 10*3/mm3      RBC 4.71 10*6/mm3      Hemoglobin 13.6 g/dL      Hematocrit 41.5 %      MCV 88.1 fL      MCH 28.9 pg      MCHC 32.8 g/dL      RDW 14.8 %      RDW-SD 47.7 fl      MPV 9.9 fL      Platelets 569 10*3/mm3      Neutrophil % 85.5 %      Lymphocyte % 9.0 %      Monocyte % 4.1 %      Eosinophil % 0.1 %      Basophil % 0.4 %      Immature Grans % 0.9 %      Neutrophils, Absolute 16.01 10*3/mm3      Lymphocytes, Absolute 1.69 10*3/mm3      Monocytes, Absolute 0.77 10*3/mm3      Eosinophils, Absolute 0.02 10*3/mm3      Basophils, Absolute 0.07 10*3/mm3      Immature Grans, Absolute 0.17 10*3/mm3      nRBC 0.0 /100 WBC     Urinalysis With Microscopic If Indicated (No Culture) - Urine, Clean Catch [[id number]]  (Abnormal) Collected: 06/02/25 0327    Specimen: Urine, Clean Catch Updated: 06/02/25 0335     Color, UA Yellow     Appearance, UA Clear     pH, UA 5.5     Specific Gravity, UA 1.015     Glucose, UA Negative     Ketones, UA 80 mg/dL (3+)     Bilirubin, UA Negative     Blood, UA Negative     Protein, UA Negative     Leuk Esterase, UA Negative     Nitrite, UA Negative     Urobilinogen, UA 1.0 E.U./dL    Narrative:      Urine microscopic not indicated.    Blood Culture - Blood, Arm, Right [[id number]] Collected:  06/02/25 0438    Specimen: Blood from Arm, Right Updated: 06/02/25 0442    Lactic Acid, Plasma [[id number]]  (Abnormal) Collected: 06/02/25 0438    Specimen: Blood from Arm, Right Updated: 06/02/25 0512     Lactate 4.4 mmol/L     Blood Culture - Blood, Arm, Right [[id number]] Collected: 06/02/25 0510    Specimen: Blood from Arm, Right Updated: 06/02/25 0518    STAT Lactic Acid, Reflex [[id number]]  (Abnormal) Collected: 06/02/25 0632    Specimen: Blood Updated: 06/02/25 0701     Lactate 2.7 mmol/L     Lactic Acid, Plasma [[id number]]  (Abnormal) Collected: 06/02/25 1632    Specimen: Blood from Arm, Right Updated: 06/02/25 1708     Lactate 2.3 mmol/L              Imaging:    CT Abdomen Pelvis With Contrast  Result Date: 6/2/2025  CT ABDOMEN PELVIS W CONTRAST-  Date of exam: 6/2/2025 4:10 AM.  Comparisons: 3/30/2025; 3/24/2025; 3/9/2025; 2/24/2025; 6/13/2024.  INDICATIONS: 44-year-old female w/ h/o upper abdominal pain & nausea for 1 day; + leukocytosis.  TECHNIQUE: Axial CT images were obtained of the abdomen and pelvis following the uneventful intravenous administration of 80 mL (or less) of Isovue-370 contrast agent. Reconstructed 2D (two-dimensional) coronal and sagittal images were also obtained. Automated exposure control and iterative construction methods were used. Additionally, the radiation dose reduction device was turned on for each scan per the ALARA (As Low as Reasonably Achievable) protocol. No oral contrast agent was administered for the study. Please see the electronic medical record, EMR (i.e., Epic), for the documented dose of intravenous contrast agent as well as the radiation dose. Despite best efforts, poor image quality limits interpretation of the study. For instance, motion artifact obscures detail. Repeat (delayed) CT images were also obtained.  FINDINGS: New mild groundglass opacities involve the anterior aspect of the lower lobe of the right lung, such as seen on image 5 of series 5 and  adjacent images. These findings are nonspecific. They may be a manifestation of an infectious/inflammatory pneumonitis/pneumonia. The patient has undergone cholecystectomy, appendectomy, and hysterectomy. The previously seen common bile duct stent has been removed (since 3/30/2025). There is dilatation of the extrahepatic biliary tree without definite CT evidence of choledocholithiasis. There is mild pneumobilia, seen previously. No acute pancreatitis. Probably no significant dilatation of the main pancreatic duct. Please correlate with pertinent lab values. There are colonic diverticula without acute diverticulitis. There are suspected small renal cysts. There is a new 3 cm left adnexal cyst. It probably represents a normal functional ovarian cyst. The right ovary is not clearly visualized. It may be surgically absent. No other acute findings are seen. The other incidental/chronic findings are as described in prior exam reports.       New mild infiltrates are seen in the lower lobe of the right lung and may represent pneumonia. There has been interval removal of the common bile duct stent with a small amount of pneumobilia. Motion artifact obscures detail. No other definite acute findings are appreciated. Please see above comments for further detail.   Portions of this note were completed with a voice recognition program.  6/2/2025 4:34 AM by Romario Romero MD on Workstation: [id number]          Differential Diagnosis and Discussion:    Abdominal Pain: Based on the patient's signs and symptoms, I considered abdominal aortic aneurysm, small bowel obstruction, pancreatitis, acute cholecystitis, acute appendecitis, peptic ulcer disease, gastritis, colitis, endocrine disorders, irritable bowel syndrome and other differential diagnosis an etiology of the patient's abdominal pain.    PROCEDURES:    Labs were collected in the emergency department and all labs were reviewed and interpreted by me.  CT scan was performed in the  emergency department and the CT scan radiology impression was interpreted by me.    ECG 12 Lead QT Measurement   Preliminary Result   HEART RATE=67  bpm   RR Bsemditc=496  ms   SC Rvkwfjxj=092  ms   P Horizontal Axis=19  deg   P Front Axis=53  deg   QRSD Interval=74  ms   QT Znlxknvr=615  ms   TBsT=828  ms   QRS Axis=48  deg   T Wave Axis=48  deg   - NORMAL ECG -   Sinus rhythm   Date and Time of Study:2025-06-02 06:53:30          Procedures    MDM  Number of Diagnoses or Management Options  Diagnosis management comments: On arrival patient reports severe abdominal pain.  She was given pain medication with minimal improvement.  Labs showed elevated white count of 18 with elevated lactic acid 4.4.  Patient was given IV fluids.  Sodium is also elevated 146.  Patient ketones in her urine.  CT was obtained that showed some mild infiltrates in lower lobes.  Patient denies respiratory symptoms. No other significant abnormality.  With patient's recent surgical history discussed patient with surgery who agreed to consult.  Patient was given multiple dose of pain medication without any significant improvement in her pain.  Discussed patient with hospitalist and will admit for further care.       Amount and/or Complexity of Data Reviewed  Clinical lab tests: reviewed  Tests in the radiology section of CPT®: reviewed  Review and summarize past medical records: yes  Independent visualization of images, tracings, or specimens: yes    Risk of Complications, Morbidity, and/or Mortality  Presenting problems: moderate  Management options: moderate                       Patient Care Considerations:    CHEST X-RAY: I considered ordering a chest x-ray however no respiratory symptoms      Consultants/Shared Management Plan:    Hospitalist: I have discussed the case with Dr Adams who agrees to accept the patient for admission.    Social Determinants of Health:    Patient is independent, reliable, and has access to care.        Disposition and Care Coordination:    Admit:   Through independent evaluation of the patient's history, physical, and imperical data, the patient meets criteria for inpatient admission to the hospital.        Final diagnoses:   Intractable abdominal pain   Leukocytosis, unspecified type   Lactic acidosis        ED Disposition       ED Disposition   Decision to Admit    Condition   --    Comment   Level of Care: Remote Telemetry [26]   Diagnosis: Abdominal pain [639489]   Admitting Physician: CANDICE BLOCK [[id number]]   Attending Physician: CANDICE BLOCK [[id number]]                 This medical record created using voice recognition software.             Orlando Diamond MD  06/02/25 2270     Other

## 2025-06-03 ENCOUNTER — ANESTHESIA (OUTPATIENT)
Dept: GASTROENTEROLOGY | Facility: HOSPITAL | Age: 45
End: 2025-06-03
Payer: COMMERCIAL

## 2025-06-03 ENCOUNTER — ANESTHESIA EVENT (OUTPATIENT)
Dept: GASTROENTEROLOGY | Facility: HOSPITAL | Age: 45
End: 2025-06-03
Payer: COMMERCIAL

## 2025-06-03 ENCOUNTER — APPOINTMENT (OUTPATIENT)
Dept: MRI IMAGING | Facility: HOSPITAL | Age: 45
End: 2025-06-03
Payer: COMMERCIAL

## 2025-06-03 LAB
ALBUMIN SERPL-MCNC: 4.3 G/DL (ref 3.5–5.2)
ALBUMIN/GLOB SERPL: 1.3 G/DL
ALP SERPL-CCNC: 66 U/L (ref 39–117)
ALT SERPL W P-5'-P-CCNC: 8 U/L (ref 1–33)
ANION GAP SERPL CALCULATED.3IONS-SCNC: 16.5 MMOL/L (ref 5–15)
AST SERPL-CCNC: 12 U/L (ref 1–32)
BILIRUB SERPL-MCNC: 0.6 MG/DL (ref 0–1.2)
BUN SERPL-MCNC: 9 MG/DL (ref 6–20)
BUN/CREAT SERPL: 12.7 (ref 7–25)
CALCIUM SPEC-SCNC: 9.1 MG/DL (ref 8.6–10.5)
CHLORIDE SERPL-SCNC: 107 MMOL/L (ref 98–107)
CO2 SERPL-SCNC: 20.5 MMOL/L (ref 22–29)
CREAT SERPL-MCNC: 0.71 MG/DL (ref 0.57–1)
D-LACTATE SERPL-SCNC: 0.6 MMOL/L (ref 0.5–2)
DEPRECATED RDW RBC AUTO: 52.8 FL (ref 37–54)
EGFRCR SERPLBLD CKD-EPI 2021: 109 ML/MIN/1.73
ERYTHROCYTE [DISTWIDTH] IN BLOOD BY AUTOMATED COUNT: 15.5 % (ref 12.3–15.4)
GLOBULIN UR ELPH-MCNC: 3.2 GM/DL
GLUCOSE SERPL-MCNC: 104 MG/DL (ref 65–99)
HCT VFR BLD AUTO: 41.6 % (ref 34–46.6)
HGB BLD-MCNC: 12.8 G/DL (ref 12–15.9)
MAGNESIUM SERPL-MCNC: 2 MG/DL (ref 1.6–2.6)
MCH RBC QN AUTO: 28.8 PG (ref 26.6–33)
MCHC RBC AUTO-ENTMCNC: 30.8 G/DL (ref 31.5–35.7)
MCV RBC AUTO: 93.7 FL (ref 79–97)
PLATELET # BLD AUTO: 443 10*3/MM3 (ref 140–450)
PMV BLD AUTO: 9.9 FL (ref 6–12)
POTASSIUM SERPL-SCNC: 3.5 MMOL/L (ref 3.5–5.2)
PROT SERPL-MCNC: 7.5 G/DL (ref 6–8.5)
RBC # BLD AUTO: 4.44 10*6/MM3 (ref 3.77–5.28)
SODIUM SERPL-SCNC: 144 MMOL/L (ref 136–145)
WBC NRBC COR # BLD AUTO: 13.06 10*3/MM3 (ref 3.4–10.8)

## 2025-06-03 PROCEDURE — 43239 EGD BIOPSY SINGLE/MULTIPLE: CPT | Performed by: INTERNAL MEDICINE

## 2025-06-03 PROCEDURE — 25810000003 LACTATED RINGERS PER 1000 ML

## 2025-06-03 PROCEDURE — 74181 MRI ABDOMEN W/O CONTRAST: CPT

## 2025-06-03 PROCEDURE — 25010000002 METOCLOPRAMIDE PER 10 MG: Performed by: INTERNAL MEDICINE

## 2025-06-03 PROCEDURE — G0378 HOSPITAL OBSERVATION PER HR: HCPCS

## 2025-06-03 PROCEDURE — 0DB68ZX EXCISION OF STOMACH, VIA NATURAL OR ARTIFICIAL OPENING ENDOSCOPIC, DIAGNOSTIC: ICD-10-PCS | Performed by: INTERNAL MEDICINE

## 2025-06-03 PROCEDURE — 25010000002 PROPOFOL 10 MG/ML EMULSION

## 2025-06-03 PROCEDURE — 88305 TISSUE EXAM BY PATHOLOGIST: CPT | Performed by: INTERNAL MEDICINE

## 2025-06-03 PROCEDURE — 25010000002 ONDANSETRON PER 1 MG: Performed by: INTERNAL MEDICINE

## 2025-06-03 PROCEDURE — 83605 ASSAY OF LACTIC ACID: CPT | Performed by: INTERNAL MEDICINE

## 2025-06-03 PROCEDURE — 25810000003 SODIUM CHLORIDE 0.9 % SOLUTION 250 ML FLEX CONT: Performed by: INTERNAL MEDICINE

## 2025-06-03 PROCEDURE — 25010000002 ACETAMINOPHEN 10 MG/ML SOLUTION

## 2025-06-03 PROCEDURE — 25010000002 LIDOCAINE PF 2% 2 % SOLUTION

## 2025-06-03 PROCEDURE — 80053 COMPREHEN METABOLIC PANEL: CPT | Performed by: INTERNAL MEDICINE

## 2025-06-03 PROCEDURE — 25810000003 SODIUM CHLORIDE 0.9 % SOLUTION: Performed by: INTERNAL MEDICINE

## 2025-06-03 PROCEDURE — 99233 SBSQ HOSP IP/OBS HIGH 50: CPT | Performed by: INTERNAL MEDICINE

## 2025-06-03 PROCEDURE — 25010000002 HYDROMORPHONE 1 MG/ML SOLUTION: Performed by: INTERNAL MEDICINE

## 2025-06-03 PROCEDURE — 25010000002 CEFTRIAXONE PER 250 MG: Performed by: INTERNAL MEDICINE

## 2025-06-03 PROCEDURE — 25010000002 PROCHLORPERAZINE 10 MG/2ML SOLUTION: Performed by: INTERNAL MEDICINE

## 2025-06-03 PROCEDURE — 83735 ASSAY OF MAGNESIUM: CPT | Performed by: INTERNAL MEDICINE

## 2025-06-03 PROCEDURE — 25010000002 AZITHROMYCIN PER 500 MG: Performed by: INTERNAL MEDICINE

## 2025-06-03 PROCEDURE — 25010000002 KETOROLAC TROMETHAMINE PER 15 MG: Performed by: INTERNAL MEDICINE

## 2025-06-03 PROCEDURE — 25010000002 FAMOTIDINE 10 MG/ML SOLUTION

## 2025-06-03 PROCEDURE — 85027 COMPLETE CBC AUTOMATED: CPT | Performed by: INTERNAL MEDICINE

## 2025-06-03 RX ORDER — SODIUM CHLORIDE 9 MG/ML
75 INJECTION, SOLUTION INTRAVENOUS CONTINUOUS
Status: ACTIVE | OUTPATIENT
Start: 2025-06-03 | End: 2025-06-04

## 2025-06-03 RX ORDER — SODIUM CHLORIDE, SODIUM LACTATE, POTASSIUM CHLORIDE, CALCIUM CHLORIDE 600; 310; 30; 20 MG/100ML; MG/100ML; MG/100ML; MG/100ML
30 INJECTION, SOLUTION INTRAVENOUS CONTINUOUS
Status: DISCONTINUED | OUTPATIENT
Start: 2025-06-03 | End: 2025-06-03

## 2025-06-03 RX ORDER — PANTOPRAZOLE SODIUM 40 MG/1
40 TABLET, DELAYED RELEASE ORAL
Status: DISCONTINUED | OUTPATIENT
Start: 2025-06-03 | End: 2025-06-10 | Stop reason: HOSPADM

## 2025-06-03 RX ORDER — FAMOTIDINE 10 MG/ML
20 INJECTION, SOLUTION INTRAVENOUS ONCE
Status: COMPLETED | OUTPATIENT
Start: 2025-06-03 | End: 2025-06-03

## 2025-06-03 RX ORDER — PROPOFOL 10 MG/ML
VIAL (ML) INTRAVENOUS AS NEEDED
Status: DISCONTINUED | OUTPATIENT
Start: 2025-06-03 | End: 2025-06-03 | Stop reason: SURG

## 2025-06-03 RX ORDER — METOCLOPRAMIDE HYDROCHLORIDE 5 MG/ML
10 INJECTION INTRAMUSCULAR; INTRAVENOUS EVERY 6 HOURS
Status: DISCONTINUED | OUTPATIENT
Start: 2025-06-03 | End: 2025-06-05

## 2025-06-03 RX ORDER — LIDOCAINE HYDROCHLORIDE 20 MG/ML
INJECTION, SOLUTION EPIDURAL; INFILTRATION; INTRACAUDAL; PERINEURAL AS NEEDED
Status: DISCONTINUED | OUTPATIENT
Start: 2025-06-03 | End: 2025-06-03 | Stop reason: SURG

## 2025-06-03 RX ADMIN — MONTELUKAST 10 MG: 10 TABLET, FILM COATED ORAL at 20:20

## 2025-06-03 RX ADMIN — DULOXETINE 30 MG: 30 CAPSULE, DELAYED RELEASE ORAL at 17:39

## 2025-06-03 RX ADMIN — ONDANSETRON 4 MG: 2 INJECTION INTRAMUSCULAR; INTRAVENOUS at 23:08

## 2025-06-03 RX ADMIN — ACETAMINOPHEN 1000 MG: 10 INJECTION INTRAVENOUS at 01:08

## 2025-06-03 RX ADMIN — SIMETHICONE 120 MG: 80 TABLET, CHEWABLE ORAL at 20:19

## 2025-06-03 RX ADMIN — HYDROMORPHONE HYDROCHLORIDE 0.5 MG: 1 INJECTION, SOLUTION INTRAMUSCULAR; INTRAVENOUS; SUBCUTANEOUS at 17:39

## 2025-06-03 RX ADMIN — KETOROLAC TROMETHAMINE 15 MG: 15 INJECTION, SOLUTION INTRAMUSCULAR; INTRAVENOUS at 17:32

## 2025-06-03 RX ADMIN — SODIUM CHLORIDE 75 ML/HR: 9 INJECTION, SOLUTION INTRAVENOUS at 15:00

## 2025-06-03 RX ADMIN — ACETAMINOPHEN 650 MG: 325 TABLET ORAL at 20:20

## 2025-06-03 RX ADMIN — TRAZODONE HYDROCHLORIDE 50 MG: 50 TABLET ORAL at 20:20

## 2025-06-03 RX ADMIN — PROCHLORPERAZINE EDISYLATE 10 MG: 5 INJECTION INTRAMUSCULAR; INTRAVENOUS at 06:37

## 2025-06-03 RX ADMIN — DULOXETINE 60 MG: 30 CAPSULE, DELAYED RELEASE ORAL at 08:31

## 2025-06-03 RX ADMIN — SIMETHICONE 120 MG: 80 TABLET, CHEWABLE ORAL at 08:31

## 2025-06-03 RX ADMIN — METOCLOPRAMIDE 10 MG: 5 INJECTION, SOLUTION INTRAMUSCULAR; INTRAVENOUS at 08:32

## 2025-06-03 RX ADMIN — ONDANSETRON 4 MG: 2 INJECTION INTRAMUSCULAR; INTRAVENOUS at 10:44

## 2025-06-03 RX ADMIN — POLYETHYLENE GLYCOL 3350 17 G: 17 POWDER, FOR SOLUTION ORAL at 08:32

## 2025-06-03 RX ADMIN — SIMETHICONE 120 MG: 80 TABLET, CHEWABLE ORAL at 17:39

## 2025-06-03 RX ADMIN — PANTOPRAZOLE SODIUM 40 MG: 40 TABLET, DELAYED RELEASE ORAL at 08:31

## 2025-06-03 RX ADMIN — PROPOFOL 250 MCG/KG/MIN: 10 INJECTION, EMULSION INTRAVENOUS at 14:34

## 2025-06-03 RX ADMIN — PANTOPRAZOLE SODIUM 40 MG: 40 TABLET, DELAYED RELEASE ORAL at 17:39

## 2025-06-03 RX ADMIN — CLONAZEPAM 0.5 MG: 0.5 TABLET ORAL at 03:51

## 2025-06-03 RX ADMIN — ONDANSETRON 4 MG: 2 INJECTION INTRAMUSCULAR; INTRAVENOUS at 01:08

## 2025-06-03 RX ADMIN — HYDROMORPHONE HYDROCHLORIDE 1 MG: 1 INJECTION, SOLUTION INTRAMUSCULAR; INTRAVENOUS; SUBCUTANEOUS at 10:44

## 2025-06-03 RX ADMIN — AMOXICILLIN AND CLAVULANATE POTASSIUM 1 TABLET: 875; 125 TABLET, FILM COATED ORAL at 20:19

## 2025-06-03 RX ADMIN — LIDOCAINE HYDROCHLORIDE 40 MG: 20 INJECTION, SOLUTION INTRAVENOUS at 14:33

## 2025-06-03 RX ADMIN — HYDROCODONE BITARTRATE AND ACETAMINOPHEN 1 TABLET: 10; 325 TABLET ORAL at 23:08

## 2025-06-03 RX ADMIN — FAMOTIDINE 20 MG: 10 INJECTION INTRAVENOUS at 13:56

## 2025-06-03 RX ADMIN — HYDROCODONE BITARTRATE AND ACETAMINOPHEN 1 TABLET: 10; 325 TABLET ORAL at 01:27

## 2025-06-03 RX ADMIN — PROPOFOL 80 MG: 10 INJECTION, EMULSION INTRAVENOUS at 14:33

## 2025-06-03 RX ADMIN — SODIUM CHLORIDE, POTASSIUM CHLORIDE, SODIUM LACTATE AND CALCIUM CHLORIDE 30 ML/HR: 600; 310; 30; 20 INJECTION, SOLUTION INTRAVENOUS at 13:56

## 2025-06-03 RX ADMIN — AZITHROMYCIN DIHYDRATE 500 MG: 500 INJECTION, POWDER, LYOPHILIZED, FOR SOLUTION INTRAVENOUS at 08:31

## 2025-06-03 RX ADMIN — HYDROCODONE BITARTRATE AND ACETAMINOPHEN 1 TABLET: 10; 325 TABLET ORAL at 15:37

## 2025-06-03 RX ADMIN — CEFTRIAXONE SODIUM 2000 MG: 2 INJECTION, POWDER, FOR SOLUTION INTRAMUSCULAR; INTRAVENOUS at 08:30

## 2025-06-03 NOTE — PROGRESS NOTES
[facility]  Inpatient Progress Note    Reason for Follow Up:  Abdominal pain, nausea, vomiting    Subjective     Interval History:   Patient still having abdominal pain and nausea and vomiting.  Patient states she is having the same pain and it hasn't improved at all.    Current Facility-Administered Medications:     acetaminophen (TYLENOL) tablet 650 mg, 650 mg, Oral, Q6H PRN, Horacio Adams DO    azithromycin (ZITHROMAX) 500 mg in sodium chloride 0.9 % 250 mL IVPB-VTB, 500 mg, Intravenous, Q24H, Horacio Adams DO, 500 mg at 06/03/25 0831    sennosides-docusate (PERICOLACE) 8.6-50 MG per tablet 2 tablet, 2 tablet, Oral, BID PRN **AND** polyethylene glycol (MIRALAX) packet 17 g, 17 g, Oral, Daily PRN **AND** bisacodyl (DULCOLAX) EC tablet 5 mg, 5 mg, Oral, Daily PRN **AND** bisacodyl (DULCOLAX) suppository 10 mg, 10 mg, Rectal, Daily PRN, Horacio Adams DO    cefTRIAXone (ROCEPHIN) in NS 2 GRAMS/20ml IV PUSH syringe, 2,000 mg, Intravenous, Q24H, Horacio Adams DO, 2,000 mg at 06/03/25 0830    clonazePAM (KlonoPIN) tablet 0.5 mg, 0.5 mg, Oral, BID PRN, Horacio Adams DO, 0.5 mg at 06/03/25 0351    diphenhydrAMINE (BENADRYL) injection 12.5 mg, 12.5 mg, Intravenous, Q6H PRN, Horacio Adams DO, 12.5 mg at 06/02/25 2120    DULoxetine (CYMBALTA) DR capsule 30 mg, 30 mg, Oral, Q PM, Horacio Adams DO, 30 mg at 06/02/25 1807    DULoxetine (CYMBALTA) DR capsule 60 mg, 60 mg, Oral, QAM, Horacio Adams DO, 60 mg at 06/03/25 0831    HYDROcodone-acetaminophen (NORCO)  MG per tablet 1 tablet, 1 tablet, Oral, Q6H PRN, Horacio Adams DO, 1 tablet at 06/03/25 0127    HYDROmorphone (DILAUDID) injection 1 mg, 1 mg, Intravenous, Q4H PRN, Darion Zamora MD    ketorolac (TORADOL) injection 15 mg, 15 mg, Intravenous, Q6H PRN, Horacio Adams DO, 15 mg at 06/02/25 2111    metoclopramide (REGLAN) injection 10 mg, 10 mg, Intravenous, Q6H, Darion Zamora MD, 10 mg at 06/03/25 0832    [facility]  (SINGULAIR) tablet 10 mg, 10 mg, Oral, Nightly, Horacio Adams DO    ondansetron (ZOFRAN) injection 4 mg, 4 mg, Intravenous, Q6H PRN, Horacio Adams DO, 4 mg at 06/03/25 0108    pantoprazole (PROTONIX) EC tablet 40 mg, 40 mg, Oral, Daily, Horacio Adams, , 40 mg at 06/03/25 0831    polyethylene glycol (MIRALAX) packet 17 g, 17 g, Oral, Daily, Horacio Adams DO, 17 g at 06/03/25 0832    prochlorperazine (COMPAZINE) injection 10 mg, 10 mg, Intravenous, Q8H PRN, Horacio Adams DO, 10 mg at 06/03/25 0637    simethicone (MYLICON) chewable tablet 120 mg, 120 mg, Oral, 4x Daily AC & at Bedtime, Horacio Adams DO, 120 mg at 06/03/25 0831    sodium chloride 0.9 % flush 10 mL, 10 mL, Intravenous, PRN, Orlando Diamond MD    sodium chloride 0.9 % flush 10 mL, 10 mL, Intravenous, Q12H, Horacio Adams DO, 10 mL at 06/02/25 2112    sodium chloride 0.9 % flush 10 mL, 10 mL, Intravenous, PRN, Horacio Adams DO    sodium chloride 0.9 % infusion 40 mL, 40 mL, Intravenous, PRN, Horacio Adams DO    sodium chloride 0.9 % infusion, 75 mL/hr, Intravenous, Continuous, Darion Zamora MD, Last Rate: 75 mL/hr at 06/03/25 0748, 75 mL/hr at 06/03/25 0748    tiZANidine (ZANAFLEX) tablet 4 mg, 4 mg, Oral, Q8H PRN, Horacio Adams DO    traZODone (DESYREL) tablet 50 mg, 50 mg, Oral, Nightly, Horacio Adams DO  Review of Systems:    Pertinent items are noted in HPI    Objective     Vital Signs  Temp:  [98.4 °F (36.9 °C)-98.6 °F (37 °C)] 98.4 °F (36.9 °C)  Heart Rate:  [] 109  Resp:  [18-22] 18  BP: (147-174)/() 155/85  Body mass index is 32.42 kg/m².  No intake or output data in the 24 hours ending 06/03/25 0916  No intake/output data recorded.     Physical Exam:   General: awake, alert and in no acute distress   Eyes: eyes move symmetrical in all directions, no scleral icterus   Neck: supple, trachea is midline   Skin: warm and dry, not jaundiced   Cardiovascular: regular rhythm and rate   Pulm: breathing unlabored   Abdomen:  soft, tender in epigastric area, non distended   Rectal: deferred   Extremities: no rash or edema   Psychiatric: mental status within normal limits, alert and oriented      Results Review:     I reviewed the patient's new clinical results.    Results from last 7 days   Lab Units 06/03/25  0501 06/02/25  0318   WBC 10*3/mm3 13.06* 18.73*   HEMOGLOBIN g/dL 12.8 13.6   HEMATOCRIT % 41.6 41.5   PLATELETS 10*3/mm3 443 569*     Results from last 7 days   Lab Units 06/03/25  0501 06/02/25  0318   SODIUM mmol/L 144 146*   POTASSIUM mmol/L 3.5 3.6   CHLORIDE mmol/L 107 106   CO2 mmol/L 20.5* 19.1*   BUN mg/dL 9.0 8.8   CREATININE mg/dL 0.71 0.73   CALCIUM mg/dL 9.1 9.9   BILIRUBIN mg/dL 0.6 0.5   ALK PHOS U/L 66 73   ALT (SGPT) U/L 8 10   AST (SGOT) U/L 12 11   GLUCOSE mg/dL 104* 120*         Lab Results   Lab Value Date/Time    LIPASE 22 06/02/2025 0318    LIPASE 35 03/30/2025 1209    LIPASE 41 03/08/2025 2329    LIPASE 20 02/22/2025 1301    LIPASE 25 02/16/2025 2217    LIPASE 29 02/02/2024 0412       Radiology:    MRI abdomen wo contrast mrcp  Result Date: 6/3/2025  Impression: 1. Cholecystectomy. No abnormal biliary dilation or choledocholithiasis. 2. No convincing MR evidence of bile leak. 3. The third and fourth duodenal segments appear generally thickened and may be inflamed. 4. Hepatic steatosis. Hepatic cysts. Electronically Signed: Margot Mcmullen MD  6/3/2025 8:12 AM EDT  Workstation ID: [id number]    CT Abdomen Pelvis With Contrast  Result Date: 6/2/2025  New mild infiltrates are seen in the lower lobe of the right lung and may represent pneumonia. There has been interval removal of the common bile duct stent with a small amount of pneumobilia. Motion artifact obscures detail. No other definite acute findings are appreciated. Please see above comments for further detail.   Portions of this note were completed with a voice recognition program.  6/2/2025 4:34 AM by Romario Romero MD on Workstation: [email or web address]           Assessment & Plan   Assessment:   Abdominal pain  Nausea and vomiting      Plan:   Patient still experiencing nausea and vomiting with abdominal pain-no improvement   Will plan to do EGD this AM to further evaluate  Patient needs to remain NPO until after procedure is performed   Patient understands and agrees to the plan    I discussed the patients findings and my recommendations with patient.      Electronically signed by MILDRED Vazquez, 6/3/2025, 09:16 EDT.

## 2025-06-03 NOTE — PLAN OF CARE
Goal Outcome Evaluation:  Plan of Care Reviewed With: patient           Outcome Evaluation: Patient AAOx4. VSS. Patient complains of pain to upper abdomen and middle back. Pain medication given PRN, patient only reports minimal relief. Patient c/o nausea and vomiting. Zofran and Compazine given as ordered. Patient has been shaking uncontrollably, but seemed to be able to get some rest tonight. Abdomen is soft and tender, bowel sounds active. Continue plan of care.

## 2025-06-03 NOTE — PROGRESS NOTES
[facility]   Hospitalist Progress Note  Date: 6/3/2025  Patient Name: Ebony Hamilton  : 1980  MRN: [id number]  Date of admission: 2025      Subjective   Subjective     Chief Complaint: Abdominal pain    Summary: 44 y.o. female with previous history of anxiety, cervical disc disease, opioid dependence, depression, history of atrial fibrillation who presented for abdominal pain nausea and vomiting.  Patient underwent a cholecystectomy in 2025 with subsequent bile leak patient then underwent ERCP with drain placement in April.  After that patient developed appendicitis and had an appendectomy.  Patient presented to the hospital today as she states since last night she developed nausea vomiting and upper epigastric abdominal pain.  Patient has required significant amount of pain medication and antiemetics without any improvement in her symptoms.  Patient had a CT of the abdomen and pelvis which showed new mild infiltrates in the lower lobe of the right lung possibly may represent pneumonia however no acute abdominal process noted.  Patient was noted to have an elevated white count at 18,000.  Patient's lactic acid was elevated at 4.4 has improved to 2.7.      Interval Followup: Patient still with severe abdominal pain, however, on arrival to the room she was asleep.  States that Dilaudid does help significantly.    Objective   Objective     Vitals:   Temp:  [97.4 °F (36.3 °C)-98.4 °F (36.9 °C)] 97.4 °F (36.3 °C)  Heart Rate:  [] 94  Resp:  [18-27] 22  BP: (133-177)/() 177/96  Physical Exam    Constitutional: Asleep upon arrival, awakens to voice, falls asleep easily   Respiratory: Clear to auscultation bilaterally, nonlabored respirations    Cardiovascular: RRR, no MRG   Gastrointestinal: Positive bowel sounds, soft, TTP in epigastric area   Neurologic: Oriented x 3, strength symmetric in all extremities, Cranial Nerves grossly intact to confrontation, speech clear    Result  Review    I have personally reviewed the results below:  [x]  Laboratory personally reviewed CMP, CBC, magnesium, lactate  []  Microbiology  []  Radiology  []  EKG/Telemetry   []  Cardiology/Vascular   []  Pathology  []  Old records  []  Other:  CBC          4/26/2025    04:57 6/2/2025    03:18 6/3/2025    05:01   CBC   WBC 7.30  18.73  13.06    RBC 3.83  4.71  4.44    Hemoglobin 10.9  13.6  12.8    Hematocrit 35.0  41.5  41.6    MCV 91.4  88.1  93.7    MCH 28.5  28.9  28.8    MCHC 31.1  32.8  30.8    RDW 14.8  14.8  15.5    Platelets 380  569  443      CMP          4/26/2025    00:06 4/26/2025    04:57 6/2/2025    03:18 6/3/2025    05:01   CMP   Glucose 118  112  120  104    BUN 10  8  8.8  9.0    Creatinine 0.55  0.52  0.73  0.71    EGFR 117.5  119.1  105.5  109.0    Sodium 135  138  146  144    Potassium 4.2  4.1  3.6  3.5    Chloride 101  104  106  107    Calcium 8.7  8.8  9.9  9.1    Total Protein 6.3  6.1  7.8  7.5    Albumin 3.6  3.5  4.7  4.3    Globulin 2.7   3.1  3.2    Total Bilirubin 0.2  0.2  0.5  0.6    Alkaline Phosphatase 59  56  73  66    AST (SGOT) 9  8  11  12    ALT (SGPT) 10  9  10  8    Albumin/Globulin Ratio 1.3   1.5  1.3    BUN/Creatinine Ratio 18.2  15.4  12.1  12.7    Anion Gap 11.7  11.2  20.9  16.5        Assessment & Plan   Assessment / Plan   Intractable abdominal pain  Intractable nausea and vomiting  Likely gastroparesis flare  Lactic acidosis  Questionable right lower lobe pneumonia due to unknown bacterial source  Leukocytosis  Anxiety  Depression  Chronic opiate dependence  Cervical degenerative disc disease  History of gastroparesis    Continue to monitor in the hospital for workup and management of the above  Discussed with gastroenterology, performed EGD today which was largely negative without any obvious source of her abdominal pain  Will attempt to repeat MRCP, patient could not tolerate due to pain overnight  Continue with Dilaudid 0.5 mg every 4 hours as needed for severe  pain, monitor vital signs closely while on high-dose IV narcotics  Will transition IV Rocephin and azithromycin to oral Augmentin to complete a 5-day course due to concern for pneumonia  Continue with scheduled Reglan 10 mg every 6 hours  Continue with scheduled simethicone 4 times daily  Trend lactate until clear  Trend renal function and electrolytes with a.m. BMP, magnesium   Trend Hgb and WBC with a.m. CBC     Discussed plan with RN, gastroenterology    VTE Prophylaxis:  Mechanical VTE prophylaxis orders are present.        CODE STATUS:   Code Status (Patient has no pulse and is not breathing): CPR (Attempt to Resuscitate)  Medical Interventions (Patient has pulse or is breathing): Full Support

## 2025-06-03 NOTE — ANESTHESIA PREPROCEDURE EVALUATION
Anesthesia Evaluation     Patient summary reviewed and Nursing notes reviewed   NPO Solid Status: > 8 hours  NPO Liquid Status: > 8 hours           Airway   Mallampati: I  TM distance: >3 FB  Neck ROM: limited  No difficulty expected  Comment: Limited ROM d/t cervical fusion   Dental - normal exam     Pulmonary - normal exam    breath sounds clear to auscultation  Cardiovascular - normal exam  Exercise tolerance: good (4-7 METS)    ECG reviewed  Rhythm: regular  Rate: normal    (+) dysrhythmias Atrial Fib      Neuro/Psych  (+) headaches, tremors (bilateral upper extremity nonesssential tremors), numbness, psychiatric history (panic disorder) Depression and Anxiety  GI/Hepatic/Renal/Endo    (+) obesity, GI bleeding , liver disease    Musculoskeletal     (+) chronic pain, neck pain, neck stiffness      ROS comment: In pain management for cervical fusion, chronic neck pain   Abdominal    Substance History      OB/GYN          Other        ROS/Med Hx Other: Epigastric pain  Intractable nausea and vomiting     Previous ERCP , previous cholecystectomy with bile leak , all resolved     - NORMAL ECG -  Sinus rhythm  Date and Time of Study:2025-06-02 06:53:30    ECHO 04/25/25 :  ·  Left ventricular ejection fraction appears to be 61 - 65%.  ·  Left ventricular diastolic function was normal.    S/p hysterectomy  Took norco this am at 0130, dilaudid 1 mg at 1044 this am, also received zofran, reglan pepcid and compazine this am       Pt states she does not want reglan - she states it causes a dystonia reaction - added this to her list                 Anesthesia Plan    ASA 3     general   total IV anesthesia  (Total IV Anesthesia    Patient understands anesthesia not responsible for dental damage.      Discussed risks with pt including aspiration, allergic reactions, apnea, advanced airway placement. Pt verbalized understanding. All questions answered.     )  intravenous induction     Anesthetic plan, risks, benefits, and  alternatives have been provided, discussed and informed consent has been obtained with: patient and child.  Pre-procedure education provided  Plan discussed with CRNA.      CODE STATUS:    Code Status (Patient has no pulse and is not breathing): CPR (Attempt to Resuscitate)  Medical Interventions (Patient has pulse or is breathing): Full Support

## 2025-06-03 NOTE — H&P (VIEW-ONLY)
[facility]  Inpatient Progress Note    Reason for Follow Up:  Abdominal pain, nausea, vomiting    Subjective     Interval History:   Patient still having abdominal pain and nausea and vomiting.  Patient states she is having the same pain and it hasn't improved at all.    Current Facility-Administered Medications:     acetaminophen (TYLENOL) tablet 650 mg, 650 mg, Oral, Q6H PRN, Horacio Adams DO    azithromycin (ZITHROMAX) 500 mg in sodium chloride 0.9 % 250 mL IVPB-VTB, 500 mg, Intravenous, Q24H, Horacio Adams DO, 500 mg at 06/03/25 0831    sennosides-docusate (PERICOLACE) 8.6-50 MG per tablet 2 tablet, 2 tablet, Oral, BID PRN **AND** polyethylene glycol (MIRALAX) packet 17 g, 17 g, Oral, Daily PRN **AND** bisacodyl (DULCOLAX) EC tablet 5 mg, 5 mg, Oral, Daily PRN **AND** bisacodyl (DULCOLAX) suppository 10 mg, 10 mg, Rectal, Daily PRN, Horacio Adams DO    cefTRIAXone (ROCEPHIN) in NS 2 GRAMS/20ml IV PUSH syringe, 2,000 mg, Intravenous, Q24H, Horcaio Adams DO, 2,000 mg at 06/03/25 0830    clonazePAM (KlonoPIN) tablet 0.5 mg, 0.5 mg, Oral, BID PRN, Horacio Adams DO, 0.5 mg at 06/03/25 0351    diphenhydrAMINE (BENADRYL) injection 12.5 mg, 12.5 mg, Intravenous, Q6H PRN, Horacio Adams DO, 12.5 mg at 06/02/25 2120    DULoxetine (CYMBALTA) DR capsule 30 mg, 30 mg, Oral, Q PM, Horacio Adams DO, 30 mg at 06/02/25 1807    DULoxetine (CYMBALTA) DR capsule 60 mg, 60 mg, Oral, QAM, Horacio Adams DO, 60 mg at 06/03/25 0831    HYDROcodone-acetaminophen (NORCO)  MG per tablet 1 tablet, 1 tablet, Oral, Q6H PRN, Horacio Adams DO, 1 tablet at 06/03/25 0127    HYDROmorphone (DILAUDID) injection 1 mg, 1 mg, Intravenous, Q4H PRN, Darion Zamora MD    ketorolac (TORADOL) injection 15 mg, 15 mg, Intravenous, Q6H PRN, Horacio Adams DO, 15 mg at 06/02/25 2111    metoclopramide (REGLAN) injection 10 mg, 10 mg, Intravenous, Q6H, Darion Zamora MD, 10 mg at 06/03/25 0832    [facility]  (SINGULAIR) tablet 10 mg, 10 mg, Oral, Nightly, Horacio Adams DO    ondansetron (ZOFRAN) injection 4 mg, 4 mg, Intravenous, Q6H PRN, Horacio Adams DO, 4 mg at 06/03/25 0108    pantoprazole (PROTONIX) EC tablet 40 mg, 40 mg, Oral, Daily, Horacio Adams, , 40 mg at 06/03/25 0831    polyethylene glycol (MIRALAX) packet 17 g, 17 g, Oral, Daily, Horacio Adams DO, 17 g at 06/03/25 0832    prochlorperazine (COMPAZINE) injection 10 mg, 10 mg, Intravenous, Q8H PRN, Horacio Adams DO, 10 mg at 06/03/25 0637    simethicone (MYLICON) chewable tablet 120 mg, 120 mg, Oral, 4x Daily AC & at Bedtime, Horacio Adams DO, 120 mg at 06/03/25 0831    sodium chloride 0.9 % flush 10 mL, 10 mL, Intravenous, PRN, Orlando Diamond MD    sodium chloride 0.9 % flush 10 mL, 10 mL, Intravenous, Q12H, Horacio Adams DO, 10 mL at 06/02/25 2112    sodium chloride 0.9 % flush 10 mL, 10 mL, Intravenous, PRN, Horacio Adams DO    sodium chloride 0.9 % infusion 40 mL, 40 mL, Intravenous, PRN, Horacio Adams DO    sodium chloride 0.9 % infusion, 75 mL/hr, Intravenous, Continuous, Darion Zamora MD, Last Rate: 75 mL/hr at 06/03/25 0748, 75 mL/hr at 06/03/25 0748    tiZANidine (ZANAFLEX) tablet 4 mg, 4 mg, Oral, Q8H PRN, Horacio Adams DO    traZODone (DESYREL) tablet 50 mg, 50 mg, Oral, Nightly, Horacio Adams DO  Review of Systems:    Pertinent items are noted in HPI    Objective     Vital Signs  Temp:  [98.4 °F (36.9 °C)-98.6 °F (37 °C)] 98.4 °F (36.9 °C)  Heart Rate:  [] 109  Resp:  [18-22] 18  BP: (147-174)/() 155/85  Body mass index is 32.42 kg/m².  No intake or output data in the 24 hours ending 06/03/25 0916  No intake/output data recorded.     Physical Exam:   General: awake, alert and in no acute distress   Eyes: eyes move symmetrical in all directions, no scleral icterus   Neck: supple, trachea is midline   Skin: warm and dry, not jaundiced   Cardiovascular: regular rhythm and rate   Pulm: breathing unlabored   Abdomen:  soft, tender in epigastric area, non distended   Rectal: deferred   Extremities: no rash or edema   Psychiatric: mental status within normal limits, alert and oriented      Results Review:     I reviewed the patient's new clinical results.    Results from last 7 days   Lab Units 06/03/25  0501 06/02/25  0318   WBC 10*3/mm3 13.06* 18.73*   HEMOGLOBIN g/dL 12.8 13.6   HEMATOCRIT % 41.6 41.5   PLATELETS 10*3/mm3 443 569*     Results from last 7 days   Lab Units 06/03/25  0501 06/02/25  0318   SODIUM mmol/L 144 146*   POTASSIUM mmol/L 3.5 3.6   CHLORIDE mmol/L 107 106   CO2 mmol/L 20.5* 19.1*   BUN mg/dL 9.0 8.8   CREATININE mg/dL 0.71 0.73   CALCIUM mg/dL 9.1 9.9   BILIRUBIN mg/dL 0.6 0.5   ALK PHOS U/L 66 73   ALT (SGPT) U/L 8 10   AST (SGOT) U/L 12 11   GLUCOSE mg/dL 104* 120*         Lab Results   Lab Value Date/Time    LIPASE 22 06/02/2025 0318    LIPASE 35 03/30/2025 1209    LIPASE 41 03/08/2025 2329    LIPASE 20 02/22/2025 1301    LIPASE 25 02/16/2025 2217    LIPASE 29 02/02/2024 0412       Radiology:    MRI abdomen wo contrast mrcp  Result Date: 6/3/2025  Impression: 1. Cholecystectomy. No abnormal biliary dilation or choledocholithiasis. 2. No convincing MR evidence of bile leak. 3. The third and fourth duodenal segments appear generally thickened and may be inflamed. 4. Hepatic steatosis. Hepatic cysts. Electronically Signed: Margot Mcmullen MD  6/3/2025 8:12 AM EDT  Workstation ID: [id number]    CT Abdomen Pelvis With Contrast  Result Date: 6/2/2025  New mild infiltrates are seen in the lower lobe of the right lung and may represent pneumonia. There has been interval removal of the common bile duct stent with a small amount of pneumobilia. Motion artifact obscures detail. No other definite acute findings are appreciated. Please see above comments for further detail.   Portions of this note were completed with a voice recognition program.  6/2/2025 4:34 AM by Romario Romero MD on Workstation: [id number]           Assessment & Plan   Assessment:   Abdominal pain  Nausea and vomiting      Plan:   Patient still experiencing nausea and vomiting with abdominal pain-no improvement   Will plan to do EGD this AM to further evaluate  Patient needs to remain NPO until after procedure is performed   Patient understands and agrees to the plan    I discussed the patients findings and my recommendations with patient.      Electronically signed by MILDRED Vazquez, 6/3/2025, 09:16 EDT.

## 2025-06-03 NOTE — ANESTHESIA POSTPROCEDURE EVALUATION
Patient: Ebony Hamilton    Procedure Summary       Date: 06/03/25 Room / Location: [facility] ENDOSCOPY 3 / [facility] ENDOSCOPY    Anesthesia Start: 1430 Anesthesia Stop: 1450    Procedure: ESOPHAGOGASTRODUODENOSCOPY Diagnosis:       Epigastric pain      Intractable nausea and vomiting      (Epigastric pain [R10.13])      (Intractable nausea and vomiting [R11.2])    Surgeons: Ha Thompson MD Provider: Michelle Torres CRNA    Anesthesia Type: general ASA Status: 3            Anesthesia Type: general    Vitals  Vitals Value Taken Time   /96 06/03/25 14:57   Temp 36.3 °C (97.4 °F) 06/03/25 14:57   Pulse 85 06/03/25 14:58   Resp 22 06/03/25 14:57   SpO2 96 % 06/03/25 14:58   Vitals shown include unfiled device data.        Post Anesthesia Care and Evaluation    Post-procedure mental status: acceptable.  Pain management: satisfactory to patient    Airway patency: patent  Anesthetic complications: No anesthetic complications    Cardiovascular status: acceptable  Respiratory status: acceptable    Comments: Per chart review

## 2025-06-04 LAB
ANION GAP SERPL CALCULATED.3IONS-SCNC: 15.4 MMOL/L (ref 5–15)
BASOPHILS # BLD AUTO: 0.08 10*3/MM3 (ref 0–0.2)
BASOPHILS NFR BLD AUTO: 0.8 % (ref 0–1.5)
BUN SERPL-MCNC: 8 MG/DL (ref 6–20)
BUN/CREAT SERPL: 18.6 (ref 7–25)
CALCIUM SPEC-SCNC: 8.6 MG/DL (ref 8.6–10.5)
CHLORIDE SERPL-SCNC: 100 MMOL/L (ref 98–107)
CO2 SERPL-SCNC: 19.6 MMOL/L (ref 22–29)
CREAT SERPL-MCNC: 0.43 MG/DL (ref 0.57–1)
DEPRECATED RDW RBC AUTO: 53.1 FL (ref 37–54)
EGFRCR SERPLBLD CKD-EPI 2021: 124.7 ML/MIN/1.73
EOSINOPHIL # BLD AUTO: 0.07 10*3/MM3 (ref 0–0.4)
EOSINOPHIL NFR BLD AUTO: 0.7 % (ref 0.3–6.2)
ERYTHROCYTE [DISTWIDTH] IN BLOOD BY AUTOMATED COUNT: 15.3 % (ref 12.3–15.4)
GLUCOSE SERPL-MCNC: 99 MG/DL (ref 65–99)
HCT VFR BLD AUTO: 37.4 % (ref 34–46.6)
HGB BLD-MCNC: 11.4 G/DL (ref 12–15.9)
IMM GRANULOCYTES # BLD AUTO: 0.07 10*3/MM3 (ref 0–0.05)
IMM GRANULOCYTES NFR BLD AUTO: 0.7 % (ref 0–0.5)
LIPASE SERPL-CCNC: 46 U/L (ref 13–60)
LYMPHOCYTES # BLD AUTO: 1.53 10*3/MM3 (ref 0.7–3.1)
LYMPHOCYTES NFR BLD AUTO: 15.8 % (ref 19.6–45.3)
MAGNESIUM SERPL-MCNC: 1.9 MG/DL (ref 1.6–2.6)
MCH RBC QN AUTO: 28.6 PG (ref 26.6–33)
MCHC RBC AUTO-ENTMCNC: 30.5 G/DL (ref 31.5–35.7)
MCV RBC AUTO: 93.7 FL (ref 79–97)
MONOCYTES # BLD AUTO: 0.68 10*3/MM3 (ref 0.1–0.9)
MONOCYTES NFR BLD AUTO: 7 % (ref 5–12)
NEUTROPHILS NFR BLD AUTO: 7.28 10*3/MM3 (ref 1.7–7)
NEUTROPHILS NFR BLD AUTO: 75 % (ref 42.7–76)
NRBC BLD AUTO-RTO: 0 /100 WBC (ref 0–0.2)
PHOSPHATE SERPL-MCNC: 3 MG/DL (ref 2.5–4.5)
PLATELET # BLD AUTO: 371 10*3/MM3 (ref 140–450)
PMV BLD AUTO: 9.9 FL (ref 6–12)
POTASSIUM SERPL-SCNC: 3.5 MMOL/L (ref 3.5–5.2)
RBC # BLD AUTO: 3.99 10*6/MM3 (ref 3.77–5.28)
SODIUM SERPL-SCNC: 135 MMOL/L (ref 136–145)
WBC NRBC COR # BLD AUTO: 9.71 10*3/MM3 (ref 3.4–10.8)

## 2025-06-04 PROCEDURE — 84100 ASSAY OF PHOSPHORUS: CPT | Performed by: INTERNAL MEDICINE

## 2025-06-04 PROCEDURE — 63710000001 ONDANSETRON ODT 4 MG TABLET DISPERSIBLE: Performed by: INTERNAL MEDICINE

## 2025-06-04 PROCEDURE — 25010000002 PROCHLORPERAZINE 10 MG/2ML SOLUTION: Performed by: INTERNAL MEDICINE

## 2025-06-04 PROCEDURE — 25010000002 HYDROMORPHONE 1 MG/ML SOLUTION: Performed by: INTERNAL MEDICINE

## 2025-06-04 PROCEDURE — 85025 COMPLETE CBC W/AUTO DIFF WBC: CPT | Performed by: INTERNAL MEDICINE

## 2025-06-04 PROCEDURE — G0378 HOSPITAL OBSERVATION PER HR: HCPCS

## 2025-06-04 PROCEDURE — 83690 ASSAY OF LIPASE: CPT | Performed by: INTERNAL MEDICINE

## 2025-06-04 PROCEDURE — 25010000002 METOCLOPRAMIDE PER 10 MG: Performed by: INTERNAL MEDICINE

## 2025-06-04 PROCEDURE — 80048 BASIC METABOLIC PNL TOTAL CA: CPT | Performed by: INTERNAL MEDICINE

## 2025-06-04 PROCEDURE — 25010000002 MORPHINE PER 10 MG: Performed by: STUDENT IN AN ORGANIZED HEALTH CARE EDUCATION/TRAINING PROGRAM

## 2025-06-04 PROCEDURE — 25810000003 SODIUM CHLORIDE 0.9 % SOLUTION: Performed by: INTERNAL MEDICINE

## 2025-06-04 PROCEDURE — 83735 ASSAY OF MAGNESIUM: CPT | Performed by: INTERNAL MEDICINE

## 2025-06-04 PROCEDURE — 25010000002 KETOROLAC TROMETHAMINE PER 15 MG: Performed by: INTERNAL MEDICINE

## 2025-06-04 PROCEDURE — 99232 SBSQ HOSP IP/OBS MODERATE 35: CPT | Performed by: INTERNAL MEDICINE

## 2025-06-04 PROCEDURE — 94799 UNLISTED PULMONARY SVC/PX: CPT

## 2025-06-04 PROCEDURE — 25010000002 ONDANSETRON PER 1 MG: Performed by: INTERNAL MEDICINE

## 2025-06-04 PROCEDURE — 25010000002 DIPHENHYDRAMINE PER 50 MG: Performed by: INTERNAL MEDICINE

## 2025-06-04 RX ORDER — MORPHINE SULFATE 2 MG/ML
1 INJECTION, SOLUTION INTRAMUSCULAR; INTRAVENOUS EVERY 4 HOURS PRN
Status: COMPLETED | OUTPATIENT
Start: 2025-06-04 | End: 2025-06-04

## 2025-06-04 RX ORDER — ONDANSETRON 4 MG/1
8 TABLET, ORALLY DISINTEGRATING ORAL 3 TIMES DAILY
Status: DISCONTINUED | OUTPATIENT
Start: 2025-06-04 | End: 2025-06-10 | Stop reason: HOSPADM

## 2025-06-04 RX ORDER — SODIUM BICARBONATE 650 MG/1
1300 TABLET ORAL 3 TIMES DAILY
Status: DISCONTINUED | OUTPATIENT
Start: 2025-06-04 | End: 2025-06-06

## 2025-06-04 RX ORDER — SODIUM CHLORIDE 9 MG/ML
100 INJECTION, SOLUTION INTRAVENOUS CONTINUOUS
Status: ACTIVE | OUTPATIENT
Start: 2025-06-04 | End: 2025-06-05

## 2025-06-04 RX ORDER — ACETAMINOPHEN 325 MG/1
650 TABLET ORAL ONCE
Status: COMPLETED | OUTPATIENT
Start: 2025-06-04 | End: 2025-06-04

## 2025-06-04 RX ADMIN — POLYETHYLENE GLYCOL 3350 17 G: 17 POWDER, FOR SOLUTION ORAL at 10:22

## 2025-06-04 RX ADMIN — METOCLOPRAMIDE 10 MG: 5 INJECTION, SOLUTION INTRAMUSCULAR; INTRAVENOUS at 07:21

## 2025-06-04 RX ADMIN — PANTOPRAZOLE SODIUM 40 MG: 40 TABLET, DELAYED RELEASE ORAL at 10:23

## 2025-06-04 RX ADMIN — HYDROMORPHONE HYDROCHLORIDE 0.5 MG: 1 INJECTION, SOLUTION INTRAMUSCULAR; INTRAVENOUS; SUBCUTANEOUS at 07:22

## 2025-06-04 RX ADMIN — Medication 10 ML: at 20:57

## 2025-06-04 RX ADMIN — PANTOPRAZOLE SODIUM 40 MG: 40 TABLET, DELAYED RELEASE ORAL at 16:06

## 2025-06-04 RX ADMIN — SIMETHICONE 120 MG: 80 TABLET, CHEWABLE ORAL at 10:22

## 2025-06-04 RX ADMIN — TIZANIDINE 4 MG: 4 TABLET ORAL at 16:14

## 2025-06-04 RX ADMIN — DULOXETINE 30 MG: 30 CAPSULE, DELAYED RELEASE ORAL at 16:06

## 2025-06-04 RX ADMIN — KETOROLAC TROMETHAMINE 15 MG: 15 INJECTION, SOLUTION INTRAMUSCULAR; INTRAVENOUS at 16:11

## 2025-06-04 RX ADMIN — DIPHENHYDRAMINE HYDROCHLORIDE 12.5 MG: 50 INJECTION, SOLUTION INTRAMUSCULAR; INTRAVENOUS at 07:21

## 2025-06-04 RX ADMIN — ONDANSETRON 8 MG: 4 TABLET, ORALLY DISINTEGRATING ORAL at 15:20

## 2025-06-04 RX ADMIN — ACETAMINOPHEN 650 MG: 325 TABLET ORAL at 20:56

## 2025-06-04 RX ADMIN — DIPHENHYDRAMINE HYDROCHLORIDE 12.5 MG: 50 INJECTION, SOLUTION INTRAMUSCULAR; INTRAVENOUS at 00:22

## 2025-06-04 RX ADMIN — ONDANSETRON 4 MG: 2 INJECTION INTRAMUSCULAR; INTRAVENOUS at 05:24

## 2025-06-04 RX ADMIN — SODIUM CHLORIDE 100 ML/HR: 9 INJECTION, SOLUTION INTRAVENOUS at 21:25

## 2025-06-04 RX ADMIN — HYDROCODONE BITARTRATE AND ACETAMINOPHEN 1 TABLET: 10; 325 TABLET ORAL at 05:24

## 2025-06-04 RX ADMIN — HYDROMORPHONE HYDROCHLORIDE 0.5 MG: 1 INJECTION, SOLUTION INTRAMUSCULAR; INTRAVENOUS; SUBCUTANEOUS at 00:23

## 2025-06-04 RX ADMIN — MORPHINE SULFATE 1 MG: 2 INJECTION, SOLUTION INTRAMUSCULAR; INTRAVENOUS at 23:27

## 2025-06-04 RX ADMIN — DULOXETINE 60 MG: 30 CAPSULE, DELAYED RELEASE ORAL at 10:23

## 2025-06-04 RX ADMIN — SODIUM BICARBONATE 1300 MG: 650 TABLET ORAL at 10:26

## 2025-06-04 RX ADMIN — HYDROCODONE BITARTRATE AND ACETAMINOPHEN 1 TABLET: 10; 325 TABLET ORAL at 15:20

## 2025-06-04 RX ADMIN — Medication 10 ML: at 08:00

## 2025-06-04 RX ADMIN — ONDANSETRON 8 MG: 4 TABLET, ORALLY DISINTEGRATING ORAL at 20:56

## 2025-06-04 RX ADMIN — AMOXICILLIN AND CLAVULANATE POTASSIUM 1 TABLET: 875; 125 TABLET, FILM COATED ORAL at 20:56

## 2025-06-04 RX ADMIN — TIZANIDINE 4 MG: 4 TABLET ORAL at 05:24

## 2025-06-04 RX ADMIN — METOCLOPRAMIDE 10 MG: 5 INJECTION, SOLUTION INTRAMUSCULAR; INTRAVENOUS at 20:55

## 2025-06-04 RX ADMIN — SODIUM CHLORIDE 100 ML/HR: 9 INJECTION, SOLUTION INTRAVENOUS at 10:44

## 2025-06-04 RX ADMIN — AMOXICILLIN AND CLAVULANATE POTASSIUM 1 TABLET: 875; 125 TABLET, FILM COATED ORAL at 10:23

## 2025-06-04 RX ADMIN — HYDROCODONE BITARTRATE AND ACETAMINOPHEN 1 TABLET: 10; 325 TABLET ORAL at 21:25

## 2025-06-04 RX ADMIN — PROCHLORPERAZINE EDISYLATE 10 MG: 5 INJECTION INTRAMUSCULAR; INTRAVENOUS at 00:23

## 2025-06-04 RX ADMIN — SODIUM BICARBONATE 1300 MG: 650 TABLET ORAL at 20:56

## 2025-06-04 RX ADMIN — SIMETHICONE 120 MG: 80 TABLET, CHEWABLE ORAL at 16:06

## 2025-06-04 RX ADMIN — TRAZODONE HYDROCHLORIDE 50 MG: 50 TABLET ORAL at 20:57

## 2025-06-04 RX ADMIN — MONTELUKAST 10 MG: 10 TABLET, FILM COATED ORAL at 20:56

## 2025-06-04 NOTE — CONSULTS
"Nutrition Services    Patient Name: Ebony Hamilton  YOB: 1980  MRN: [id number]  Admission date: 6/2/2025      CLINICAL NUTRITION ASSESSMENT      Reason for Assessment  MST Score 2+     H&P:  Past Medical History:   Diagnosis Date    Allergic     Anxiety     Atrial fibrillation     RELEASED BY CARDIOLOGIST/ELECTROPHYSIST, NO CURRENT MEDS    Chronic pain disorder     Depression     Endometriosis     Headache     Hemorrhoids     Injury of shoulder, right 2009    CHRONIC PAIN    Panic disorder     Rectal bleeding 2010    S/P laparoscopic appendectomy 03/09/2025    S/P laparoscopic cholecystectomy 02/17/2025        Current Problems:   Active Hospital Problems    Diagnosis     **Abdominal pain     Intractable nausea and vomiting         Nutrition/Diet History         Narrative   06/04: Admitted with c/o abdominal pain with n/v and h/o A-fib and cholecystectomy per H&P report. Pt states sudden onset of symptoms 6/01 w/ prior adequate oral intake and appetite. Diet advanced to full liquids this afternoon, pt reported tolerance of jello w/o trials of other liquids during my visit. S/p EGD w/o significant findings. Wt 79.3kg per bed scale, UBW per pt report 77.3kg - no weight loss appreciated. Labs and meds reviewed     Pt self reported gastroparesis. Provided nutrition guidelines for management including small, frequent meals 5-7x daily; Drinks out side of eating occasions; Limiting foods high in fat/fiber; Decreased consumption of caffeine and alcohol; Chewing foods well at meals - pt verbalized understanding and reported noncompliance/unwilling to make lifestyle change      Anthropometrics        Current Height, Weight Height: 157.5 cm (62\")  Weight: 80.4 kg (177 lb 4 oz)   Current BMI Body mass index is 32.42 kg/m².   BMI Classification Overweight   % %   Adjusted Body Weight (ABW) 57.6kg   Weight Hx  Wt Readings from Last 30 Encounters:   06/02/25 0809 80.4 kg (177 lb 4 oz)   06/02/25 0303 " 80.4 kg (177 lb 4 oz)   04/24/25 2328 77.9 kg (171 lb 11.8 oz)   04/24/25 1612 77.6 kg (171 lb)   04/24/25 1432 77.6 kg (171 lb 1.6 oz)   04/15/25 1057 77.8 kg (171 lb 9.6 oz)   04/08/25 0623 76.8 kg (169 lb 5 oz)   03/30/25 1832 78.2 kg (172 lb 6.4 oz)   03/30/25 1212 81.1 kg (178 lb 12.7 oz)   03/25/25 1410 81.1 kg (178 lb 12.8 oz)   03/21/25 1437 79.4 kg (175 lb)   03/20/25 0057 81.7 kg (180 lb 1.9 oz)   03/14/25 1406 80.4 kg (177 lb 4.8 oz)   03/09/25 0400 77.7 kg (171 lb 4.8 oz)   03/08/25 2228 78 kg (171 lb 15.3 oz)   03/07/25 1419 79.8 kg (175 lb 14.4 oz)   03/06/25 0704 80.4 kg (177 lb 4 oz)   03/05/25 1130 81.2 kg (179 lb 1.6 oz)   02/22/25 1403 81.5 kg (179 lb 10.8 oz)   02/17/25 0618 81.6 kg (180 lb)   02/16/25 2209 84.4 kg (186 lb 1.1 oz)   01/11/25 1639 85.5 kg (188 lb 7.9 oz)   01/11/25 1527 83.2 kg (183 lb 6.8 oz)   01/11/25 1314 83.1 kg (183 lb 1.6 oz)   09/26/24 1325 87.5 kg (192 lb 14.4 oz)   08/20/24 1405 85.3 kg (188 lb 1.6 oz)   05/31/24 0743 89.1 kg (196 lb 6.4 oz)   02/27/24 1059 86.2 kg (190 lb)   02/02/24 0340 86.2 kg (190 lb 0.6 oz)   01/31/24 0723 85.1 kg (187 lb 9.8 oz)   01/17/24 1424 87.1 kg (192 lb)   01/10/24 0938 86.6 kg (191 lb)   11/08/23 1411 85.3 kg (188 lb)   10/03/23 1312 83 kg (183 lb)   07/12/23 1046 84.5 kg (186 lb 3.2 oz)   06/21/23 1506 85.4 kg (188 lb 3.2 oz)          Wt Change Observation No weight change appreciated      Estimated/Assessed Needs  Estimated Needs based on: Adjusted Body Weight       Energy Requirements 25-30 kcal/kg   EST Needs (kcal/day) 3326-6019       Protein Requirements 1.0-1.2 g/kg   EST Daily Needs (g/day) 57-70       Fluid Requirements 25-30 ml/kg    Estimated Needs (mL/day)  5459-4576        Labs/Medications         Pertinent Labs Reviewed.   Results from last 7 days   Lab Units 06/04/25  0500 06/03/25  0501 06/02/25  0318   SODIUM mmol/L 135* 144 146*   POTASSIUM mmol/L 3.5 3.5 3.6   CHLORIDE mmol/L 100 107 106   CO2 mmol/L 19.6* 20.5* 19.1*  "  BUN mg/dL 8.0 9.0 8.8   CREATININE mg/dL 0.43* 0.71 0.73   CALCIUM mg/dL 8.6 9.1 9.9   BILIRUBIN mg/dL  --  0.6 0.5   ALK PHOS U/L  --  66 73   ALT (SGPT) U/L  --  8 10   AST (SGOT) U/L  --  12 11   GLUCOSE mg/dL 99 104* 120*     Results from last 7 days   Lab Units 06/04/25  0500 06/03/25  0501   MAGNESIUM mg/dL 1.9 2.0   PHOSPHORUS mg/dL 3.0  --    HEMOGLOBIN g/dL 11.4* 12.8   HEMATOCRIT % 37.4 41.6     No results found for: \"COVID19\"  No results found for: \"HGBA1C\"      Pertinent Medications Reviewed.     Malnutrition Severity Assessment              Nutrition Diagnosis         Nutrition Dx Problem 1 Limited adherence to diet modifications related to GI dysfunction as evidenced by PO diet not tolerated.     Nutrition Intervention           Current Nutrition Orders & Evaluation of Intake       Current PO Diet Diet: Liquid; Full Liquid; Fluid Consistency: Thin (IDDSI 0)   Supplement No active supplement orders           Nutrition Intervention/Prescription        Full liquid diet, adv diet as tolerated - encouraged small, frequent intake at meals to promote acceptance     Recommend addition of Boost Breeze TID for ONS per pt preference         Medical Nutrition Therapy/Nutrition Education          Learner     Readiness Patient  Non-acceptance     Method     Response Explanation  Needs reinforcement     Monitor/Evaluation        Monitor PO intake, Supplement intake, GI status, Diet advancement     Nutrition Discharge Plan         No nutrition discharge needs identified at this time     Electronically signed by:  Divina Clarke RD  06/04/25 14:54 EDT    "

## 2025-06-04 NOTE — PLAN OF CARE
Goal Outcome Evaluation:           Progress: improving  Outcome Evaluation: Alert and oriented x 4, VSS, no significant changes in status during shift, medicated for pain per MAR, medicated for nausea per MAR, medicated for headache per MAR, continuous IV fluids initiated, diet advanced to full liquid.

## 2025-06-04 NOTE — PROGRESS NOTES
[facility]   Hospitalist Progress Note  Date: 2025  Patient Name: Ebony Hamilton  : 1980  MRN: [id number]  Date of admission: 2025      Subjective   Subjective     Chief Complaint: Abdominal pain    Summary: 44 y.o. female with previous history of anxiety, cervical disc disease, opioid dependence, depression, history of atrial fibrillation who presented for abdominal pain nausea and vomiting.  Patient underwent a cholecystectomy in 2025 with subsequent bile leak patient then underwent ERCP with drain placement in April.  After that patient developed appendicitis and had an appendectomy.  Patient presented to the hospital today as she states since last night she developed nausea vomiting and upper epigastric abdominal pain.  Patient has required significant amount of pain medication and antiemetics without any improvement in her symptoms.  Patient had a CT of the abdomen and pelvis which showed new mild infiltrates in the lower lobe of the right lung possibly may represent pneumonia however no acute abdominal process noted.  Patient was noted to have an elevated white count at 18,000.  Patient's lactic acid was elevated at 4.4 has improved to 2.7.      Interval Followup: No acute events overnight.  Still endorses significant severe abdominal pain.  Still using IV Dilaudid frequently.  Concerned this could be a gastroparesis flare as she has had this problem in the past and has seen Dr. Alvarado up in [place] for motility.  Has a follow-up later this month.  Feels the Reglan helps but she cannot be on it for prolonged times as it has previously caused a dystonic reaction    Objective   Objective     Vitals:   Temp:  [97.4 °F (36.3 °C)-98.3 °F (36.8 °C)] 98.1 °F (36.7 °C)  Heart Rate:  [] 70  Resp:  [16-27] 16  BP: (113-180)/() 113/71  Physical Exam    Constitutional: Asleep upon arrival, awakens to voice, appears more comfortable, less shaky   Respiratory: Clear to  auscultation bilaterally, nonlabored respirations    Cardiovascular: RRR, no MRG   Gastrointestinal: Positive bowel sounds, soft, TTP in epigastric area   Neurologic: Oriented x 3, strength symmetric in all extremities, Cranial Nerves grossly intact to confrontation, speech clear    Result Review    I have personally reviewed the results below:  [x]  Laboratory personally reviewed CMP, CBC, magnesium, phosphorus  []  Microbiology  []  Radiology  []  EKG/Telemetry   []  Cardiology/Vascular   []  Pathology  []  Old records  []  Other:  CBC          6/2/2025    03:18 6/3/2025    05:01 6/4/2025    05:00   CBC   WBC 18.73  13.06  9.71    RBC 4.71  4.44  3.99    Hemoglobin 13.6  12.8  11.4    Hematocrit 41.5  41.6  37.4    MCV 88.1  93.7  93.7    MCH 28.9  28.8  28.6    MCHC 32.8  30.8  30.5    RDW 14.8  15.5  15.3    Platelets 569  443  371      CMP          6/2/2025    03:18 6/3/2025    05:01 6/4/2025    05:00   CMP   Glucose 120  104  99    BUN 8.8  9.0  8.0    Creatinine 0.73  0.71  0.43    EGFR 105.5  109.0  124.7    Sodium 146  144  135    Potassium 3.6  3.5  3.5    Chloride 106  107  100    Calcium 9.9  9.1  8.6    Total Protein 7.8  7.5     Albumin 4.7  4.3     Globulin 3.1  3.2     Total Bilirubin 0.5  0.6     Alkaline Phosphatase 73  66     AST (SGOT) 11  12     ALT (SGPT) 10  8     Albumin/Globulin Ratio 1.5  1.3     BUN/Creatinine Ratio 12.1  12.7  18.6    Anion Gap 20.9  16.5  15.4        Assessment & Plan   Assessment / Plan   Intractable abdominal pain  Intractable nausea and vomiting  Likely gastroparesis flare  Lactic acidosis  Questionable right lower lobe pneumonia due to unknown bacterial source  Leukocytosis  Anxiety  Depression  Chronic opiate dependence  Cervical degenerative disc disease  History of gastroparesis    Continue to monitor in the hospital for workup and management of the above  MRCP personally reviewed without abnormality which would cause her pain  I do have concern for gastroparesis  flare, will continue with scheduled IV Reglan 10 mg every 6 hours.  Will plan to continue Reglan for 1-2 weeks total due to prior reaction  DC Dilaudid as patient has been overly sedated upon my arrival both days  DC IV Benadryl  Continue oral Augmentin to complete a 5-day course due to concern for pneumonia  Continue with scheduled simethicone 4 times daily  Advance to full liquid diet  Continue NS at 100 cc/h  Patient has follow-up with GI motility clinic later this month  Trend renal function and electrolytes with a.m. BMP, magnesium   Trend Hgb and WBC with a.m. CBC     Discussed plan with RN    VTE Prophylaxis:  Mechanical VTE prophylaxis orders are present.        CODE STATUS:   Code Status (Patient has no pulse and is not breathing): CPR (Attempt to Resuscitate)  Medical Interventions (Patient has pulse or is breathing): Full Support

## 2025-06-05 ENCOUNTER — APPOINTMENT (OUTPATIENT)
Dept: CT IMAGING | Facility: HOSPITAL | Age: 45
End: 2025-06-05
Payer: COMMERCIAL

## 2025-06-05 LAB
ANION GAP SERPL CALCULATED.3IONS-SCNC: 11.5 MMOL/L (ref 5–15)
BASOPHILS # BLD AUTO: 0.05 10*3/MM3 (ref 0–0.2)
BASOPHILS NFR BLD AUTO: 0.6 % (ref 0–1.5)
BUN SERPL-MCNC: 6.2 MG/DL (ref 6–20)
BUN/CREAT SERPL: 10.2 (ref 7–25)
CALCIUM SPEC-SCNC: 8.2 MG/DL (ref 8.6–10.5)
CHLORIDE SERPL-SCNC: 106 MMOL/L (ref 98–107)
CO2 SERPL-SCNC: 25.5 MMOL/L (ref 22–29)
CREAT SERPL-MCNC: 0.61 MG/DL (ref 0.57–1)
CYTO UR: NORMAL
DEPRECATED RDW RBC AUTO: 49.1 FL (ref 37–54)
EGFRCR SERPLBLD CKD-EPI 2021: 114.6 ML/MIN/1.73
EOSINOPHIL # BLD AUTO: 0.13 10*3/MM3 (ref 0–0.4)
EOSINOPHIL NFR BLD AUTO: 1.6 % (ref 0.3–6.2)
ERYTHROCYTE [DISTWIDTH] IN BLOOD BY AUTOMATED COUNT: 14.6 % (ref 12.3–15.4)
GLUCOSE SERPL-MCNC: 102 MG/DL (ref 65–99)
HCT VFR BLD AUTO: 35 % (ref 34–46.6)
HGB BLD-MCNC: 10.9 G/DL (ref 12–15.9)
HOLD SPECIMEN: NORMAL
IMM GRANULOCYTES # BLD AUTO: 0.05 10*3/MM3 (ref 0–0.05)
IMM GRANULOCYTES NFR BLD AUTO: 0.6 % (ref 0–0.5)
LAB AP CASE REPORT: NORMAL
LAB AP CLINICAL INFORMATION: NORMAL
LYMPHOCYTES # BLD AUTO: 1.98 10*3/MM3 (ref 0.7–3.1)
LYMPHOCYTES NFR BLD AUTO: 24.4 % (ref 19.6–45.3)
MAGNESIUM SERPL-MCNC: 1.6 MG/DL (ref 1.6–2.6)
MCH RBC QN AUTO: 28.6 PG (ref 26.6–33)
MCHC RBC AUTO-ENTMCNC: 31.1 G/DL (ref 31.5–35.7)
MCV RBC AUTO: 91.9 FL (ref 79–97)
MONOCYTES # BLD AUTO: 0.55 10*3/MM3 (ref 0.1–0.9)
MONOCYTES NFR BLD AUTO: 6.8 % (ref 5–12)
NEUTROPHILS NFR BLD AUTO: 5.36 10*3/MM3 (ref 1.7–7)
NEUTROPHILS NFR BLD AUTO: 66 % (ref 42.7–76)
NRBC BLD AUTO-RTO: 0 /100 WBC (ref 0–0.2)
PATH REPORT.FINAL DX SPEC: NORMAL
PATH REPORT.GROSS SPEC: NORMAL
PHOSPHATE SERPL-MCNC: 2.9 MG/DL (ref 2.5–4.5)
PLATELET # BLD AUTO: 390 10*3/MM3 (ref 140–450)
PMV BLD AUTO: 9.5 FL (ref 6–12)
POTASSIUM SERPL-SCNC: 2.9 MMOL/L (ref 3.5–5.2)
QT INTERVAL: 376 MS
QTC INTERVAL: 422 MS
RBC # BLD AUTO: 3.81 10*6/MM3 (ref 3.77–5.28)
SODIUM SERPL-SCNC: 143 MMOL/L (ref 136–145)
WBC NRBC COR # BLD AUTO: 8.12 10*3/MM3 (ref 3.4–10.8)

## 2025-06-05 PROCEDURE — 25010000002 ONDANSETRON PER 1 MG: Performed by: INTERNAL MEDICINE

## 2025-06-05 PROCEDURE — 63710000001 ONDANSETRON ODT 4 MG TABLET DISPERSIBLE: Performed by: INTERNAL MEDICINE

## 2025-06-05 PROCEDURE — 25010000002 SODIUM CHLORIDE 0.9 % WITH KCL 20 MEQ 20-0.9 MEQ/L-% SOLUTION: Performed by: INTERNAL MEDICINE

## 2025-06-05 PROCEDURE — 25010000002 HALOPERIDOL LACTATE PER 5 MG: Performed by: INTERNAL MEDICINE

## 2025-06-05 PROCEDURE — 84100 ASSAY OF PHOSPHORUS: CPT | Performed by: INTERNAL MEDICINE

## 2025-06-05 PROCEDURE — 25010000002 HYDROMORPHONE 1 MG/ML SOLUTION: Performed by: INTERNAL MEDICINE

## 2025-06-05 PROCEDURE — 93005 ELECTROCARDIOGRAM TRACING: CPT | Performed by: INTERNAL MEDICINE

## 2025-06-05 PROCEDURE — 25510000001 IOPAMIDOL PER 1 ML: Performed by: INTERNAL MEDICINE

## 2025-06-05 PROCEDURE — 36410 VNPNXR 3YR/> PHY/QHP DX/THER: CPT

## 2025-06-05 PROCEDURE — 99233 SBSQ HOSP IP/OBS HIGH 50: CPT | Performed by: INTERNAL MEDICINE

## 2025-06-05 PROCEDURE — 25010000002 HYDROMORPHONE 1 MG/ML SOLUTION: Performed by: STUDENT IN AN ORGANIZED HEALTH CARE EDUCATION/TRAINING PROGRAM

## 2025-06-05 PROCEDURE — 25010000002 METOCLOPRAMIDE PER 10 MG: Performed by: INTERNAL MEDICINE

## 2025-06-05 PROCEDURE — 25010000002 PROMETHAZINE PER 50 MG: Performed by: INTERNAL MEDICINE

## 2025-06-05 PROCEDURE — 93010 ELECTROCARDIOGRAM REPORT: CPT | Performed by: INTERNAL MEDICINE

## 2025-06-05 PROCEDURE — 74174 CTA ABD&PLVS W/CONTRAST: CPT

## 2025-06-05 PROCEDURE — 25010000002 POTASSIUM CHLORIDE 10 MEQ/100ML SOLUTION: Performed by: INTERNAL MEDICINE

## 2025-06-05 PROCEDURE — 80048 BASIC METABOLIC PNL TOTAL CA: CPT | Performed by: INTERNAL MEDICINE

## 2025-06-05 PROCEDURE — 85025 COMPLETE CBC W/AUTO DIFF WBC: CPT | Performed by: INTERNAL MEDICINE

## 2025-06-05 PROCEDURE — 25010000002 PROCHLORPERAZINE 10 MG/2ML SOLUTION: Performed by: INTERNAL MEDICINE

## 2025-06-05 PROCEDURE — C1751 CATH, INF, PER/CENT/MIDLINE: HCPCS

## 2025-06-05 PROCEDURE — 25810000003 SODIUM CHLORIDE 0.9 % SOLUTION: Performed by: INTERNAL MEDICINE

## 2025-06-05 PROCEDURE — 83735 ASSAY OF MAGNESIUM: CPT | Performed by: INTERNAL MEDICINE

## 2025-06-05 PROCEDURE — 25010000002 KETOROLAC TROMETHAMINE PER 15 MG: Performed by: INTERNAL MEDICINE

## 2025-06-05 RX ORDER — POTASSIUM CHLORIDE 7.45 MG/ML
10 INJECTION INTRAVENOUS
Status: COMPLETED | OUTPATIENT
Start: 2025-06-05 | End: 2025-06-05

## 2025-06-05 RX ORDER — CLONAZEPAM 0.5 MG/1
0.5 TABLET ORAL 2 TIMES DAILY PRN
Status: DISCONTINUED | OUTPATIENT
Start: 2025-06-05 | End: 2025-06-10 | Stop reason: HOSPADM

## 2025-06-05 RX ORDER — HALOPERIDOL 5 MG/ML
5 INJECTION INTRAMUSCULAR ONCE
Status: COMPLETED | OUTPATIENT
Start: 2025-06-05 | End: 2025-06-05

## 2025-06-05 RX ORDER — SODIUM CHLORIDE AND POTASSIUM CHLORIDE 150; 900 MG/100ML; MG/100ML
100 INJECTION, SOLUTION INTRAVENOUS CONTINUOUS
Status: ACTIVE | OUTPATIENT
Start: 2025-06-05 | End: 2025-06-06

## 2025-06-05 RX ORDER — SCOPOLAMINE 1 MG/3D
1 PATCH, EXTENDED RELEASE TRANSDERMAL
Status: DISCONTINUED | OUTPATIENT
Start: 2025-06-05 | End: 2025-06-10 | Stop reason: HOSPADM

## 2025-06-05 RX ORDER — IOPAMIDOL 755 MG/ML
100 INJECTION, SOLUTION INTRAVASCULAR
Status: COMPLETED | OUTPATIENT
Start: 2025-06-05 | End: 2025-06-05

## 2025-06-05 RX ORDER — SODIUM CHLORIDE 0.9 % (FLUSH) 0.9 %
10 SYRINGE (ML) INJECTION EVERY 12 HOURS SCHEDULED
Status: DISCONTINUED | OUTPATIENT
Start: 2025-06-05 | End: 2025-06-05

## 2025-06-05 RX ORDER — SODIUM CHLORIDE 0.9 % (FLUSH) 0.9 %
10 SYRINGE (ML) INJECTION AS NEEDED
Status: DISCONTINUED | OUTPATIENT
Start: 2025-06-05 | End: 2025-06-08

## 2025-06-05 RX ORDER — LUBIPROSTONE 8 UG/1
8 CAPSULE ORAL 2 TIMES DAILY
Status: DISCONTINUED | OUTPATIENT
Start: 2025-06-05 | End: 2025-06-10 | Stop reason: HOSPADM

## 2025-06-05 RX ORDER — HYDROCODONE BITARTRATE AND ACETAMINOPHEN 10; 325 MG/1; MG/1
1 TABLET ORAL EVERY 4 HOURS PRN
Refills: 0 | Status: DISCONTINUED | OUTPATIENT
Start: 2025-06-05 | End: 2025-06-08

## 2025-06-05 RX ORDER — NIFEDIPINE 10 MG/1
10 CAPSULE ORAL EVERY 8 HOURS SCHEDULED
Status: DISCONTINUED | OUTPATIENT
Start: 2025-06-05 | End: 2025-06-06

## 2025-06-05 RX ADMIN — NIFEDIPINE 10 MG: 10 CAPSULE ORAL at 21:49

## 2025-06-05 RX ADMIN — ONDANSETRON 8 MG: 4 TABLET, ORALLY DISINTEGRATING ORAL at 08:14

## 2025-06-05 RX ADMIN — POLYETHYLENE GLYCOL 3350 17 G: 17 POWDER, FOR SOLUTION ORAL at 08:16

## 2025-06-05 RX ADMIN — SODIUM CHLORIDE AND POTASSIUM CHLORIDE 100 ML/HR: 150; 900 INJECTION, SOLUTION INTRAVENOUS at 22:56

## 2025-06-05 RX ADMIN — MONTELUKAST 10 MG: 10 TABLET, FILM COATED ORAL at 21:50

## 2025-06-05 RX ADMIN — PROCHLORPERAZINE EDISYLATE 10 MG: 5 INJECTION INTRAMUSCULAR; INTRAVENOUS at 11:59

## 2025-06-05 RX ADMIN — SODIUM BICARBONATE 1300 MG: 650 TABLET ORAL at 15:08

## 2025-06-05 RX ADMIN — HYDROCODONE BITARTRATE AND ACETAMINOPHEN 1 TABLET: 10; 325 TABLET ORAL at 17:40

## 2025-06-05 RX ADMIN — Medication 5 MG: at 21:50

## 2025-06-05 RX ADMIN — HYDROCODONE BITARTRATE AND ACETAMINOPHEN 1 TABLET: 10; 325 TABLET ORAL at 21:50

## 2025-06-05 RX ADMIN — ONDANSETRON 4 MG: 2 INJECTION INTRAMUSCULAR; INTRAVENOUS at 22:52

## 2025-06-05 RX ADMIN — SODIUM CHLORIDE AND POTASSIUM CHLORIDE 100 ML/HR: 150; 900 INJECTION, SOLUTION INTRAVENOUS at 13:42

## 2025-06-05 RX ADMIN — HYDROCODONE BITARTRATE AND ACETAMINOPHEN 1 TABLET: 10; 325 TABLET ORAL at 04:39

## 2025-06-05 RX ADMIN — SODIUM CHLORIDE 100 ML/HR: 9 INJECTION, SOLUTION INTRAVENOUS at 06:37

## 2025-06-05 RX ADMIN — POTASSIUM CHLORIDE 10 MEQ: 10 INJECTION, SOLUTION INTRAVENOUS at 10:13

## 2025-06-05 RX ADMIN — HYDROCODONE BITARTRATE AND ACETAMINOPHEN 1 TABLET: 10; 325 TABLET ORAL at 11:47

## 2025-06-05 RX ADMIN — HYDROMORPHONE HYDROCHLORIDE 0.5 MG: 1 INJECTION, SOLUTION INTRAMUSCULAR; INTRAVENOUS; SUBCUTANEOUS at 01:01

## 2025-06-05 RX ADMIN — POTASSIUM CHLORIDE 10 MEQ: 10 INJECTION, SOLUTION INTRAVENOUS at 15:08

## 2025-06-05 RX ADMIN — HALOPERIDOL LACTATE 5 MG: 5 INJECTION, SOLUTION INTRAMUSCULAR at 09:28

## 2025-06-05 RX ADMIN — SIMETHICONE 120 MG: 80 TABLET, CHEWABLE ORAL at 06:36

## 2025-06-05 RX ADMIN — AMOXICILLIN AND CLAVULANATE POTASSIUM 1 TABLET: 875; 125 TABLET, FILM COATED ORAL at 08:22

## 2025-06-05 RX ADMIN — SIMETHICONE 120 MG: 80 TABLET, CHEWABLE ORAL at 17:40

## 2025-06-05 RX ADMIN — IOPAMIDOL 92 ML: 755 INJECTION, SOLUTION INTRAVENOUS at 11:20

## 2025-06-05 RX ADMIN — HYDROMORPHONE HYDROCHLORIDE 0.5 MG: 1 INJECTION, SOLUTION INTRAMUSCULAR; INTRAVENOUS; SUBCUTANEOUS at 14:24

## 2025-06-05 RX ADMIN — METOCLOPRAMIDE 10 MG: 5 INJECTION, SOLUTION INTRAMUSCULAR; INTRAVENOUS at 01:31

## 2025-06-05 RX ADMIN — PANTOPRAZOLE SODIUM 40 MG: 40 TABLET, DELAYED RELEASE ORAL at 06:36

## 2025-06-05 RX ADMIN — Medication 10 ML: at 21:50

## 2025-06-05 RX ADMIN — HYDROMORPHONE HYDROCHLORIDE 1 MG: 1 INJECTION, SOLUTION INTRAMUSCULAR; INTRAVENOUS; SUBCUTANEOUS at 18:21

## 2025-06-05 RX ADMIN — KETOROLAC TROMETHAMINE 15 MG: 15 INJECTION, SOLUTION INTRAMUSCULAR; INTRAVENOUS at 06:36

## 2025-06-05 RX ADMIN — ONDANSETRON 8 MG: 4 TABLET, ORALLY DISINTEGRATING ORAL at 21:50

## 2025-06-05 RX ADMIN — POTASSIUM CHLORIDE 10 MEQ: 10 INJECTION, SOLUTION INTRAVENOUS at 11:48

## 2025-06-05 RX ADMIN — SIMETHICONE 120 MG: 80 TABLET, CHEWABLE ORAL at 21:50

## 2025-06-05 RX ADMIN — PROCHLORPERAZINE EDISYLATE 10 MG: 5 INJECTION INTRAMUSCULAR; INTRAVENOUS at 21:50

## 2025-06-05 RX ADMIN — ONDANSETRON 4 MG: 2 INJECTION INTRAMUSCULAR; INTRAVENOUS at 13:39

## 2025-06-05 RX ADMIN — POTASSIUM CHLORIDE 10 MEQ: 10 INJECTION, SOLUTION INTRAVENOUS at 13:44

## 2025-06-05 RX ADMIN — PROMETHAZINE HYDROCHLORIDE 12.5 MG: 25 INJECTION, SOLUTION INTRAMUSCULAR; INTRAVENOUS at 17:41

## 2025-06-05 RX ADMIN — SODIUM BICARBONATE 1300 MG: 650 TABLET ORAL at 21:49

## 2025-06-05 RX ADMIN — SODIUM BICARBONATE 1300 MG: 650 TABLET ORAL at 08:14

## 2025-06-05 RX ADMIN — AMOXICILLIN AND CLAVULANATE POTASSIUM 1 TABLET: 875; 125 TABLET, FILM COATED ORAL at 21:50

## 2025-06-05 RX ADMIN — HYDROMORPHONE HYDROCHLORIDE 1 MG: 1 INJECTION, SOLUTION INTRAMUSCULAR; INTRAVENOUS; SUBCUTANEOUS at 22:52

## 2025-06-05 RX ADMIN — LUBIPROSTONE 8 MCG: 8 CAPSULE, GELATIN COATED ORAL at 17:40

## 2025-06-05 RX ADMIN — DULOXETINE 60 MG: 30 CAPSULE, DELAYED RELEASE ORAL at 06:36

## 2025-06-05 RX ADMIN — Medication 10 ML: at 08:15

## 2025-06-05 RX ADMIN — ONDANSETRON 8 MG: 4 TABLET, ORALLY DISINTEGRATING ORAL at 15:07

## 2025-06-05 RX ADMIN — PANTOPRAZOLE SODIUM 40 MG: 40 TABLET, DELAYED RELEASE ORAL at 17:40

## 2025-06-05 RX ADMIN — METOCLOPRAMIDE 10 MG: 5 INJECTION, SOLUTION INTRAMUSCULAR; INTRAVENOUS at 08:14

## 2025-06-05 RX ADMIN — SCOLOPAMINE TRANSDERMAL SYSTEM 1 PATCH: 1 PATCH, EXTENDED RELEASE TRANSDERMAL at 13:09

## 2025-06-05 RX ADMIN — HYDROMORPHONE HYDROCHLORIDE 0.5 MG: 1 INJECTION, SOLUTION INTRAMUSCULAR; INTRAVENOUS; SUBCUTANEOUS at 10:13

## 2025-06-05 NOTE — CONSULTS
Midline Line Insertion Procedure Note    Procedure: Insertion of Midline Catheter    Indications:  Poor Access, Frequently failed phlebotomy.     Procedure Details     Patient's pertinent medical history was reviewed.    Ultrasound used to identify an appropriate vein.Sterile technique was used including antiseptics, cap, gloves, hand hygiene, mask, and sheet.    #20G/10CM PowerGlide inserted to the R Basilic vein per hospital protocol.   Blood return:  yes    Findings:  Catheter inserted to 10 cm, with 0 cm. Exposed.   Catheter was flushed with 20 cc NS. Patient did tolerate procedure well.    LOT:[id number]  Expiration date:02/2026    Recommendations:  Midline Brochure given to patient with teaching instruction.     Gagan Garcias, TEAGAN

## 2025-06-05 NOTE — PLAN OF CARE
Goal Outcome Evaluation:           Progress: no change  Outcome Evaluation: Patient transferred to [facility] from [facility]. Patient is A&Ox4, on room air. Complained of abdominal pain and nausea, medicated prn per mar. Reviewed pain management plan with MD for better pain and nausea control. IV fluids maintained per orders. No new issues at this time.

## 2025-06-05 NOTE — PROGRESS NOTES
[facility]   Hospitalist Progress Note  Date: 2025  Patient Name: Ebony Hamilton  : 1980  MRN: [id number]  Date of admission: 2025      Subjective   Subjective     Chief Complaint: Abdominal pain    Summary: 44 y.o. female with previous history of anxiety, cervical disc disease, opioid dependence, depression, history of atrial fibrillation who presented for abdominal pain nausea and vomiting.  Patient underwent a cholecystectomy in 2025 with subsequent bile leak patient then underwent ERCP with drain placement in April.  After that patient developed appendicitis and had an appendectomy.  Patient presented to the hospital today as she states since last night she developed nausea vomiting and upper epigastric abdominal pain.  Patient has required significant amount of pain medication and antiemetics without any improvement in her symptoms.  Patient had a CT of the abdomen and pelvis which showed new mild infiltrates in the lower lobe of the right lung possibly may represent pneumonia however no acute abdominal process noted.  Patient was noted to have an elevated white count at 18,000.  Patient's lactic acid was elevated at 4.4 has improved to 2.7.      Interval Followup: States her abdominal pain is severe and intractable this morning.  Really not able to tolerate any oral intake.  Family member has requested we discontinue Reglan due to her prior dystonic reaction.    Objective   Objective     Vitals:   Temp:  [97.9 °F (36.6 °C)-98.4 °F (36.9 °C)] 98.2 °F (36.8 °C)  Heart Rate:  [] 83  Resp:  [16-20] 20  BP: (108-181)/() 181/105  Physical Exam    Constitutional: Appears uncomfortable, crying during interview, doubled over in pain   Respiratory: Clear to auscultation bilaterally, nonlabored respirations    Cardiovascular: RRR, no MRG   Gastrointestinal: Positive bowel sounds, soft, subjectively TTP in epigastric area   Neurologic: Oriented x 3, strength symmetric in all  extremities, Cranial Nerves grossly intact to confrontation, speech clear    Result Review    I have personally reviewed the results below:  [x]  Laboratory personally reviewed CMP, CBC, magnesium, phosphorus  []  Microbiology  []  Radiology  []  EKG/Telemetry   []  Cardiology/Vascular   []  Pathology  []  Old records  []  Other:  CBC          6/3/2025    05:01 6/4/2025    05:00 6/5/2025    09:00   CBC   WBC 13.06  9.71  8.12    RBC 4.44  3.99  3.81    Hemoglobin 12.8  11.4  10.9    Hematocrit 41.6  37.4  35.0    MCV 93.7  93.7  91.9    MCH 28.8  28.6  28.6    MCHC 30.8  30.5  31.1    RDW 15.5  15.3  14.6    Platelets 443  371  390      CMP          6/3/2025    05:01 6/4/2025    05:00 6/5/2025    09:00   CMP   Glucose 104  99  102    BUN 9.0  8.0  6.2    Creatinine 0.71  0.43  0.61    EGFR 109.0  124.7  114.6    Sodium 144  135  143    Potassium 3.5  3.5  2.9    Chloride 107  100  106    Calcium 9.1  8.6  8.2    Total Protein 7.5      Albumin 4.3      Globulin 3.2      Total Bilirubin 0.6      Alkaline Phosphatase 66      AST (SGOT) 12      ALT (SGPT) 8      Albumin/Globulin Ratio 1.3      BUN/Creatinine Ratio 12.7  18.6  10.2    Anion Gap 16.5  15.4  11.5        Assessment & Plan   Assessment / Plan   Intractable abdominal pain  Intractable nausea and vomiting  Likely gastroparesis flare  Hypokalemia  Lactic acidosis  Questionable right lower lobe pneumonia due to unknown bacterial source  Leukocytosis  Anxiety  Depression  Chronic opiate dependence  Cervical degenerative disc disease  History of gastroparesis    Continue to monitor in the hospital for workup and management of the above  Gastroenterology to follow-up today  Obtain CT abdomen pelvis with contrast which again did not show any ischemic colitis, abscess, or cause of her abdominal pain  Discontinue Reglan per family request  Add scopolamine patch  Continue with scheduled Zofran 8 mg p.o. 3 times daily  Hold Cymbalta and trazodone as these can contribute  to nausea and vomiting  Replace potassium IV, start NS plus KCl  Continue IV Dilaudid as needed  Continue oral Augmentin to complete a 5-day course due to concern for pneumonia  Continue with scheduled simethicone 4 times daily  Decreased with liquid diet  Patient has follow-up with GI motility clinic later this month  Trend renal function and electrolytes with a.m. BMP, magnesium   Trend Hgb and WBC with a.m. CBC     Discussed plan with RN, gastroenterology    Total of 53 minutes spent reviewing labs and imaging, counseling and educating the patient and family, ordering medications, discussing with specialty services, documenting clinical information in the electronic medical record, and care coordination.    VTE Prophylaxis:  Mechanical VTE prophylaxis orders are present.        CODE STATUS:   Code Status (Patient has no pulse and is not breathing): CPR (Attempt to Resuscitate)  Medical Interventions (Patient has pulse or is breathing): Full Support

## 2025-06-05 NOTE — PLAN OF CARE
Goal Outcome Evaluation:  Plan of Care Reviewed With: patient        Progress: no change  Outcome Evaluation: Assumed care mid-shift. Pt given one-time dose of morphine and dilaudid by prior RN due to Norco being ineffective in managing abdominal pain and headache. PRN Norco given this morning. IV fluids infusing. A&Ox4. VSS on room air.

## 2025-06-06 ENCOUNTER — ANESTHESIA (OUTPATIENT)
Dept: GASTROENTEROLOGY | Facility: HOSPITAL | Age: 45
End: 2025-06-06
Payer: COMMERCIAL

## 2025-06-06 ENCOUNTER — ANESTHESIA EVENT (OUTPATIENT)
Dept: GASTROENTEROLOGY | Facility: HOSPITAL | Age: 45
End: 2025-06-06
Payer: COMMERCIAL

## 2025-06-06 DIAGNOSIS — F41.1 GENERALIZED ANXIETY DISORDER: ICD-10-CM

## 2025-06-06 DIAGNOSIS — F33.1 MAJOR DEPRESSIVE DISORDER, RECURRENT, MODERATE: ICD-10-CM

## 2025-06-06 LAB
ANION GAP SERPL CALCULATED.3IONS-SCNC: 9.1 MMOL/L (ref 5–15)
BASOPHILS # BLD AUTO: 0.04 10*3/MM3 (ref 0–0.2)
BASOPHILS NFR BLD AUTO: 0.5 % (ref 0–1.5)
BUN SERPL-MCNC: 2.5 MG/DL (ref 6–20)
BUN/CREAT SERPL: 4.6 (ref 7–25)
CALCIUM SPEC-SCNC: 8.7 MG/DL (ref 8.6–10.5)
CHLORIDE SERPL-SCNC: 107 MMOL/L (ref 98–107)
CO2 SERPL-SCNC: 25.9 MMOL/L (ref 22–29)
CREAT SERPL-MCNC: 0.54 MG/DL (ref 0.57–1)
DEPRECATED RDW RBC AUTO: 50.5 FL (ref 37–54)
EGFRCR SERPLBLD CKD-EPI 2021: 118.1 ML/MIN/1.73
EOSINOPHIL # BLD AUTO: 0.13 10*3/MM3 (ref 0–0.4)
EOSINOPHIL NFR BLD AUTO: 1.7 % (ref 0.3–6.2)
ERYTHROCYTE [DISTWIDTH] IN BLOOD BY AUTOMATED COUNT: 14.9 % (ref 12.3–15.4)
GLUCOSE SERPL-MCNC: 98 MG/DL (ref 65–99)
HCT VFR BLD AUTO: 35.4 % (ref 34–46.6)
HGB BLD-MCNC: 11.2 G/DL (ref 12–15.9)
IMM GRANULOCYTES # BLD AUTO: 0.04 10*3/MM3 (ref 0–0.05)
IMM GRANULOCYTES NFR BLD AUTO: 0.5 % (ref 0–0.5)
LYMPHOCYTES # BLD AUTO: 1.95 10*3/MM3 (ref 0.7–3.1)
LYMPHOCYTES NFR BLD AUTO: 25.7 % (ref 19.6–45.3)
MAGNESIUM SERPL-MCNC: 1.8 MG/DL (ref 1.6–2.6)
MCH RBC QN AUTO: 29.2 PG (ref 26.6–33)
MCHC RBC AUTO-ENTMCNC: 31.6 G/DL (ref 31.5–35.7)
MCV RBC AUTO: 92.4 FL (ref 79–97)
MONOCYTES # BLD AUTO: 0.53 10*3/MM3 (ref 0.1–0.9)
MONOCYTES NFR BLD AUTO: 7 % (ref 5–12)
NEUTROPHILS NFR BLD AUTO: 4.9 10*3/MM3 (ref 1.7–7)
NEUTROPHILS NFR BLD AUTO: 64.6 % (ref 42.7–76)
NRBC BLD AUTO-RTO: 0 /100 WBC (ref 0–0.2)
PHOSPHATE SERPL-MCNC: 3.4 MG/DL (ref 2.5–4.5)
PLATELET # BLD AUTO: 394 10*3/MM3 (ref 140–450)
PMV BLD AUTO: 10 FL (ref 6–12)
POTASSIUM SERPL-SCNC: 3.6 MMOL/L (ref 3.5–5.2)
RBC # BLD AUTO: 3.83 10*6/MM3 (ref 3.77–5.28)
SODIUM SERPL-SCNC: 142 MMOL/L (ref 136–145)
WBC NRBC COR # BLD AUTO: 7.59 10*3/MM3 (ref 3.4–10.8)

## 2025-06-06 PROCEDURE — 25010000002 ONDANSETRON PER 1 MG: Performed by: INTERNAL MEDICINE

## 2025-06-06 PROCEDURE — 0DB98ZX EXCISION OF DUODENUM, VIA NATURAL OR ARTIFICIAL OPENING ENDOSCOPIC, DIAGNOSTIC: ICD-10-PCS | Performed by: INTERNAL MEDICINE

## 2025-06-06 PROCEDURE — 80048 BASIC METABOLIC PNL TOTAL CA: CPT | Performed by: INTERNAL MEDICINE

## 2025-06-06 PROCEDURE — 25010000002 LIDOCAINE PF 2% 2 % SOLUTION: Performed by: NURSE ANESTHETIST, CERTIFIED REGISTERED

## 2025-06-06 PROCEDURE — 25010000002 SODIUM CHLORIDE 0.9 % WITH KCL 20 MEQ 20-0.9 MEQ/L-% SOLUTION: Performed by: INTERNAL MEDICINE

## 2025-06-06 PROCEDURE — 25810000003 SODIUM CHLORIDE 0.9 % SOLUTION: Performed by: NURSE ANESTHETIST, CERTIFIED REGISTERED

## 2025-06-06 PROCEDURE — 0DBA8ZX EXCISION OF JEJUNUM, VIA NATURAL OR ARTIFICIAL OPENING ENDOSCOPIC, DIAGNOSTIC: ICD-10-PCS | Performed by: INTERNAL MEDICINE

## 2025-06-06 PROCEDURE — 25010000002 HYDROMORPHONE 1 MG/ML SOLUTION: Performed by: INTERNAL MEDICINE

## 2025-06-06 PROCEDURE — 88305 TISSUE EXAM BY PATHOLOGIST: CPT | Performed by: INTERNAL MEDICINE

## 2025-06-06 PROCEDURE — 99232 SBSQ HOSP IP/OBS MODERATE 35: CPT | Performed by: INTERNAL MEDICINE

## 2025-06-06 PROCEDURE — 85025 COMPLETE CBC W/AUTO DIFF WBC: CPT | Performed by: INTERNAL MEDICINE

## 2025-06-06 PROCEDURE — 63710000001 ONDANSETRON ODT 4 MG TABLET DISPERSIBLE: Performed by: INTERNAL MEDICINE

## 2025-06-06 PROCEDURE — 84100 ASSAY OF PHOSPHORUS: CPT | Performed by: INTERNAL MEDICINE

## 2025-06-06 PROCEDURE — 25010000002 PROMETHAZINE PER 50 MG: Performed by: INTERNAL MEDICINE

## 2025-06-06 PROCEDURE — 25010000002 PROPOFOL 10 MG/ML EMULSION: Performed by: NURSE ANESTHETIST, CERTIFIED REGISTERED

## 2025-06-06 PROCEDURE — 83735 ASSAY OF MAGNESIUM: CPT | Performed by: INTERNAL MEDICINE

## 2025-06-06 PROCEDURE — 43239 EGD BIOPSY SINGLE/MULTIPLE: CPT | Performed by: INTERNAL MEDICINE

## 2025-06-06 PROCEDURE — 25010000002 PROCHLORPERAZINE 10 MG/2ML SOLUTION: Performed by: INTERNAL MEDICINE

## 2025-06-06 PROCEDURE — 25010000002 LABETALOL 5 MG/ML SOLUTION: Performed by: NURSE ANESTHETIST, CERTIFIED REGISTERED

## 2025-06-06 RX ORDER — SODIUM CHLORIDE 9 MG/ML
50 INJECTION, SOLUTION INTRAVENOUS CONTINUOUS
Status: ACTIVE | OUTPATIENT
Start: 2025-06-06 | End: 2025-06-06

## 2025-06-06 RX ORDER — SIMETHICONE 80 MG
120 TABLET,CHEWABLE ORAL 4 TIMES DAILY PRN
Status: DISCONTINUED | OUTPATIENT
Start: 2025-06-06 | End: 2025-06-10 | Stop reason: HOSPADM

## 2025-06-06 RX ORDER — DULOXETIN HYDROCHLORIDE 60 MG/1
CAPSULE, DELAYED RELEASE ORAL
Qty: 90 CAPSULE | Refills: 1 | Status: SHIPPED | OUTPATIENT
Start: 2025-06-06

## 2025-06-06 RX ORDER — CLONAZEPAM 0.5 MG/1
0.5 TABLET ORAL 2 TIMES DAILY PRN
Qty: 40 TABLET | Refills: 0 | Status: SHIPPED | OUTPATIENT
Start: 2025-06-06

## 2025-06-06 RX ORDER — SODIUM CHLORIDE 9 MG/ML
INJECTION, SOLUTION INTRAVENOUS CONTINUOUS PRN
Status: DISCONTINUED | OUTPATIENT
Start: 2025-06-06 | End: 2025-06-06 | Stop reason: SURG

## 2025-06-06 RX ORDER — FLUCONAZOLE 100 MG/1
200 TABLET ORAL ONCE
Status: COMPLETED | OUTPATIENT
Start: 2025-06-06 | End: 2025-06-06

## 2025-06-06 RX ORDER — DULOXETIN HYDROCHLORIDE 30 MG/1
CAPSULE, DELAYED RELEASE ORAL
Qty: 90 CAPSULE | Refills: 1 | Status: SHIPPED | OUTPATIENT
Start: 2025-06-06

## 2025-06-06 RX ORDER — LIDOCAINE HYDROCHLORIDE 20 MG/ML
INJECTION, SOLUTION EPIDURAL; INFILTRATION; INTRACAUDAL; PERINEURAL AS NEEDED
Status: DISCONTINUED | OUTPATIENT
Start: 2025-06-06 | End: 2025-06-06 | Stop reason: SURG

## 2025-06-06 RX ORDER — PROPOFOL 10 MG/ML
VIAL (ML) INTRAVENOUS AS NEEDED
Status: DISCONTINUED | OUTPATIENT
Start: 2025-06-06 | End: 2025-06-06 | Stop reason: SURG

## 2025-06-06 RX ORDER — LABETALOL HYDROCHLORIDE 5 MG/ML
INJECTION, SOLUTION INTRAVENOUS AS NEEDED
Status: DISCONTINUED | OUTPATIENT
Start: 2025-06-06 | End: 2025-06-06 | Stop reason: SURG

## 2025-06-06 RX ORDER — NIFEDIPINE 10 MG/1
20 CAPSULE ORAL EVERY 8 HOURS SCHEDULED
Status: DISCONTINUED | OUTPATIENT
Start: 2025-06-06 | End: 2025-06-07

## 2025-06-06 RX ADMIN — LUBIPROSTONE 8 MCG: 8 CAPSULE, GELATIN COATED ORAL at 22:09

## 2025-06-06 RX ADMIN — ONDANSETRON 8 MG: 4 TABLET, ORALLY DISINTEGRATING ORAL at 23:47

## 2025-06-06 RX ADMIN — HYDROMORPHONE HYDROCHLORIDE 0.5 MG: 1 INJECTION, SOLUTION INTRAMUSCULAR; INTRAVENOUS; SUBCUTANEOUS at 14:52

## 2025-06-06 RX ADMIN — Medication 10 ML: at 09:49

## 2025-06-06 RX ADMIN — ONDANSETRON 4 MG: 2 INJECTION INTRAMUSCULAR; INTRAVENOUS at 19:49

## 2025-06-06 RX ADMIN — HYDROMORPHONE HYDROCHLORIDE 0.5 MG: 1 INJECTION, SOLUTION INTRAMUSCULAR; INTRAVENOUS; SUBCUTANEOUS at 19:49

## 2025-06-06 RX ADMIN — LUBIPROSTONE 8 MCG: 8 CAPSULE, GELATIN COATED ORAL at 11:16

## 2025-06-06 RX ADMIN — PROPOFOL 100 MG: 10 INJECTION, EMULSION INTRAVENOUS at 08:03

## 2025-06-06 RX ADMIN — LIDOCAINE HYDROCHLORIDE 60 MG: 20 INJECTION, SOLUTION INTRAVENOUS at 08:03

## 2025-06-06 RX ADMIN — PROMETHAZINE HYDROCHLORIDE 12.5 MG: 25 INJECTION, SOLUTION INTRAMUSCULAR; INTRAVENOUS at 03:03

## 2025-06-06 RX ADMIN — ONDANSETRON 4 MG: 2 INJECTION INTRAMUSCULAR; INTRAVENOUS at 05:07

## 2025-06-06 RX ADMIN — SIMETHICONE 120 MG: 80 TABLET, CHEWABLE ORAL at 11:17

## 2025-06-06 RX ADMIN — HYDROCODONE BITARTRATE AND ACETAMINOPHEN 1 TABLET: 10; 325 TABLET ORAL at 22:17

## 2025-06-06 RX ADMIN — SODIUM CHLORIDE: 9 INJECTION, SOLUTION INTRAVENOUS at 07:59

## 2025-06-06 RX ADMIN — MONTELUKAST 10 MG: 10 TABLET, FILM COATED ORAL at 22:09

## 2025-06-06 RX ADMIN — PROPOFOL 250 MCG/KG/MIN: 10 INJECTION, EMULSION INTRAVENOUS at 08:04

## 2025-06-06 RX ADMIN — SODIUM BICARBONATE 1300 MG: 650 TABLET ORAL at 09:47

## 2025-06-06 RX ADMIN — FLUCONAZOLE 200 MG: 100 TABLET ORAL at 16:33

## 2025-06-06 RX ADMIN — ONDANSETRON 8 MG: 4 TABLET, ORALLY DISINTEGRATING ORAL at 09:47

## 2025-06-06 RX ADMIN — NIFEDIPINE 10 MG: 10 CAPSULE ORAL at 14:50

## 2025-06-06 RX ADMIN — Medication 10 ML: at 22:13

## 2025-06-06 RX ADMIN — CLONAZEPAM 0.5 MG: 0.5 TABLET ORAL at 19:49

## 2025-06-06 RX ADMIN — LABETALOL HYDROCHLORIDE 5 MG: 5 INJECTION, SOLUTION INTRAVENOUS at 08:09

## 2025-06-06 RX ADMIN — HYDROCODONE BITARTRATE AND ACETAMINOPHEN 1 TABLET: 10; 325 TABLET ORAL at 11:56

## 2025-06-06 RX ADMIN — PANTOPRAZOLE SODIUM 40 MG: 40 TABLET, DELAYED RELEASE ORAL at 09:48

## 2025-06-06 RX ADMIN — HYDROCODONE BITARTRATE AND ACETAMINOPHEN 1 TABLET: 10; 325 TABLET ORAL at 17:46

## 2025-06-06 RX ADMIN — SODIUM CHLORIDE AND POTASSIUM CHLORIDE 100 ML/HR: 150; 900 INJECTION, SOLUTION INTRAVENOUS at 07:03

## 2025-06-06 RX ADMIN — HYDROMORPHONE HYDROCHLORIDE 0.5 MG: 1 INJECTION, SOLUTION INTRAMUSCULAR; INTRAVENOUS; SUBCUTANEOUS at 23:47

## 2025-06-06 RX ADMIN — HYDROMORPHONE HYDROCHLORIDE 1 MG: 1 INJECTION, SOLUTION INTRAMUSCULAR; INTRAVENOUS; SUBCUTANEOUS at 03:03

## 2025-06-06 RX ADMIN — NIFEDIPINE 20 MG: 10 CAPSULE ORAL at 22:09

## 2025-06-06 RX ADMIN — HYDROMORPHONE HYDROCHLORIDE 1 MG: 1 INJECTION, SOLUTION INTRAMUSCULAR; INTRAVENOUS; SUBCUTANEOUS at 09:47

## 2025-06-06 RX ADMIN — PANTOPRAZOLE SODIUM 40 MG: 40 TABLET, DELAYED RELEASE ORAL at 16:33

## 2025-06-06 RX ADMIN — AMOXICILLIN AND CLAVULANATE POTASSIUM 1 TABLET: 875; 125 TABLET, FILM COATED ORAL at 09:47

## 2025-06-06 RX ADMIN — SODIUM CHLORIDE 50 ML/HR: 9 INJECTION, SOLUTION INTRAVENOUS at 07:26

## 2025-06-06 RX ADMIN — PROCHLORPERAZINE EDISYLATE 10 MG: 5 INJECTION INTRAMUSCULAR; INTRAVENOUS at 11:56

## 2025-06-06 RX ADMIN — SIMETHICONE 120 MG: 80 TABLET, CHEWABLE ORAL at 09:47

## 2025-06-06 RX ADMIN — ONDANSETRON 8 MG: 4 TABLET, ORALLY DISINTEGRATING ORAL at 14:50

## 2025-06-06 NOTE — PLAN OF CARE
Goal Outcome Evaluation:              Outcome Evaluation: alert and oriented x4. vss on RA. medicated frequently per MAR for c/o abdominal pain/nausea. continuous fluids maintained. NPO since midnight for scope today. no new issues/needs at this time.

## 2025-06-06 NOTE — PLAN OF CARE
Goal Outcome Evaluation:           Progress: no change  Outcome Evaluation: Pt is A & O x4. Pt's pain has been controlled with PRN pain medication throughout my shift. Pt has had some episodes of nausea that was relieved with PRN antimetics. Pt is up ad eloy in room. Pt had endo done this morning that showed no issues. Pt's diet was advanced to regular bland diet. Pt to remain in hospital until she is able to tolerate eating without excessive amount of pain and nausea.

## 2025-06-06 NOTE — ANESTHESIA POSTPROCEDURE EVALUATION
Patient: Ebnoy Hamilton    Procedure Summary       Date: 06/06/25 Room / Location: [facility] ENDOSCOPY 4 / [facility] ENDOSCOPY    Anesthesia Start: 0759 Anesthesia Stop: 0823    Procedure: ENTEROSCOPY SMALL BOWEL with biopsies Diagnosis:       Epigastric pain      Bilious vomiting with nausea      (Epigastric pain [R10.13])      (Bilious vomiting with nausea [R11.14])    Surgeons: Ha Thompson MD Provider: Lee Ann Sevilla CRNA    Anesthesia Type: general ASA Status: 3            Anesthesia Type: general    Vitals  Vitals Value Taken Time   /89 06/06/25 08:46   Temp 36.5 °C (97.7 °F) 06/06/25 08:37   Pulse 97 06/06/25 08:49   Resp 16 06/06/25 08:37   SpO2 100 % 06/06/25 08:49   Vitals shown include unfiled device data.        Post Anesthesia Care and Evaluation    Post-procedure mental status: acceptable.  Pain management: satisfactory to patient    Airway patency: patent  Anesthetic complications: No anesthetic complications    Cardiovascular status: acceptable  Respiratory status: acceptable and room air    Comments: Per chart review

## 2025-06-06 NOTE — TELEPHONE ENCOUNTER
Duloxetine listed as Historical Provider    Controlled Medication Refill Request:    1.  Last Office Visit:  4/24/2025    2.  Next Office Visit:  Visit date not found     3.  Last UDS Date:  7/12/2023    4.  Last Consent Signed:  7/12/2023    5.  Medication Requested: Klonopin (Clonazepam)    Pharmacy:   [id number] - [place], [place] - [address] - [phone] PH - [phone] FX  [address] [place] [address]  Phone: [phone] Fax: [phone]    [id number] - [place], [place] - [address] [place] [address] - [phone]  - [phone]   [address] [place] [id number]  Phone: [phone] Fax: [phone]              
No

## 2025-06-06 NOTE — ANESTHESIA PREPROCEDURE EVALUATION
Anesthesia Evaluation     Patient summary reviewed   NPO Solid Status: > 8 hours  NPO Liquid Status: > 4 hours           Airway   Mallampati: III  TM distance: >3 FB  Neck ROM: full  Possible difficult intubation  Dental - normal exam     Pulmonary    (+) a smoker Former, cigarettes,decreased breath sounds  Cardiovascular - normal exam    ECG reviewed  Rhythm: regular  Rate: normal    (+) dysrhythmias (YEARS AGO AND ONLY 1 TIME. PT STATES SHE WAS TOLD IT WAS SVT vs. possible afib?? denies having any problems since) Atrial Fib      Neuro/Psych  (+) headaches, numbness, psychiatric history Anxiety and Depression  GI/Hepatic/Renal/Endo    (+) obesity, morbid obesity, GI bleeding upper and lower active bleeding, liver disease    Musculoskeletal     (+) chronic pain (chronic pain disorder)  Abdominal  - normal exam   Substance History      OB/GYN          Other        ROS/Med Hx Other:  OTHERWISE NORMAL ECG -  Sinus rhythm  Minimal ST depression, anterolateral leads  When compared with ECG of 02-Jun-2025 06:53:30,  No significant change  Electronically Signed By: Kulwinder Andrade ([facility]) 2025-06-05 14:57:46  Date and Time of Study:2025-06-05 09:11:55                  Anesthesia Plan    ASA 3     general   total IV anesthesia  (Total IV Anesthesia    Patient understands anesthesia not responsible for dental damage.  )  intravenous induction     Anesthetic plan, risks, benefits, and alternatives have been provided, discussed and informed consent has been obtained with: patient.    Plan discussed with CRNA.      CODE STATUS:    Code Status (Patient has no pulse and is not breathing): CPR (Attempt to Resuscitate)  Medical Interventions (Patient has pulse or is breathing): Full Support

## 2025-06-06 NOTE — PROGRESS NOTES
[facility]   Hospitalist Progress Note  Date: 2025  Patient Name: Ebony Hamilton  : 1980  MRN: [id number]  Date of admission: 2025      Subjective   Subjective     Chief Complaint: Abdominal pain    Summary: 44 y.o. female with previous history of anxiety, cervical disc disease, opioid dependence, depression, history of atrial fibrillation who presented for abdominal pain nausea and vomiting.  Patient underwent a cholecystectomy in 2025 with subsequent bile leak patient then underwent ERCP with drain placement in April.  After that patient developed appendicitis and had an appendectomy.  Patient presented to the hospital today as she states since last night she developed nausea vomiting and upper epigastric abdominal pain.  Patient has required significant amount of pain medication and antiemetics without any improvement in her symptoms.  Patient had a CT of the abdomen and pelvis which showed new mild infiltrates in the lower lobe of the right lung possibly may represent pneumonia however no acute abdominal process noted.  Patient was noted to have an elevated white count at 18,000.  Patient's lactic acid was elevated at 4.4 has improved to 2.7.  She was admitted for further care, started on IV Reglan, antiemetics, initially started antibiotics for pneumonia.  GI was consulted, performed EGD which was largely negative.  MRCP was obtained which showed some inflammation in the small intestine otherwise normal.  Underwent push enteroscopy on  as the patient continued to have pain, this was also negative.  She is started on scopolamine patch, Amitiza, and home medications were held with improvement.    Interval Followup: Patient was taken for push enteroscopy this morning which was ultimately negative.  Biopsy obtained and pending.  She actually states she is feeling better today than she has since admission.  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  She began to have loose bowel movements yesterday.    Objective   Objective     Vitals:   Temp:  [97.3 °F (36.3 °C)-98.6 °F (37 °C)] 98.6 °F (37 °C)  Heart Rate:  [] 96  Resp:  [12-23] 18  BP: (126-172)/() 151/106  Flow (L/min) (Oxygen Therapy):  [2] 2  Physical Exam    Constitutional: Appears more comfortable, sitting up in bed   Respiratory: Clear to auscultation bilaterally, nonlabored respirations    Cardiovascular: RRR, no MRG   Gastrointestinal: Positive bowel sounds, soft, less tender   Neurologic: Oriented x 3, strength symmetric in all extremities, Cranial Nerves grossly intact to confrontation, speech clear    Result Review    I have personally reviewed the results below:  [x]  Laboratory personally reviewed BMP, CBC, magnesium, phosphorus  []  Microbiology  []  Radiology  []  EKG/Telemetry   []  Cardiology/Vascular   []  Pathology  []  Old records  []  Other:  CBC          6/4/2025    05:00 6/5/2025    09:00 6/6/2025    05:24   CBC   WBC 9.71  8.12  7.59    RBC 3.99  3.81  3.83    Hemoglobin 11.4  10.9  11.2    Hematocrit 37.4  35.0  35.4    MCV 93.7  91.9  92.4    MCH 28.6  28.6  29.2    MCHC 30.5  31.1  31.6    RDW 15.3  14.6  14.9    Platelets 371  390  394      CMP          6/4/2025    05:00 6/5/2025    09:00 6/6/2025    04:53   CMP   Glucose 99  102  98    BUN 8.0  6.2  2.5    Creatinine 0.43  0.61  0.54    EGFR 124.7  114.6  118.1    Sodium 135  143  142    Potassium 3.5  2.9  3.6    Chloride 100  106  107    Calcium 8.6  8.2  8.7    BUN/Creatinine Ratio 18.6  10.2  4.6    Anion Gap 15.4  11.5  9.1        Assessment & Plan   Assessment / Plan   Intractable abdominal pain  Intractable nausea and vomiting  Likely gastroparesis flare  Hypokalemia  Lactic acidosis  Questionable right lower lobe pneumonia due to unknown bacterial source  Leukocytosis  Anxiety  Depression  Chronic opiate  dependence  Cervical degenerative disc disease  History of gastroparesis    Continue to monitor in the hospital for workup and management of the above  Discussed with gastroenterology, as she had some inflammatory changes on MRCP, they obtained push enteroscopy this morning which showed normal mucosa, biopsies were obtained and pending  Symptomatically, she does appear to be improved.  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  She has previously been on Linzess outpatient  Continue scopolamine patch  Continue with scheduled Zofran 8 mg p.o. 3 times daily  Reviewed QTc on EKG and acceptable at 422  Continue to hold Cymbalta and trazodone as these can contribute to nausea and vomiting  DC IV fluids, advance to GI bland diet  Dilaudid, plan to discontinue tomorrow as this is likely worsening her symptoms  Continue oral Augmentin to complete a 5-day course due to concern for pneumonia  Change simethicone to as needed  Patient has follow-up with GI motility clinic later this month  Trend renal function and electrolytes with a.m. BMP, magnesium   Trend Hgb and WBC with a.m. CBC     Discussed plan with RN, gastroenterology    VTE Prophylaxis:  Mechanical VTE prophylaxis orders are present.        CODE STATUS:   Code Status (Patient has no pulse and is not breathing): CPR (Attempt to Resuscitate)  Medical Interventions (Patient has pulse or is breathing): Full Support

## 2025-06-07 ENCOUNTER — APPOINTMENT (OUTPATIENT)
Dept: CT IMAGING | Facility: HOSPITAL | Age: 45
End: 2025-06-07
Payer: COMMERCIAL

## 2025-06-07 LAB
ANION GAP SERPL CALCULATED.3IONS-SCNC: 10.6 MMOL/L (ref 5–15)
BACTERIA SPEC AEROBE CULT: NORMAL
BACTERIA SPEC AEROBE CULT: NORMAL
BASOPHILS # BLD AUTO: 0.06 10*3/MM3 (ref 0–0.2)
BASOPHILS NFR BLD AUTO: 0.7 % (ref 0–1.5)
BUN SERPL-MCNC: 4.5 MG/DL (ref 6–20)
BUN/CREAT SERPL: 7.1 (ref 7–25)
CALCIUM SPEC-SCNC: 9 MG/DL (ref 8.6–10.5)
CHLORIDE SERPL-SCNC: 101 MMOL/L (ref 98–107)
CO2 SERPL-SCNC: 29.4 MMOL/L (ref 22–29)
CREAT SERPL-MCNC: 0.63 MG/DL (ref 0.57–1)
DEPRECATED RDW RBC AUTO: 50.6 FL (ref 37–54)
EGFRCR SERPLBLD CKD-EPI 2021: 113.7 ML/MIN/1.73
EOSINOPHIL # BLD AUTO: 0.16 10*3/MM3 (ref 0–0.4)
EOSINOPHIL NFR BLD AUTO: 2 % (ref 0.3–6.2)
ERYTHROCYTE [DISTWIDTH] IN BLOOD BY AUTOMATED COUNT: 15.2 % (ref 12.3–15.4)
GLUCOSE SERPL-MCNC: 110 MG/DL (ref 65–99)
HCT VFR BLD AUTO: 37.4 % (ref 34–46.6)
HGB BLD-MCNC: 11.9 G/DL (ref 12–15.9)
IMM GRANULOCYTES # BLD AUTO: 0.06 10*3/MM3 (ref 0–0.05)
IMM GRANULOCYTES NFR BLD AUTO: 0.7 % (ref 0–0.5)
LYMPHOCYTES # BLD AUTO: 2.14 10*3/MM3 (ref 0.7–3.1)
LYMPHOCYTES NFR BLD AUTO: 26.3 % (ref 19.6–45.3)
MAGNESIUM SERPL-MCNC: 1.8 MG/DL (ref 1.6–2.6)
MCH RBC QN AUTO: 29.5 PG (ref 26.6–33)
MCHC RBC AUTO-ENTMCNC: 31.8 G/DL (ref 31.5–35.7)
MCV RBC AUTO: 92.6 FL (ref 79–97)
MONOCYTES # BLD AUTO: 0.52 10*3/MM3 (ref 0.1–0.9)
MONOCYTES NFR BLD AUTO: 6.4 % (ref 5–12)
NEUTROPHILS NFR BLD AUTO: 5.21 10*3/MM3 (ref 1.7–7)
NEUTROPHILS NFR BLD AUTO: 63.9 % (ref 42.7–76)
NRBC BLD AUTO-RTO: 0 /100 WBC (ref 0–0.2)
PHOSPHATE SERPL-MCNC: 3.7 MG/DL (ref 2.5–4.5)
PLATELET # BLD AUTO: 390 10*3/MM3 (ref 140–450)
PMV BLD AUTO: 9.2 FL (ref 6–12)
POTASSIUM SERPL-SCNC: 2.9 MMOL/L (ref 3.5–5.2)
QT INTERVAL: 279 MS
QTC INTERVAL: 428 MS
RBC # BLD AUTO: 4.04 10*6/MM3 (ref 3.77–5.28)
SODIUM SERPL-SCNC: 141 MMOL/L (ref 136–145)
WBC NRBC COR # BLD AUTO: 8.15 10*3/MM3 (ref 3.4–10.8)

## 2025-06-07 PROCEDURE — 25010000002 KETOROLAC TROMETHAMINE PER 15 MG: Performed by: INTERNAL MEDICINE

## 2025-06-07 PROCEDURE — 25010000002 ERYTHROMYCIN LACTOBIONATE PER 500 MG: Performed by: INTERNAL MEDICINE

## 2025-06-07 PROCEDURE — 83735 ASSAY OF MAGNESIUM: CPT | Performed by: INTERNAL MEDICINE

## 2025-06-07 PROCEDURE — 25010000002 POTASSIUM CHLORIDE 10 MEQ/100ML SOLUTION: Performed by: INTERNAL MEDICINE

## 2025-06-07 PROCEDURE — 99233 SBSQ HOSP IP/OBS HIGH 50: CPT | Performed by: INTERNAL MEDICINE

## 2025-06-07 PROCEDURE — 25010000002 PROCHLORPERAZINE 10 MG/2ML SOLUTION: Performed by: INTERNAL MEDICINE

## 2025-06-07 PROCEDURE — 84100 ASSAY OF PHOSPHORUS: CPT | Performed by: INTERNAL MEDICINE

## 2025-06-07 PROCEDURE — 80048 BASIC METABOLIC PNL TOTAL CA: CPT | Performed by: INTERNAL MEDICINE

## 2025-06-07 PROCEDURE — 85025 COMPLETE CBC W/AUTO DIFF WBC: CPT | Performed by: INTERNAL MEDICINE

## 2025-06-07 PROCEDURE — 25510000001 IOPAMIDOL PER 1 ML: Performed by: INTERNAL MEDICINE

## 2025-06-07 PROCEDURE — 25010000002 HYDROMORPHONE 1 MG/ML SOLUTION: Performed by: INTERNAL MEDICINE

## 2025-06-07 PROCEDURE — 63710000001 ONDANSETRON ODT 4 MG TABLET DISPERSIBLE: Performed by: INTERNAL MEDICINE

## 2025-06-07 PROCEDURE — 99232 SBSQ HOSP IP/OBS MODERATE 35: CPT | Performed by: INTERNAL MEDICINE

## 2025-06-07 PROCEDURE — 25810000003 SODIUM CHLORIDE 0.9 % SOLUTION: Performed by: INTERNAL MEDICINE

## 2025-06-07 PROCEDURE — 93005 ELECTROCARDIOGRAM TRACING: CPT | Performed by: INTERNAL MEDICINE

## 2025-06-07 PROCEDURE — 71275 CT ANGIOGRAPHY CHEST: CPT

## 2025-06-07 RX ORDER — IOPAMIDOL 755 MG/ML
100 INJECTION, SOLUTION INTRAVASCULAR
Status: COMPLETED | OUTPATIENT
Start: 2025-06-07 | End: 2025-06-07

## 2025-06-07 RX ORDER — METOPROLOL SUCCINATE 50 MG/1
50 TABLET, EXTENDED RELEASE ORAL
Status: DISCONTINUED | OUTPATIENT
Start: 2025-06-07 | End: 2025-06-10 | Stop reason: HOSPADM

## 2025-06-07 RX ORDER — POTASSIUM CHLORIDE 7.45 MG/ML
10 INJECTION INTRAVENOUS
Status: COMPLETED | OUTPATIENT
Start: 2025-06-07 | End: 2025-06-07

## 2025-06-07 RX ORDER — KETOROLAC TROMETHAMINE 30 MG/ML
15 INJECTION, SOLUTION INTRAMUSCULAR; INTRAVENOUS EVERY 6 HOURS PRN
Status: DISCONTINUED | OUTPATIENT
Start: 2025-06-07 | End: 2025-06-10 | Stop reason: HOSPADM

## 2025-06-07 RX ORDER — SODIUM CHLORIDE 9 MG/ML
150 INJECTION, SOLUTION INTRAVENOUS CONTINUOUS
Status: DISCONTINUED | OUTPATIENT
Start: 2025-06-07 | End: 2025-06-08

## 2025-06-07 RX ADMIN — HYDROCODONE BITARTRATE AND ACETAMINOPHEN 1 TABLET: 10; 325 TABLET ORAL at 07:36

## 2025-06-07 RX ADMIN — ONDANSETRON 8 MG: 4 TABLET, ORALLY DISINTEGRATING ORAL at 21:31

## 2025-06-07 RX ADMIN — NIFEDIPINE 20 MG: 10 CAPSULE ORAL at 14:04

## 2025-06-07 RX ADMIN — LUBIPROSTONE 8 MCG: 8 CAPSULE, GELATIN COATED ORAL at 08:09

## 2025-06-07 RX ADMIN — ERYTHROMYCIN LACTOBIONATE 500 MG: 500 INJECTION, POWDER, LYOPHILIZED, FOR SOLUTION INTRAVENOUS at 11:51

## 2025-06-07 RX ADMIN — POTASSIUM CHLORIDE 10 MEQ: 7.46 INJECTION, SOLUTION INTRAVENOUS at 09:53

## 2025-06-07 RX ADMIN — PROCHLORPERAZINE EDISYLATE 10 MG: 5 INJECTION INTRAMUSCULAR; INTRAVENOUS at 12:54

## 2025-06-07 RX ADMIN — SIMETHICONE 120 MG: 80 TABLET, CHEWABLE ORAL at 04:17

## 2025-06-07 RX ADMIN — PANTOPRAZOLE SODIUM 40 MG: 40 TABLET, DELAYED RELEASE ORAL at 16:32

## 2025-06-07 RX ADMIN — HYDROMORPHONE HYDROCHLORIDE 0.5 MG: 1 INJECTION, SOLUTION INTRAMUSCULAR; INTRAVENOUS; SUBCUTANEOUS at 09:53

## 2025-06-07 RX ADMIN — IOPAMIDOL 35 ML: 755 INJECTION, SOLUTION INTRAVENOUS at 15:33

## 2025-06-07 RX ADMIN — HYDROMORPHONE HYDROCHLORIDE 0.5 MG: 1 INJECTION, SOLUTION INTRAMUSCULAR; INTRAVENOUS; SUBCUTANEOUS at 04:14

## 2025-06-07 RX ADMIN — METOPROLOL TARTRATE 5 MG: 1 INJECTION, SOLUTION INTRAVENOUS at 15:25

## 2025-06-07 RX ADMIN — HYDROCODONE BITARTRATE AND ACETAMINOPHEN 1 TABLET: 10; 325 TABLET ORAL at 02:47

## 2025-06-07 RX ADMIN — HYDROCODONE BITARTRATE AND ACETAMINOPHEN 1 TABLET: 10; 325 TABLET ORAL at 18:21

## 2025-06-07 RX ADMIN — HYDROMORPHONE HYDROCHLORIDE 0.5 MG: 1 INJECTION, SOLUTION INTRAMUSCULAR; INTRAVENOUS; SUBCUTANEOUS at 22:24

## 2025-06-07 RX ADMIN — HYDROCODONE BITARTRATE AND ACETAMINOPHEN 1 TABLET: 10; 325 TABLET ORAL at 11:55

## 2025-06-07 RX ADMIN — NIFEDIPINE 20 MG: 10 CAPSULE ORAL at 07:35

## 2025-06-07 RX ADMIN — SODIUM CHLORIDE 150 ML/HR: 9 INJECTION, SOLUTION INTRAVENOUS at 16:52

## 2025-06-07 RX ADMIN — Medication 10 ML: at 08:09

## 2025-06-07 RX ADMIN — ACETAMINOPHEN 650 MG: 325 TABLET ORAL at 16:34

## 2025-06-07 RX ADMIN — PANTOPRAZOLE SODIUM 40 MG: 40 TABLET, DELAYED RELEASE ORAL at 07:36

## 2025-06-07 RX ADMIN — POTASSIUM CHLORIDE 10 MEQ: 7.46 INJECTION, SOLUTION INTRAVENOUS at 10:43

## 2025-06-07 RX ADMIN — HYDROMORPHONE HYDROCHLORIDE 0.5 MG: 1 INJECTION, SOLUTION INTRAMUSCULAR; INTRAVENOUS; SUBCUTANEOUS at 15:59

## 2025-06-07 RX ADMIN — ONDANSETRON 8 MG: 4 TABLET, ORALLY DISINTEGRATING ORAL at 14:04

## 2025-06-07 RX ADMIN — ERYTHROMYCIN LACTOBIONATE 500 MG: 500 INJECTION, POWDER, LYOPHILIZED, FOR SOLUTION INTRAVENOUS at 21:31

## 2025-06-07 RX ADMIN — ONDANSETRON 8 MG: 4 TABLET, ORALLY DISINTEGRATING ORAL at 08:09

## 2025-06-07 RX ADMIN — MONTELUKAST 10 MG: 10 TABLET, FILM COATED ORAL at 21:31

## 2025-06-07 RX ADMIN — TIZANIDINE 4 MG: 4 TABLET ORAL at 00:06

## 2025-06-07 RX ADMIN — KETOROLAC TROMETHAMINE 15 MG: 30 INJECTION, SOLUTION INTRAMUSCULAR; INTRAVENOUS at 14:04

## 2025-06-07 RX ADMIN — LUBIPROSTONE 8 MCG: 8 CAPSULE, GELATIN COATED ORAL at 21:31

## 2025-06-07 RX ADMIN — POTASSIUM CHLORIDE 10 MEQ: 7.46 INJECTION, SOLUTION INTRAVENOUS at 11:51

## 2025-06-07 RX ADMIN — KETOROLAC TROMETHAMINE 15 MG: 30 INJECTION, SOLUTION INTRAMUSCULAR; INTRAVENOUS at 21:38

## 2025-06-07 RX ADMIN — POTASSIUM CHLORIDE 10 MEQ: 7.46 INJECTION, SOLUTION INTRAVENOUS at 11:22

## 2025-06-07 RX ADMIN — METOPROLOL SUCCINATE 50 MG: 50 TABLET, EXTENDED RELEASE ORAL at 16:00

## 2025-06-07 NOTE — PROGRESS NOTES
[facility]   Hospitalist Progress Note  Date: 2025  Patient Name: Ebony Hamilton  : 1980  MRN: [id number]  Date of admission: 2025      Subjective   Subjective     Chief Complaint: Abdominal pain    Summary: 44 y.o. female with previous history of anxiety, cervical disc disease, opioid dependence, depression, history of atrial fibrillation who presented for abdominal pain nausea and vomiting.  Patient underwent a cholecystectomy in 2025 with subsequent bile leak patient then underwent ERCP with drain placement in April.  After that patient developed appendicitis and had an appendectomy.  Patient presented to the hospital today as she states since last night she developed nausea vomiting and upper epigastric abdominal pain.  Patient has required significant amount of pain medication and antiemetics without any improvement in her symptoms.  Patient had a CT of the abdomen and pelvis which showed new mild infiltrates in the lower lobe of the right lung possibly may represent pneumonia however no acute abdominal process noted.  Patient was noted to have an elevated white count at 18,000.  Patient's lactic acid was elevated at 4.4 has improved to 2.7.  She was admitted for further care, started on IV Reglan, antiemetics, initially started antibiotics for pneumonia.  GI was consulted, performed EGD which was largely negative.  MRCP was obtained which showed some inflammation in the small intestine otherwise normal.  Underwent push enteroscopy on  as the patient continued to have pain, this was also negative.  She is started on scopolamine patch, Amitiza, and home medications were held with improvement.    Interval Followup: Patient states she feels worse today than she did yesterday.  Is really not able to keep any food down.  She can drink apple juice with us about it.  Try to little bit of broth last night but vomited.     Objective   Objective     Vitals:   Temp:  [97.2 °F (36.2  °C)-98.6 °F (37 °C)] 97.9 °F (36.6 °C)  Heart Rate:  [] 107  Resp:  [18] 18  BP: (116-163)/() 139/93  Physical Exam    Constitutional: Appears more comfortable, sitting up in bed   Respiratory: Clear to auscultation bilaterally, nonlabored respirations    Cardiovascular: RRR, no MRG   Gastrointestinal: Positive bowel sounds, soft   Neurologic: Oriented x 3, strength symmetric in all extremities, Cranial Nerves grossly intact to confrontation, speech clear    Result Review    I have personally reviewed the results below:  [x]  Laboratory personally reviewed BMP, CBC, magnesium, phosphorus  []  Microbiology  []  Radiology  []  EKG/Telemetry   []  Cardiology/Vascular   []  Pathology  []  Old records  []  Other:  CBC          6/5/2025    09:00 6/6/2025    05:24 6/7/2025    07:42   CBC   WBC 8.12  7.59  8.15    RBC 3.81  3.83  4.04    Hemoglobin 10.9  11.2  11.9    Hematocrit 35.0  35.4  37.4    MCV 91.9  92.4  92.6    MCH 28.6  29.2  29.5    MCHC 31.1  31.6  31.8    RDW 14.6  14.9  15.2    Platelets 390  394  390      CMP          6/5/2025    09:00 6/6/2025    04:53 6/7/2025    07:42   CMP   Glucose 102  98  110    BUN 6.2  2.5  4.5    Creatinine 0.61  0.54  0.63    EGFR 114.6  118.1  113.7    Sodium 143  142  141    Potassium 2.9  3.6  2.9    Chloride 106  107  101    Calcium 8.2  8.7  9.0    BUN/Creatinine Ratio 10.2  4.6  7.1    Anion Gap 11.5  9.1  10.6        Assessment & Plan   Assessment / Plan   Intractable abdominal pain  Intractable nausea and vomiting  Likely gastroparesis flare  Hypokalemia  Lactic acidosis  Questionable right lower lobe pneumonia due to unknown bacterial source  Leukocytosis  Anxiety  Depression  Chronic opiate dependence  Cervical degenerative disc disease  History of gastroparesis    Continue to monitor in the hospital for workup and management of the above  Gastroenterology following, appreciate assistance  Will give a trial of IV erythromycin 5 mg every 8 hours  Continue  with scopolamine patch and scheduled Amitiza  Continue with scheduled Zofran 8 mg p.o. 3 times daily  Continue to hold Cymbalta and trazodone as these can contribute to nausea and vomiting  Decreased to clear liquid diet.  Did offer cortrak and tube feeds with patient would like to hold off  Did discuss that she may benefit from transfer to [place] as we are running out of interventions available at this facility.  She would like to hold off on transfer if possible  Stretch Dilaudid every 6 hours, she is still using Dilaudid and Norco quite frequently.  Monitor vital signs closely while on IV narcotics  Completed a course of antibiotics for pneumonia  DC simethicone  Patient has follow-up with GI motility clinic later this month  Trend renal function and electrolytes with a.m. BMP, magnesium   Trend Hgb and WBC with a.m. CBC     Discussed plan with RN, gastroenterology    Total of 53 minutes spent reviewing labs and imaging, counseling and educating the patient and family, ordering medications, discussing with specialty services, documenting clinical information in the electronic medical record, and care coordination.    VTE Prophylaxis:  Mechanical VTE prophylaxis orders are present.        CODE STATUS:   Code Status (Patient has no pulse and is not breathing): CPR (Attempt to Resuscitate)  Medical Interventions (Patient has pulse or is breathing): Full Support

## 2025-06-07 NOTE — PLAN OF CARE
Goal Outcome Evaluation:  Plan of Care Reviewed With: patient            Pt has had intermittent nausea and pain. Pt is being medicated for nausea and pain medication. Pt states interventions are effective. Pt has been having redness on arms above and below elbows and  legs around knee area and when ambulated to bathroom and back to bed , pt had blotchy purplish hue around inner leg around knee above and below and after approximately 15 minutes the purplish hue blotchy areas left.  Redness blanchable on arms and legs and chest. Will continue to monitor. Pt is alert and oriented. Pt able to have clear speech and voices needs. Ambulatory with steady gait. Pleasant and cooperative.  was at bedside early part of night and very supportive.pt's pain is in upper quadrants of abdomen, neck, and back. Will continue to monitor.

## 2025-06-07 NOTE — PLAN OF CARE
Goal Outcome Evaluation:      Patient has consistent abdominal pain, frequent pain meds, at 1500 the patient's HR was 150s-160s. Called Dr. Zamora- see orders. EKG done, CT scan done, now on flex tele, HR was treated with Lopressor push.

## 2025-06-07 NOTE — PROGRESS NOTES
[facility]  Inpatient Progress Note    Reason for Follow Up:  nausea, vomiting    Subjective     Interval History:   Pt reports she has been unable to tolerate solids.  She reports she can tolerate apple juice without issue.    Current Facility-Administered Medications:     acetaminophen (TYLENOL) tablet 650 mg, 650 mg, Oral, Q6H PRN, Ha Thompson MD, 650 mg at 06/07/25 1634    sennosides-docusate (PERICOLACE) 8.6-50 MG per tablet 2 tablet, 2 tablet, Oral, BID PRN **AND** polyethylene glycol (MIRALAX) packet 17 g, 17 g, Oral, Daily PRN **AND** bisacodyl (DULCOLAX) EC tablet 5 mg, 5 mg, Oral, Daily PRN **AND** bisacodyl (DULCOLAX) suppository 10 mg, 10 mg, Rectal, Daily PRN, Ha Thompson MD    clonazePAM (KlonoPIN) tablet 0.5 mg, 0.5 mg, Oral, BID PRN, Darion Zamora MD, 0.5 mg at 06/06/25 1949    [Held by provider] DULoxetine (CYMBALTA) DR capsule 30 mg, 30 mg, Oral, Q PM, Ha Thompson MD, 30 mg at 06/04/25 1606    [Held by provider] DULoxetine (CYMBALTA) DR capsule 60 mg, 60 mg, Oral, QAM, Ha Thompson MD, 60 mg at 06/05/25 0636    erythromycin (ERYTHROCIN) 500 mg in sodium chloride 0.9 % 100 mL IVPB, 500 mg, Intravenous, Q8H, Darion Zamora MD, Last Rate: 100 mL/hr at 06/07/25 1151, 500 mg at 06/07/25 1151    HYDROcodone-acetaminophen (NORCO)  MG per tablet 1 tablet, 1 tablet, Oral, Q4H PRN, Darion Zamora MD, 1 tablet at 06/07/25 1821    HYDROmorphone (DILAUDID) injection 0.5 mg, 0.5 mg, Intravenous, Q6H PRN, Darion Zamora MD, 0.5 mg at 06/07/25 1559    ketorolac (TORADOL) injection 15 mg, 15 mg, Intravenous, Q6H PRN, Darion Zamora MD, 15 mg at 06/07/25 1404    lubiprostone (AMITIZA) capsule 8 mcg, 8 mcg, Oral, BID, Darion Zamora MD, 8 mcg at 06/07/25 0809    metoprolol succinate XL (TOPROL-XL) 24 hr tablet 50 mg, 50 mg, Oral, Q24H, Darion Zamora MD, 50 mg at 06/07/25 1600    montelukast (SINGULAIR) tablet 10 mg,  10 mg, Oral, Nightly, Ha Thompson MD, 10 mg at 06/06/25 2209    ondansetron (ZOFRAN) injection 4 mg, 4 mg, Intravenous, Q6H PRN, Ha Thompson MD, 4 mg at 06/06/25 1949    ondansetron ODT (ZOFRAN-ODT) disintegrating tablet 8 mg, 8 mg, Oral, TID, Darion Zamora MD, 8 mg at 06/07/25 1404    pantoprazole (PROTONIX) EC tablet 40 mg, 40 mg, Oral, BID AC, Darion Zamora MD, 40 mg at 06/07/25 1632    prochlorperazine (COMPAZINE) injection 10 mg, 10 mg, Intravenous, Q8H PRN, Ha Thompson MD, 10 mg at 06/07/25 1254    promethazine (PHENERGAN) IVPB 12.5 mg, 12.5 mg, Intravenous, Q6H PRN, Darion Zamora MD, 12.5 mg at 06/06/25 0303    scopolamine patch 1 mg/72 hr, 1 patch, Transdermal, Q72H, Darion Zamora MD, 1 patch at 06/05/25 1309    simethicone (MYLICON) chewable tablet 120 mg, 120 mg, Oral, 4x Daily PRN, Dairon Zamora MD, 120 mg at 06/07/25 0417    sodium chloride 0.9 % flush 10 mL, 10 mL, Intravenous, PRN, Ha Thompson MD    sodium chloride 0.9 % flush 10 mL, 10 mL, Intravenous, Q12H, Ha Thompson MD, 10 mL at 06/07/25 0809    sodium chloride 0.9 % flush 10 mL, 10 mL, Intravenous, PRNJay Syed Kashif, MD    sodium chloride 0.9 % flush 10 mL, 10 mL, Intravenous, PRN, Darion Zamora MD    sodium chloride 0.9 % infusion 40 mL, 40 mL, Intravenous, PRNJay Syed Kashif, MD    sodium chloride 0.9 % infusion, 150 mL/hr, Intravenous, Continuous, Darion Zamora MD, Last Rate: 150 mL/hr at 06/07/25 1652, 150 mL/hr at 06/07/25 1652    tiZANidine (ZANAFLEX) tablet 4 mg, 4 mg, Oral, Q8H PRN, Ha Thompson MD, 4 mg at 06/07/25 0006    [Held by provider] traZODone (DESYREL) tablet 50 mg, 50 mg, Oral, Nightly, Ha Thompson MD, 50 mg at 06/04/25 2057  Review of Systems:    The following systems were reviewed and negative;  constitution, respiratory, and cardiovascular    Objective     Vital Signs  Temp:  [97.2 °F (36.2 °C)-98.6 °F (37  °C)] 98.1 °F (36.7 °C)  Heart Rate:  [] 106  Resp:  [18-22] 18  BP: (116-160)/(73-93) 123/85  Body mass index is 33.43 kg/m².    Intake/Output Summary (Last 24 hours) at 6/7/2025 1943  Last data filed at 6/7/2025 1717  Gross per 24 hour   Intake 360 ml   Output --   Net 360 ml     No intake/output data recorded.     Physical Exam:   General: awake, alert and in no acute distress   Skin: warm and dry, not jaundiced   Pulm: breathing unlabored   Abdomen: soft, epigastric TTP, nondistended   Psychiatric: mental status within normal limits     Results Review:     I reviewed the patient's new clinical results.    Results from last 7 days   Lab Units 06/07/25  0742 06/06/25  0524 06/05/25  0900   WBC 10*3/mm3 8.15 7.59 8.12   HEMOGLOBIN g/dL 11.9* 11.2* 10.9*   HEMATOCRIT % 37.4 35.4 35.0   PLATELETS 10*3/mm3 390 394 390     Results from last 7 days   Lab Units 06/07/25  0742 06/06/25  0453 06/05/25  0900 06/04/25  0500 06/03/25  0501 06/02/25  0318   SODIUM mmol/L 141 142 143   < > 144 146*   POTASSIUM mmol/L 2.9* 3.6 2.9*   < > 3.5 3.6   CHLORIDE mmol/L 101 107 106   < > 107 106   CO2 mmol/L 29.4* 25.9 25.5   < > 20.5* 19.1*   BUN mg/dL 4.5* 2.5* 6.2   < > 9.0 8.8   CREATININE mg/dL 0.63 0.54* 0.61   < > 0.71 0.73   CALCIUM mg/dL 9.0 8.7 8.2*   < > 9.1 9.9   BILIRUBIN mg/dL  --   --   --   --  0.6 0.5   ALK PHOS U/L  --   --   --   --  66 73   ALT (SGPT) U/L  --   --   --   --  8 10   AST (SGOT) U/L  --   --   --   --  12 11   GLUCOSE mg/dL 110* 98 102*   < > 104* 120*    < > = values in this interval not displayed.         Lab Results   Lab Value Date/Time    LIPASE 46 06/04/2025 0500    LIPASE 22 06/02/2025 0318    LIPASE 35 03/30/2025 1209    LIPASE 41 03/08/2025 2329    LIPASE 20 02/22/2025 1301    LIPASE 25 02/16/2025 2217    LIPASE 29 02/02/2024 0412         Assessment & Plan   Assessment:     Nausea, vomiting  Gastroparesis  Epigastric pain    Plan:     - recent EGD/small bowel enteroscopy unrevealing for  etiology of nausea, vomiting, epigastric pain; no H.pylori on gastric biopsies  - no significant findings on CT abd/pelvis to explain symptoms  - MRCP with thickening of 3rd/4th portion of duodenum and may be inflamed; f/u enteroscopy negative  - pt with reported history of gastroparesis x 1 year after stopping GLP-1; pt follows with Dr. Alvarado and has upcoming appointment  - agree with trial of erythromycin; rec 500 mg IV q8h  - pt currently on PPI BID and scheduled zofran; can transition to IV if unable to tolerate po  - continue to advance diet as tolerated    I discussed the patients findings and my recommendations with patient, family, and primary care team.         Romina Thomas M.D.  [place] [id number]  [place]  [address]  Office: [phone]

## 2025-06-08 LAB
ANION GAP SERPL CALCULATED.3IONS-SCNC: 9.7 MMOL/L (ref 5–15)
BASOPHILS # BLD AUTO: 0.05 10*3/MM3 (ref 0–0.2)
BASOPHILS NFR BLD AUTO: 0.7 % (ref 0–1.5)
BUN SERPL-MCNC: 5.3 MG/DL (ref 6–20)
BUN/CREAT SERPL: 10.4 (ref 7–25)
CALCIUM SPEC-SCNC: 8.5 MG/DL (ref 8.6–10.5)
CHLORIDE SERPL-SCNC: 109 MMOL/L (ref 98–107)
CO2 SERPL-SCNC: 25.3 MMOL/L (ref 22–29)
CREAT SERPL-MCNC: 0.51 MG/DL (ref 0.57–1)
DEPRECATED RDW RBC AUTO: 51.4 FL (ref 37–54)
EGFRCR SERPLBLD CKD-EPI 2021: 119.7 ML/MIN/1.73
EOSINOPHIL # BLD AUTO: 0.18 10*3/MM3 (ref 0–0.4)
EOSINOPHIL NFR BLD AUTO: 2.4 % (ref 0.3–6.2)
ERYTHROCYTE [DISTWIDTH] IN BLOOD BY AUTOMATED COUNT: 15.3 % (ref 12.3–15.4)
GLUCOSE BLDC GLUCOMTR-MCNC: 92 MG/DL (ref 70–99)
GLUCOSE SERPL-MCNC: 92 MG/DL (ref 65–99)
HCT VFR BLD AUTO: 37.2 % (ref 34–46.6)
HGB BLD-MCNC: 11.6 G/DL (ref 12–15.9)
IMM GRANULOCYTES # BLD AUTO: 0.07 10*3/MM3 (ref 0–0.05)
IMM GRANULOCYTES NFR BLD AUTO: 0.9 % (ref 0–0.5)
LYMPHOCYTES # BLD AUTO: 2.22 10*3/MM3 (ref 0.7–3.1)
LYMPHOCYTES NFR BLD AUTO: 29.3 % (ref 19.6–45.3)
MAGNESIUM SERPL-MCNC: 1.7 MG/DL (ref 1.6–2.6)
MCH RBC QN AUTO: 28.9 PG (ref 26.6–33)
MCHC RBC AUTO-ENTMCNC: 31.2 G/DL (ref 31.5–35.7)
MCV RBC AUTO: 92.5 FL (ref 79–97)
MONOCYTES # BLD AUTO: 0.43 10*3/MM3 (ref 0.1–0.9)
MONOCYTES NFR BLD AUTO: 5.7 % (ref 5–12)
NEUTROPHILS NFR BLD AUTO: 4.63 10*3/MM3 (ref 1.7–7)
NEUTROPHILS NFR BLD AUTO: 61 % (ref 42.7–76)
NRBC BLD AUTO-RTO: 0 /100 WBC (ref 0–0.2)
PHOSPHATE SERPL-MCNC: 4 MG/DL (ref 2.5–4.5)
PLATELET # BLD AUTO: 328 10*3/MM3 (ref 140–450)
PMV BLD AUTO: 9.6 FL (ref 6–12)
POTASSIUM SERPL-SCNC: 3.5 MMOL/L (ref 3.5–5.2)
RBC # BLD AUTO: 4.02 10*6/MM3 (ref 3.77–5.28)
SODIUM SERPL-SCNC: 144 MMOL/L (ref 136–145)
WBC NRBC COR # BLD AUTO: 7.58 10*3/MM3 (ref 3.4–10.8)

## 2025-06-08 PROCEDURE — 25810000003 SODIUM CHLORIDE 0.9 % SOLUTION: Performed by: INTERNAL MEDICINE

## 2025-06-08 PROCEDURE — 85025 COMPLETE CBC W/AUTO DIFF WBC: CPT | Performed by: INTERNAL MEDICINE

## 2025-06-08 PROCEDURE — 83735 ASSAY OF MAGNESIUM: CPT | Performed by: INTERNAL MEDICINE

## 2025-06-08 PROCEDURE — 84100 ASSAY OF PHOSPHORUS: CPT | Performed by: INTERNAL MEDICINE

## 2025-06-08 PROCEDURE — 99232 SBSQ HOSP IP/OBS MODERATE 35: CPT | Performed by: INTERNAL MEDICINE

## 2025-06-08 PROCEDURE — 25010000002 ERYTHROMYCIN LACTOBIONATE PER 500 MG: Performed by: INTERNAL MEDICINE

## 2025-06-08 PROCEDURE — 80048 BASIC METABOLIC PNL TOTAL CA: CPT | Performed by: INTERNAL MEDICINE

## 2025-06-08 PROCEDURE — 82948 REAGENT STRIP/BLOOD GLUCOSE: CPT

## 2025-06-08 PROCEDURE — 25010000002 HYDROMORPHONE 1 MG/ML SOLUTION: Performed by: INTERNAL MEDICINE

## 2025-06-08 PROCEDURE — 63710000001 ONDANSETRON ODT 4 MG TABLET DISPERSIBLE: Performed by: INTERNAL MEDICINE

## 2025-06-08 RX ORDER — SODIUM CHLORIDE 9 MG/ML
100 INJECTION, SOLUTION INTRAVENOUS CONTINUOUS
Status: ACTIVE | OUTPATIENT
Start: 2025-06-08 | End: 2025-06-09

## 2025-06-08 RX ORDER — HYDROCODONE BITARTRATE AND ACETAMINOPHEN 10; 325 MG/1; MG/1
1 TABLET ORAL EVERY 4 HOURS PRN
Refills: 0 | Status: DISCONTINUED | OUTPATIENT
Start: 2025-06-08 | End: 2025-06-10 | Stop reason: HOSPADM

## 2025-06-08 RX ADMIN — HYDROCODONE BITARTRATE AND ACETAMINOPHEN 1 TABLET: 10; 325 TABLET ORAL at 19:34

## 2025-06-08 RX ADMIN — HYDROCODONE BITARTRATE AND ACETAMINOPHEN 1 TABLET: 10; 325 TABLET ORAL at 06:18

## 2025-06-08 RX ADMIN — HYDROCODONE BITARTRATE AND ACETAMINOPHEN 1 TABLET: 10; 325 TABLET ORAL at 13:03

## 2025-06-08 RX ADMIN — HYDROCODONE BITARTRATE AND ACETAMINOPHEN 1 TABLET: 10; 325 TABLET ORAL at 23:44

## 2025-06-08 RX ADMIN — HYDROMORPHONE HYDROCHLORIDE 0.5 MG: 1 INJECTION, SOLUTION INTRAMUSCULAR; INTRAVENOUS; SUBCUTANEOUS at 16:48

## 2025-06-08 RX ADMIN — ONDANSETRON 8 MG: 4 TABLET, ORALLY DISINTEGRATING ORAL at 08:46

## 2025-06-08 RX ADMIN — SODIUM CHLORIDE 100 ML/HR: 9 INJECTION, SOLUTION INTRAVENOUS at 14:27

## 2025-06-08 RX ADMIN — ERYTHROMYCIN LACTOBIONATE 500 MG: 500 INJECTION, POWDER, LYOPHILIZED, FOR SOLUTION INTRAVENOUS at 13:05

## 2025-06-08 RX ADMIN — SODIUM CHLORIDE 150 ML/HR: 9 INJECTION, SOLUTION INTRAVENOUS at 10:26

## 2025-06-08 RX ADMIN — Medication 10 ML: at 20:00

## 2025-06-08 RX ADMIN — PANTOPRAZOLE SODIUM 40 MG: 40 TABLET, DELAYED RELEASE ORAL at 16:49

## 2025-06-08 RX ADMIN — HYDROMORPHONE HYDROCHLORIDE 0.5 MG: 1 INJECTION, SOLUTION INTRAMUSCULAR; INTRAVENOUS; SUBCUTANEOUS at 04:11

## 2025-06-08 RX ADMIN — TIZANIDINE 4 MG: 4 TABLET ORAL at 13:03

## 2025-06-08 RX ADMIN — ERYTHROMYCIN LACTOBIONATE 500 MG: 500 INJECTION, POWDER, LYOPHILIZED, FOR SOLUTION INTRAVENOUS at 19:59

## 2025-06-08 RX ADMIN — ONDANSETRON 8 MG: 4 TABLET, ORALLY DISINTEGRATING ORAL at 14:26

## 2025-06-08 RX ADMIN — HYDROCODONE BITARTRATE AND ACETAMINOPHEN 1 TABLET: 10; 325 TABLET ORAL at 00:11

## 2025-06-08 RX ADMIN — CLONAZEPAM 0.5 MG: 0.5 TABLET ORAL at 20:30

## 2025-06-08 RX ADMIN — ONDANSETRON 8 MG: 4 TABLET, ORALLY DISINTEGRATING ORAL at 20:30

## 2025-06-08 RX ADMIN — SODIUM CHLORIDE 150 ML/HR: 9 INJECTION, SOLUTION INTRAVENOUS at 01:43

## 2025-06-08 RX ADMIN — MONTELUKAST 10 MG: 10 TABLET, FILM COATED ORAL at 20:30

## 2025-06-08 RX ADMIN — METOPROLOL SUCCINATE 50 MG: 50 TABLET, EXTENDED RELEASE ORAL at 08:46

## 2025-06-08 RX ADMIN — TIZANIDINE 4 MG: 4 TABLET ORAL at 23:56

## 2025-06-08 RX ADMIN — DULOXETINE 30 MG: 30 CAPSULE, DELAYED RELEASE ORAL at 16:49

## 2025-06-08 RX ADMIN — TIZANIDINE 4 MG: 4 TABLET ORAL at 00:11

## 2025-06-08 RX ADMIN — SODIUM CHLORIDE 100 ML/HR: 9 INJECTION, SOLUTION INTRAVENOUS at 22:39

## 2025-06-08 RX ADMIN — LUBIPROSTONE 8 MCG: 8 CAPSULE, GELATIN COATED ORAL at 08:46

## 2025-06-08 RX ADMIN — Medication 10 ML: at 08:46

## 2025-06-08 RX ADMIN — HYDROMORPHONE HYDROCHLORIDE 0.5 MG: 1 INJECTION, SOLUTION INTRAMUSCULAR; INTRAVENOUS; SUBCUTANEOUS at 10:21

## 2025-06-08 RX ADMIN — PANTOPRAZOLE SODIUM 40 MG: 40 TABLET, DELAYED RELEASE ORAL at 06:18

## 2025-06-08 RX ADMIN — HYDROMORPHONE HYDROCHLORIDE 0.5 MG: 1 INJECTION, SOLUTION INTRAMUSCULAR; INTRAVENOUS; SUBCUTANEOUS at 22:39

## 2025-06-08 RX ADMIN — Medication 10 ML: at 13:04

## 2025-06-08 RX ADMIN — ERYTHROMYCIN LACTOBIONATE 500 MG: 500 INJECTION, POWDER, LYOPHILIZED, FOR SOLUTION INTRAVENOUS at 04:11

## 2025-06-08 RX ADMIN — LUBIPROSTONE 8 MCG: 8 CAPSULE, GELATIN COATED ORAL at 20:28

## 2025-06-08 RX ADMIN — SCOLOPAMINE TRANSDERMAL SYSTEM 1 PATCH: 1 PATCH, EXTENDED RELEASE TRANSDERMAL at 13:03

## 2025-06-08 NOTE — PLAN OF CARE
Goal Outcome Evaluation:           Progress: no change  Outcome Evaluation: Pt is AxOx4 on room air. Pt has been walking with  throughout end of shift. I took patient care over around 4:30. Pt has no needs or concerns at this time.

## 2025-06-08 NOTE — PLAN OF CARE
Goal Outcome Evaluation:  Plan of Care Reviewed With: patient        Progress: no change        Pt is alert and oriented. Clear speech. Pt is pleasant and cooperative. Spouse supportive. Pt swallows medications without difficulty. Pt ambulatory with steady gait. Pt is independent with ADL/s and ambulation. Pt has pain LUQ and RUQ and back as well. Pt has scheduled and prn nausea meds.  Pt has denied nausea tonight. Pt resting well. Will continue to monitor.

## 2025-06-08 NOTE — PROGRESS NOTES
[facility]   Hospitalist Progress Note  Date: 2025  Patient Name: Ebony Hamilton  : 1980  MRN: [id number]  Date of admission: 2025      Subjective   Subjective     Chief Complaint: Abdominal pain    Summary: 44 y.o. female with previous history of anxiety, cervical disc disease, opioid dependence, depression, history of atrial fibrillation who presented for abdominal pain nausea and vomiting.  Patient underwent a cholecystectomy in 2025 with subsequent bile leak patient then underwent ERCP with drain placement in April.  After that patient developed appendicitis and had an appendectomy.  Patient presented to the hospital today as she states since last night she developed nausea vomiting and upper epigastric abdominal pain.  Patient has required significant amount of pain medication and antiemetics without any improvement in her symptoms.  Patient had a CT of the abdomen and pelvis which showed new mild infiltrates in the lower lobe of the right lung possibly may represent pneumonia however no acute abdominal process noted.  Patient was noted to have an elevated white count at 18,000.  Patient's lactic acid was elevated at 4.4 has improved to 2.7.  She was admitted for further care, started on IV Reglan, antiemetics, initially started antibiotics for pneumonia.  GI was consulted, performed EGD which was largely negative.  MRCP was obtained which showed some inflammation in the small intestine otherwise normal.  Underwent push enteroscopy on  as the patient continued to have pain, this was also negative.  She is started on scopolamine patch, Amitiza, and home medications were held with improvement.    Interval Followup: No acute events overnight.  States he feels about the same as she did yesterday, maybe a little bit better.  Tried some Jell-O this morning but states it did not sit very well.  Had tachycardia yesterday, improved with metoprolol which she states has been on in  the past.  CT PE was obtained and negative.    Objective   Objective     Vitals:   Temp:  [98 °F (36.7 °C)-98.4 °F (36.9 °C)] 98.2 °F (36.8 °C)  Heart Rate:  [] 89  Resp:  [18-22] 18  BP: ()/(73-93) 147/91  Physical Exam    Constitutional: Resting comfortably in bed, NAD   Respiratory: Clear to auscultation bilaterally, nonlabored respirations    Cardiovascular: RRR, no MRG   Gastrointestinal: Positive bowel sounds, soft   Neurologic: Oriented x 3, strength symmetric in all extremities, Cranial Nerves grossly intact to confrontation, speech clear    Result Review    I have personally reviewed the results below:  [x]  Laboratory personally reviewed BMP, CBC, magnesium, phosphorus  []  Microbiology  []  Radiology  []  EKG/Telemetry   []  Cardiology/Vascular   []  Pathology  []  Old records  []  Other:  CBC          6/6/2025    05:24 6/7/2025    07:42 6/8/2025    06:21   CBC   WBC 7.59  8.15  7.58    RBC 3.83  4.04  4.02    Hemoglobin 11.2  11.9  11.6    Hematocrit 35.4  37.4  37.2    MCV 92.4  92.6  92.5    MCH 29.2  29.5  28.9    MCHC 31.6  31.8  31.2    RDW 14.9  15.2  15.3    Platelets 394  390  328      CMP          6/6/2025    04:53 6/7/2025    07:42 6/8/2025    06:21   CMP   Glucose 98  110  92    BUN 2.5  4.5  5.3    Creatinine 0.54  0.63  0.51    EGFR 118.1  113.7  119.7    Sodium 142  141  144    Potassium 3.6  2.9  3.5    Chloride 107  101  109    Calcium 8.7  9.0  8.5    BUN/Creatinine Ratio 4.6  7.1  10.4    Anion Gap 9.1  10.6  9.7        Assessment & Plan   Assessment / Plan   Intractable abdominal pain  Intractable nausea and vomiting  Likely gastroparesis flare  Hypokalemia  Lactic acidosis  Questionable right lower lobe pneumonia due to unknown bacterial source  Leukocytosis  Anxiety  Depression  Chronic opiate dependence  Cervical degenerative disc disease  History of gastroparesis    Continue to monitor in the hospital for workup and management of the above  Gastroenterology following,  appreciate assistance  Continue with a trial of erythromycin 500 mg every 8 hours  Continue with scopolamine patch and scheduled Amitiza  Continue with scheduled Zofran 8 mg p.o. 3 times daily  Restart home Cymbalta, continue to hold trazodone  Continue clear liquid diet, advance as tolerated  Did discuss that she may benefit from transfer to [place] where her motility specialist is located if we cannot get any symptomatic relief  Continue Dilaudid every 6 hours, Norco as needed  Patient has follow-up with GI motility clinic later this month  Trend renal function and electrolytes with a.m. BMP, magnesium   Trend Hgb and WBC with a.m. CBC     Discussed plan with RN, gastroenterology    VTE Prophylaxis:  Mechanical VTE prophylaxis orders are present.        CODE STATUS:   Code Status (Patient has no pulse and is not breathing): CPR (Attempt to Resuscitate)  Medical Interventions (Patient has pulse or is breathing): Full Support

## 2025-06-09 LAB
ANION GAP SERPL CALCULATED.3IONS-SCNC: 9 MMOL/L (ref 5–15)
BASOPHILS # BLD AUTO: 0.06 10*3/MM3 (ref 0–0.2)
BASOPHILS NFR BLD AUTO: 0.7 % (ref 0–1.5)
BUN SERPL-MCNC: 9 MG/DL (ref 6–20)
BUN/CREAT SERPL: 14.8 (ref 7–25)
CALCIUM SPEC-SCNC: 8.9 MG/DL (ref 8.6–10.5)
CHLORIDE SERPL-SCNC: 106 MMOL/L (ref 98–107)
CO2 SERPL-SCNC: 25 MMOL/L (ref 22–29)
CREAT SERPL-MCNC: 0.61 MG/DL (ref 0.57–1)
CYTO UR: NORMAL
DEPRECATED RDW RBC AUTO: 50.9 FL (ref 37–54)
EGFRCR SERPLBLD CKD-EPI 2021: 114.6 ML/MIN/1.73
EOSINOPHIL # BLD AUTO: 0.22 10*3/MM3 (ref 0–0.4)
EOSINOPHIL NFR BLD AUTO: 2.5 % (ref 0.3–6.2)
ERYTHROCYTE [DISTWIDTH] IN BLOOD BY AUTOMATED COUNT: 15.1 % (ref 12.3–15.4)
GLUCOSE SERPL-MCNC: 99 MG/DL (ref 65–99)
HCT VFR BLD AUTO: 36.4 % (ref 34–46.6)
HGB BLD-MCNC: 11.4 G/DL (ref 12–15.9)
IMM GRANULOCYTES # BLD AUTO: 0.07 10*3/MM3 (ref 0–0.05)
IMM GRANULOCYTES NFR BLD AUTO: 0.8 % (ref 0–0.5)
LAB AP CASE REPORT: NORMAL
LAB AP CLINICAL INFORMATION: NORMAL
LYMPHOCYTES # BLD AUTO: 2.07 10*3/MM3 (ref 0.7–3.1)
LYMPHOCYTES NFR BLD AUTO: 23.4 % (ref 19.6–45.3)
MAGNESIUM SERPL-MCNC: 1.9 MG/DL (ref 1.6–2.6)
MCH RBC QN AUTO: 28.9 PG (ref 26.6–33)
MCHC RBC AUTO-ENTMCNC: 31.3 G/DL (ref 31.5–35.7)
MCV RBC AUTO: 92.4 FL (ref 79–97)
MONOCYTES # BLD AUTO: 0.53 10*3/MM3 (ref 0.1–0.9)
MONOCYTES NFR BLD AUTO: 6 % (ref 5–12)
NEUTROPHILS NFR BLD AUTO: 5.9 10*3/MM3 (ref 1.7–7)
NEUTROPHILS NFR BLD AUTO: 66.6 % (ref 42.7–76)
NRBC BLD AUTO-RTO: 0 /100 WBC (ref 0–0.2)
PATH REPORT.FINAL DX SPEC: NORMAL
PATH REPORT.GROSS SPEC: NORMAL
PHOSPHATE SERPL-MCNC: 3.4 MG/DL (ref 2.5–4.5)
PLATELET # BLD AUTO: 438 10*3/MM3 (ref 140–450)
PMV BLD AUTO: 9.7 FL (ref 6–12)
POTASSIUM SERPL-SCNC: 3.8 MMOL/L (ref 3.5–5.2)
RBC # BLD AUTO: 3.94 10*6/MM3 (ref 3.77–5.28)
SODIUM SERPL-SCNC: 140 MMOL/L (ref 136–145)
WBC NRBC COR # BLD AUTO: 8.85 10*3/MM3 (ref 3.4–10.8)

## 2025-06-09 PROCEDURE — 99232 SBSQ HOSP IP/OBS MODERATE 35: CPT | Performed by: INTERNAL MEDICINE

## 2025-06-09 PROCEDURE — 25010000002 KETOROLAC TROMETHAMINE PER 15 MG: Performed by: INTERNAL MEDICINE

## 2025-06-09 PROCEDURE — 83735 ASSAY OF MAGNESIUM: CPT | Performed by: INTERNAL MEDICINE

## 2025-06-09 PROCEDURE — 80048 BASIC METABOLIC PNL TOTAL CA: CPT | Performed by: INTERNAL MEDICINE

## 2025-06-09 PROCEDURE — 84100 ASSAY OF PHOSPHORUS: CPT | Performed by: INTERNAL MEDICINE

## 2025-06-09 PROCEDURE — 25010000002 HYDROMORPHONE 1 MG/ML SOLUTION: Performed by: FAMILY MEDICINE

## 2025-06-09 PROCEDURE — 63710000001 ONDANSETRON ODT 4 MG TABLET DISPERSIBLE: Performed by: INTERNAL MEDICINE

## 2025-06-09 PROCEDURE — 85025 COMPLETE CBC W/AUTO DIFF WBC: CPT | Performed by: INTERNAL MEDICINE

## 2025-06-09 PROCEDURE — 25010000002 HYDROMORPHONE 1 MG/ML SOLUTION: Performed by: INTERNAL MEDICINE

## 2025-06-09 PROCEDURE — 25010000002 ERYTHROMYCIN LACTOBIONATE PER 500 MG: Performed by: INTERNAL MEDICINE

## 2025-06-09 RX ADMIN — ONDANSETRON 8 MG: 4 TABLET, ORALLY DISINTEGRATING ORAL at 09:31

## 2025-06-09 RX ADMIN — HYDROMORPHONE HYDROCHLORIDE 0.5 MG: 1 INJECTION, SOLUTION INTRAMUSCULAR; INTRAVENOUS; SUBCUTANEOUS at 04:26

## 2025-06-09 RX ADMIN — TIZANIDINE 4 MG: 4 TABLET ORAL at 21:05

## 2025-06-09 RX ADMIN — ONDANSETRON 8 MG: 4 TABLET, ORALLY DISINTEGRATING ORAL at 14:10

## 2025-06-09 RX ADMIN — TIZANIDINE 4 MG: 4 TABLET ORAL at 12:01

## 2025-06-09 RX ADMIN — LUBIPROSTONE 8 MCG: 8 CAPSULE, GELATIN COATED ORAL at 21:05

## 2025-06-09 RX ADMIN — MONTELUKAST 10 MG: 10 TABLET, FILM COATED ORAL at 21:05

## 2025-06-09 RX ADMIN — HYDROCODONE BITARTRATE AND ACETAMINOPHEN 1 TABLET: 10; 325 TABLET ORAL at 03:47

## 2025-06-09 RX ADMIN — HYDROCODONE BITARTRATE AND ACETAMINOPHEN 1 TABLET: 10; 325 TABLET ORAL at 21:05

## 2025-06-09 RX ADMIN — METOPROLOL SUCCINATE 50 MG: 50 TABLET, EXTENDED RELEASE ORAL at 09:32

## 2025-06-09 RX ADMIN — LUBIPROSTONE 8 MCG: 8 CAPSULE, GELATIN COATED ORAL at 09:31

## 2025-06-09 RX ADMIN — KETOROLAC TROMETHAMINE 15 MG: 30 INJECTION, SOLUTION INTRAMUSCULAR; INTRAVENOUS at 15:51

## 2025-06-09 RX ADMIN — ERYTHROMYCIN LACTOBIONATE 500 MG: 500 INJECTION, POWDER, LYOPHILIZED, FOR SOLUTION INTRAVENOUS at 03:47

## 2025-06-09 RX ADMIN — DULOXETINE 30 MG: 30 CAPSULE, DELAYED RELEASE ORAL at 17:15

## 2025-06-09 RX ADMIN — HYDROMORPHONE HYDROCHLORIDE 0.5 MG: 1 INJECTION, SOLUTION INTRAMUSCULAR; INTRAVENOUS; SUBCUTANEOUS at 22:15

## 2025-06-09 RX ADMIN — PANTOPRAZOLE SODIUM 40 MG: 40 TABLET, DELAYED RELEASE ORAL at 06:34

## 2025-06-09 RX ADMIN — KETOROLAC TROMETHAMINE 15 MG: 30 INJECTION, SOLUTION INTRAMUSCULAR; INTRAVENOUS at 23:05

## 2025-06-09 RX ADMIN — CLONAZEPAM 0.5 MG: 0.5 TABLET ORAL at 21:05

## 2025-06-09 RX ADMIN — Medication 10 ML: at 21:07

## 2025-06-09 RX ADMIN — ERYTHROMYCIN LACTOBIONATE 500 MG: 500 INJECTION, POWDER, LYOPHILIZED, FOR SOLUTION INTRAVENOUS at 12:01

## 2025-06-09 RX ADMIN — DULOXETINE 60 MG: 30 CAPSULE, DELAYED RELEASE ORAL at 06:34

## 2025-06-09 RX ADMIN — PANTOPRAZOLE SODIUM 40 MG: 40 TABLET, DELAYED RELEASE ORAL at 17:15

## 2025-06-09 RX ADMIN — HYDROCODONE BITARTRATE AND ACETAMINOPHEN 1 TABLET: 10; 325 TABLET ORAL at 17:15

## 2025-06-09 RX ADMIN — ONDANSETRON 8 MG: 4 TABLET, ORALLY DISINTEGRATING ORAL at 21:06

## 2025-06-09 RX ADMIN — ERYTHROMYCIN LACTOBIONATE 500 MG: 500 INJECTION, POWDER, LYOPHILIZED, FOR SOLUTION INTRAVENOUS at 19:37

## 2025-06-09 RX ADMIN — Medication 10 ML: at 09:32

## 2025-06-09 RX ADMIN — HYDROMORPHONE HYDROCHLORIDE 0.5 MG: 1 INJECTION, SOLUTION INTRAMUSCULAR; INTRAVENOUS; SUBCUTANEOUS at 10:37

## 2025-06-09 RX ADMIN — HYDROCODONE BITARTRATE AND ACETAMINOPHEN 1 TABLET: 10; 325 TABLET ORAL at 12:01

## 2025-06-09 RX ADMIN — HYDROCODONE BITARTRATE AND ACETAMINOPHEN 1 TABLET: 10; 325 TABLET ORAL at 07:50

## 2025-06-09 RX ADMIN — KETOROLAC TROMETHAMINE 15 MG: 30 INJECTION, SOLUTION INTRAMUSCULAR; INTRAVENOUS at 09:42

## 2025-06-09 NOTE — PLAN OF CARE
Goal Outcome Evaluation:  Plan of Care Reviewed With: patient        Progress: improving  Outcome Evaluation: Alert and oriented x4. Complaints of pain, medicated per mar. Tolerating food okay, however wants to remain on GI diet at this time. Up ad eloy in room. Continuous fluids discontinued per order.

## 2025-06-09 NOTE — PLAN OF CARE
Goal Outcome Evaluation:  Plan of Care Reviewed With: patient        Progress: improving  Outcome Evaluation: Although pain is still severe and mainly 8/10 on numeric pain scale, able to tolerate snacks and beverages without complaint of nausea or vomiting. Patient has ongoing cervical spine pain and takes norco and tizanidine for this but with being off scheduled narcotics, as [facility] has ordered them PRN, her pain is 5/10 at baseline at home but does not go below 8/10 on her neck/cervical spine. Relayed this to day shift and encouraged patient to revisit pain management orders with provider. Despite pain, patient has maintained pleasant communication and optimistic outlook. Call bell in reach, no further needs voiced, up ad eloy with Mid Line infusing to right arm.

## 2025-06-09 NOTE — PAYOR COMM NOTE
"Ebony Hamilton (44 y.o. Female)       Date of Birth   1980    Social Security Number       Address   [address] [place] [address]    Home Phone   [phone]    MRN   [id number]       Denominational   None    Marital Status                               Admission Date   6/2/2025    Admission Type   Emergency    Admitting Provider   Darion Zamora MD    Attending Provider   Darion Zamora MD    Department, Room/Bed   [address], 4002/1       Discharge Date       Discharge Disposition       Discharge Destination                                 Attending Provider: Darion Zamora MD    Allergies: Escitalopram, Sulfa Antibiotics, Reglan [Metoclopramide], Baclofen, Ciprofloxacin, Codeine, Gold-containing Drug Products, Latex, Lovenox [Enoxaparin Sodium], Nickel, Tegaderm Ag Mesh [Silver]    Isolation: None   Infection: None   Code Status: CPR    Ht: 157.5 cm (62\")   Wt: 82.9 kg (182 lb 12.2 oz)    Admission Cmt: None   Principal Problem: Abdominal pain [R10.9]                   Active Insurance as of 6/2/2025       Primary Coverage       Payor Plan Insurance Group Employer/Plan Group    [employer]HE[employer] BLUE CROSS AN[employer]M BLUE CROSS BLUE SHIELD PPO [id number]       Payor Plan Address Payor Plan Phone Number Payor Plan Fax Number Effective Dates    [address] [phone]  3/1/2025 - None Entered    [place] [address]         Subscriber Name Subscriber Birth Date Member ID       King Askew 1980 [id number]                     Emergency Contacts        (Rel.) Home Phone Work Phone Mobile Phone    KING ASKEW (Spouse) [phone] -- [phone]        UPDATE    [facility] ,[facility] [phone]  F [phone]         Lorna Downs, RN   Registered Nurse  Nursing     Plan of Care     Signed     Date of Service: 06/07/25 0207  Creation Time: 06/07/25 0207     Signed         Goal Outcome Evaluation:  Plan of Care " Reviewed With: patient   Pt has had intermittent nausea and pain. Pt is being medicated for nausea and pain medication. Pt states interventions are effective. Pt has been having redness on arms above and below elbows and  legs around knee area and when ambulated to bathroom and back to bed , pt had blotchy purplish hue around inner leg around knee above and below and after approximately 15 minutes the purplish hue blotchy areas left.  Redness blanchable on arms and legs and chest. Will continue to monitor. Pt is alert and oriented. Pt able to have clear speech and voices needs. Ambulatory with steady gait. Pleasant and cooperative.  was at bedside early part of night and very supportive.pt's pain is in upper quadrants of abdomen, neck, and back. Will continue to monitor.                              Darion Zamora MD   Physician  Hospitalist     Progress Notes     Signed     Date of Service: 25  Creation Time: 25     Signed       Expand All Collapse All     [facility]   Hospitalist Progress Note  Date: 2025  Patient Name: Ebony Hamilton  : 1980  MRN: [id number]  Date of admission: 2025        Subjective[]Expand by Default  Subjective      Chief Complaint: Abdominal pain     Summary: 44 y.o. female with previous history of anxiety, cervical disc disease, opioid dependence, depression, history of atrial fibrillation who presented for abdominal pain nausea and vomiting.  Patient underwent a cholecystectomy in 2025 with subsequent bile leak patient then underwent ERCP with drain placement in April.  After that patient developed appendicitis and had an appendectomy.  Patient presented to the hospital today as she states since last night she developed nausea vomiting and upper epigastric abdominal pain.  Patient has required significant amount of pain medication and antiemetics without any improvement in her symptoms.  Patient had a CT of the abdomen and  pelvis which showed new mild infiltrates in the lower lobe of the right lung possibly may represent pneumonia however no acute abdominal process noted.  Patient was noted to have an elevated white count at 18,000.  Patient's lactic acid was elevated at 4.4 has improved to 2.7.  She was admitted for further care, started on IV Reglan, antiemetics, initially started antibiotics for pneumonia.  GI was consulted, performed EGD which was largely negative.  MRCP was obtained which showed some inflammation in the small intestine otherwise normal.  Underwent push enteroscopy on 6/6 as the patient continued to have pain, this was also negative.  She is started on scopolamine patch, Amitiza, and home medications were held with improvement.     Interval Followup: Patient states she feels worse today than she did yesterday.  Is really not able to keep any food down.  She can drink apple juice with us about it.  Try to little bit of broth last night but vomited.      Objective  Objective      Vitals:   Temp:  [97.2 °F (36.2 °C)-98.6 °F (37 °C)] 97.9 °F (36.6 °C)  Heart Rate:  [] 107  Resp:  [18] 18  BP: (116-163)/() 139/93  Physical Exam                         Constitutional: Appears more comfortable, sitting up in bed              Respiratory: Clear to auscultation bilaterally, nonlabored respirations               Cardiovascular: RRR, no MRG              Gastrointestinal: Positive bowel sounds, soft              Neurologic: Oriented x 3, strength symmetric in all extremities, Cranial Nerves grossly intact to confrontation, speech clear     Result Review  I have personally reviewed the results below:  [x]  Laboratory personally reviewed BMP, CBC, magnesium, phosphorus  []  Microbiology  []  Radiology  []  EKG/Telemetry   []  Cardiology/Vascular   []  Pathology  []  Old records  []  Other:  CBC Results   CBC            6/5/2025    09:00 6/6/2025    05:24 6/7/2025    07:42   CBC   WBC 8.12  7.59  8.15    RBC 3.81   3.83  4.04    Hemoglobin 10.9  11.2  11.9    Hematocrit 35.0  35.4  37.4    MCV 91.9  92.4  92.6    MCH 28.6  29.2  29.5    MCHC 31.1  31.6  31.8    RDW 14.6  14.9  15.2    Platelets 390  394  390          CMP Results:   CMP            6/5/2025    09:00 6/6/2025    04:53 6/7/2025    07:42   CMP   Glucose 102  98  110    BUN 6.2  2.5  4.5    Creatinine 0.61  0.54  0.63    EGFR 114.6  118.1  113.7    Sodium 143  142  141    Potassium 2.9  3.6  2.9    Chloride 106  107  101    Calcium 8.2  8.7  9.0    BUN/Creatinine Ratio 10.2  4.6  7.1    Anion Gap 11.5  9.1  10.6             Assessment & Plan  Assessment / Plan   Intractable abdominal pain  Intractable nausea and vomiting  Likely gastroparesis flare  Hypokalemia  Lactic acidosis  Questionable right lower lobe pneumonia due to unknown bacterial source  Leukocytosis  Anxiety  Depression  Chronic opiate dependence  Cervical degenerative disc disease  History of gastroparesis     Continue to monitor in the hospital for workup and management of the above  Gastroenterology following, appreciate assistance  Will give a trial of IV erythromycin 5 mg every 8 hours  Continue with scopolamine patch and scheduled Amitiza  Continue with scheduled Zofran 8 mg p.o. 3 times daily  Continue to hold Cymbalta and trazodone as these can contribute to nausea and vomiting  Decreased to clear liquid diet.  Did offer cortrak and tube feeds with patient would like to hold off  Did discuss that she may benefit from transfer to [facility] as we are running out of interventions available at this facility.  She would like to hold off on transfer if possible  Stretch Dilaudid every 6 hours, she is still using Dilaudid and Norco quite frequently.  Monitor vital signs closely while on IV narcotics  Completed a course of antibiotics for pneumonia  DC simethicone  Patient has follow-up with GI motility clinic later this month  Trend renal function and electrolytes with a.m. BMP, magnesium   Trend Hgb  and WBC with a.m. CBC     Discussed plan with RN, gastroenterology     Total of 53 minutes spent reviewing labs and imaging, counseling and educating the patient and family, ordering medications, discussing with specialty services, documenting clinical information in the electronic medical record, and care coordination.     VTE Prophylaxis:  Mechanical VTE prophylaxis orders are present.           CODE STATUS:   Code Status (Patient has no pulse and is not breathing): CPR (Attempt to Resuscitate)  Medical Interventions (Patient has pulse or is breathing): Full Support                                    Kim March, RN   Registered Nurse  Nursing     Plan of Care     Signed     Date of Service: 06/07/25 1750  Creation Time: 06/07/25 1750     Signed         Goal Outcome Evaluation:   Patient has consistent abdominal pain, frequent pain meds, at 1500 the patient's HR was 150s-160s. Called Dr. Zamora- see orders. EKG done, CT scan done, now on flex tele, HR was treated with Lopressor push.                               Romina Thomas MD   Physician  Gastroenterology     Progress Notes     Signed     Date of Service: 06/07/25 1943  Creation Time: 06/07/25 1943     Signed       Expand All Collapse All    [facility]  Inpatient Progress Note     Reason for Follow Up:  nausea, vomiting     Subjective[]Expand by Default  Interval History:   Pt reports she has been unable to tolerate solids.  She reports she can tolerate apple juice without issue.    Current Medications      Current Facility-Administered Medications:     acetaminophen (TYLENOL) tablet 650 mg, 650 mg, Oral, Q6H PRN, Ha Thompson MD, 650 mg at 06/07/25 1634    sennosides-docusate (PERICOLACE) 8.6-50 MG per tablet 2 tablet, 2 tablet, Oral, BID PRN **AND** polyethylene glycol (MIRALAX) packet 17 g, 17 g, Oral, Daily PRN **AND** bisacodyl (DULCOLAX) EC tablet 5 mg, 5 mg, Oral, Daily PRN **AND** bisacodyl (DULCOLAX)  suppository 10 mg, 10 mg, Rectal, Daily PRN, Ha Thompson MD    clonazePAM (KlonoPIN) tablet 0.5 mg, 0.5 mg, Oral, BID PRN, Darion Zamora MD, 0.5 mg at 06/06/25 1949    [Held by provider] DULoxetine (CYMBALTA) DR capsule 30 mg, 30 mg, Oral, Q PM, Ha Thompson MD, 30 mg at 06/04/25 1606    [Held by provider] DULoxetine (CYMBALTA) DR capsule 60 mg, 60 mg, Oral, QAM, Ha Thompson MD, 60 mg at 06/05/25 0636    erythromycin (ERYTHROCIN) 500 mg in sodium chloride 0.9 % 100 mL IVPB, 500 mg, Intravenous, Q8H, Darion Zamora MD, Last Rate: 100 mL/hr at 06/07/25 1151, 500 mg at 06/07/25 1151    HYDROcodone-acetaminophen (NORCO)  MG per tablet 1 tablet, 1 tablet, Oral, Q4H PRN, Darion Zamora MD, 1 tablet at 06/07/25 1821    HYDROmorphone (DILAUDID) injection 0.5 mg, 0.5 mg, Intravenous, Q6H PRN, Darion Zamora MD, 0.5 mg at 06/07/25 1559    ketorolac (TORADOL) injection 15 mg, 15 mg, Intravenous, Q6H PRN, Darion Zamora MD, 15 mg at 06/07/25 1404    lubiprostone (AMITIZA) capsule 8 mcg, 8 mcg, Oral, BID, Darion Zamora MD, 8 mcg at 06/07/25 0809    metoprolol succinate XL (TOPROL-XL) 24 hr tablet 50 mg, 50 mg, Oral, Q24H, Darion Zamora MD, 50 mg at 06/07/25 1600    montelukast (SINGULAIR) tablet 10 mg, 10 mg, Oral, Nightly, Ha Thompson MD, 10 mg at 06/06/25 2209    ondansetron (ZOFRAN) injection 4 mg, 4 mg, Intravenous, Q6H PRN, Ha Thompson MD, 4 mg at 06/06/25 1949    ondansetron ODT (ZOFRAN-ODT) disintegrating tablet 8 mg, 8 mg, Oral, TID, Darion Zamora MD, 8 mg at 06/07/25 1404    pantoprazole (PROTONIX) EC tablet 40 mg, 40 mg, Oral, BID AC, Darion Zamora MD, 40 mg at 06/07/25 1632    prochlorperazine (COMPAZINE) injection 10 mg, 10 mg, Intravenous, Q8H PRN, Ha Thompson MD, 10 mg at 06/07/25 1254    promethazine (PHENERGAN) IVPB 12.5 mg, 12.5 mg, Intravenous, Q6H PRN, Darion Zamora MD, 12.5 mg at 06/06/25 7624     scopolamine patch 1 mg/72 hr, 1 patch, Transdermal, Q72H, Darion Zamora MD, 1 patch at 06/05/25 1309    simethicone (MYLICON) chewable tablet 120 mg, 120 mg, Oral, 4x Daily PRN, Darion Zamora MD, 120 mg at 06/07/25 0417    sodium chloride 0.9 % flush 10 mL, 10 mL, Intravenous, PRN, Ha Thompson MD    sodium chloride 0.9 % flush 10 mL, 10 mL, Intravenous, Q12H, Ha Thompson MD, 10 mL at 06/07/25 0809    sodium chloride 0.9 % flush 10 mL, 10 mL, Intravenous, PRN, Ha Thompson MD    sodium chloride 0.9 % flush 10 mL, 10 mL, Intravenous, PRN, Darion Zamora MD    sodium chloride 0.9 % infusion 40 mL, 40 mL, Intravenous, PRN, Ha Thompson MD    sodium chloride 0.9 % infusion, 150 mL/hr, Intravenous, Continuous, Darion Zamora MD, Last Rate: 150 mL/hr at 06/07/25 1652, 150 mL/hr at 06/07/25 1652    tiZANidine (ZANAFLEX) tablet 4 mg, 4 mg, Oral, Q8H PRN, Ha Thompson MD, 4 mg at 06/07/25 0006    [Held by provider] traZODone (DESYREL) tablet 50 mg, 50 mg, Oral, Nightly, Ha Thompson MD, 50 mg at 06/04/25 2057     Review of Systems:               The following systems were reviewed and negative;  constitution, respiratory, and cardiovascular     Objective  Vital Signs  Temp:  [97.2 °F (36.2 °C)-98.6 °F (37 °C)] 98.1 °F (36.7 °C)  Heart Rate:  [] 106  Resp:  [18-22] 18  BP: (116-160)/(73-93) 123/85  Body mass index is 33.43 kg/m².     Intake/Output Summary (Last 24 hours) at 6/7/2025 1943  Last data filed at 6/7/2025 1717      Gross per 24 hour   Intake 360 ml   Output --   Net 360 ml      No intake/output data recorded.                Physical Exam:              General: awake, alert and in no acute distress              Skin: warm and dry, not jaundiced              Pulm: breathing unlabored              Abdomen: soft, epigastric TTP, nondistended              Psychiatric: mental status within normal limits                Results Review:                 I reviewed the patient's new clinical results.            Results from last 7 days   Lab Units 06/07/25  0742 06/06/25  0524 06/05/25  0900   WBC 10*3/mm3 8.15 7.59 8.12   HEMOGLOBIN g/dL 11.9* 11.2* 10.9*   HEMATOCRIT % 37.4 35.4 35.0   PLATELETS 10*3/mm3 390 394 390                Results from last 7 days   Lab Units 06/07/25  0742 06/06/25  0453 06/05/25  0900 06/04/25  0500 06/03/25  0501 06/02/25  0318   SODIUM mmol/L 141 142 143   < > 144 146*   POTASSIUM mmol/L 2.9* 3.6 2.9*   < > 3.5 3.6   CHLORIDE mmol/L 101 107 106   < > 107 106   CO2 mmol/L 29.4* 25.9 25.5   < > 20.5* 19.1*   BUN mg/dL 4.5* 2.5* 6.2   < > 9.0 8.8   CREATININE mg/dL 0.63 0.54* 0.61   < > 0.71 0.73   CALCIUM mg/dL 9.0 8.7 8.2*   < > 9.1 9.9   BILIRUBIN mg/dL  --   --   --   --  0.6 0.5   ALK PHOS U/L  --   --   --   --  66 73   ALT (SGPT) U/L  --   --   --   --  8 10   AST (SGOT) U/L  --   --   --   --  12 11   GLUCOSE mg/dL 110* 98 102*   < > 104* 120*    < > = values in this interval not displayed.                Lab Results   Lab Value Date/Time     LIPASE 46 06/04/2025 0500     LIPASE 22 06/02/2025 0318     LIPASE 35 03/30/2025 1209     LIPASE 41 03/08/2025 2329     LIPASE 20 02/22/2025 1301     LIPASE 25 02/16/2025 2217     LIPASE 29 02/02/2024 0412            Assessment & Plan  Assessment:      Nausea, vomiting  Gastroparesis  Epigastric pain     Plan:      - recent EGD/small bowel enteroscopy unrevealing for etiology of nausea, vomiting, epigastric pain; no H.pylori on gastric biopsies  - no significant findings on CT abd/pelvis to explain symptoms  - MRCP with thickening of 3rd/4th portion of duodenum and may be inflamed; f/u enteroscopy negative  - pt with reported history of gastroparesis x 1 year after stopping GLP-1; pt follows with Dr. Alvarado and has upcoming appointment  - agree with trial of erythromycin; rec 500 mg IV q8h  - pt currently on PPI BID and scheduled zofran; can transition to IV if unable to tolerate po  -  continue to advance diet as tolerated     I discussed the patients findings and my recommendations with patient, family, and primary care team.          Romina Thomas M.D.  [address] [place]  [id number]  Office: [phone]                                 Lorna Downs, RN   Registered Nurse  Nursing     Plan of Care     Signed     Date of Service: 25  Creation Time: 25     Signed         Goal Outcome Evaluation:  Plan of Care Reviewed With: patient  Progress: no change      Pt is alert and oriented. Clear speech. Pt is pleasant and cooperative. Spouse supportive. Pt swallows medications without difficulty. Pt ambulatory with steady gait. Pt is independent with ADL/s and ambulation. Pt has pain LUQ and RUQ and back as well. Pt has scheduled and prn nausea meds.  Pt has denied nausea tonight. Pt resting well. Will continue to monitor.                           Darion Zamora MD   Physician  Hospitalist     Progress Notes     Signed     Date of Service: 25  Creation Time: 25     Signed       Expand All [facility] All     [facility]   Hospitalist Progress Note  Date: 2025  Patient Name: Ebony Hamilton  : 1980  MRN: [id number]  Date of admission: 2025        Subjective[]Expand by Default  Subjective      Chief Complaint: Abdominal pain     Summary: 44 y.o. female with previous history of anxiety, cervical disc disease, opioid dependence, depression, history of atrial fibrillation who presented for abdominal pain nausea and vomiting.  Patient underwent a cholecystectomy in 2025 with subsequent bile leak patient then underwent ERCP with drain placement in April.  After that patient developed appendicitis and had an appendectomy.  Patient presented to the hospital today as she states since last night she developed nausea vomiting and upper epigastric abdominal pain.  Patient has required  significant amount of pain medication and antiemetics without any improvement in her symptoms.  Patient had a CT of the abdomen and pelvis which showed new mild infiltrates in the lower lobe of the right lung possibly may represent pneumonia however no acute abdominal process noted.  Patient was noted to have an elevated white count at 18,000.  Patient's lactic acid was elevated at 4.4 has improved to 2.7.  She was admitted for further care, started on IV Reglan, antiemetics, initially started antibiotics for pneumonia.  GI was consulted, performed EGD which was largely negative.  MRCP was obtained which showed some inflammation in the small intestine otherwise normal.  Underwent push enteroscopy on 6/6 as the patient continued to have pain, this was also negative.  She is started on scopolamine patch, Amitiza, and home medications were held with improvement.     Interval Followup: No acute events overnight.  States he feels about the same as she did yesterday, maybe a little bit better.  Tried some Jell-O this morning but states it did not sit very well.  Had tachycardia yesterday, improved with metoprolol which she states has been on in the past.  CT PE was obtained and negative.     Objective  Objective      Vitals:   Temp:  [98 °F (36.7 °C)-98.4 °F (36.9 °C)] 98.2 °F (36.8 °C)  Heart Rate:  [] 89  Resp:  [18-22] 18  BP: ()/(73-93) 147/91  Physical Exam                         Constitutional: Resting comfortably in bed, NAD              Respiratory: Clear to auscultation bilaterally, nonlabored respirations               Cardiovascular: RRR, no MRG              Gastrointestinal: Positive bowel sounds, soft              Neurologic: Oriented x 3, strength symmetric in all extremities, Cranial Nerves grossly intact to confrontation, speech clear     Result Review  I have personally reviewed the results below:  [x]  Laboratory personally reviewed BMP, CBC, magnesium, phosphorus  []  Microbiology  []   Radiology  []  EKG/Telemetry   []  Cardiology/Vascular   []  Pathology  []  Old records  []  Other:  CBC Results   CBC            6/6/2025    05:24 6/7/2025    07:42 6/8/2025    06:21   CBC   WBC 7.59  8.15  7.58    RBC 3.83  4.04  4.02    Hemoglobin 11.2  11.9  11.6    Hematocrit 35.4  37.4  37.2    MCV 92.4  92.6  92.5    MCH 29.2  29.5  28.9    MCHC 31.6  31.8  31.2    RDW 14.9  15.2  15.3    Platelets 394  390  328          CMP Results:   CMP            6/6/2025    04:53 6/7/2025    07:42 6/8/2025    06:21   CMP   Glucose 98  110  92    BUN 2.5  4.5  5.3    Creatinine 0.54  0.63  0.51    EGFR 118.1  113.7  119.7    Sodium 142  141  144    Potassium 3.6  2.9  3.5    Chloride 107  101  109    Calcium 8.7  9.0  8.5    BUN/Creatinine Ratio 4.6  7.1  10.4    Anion Gap 9.1  10.6  9.7             Assessment & Plan  Assessment / Plan   Intractable abdominal pain  Intractable nausea and vomiting  Likely gastroparesis flare  Hypokalemia  Lactic acidosis  Questionable right lower lobe pneumonia due to unknown bacterial source  Leukocytosis  Anxiety  Depression  Chronic opiate dependence  Cervical degenerative disc disease  History of gastroparesis     Continue to monitor in the hospital for workup and management of the above  Gastroenterology following, appreciate assistance  Continue with a trial of erythromycin 500 mg every 8 hours  Continue with scopolamine patch and scheduled Amitiza  Continue with scheduled Zofran 8 mg p.o. 3 times daily  Restart home Cymbalta, continue to hold trazodone  Continue clear liquid diet, advance as tolerated  Did discuss that she may benefit from transfer to [place] where her motility specialist is located if we cannot get any symptomatic relief  Continue Dilaudid every 6 hours, Norco as needed  Patient has follow-up with GI motility clinic later this month  Trend renal function and electrolytes with a.m. BMP, magnesium   Trend Hgb and WBC with a.m. CBC     Discussed plan with RN,  gastroenterology     VTE Prophylaxis:  Mechanical VTE prophylaxis orders are present.           CODE STATUS:   Code Status (Patient has no pulse and is not breathing): CPR (Attempt to Resuscitate)  Medical Interventions (Patient has pulse or is breathing): Full Support                                    Kimberly Boyce, RN   Registered Nurse  Nursing     Plan of Care     Signed     Date of Service: 06/09/25 0817  Creation Time: 06/09/25 0817     Signed         Goal Outcome Evaluation:  Plan of Care Reviewed With: patient  Progress: improving  Outcome Evaluation: Although pain is still severe and mainly 8/10 on numeric pain scale, able to tolerate snacks and beverages without complaint of nausea or vomiting. Patient has ongoing cervical spine pain and takes norco and tizanidine for this but with being off scheduled narcotics, as [facility] has ordered them PRN, her pain is 5/10 at baseline at home but does not go below 8/10 on her neck/cervical spine. Relayed this to day shift and encouraged patient to revisit pain management orders with provider. Despite pain, patient has maintained pleasant communication and optimistic outlook. Call bell in reach, no further needs voiced, up ad eloy with Mid Line infusing to right arm.                           NO OTHER NOTES AVAILABLE AT THIS TIME.WILL F/U

## 2025-06-09 NOTE — PROGRESS NOTES
[facility]   Hospitalist Progress Note  Date: 2025  Patient Name: Ebony Hamilton  : 1980  MRN: [id number]  Date of admission: 2025      Subjective   Subjective     Chief Complaint: Abdominal pain    Summary: 44 y.o. female with previous history of anxiety, cervical disc disease, opioid dependence, depression, history of atrial fibrillation who presented for abdominal pain nausea and vomiting.  Patient underwent a cholecystectomy in 2025 with subsequent bile leak patient then underwent ERCP with drain placement in April.  After that patient developed appendicitis and had an appendectomy.  Patient presented to the hospital today as she states since last night she developed nausea vomiting and upper epigastric abdominal pain.  Patient has required significant amount of pain medication and antiemetics without any improvement in her symptoms.  Patient had a CT of the abdomen and pelvis which showed new mild infiltrates in the lower lobe of the right lung possibly may represent pneumonia however no acute abdominal process noted.  Patient was noted to have an elevated white count at 18,000.  Patient's lactic acid was elevated at 4.4 has improved to 2.7.  She was admitted for further care, started on IV Reglan, antiemetics, initially started antibiotics for pneumonia.  GI was consulted, performed EGD which was largely negative.  MRCP was obtained which showed some inflammation in the small intestine otherwise normal.  Underwent push enteroscopy on  as the patient continued to have pain, this was also negative.  She was started on scopolamine patch, Amitiza, and home medications were held with some improvement.  Giving trial of erythromycin with improvement.  When she can tolerate diet, can be discharged home and follow-up with Dr. Alvarado with GI motility later this month.    Interval Followup: No acute events overnight.  She was able to keep a sandwich down yesterday, it caused  abdominal pain but she did not vomit.  She does feel like she is slowly improving and would like to hold off on transfer to [place].  Heart rates are better improved.    Objective   Objective     Vitals:   Temp:  [97.9 °F (36.6 °C)-98.2 °F (36.8 °C)] 97.9 °F (36.6 °C)  Heart Rate:  [73-87] 76  Resp:  [18] 18  BP: (106-160)/() 160/100  Physical Exam    Constitutional: Awake and alert, NAD, resting in bed   Respiratory: Clear to auscultation bilaterally, nonlabored respirations    Cardiovascular: RRR, no MRG   Gastrointestinal: Positive bowel sounds, soft, nontender   Neurologic: Oriented x 3, strength symmetric in all extremities, Cranial Nerves grossly intact to confrontation, speech clear    Result Review    I have personally reviewed the results below:  [x]  Laboratory personally reviewed BMP, CBC, magnesium, phosphorus  []  Microbiology  []  Radiology  []  EKG/Telemetry   []  Cardiology/Vascular   []  Pathology  []  Old records  []  Other:  CBC          6/7/2025    07:42 6/8/2025    06:21 6/9/2025    04:09   CBC   WBC 8.15  7.58  8.85    RBC 4.04  4.02  3.94    Hemoglobin 11.9  11.6  11.4    Hematocrit 37.4  37.2  36.4    MCV 92.6  92.5  92.4    MCH 29.5  28.9  28.9    MCHC 31.8  31.2  31.3    RDW 15.2  15.3  15.1    Platelets 390  328  438      CMP          6/7/2025    07:42 6/8/2025    06:21 6/9/2025    04:09   CMP   Glucose 110  92  99    BUN 4.5  5.3  9.0    Creatinine 0.63  0.51  0.61    EGFR 113.7  119.7  114.6    Sodium 141  144  140    Potassium 2.9  3.5  3.8    Chloride 101  109  106    Calcium 9.0  8.5  8.9    BUN/Creatinine Ratio 7.1  10.4  14.8    Anion Gap 10.6  9.7  9.0        Assessment & Plan   Assessment / Plan   Intractable abdominal pain  Intractable nausea and vomiting  Likely gastroparesis flare  Hypokalemia  Lactic acidosis  Questionable right lower lobe pneumonia due to unknown bacterial source  Leukocytosis  Anxiety  Depression  Chronic opiate dependence  Cervical degenerative  disc disease  History of gastroparesis    Continue to monitor in the hospital for workup and management of the above  Gastroenterology following, appreciate assistance  Continue with a trial of erythromycin 500 mg every 8 hours  Continue with scopolamine patch and scheduled Amitiza  Continue with scheduled Zofran 8 mg p.o. 3 times daily  Continue home Cymbalta, continue to hold trazodone  Advance to GI bland diet  Continue NS at 100 cc/h but plan to DC this afternoon  Will discontinue IV Dilaudid and continue home [place]  Patient has follow-up with GI motility clinic later this month  Trend renal function and electrolytes with a.m. BMP, magnesium   Trend Hgb and WBC with a.m. CBC     Discussed plan with RN    VTE Prophylaxis:  Mechanical VTE prophylaxis orders are present.        CODE STATUS:   Code Status (Patient has no pulse and is not breathing): CPR (Attempt to Resuscitate)  Medical Interventions (Patient has pulse or is breathing): Full Support

## 2025-06-10 ENCOUNTER — READMISSION MANAGEMENT (OUTPATIENT)
Dept: CALL CENTER | Facility: HOSPITAL | Age: 45
End: 2025-06-10
Payer: COMMERCIAL

## 2025-06-10 VITALS
HEIGHT: 62 IN | BODY MASS INDEX: 33.63 KG/M2 | HEART RATE: 95 BPM | OXYGEN SATURATION: 99 % | WEIGHT: 182.76 LBS | TEMPERATURE: 97.9 F | DIASTOLIC BLOOD PRESSURE: 80 MMHG | RESPIRATION RATE: 16 BRPM | SYSTOLIC BLOOD PRESSURE: 134 MMHG

## 2025-06-10 LAB
ANION GAP SERPL CALCULATED.3IONS-SCNC: 9 MMOL/L (ref 5–15)
BASOPHILS # BLD AUTO: 0.04 10*3/MM3 (ref 0–0.2)
BASOPHILS NFR BLD AUTO: 0.5 % (ref 0–1.5)
BUN SERPL-MCNC: 10.3 MG/DL (ref 6–20)
BUN/CREAT SERPL: 17.2 (ref 7–25)
CALCIUM SPEC-SCNC: 8.7 MG/DL (ref 8.6–10.5)
CHLORIDE SERPL-SCNC: 106 MMOL/L (ref 98–107)
CO2 SERPL-SCNC: 26 MMOL/L (ref 22–29)
CREAT SERPL-MCNC: 0.6 MG/DL (ref 0.57–1)
DEPRECATED RDW RBC AUTO: 50.4 FL (ref 37–54)
EGFRCR SERPLBLD CKD-EPI 2021: 115.1 ML/MIN/1.73
EOSINOPHIL # BLD AUTO: 0.13 10*3/MM3 (ref 0–0.4)
EOSINOPHIL NFR BLD AUTO: 1.5 % (ref 0.3–6.2)
ERYTHROCYTE [DISTWIDTH] IN BLOOD BY AUTOMATED COUNT: 14.8 % (ref 12.3–15.4)
GLUCOSE SERPL-MCNC: 109 MG/DL (ref 65–99)
HCT VFR BLD AUTO: 35.5 % (ref 34–46.6)
HGB BLD-MCNC: 11.1 G/DL (ref 12–15.9)
IMM GRANULOCYTES # BLD AUTO: 0.06 10*3/MM3 (ref 0–0.05)
IMM GRANULOCYTES NFR BLD AUTO: 0.7 % (ref 0–0.5)
LYMPHOCYTES # BLD AUTO: 1.9 10*3/MM3 (ref 0.7–3.1)
LYMPHOCYTES NFR BLD AUTO: 22.6 % (ref 19.6–45.3)
MAGNESIUM SERPL-MCNC: 1.9 MG/DL (ref 1.6–2.6)
MCH RBC QN AUTO: 28.8 PG (ref 26.6–33)
MCHC RBC AUTO-ENTMCNC: 31.3 G/DL (ref 31.5–35.7)
MCV RBC AUTO: 92.2 FL (ref 79–97)
MONOCYTES # BLD AUTO: 0.39 10*3/MM3 (ref 0.1–0.9)
MONOCYTES NFR BLD AUTO: 4.6 % (ref 5–12)
NEUTROPHILS NFR BLD AUTO: 5.9 10*3/MM3 (ref 1.7–7)
NEUTROPHILS NFR BLD AUTO: 70.1 % (ref 42.7–76)
NRBC BLD AUTO-RTO: 0 /100 WBC (ref 0–0.2)
PHOSPHATE SERPL-MCNC: 3.4 MG/DL (ref 2.5–4.5)
PLATELET # BLD AUTO: 402 10*3/MM3 (ref 140–450)
PMV BLD AUTO: 9.5 FL (ref 6–12)
POTASSIUM SERPL-SCNC: 3.7 MMOL/L (ref 3.5–5.2)
RBC # BLD AUTO: 3.85 10*6/MM3 (ref 3.77–5.28)
SODIUM SERPL-SCNC: 141 MMOL/L (ref 136–145)
WBC NRBC COR # BLD AUTO: 8.42 10*3/MM3 (ref 3.4–10.8)

## 2025-06-10 PROCEDURE — 99239 HOSP IP/OBS DSCHRG MGMT >30: CPT | Performed by: INTERNAL MEDICINE

## 2025-06-10 PROCEDURE — 25010000002 ERYTHROMYCIN LACTOBIONATE PER 500 MG: Performed by: INTERNAL MEDICINE

## 2025-06-10 PROCEDURE — 63710000001 ONDANSETRON ODT 4 MG TABLET DISPERSIBLE: Performed by: INTERNAL MEDICINE

## 2025-06-10 PROCEDURE — 85025 COMPLETE CBC W/AUTO DIFF WBC: CPT | Performed by: INTERNAL MEDICINE

## 2025-06-10 PROCEDURE — 84100 ASSAY OF PHOSPHORUS: CPT | Performed by: INTERNAL MEDICINE

## 2025-06-10 PROCEDURE — 80048 BASIC METABOLIC PNL TOTAL CA: CPT | Performed by: INTERNAL MEDICINE

## 2025-06-10 PROCEDURE — 83735 ASSAY OF MAGNESIUM: CPT | Performed by: INTERNAL MEDICINE

## 2025-06-10 PROCEDURE — 25010000002 KETOROLAC TROMETHAMINE PER 15 MG: Performed by: INTERNAL MEDICINE

## 2025-06-10 RX ORDER — SCOPOLAMINE 1 MG/3D
1 PATCH, EXTENDED RELEASE TRANSDERMAL
Qty: 5 PATCH | Refills: 0 | Status: SHIPPED | OUTPATIENT
Start: 2025-06-11

## 2025-06-10 RX ORDER — LUBIPROSTONE 8 UG/1
8 CAPSULE ORAL 2 TIMES DAILY
Qty: 30 CAPSULE | Refills: 0 | Status: SHIPPED | OUTPATIENT
Start: 2025-06-10 | End: 2025-06-13 | Stop reason: SDUPTHER

## 2025-06-10 RX ORDER — METOPROLOL SUCCINATE 50 MG/1
50 TABLET, EXTENDED RELEASE ORAL DAILY
Qty: 30 TABLET | Refills: 0 | Status: SHIPPED | OUTPATIENT
Start: 2025-06-10 | End: 2025-07-10

## 2025-06-10 RX ORDER — IBUPROFEN 800 MG/1
800 TABLET, FILM COATED ORAL EVERY 8 HOURS PRN
Start: 2025-06-10

## 2025-06-10 RX ORDER — ERYTHROMYCIN 500 MG/1
500 TABLET, DELAYED RELEASE ORAL
Qty: 12 TABLET | Refills: 0 | Status: SHIPPED | OUTPATIENT
Start: 2025-06-10 | End: 2025-06-14

## 2025-06-10 RX ORDER — ONDANSETRON 4 MG/1
4 TABLET, FILM COATED ORAL EVERY 8 HOURS PRN
Qty: 15 TABLET | Refills: 0 | Status: SHIPPED | OUTPATIENT
Start: 2025-06-10

## 2025-06-10 RX ORDER — PROCHLORPERAZINE MALEATE 10 MG
10 TABLET ORAL EVERY 6 HOURS PRN
Qty: 15 TABLET | Refills: 0 | Status: SHIPPED | OUTPATIENT
Start: 2025-06-10

## 2025-06-10 RX ADMIN — Medication 10 ML: at 09:17

## 2025-06-10 RX ADMIN — HYDROCODONE BITARTRATE AND ACETAMINOPHEN 1 TABLET: 10; 325 TABLET ORAL at 00:59

## 2025-06-10 RX ADMIN — HYDROCODONE BITARTRATE AND ACETAMINOPHEN 1 TABLET: 10; 325 TABLET ORAL at 10:25

## 2025-06-10 RX ADMIN — PANTOPRAZOLE SODIUM 40 MG: 40 TABLET, DELAYED RELEASE ORAL at 06:39

## 2025-06-10 RX ADMIN — DULOXETINE 60 MG: 30 CAPSULE, DELAYED RELEASE ORAL at 06:06

## 2025-06-10 RX ADMIN — ERYTHROMYCIN LACTOBIONATE 500 MG: 500 INJECTION, POWDER, LYOPHILIZED, FOR SOLUTION INTRAVENOUS at 11:31

## 2025-06-10 RX ADMIN — ERYTHROMYCIN LACTOBIONATE 500 MG: 500 INJECTION, POWDER, LYOPHILIZED, FOR SOLUTION INTRAVENOUS at 03:02

## 2025-06-10 RX ADMIN — HYDROCODONE BITARTRATE AND ACETAMINOPHEN 1 TABLET: 10; 325 TABLET ORAL at 06:06

## 2025-06-10 RX ADMIN — KETOROLAC TROMETHAMINE 15 MG: 30 INJECTION, SOLUTION INTRAMUSCULAR; INTRAVENOUS at 11:31

## 2025-06-10 RX ADMIN — ONDANSETRON 8 MG: 4 TABLET, ORALLY DISINTEGRATING ORAL at 09:16

## 2025-06-10 RX ADMIN — LUBIPROSTONE 8 MCG: 8 CAPSULE, GELATIN COATED ORAL at 09:17

## 2025-06-10 RX ADMIN — METOPROLOL SUCCINATE 50 MG: 50 TABLET, EXTENDED RELEASE ORAL at 09:17

## 2025-06-10 RX ADMIN — KETOROLAC TROMETHAMINE 15 MG: 30 INJECTION, SOLUTION INTRAMUSCULAR; INTRAVENOUS at 05:12

## 2025-06-10 NOTE — PLAN OF CARE
Goal Outcome Evaluation:           Progress: improving  Outcome Evaluation: patient alert and oriented x4, up ad eloy, rested up in bed throughout the day with intermittent complaints of abdominal pain and discomfort. patient able to eat meals and no complaints of nausea this shift. IV antibiotics given per midline catheter. seen by hospitalist. meds to beds order entered and delivered. medicated per MAR. patient will be leaving by friend/family to come pick her up. Follow up appointments made and discharge instructions reviewed. midline catheter removed. no concerns at this time.

## 2025-06-10 NOTE — OUTREACH NOTE
Prep Survey      Flowsheet Row Responses   [facility] patient discharged from? [facility]   Is LACE score < 7 ? No   Eligibility [facility]   Date of Admission 06/02/25   Date of Discharge 06/10/25   Discharge Disposition Home or Self Care   Discharge diagnosis abdominal pain   Does the patient have one of the following disease processes/diagnoses(primary or secondary)? Other   Does the patient have Home health ordered? No   Is there a DME ordered? No   Prep survey completed? Yes            Joya ABARCA - Registered Nurse

## 2025-06-10 NOTE — PAYOR COMM NOTE
"Ebony Hamilton (44 y.o. Female)       Date of Birth   1980    Social Security Number       Address   [address] [place] [address]    Home Phone   [phone]    MRN   [id number]       [employer]   None    Marital Status                               Admission Date   6/2/2025    Admission Type   Emergency    Admitting Provider   Darion Zamora MD    Attending Provider   Adrien Pappas MD    Department, Room/Bed   [address], 4002/1       Discharge Date       Discharge Disposition       Discharge Destination                                 Attending Provider: Adrien Pappas MD    Allergies: Escitalopram, Sulfa Antibiotics, Reglan [Metoclopramide], Baclofen, Ciprofloxacin, Codeine, Gold-containing Drug Products, Latex, Lovenox [Enoxaparin Sodium], Nickel, Tegaderm Ag Mesh [Silver]    Isolation: None   Infection: None   Code Status: CPR    Ht: 157.5 cm (62\")   Wt: 82.9 kg (182 lb 12.2 oz)    Admission Cmt: None   Principal Problem: Abdominal pain [R10.9]                   Active Insurance as of 6/2/2025       Primary Coverage       Payor Plan Insurance Group Employer/Plan Group    [employer]HE[employer] BLUE CROSS AN[employer]M BLUE CROSS BLUE [employer]IELD PPO [id number]       Payor Plan Address Payor Plan Phone Number Payor Plan Fax Number Effective Dates    [address] [phone]  3/1/2025 - None Entered    [place] [address]         Subscriber Name Subscriber Birth Date Member ID       King Askew 1980 [id number]                     Emergency Contacts        (Rel.) Home Phone Work Phone Mobile Phone    KING ASKEW (Spouse) [phone] -- [phone]        update    [facility] ,[place]   [phone]  F [phone]          Darion Zamora MD   Physician  Hospitalist     Progress Notes     Signed     Date of Service: 06/09/25 1321  Creation Time: 06/09/25 1321     Signed       Expand All Collapse All     [facility] "   Hospitalist Progress Note  Date: 2025  Patient Name: Ebony Hamilton  : 1980  MRN: [id number]  Date of admission: 2025        Subjective[]Expand by Default  Subjective      Chief Complaint: Abdominal pain     Summary: 44 y.o. female with previous history of anxiety, cervical disc disease, opioid dependence, depression, history of atrial fibrillation who presented for abdominal pain nausea and vomiting.  Patient underwent a cholecystectomy in 2025 with subsequent bile leak patient then underwent ERCP with drain placement in April.  After that patient developed appendicitis and had an appendectomy.  Patient presented to the hospital today as she states since last night she developed nausea vomiting and upper epigastric abdominal pain.  Patient has required significant amount of pain medication and antiemetics without any improvement in her symptoms.  Patient had a CT of the abdomen and pelvis which showed new mild infiltrates in the lower lobe of the right lung possibly may represent pneumonia however no acute abdominal process noted.  Patient was noted to have an elevated white count at 18,000.  Patient's lactic acid was elevated at 4.4 has improved to 2.7.  She was admitted for further care, started on IV Reglan, antiemetics, initially started antibiotics for pneumonia.  GI was consulted, performed EGD which was largely negative.  MRCP was obtained which showed some inflammation in the small intestine otherwise normal.  Underwent push enteroscopy on  as the patient continued to have pain, this was also negative.  She was started on scopolamine patch, Amitiza, and home medications were held with some improvement.  Giving trial of erythromycin with improvement.  When she can tolerate diet, can be discharged home and follow-up with Dr. Alvarado with GI motility later this month.     Interval Followup: No acute events overnight.  She was able to keep a sandwich down yesterday, it caused  abdominal pain but she did not vomit.  She does feel like she is slowly improving and would like to hold off on transfer to [place].  Heart rates are better improved.     Objective  Objective      Vitals:   Temp:  [97.9 °F (36.6 °C)-98.2 °F (36.8 °C)] 97.9 °F (36.6 °C)  Heart Rate:  [73-87] 76  Resp:  [18] 18  BP: (106-160)/() 160/100  Physical Exam                         Constitutional: Awake and alert, NAD, resting in bed              Respiratory: Clear to auscultation bilaterally, nonlabored respirations               Cardiovascular: RRR, no MRG              Gastrointestinal: Positive bowel sounds, soft, nontender              Neurologic: Oriented x 3, strength symmetric in all extremities, Cranial Nerves grossly intact to confrontation, speech clear     Result Review  I have personally reviewed the results below:  [x]  Laboratory personally reviewed BMP, CBC, magnesium, phosphorus  []  Microbiology  []  Radiology  []  EKG/Telemetry   []  Cardiology/Vascular   []  Pathology  []  Old records  []  Other:  CBC Results   CBC            6/7/2025    07:42 6/8/2025    06:21 6/9/2025    04:09   CBC   WBC 8.15  7.58  8.85    RBC 4.04  4.02  3.94    Hemoglobin 11.9  11.6  11.4    Hematocrit 37.4  37.2  36.4    MCV 92.6  92.5  92.4    MCH 29.5  28.9  28.9    MCHC 31.8  31.2  31.3    RDW 15.2  15.3  15.1    Platelets 390  328  438          CMP Results:   CMP            6/7/2025    07:42 6/8/2025    06:21 6/9/2025    04:09   CMP   Glucose 110  92  99    BUN 4.5  5.3  9.0    Creatinine 0.63  0.51  0.61    EGFR 113.7  119.7  114.6    Sodium 141  144  140    Potassium 2.9  3.5  3.8    Chloride 101  109  106    Calcium 9.0  8.5  8.9    BUN/Creatinine Ratio 7.1  10.4  14.8    Anion Gap 10.6  9.7  9.0             Assessment & Plan  Assessment / Plan   Intractable abdominal pain  Intractable nausea and vomiting  Likely gastroparesis flare  Hypokalemia  Lactic acidosis  Questionable right lower lobe pneumonia due to unknown  bacterial source  Leukocytosis  Anxiety  Depression  Chronic opiate dependence  Cervical degenerative disc disease  History of gastroparesis     Continue to monitor in the hospital for workup and management of the above  Gastroenterology following, appreciate assistance  Continue with a trial of erythromycin 500 mg every 8 hours  Continue with scopolamine patch and scheduled Amitiza  Continue with scheduled Zofran 8 mg p.o. 3 times daily  Continue home Cymbalta, continue to hold trazodone  Advance to GI bland diet  Continue NS at 100 cc/h but plan to DC this afternoon  Will discontinue IV Dilaudid and continue home [place]  Patient has follow-up with GI motility clinic later this month  Trend renal function and electrolytes with a.m. BMP, magnesium   Trend Hgb and WBC with a.m. CBC     Discussed plan with RN     VTE Prophylaxis:  Mechanical VTE prophylaxis orders are present.           CODE STATUS:   Code Status (Patient has no pulse and is not breathing): CPR (Attempt to Resuscitate)  Medical Interventions (Patient has pulse or is breathing): Full Support                                 Zakia Long, RN   Registered Nurse  Nursing     Plan of Care     Signed     Date of Service: 06/09/25 1608  Creation Time: 06/09/25 1608     Signed         Goal Outcome Evaluation:  Plan of Care Reviewed With: patient  Progress: improving  Outcome Evaluation: Alert and oriented x4. Complaints of pain, medicated per mar. Tolerating food okay, however wants to remain on GI diet at this time. Up ad eloy in room. Continuous fluids discontinued per order.                        Kimberly Boyce, RN  Taking 25 % meals  Registered Nurse  Nursing     Plan of Care     Signed     Date of Service: 06/10/25 0441  Creation Time: 06/10/25 0441     Signed         Goal Outcome Evaluation:  Plan of Care Reviewed With: patient, spouse  Progress: improving  Outcome Evaluation: Patient has no complaints of nausea, this shift, but still complaints of  pain. Maintaine 8/10 pain, requested notify provider and request dilaudid restart. Hospitalist notified, one time dose ordered and administered. Otherwise, patient has no further complaints. Midline covered and patient showered. Reassessed midline, within normal limits on assessment. Pulled labs this morning and pending results. Patient sitting up in bed, eyes closed, no needs voiced, call bell in reach, iv antibiotic infusing.                        notes pending for 6/10   Zofran 8 mg po 3x daily  Protonix 40 mg po 2 x daily  Hydrocodone 10/325 po q 4hrs prn [taken 3 today)  Toradol 15 mg iv q 6 prn x1 today

## 2025-06-10 NOTE — PLAN OF CARE
Goal Outcome Evaluation:  Plan of Care Reviewed With: patient, spouse        Progress: improving  Outcome Evaluation: Patient has no complaints of nausea, this shift, but still complaints of pain. Maintaine 8/10 pain, requested notify provider and request dilaudid restart. Hospitalist notified, one time dose ordered and administered. Otherwise, patient has no further complaints. Midline covered and patient showered. Reassessed midline, within normal limits on assessment. Pulled labs this morning and pending results. Patient sitting up in bed, eyes closed, no needs voiced, call bell in reach, iv antibiotic infusing.

## 2025-06-10 NOTE — DISCHARGE SUMMARY
[facility]         HOSPITALIST  DISCHARGE SUMMARY    Patient Name: Ebony Hamilton  : 1980  MRN: [id number]    Date of Admission: 2025  Date of Discharge:  06/10/25  Primary Care Physician: Karen Stover APRN    Consultants:  -Gastroenterology: Dr. Ventura Thompson, Dr. Romina Thomas    Hospital Problems:  Intractable abdominal pain  Intractable nausea and vomiting  Likely gastroparesis flare  Hypokalemia  Lactic acidosis  Questionable right lower lobe pneumonia due to unknown bacterial source  Leukocytosis  Anxiety  Depression  Chronic opiate dependence  Cervical degenerative disc disease  History of gastroparesis    Hospital Course     Hospital Course:  Ebony Hamilton is a 44 y.o. female with previous history of anxiety, cervical disc disease, opioid dependence, depression, history of atrial fibrillation who presented for abdominal pain nausea and vomiting.  Patient underwent a cholecystectomy in 2025 with subsequent bile leak patient then underwent ERCP with drain placement in April.  After that patient developed appendicitis and had an appendectomy.  Patient presented to the hospital today as she states since last night she developed nausea vomiting and upper epigastric abdominal pain.  Patient has required significant amount of pain medication and antiemetics without any improvement in her symptoms.  Patient had a CT of the abdomen and pelvis which showed new mild infiltrates in the lower lobe of the right lung possibly may represent pneumonia however no acute abdominal process noted.  Patient was noted to have an elevated white count at 18,000.  Patient's lactic acid was elevated at 4.4 has improved to 2.7.  She was admitted for further care, started on IV Reglan, antiemetics, initially started antibiotics for pneumonia.  GI was consulted, performed EGD which was largely negative.  MRCP was obtained which showed some inflammation in the small intestine  otherwise normal.  Underwent push enteroscopy on 6/6 as the patient continued to have pain, this was also negative.  She was started on scopolamine patch, Amitiza, and home medications were held with some improvement.  Patient started on erythromycin and will continue this at discharge.  Patient able to tolerate some p.o. intake.  Patient already has established appointment with GI motility clinic at [facility] later this month and will maintain this appointment.  On day of discharge patient hemodynamically stable no additional inpatient evaluation or history at this time.    DISCHARGE Follow Up Recommendations for labs and diagnostics:   - Follow-up with PCP in 3 to 5 days  - Follow-up with [facility] at previously scheduled appointment    Day of Discharge     Vital Signs:  Temp:  [97.4 °F (36.3 °C)-98.1 °F (36.7 °C)] 97.9 °F (36.6 °C)  Heart Rate:  [68-95] 95  Resp:  [16-18] 16  BP: (109-160)/() 134/80  Physical Exam:   Gen: Conversant, Pleasant, sitting up in bed  Resp: Equal chest rise bilaterally, good aeration  Card: RRR, No m/r/g  Abd: Soft, Nontender, Nondistended, + bowel sounds    Discharge Details        Discharge Medications        New Medications        Instructions Start Date   erythromycin 500 MG EC tablet  Commonly known as: KAVEH-TAB   500 mg, Oral, 3 Times Daily Before Meals      lubiprostone 8 MCG capsule  Commonly known as: AMITIZA   8 mcg, Oral, 2 Times Daily      metoprolol succinate XL 50 MG 24 hr tablet  Commonly known as: TOPROL-XL   50 mg, Oral, Daily      Scopolamine 1 MG/3DAYS patch   1 patch, Transdermal, Every 72 Hours   Start Date: June 11, 2025            Changes to Medications        Instructions Start Date   DULoxetine 60 MG capsule  Commonly known as: CYMBALTA  What changed: See the new instructions.   TAKE ONE CAPSULE EVERY MORNING along with 30 MG tablet      DULoxetine 30 MG capsule  Commonly known as: CYMBALTA  What changed: See the new instructions.   Take 1 capsule by mouth  Every Night. Take with 60mg for total dose of 90mg      ibuprofen 800 MG tablet  Commonly known as: ADVIL,MOTRIN  What changed:   when to take this  reasons to take this   800 mg, Oral, Every 8 Hours PRN      traZODone 50 MG tablet  Commonly known as: DESYREL  What changed:   how much to take  how to take this  when to take this  additional instructions   Take 0.5 to 2 tab PO QHS PRN sleep             Continue These Medications        Instructions Start Date   albuterol sulfate  (90 Base) MCG/ACT inhaler  Commonly known as: PROVENTIL HFA;VENTOLIN HFA;PROAIR HFA   2 puffs, Inhalation, Every 6 Hours PRN      CALCIUM PO   1 tablet, Daily      clonazePAM 0.5 MG tablet  Commonly known as: KlonoPIN   0.5 mg, Oral, 2 Times Daily PRN      diphenhydrAMINE-zinc acetate 2-0.1 % cream   1 Application, Topical, 3 Times Daily PRN      HYDROcodone-acetaminophen  MG per tablet  Commonly known as: NORCO   1.5 tablets, Oral, Every 4 Hours PRN      montelukast 10 MG tablet  Commonly known as: Singulair   10 mg, Oral, Nightly      Narcan 4 MG/0.1ML nasal spray  Generic drug: naloxone   Call 911. Don't prime. [place] in 1 nostril for overdose. Repeat in 2-3 minutes in other nostril if no or minimal breathing/responsiveness.      ondansetron 4 MG tablet  Commonly known as: ZOFRAN   4 mg, Oral, Every 8 Hours PRN      pantoprazole 40 MG EC tablet  Commonly known as: PROTONIX   40 mg, Oral, Daily      polyethylene glycol 17 g packet  Commonly known as: MIRALAX   Mix 17gm (contents of 1 packet) in eight ounces of water or other beverage to drink once daily      prochlorperazine 10 MG tablet  Commonly known as: COMPAZINE   10 mg, Oral, Every 6 Hours PRN      sennosides-docusate 8.6-50 MG per tablet  Commonly known as: PERICOLACE   1 tablet, Oral, Daily      simethicone 80 MG chewable tablet  Commonly known as: MYLICON   120 mg, Oral, 4 Times Daily Before Meals & Nightly      tiZANidine 4 MG tablet  Commonly known as: ZANAFLEX    TAKE 1-2 TABLETS EVERY 6 HOURS AS NEEDED FOR MUSCLE SPASMS      vitamin C 500 MG tablet  Commonly known as: ASCORBIC ACID   500 mg, Daily               Allergies   Allergen Reactions    Escitalopram Nausea Only and Rash     Nausea, sweating, rash     Sulfa Antibiotics Anaphylaxis    Reglan [Metoclopramide] Dystonia    Baclofen GI Intolerance, Hives and Nausea And Vomiting    Ciprofloxacin Hallucinations    Codeine Hives    Gold-Containing Drug Products Rash    Latex Rash    Lovenox [Enoxaparin Sodium] Rash    Nickel Hives    Tegaderm Ag Mesh [Silver] Hives       Discharge Disposition:  Home or Self Care    Diet:  Hospital:  Diet Order   Procedures    Diet: Gastrointestinal; Low Irritant; Fluid Consistency: Thin (IDDSI 0)       Discharge Activity:   Activity Instructions       Activity as Tolerated              CODE STATUS:  Code Status and Medical Interventions: CPR (Attempt to Resuscitate); Full Support   Ordered at: 06/02/25 0811     Code Status (Patient has no pulse and is not breathing):    CPR (Attempt to Resuscitate)     Medical Interventions (Patient has pulse or is breathing):    Full Support       Future Appointments   Date Time Provider Department Center   6/13/2025  1:00 PM Ethel Mar APRN [facility] [place] [facility]       Additional Instructions for the Follow-ups that You Need to Schedule       Discharge Follow-up with PCP   As directed       Currently Documented PCP:    Karen Stover APRN    PCP Phone Number:    [phone]     Follow Up Details: Follow-up in 3-5 days                Pertinent  and/or Most Recent Results     PROCEDURES:   -EGD (06/03/2025)  -Small bowel enteroscopy (06/06/2025)    RADIOLOGY:  CT Angiogram Chest Pulmonary Embolism [[id number]] Keo as Reviewed   Order Status: Completed Collected: 06/07/25 1538    Updated: 06/07/25 1545   Narrative:     CT ANGIOGRAM CHEST PULMONARY EMBOLISM    Date of Exam: 6/7/2025 3:22 PM EDT    Indication: sinus tach, soa.    Comparison: Chest  radiograph 4/25/2025, CT chest 3/30/2025    Technique: Axial CT images were obtained of the chest after the uneventful intravenous administration of iodinated contrast utilizing pulmonary embolism protocol.  Reconstructed coronal and sagittal images were also obtained. In addition, a 3-D volume  rendered image was created for interpretation.  Automated exposure control and iterative construction methods were used.      Findings:  Motion-degraded exam. Thyroid unremarkable. Normal caliber thoracic aorta. Normal caliber main pulmonary artery. Limited assessment for pulmonary embolism due to suboptimal bolus timing. No evidence of large central or lobar filling defect to suggest  embolism. More peripheral branches are limited. Heart size within normal limits. No pericardial effusion. Esophagus unremarkable. No appreciable coronary atherosclerotic disease within limitations of motion. No suspicious adenopathy.    Trachea patent. Linear bandlike areas of atelectasis and/or scar. No infectious consolidation, edema, effusion or pneumothorax. No suspicious pulmonary nodule. Questionable fat stranding adjacent to the pancreas. Benign central pneumobilia.  Cholecystectomy. No acute chest wall findings. Cervical fusion hardware. T5 bone island. No aggressive bone lesion or displaced fracture.   Impression:     Impression:  1. Limited exam due to motion and suboptimal contrast bolus timing. No evidence of large central or lobar embolus. More peripheral branches are limited in assessment.  2. No infectious appearing airspace process. Linear bandlike areas of atelectasis/scar.  3. Questionable fat stranding adjacent to the pancreas. Correlate with lipase levels.        Electronically Signed: Chace Shepard MD   6/7/2025 3:43 PM EDT   Workstation ID: [id number]    CT Angiogram Abdomen Pelvis [[id number]] Keo as Reviewed   Order Status: Completed Collected: 06/05/25 1221    Updated: 06/05/25 1228   Narrative:     CT ANGIOGRAM  ABDOMEN PELVIS    Date of Exam: 6/5/2025 11:19 AM EDT    Indication: intractable abd pain.    Comparison: 6/2/2025, MRI abdomen 6/3/2025    Technique: CTA of the abdomen and pelvis was performed after the uneventful intravenous administration of iodinated contrast. Reconstructed coronal and sagittal images were also obtained. In addition, a 3-D volume rendered image was created for  interpretation. Automated exposure control and iterative reconstruction methods were used.      Findings:  AORTA:  Normal in caliber throughout. No dissection or penetrating ulcer identified.  No significant atherosclerotic disease.  MESENTERIC/RENAL VESSELS:  Normal in caliber. No dissection or significant stenosis identified.  PELVIC VESSELS: Normal in caliber. No dissection or significant stenosis identified.  IVC:  Normal caliber.      LOWER THORAX: Resolving right lower lung airspace disease.    LIVER: Unremarkable parenchyma without focal lesion.  BILIARY/GALLBLADDER: Cholecystectomy  SPLEEN:  Unremarkable  PANCREAS:  Unremarkable  ADRENAL:  Unremarkable  KIDNEYS:  Unremarkable parenchyma with no solid mass identified. No obstruction.  No calculus identified.  GASTROINTESTINAL/MESENTERY:  No evidence of obstruction nor mural inflammation. There are air-fluid levels scattered throughout the colon which can be seen in diarrheal state. Correlate with symptoms and history.    RETROPERITONEUM/LYMPH NODES:  Unremarkable    REPRODUCTIVE: There is similar 2.9 cm dominant left ovarian follicle. The uterus is surgically absent. There is small volume free fluid in the pelvis, a frequent normal finding in females.  BLADDER:  Unremarkable    OSSEUS STRUCTURES:  Typical for age with no acute process identified.   Impression:     Impression:  1.Unremarkable appearance of the abdominal arterial vasculature.  2.Resolving right lower lung airspace disease.  3.Colonic gas fluid level which can be seen in diarrheal state. No mural inflammatory changes  are identified.        Electronically Signed: Ankit Barker MD   6/5/2025 12:25 PM EDT   Workstation ID: [id number]    MRI abdomen wo contrast mrcp [[id number]] Keo as Reviewed   Order Status: Completed Collected: 06/03/25 0800    Updated: 06/03/25 0814   Narrative:     MRI ABDOMEN WO CONTRAST MRCP    Date of Exam: 6/3/2025 6:45 AM EDT    Indication: Abdominal pain, hx of bile duct leak.     Comparison: CT abdomen and pelvis 6/2/2025.    Technique:  Routine multiplanar/multisequence images of the abdomen were obtained with MRCP sequences without contrast administration.      Findings:      The study is mildly motion degraded. The patient was premedicated prior to the examination.    The gallbladder is surgically absent. The common bile duct caliber is within normal limits, 5 to 6 mm. No intrahepatic biliary ductal dilation is identified. No choledocholithiasis is seen. Pancreatic duct caliber is normal, 2 to 3 mm, without evidence  of pancreatic divisum.    No significant fluid is seen within the right upper quadrant to suggest residual bile leak.    A cyst in the left hepatic lobe measures 14 mm. Other tiny round T2 hyperintense foci are scattered within the liver parenchyma consistent with tiny cysts .    The liver size is normal. The liver demonstrates signal drop on opposed phase imaging suggesting steatosis. No morphologic evidence of cirrhosis.    Spleen size is normal. Pancreas, adrenals, kidneys have an unremarkable noncontrast appearance. No adenopathy. Normal caliber of the abdominal aorta. No acute basilar airspace disease. No suspicious osseous abnormalities are identified.    The stomach and the imaged portion of the colon appear unremarkable. The third and fourth duodenal segments appear generally thickened and may be inflamed.       Impression:     Impression:    1. Cholecystectomy. No abnormal biliary dilation or choledocholithiasis.  2. No convincing MR evidence of bile leak.  3. The third and fourth  duodenal segments appear generally thickened and may be inflamed.  4. Hepatic steatosis. Hepatic cysts.        Electronically Signed: Margot Mcmullen MD   6/3/2025 8:12 AM EDT   Workstation ID: [id number]    CT Abdomen Pelvis With Contrast [[id number]] Keo as Reviewed   Order Status: Completed Collected: 06/02/25 0421    Updated: 06/02/25 0436   Narrative:     CT ABDOMEN PELVIS W CONTRAST-     Date of exam: 6/2/2025 4:10 AM.     Comparisons: 3/30/2025; 3/24/2025; 3/9/2025; 2/24/2025; 6/13/2024.     INDICATIONS:  44-year-old female w/ h/o upper abdominal pain & nausea for 1 day; +  leukocytosis.     TECHNIQUE:  Axial CT images were obtained of the abdomen and pelvis following the  uneventful intravenous administration of 80 mL (or less) of Isovue-370  contrast agent. Reconstructed 2D (two-dimensional) coronal and sagittal  images were also obtained. Automated exposure control and iterative  construction methods were used. Additionally, the radiation dose  reduction device was turned on for each scan per the ALARA (As Low as  Reasonably Achievable) protocol. No oral contrast agent was administered  for the study. Please see the electronic medical record, EMR (i.e.,  Epic), for the documented dose of intravenous contrast agent as well as  the radiation dose. Despite best efforts, poor image quality limits  interpretation of the study. For instance, motion artifact obscures  detail. Repeat (delayed) CT images were also obtained.     FINDINGS:  New mild groundglass opacities involve the anterior aspect of the lower  lobe of the right lung, such as seen on image 5 of series 5 and adjacent  images. These findings are nonspecific. They may be a manifestation of  an infectious/inflammatory pneumonitis/pneumonia. The patient has  undergone cholecystectomy, appendectomy, and hysterectomy. The  previously seen common bile duct stent has been removed (since  3/30/2025). There is dilatation of the extrahepatic biliary tree  without  definite CT evidence of choledocholithiasis. There is mild pneumobilia,  seen previously. No acute pancreatitis. Probably no significant  dilatation of the main pancreatic duct. Please correlate with pertinent  lab values. There are colonic diverticula without acute diverticulitis.  There are suspected small renal cysts. There is a new 3 cm left adnexal  cyst. It probably represents a normal functional ovarian cyst. The right  ovary is not clearly visualized. It may be surgically absent. No other  acute findings are seen. The other incidental/chronic findings are as  described in prior exam reports.         Impression:     New mild infiltrates are seen in the lower lobe of the right lung and  may represent pneumonia. There has been interval removal of the common  bile duct stent with a small amount of pneumobilia. Motion artifact  obscures detail. No other definite acute findings are appreciated.  Please see above comments for further detail.        Portions of this note were completed with a voice recognition program.     6/2/2025 4:34 AM by Romario Romero MD on Workstation: [employer]       LAB RESULTS:      Lab 06/10/25  0320 06/09/25  0409 06/08/25  0621 06/07/25  0742 06/06/25  0524 06/04/25  0500 06/03/25  1718   WBC 8.42 8.85 7.58 8.15 7.59   < >  --    HEMOGLOBIN 11.1* 11.4* 11.6* 11.9* 11.2*   < >  --    HEMATOCRIT 35.5 36.4 37.2 37.4 35.4   < >  --    PLATELETS 402 438 328 390 394   < >  --    NEUTROS ABS 5.90 5.90 4.63 5.21 4.90   < >  --    IMMATURE GRANS (ABS) 0.06* 0.07* 0.07* 0.06* 0.04   < >  --    LYMPHS ABS 1.90 2.07 2.22 2.14 1.95   < >  --    MONOS ABS 0.39 0.53 0.43 0.52 0.53   < >  --    EOS ABS 0.13 0.22 0.18 0.16 0.13   < >  --    MCV 92.2 92.4 92.5 92.6 92.4   < >  --    LACTATE  --   --   --   --   --   --  0.6    < > = values in this interval not displayed.         Lab 06/10/25  0320 06/09/25  0409 06/08/25  0621 06/07/25  0742 06/06/25  0453   SODIUM 141 140 144 141 142   POTASSIUM  3.7 3.8 3.5 2.9* 3.6   CHLORIDE 106 106 109* 101 107   CO2 26.0 25.0 25.3 29.4* 25.9   ANION GAP 9.0 9.0 9.7 10.6 9.1   BUN 10.3 9.0 5.3* 4.5* 2.5*   CREATININE 0.60 0.61 0.51* 0.63 0.54*   EGFR 115.1 114.6 119.7 113.7 118.1   GLUCOSE 109* 99 92 110* 98   CALCIUM 8.7 8.9 8.5* 9.0 8.7   MAGNESIUM 1.9 1.9 1.7 1.8 1.8   PHOSPHORUS 3.4 3.4 4.0 3.7 3.4         Lab 06/04/25  0500   LIPASE 46           Brief Urine Lab Results  (Last result in the past 365 days)        Color   Clarity   Blood   Leuk Est   Nitrite   Protein   CREAT   Urine HCG        06/02/25 0327 Yellow   Clear   Negative   Negative   Negative   Negative                 Microbiology Results (last 10 days)       Procedure Component Value - Date/Time    Blood Culture - Blood, Arm, Right [[id number]]  (Normal) Collected: 06/02/25 0510    Lab Status: Final result Specimen: Blood from Arm, Right Updated: 06/07/25 0530     Blood Culture No growth at 5 days    Blood Culture - Blood, Arm, Right [[id number]]  (Normal) Collected: 06/02/25 0438    Lab Status: Final result Specimen: Blood from Arm, Right Updated: 06/07/25 0445     Blood Culture No growth at 5 days    Narrative:      Less than seven (7) mL's of blood was collected.  Insufficient quantity may yield false negative results.            Results for orders placed during the hospital encounter of 04/24/25    Adult Transthoracic Echo Complete W/ Cont if Necessary Per Protocol    Interpretation Summary    Left ventricular ejection fraction appears to be 61 - 65%.    Left ventricular diastolic function was normal.      Time spent on Discharge including face to face service:  35 minutes    Electronically signed by Adrien Pappas MD, 06/10/25, 11:39 AM EDT.

## 2025-06-11 ENCOUNTER — TRANSITIONAL CARE MANAGEMENT TELEPHONE ENCOUNTER (OUTPATIENT)
Dept: CALL CENTER | Facility: HOSPITAL | Age: 45
End: 2025-06-11
Payer: COMMERCIAL

## 2025-06-11 LAB
QT INTERVAL: 418 MS
QTC INTERVAL: 442 MS

## 2025-06-11 NOTE — OUTREACH NOTE
Call Center TCM Note      Flowsheet Row Responses   [facility] patient discharged from? [facility]   Does the patient have one of the following disease processes/diagnoses(primary or secondary)? Other   TCM attempt successful? No   Unsuccessful attempts Attempt 2  [Donaldo on verbal release,  no answer]            Krissy SHEPARD - Registered Nurse    6/11/2025, 16:00 EDT

## 2025-06-11 NOTE — OUTREACH NOTE
Call Center TCM Note      Flowsheet Row Responses   [facility] facility patient discharged from? [facility]   Does the patient have one of the following disease processes/diagnoses(primary or secondary)? Other   TCM attempt successful? No  [vr for Donaldo Askew]   Unsuccessful attempts Attempt 1   Call Status Left message            OLI Luciano Registered Nurse    6/11/2025, 14:19 EDT

## 2025-06-12 ENCOUNTER — TRANSITIONAL CARE MANAGEMENT TELEPHONE ENCOUNTER (OUTPATIENT)
Dept: CALL CENTER | Facility: HOSPITAL | Age: 45
End: 2025-06-12
Payer: COMMERCIAL

## 2025-06-12 NOTE — OUTREACH NOTE
Call Center TCM Note      Flowsheet Row Responses   [facility] facility patient discharged from? [facility]   Does the patient have one of the following disease processes/diagnoses(primary or secondary)? Other   TCM attempt successful? No   Unsuccessful attempts Attempt 3  [attempted pt and ]            OLI MILLER - Registered Nurse    6/12/2025, 13:12 EDT

## 2025-06-13 ENCOUNTER — OFFICE VISIT (OUTPATIENT)
Dept: GASTROENTEROLOGY | Facility: CLINIC | Age: 45
End: 2025-06-13
Payer: COMMERCIAL

## 2025-06-13 VITALS
SYSTOLIC BLOOD PRESSURE: 104 MMHG | DIASTOLIC BLOOD PRESSURE: 65 MMHG | RESPIRATION RATE: 18 BRPM | OXYGEN SATURATION: 97 % | HEIGHT: 62 IN | BODY MASS INDEX: 32.79 KG/M2 | HEART RATE: 70 BPM | WEIGHT: 178.2 LBS

## 2025-06-13 DIAGNOSIS — K31.84 GASTROPARESIS: Primary | ICD-10-CM

## 2025-06-13 RX ORDER — LUBIPROSTONE 8 UG/1
8 CAPSULE ORAL 2 TIMES DAILY
Qty: 180 CAPSULE | Refills: 1 | Status: SHIPPED | OUTPATIENT
Start: 2025-06-13

## 2025-06-13 NOTE — PROGRESS NOTES
Chief Complaint  Follow-up (Endo)    Ebony Hamilton is a 44 y.o. female who presents to [facility] for hospital follow up    History of present Illness  Patient presents to the office for hospital follow up. Patient has had numerous hospitalizations this year resulting in cholecystectomy and appendectomy. She has continued to struggle with persistent epigastric pain that radiates to her back as well as nausea and vomiting. [facility] with [facility] but does not currently have a follow up apt. She was discharged for [facility] about 2-3 days ago and is doing well but eating minimally out of fear of vomiting. Unable to take Reglan due to dyskinesia reaction. Maintained on erythromycin right now. She is currently taking senna and miralax daily to manage constipation with adequate relief. Wants to continue Amitiza as this worked better for her. Denies melena and hematochezia.     Past Medical History:   Diagnosis Date    Allergic     Anxiety     Atrial fibrillation     RELEASED BY CARDIOLOGIST/ELECTROPHYSIST, NO CURRENT MEDS    Chronic pain disorder     Depression     Endometriosis     Headache     Hemorrhoids     Injury of shoulder, right 2009    CHRONIC PAIN    Panic disorder     Rectal bleeding 2010    S/P laparoscopic appendectomy 03/09/2025    S/P laparoscopic cholecystectomy 02/17/2025       Past Surgical History:   Procedure Laterality Date    APPENDECTOMY N/A 3/9/2025    Procedure: APPENDECTOMY LAPAROSCOPIC;  Surgeon: Mingo Cannon MD;  Location: [facility] MAIN OR;  Service: General;  Laterality: N/A;    CERVICAL ARTHRODESIS  01/14/2015    Donaldo Albarran    CERVICAL FUSION      C4-7 FUSION, FULL ROM    CHOLECYSTECTOMY N/A 2/17/2025    Procedure: CHOLECYSTECTOMY LAPAROSCOPIC plain language: removal of gallbladder thru small incisions using long instruments and a camera;  Surgeon: Josué Castano MD;  Location: [facility] MAIN OR;  Service: General;   Laterality: N/A;    COLONOSCOPY N/A 05/31/2022    Procedure: COLONOSCOPY;  Surgeon: Shabbir Baird MD;  Location: [facility] ENDOSCOPY;  Service: General;  Laterality: N/A;  HEMORRHOIDS    ENDOSCOPY N/A 2/17/2025    Procedure: ESOPHAGOGASTRODUODENOSCOPY WITH BIOPSIES;  Surgeon: Sanya Reyes MD;  Location: [facility] ENDOSCOPY;  Service: Gastroenterology;  Laterality: N/A;  GASTRITIS, SMALL HIATAL HERNIA    ENDOSCOPY N/A 3/6/2025    Procedure: ESOPHAGOGASTRODUODENOSCOPY with pancreatic stent removal;  Surgeon: Ha Thompson MD;  Location: [facility] ENDOSCOPY;  Service: Gastroenterology;  Laterality: N/A;  pancreatic stent removal, hiatal hernia    ENDOSCOPY N/A 6/3/2025    Procedure: ESOPHAGOGASTRODUODENOSCOPY;  Surgeon: Ha Thompson MD;  Location: [facility] ENDOSCOPY;  Service: Gastroenterology;  Laterality: N/A;  normal    ENTEROSCOPY SMALL BOWEL N/A 6/6/2025    Procedure: ENTEROSCOPY SMALL BOWEL with biopsies;  Surgeon: Ha Thompson MD;  Location: [facility] ENDOSCOPY;  Service: Gastroenterology;  Laterality: N/A;  normal    ERCP N/A 2/24/2025    Procedure: ENDOSCOPIC RETROGRADE CHOLANGIOPANCREATOGRAPHY with bile duct stent placement and pancreatic duct stent placement;  Surgeon: Ha Thompson MD;  Location: [facility] ENDOSCOPY;  Service: Gastroenterology;  Laterality: N/A;  bile duct leeak    ERCP N/A 4/8/2025    Procedure: ENDOSCOPIC RETROGRADE CHOLANGIOPANCREATOGRAPHY WITH STENT REMOVAL;  Surgeon: Ha Thompson MD;  Location: [facility] ENDOSCOPY;  Service: Gastroenterology;  Laterality: N/A;  STENT REMOVAL    EXPLORATORY LAPAROTOMY      HEMORRHOIDECTOMY N/A 05/31/2022    Procedure: HEMORRHOID BANDING;  Surgeon: Shabbir Baird MD;  Location: [facility] ENDOSCOPY;  Service: General;  Laterality: N/A;  BANDS X 2    HEMORRHOIDECTOMY N/A 1/31/2024    Procedure: HEMORRHOIDECTOMY;  Surgeon: Shabbir Baird MD;  Location: [facility] OR [facility];  Service: General;  Laterality: N/A;     HYSTERECTOMY      SHOULDER ARTHROSCOPY Right     SHOULDER SURGERY Left     ARTHROSCOPY LABRAL TEAR X2    TUBAL ABDOMINAL LIGATION           Current Outpatient Medications:     albuterol sulfate  (90 Base) MCG/ACT inhaler, Inhale 2 puffs Every 6 (Six) Hours As Needed for Wheezing., Disp: 18 g, Rfl: 1    CALCIUM PO, Take 1 tablet by mouth Daily., Disp: , Rfl:     clonazePAM (KlonoPIN) 0.5 MG tablet, Take 1 tablet by mouth 2 (Two) Times a Day As Needed for Anxiety., Disp: 40 tablet, Rfl: 0    diphenhydrAMINE-zinc acetate 2-0.1 % cream, Apply 1 Application topically to the appropriate area as directed 3 (Three) Times a Day As Needed for Itching or Rash., Disp: 15 g, Rfl: 0    DULoxetine (CYMBALTA) 30 MG capsule, Take 1 capsule by mouth Every Night. Take with 60mg for total dose of 90mg, Disp: 90 capsule, Rfl: 1    DULoxetine (CYMBALTA) 60 MG capsule, TAKE ONE CAPSULE EVERY MORNING along with 30 MG tablet, Disp: 90 capsule, Rfl: 1    erythromycin (KAVEH-TAB) 500 MG EC tablet, Take 1 tablet by mouth 3 (Three) Times a Day Before Meals for 4 days., Disp: 12 tablet, Rfl: 0    HYDROcodone-acetaminophen (NORCO)  MG per tablet, Take 1.5 tablets by mouth Every 4 (Four) Hours As Needed for Moderate Pain or Severe Pain., Disp: 27 tablet, Rfl: 0    ibuprofen (ADVIL,MOTRIN) 800 MG tablet, Take 1 tablet by mouth Every 8 (Eight) Hours As Needed for Mild Pain., Disp: , Rfl:     lubiprostone (AMITIZA) 8 MCG capsule, Take 1 capsule by mouth 2 (Two) Times a Day., Disp: 180 capsule, Rfl: 1    metoprolol succinate XL (TOPROL-XL) 50 MG 24 hr tablet, Take 1 tablet by mouth Daily for 30 days., Disp: 30 tablet, Rfl: 0    montelukast (Singulair) 10 MG tablet, Take 1 tablet by mouth Every Night., Disp: 90 tablet, Rfl: 1    naloxone (NARCAN) 4 MG/0.1ML nasal spray, Call 911. Don't prime. [place] in 1 nostril for overdose. Repeat in 2-3 minutes in other nostril if no or minimal breathing/responsiveness. (Patient taking differently:  Administer 1 spray into the nostril(s) as directed by provider As Needed. Call 911. Don't prime. [place] in 1 nostril for overdose. Repeat in 2-3 minutes in other nostril if no or minimal breathing/responsiveness.), Disp: 2 each, Rfl: 0    ondansetron (ZOFRAN) 4 MG tablet, Take 1 tablet by mouth Every 8 (Eight) Hours As Needed for Nausea or Vomiting., Disp: 15 tablet, Rfl: 0    pantoprazole (PROTONIX) 40 MG EC tablet, Take 1 tablet by mouth Daily., Disp: 90 tablet, Rfl: 3    polyethylene glycol (MIRALAX) 17 g packet, Mix 17gm (contents of 1 packet) in eight ounces of water or other beverage to drink once daily, Disp: 7 packet, Rfl: 0    prochlorperazine (COMPAZINE) 10 MG tablet, Take 1 tablet by mouth Every 6 (Six) Hours As Needed for Nausea or Vomiting., Disp: 15 tablet, Rfl: 0    Scopolamine 1 MG/3DAYS patch, Place 1 patch on the skin as directed by provider Every 72 (Seventy-Two) Hours., Disp: 5 patch, Rfl: 0    sennosides-docusate (senna-docusate sodium) 8.6-50 MG per tablet, Take 1 tablet by mouth Daily., Disp: 30 tablet, Rfl: 2    simethicone (MYLICON) 80 MG chewable tablet, Chew 1.5 tablets 4 (Four) Times a Day Before Meals & at Bedtime., Disp: , Rfl:     tiZANidine (ZANAFLEX) 4 MG tablet, Take 1 tablet by mouth Every 8 (Eight) Hours As Needed for Muscle Spasms., Disp: 90 tablet, Rfl: 2    traZODone (DESYREL) 50 MG tablet, Take 0.5 to 2 tab PO QHS PRN sleep (Patient taking differently: Take 1 tablet by mouth Every Night.), Disp: 90 tablet, Rfl: 2    vitamin C (ASCORBIC ACID) 500 MG tablet, Take 1 tablet by mouth Daily., Disp: , Rfl:      Allergies   Allergen Reactions    Escitalopram Nausea Only and Rash     Nausea, sweating, rash     Sulfa Antibiotics Anaphylaxis    Reglan [Metoclopramide] Dystonia    Baclofen GI Intolerance, Hives and Nausea And Vomiting    Ciprofloxacin Hallucinations    Codeine Hives    Gold-Containing Drug Products Rash    Latex Rash    Lovenox [Enoxaparin Sodium] Rash    Nickel Hives     "Tegaderm Ag Mesh [Silver] Hives       Family History   Problem Relation Age of Onset    Depression Mother     Bipolar disorder Mother     Anxiety disorder Mother     Cancer Mother     Heart disease Mother     Hypertension Mother     Anxiety disorder Father     Hypertension Father     Dementia Paternal Uncle     Dementia Paternal Grandmother     Heart disease Other     Colon cancer Neg Hx     Malig Hyperthermia Neg Hx         Social History     Social History Narrative    Not on file       Objective       Vital Signs:   /65   Pulse 70   Resp 18   Ht 157.5 cm (62\")   Wt 80.8 kg (178 lb 3.2 oz)   SpO2 97%   BMI 32.59 kg/m²       Physical Exam  Constitutional:       Appearance: Normal appearance. She is normal weight.   HENT:      Head: Normocephalic and atraumatic.      Nose: Nose normal.   Pulmonary:      Effort: Pulmonary effort is normal.   Skin:     General: Skin is warm and dry.   Neurological:      Mental Status: She is alert and oriented to person, place, and time. Mental status is at baseline.   Psychiatric:         Mood and Affect: Mood normal.         Behavior: Behavior normal.         Thought Content: Thought content normal.         Judgment: Judgment normal.         Result Review :       CBC w/diff          6/8/2025    06:21 6/9/2025    04:09 6/10/2025    03:20   CBC w/Diff   WBC 7.58  8.85  8.42    RBC 4.02  3.94  3.85    Hemoglobin 11.6  11.4  11.1    Hematocrit 37.2  36.4  35.5    MCV 92.5  92.4  92.2    MCH 28.9  28.9  28.8    MCHC 31.2  31.3  31.3    RDW 15.3  15.1  14.8    Platelets 328  438  402    Neutrophil Rel % 61.0  66.6  70.1    Immature Granulocyte Rel % 0.9  0.8  0.7    Lymphocyte Rel % 29.3  23.4  22.6    Monocyte Rel % 5.7  6.0  4.6    Eosinophil Rel % 2.4  2.5  1.5    Basophil Rel % 0.7  0.7  0.5      CMP          6/8/2025    06:21 6/9/2025    04:09 6/10/2025    03:20   CMP   Glucose 92  99  109    BUN 5.3  9.0  10.3    Creatinine 0.51  0.61  0.60    EGFR 119.7  114.6  115.1  "   Sodium 144  140  141    Potassium 3.5  3.8  3.7    Chloride 109  106  106    Calcium 8.5  8.9  8.7    BUN/Creatinine Ratio 10.4  14.8  17.2    Anion Gap 9.7  9.0  9.0              Assessment and Plan    Diagnoses and all orders for this visit:    1. Gastroparesis (Primary)    Other orders  -     lubiprostone (AMITIZA) 8 MCG capsule; Take 1 capsule by mouth 2 (Two) Times a Day.  Dispense: 180 capsule; Refill: 1    Continue PPI - protonix 40mg daily  Continue erythromycin   Amitiza 8 BID sent to pharmacy for constipation  Arrange follow up with motility clinic to discuss option for gastroparesis.     Follow Up   No follow-ups on file.  Patient was given instructions and counseling regarding her condition or for health maintenance advice. Please see specific information pulled into the AVS if appropriate.

## 2025-06-18 ENCOUNTER — RESULTS FOLLOW-UP (OUTPATIENT)
Dept: GASTROENTEROLOGY | Facility: HOSPITAL | Age: 45
End: 2025-06-18
Payer: COMMERCIAL

## 2025-06-26 ENCOUNTER — HOSPITAL ENCOUNTER (EMERGENCY)
Facility: HOSPITAL | Age: 45
Discharge: OTHER INSTITUTION | End: 2025-06-27
Attending: EMERGENCY MEDICINE
Payer: COMMERCIAL

## 2025-06-26 DIAGNOSIS — R10.9 INTRACTABLE ABDOMINAL PAIN: ICD-10-CM

## 2025-06-26 DIAGNOSIS — R11.2 INTRACTABLE NAUSEA AND VOMITING: ICD-10-CM

## 2025-06-26 DIAGNOSIS — K31.84 GASTROPARESIS: Primary | ICD-10-CM

## 2025-06-26 LAB
ALBUMIN SERPL-MCNC: 4.7 G/DL (ref 3.5–5.2)
ALBUMIN/GLOB SERPL: 1.2 G/DL
ALP SERPL-CCNC: 72 U/L (ref 39–117)
ALT SERPL W P-5'-P-CCNC: 12 U/L (ref 1–33)
ANION GAP SERPL CALCULATED.3IONS-SCNC: 17.2 MMOL/L (ref 5–15)
AST SERPL-CCNC: 14 U/L (ref 1–32)
BASOPHILS # BLD AUTO: 0.07 10*3/MM3 (ref 0–0.2)
BASOPHILS NFR BLD AUTO: 0.5 % (ref 0–1.5)
BILIRUB SERPL-MCNC: 0.5 MG/DL (ref 0–1.2)
BUN SERPL-MCNC: 12.5 MG/DL (ref 6–20)
BUN/CREAT SERPL: 15.8 (ref 7–25)
CALCIUM SPEC-SCNC: 10.2 MG/DL (ref 8.6–10.5)
CHLORIDE SERPL-SCNC: 101 MMOL/L (ref 98–107)
CO2 SERPL-SCNC: 17.8 MMOL/L (ref 22–29)
CREAT SERPL-MCNC: 0.79 MG/DL (ref 0.57–1)
DEPRECATED RDW RBC AUTO: 46 FL (ref 37–54)
EGFRCR SERPLBLD CKD-EPI 2021: 95.9 ML/MIN/1.73
EOSINOPHIL # BLD AUTO: 0.06 10*3/MM3 (ref 0–0.4)
EOSINOPHIL NFR BLD AUTO: 0.4 % (ref 0.3–6.2)
ERYTHROCYTE [DISTWIDTH] IN BLOOD BY AUTOMATED COUNT: 13.8 % (ref 12.3–15.4)
GLOBULIN UR ELPH-MCNC: 3.8 GM/DL
GLUCOSE SERPL-MCNC: 104 MG/DL (ref 65–99)
HCT VFR BLD AUTO: 41.9 % (ref 34–46.6)
HGB BLD-MCNC: 13.4 G/DL (ref 12–15.9)
HOLD SPECIMEN: NORMAL
HOLD SPECIMEN: NORMAL
IMM GRANULOCYTES # BLD AUTO: 0.08 10*3/MM3 (ref 0–0.05)
IMM GRANULOCYTES NFR BLD AUTO: 0.6 % (ref 0–0.5)
LIPASE SERPL-CCNC: 53 U/L (ref 13–60)
LYMPHOCYTES # BLD AUTO: 1.72 10*3/MM3 (ref 0.7–3.1)
LYMPHOCYTES NFR BLD AUTO: 12 % (ref 19.6–45.3)
MCH RBC QN AUTO: 29 PG (ref 26.6–33)
MCHC RBC AUTO-ENTMCNC: 32 G/DL (ref 31.5–35.7)
MCV RBC AUTO: 90.7 FL (ref 79–97)
MONOCYTES # BLD AUTO: 0.67 10*3/MM3 (ref 0.1–0.9)
MONOCYTES NFR BLD AUTO: 4.7 % (ref 5–12)
NEUTROPHILS NFR BLD AUTO: 11.77 10*3/MM3 (ref 1.7–7)
NEUTROPHILS NFR BLD AUTO: 81.8 % (ref 42.7–76)
NRBC BLD AUTO-RTO: 0 /100 WBC (ref 0–0.2)
PLATELET # BLD AUTO: 593 10*3/MM3 (ref 140–450)
PMV BLD AUTO: 9.7 FL (ref 6–12)
POTASSIUM SERPL-SCNC: 3.9 MMOL/L (ref 3.5–5.2)
PROT SERPL-MCNC: 8.5 G/DL (ref 6–8.5)
RBC # BLD AUTO: 4.62 10*6/MM3 (ref 3.77–5.28)
SODIUM SERPL-SCNC: 136 MMOL/L (ref 136–145)
WBC NRBC COR # BLD AUTO: 14.37 10*3/MM3 (ref 3.4–10.8)
WHOLE BLOOD HOLD COAG: NORMAL
WHOLE BLOOD HOLD SPECIMEN: NORMAL

## 2025-06-26 PROCEDURE — 85025 COMPLETE CBC W/AUTO DIFF WBC: CPT

## 2025-06-26 PROCEDURE — 93005 ELECTROCARDIOGRAM TRACING: CPT | Performed by: EMERGENCY MEDICINE

## 2025-06-26 PROCEDURE — 83690 ASSAY OF LIPASE: CPT

## 2025-06-26 PROCEDURE — 25010000002 HYDROMORPHONE 1 MG/ML SOLUTION: Performed by: EMERGENCY MEDICINE

## 2025-06-26 PROCEDURE — 25010000002 ONDANSETRON PER 1 MG: Performed by: EMERGENCY MEDICINE

## 2025-06-26 PROCEDURE — 80053 COMPREHEN METABOLIC PANEL: CPT

## 2025-06-26 PROCEDURE — 96375 TX/PRO/DX INJ NEW DRUG ADDON: CPT

## 2025-06-26 PROCEDURE — 99285 EMERGENCY DEPT VISIT HI MDM: CPT

## 2025-06-26 PROCEDURE — 96374 THER/PROPH/DIAG INJ IV PUSH: CPT

## 2025-06-26 RX ORDER — ONDANSETRON 2 MG/ML
4 INJECTION INTRAMUSCULAR; INTRAVENOUS ONCE
Status: COMPLETED | OUTPATIENT
Start: 2025-06-26 | End: 2025-06-26

## 2025-06-26 RX ORDER — DIPHENHYDRAMINE HYDROCHLORIDE 50 MG/ML
50 INJECTION, SOLUTION INTRAMUSCULAR; INTRAVENOUS ONCE
Status: COMPLETED | OUTPATIENT
Start: 2025-06-27 | End: 2025-06-27

## 2025-06-26 RX ORDER — KETOROLAC TROMETHAMINE 30 MG/ML
30 INJECTION, SOLUTION INTRAMUSCULAR; INTRAVENOUS ONCE
Status: COMPLETED | OUTPATIENT
Start: 2025-06-27 | End: 2025-06-27

## 2025-06-26 RX ORDER — SODIUM CHLORIDE 0.9 % (FLUSH) 0.9 %
10 SYRINGE (ML) INJECTION AS NEEDED
Status: DISCONTINUED | OUTPATIENT
Start: 2025-06-26 | End: 2025-06-27 | Stop reason: HOSPADM

## 2025-06-26 RX ADMIN — ONDANSETRON 4 MG: 2 INJECTION INTRAMUSCULAR; INTRAVENOUS at 21:09

## 2025-06-26 RX ADMIN — HYDROMORPHONE HYDROCHLORIDE 0.5 MG: 1 INJECTION, SOLUTION INTRAMUSCULAR; INTRAVENOUS; SUBCUTANEOUS at 21:09

## 2025-06-27 ENCOUNTER — APPOINTMENT (OUTPATIENT)
Dept: CT IMAGING | Facility: HOSPITAL | Age: 45
End: 2025-06-27
Payer: COMMERCIAL

## 2025-06-27 VITALS
HEIGHT: 62 IN | DIASTOLIC BLOOD PRESSURE: 86 MMHG | SYSTOLIC BLOOD PRESSURE: 149 MMHG | HEART RATE: 64 BPM | OXYGEN SATURATION: 95 % | RESPIRATION RATE: 21 BRPM | TEMPERATURE: 98.3 F | BODY MASS INDEX: 31.28 KG/M2 | WEIGHT: 169.97 LBS

## 2025-06-27 LAB
BACTERIA UR QL AUTO: ABNORMAL /HPF
BILIRUB UR QL STRIP: NEGATIVE
CLARITY UR: ABNORMAL
COLOR UR: YELLOW
GLUCOSE UR STRIP-MCNC: NEGATIVE MG/DL
HGB UR QL STRIP.AUTO: NEGATIVE
HYALINE CASTS UR QL AUTO: ABNORMAL /LPF
KETONES UR QL STRIP: ABNORMAL
LEUKOCYTE ESTERASE UR QL STRIP.AUTO: ABNORMAL
NITRITE UR QL STRIP: NEGATIVE
PH UR STRIP.AUTO: 5.5 [PH] (ref 5–8)
PROT UR QL STRIP: ABNORMAL
RBC # UR STRIP: ABNORMAL /HPF
REF LAB TEST METHOD: ABNORMAL
SP GR UR STRIP: 1.02 (ref 1–1.03)
SQUAMOUS #/AREA URNS HPF: ABNORMAL /HPF
UROBILINOGEN UR QL STRIP: ABNORMAL
WBC # UR STRIP: ABNORMAL /HPF

## 2025-06-27 PROCEDURE — 96375 TX/PRO/DX INJ NEW DRUG ADDON: CPT

## 2025-06-27 PROCEDURE — 81001 URINALYSIS AUTO W/SCOPE: CPT | Performed by: EMERGENCY MEDICINE

## 2025-06-27 PROCEDURE — 25010000002 DIPHENHYDRAMINE PER 50 MG: Performed by: EMERGENCY MEDICINE

## 2025-06-27 PROCEDURE — 25010000002 HYDROMORPHONE 1 MG/ML SOLUTION: Performed by: EMERGENCY MEDICINE

## 2025-06-27 PROCEDURE — 25010000002 PROCHLORPERAZINE 10 MG/2ML SOLUTION: Performed by: EMERGENCY MEDICINE

## 2025-06-27 PROCEDURE — 74177 CT ABD & PELVIS W/CONTRAST: CPT

## 2025-06-27 PROCEDURE — 25510000001 IOPAMIDOL PER 1 ML: Performed by: EMERGENCY MEDICINE

## 2025-06-27 PROCEDURE — 25010000002 DROPERIDOL PER 5 MG: Performed by: EMERGENCY MEDICINE

## 2025-06-27 PROCEDURE — 25010000002 KETOROLAC TROMETHAMINE PER 15 MG: Performed by: EMERGENCY MEDICINE

## 2025-06-27 PROCEDURE — 25810000003 LACTATED RINGERS SOLUTION: Performed by: EMERGENCY MEDICINE

## 2025-06-27 PROCEDURE — 96376 TX/PRO/DX INJ SAME DRUG ADON: CPT

## 2025-06-27 RX ORDER — IOPAMIDOL 755 MG/ML
100 INJECTION, SOLUTION INTRAVASCULAR
Status: COMPLETED | OUTPATIENT
Start: 2025-06-27 | End: 2025-06-27

## 2025-06-27 RX ORDER — ACETAMINOPHEN 500 MG
1000 TABLET ORAL ONCE
Status: DISCONTINUED | OUTPATIENT
Start: 2025-06-27 | End: 2025-06-27 | Stop reason: HOSPADM

## 2025-06-27 RX ORDER — PROCHLORPERAZINE EDISYLATE 5 MG/ML
10 INJECTION INTRAMUSCULAR; INTRAVENOUS ONCE
Status: COMPLETED | OUTPATIENT
Start: 2025-06-27 | End: 2025-06-27

## 2025-06-27 RX ORDER — DROPERIDOL 2.5 MG/ML
2.5 INJECTION, SOLUTION INTRAMUSCULAR; INTRAVENOUS ONCE
Status: COMPLETED | OUTPATIENT
Start: 2025-06-27 | End: 2025-06-27

## 2025-06-27 RX ADMIN — PROCHLORPERAZINE EDISYLATE 10 MG: 5 INJECTION INTRAMUSCULAR; INTRAVENOUS at 04:21

## 2025-06-27 RX ADMIN — KETOROLAC TROMETHAMINE 30 MG: 30 INJECTION, SOLUTION INTRAMUSCULAR; INTRAVENOUS at 00:08

## 2025-06-27 RX ADMIN — HYDROMORPHONE HYDROCHLORIDE 1 MG: 1 INJECTION, SOLUTION INTRAMUSCULAR; INTRAVENOUS; SUBCUTANEOUS at 05:30

## 2025-06-27 RX ADMIN — HYDROMORPHONE HYDROCHLORIDE 0.5 MG: 1 INJECTION, SOLUTION INTRAMUSCULAR; INTRAVENOUS; SUBCUTANEOUS at 09:50

## 2025-06-27 RX ADMIN — DROPERIDOL 2.5 MG: 2.5 INJECTION, SOLUTION INTRAMUSCULAR; INTRAVENOUS at 01:12

## 2025-06-27 RX ADMIN — DIPHENHYDRAMINE HYDROCHLORIDE 50 MG: 50 INJECTION, SOLUTION INTRAMUSCULAR; INTRAVENOUS at 00:08

## 2025-06-27 RX ADMIN — IOPAMIDOL 85 ML: 755 INJECTION, SOLUTION INTRAVENOUS at 01:06

## 2025-06-27 RX ADMIN — SODIUM CHLORIDE, POTASSIUM CHLORIDE, SODIUM LACTATE AND CALCIUM CHLORIDE 1000 ML: 600; 310; 30; 20 INJECTION, SOLUTION INTRAVENOUS at 00:11

## 2025-06-27 NOTE — ED PROVIDER NOTES
Time: 8:49 PM EDT  Date of encounter:  6/26/2025  Independent Historian/Clinical History and Information was obtained by:   Patient and Family    History is limited by: N/A    Chief complaint: Abdominal pain and vomiting    History of Present Illness:  Patient is a 44 y.o. year old female who presents to the emergency department accompanied by her  for evaluation of sudden onset of abdominal pain and vomiting.   reports that she has been diagnosed with gastroparesis.  She has been hospitalized 2 previous times with similar symptoms.  Each time he states that she has been hospitalized for 8 to 10 days.  Today symptoms began around 6 PM tonight.  Patient complains of severe pain as well as intractable nausea and vomiting.        Patient Care Team  Primary Care Provider: Karen Stovre APRN    Past Medical History:     Allergies   Allergen Reactions    Escitalopram Nausea Only and Rash     Nausea, sweating, rash     Sulfa Antibiotics Anaphylaxis    Reglan [Metoclopramide] Dystonia    Baclofen GI Intolerance, Hives and Nausea And Vomiting    Ciprofloxacin Hallucinations    Codeine Hives    Gold-Containing Drug Products Rash    Latex Rash    Lovenox [Enoxaparin Sodium] Rash    Nickel Hives    Tegaderm Ag Mesh [Silver] Hives     Past Medical History:   Diagnosis Date    Allergic     Anxiety     Atrial fibrillation     RELEASED BY CARDIOLOGIST/ELECTROPHYSIST, NO CURRENT MEDS    Chronic pain disorder     Depression     Endometriosis     Headache     Hemorrhoids     Injury of shoulder, right 2009    CHRONIC PAIN    Panic disorder     Rectal bleeding 2010    S/P laparoscopic appendectomy 03/09/2025    S/P laparoscopic cholecystectomy 02/17/2025     Past Surgical History:   Procedure Laterality Date    APPENDECTOMY N/A 3/9/2025    Procedure: APPENDECTOMY LAPAROSCOPIC;  Surgeon: Mingo Cannon MD;  Location: [facility];  Service: General;  Laterality: N/A;    CERVICAL ARTHRODESIS  01/14/2015     Donaldo Albarran    CERVICAL FUSION      C4-7 FUSION, FULL ROM    CHOLECYSTECTOMY N/A 2/17/2025    Procedure: CHOLECYSTECTOMY LAPAROSCOPIC plain language: removal of gallbladder thru small incisions using long instruments and a camera;  Surgeon: Josué Castano MD;  Location: [facility] MAIN OR;  Service: General;  Laterality: N/A;    COLONOSCOPY N/A 05/31/2022    Procedure: COLONOSCOPY;  Surgeon: Shabbir Baird MD;  Location: [facility] ENDOSCOPY;  Service: General;  Laterality: N/A;  HEMORRHOIDS    ENDOSCOPY N/A 2/17/2025    Procedure: ESOPHAGOGASTRODUODENOSCOPY WITH BIOPSIES;  Surgeon: Sanya Reyes MD;  Location: [facility] ENDOSCOPY;  Service: Gastroenterology;  Laterality: N/A;  GASTRITIS, SMALL HIATAL HERNIA    ENDOSCOPY N/A 3/6/2025    Procedure: ESOPHAGOGASTRODUODENOSCOPY with pancreatic stent removal;  Surgeon: Ha Thompson MD;  Location: [facility] ENDOSCOPY;  Service: Gastroenterology;  Laterality: N/A;  pancreatic stent removal, hiatal hernia    ENDOSCOPY N/A 6/3/2025    Procedure: ESOPHAGOGASTRODUODENOSCOPY;  Surgeon: Ha Thompson MD;  Location: [facility] ENDOSCOPY;  Service: Gastroenterology;  Laterality: N/A;  normal    ENTEROSCOPY SMALL BOWEL N/A 6/6/2025    Procedure: ENTEROSCOPY SMALL BOWEL with biopsies;  Surgeon: Ha Thompson MD;  Location: [facility] ENDOSCOPY;  Service: Gastroenterology;  Laterality: N/A;  normal    ERCP N/A 2/24/2025    Procedure: ENDOSCOPIC RETROGRADE CHOLANGIOPANCREATOGRAPHY with bile duct stent placement and pancreatic duct stent placement;  Surgeon: Ha Thompson MD;  Location: [facility] ENDOSCOPY;  Service: Gastroenterology;  Laterality: N/A;  bile duct leeak    ERCP N/A 4/8/2025    Procedure: ENDOSCOPIC RETROGRADE CHOLANGIOPANCREATOGRAPHY WITH STENT REMOVAL;  Surgeon: Ha Thompson MD;  Location: [facility] ENDOSCOPY;  Service: Gastroenterology;  Laterality: N/A;  STENT REMOVAL    EXPLORATORY LAPAROTOMY      HEMORRHOIDECTOMY N/A 05/31/2022     Procedure: HEMORRHOID BANDING;  Surgeon: Shabbir Baird MD;  Location: [facility] ENDOSCOPY;  Service: General;  Laterality: N/A;  BANDS X 2    HEMORRHOIDECTOMY N/A 1/31/2024    Procedure: HEMORRHOIDECTOMY;  Surgeon: Shabbir Baird MD;  Location: [facility] OR [facility];  Service: General;  Laterality: N/A;    HYSTERECTOMY      SHOULDER ARTHROSCOPY Right     SHOULDER SURGERY Left     ARTHROSCOPY LABRAL TEAR X2    TUBAL ABDOMINAL LIGATION       Family History   Problem Relation Age of Onset    Depression Mother     Bipolar disorder Mother     Anxiety disorder Mother     Cancer Mother     Heart disease Mother     Hypertension Mother     Anxiety disorder Father     Hypertension Father     Dementia Paternal Uncle     Dementia Paternal Grandmother     Heart disease Other     Colon cancer Neg Hx     Malig Hyperthermia Neg Hx        Home Medications:  Prior to Admission medications    Medication Sig Start Date End Date Taking? Authorizing Provider   albuterol sulfate  (90 Base) MCG/ACT inhaler Inhale 2 puffs Every 6 (Six) Hours As Needed for Wheezing. 4/15/25   Karen Stover APRN   CALCIUM PO Take 1 tablet by mouth Daily.    Provider, MD Avani   clonazePAM (KlonoPIN) 0.5 MG tablet Take 1 tablet by mouth 2 (Two) Times a Day As Needed for Anxiety. 6/6/25   Karen Stover APRN   diphenhydrAMINE-zinc acetate 2-0.1 % cream Apply 1 Application topically to the appropriate area as directed 3 (Three) Times a Day As Needed for Itching or Rash. 4/6/25   Naila Alejandre MD   DULoxetine (CYMBALTA) 30 MG capsule Take 1 capsule by mouth Every Night. Take with 60mg for total dose of 90mg 6/6/25   Karen Stover APRN   DULoxetine (CYMBALTA) 60 MG capsule TAKE ONE CAPSULE EVERY MORNING along with 30 MG tablet 6/6/25   Karen Stover APRN   HYDROcodone-acetaminophen (NORCO)  MG per tablet Take 1.5 tablets by mouth Every 4 (Four) Hours As Needed for Moderate Pain or Severe  Pain. 4/6/25   Naila Alejandre MD   ibuprofen (ADVIL,MOTRIN) 800 MG tablet Take 1 tablet by mouth Every 8 (Eight) Hours As Needed for Mild Pain. 6/10/25   Adrien Pappas MD   lubiprostone (AMITIZA) 8 MCG capsule Take 1 capsule by mouth 2 (Two) Times a Day. 6/13/25   Ethel Mar APRN   metoprolol succinate XL (TOPROL-XL) 50 MG 24 hr tablet Take 1 tablet by mouth Daily for 30 days. 6/10/25 7/10/25  Adrien Pappas MD   montelukast (Singulair) 10 MG tablet Take 1 tablet by mouth Every Night. 4/16/25   Karen Stover APRN   naloxone (NARCAN) 4 MG/0.1ML nasal spray Call 911. Don't prime. [place] in 1 nostril for overdose. Repeat in 2-3 minutes in other nostril if no or minimal breathing/responsiveness.  Patient taking differently: Administer 1 spray into the nostril(s) as directed by provider As Needed. Call 911. Don't prime. [place] in 1 nostril for overdose. Repeat in 2-3 minutes in other nostril if no or minimal breathing/responsiveness. 3/11/25   India Chris APRN   ondansetron (ZOFRAN) 4 MG tablet Take 1 tablet by mouth Every 8 (Eight) Hours As Needed for Nausea or Vomiting. 6/10/25   Adrien Pappas MD   pantoprazole (PROTONIX) 40 MG EC tablet Take 1 tablet by mouth Daily. 11/11/24   Karen Stover APRN   polyethylene glycol (MIRALAX) 17 g packet Mix 17gm (contents of 1 packet) in eight ounces of water or other beverage to drink once daily 3/11/25   India Chris APRN   prochlorperazine (COMPAZINE) 10 MG tablet Take 1 tablet by mouth Every 6 (Six) Hours As Needed for Nausea or Vomiting. 6/10/25   Adrien Pappas MD   Scopolamine 1 MG/3DAYS patch Place 1 patch on the skin as directed by provider Every 72 (Seventy-Two) Hours. 6/11/25   Adrien Pappas MD   sennosides-docusate (senna-docusate sodium) 8.6-50 MG per tablet Take 1 tablet by mouth Daily. 6/27/24   Karen Stover APRN   simethicone (MYLICON) 80 MG chewable tablet Chew 1.5 tablets 4 (Four) Times a Day  "Before Meals & at Bedtime. 25   Naila Alejandre MD   tiZANidine (ZANAFLEX) 4 MG tablet Take 1 tablet by mouth Every 8 (Eight) Hours As Needed for Muscle Spasms. 25   Karen Stover APRN   traZODone (DESYREL) 50 MG tablet Take 0.5 to 2 tab PO QHS PRN sleep  Patient taking differently: Take 1 tablet by mouth Every Night. 25   Karen Stover APRN   vitamin C (ASCORBIC ACID) 500 MG tablet Take 1 tablet by mouth Daily.    Provider, Avani, MD        Social History:   Social History     Tobacco Use    Smoking status: Former     Current packs/day: 0.00     Average packs/day: 0.3 packs/day for 23.3 years (6.0 ttl pk-yrs)     Types: Cigarettes     Start date: 1999     Quit date: 2019     Years since quittin.4    Smokeless tobacco: Never   Vaping Use    Vaping status: Never Used   Substance Use Topics    Alcohol use: Not Currently     Comment: rarely    Drug use: Never         Review of Systems:  Review of Systems   Constitutional:  Negative for chills and fever.   HENT:  Negative for congestion, ear pain and sore throat.    Eyes:  Negative for pain.   Respiratory:  Negative for cough, chest tightness and shortness of breath.    Cardiovascular:  Negative for chest pain.   Gastrointestinal:  Positive for abdominal pain, nausea and vomiting. Negative for diarrhea.   Genitourinary:  Negative for flank pain and hematuria.   Musculoskeletal:  Negative for joint swelling.   Skin:  Negative for pallor.   Neurological:  Negative for seizures and headaches.   All other systems reviewed and are negative.       Physical Exam:  /86 (BP Location: Left arm, Patient Position: Lying)   Pulse 64   Temp 98.3 °F (36.8 °C) (Oral)   Resp 21   Ht 157.5 cm (62\")   Wt 77.1 kg (169 lb 15.6 oz)   SpO2 95%   BMI 31.09 kg/m²   Vital signs were reviewed under triage note.  General appearance - Patient appears well-developed and well-nourished.  Patient is in no acute distress.  Head - " Normocephalic, atraumatic.  Pupils - Equal, round, reactive to light.  Extraocular muscles are intact.  Conjunctiva is clear.  Nasal - Normal inspection.  No evidence of trauma or epistaxis.  Tympanic membranes - Gray, intact without erythema or retractions.  Oral mucosa - Pink and moist without lesions or erythema.  Uvula is midline.  Chest wall - Atraumatic.  Chest wall is nontender.  There are no vesicular rashes noted.  Neck - Supple.  Trachea was midline.  There is no palpable lymphadenopathy or thyromegaly.  There are no meningeal signs  Lungs - Clear to auscultation and percussion bilaterally.  Heart - Regular rate and rhythm without any murmurs, clicks, or gallops.  Abdomen - Soft.  Bowel sounds are present.  There is moderate diffuse palpable tenderness.  There is no rebound, guarding, or rigidity.  There are no palpable masses.  There are no pulsatile masses.  Back - Spine is straight and midline.  There is no CVA tenderness.  Extremities - Intact x4 with full range of motion.  There is no palpable edema.  Pulses are intact x4 and equal.  Neurologic - Patient is awake, alert, and oriented x3.  Cranial nerves II through XII are grossly intact.  Motor and sensory functions grossly intact.  Cerebellar function was normal.  Integument - There are no rashes.  There are no petechia or purpura lesions noted.  There are no vesicular lesions noted.            Medical Decision Making:      Comorbidities that affect care:    Atrial fibrillation, endometriosis, anxiety, depression, gastroparesis, orthostatic hypotension    External Notes reviewed:    Previous Clinic Note: Office visit with Ethel Beltran on 6/13/2025 was reviewed by me.      The following orders were placed and all results were independently analyzed by me:  Orders Placed This Encounter   Procedures    CT Abdomen Pelvis With Contrast    [place] Draw    Comprehensive Metabolic Panel    Lipase    Urinalysis With Microscopic If Indicated (No Culture) -  Urine, Clean Catch    CBC Auto Differential    Urinalysis, Microscopic Only - Urine, Clean Catch    Undress & Gown    ECG 12 Lead QT Measurement    CBC & Differential    Green Top (Gel)    Lavender Top    Gold Top - SST    Light Blue Top       Medications Given in the Emergency Department:  Medications   ondansetron (ZOFRAN) injection 4 mg (4 mg Intravenous Given 6/26/25 2109)   HYDROmorphone (DILAUDID) injection 0.5 mg (0.5 mg Intravenous Given 6/26/25 2109)   lactated ringers bolus 1,000 mL (0 mL Intravenous Stopped 6/27/25 0323)   diphenhydrAMINE (BENADRYL) injection 50 mg (50 mg Intravenous Given 6/27/25 0008)   ketorolac (TORADOL) injection 30 mg (30 mg Intravenous Given 6/27/25 0008)   droperidol (INAPSINE) injection 2.5 mg (2.5 mg Intravenous Given 6/27/25 0112)   iopamidol (ISOVUE-370) 76 % injection 100 mL (85 mL Intravenous Given 6/27/25 0106)   HYDROmorphone (DILAUDID) injection 1 mg (1 mg Intravenous Given 6/27/25 0530)   prochlorperazine (COMPAZINE) injection 10 mg (10 mg Intravenous Given 6/27/25 0421)   HYDROmorphone (DILAUDID) injection 0.5 mg (0.5 mg Intravenous Given 6/27/25 0950)        ED Course:    The patient was initially evaluated in the triage area where orders were placed. The patient was later dispositioned by Sheng Guaman DO.      The patient was advised to stay for completion of workup which includes but is not limited to communication of labs and radiological results, reassessment and plan. The patient was advised that leaving prior to disposition by a provider could result in critical findings that are not communicated to the patient.     ED Course as of 06/28/25 1308   Fri Jun 27, 2025 0340 EKG performed at 12:35 AM was interpreted by me to show a normal sinus rhythm with a ventricular rate of 62 bpm.  The NH interval was 192 ms.  P waves were normal.  QRS intervals normal.  Axis is a 54 degrees.  There was no acute ischemic ST or T wave change identified.  QT corrected was 416 ms.  [TB]      ED Course User Index  [TB] Sheng Guaman DO       The patient was seen and evaluated in the ED by me.  The above history and physical examination was performed as documented.  Diagnostic data was obtained.  Results reviewed.  Prior to me seeing the patient she had been given a dose of hydromorphone in ondansetron.  When I saw the patient she reports no improvement in her symptoms and complains of severe nausea and pain.  Subsequently I gave her a dose of ketorolac and diphenhydramine.  I explained that narcotics are not contraindicated and actually detrimental to gastroparesis as narcotics will delay gastric emptying and essentially exacerbate her gastroparesis.  Patient was given a liter of IV fluids.  Patient continue to report no improvement in her symptoms.  At this point, I gave the patient a dose of droperidol.  Upon repeat evaluation the patient is resting comfortably and sleeping.  I discussed the findings with the patient's .  There was no acute findings to warrant inpatient admission.  Although to give the patient a trial oral Haldol as it is a help with her symptoms.  When nursing staff went to give the patient written for discharge she began complaining of severe pain and nausea with dry heaves.  Patient reports that she cannot go home.  I question if there may be a psychosomatic component to her symptoms however with her complaint of active symptoms I consulted the hospitalist service for admission.  They requested I talk to GI to see about possible transfer to motility center.  Dr. Estes was in agreement with this as an option.  I thus talked to [facility].  Their GI excepted the patient and hospital service will admit the patient.    Labs:    Lab Results (last 24 hours)       ** No results found for the last 24 hours. **             Imaging:    No Radiology Exams Resulted Within Past 24 Hours      Differential Diagnosis and Discussion:      Abdominal Pain: Based on the  patient's signs and symptoms, I considered abdominal aortic aneurysm, small bowel obstruction, pancreatitis, acute cholecystitis, acute appendecitis, peptic ulcer disease, gastritis, colitis, endocrine disorders, irritable bowel syndrome and other differential diagnosis an etiology of the patient's abdominal pain.  Vomiting: Differential diagnosis includes but is not limited to migraine, labyrinthine disorders, psychogenic, metabolic and endocrine causes, peptic ulcer, gastric outlet obstruction, gastritis, gastroenteritis, appendicitis, intestinal obstruction, paralytic ileus, food poisoning, cholecystitis, acute hepatitis, acute pancreatitis, acute febrile illness, and myocardial infarction.    PROCEDURES:    Labs were collected in the emergency department and all labs were reviewed and interpreted by me.  An EKG was performed and the EKG was interpreted by me.  CT scan was performed in the emergency department and the CT scan radiology impression was interpreted by me.    ECG 12 Lead QT Measurement   Preliminary Result   HEART RATE=62  bpm   RR Qrtrwmdr=981  ms   WV Nhwbsatj=836  ms   P Horizontal Axis=21  deg   P Front Axis=10  deg   QRSD Interval=80  ms   QT Zworetre=253  ms   AYqD=219  ms   QRS Axis=54  deg   T Wave Axis=39  deg   - NORMAL ECG -   Sinus rhythm   Date and Time of Study:2025-06-27 00:35:10           Procedures    MDM                   Patient Care Considerations:    SEPSIS was considered but is NOT present in the emergency department as SIRS criteria is not present.      Consultants/Shared Management Plan:    Transfer Provider: I have discussed the case with Dr. Mtz at [facility] who agrees to accept the patient as a transfer.    Social Determinants of Health:    Patient is independent, reliable, and has access to care.       Disposition and Care Coordination:    Transferred: Through independent evaluation of the patient's history, physical, and imperical data, the patient meets  criteria to be transferred to another hospital for evaluation/admission.        Final diagnoses:   Gastroparesis   Intractable nausea and vomiting   Intractable abdominal pain        ED Disposition       ED Disposition   Transfer to Another Facility     Condition   --    Comment   --               This medical record created using voice recognition software.             Sheng Guaman DO  06/28/25 7239

## 2025-06-28 LAB
QT INTERVAL: 409 MS
QTC INTERVAL: 416 MS

## 2025-06-30 LAB
QT INTERVAL: 279 MS
QTC INTERVAL: 428 MS

## 2025-07-17 DIAGNOSIS — F41.1 GENERALIZED ANXIETY DISORDER: ICD-10-CM

## 2025-07-18 RX ORDER — CLONAZEPAM 0.5 MG/1
0.5 TABLET ORAL 2 TIMES DAILY PRN
Qty: 40 TABLET | Refills: 0 | Status: SHIPPED | OUTPATIENT
Start: 2025-07-18

## 2025-07-18 NOTE — TELEPHONE ENCOUNTER
Controlled Medication Refill Request:    1.  Last Office Visit:  04/24/2025    2.  Next Office Visit:  Visit date not found     3.  Last UDS Date:  07/12/2023    4.  Last Consent Signed:  06/13/2025    5.  Medication Requested: Klonopin (Clonazepam)    Pharmacy:   [facility]/pharmacy #[id number] - [place] - [address] - [phone] PH - [phone] FX  [address] [place] [address]  Phone: [phone] Fax: [phone]    [id number] - [place], [place] - [address] [place] [address] - [phone]  - [phone] [facility] [place] [place] [address]  Phone: [phone] Fax: [phone]    [facility] - [place] [address] [address] [place] [address]  Phone: [phone] Fax: [phone]

## 2025-08-06 ENCOUNTER — OFFICE VISIT (OUTPATIENT)
Dept: FAMILY MEDICINE CLINIC | Facility: CLINIC | Age: 45
End: 2025-08-06
Payer: COMMERCIAL

## 2025-08-06 VITALS
SYSTOLIC BLOOD PRESSURE: 114 MMHG | WEIGHT: 184 LBS | HEART RATE: 91 BPM | OXYGEN SATURATION: 97 % | DIASTOLIC BLOOD PRESSURE: 74 MMHG | BODY MASS INDEX: 33.86 KG/M2 | TEMPERATURE: 98.1 F | HEIGHT: 62 IN

## 2025-08-06 DIAGNOSIS — K31.84 GASTROPARESIS: ICD-10-CM

## 2025-08-06 DIAGNOSIS — R11.0 NAUSEA: ICD-10-CM

## 2025-08-06 DIAGNOSIS — Z00.00 ANNUAL PHYSICAL EXAM: Primary | ICD-10-CM

## 2025-08-06 DIAGNOSIS — K58.1 IRRITABLE BOWEL SYNDROME WITH CONSTIPATION: ICD-10-CM

## 2025-08-06 DIAGNOSIS — F33.1 MAJOR DEPRESSIVE DISORDER, RECURRENT, MODERATE: ICD-10-CM

## 2025-08-06 DIAGNOSIS — Z12.31 SCREENING MAMMOGRAM FOR BREAST CANCER: ICD-10-CM

## 2025-08-06 DIAGNOSIS — I47.11 INAPPROPRIATE SINUS TACHYCARDIA: ICD-10-CM

## 2025-08-06 DIAGNOSIS — F41.1 GENERALIZED ANXIETY DISORDER: ICD-10-CM

## 2025-08-06 RX ORDER — IVABRADINE 5 MG/1
7.5 TABLET, FILM COATED ORAL 2 TIMES DAILY WITH MEALS
COMMUNITY

## 2025-08-06 RX ORDER — SCOPOLAMINE 1 MG/3D
1 PATCH, EXTENDED RELEASE TRANSDERMAL
Qty: 10 PATCH | Refills: 1 | Status: SHIPPED | OUTPATIENT
Start: 2025-08-06

## 2025-08-06 RX ORDER — PROMETHAZINE HYDROCHLORIDE 25 MG/1
25 TABLET ORAL EVERY 8 HOURS PRN
COMMUNITY
Start: 2025-07-09 | End: 2025-08-06 | Stop reason: SDUPTHER

## 2025-08-06 RX ORDER — NALOXEGOL OXALATE 25 MG/1
25 TABLET, FILM COATED ORAL DAILY
Qty: 30 TABLET | Refills: 2 | Status: SHIPPED | OUTPATIENT
Start: 2025-08-06

## 2025-08-06 RX ORDER — POTASSIUM CHLORIDE 750 MG/1
10 TABLET, EXTENDED RELEASE ORAL 2 TIMES DAILY
Qty: 30 TABLET | Refills: 2 | Status: SHIPPED | OUTPATIENT
Start: 2025-08-06

## 2025-08-06 RX ORDER — PROMETHAZINE HYDROCHLORIDE 25 MG/1
25 TABLET ORAL EVERY 8 HOURS PRN
Qty: 30 TABLET | Refills: 1 | Status: SHIPPED | OUTPATIENT
Start: 2025-08-06

## 2025-08-06 RX ORDER — NALOXEGOL OXALATE 25 MG/1
1 TABLET, FILM COATED ORAL DAILY
COMMUNITY
Start: 2025-07-09 | End: 2025-08-06 | Stop reason: SDUPTHER

## 2025-08-06 RX ORDER — METOPROLOL SUCCINATE 50 MG/1
50 TABLET, EXTENDED RELEASE ORAL DAILY
Qty: 30 TABLET | Refills: 5 | Status: SHIPPED | OUTPATIENT
Start: 2025-08-06

## 2025-08-06 RX ORDER — PROCHLORPERAZINE MALEATE 10 MG
10 TABLET ORAL EVERY 6 HOURS PRN
Qty: 30 TABLET | Refills: 1 | Status: SHIPPED | OUTPATIENT
Start: 2025-08-06

## 2025-08-06 RX ORDER — POTASSIUM CHLORIDE 750 MG/1
20 TABLET, EXTENDED RELEASE ORAL 2 TIMES DAILY
COMMUNITY
Start: 2025-07-09 | End: 2025-08-06 | Stop reason: SDUPTHER

## 2025-08-06 RX ORDER — LUBIPROSTONE 8 UG/1
8 CAPSULE ORAL 2 TIMES DAILY
Qty: 60 CAPSULE | Refills: 2 | Status: SHIPPED | OUTPATIENT
Start: 2025-08-06

## 2025-08-11 ENCOUNTER — PATIENT MESSAGE (OUTPATIENT)
Dept: FAMILY MEDICINE CLINIC | Facility: CLINIC | Age: 45
End: 2025-08-11
Payer: COMMERCIAL

## 2025-08-13 ENCOUNTER — PRIOR AUTHORIZATION (OUTPATIENT)
Dept: FAMILY MEDICINE CLINIC | Facility: CLINIC | Age: 45
End: 2025-08-13
Payer: COMMERCIAL

## 2025-08-13 DIAGNOSIS — G43.711 INTRACTABLE CHRONIC MIGRAINE WITHOUT AURA AND WITH STATUS MIGRAINOSUS: Primary | ICD-10-CM

## 2025-08-19 DIAGNOSIS — F41.1 GENERALIZED ANXIETY DISORDER: ICD-10-CM

## 2025-08-19 RX ORDER — CLONAZEPAM 0.5 MG/1
0.5 TABLET ORAL 2 TIMES DAILY PRN
Qty: 40 TABLET | Refills: 0 | Status: SHIPPED | OUTPATIENT
Start: 2025-08-19

## 2025-08-28 DIAGNOSIS — F41.1 GENERALIZED ANXIETY DISORDER: Primary | ICD-10-CM

## 2025-08-28 DIAGNOSIS — F33.1 MAJOR DEPRESSIVE DISORDER, RECURRENT, MODERATE: ICD-10-CM

## 2025-08-28 RX ORDER — ARIPIPRAZOLE 2 MG/1
2 TABLET ORAL DAILY
Qty: 30 TABLET | Refills: 2 | Status: SHIPPED | OUTPATIENT
Start: 2025-08-28

## (undated) DEVICE — THE STERILE LIGHT HANDLE COVER IS USED WITH STERIS SURGICAL LIGHTING AND VISUALIZATION SYSTEMS.

## (undated) DEVICE — Device

## (undated) DEVICE — BLCK/BITE BLOX WO/DENTL/RIM W/STRAP 54F

## (undated) DEVICE — SLV SCD KN/LEN ADJ EXPRSS BLENDED MD 1P/U

## (undated) DEVICE — ANTIBACTERIAL VIOLET BRAIDED (POLYGLACTIN 910), SYNTHETIC ABSORBABLE SUTURE: Brand: COATED VICRYL

## (undated) DEVICE — SOL IRRG H2O PL/BG 1000ML STRL

## (undated) DEVICE — DEFENDO AIR WATER SUCTION AND BIOPSY VALVE KIT FOR  OLYMPUS: Brand: DEFENDO AIR/WATER/SUCTION AND BIOPSY VALVE

## (undated) DEVICE — 5065 IMPAD REGULAR PAIR: Brand: A-V IMPULSE

## (undated) DEVICE — SUT MNCRYL PLS ANTIB UD 4/0 PS2 18IN

## (undated) DEVICE — ROTATING HEMOSTATIC VALVE .096": Brand: RHV

## (undated) DEVICE — DRAPE,UNDERBUTTOCKS,PCH,STERILE: Brand: MEDLINE

## (undated) DEVICE — 1LYRTR 16FR10ML100%SIL UMS SNP: Brand: MEDLINE INDUSTRIES, INC.

## (undated) DEVICE — SOL IRR NACL 0.9PCT BO 1000ML

## (undated) DEVICE — GOWN,SIRUS,POLYRNF,BRTHSLV,2XL,18/CS: Brand: MEDLINE

## (undated) DEVICE — SINGLE USE DISTAL COVER MAJ-2315: Brand: SINGLE USE DISTAL COVER

## (undated) DEVICE — SUT PDS2 0 CT1 27IN Z340H MF VIL

## (undated) DEVICE — SYR LL TP 10ML STRL

## (undated) DEVICE — TROCARS: Brand: KII® BALLOON BLUNT TIP SYSTEM

## (undated) DEVICE — SUT VIC PLS CTD BR 0 TIE 18IN VIL

## (undated) DEVICE — SOL IRR H2O BTL 500ML STRL

## (undated) DEVICE — 90MM X 18MM SINGLE-USE SLOTTED ANOSCOPE WITH BUILT-IN LIGHT SOURCE: Brand: ANOSPEC

## (undated) DEVICE — INTENDED FOR TISSUE SEPARATION, AND OTHER PROCEDURES THAT REQUIRE A SHARP SURGICAL BLADE TO PUNCTURE OR CUT.: Brand: BARD-PARKER ® STAINLESS STEEL BLADES

## (undated) DEVICE — LAPAROSCOPIC SMOKE EVACUATION SYSTEM ACTIVE AND PASSIVE: Brand: VALLEYLAB

## (undated) DEVICE — CONN JET HYDRA H20 AUXILIARY DISP

## (undated) DEVICE — ENDOPATH XCEL WITH OPTIVIEW TECHNOLOGY UNIVERSAL TROCAR STABILITY SLEEVES: Brand: ENDOPATH XCEL OPTIVIEW

## (undated) DEVICE — SYR LUERLOK 30CC

## (undated) DEVICE — STERILE POLYISOPRENE POWDER-FREE SURGICAL GLOVES WITH EMOLLIENT COATING: Brand: PROTEXIS

## (undated) DEVICE — GAUZE,SPONGE,4"X4",16PLY,STRL,LF,10/TRAY: Brand: MEDLINE

## (undated) DEVICE — DEV LIG SHORTSHOT HMROID M/BND W/TRIVIEW ANOSCP 5IN

## (undated) DEVICE — SPHINCT CANNULATOME2 2L DOME/TP .035IN 6F 2.8MM 200CM

## (undated) DEVICE — LINER SURG CANSTR SXN S/RIGD 1500CC

## (undated) DEVICE — SPHINCTEROTOME: Brand: DREAMTOME™ RX 49

## (undated) DEVICE — APPL HEMO SURG ARISTA/AH/FLEXITIP XL 38CM

## (undated) DEVICE — STERILE POLYISOPRENE POWDER-FREE SURGICAL GLOVES: Brand: PROTEXIS

## (undated) DEVICE — ENDOPATH XCEL BLADELESS TROCARS WITH STABILITY SLEEVES: Brand: ENDOPATH XCEL

## (undated) DEVICE — PAD GRND REM POLYHESIVE A/ DISP

## (undated) DEVICE — EXOFIN PRECISION PEN HIGH VISCOSITY TOPICAL SKIN ADHESIVE: Brand: EXOFIN PRECISION PEN, 1G

## (undated) DEVICE — INTENDED FOR TISSUE SEPARATION, AND OTHER PROCEDURES THAT REQUIRE A SHARP SURGICAL BLADE TO PUNCTURE OR CUT.: Brand: BARD-PARKER ® CARBON RIB-BACK BLADES

## (undated) DEVICE — Device: Brand: DEFENDO AIR/WATER/SUCTION AND BIOPSY VALVE

## (undated) DEVICE — DRESSING,GAUZE,XEROFORM,CURAD,5"X9",ST: Brand: CURAD

## (undated) DEVICE — BILIARY BALLOON DILATATION CATHETER: Brand: CRE™ RX

## (undated) DEVICE — LAPAROSCOPIC SCISSORS: Brand: EPIX LAPAROSCOPIC SCISSORS

## (undated) DEVICE — JELLY,LUBE,STERILE,FLIP TOP,TUBE,4-OZ: Brand: MEDLINE

## (undated) DEVICE — SUT SILK 3/0 SH CR8 18IN C013D

## (undated) DEVICE — VAGINAL PREP TRAY: Brand: MEDLINE INDUSTRIES, INC.

## (undated) DEVICE — SOL IRR NACL 0.9PCT 1000ML

## (undated) DEVICE — ENDOPATH XCEL WITH OPTIVIEW TECHNOLOGY BLADELESS TROCARS WITH STABILITY SLEEVES: Brand: ENDOPATH XCEL OPTIVIEW

## (undated) DEVICE — SNAR E/S POLYP SNAREMASTER OVL/10MM 2.8X2300MM YEL

## (undated) DEVICE — SOLIDIFIER LIQLOC PLS 1500CC BT

## (undated) DEVICE — GLOVE,SURG,SENSICARE SLT,LF,PF,7.5: Brand: MEDLINE

## (undated) DEVICE — GLOVE,SURG,SENSICARE SLT,LF,PF,6.5: Brand: MEDLINE

## (undated) DEVICE — GLV SURG SENSICARE PI LF PF 7.5 GRN STRL

## (undated) DEVICE — MAJOR-LF: Brand: MEDLINE INDUSTRIES, INC.

## (undated) DEVICE — SINGLE USE ELECTROSURGICAL HEMOSTATIC FORCEPS: Brand: SINGLE USE ELECTROSURGICAL HEMOSTATIC FORCEPS

## (undated) DEVICE — ANTIBACTERIAL VIOLET BRAIDED (POLYGLACTIN 910), SYNTHETIC ABSORBABLE SURGICAL SUTURE: Brand: COATED VICRYL

## (undated) DEVICE — BRIEF KNIT SEAMLSS PREM 70IN 3XL PK/2

## (undated) DEVICE — SUT VIC 3/0 SH 27IN J416H

## (undated) DEVICE — DRSNG PAD ABD 8X10IN STRL

## (undated) DEVICE — DEV INFL CRE STERIFLATE 60CC DISP

## (undated) DEVICE — ENDOPATH ETS-FLEX45 ARTICULATING ENDOSCOPIC LINEAR CUTTER, NO RELOAD: Brand: ENDOPATH

## (undated) DEVICE — MINOR-LF: Brand: MEDLINE INDUSTRIES, INC.

## (undated) DEVICE — GENERAL LAPAROSCOPY-LF: Brand: MEDLINE INDUSTRIES, INC.

## (undated) DEVICE — GW JAG STR .035IN 450CM

## (undated) DEVICE — LAPAROVUE VISIBILITY SYSTEM LAPAROSCOPIC SOLUTIONS: Brand: LAPAROVUE

## (undated) DEVICE — STRAP STIRUP SLP RNG 19X3.5IN DISP

## (undated) DEVICE — COLON KIT: Brand: MEDLINE INDUSTRIES, INC.

## (undated) DEVICE — SYR SLPTP 30CC

## (undated) DEVICE — PROXIMATE RH ROTATING HEAD SKIN STAPLERS (35 WIDE) CONTAINS 35 STAINLESS STEEL STAPLES: Brand: PROXIMATE

## (undated) DEVICE — PENCL SMOKE/EVAC MEGADYNE TELESCP 10FT

## (undated) DEVICE — GLV SURG SENSICARE PI ORTHO SZ6.5 LF STRL

## (undated) DEVICE — GLV SURG BIOGEL LTX PF 7 1/2

## (undated) DEVICE — SNAR POLYP SENSATION MD/OVL FLX 27MM/LP 2.4MM 240CM 1P/U

## (undated) DEVICE — SINGLE-USE BIOPSY FORCEPS: Brand: RADIAL JAW 4

## (undated) DEVICE — TUBING, SUCTION, 1/4" X 10', STRAIGHT: Brand: MEDLINE

## (undated) DEVICE — RX DELIVERY SYSTEM: Brand: NAVIFLEX™ RX DELIVERY SYSTEM

## (undated) DEVICE — ANTIBACTERIAL UNDYED BRAIDED (POLYGLACTIN 910), SYNTHETIC ABSORBABLE SUTURE: Brand: COATED VICRYL

## (undated) DEVICE — YANKAUER,BULB TIP,W/O VENT,RIGID,STERILE: Brand: MEDLINE

## (undated) DEVICE — 2, DISPOSABLE SUCTION/IRRIGATOR WITHOUT DISPOSABLE TIP: Brand: STRYKEFLOW

## (undated) DEVICE — TISSUE RETRIEVAL SYSTEM: Brand: INZII RETRIEVAL SYSTEM

## (undated) DEVICE — LEGGINGS, PAIR, CLEAR, STERILE: Brand: MEDLINE

## (undated) DEVICE — SOL IRR H2O BTL 1000ML STRL